# Patient Record
Sex: MALE | Race: WHITE | NOT HISPANIC OR LATINO | Employment: OTHER | URBAN - METROPOLITAN AREA
[De-identification: names, ages, dates, MRNs, and addresses within clinical notes are randomized per-mention and may not be internally consistent; named-entity substitution may affect disease eponyms.]

---

## 2017-01-07 ENCOUNTER — APPOINTMENT (OUTPATIENT)
Dept: RADIOLOGY | Facility: MEDICAL CENTER | Age: 67
DRG: 356 | End: 2017-01-07
Attending: INTERNAL MEDICINE
Payer: MEDICARE

## 2017-01-07 PROCEDURE — 73620 X-RAY EXAM OF FOOT: CPT | Mod: RT

## 2017-01-10 ENCOUNTER — PATIENT OUTREACH (OUTPATIENT)
Dept: HEALTH INFORMATION MANAGEMENT | Facility: OTHER | Age: 67
End: 2017-01-10

## 2017-11-24 ENCOUNTER — RESOLUTE PROFESSIONAL BILLING HOSPITAL PROF FEE (OUTPATIENT)
Dept: HOSPITALIST | Facility: MEDICAL CENTER | Age: 67
End: 2017-11-24
Payer: MEDICARE

## 2017-11-24 ENCOUNTER — APPOINTMENT (OUTPATIENT)
Dept: RADIOLOGY | Facility: MEDICAL CENTER | Age: 67
DRG: 896 | End: 2017-11-24
Attending: STUDENT IN AN ORGANIZED HEALTH CARE EDUCATION/TRAINING PROGRAM
Payer: MEDICARE

## 2017-11-24 ENCOUNTER — HOSPITAL ENCOUNTER (INPATIENT)
Facility: MEDICAL CENTER | Age: 67
LOS: 21 days | DRG: 896 | End: 2017-12-15
Attending: EMERGENCY MEDICINE | Admitting: INTERNAL MEDICINE
Payer: MEDICARE

## 2017-11-24 DIAGNOSIS — R41.89 NEUROCOGNITIVE DEFICITS: ICD-10-CM

## 2017-11-24 DIAGNOSIS — K92.1 HEMATOCHEZIA: ICD-10-CM

## 2017-11-24 DIAGNOSIS — R29.818 NEUROCOGNITIVE DEFICITS: ICD-10-CM

## 2017-11-24 DIAGNOSIS — F10.930 ALCOHOL WITHDRAWAL SYNDROME WITHOUT COMPLICATION (HCC): ICD-10-CM

## 2017-11-24 PROBLEM — I47.21 TORSADES DE POINTES (HCC): Status: ACTIVE | Noted: 2017-11-24

## 2017-11-24 PROBLEM — F10.939 ALCOHOL WITHDRAWAL (HCC): Status: ACTIVE | Noted: 2017-11-24

## 2017-11-24 PROBLEM — Z86.711 HISTORY OF PULMONARY EMBOLISM: Status: ACTIVE | Noted: 2017-11-24

## 2017-11-24 PROBLEM — K92.2 GI BLEED: Status: ACTIVE | Noted: 2017-11-24

## 2017-11-24 LAB
ALBUMIN SERPL BCP-MCNC: 3.1 G/DL (ref 3.2–4.9)
ALBUMIN SERPL BCP-MCNC: 3.3 G/DL (ref 3.2–4.9)
ALBUMIN/GLOB SERPL: 1.1 G/DL
ALBUMIN/GLOB SERPL: 1.2 G/DL
ALP SERPL-CCNC: 39 U/L (ref 30–99)
ALP SERPL-CCNC: 48 U/L (ref 30–99)
ALT SERPL-CCNC: 18 U/L (ref 2–50)
ALT SERPL-CCNC: 23 U/L (ref 2–50)
AMPHET UR QL SCN: NEGATIVE
ANION GAP SERPL CALC-SCNC: 10 MMOL/L (ref 0–11.9)
ANION GAP SERPL CALC-SCNC: 12 MMOL/L (ref 0–11.9)
AST SERPL-CCNC: 22 U/L (ref 12–45)
AST SERPL-CCNC: 31 U/L (ref 12–45)
BARBITURATES UR QL SCN: NEGATIVE
BASOPHILS # BLD AUTO: 0.1 % (ref 0–1.8)
BASOPHILS # BLD: 0.02 K/UL (ref 0–0.12)
BENZODIAZ UR QL SCN: NEGATIVE
BILIRUB SERPL-MCNC: 0.8 MG/DL (ref 0.1–1.5)
BILIRUB SERPL-MCNC: 1 MG/DL (ref 0.1–1.5)
BUN SERPL-MCNC: 13 MG/DL (ref 8–22)
BUN SERPL-MCNC: 16 MG/DL (ref 8–22)
BZE UR QL SCN: NEGATIVE
CALCIUM SERPL-MCNC: 8.2 MG/DL (ref 8.5–10.5)
CALCIUM SERPL-MCNC: 8.4 MG/DL (ref 8.5–10.5)
CANNABINOIDS UR QL SCN: NEGATIVE
CHLORIDE SERPL-SCNC: 108 MMOL/L (ref 96–112)
CHLORIDE SERPL-SCNC: 109 MMOL/L (ref 96–112)
CO2 SERPL-SCNC: 20 MMOL/L (ref 20–33)
CO2 SERPL-SCNC: 21 MMOL/L (ref 20–33)
CREAT SERPL-MCNC: 0.8 MG/DL (ref 0.5–1.4)
CREAT SERPL-MCNC: 0.88 MG/DL (ref 0.5–1.4)
EKG IMPRESSION: NORMAL
EKG IMPRESSION: NORMAL
EOSINOPHIL # BLD AUTO: 0 K/UL (ref 0–0.51)
EOSINOPHIL NFR BLD: 0 % (ref 0–6.9)
ERYTHROCYTE [DISTWIDTH] IN BLOOD BY AUTOMATED COUNT: 51.2 FL (ref 35.9–50)
ERYTHROCYTE [DISTWIDTH] IN BLOOD BY AUTOMATED COUNT: 51.5 FL (ref 35.9–50)
ERYTHROCYTE [DISTWIDTH] IN BLOOD BY AUTOMATED COUNT: 52.6 FL (ref 35.9–50)
GFR SERPL CREATININE-BSD FRML MDRD: >60 ML/MIN/1.73 M 2
GFR SERPL CREATININE-BSD FRML MDRD: >60 ML/MIN/1.73 M 2
GLOBULIN SER CALC-MCNC: 2.6 G/DL (ref 1.9–3.5)
GLOBULIN SER CALC-MCNC: 2.9 G/DL (ref 1.9–3.5)
GLUCOSE SERPL-MCNC: 107 MG/DL (ref 65–99)
GLUCOSE SERPL-MCNC: 150 MG/DL (ref 65–99)
HAV IGM SERPL QL IA: NEGATIVE
HBV CORE IGM SER QL: NEGATIVE
HBV SURFACE AG SER QL: NEGATIVE
HCT VFR BLD AUTO: 27 % (ref 42–52)
HCT VFR BLD AUTO: 27 % (ref 42–52)
HCT VFR BLD AUTO: 33 % (ref 42–52)
HCV AB SER QL: NEGATIVE
HGB BLD-MCNC: 11.1 G/DL (ref 14–18)
HGB BLD-MCNC: 9.1 G/DL (ref 14–18)
HGB BLD-MCNC: 9.2 G/DL (ref 14–18)
IMM GRANULOCYTES # BLD AUTO: 0.12 K/UL (ref 0–0.11)
IMM GRANULOCYTES NFR BLD AUTO: 0.8 % (ref 0–0.9)
INR PPP: 1.21 (ref 0.87–1.13)
LIPASE SERPL-CCNC: 91 U/L (ref 11–82)
LYMPHOCYTES # BLD AUTO: 0.4 K/UL (ref 1–4.8)
LYMPHOCYTES NFR BLD: 2.7 % (ref 22–41)
MAGNESIUM SERPL-MCNC: 1.5 MG/DL (ref 1.5–2.5)
MAGNESIUM SERPL-MCNC: 2.4 MG/DL (ref 1.5–2.5)
MCH RBC QN AUTO: 33.2 PG (ref 27–33)
MCH RBC QN AUTO: 33.2 PG (ref 27–33)
MCH RBC QN AUTO: 33.7 PG (ref 27–33)
MCHC RBC AUTO-ENTMCNC: 33.6 G/DL (ref 33.7–35.3)
MCHC RBC AUTO-ENTMCNC: 33.7 G/DL (ref 33.7–35.3)
MCHC RBC AUTO-ENTMCNC: 34.1 G/DL (ref 33.7–35.3)
MCV RBC AUTO: 100.3 FL (ref 81.4–97.8)
MCV RBC AUTO: 97.5 FL (ref 81.4–97.8)
MCV RBC AUTO: 98.5 FL (ref 81.4–97.8)
METHADONE UR QL SCN: NEGATIVE
MONOCYTES # BLD AUTO: 1.77 K/UL (ref 0–0.85)
MONOCYTES NFR BLD AUTO: 12.1 % (ref 0–13.4)
NEUTROPHILS # BLD AUTO: 12.26 K/UL (ref 1.82–7.42)
NEUTROPHILS NFR BLD: 84.3 % (ref 44–72)
NRBC # BLD AUTO: 0 K/UL
NRBC BLD AUTO-RTO: 0 /100 WBC
OPIATES UR QL SCN: NEGATIVE
OXYCODONE UR QL SCN: NEGATIVE
PCP UR QL SCN: NEGATIVE
PHOSPHATE SERPL-MCNC: 2.9 MG/DL (ref 2.5–4.5)
PLATELET # BLD AUTO: 126 K/UL (ref 164–446)
PLATELET # BLD AUTO: 94 K/UL (ref 164–446)
PLATELET # BLD AUTO: 97 K/UL (ref 164–446)
PMV BLD AUTO: 10.3 FL (ref 9–12.9)
PMV BLD AUTO: 10.5 FL (ref 9–12.9)
PMV BLD AUTO: 9.9 FL (ref 9–12.9)
POTASSIUM SERPL-SCNC: 3.5 MMOL/L (ref 3.6–5.5)
POTASSIUM SERPL-SCNC: 4.8 MMOL/L (ref 3.6–5.5)
PROPOXYPH UR QL SCN: NEGATIVE
PROT SERPL-MCNC: 5.7 G/DL (ref 6–8.2)
PROT SERPL-MCNC: 6.2 G/DL (ref 6–8.2)
PROTHROMBIN TIME: 15 SEC (ref 12–14.6)
RBC # BLD AUTO: 2.74 M/UL (ref 4.7–6.1)
RBC # BLD AUTO: 2.77 M/UL (ref 4.7–6.1)
RBC # BLD AUTO: 3.29 M/UL (ref 4.7–6.1)
SODIUM SERPL-SCNC: 140 MMOL/L (ref 135–145)
SODIUM SERPL-SCNC: 140 MMOL/L (ref 135–145)
T4 FREE SERPL-MCNC: 0.74 NG/DL (ref 0.53–1.43)
TSH SERPL DL<=0.005 MIU/L-ACNC: 0.64 UIU/ML (ref 0.3–3.7)
WBC # BLD AUTO: 14.6 K/UL (ref 4.8–10.8)
WBC # BLD AUTO: 7.7 K/UL (ref 4.8–10.8)
WBC # BLD AUTO: 9.7 K/UL (ref 4.8–10.8)

## 2017-11-24 PROCEDURE — 83690 ASSAY OF LIPASE: CPT

## 2017-11-24 PROCEDURE — 99285 EMERGENCY DEPT VISIT HI MDM: CPT

## 2017-11-24 PROCEDURE — 700105 HCHG RX REV CODE 258: Performed by: STUDENT IN AN ORGANIZED HEALTH CARE EDUCATION/TRAINING PROGRAM

## 2017-11-24 PROCEDURE — 302255 BARRIER CREAM MOISTURE BAZA PROTECT (ZINC) 5OZ: Performed by: INTERNAL MEDICINE

## 2017-11-24 PROCEDURE — 96376 TX/PRO/DX INJ SAME DRUG ADON: CPT

## 2017-11-24 PROCEDURE — 700111 HCHG RX REV CODE 636 W/ 250 OVERRIDE (IP): Performed by: EMERGENCY MEDICINE

## 2017-11-24 PROCEDURE — A9270 NON-COVERED ITEM OR SERVICE: HCPCS | Performed by: STUDENT IN AN ORGANIZED HEALTH CARE EDUCATION/TRAINING PROGRAM

## 2017-11-24 PROCEDURE — 84100 ASSAY OF PHOSPHORUS: CPT

## 2017-11-24 PROCEDURE — 700102 HCHG RX REV CODE 250 W/ 637 OVERRIDE(OP): Performed by: STUDENT IN AN ORGANIZED HEALTH CARE EDUCATION/TRAINING PROGRAM

## 2017-11-24 PROCEDURE — 94760 N-INVAS EAR/PLS OXIMETRY 1: CPT

## 2017-11-24 PROCEDURE — 93005 ELECTROCARDIOGRAM TRACING: CPT | Performed by: STUDENT IN AN ORGANIZED HEALTH CARE EDUCATION/TRAINING PROGRAM

## 2017-11-24 PROCEDURE — 700111 HCHG RX REV CODE 636 W/ 250 OVERRIDE (IP): Performed by: STUDENT IN AN ORGANIZED HEALTH CARE EDUCATION/TRAINING PROGRAM

## 2017-11-24 PROCEDURE — 80053 COMPREHEN METABOLIC PANEL: CPT

## 2017-11-24 PROCEDURE — 93010 ELECTROCARDIOGRAM REPORT: CPT | Mod: 76 | Performed by: INTERNAL MEDICINE

## 2017-11-24 PROCEDURE — HZ2ZZZZ DETOXIFICATION SERVICES FOR SUBSTANCE ABUSE TREATMENT: ICD-10-PCS | Performed by: INTERNAL MEDICINE

## 2017-11-24 PROCEDURE — 99223 1ST HOSP IP/OBS HIGH 75: CPT | Mod: AI,GC | Performed by: INTERNAL MEDICINE

## 2017-11-24 PROCEDURE — 96374 THER/PROPH/DIAG INJ IV PUSH: CPT

## 2017-11-24 PROCEDURE — 36415 COLL VENOUS BLD VENIPUNCTURE: CPT

## 2017-11-24 PROCEDURE — 96361 HYDRATE IV INFUSION ADD-ON: CPT

## 2017-11-24 PROCEDURE — 85027 COMPLETE CBC AUTOMATED: CPT | Mod: 91

## 2017-11-24 PROCEDURE — 83735 ASSAY OF MAGNESIUM: CPT

## 2017-11-24 PROCEDURE — 76700 US EXAM ABDOM COMPLETE: CPT

## 2017-11-24 PROCEDURE — 85025 COMPLETE CBC W/AUTO DIFF WBC: CPT

## 2017-11-24 PROCEDURE — 84443 ASSAY THYROID STIM HORMONE: CPT

## 2017-11-24 PROCEDURE — C9113 INJ PANTOPRAZOLE SODIUM, VIA: HCPCS | Performed by: STUDENT IN AN ORGANIZED HEALTH CARE EDUCATION/TRAINING PROGRAM

## 2017-11-24 PROCEDURE — 302172 SOFT BED BELT: Performed by: INTERNAL MEDICINE

## 2017-11-24 PROCEDURE — 770020 HCHG ROOM/CARE - TELE (206)

## 2017-11-24 PROCEDURE — 80074 ACUTE HEPATITIS PANEL: CPT

## 2017-11-24 PROCEDURE — 84439 ASSAY OF FREE THYROXINE: CPT

## 2017-11-24 PROCEDURE — 85610 PROTHROMBIN TIME: CPT

## 2017-11-24 PROCEDURE — 700101 HCHG RX REV CODE 250: Performed by: STUDENT IN AN ORGANIZED HEALTH CARE EDUCATION/TRAINING PROGRAM

## 2017-11-24 PROCEDURE — 74000 DX-ABDOMEN-1 VIEW: CPT

## 2017-11-24 PROCEDURE — 700105 HCHG RX REV CODE 258: Performed by: EMERGENCY MEDICINE

## 2017-11-24 PROCEDURE — 80307 DRUG TEST PRSMV CHEM ANLYZR: CPT

## 2017-11-24 RX ORDER — CHLORDIAZEPOXIDE HYDROCHLORIDE 25 MG/1
25 CAPSULE, GELATIN COATED ORAL 3 TIMES DAILY
Status: DISCONTINUED | OUTPATIENT
Start: 2017-11-24 | End: 2017-11-26

## 2017-11-24 RX ORDER — LORAZEPAM 2 MG/ML
1.5 INJECTION INTRAMUSCULAR
Status: DISCONTINUED | OUTPATIENT
Start: 2017-11-24 | End: 2017-12-02

## 2017-11-24 RX ORDER — LORAZEPAM 0.5 MG/1
0.5 TABLET ORAL EVERY 4 HOURS PRN
Status: DISCONTINUED | OUTPATIENT
Start: 2017-11-24 | End: 2017-12-02

## 2017-11-24 RX ORDER — LORAZEPAM 1 MG/1
1 TABLET ORAL EVERY 4 HOURS PRN
Status: DISCONTINUED | OUTPATIENT
Start: 2017-11-24 | End: 2017-12-02

## 2017-11-24 RX ORDER — LORAZEPAM 2 MG/ML
1 INJECTION INTRAMUSCULAR
Status: DISCONTINUED | OUTPATIENT
Start: 2017-11-24 | End: 2017-12-02

## 2017-11-24 RX ORDER — LORAZEPAM 1 MG/1
2 TABLET ORAL
Status: DISCONTINUED | OUTPATIENT
Start: 2017-11-24 | End: 2017-12-02

## 2017-11-24 RX ORDER — PANTOPRAZOLE SODIUM 40 MG/10ML
40 INJECTION, POWDER, LYOPHILIZED, FOR SOLUTION INTRAVENOUS 2 TIMES DAILY
Status: DISCONTINUED | OUTPATIENT
Start: 2017-11-24 | End: 2017-11-28

## 2017-11-24 RX ORDER — POTASSIUM CHLORIDE 20 MEQ/1
60 TABLET, EXTENDED RELEASE ORAL ONCE
Status: COMPLETED | OUTPATIENT
Start: 2017-11-24 | End: 2017-11-24

## 2017-11-24 RX ORDER — AMOXICILLIN 250 MG
2 CAPSULE ORAL 2 TIMES DAILY
Status: DISCONTINUED | OUTPATIENT
Start: 2017-11-24 | End: 2017-12-12

## 2017-11-24 RX ORDER — BISACODYL 10 MG
10 SUPPOSITORY, RECTAL RECTAL
Status: DISCONTINUED | OUTPATIENT
Start: 2017-11-24 | End: 2017-12-12

## 2017-11-24 RX ORDER — LORAZEPAM 2 MG/ML
2 INJECTION INTRAMUSCULAR ONCE
Status: COMPLETED | OUTPATIENT
Start: 2017-11-24 | End: 2017-11-24

## 2017-11-24 RX ORDER — POLYETHYLENE GLYCOL 3350 17 G/17G
1 POWDER, FOR SOLUTION ORAL
Status: DISCONTINUED | OUTPATIENT
Start: 2017-11-24 | End: 2017-12-12

## 2017-11-24 RX ORDER — LORAZEPAM 2 MG/ML
2 INJECTION INTRAMUSCULAR
Status: DISCONTINUED | OUTPATIENT
Start: 2017-11-24 | End: 2017-12-02

## 2017-11-24 RX ORDER — LORAZEPAM 1 MG/1
3 TABLET ORAL
Status: DISCONTINUED | OUTPATIENT
Start: 2017-11-24 | End: 2017-12-02

## 2017-11-24 RX ORDER — MAGNESIUM SULFATE HEPTAHYDRATE 40 MG/ML
2 INJECTION, SOLUTION INTRAVENOUS ONCE
Status: COMPLETED | OUTPATIENT
Start: 2017-11-24 | End: 2017-11-24

## 2017-11-24 RX ORDER — SODIUM CHLORIDE 9 MG/ML
INJECTION, SOLUTION INTRAVENOUS CONTINUOUS
Status: DISCONTINUED | OUTPATIENT
Start: 2017-11-24 | End: 2017-11-27

## 2017-11-24 RX ORDER — SODIUM CHLORIDE 9 MG/ML
1000 INJECTION, SOLUTION INTRAVENOUS ONCE
Status: COMPLETED | OUTPATIENT
Start: 2017-11-24 | End: 2017-11-24

## 2017-11-24 RX ORDER — LORAZEPAM 2 MG/ML
0.5 INJECTION INTRAMUSCULAR EVERY 4 HOURS PRN
Status: DISCONTINUED | OUTPATIENT
Start: 2017-11-24 | End: 2017-12-02

## 2017-11-24 RX ORDER — LORAZEPAM 1 MG/1
4 TABLET ORAL
Status: DISCONTINUED | OUTPATIENT
Start: 2017-11-24 | End: 2017-12-02

## 2017-11-24 RX ADMIN — CEFTRIAXONE 2 G: 2 INJECTION, POWDER, FOR SOLUTION INTRAMUSCULAR; INTRAVENOUS at 08:35

## 2017-11-24 RX ADMIN — LORAZEPAM 1 MG: 2 INJECTION INTRAMUSCULAR; INTRAVENOUS at 16:54

## 2017-11-24 RX ADMIN — LORAZEPAM 1 MG: 1 TABLET ORAL at 22:05

## 2017-11-24 RX ADMIN — THIAMINE HYDROCHLORIDE 100 MG: 100 INJECTION, SOLUTION INTRAMUSCULAR; INTRAVENOUS at 10:20

## 2017-11-24 RX ADMIN — POTASSIUM CHLORIDE 60 MEQ: 1500 TABLET, EXTENDED RELEASE ORAL at 16:57

## 2017-11-24 RX ADMIN — SODIUM CHLORIDE: 9 INJECTION, SOLUTION INTRAVENOUS at 22:06

## 2017-11-24 RX ADMIN — CHLORDIAZEPOXIDE HYDROCHLORIDE 25 MG: 25 CAPSULE ORAL at 22:05

## 2017-11-24 RX ADMIN — LORAZEPAM 1 MG: 1 TABLET ORAL at 19:04

## 2017-11-24 RX ADMIN — PANTOPRAZOLE SODIUM 40 MG: 40 INJECTION, POWDER, FOR SOLUTION INTRAVENOUS at 22:06

## 2017-11-24 RX ADMIN — LORAZEPAM 1.5 MG: 2 INJECTION INTRAMUSCULAR; INTRAVENOUS at 10:21

## 2017-11-24 RX ADMIN — SODIUM CHLORIDE: 9 INJECTION, SOLUTION INTRAVENOUS at 08:33

## 2017-11-24 RX ADMIN — MAGNESIUM SULFATE IN WATER 2 G: 40 INJECTION, SOLUTION INTRAVENOUS at 15:33

## 2017-11-24 RX ADMIN — CHLORDIAZEPOXIDE HYDROCHLORIDE 25 MG: 25 CAPSULE ORAL at 10:20

## 2017-11-24 RX ADMIN — SODIUM CHLORIDE: 9 INJECTION, SOLUTION INTRAVENOUS at 11:42

## 2017-11-24 RX ADMIN — LORAZEPAM 2 MG: 2 INJECTION INTRAMUSCULAR; INTRAVENOUS at 06:49

## 2017-11-24 RX ADMIN — SODIUM CHLORIDE 1000 ML: 9 INJECTION, SOLUTION INTRAVENOUS at 04:39

## 2017-11-24 RX ADMIN — PANTOPRAZOLE SODIUM 40 MG: 40 INJECTION, POWDER, FOR SOLUTION INTRAVENOUS at 10:20

## 2017-11-24 RX ADMIN — FOLIC ACID 1 MG: 5 INJECTION, SOLUTION INTRAMUSCULAR; INTRAVENOUS; SUBCUTANEOUS at 08:36

## 2017-11-24 RX ADMIN — CHLORDIAZEPOXIDE HYDROCHLORIDE 25 MG: 25 CAPSULE ORAL at 15:34

## 2017-11-24 RX ADMIN — LORAZEPAM 2 MG: 1 TABLET ORAL at 12:45

## 2017-11-24 RX ADMIN — LORAZEPAM 2 MG: 2 INJECTION INTRAMUSCULAR; INTRAVENOUS at 04:39

## 2017-11-24 RX ADMIN — LORAZEPAM 0.5 MG: 2 INJECTION INTRAMUSCULAR; INTRAVENOUS at 13:54

## 2017-11-24 RX ADMIN — SODIUM CHLORIDE 1000 ML: 9 INJECTION, SOLUTION INTRAVENOUS at 10:23

## 2017-11-24 ASSESSMENT — LIFESTYLE VARIABLES
HAVE YOU EVER FELT YOU SHOULD CUT DOWN ON YOUR DRINKING: NO
PAROXYSMAL SWEATS: NO SWEAT VISIBLE
ORIENTATION AND CLOUDING OF SENSORIUM: ORIENTED AND CAN DO SERIAL ADDITIONS
ON A TYPICAL DAY WHEN YOU DRINK ALCOHOL HOW MANY DRINKS DO YOU HAVE: 5
AUDITORY DISTURBANCES: NOT PRESENT
NAUSEA AND VOMITING: NO NAUSEA AND NO VOMITING
VISUAL DISTURBANCES: MODERATE SENSITIVITY
ORIENTATION AND CLOUDING OF SENSORIUM: CANNOT DO SERIAL ADDITIONS OR IS UNCERTAIN ABOUT DATE
ANXIETY: MILDLY ANXIOUS
AUDITORY DISTURBANCES: MILD HARSHNESS OR ABILITY TO FRIGHTEN
VISUAL DISTURBANCES: NOT PRESENT
NAUSEA AND VOMITING: NO NAUSEA AND NO VOMITING
VISUAL DISTURBANCES: NOT PRESENT
AUDITORY DISTURBANCES: NOT PRESENT
VISUAL DISTURBANCES: MODERATE SENSITIVITY
AGITATION: SOMEWHAT MORE THAN NORMAL ACTIVITY
TREMOR: MODERATE TREMOR WITH ARMS EXTENDED
PAROXYSMAL SWEATS: NO SWEAT VISIBLE
EVER_SMOKED: NEVER
TOTAL SCORE: 0
AGITATION: *
EVER HAD A DRINK FIRST THING IN THE MORNING TO STEADY YOUR NERVES TO GET RID OF A HANGOVER: NO
TREMOR: *
TREMOR: *
TOTAL SCORE: 8
EVER FELT BAD OR GUILTY ABOUT YOUR DRINKING: NO
TREMOR: *
EVER HAD A DRINK FIRST THING IN THE MORNING TO STEADY YOUR NERVES TO GET RID OF A HANGOVER: NO
TOTAL SCORE: 10
VISUAL DISTURBANCES: VERY MILD SENSITIVITY
TOTAL SCORE: 0
ON A TYPICAL DAY WHEN YOU DRINK ALCOHOL HOW MANY DRINKS DO YOU HAVE: 4
HEADACHE, FULLNESS IN HEAD: VERY MILD
TOTAL SCORE: 0
ANXIETY: *
ORIENTATION AND CLOUDING OF SENSORIUM: CANNOT DO SERIAL ADDITIONS OR IS UNCERTAIN ABOUT DATE
NAUSEA AND VOMITING: NO NAUSEA AND NO VOMITING
TOTAL SCORE: 11
DO YOU DRINK ALCOHOL: YES
TOTAL SCORE: 10
AUDITORY DISTURBANCES: NOT PRESENT
PAROXYSMAL SWEATS: BARELY PERCEPTIBLE SWEATING. PALMS MOIST
PAROXYSMAL SWEATS: NO SWEAT VISIBLE
HOW MANY TIMES IN THE PAST YEAR HAVE YOU HAD 5 OR MORE DRINKS IN A DAY: 150
PAROXYSMAL SWEATS: NO SWEAT VISIBLE
HEADACHE, FULLNESS IN HEAD: NOT PRESENT
ORIENTATION AND CLOUDING OF SENSORIUM: CANNOT DO SERIAL ADDITIONS OR IS UNCERTAIN ABOUT DATE
AUDITORY DISTURBANCES: VERY MILD HARSHNESS OR ABILITY TO FRIGHTEN
AGITATION: SOMEWHAT MORE THAN NORMAL ACTIVITY
ANXIETY: MILDLY ANXIOUS
AUDITORY DISTURBANCES: NOT PRESENT
CONSUMPTION TOTAL: POSITIVE
NAUSEA AND VOMITING: NO NAUSEA AND NO VOMITING
HEADACHE, FULLNESS IN HEAD: VERY MILD
ORIENTATION AND CLOUDING OF SENSORIUM: ORIENTED AND CAN DO SERIAL ADDITIONS
ORIENTATION AND CLOUDING OF SENSORIUM: CANNOT DO SERIAL ADDITIONS OR IS UNCERTAIN ABOUT DATE
TOTAL SCORE: 0
HOW MANY TIMES IN THE PAST YEAR HAVE YOU HAD 5 OR MORE DRINKS IN A DAY: 150
CONSUMPTION TOTAL: POSITIVE
TOTAL SCORE: 0
HEADACHE, FULLNESS IN HEAD: NOT PRESENT
HAVE PEOPLE ANNOYED YOU BY CRITICIZING YOUR DRINKING: NO
ANXIETY: MILDLY ANXIOUS
NAUSEA AND VOMITING: NO NAUSEA AND NO VOMITING
PAROXYSMAL SWEATS: BARELY PERCEPTIBLE SWEATING. PALMS MOIST
TREMOR: MODERATE TREMOR WITH ARMS EXTENDED
HEADACHE, FULLNESS IN HEAD: NOT PRESENT
TOTAL SCORE: 0
TOTAL SCORE: 15
AGITATION: *
HEADACHE, FULLNESS IN HEAD: VERY MILD
VISUAL DISTURBANCES: MODERATE SENSITIVITY
PAROXYSMAL SWEATS: NO SWEAT VISIBLE
ANXIETY: *
AUDITORY DISTURBANCES: NOT PRESENT
HAVE YOU EVER FELT YOU SHOULD CUT DOWN ON YOUR DRINKING: NO
TREMOR: MODERATE TREMOR WITH ARMS EXTENDED
TOTAL SCORE: 9
TREMOR: *
ANXIETY: MILDLY ANXIOUS
EVER FELT BAD OR GUILTY ABOUT YOUR DRINKING: NO
AUDITORY DISTURBANCES: NOT PRESENT
AGITATION: SOMEWHAT MORE THAN NORMAL ACTIVITY
AVERAGE NUMBER OF DAYS PER WEEK YOU HAVE A DRINK CONTAINING ALCOHOL: 6
ORIENTATION AND CLOUDING OF SENSORIUM: CANNOT DO SERIAL ADDITIONS OR IS UNCERTAIN ABOUT DATE
ORIENTATION AND CLOUDING OF SENSORIUM: CANNOT DO SERIAL ADDITIONS OR IS UNCERTAIN ABOUT DATE
HEADACHE, FULLNESS IN HEAD: MODERATE
HAVE PEOPLE ANNOYED YOU BY CRITICIZING YOUR DRINKING: NO
TREMOR: *
TOTAL SCORE: 10
HEADACHE, FULLNESS IN HEAD: VERY MILD
AGITATION: SOMEWHAT MORE THAN NORMAL ACTIVITY
AVERAGE NUMBER OF DAYS PER WEEK YOU HAVE A DRINK CONTAINING ALCOHOL: 6
ANXIETY: MILDLY ANXIOUS
VISUAL DISTURBANCES: NOT PRESENT
AGITATION: SOMEWHAT MORE THAN NORMAL ACTIVITY
PAROXYSMAL SWEATS: NO SWEAT VISIBLE
NAUSEA AND VOMITING: NO NAUSEA AND NO VOMITING
AGITATION: *
VISUAL DISTURBANCES: VERY MILD SENSITIVITY
NAUSEA AND VOMITING: NO NAUSEA AND NO VOMITING
TOTAL SCORE: 11
NAUSEA AND VOMITING: NO NAUSEA AND NO VOMITING
ANXIETY: MILDLY ANXIOUS
ALCOHOL_USE: YES

## 2017-11-24 ASSESSMENT — ENCOUNTER SYMPTOMS
FOCAL WEAKNESS: 0
BLURRED VISION: 0
VOMITING: 1
DIZZINESS: 0
WHEEZING: 0
COUGH: 0
PHOTOPHOBIA: 0
CHILLS: 0
NAUSEA: 0
SENSORY CHANGE: 0
SORE THROAT: 0
PALPITATIONS: 0
DOUBLE VISION: 0
HEARTBURN: 0
TINGLING: 0
DEPRESSION: 0
MYALGIAS: 0
HEADACHES: 0
SHORTNESS OF BREATH: 0
DIAPHORESIS: 0
LOSS OF CONSCIOUSNESS: 0
DIZZINESS: 1
FEVER: 0
HEMOPTYSIS: 0
DIARRHEA: 1
WEIGHT LOSS: 0
ABDOMINAL PAIN: 1
VOMITING: 0
WEAKNESS: 0
BLOOD IN STOOL: 1

## 2017-11-24 ASSESSMENT — PAIN SCALES - GENERAL
PAINLEVEL_OUTOF10: 0

## 2017-11-24 ASSESSMENT — PATIENT HEALTH QUESTIONNAIRE - PHQ9
SUM OF ALL RESPONSES TO PHQ9 QUESTIONS 1 AND 2: 0
SUM OF ALL RESPONSES TO PHQ QUESTIONS 1-9: 0
2. FEELING DOWN, DEPRESSED, IRRITABLE, OR HOPELESS: NOT AT ALL
1. LITTLE INTEREST OR PLEASURE IN DOING THINGS: NOT AT ALL

## 2017-11-24 NOTE — H&P
Internal Medicine Admitting History and Physical    Note Author: Kim Moreno M.D.       Name Chava Mercedes     1950   Age/Sex 67 y.o. male   MRN [id number]   Code Status Full Code     After 5PM or if no immediate response to page, please call for cross-coverage  Attending/Team: Dr. Up/Lydia See Patient List for primary contact information  Call [phone] to page    1st Call - Day Intern (R1):   Dr. Martínez 2nd Call - Day Sr. Resident (R2/R3):   Dr. Del Angel       Chief Complaint:  GI bleed, ETOH withdrawl    HPI:  Mr. Mercedes is a 67-year-old male with a past medical history of duodenal ulcer requiring hemostasis in 2016, alcohol withdrawal complicated by delirium tremens, PE status post IVC filter in 2017 who presents today as a transfer from [place] for GI bleed and alcohol withdrawal. Patient is a poor historian with inconsistent stories to different staff members. Patient states that the bleeding started yesterday evening, as he had 2-3 episodes of bloody bowel movements consisting of dark stool as well as bright red blood per rectum. He also notes multiple episodes of emesis however denies any blood in the vomit. He states that emesis was mainly bile. Patient also complains of abdominal pain that is located in his epigastric and periumbilical region, with radiation straight through to the middle of his back. He also notes dizziness when he is standing. He denies any fevers, chills, chest pain, palpitations, shortness of breath.  Patient states that his last drink was on 2017, where he drank 2 beers.    An [place] patient was not to be tachycardic to 125. His hemoglobin was noted to be 11.9. Patient was given IV fluids, octreotide, Protonix and transferred to [facility] for further evaluation. In the ED patient remained tachycardic but his Hb remained relatively stable at 11.1. Patient was admitted for further workup.       Review of Systems   Constitutional: Negative  for chills, fever and weight loss.   HENT: Negative for congestion, hearing loss and sore throat.    Eyes: Negative for blurred vision, double vision and photophobia.   Respiratory: Negative for cough, shortness of breath and wheezing.    Cardiovascular: Negative for chest pain, palpitations and leg swelling.   Gastrointestinal: Positive for abdominal pain, blood in stool, diarrhea, melena and vomiting. Negative for nausea.   Genitourinary: Negative for dysuria, frequency and urgency.   Musculoskeletal: Negative for joint pain and myalgias.   Skin: Negative for rash.   Neurological: Positive for dizziness. Negative for tingling, sensory change, focal weakness, weakness and headaches.   Psychiatric/Behavioral: Negative for depression.             Past Medical History:   No past medical history on file.    Past Surgical History:  Past Surgical History:   Procedure Laterality Date   • GASTROSCOPY-ENDO  12/18/2016    Procedure: GASTROSCOPY-ENDO;  Surgeon: Garry Cordero M.D.;  Location: SURGERY [facility];  Service:    • GASTROSCOPY N/A 12/11/2016    Procedure: GASTROSCOPY;  Surgeon: Olaf Ortiz M.D.;  Location: SURGERY [facility];  Service:    • GASTROSCOPY-ENDO  12/11/2016    Procedure: GASTROSCOPY-ENDO;  Surgeon: Olaf Ortiz M.D.;  Location: SURGERY [facility];  Service:        Current Outpatient Medications:  Home Medications    **Home medications have not yet been reviewed for this encounter**         Medication Allergy/Sensitivities:  No Known Allergies      Family History:  No family history on file.    Social History:  Social History     Social History   • Marital status: Single     Spouse name: N/A   • Number of children: N/A   • Years of education: N/A     Occupational History   • Not on file.     Social History Main Topics   • Smoking status: Not on file   • Smokeless tobacco: Not on file   • Alcohol use Not on file   • Drug use: Unknown   • Sexual activity: Not on file     Other Topics  Concern   • Not on file     Social History Narrative   • No narrative on file       Physical Exam     Vitals:    11/24/17 0426 11/24/17 0429   BP:  139/93   Pulse:  (!) 123   Resp:  18   Temp:  36.9 °C (98.5 °F)   Weight: 68 kg (150 lb)    Height: 1.829 m (6')      Body mass index is 20.34 kg/m².  /93   Pulse (!) 123   Temp 36.9 °C (98.5 °F)   Resp 18   Ht 1.829 m (6')   Wt 68 kg (150 lb)   BMI 20.34 kg/m²   O2 therapy: O2 Delivery: None (Room Air)    Physical Exam   Constitutional: He is oriented to person, place, and time and well-developed, well-nourished, and in no distress.   Patient has severe tremors with rest and intention   HENT:   Head: Normocephalic and atraumatic.   Mouth/Throat: No oropharyngeal exudate.   Eyes: Pupils are equal, round, and reactive to light. No scleral icterus.   Neck: Normal range of motion. Neck supple. No JVD present.   Cardiovascular: Regular rhythm and normal heart sounds.  Tachycardia present.    Pulmonary/Chest: Effort normal and breath sounds normal. No respiratory distress. He has no wheezes.   Abdominal: Soft. Bowel sounds are normal. He exhibits no distension. There is tenderness. There is no rebound and no guarding.   Moderate hepatomegaly noted   Genitourinary:   Genitourinary Comments: Rectal exam showed trace amount of blood in the rectal vault   Musculoskeletal: Normal range of motion. He exhibits no edema.   Neurological: He is alert and oriented to person, place, and time. No cranial nerve deficit.   Skin: Skin is dry. No rash noted. He is not diaphoretic. No erythema.   Psychiatric: Affect normal.       Data Review       Old Records Request:   Completed  Current Records review and summary: Completed    Lab Data Review:  Recent Results (from the past 24 hour(s))   CBC WITH DIFFERENTIAL    Collection Time: 11/24/17  4:33 AM   Result Value Ref Range    WBC 14.6 (H) 4.8 - 10.8 K/uL    RBC 3.29 (L) 4.70 - 6.10 M/uL    Hemoglobin 11.1 (L) 14.0 - 18.0 g/dL     Hematocrit 33.0 (L) 42.0 - 52.0 %    .3 (H) 81.4 - 97.8 fL    MCH 33.7 (H) 27.0 - 33.0 pg    MCHC 33.6 (L) 33.7 - 35.3 g/dL    RDW 52.6 (H) 35.9 - 50.0 fL    Platelet Count 126 (L) 164 - 446 K/uL    MPV 10.3 9.0 - 12.9 fL    Neutrophils-Polys 84.30 (H) 44.00 - 72.00 %    Lymphocytes 2.70 (L) 22.00 - 41.00 %    Monocytes 12.10 0.00 - 13.40 %    Eosinophils 0.00 0.00 - 6.90 %    Basophils 0.10 0.00 - 1.80 %    Immature Granulocytes 0.80 0.00 - 0.90 %    Nucleated RBC 0.00 /100 WBC    Neutrophils (Absolute) 12.26 (H) 1.82 - 7.42 K/uL    Lymphs (Absolute) 0.40 (L) 1.00 - 4.80 K/uL    Monos (Absolute) 1.77 (H) 0.00 - 0.85 K/uL    Eos (Absolute) 0.00 0.00 - 0.51 K/uL    Baso (Absolute) 0.02 0.00 - 0.12 K/uL    Immature Granulocytes (abs) 0.12 (H) 0.00 - 0.11 K/uL    NRBC (Absolute) 0.00 K/uL   CMP    Collection Time: 11/24/17  4:33 AM   Result Value Ref Range    Sodium 140 135 - 145 mmol/L    Potassium 4.8 3.6 - 5.5 mmol/L    Chloride 108 96 - 112 mmol/L    Co2 20 20 - 33 mmol/L    Anion Gap 12.0 (H) 0.0 - 11.9    Glucose 150 (H) 65 - 99 mg/dL    Bun 16 8 - 22 mg/dL    Creatinine 0.88 0.50 - 1.40 mg/dL    Calcium 8.4 (L) 8.5 - 10.5 mg/dL    AST(SGOT) 31 12 - 45 U/L    ALT(SGPT) 23 2 - 50 U/L    Alkaline Phosphatase 48 30 - 99 U/L    Total Bilirubin 1.0 0.1 - 1.5 mg/dL    Albumin 3.3 3.2 - 4.9 g/dL    Total Protein 6.2 6.0 - 8.2 g/dL    Globulin 2.9 1.9 - 3.5 g/dL    A-G Ratio 1.1 g/dL   PT/INR    Collection Time: 11/24/17  4:33 AM   Result Value Ref Range    PT 15.0 (H) 12.0 - 14.6 sec    INR 1.21 (H) 0.87 - 1.13   ESTIMATED GFR    Collection Time: 11/24/17  4:33 AM   Result Value Ref Range    GFR If African American >60 >60 mL/min/1.73 m 2    GFR If Non African American >60 >60 mL/min/1.73 m 2   Magnesium    Collection Time: 11/24/17  4:33 AM   Result Value Ref Range    Magnesium 1.5 1.5 - 2.5 mg/dL   PHOSPHORUS    Collection Time: 11/24/17  4:33 AM   Result Value Ref Range    Phosphorus 2.9 2.5 - 4.5 mg/dL        Imaging/Procedures Review:    Independant Imaging Review: Completed  US-ABDOMEN COMPLETE SURVEY    (Results Pending)   [id number] VIEW    (Results Pending)        EKG: Pending       Assessment/Plan     * GI bleed- (present on admission)   Assessment & Plan    - Patient transferred from [place] after having mixture of melena and bright red blood per rectum for 1 day  - Complains of epigastric abdominal pain, dizziness when standing, tachycardic at rest.  - Hb currently stable, decreased from 11.9 in [place] to 11.1 at [facility]  - Patient received octreotide and Protonix in [place]  - Continue IV fluids  - NPO  - Started on octreotide, Protonix, Rocephin for prophylaxis  - Abdominal ultrasound ordered  - GI on consult, appreciate rec's, likely requires scope with possible intervention  - Q8 H&H        Alcohol withdrawal (CMS-HCC)- (present on admission)   Assessment & Plan    - Patient has history of alcohol withdrawal with symptoms, history of DTs  - Patient states last drink was 2 days ago, 11/22/2017  - Patient currently has tremors  - CIWA protocol  - Fall/Seizure/Aspiration precautions  - Folate, Thiamine, B12        History of pulmonary embolism   Assessment & Plan    - History of PE status post IVC filter        Hepatic steatosis- (present on admission)   Assessment & Plan    - Patient has history of hepatic steatosis  - Abdominal ultrasound ordered          Anticipated Hospital stay:  >2 midnights      Quality Measures    Reviewed items::  Labs reviewed and Medications reviewed  Walker catheter::  No Walker  : no central line.  DVT prophylaxis pharmacological::  Contraindicated - High bleeding risk  DVT prophylaxis - mechanical:  SCDs  Ulcer Prophylaxis::  Yes  : Rocephin.
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
## 2017-11-24 NOTE — ED NOTES
Med rec complete per Pt at bedside   Pt denies any medication Rx or OTC  Allergies reviewed  No ABX in last 30 days

## 2017-11-24 NOTE — ED NOTES
"BIB med flight    Chief Complaint   Patient presents with   • Lower GI Bleed     Per EMS, pt had bowel movement around 0000 with corin red blood. per pt, \"I had 3 bloody stools.\" pt transfered [facility].      Pt in gown, blood drawn, ERP at bedside.   "

## 2017-11-24 NOTE — PROGRESS NOTES
Change in patient condition due to: Cardiac  Rapid Response called at: 1435  Physician NORM KAHN notified at 1445    See Code Blue timeline for rapid response events. Patient Remained on Unit

## 2017-11-24 NOTE — ASSESSMENT & PLAN NOTE
- History of PE status post IVC filter  - IVC filter placed due to contraindication for anticoagulation given duodenal ulcer bleeding

## 2017-11-24 NOTE — SENIOR ADMIT NOTE
HPI:  Patient is a 67-year-old man who was transferred here from [place] due to GI bleeding and alcohol withdrawal. His hemoglobin in [place] was 11.9. He was given octreotide and Protonix IV, and then transferred here. Patient is a poor historian, and is unclear on details, but endorses 3 episodes of pasty stool with a mixture of bright red blood and dark red. He told EDP that he filled up the toilet with blood 3 times. This is associated with orthostasis, headache, bilious vomiting, diffuse abdominal pain - more periumbilical - not related to food and it does radiates to his back. He denies fevers/chills, hematemesis.   Of note, the patient states that his last drinks were 2 beers on Wednesday. He has a history of heavy drinking in the past.  Patient has a history of duodenal ulcers with severe GI bleeding, PE s/p IVC filter placement and severe alcohol withdrawal in the past with DVTs - no seizures. He is      In ED: Severe tremors, tachycardic, MADELINE - trace amounts of bright red blood.     On exam: In moderate distress, cooperative, mild tangential thinking when answering questions.  Eyes: No scleral icterus  Cardiac: Tachycardic, regular rhythm, no murmurs rubs or gallops. No lower extremity edema.  Pulmonary: Clear to auscultation bilaterally, no wheezes or crackles.  Abdomen: Distended, mild guarding, no rebound. Diffuse mild tenderness. No stigmata of liver disease. Hepatomegaly. Negative Brar sign.  Neuro: severe tremors    Assessment and plan:    GI bleed  History of duodenal ulcers in the past  Hb 11.9 >> now 11.1  Positive orthostasis  MADELINE - trace blood  Diffuse abdominal tenderness    Plan:  NPO  IV fluids - has received 2 L as of now - start 150 ml/hr  PPI IV BID  GI consult in a.m. for possible EGD  Lipase  Abdominal x-ray  Abdominal ultrasound  SCDs     Alcohol withdrawal  History of delirium tremens  [facility] protocol  IV thiamine and folate  EKG    PE   s/p IVC filter

## 2017-11-24 NOTE — ASSESSMENT & PLAN NOTE
- Patient has history of alcohol withdrawal with symptoms, history of DTs  - Patient states last drink was 11/22/17, two days before admission  - Withdrawal symptoms improved as CIWA/Librium were discontinued  - Beyond typical 7 day withdrawal course, but continues to have disorientation/delirium (much improved)  - Fall/Seizure/Aspiration precautions  - Multivitamin, thiamine, folate

## 2017-11-24 NOTE — ASSESSMENT & PLAN NOTE
- Patient transferred from [place] after having mixture of melena and bright red blood per rectum for 1 day  - Abdominal ultrasound shows fatty liver, mild hepatomegaly, cholelithiasis  - RBC scan limited study due to motion, no signs of active bleeding  - Hb stable, no signs of active bleeding  - EGD shows small nodular mucosa, superficial ulcers, pathology pending  - Continue PPI, sucralfate  - Continue IV maintenance fluids  - Transfuse if Hgb <7  - colonoscopy as outpatient

## 2017-11-24 NOTE — PROGRESS NOTES
Pt settled into room. Strip bed alarm and built in bed alarm armed on pt. Call light within reach. VSS. Admit profile completed. Skin check done. MD at bedside talking with pt. Pt very tremulous. Pt educated to not get up and to call for assistance. Pt pulling at IV while at bedside. Educated to not touch IV. More tape applied to pt's IV. Pt oriented x4 though is off. Pt mumbles and makes nonsense statements occasionally. Pt knows, date, time, place, and person.

## 2017-11-24 NOTE — PROGRESS NOTES
Monitor room called monitor check on 720 in [facility]. Patient was in [place]. Monitor room called down to [facility] and was told that patient was in the bathroom and they would check. This RN went to [facility] and found nurse in bathroom with patient. This RN pulled code cart into bathroom and a rapid response was initiated. NORM KAHN was paged. On [place] patient appeared to be in [place] and this was verified by monitor room. Patient then converted out into sinus tach. Patient was returned to bed and transported to [facility] on [place] with Rapid Team and this RN.   NORM KAHN updated. 2 g Mg ordered. CIWA assessed. Patient updated on plan of care. Call light within reach and hourly rounding in progress.

## 2017-11-25 ENCOUNTER — APPOINTMENT (OUTPATIENT)
Dept: RADIOLOGY | Facility: MEDICAL CENTER | Age: 67
DRG: 896 | End: 2017-11-25
Attending: STUDENT IN AN ORGANIZED HEALTH CARE EDUCATION/TRAINING PROGRAM
Payer: MEDICARE

## 2017-11-25 LAB
ALBUMIN SERPL BCP-MCNC: 2.9 G/DL (ref 3.2–4.9)
ALBUMIN/GLOB SERPL: 1.3 G/DL
ALP SERPL-CCNC: 33 U/L (ref 30–99)
ALT SERPL-CCNC: 15 U/L (ref 2–50)
ANION GAP SERPL CALC-SCNC: 5 MMOL/L (ref 0–11.9)
AST SERPL-CCNC: 22 U/L (ref 12–45)
BILIRUB SERPL-MCNC: 1 MG/DL (ref 0.1–1.5)
BUN SERPL-MCNC: 10 MG/DL (ref 8–22)
CALCIUM SERPL-MCNC: 7.8 MG/DL (ref 8.5–10.5)
CHLORIDE SERPL-SCNC: 112 MMOL/L (ref 96–112)
CO2 SERPL-SCNC: 20 MMOL/L (ref 20–33)
CREAT SERPL-MCNC: 0.67 MG/DL (ref 0.5–1.4)
EKG IMPRESSION: NORMAL
ERYTHROCYTE [DISTWIDTH] IN BLOOD BY AUTOMATED COUNT: 51.8 FL (ref 35.9–50)
ERYTHROCYTE [DISTWIDTH] IN BLOOD BY AUTOMATED COUNT: 52.5 FL (ref 35.9–50)
FOLATE SERPL-MCNC: 18.4 NG/ML
GFR SERPL CREATININE-BSD FRML MDRD: >60 ML/MIN/1.73 M 2
GLOBULIN SER CALC-MCNC: 2.2 G/DL (ref 1.9–3.5)
GLUCOSE SERPL-MCNC: 91 MG/DL (ref 65–99)
HCT VFR BLD AUTO: 26.2 % (ref 42–52)
HCT VFR BLD AUTO: 27.1 % (ref 42–52)
HGB BLD-MCNC: 8.6 G/DL (ref 14–18)
HGB BLD-MCNC: 8.8 G/DL (ref 14–18)
MAGNESIUM SERPL-MCNC: 2 MG/DL (ref 1.5–2.5)
MCH RBC QN AUTO: 33.2 PG (ref 27–33)
MCH RBC QN AUTO: 33.5 PG (ref 27–33)
MCHC RBC AUTO-ENTMCNC: 32.5 G/DL (ref 33.7–35.3)
MCHC RBC AUTO-ENTMCNC: 32.8 G/DL (ref 33.7–35.3)
MCV RBC AUTO: 101.9 FL (ref 81.4–97.8)
MCV RBC AUTO: 102.3 FL (ref 81.4–97.8)
PHOSPHATE SERPL-MCNC: 1.5 MG/DL (ref 2.5–4.5)
PLATELET # BLD AUTO: 92 K/UL (ref 164–446)
PLATELET # BLD AUTO: 98 K/UL (ref 164–446)
PMV BLD AUTO: 10.6 FL (ref 9–12.9)
PMV BLD AUTO: 10.7 FL (ref 9–12.9)
POTASSIUM SERPL-SCNC: 3.7 MMOL/L (ref 3.6–5.5)
PROT SERPL-MCNC: 5.1 G/DL (ref 6–8.2)
RBC # BLD AUTO: 2.57 M/UL (ref 4.7–6.1)
RBC # BLD AUTO: 2.65 M/UL (ref 4.7–6.1)
SODIUM SERPL-SCNC: 137 MMOL/L (ref 135–145)
VIT B12 SERPL-MCNC: 169 PG/ML (ref 211–911)
WBC # BLD AUTO: 5.3 K/UL (ref 4.8–10.8)
WBC # BLD AUTO: 5.9 K/UL (ref 4.8–10.8)

## 2017-11-25 PROCEDURE — 90662 IIV NO PRSV INCREASED AG IM: CPT | Performed by: INTERNAL MEDICINE

## 2017-11-25 PROCEDURE — 84100 ASSAY OF PHOSPHORUS: CPT

## 2017-11-25 PROCEDURE — 90471 IMMUNIZATION ADMIN: CPT

## 2017-11-25 PROCEDURE — 83735 ASSAY OF MAGNESIUM: CPT

## 2017-11-25 PROCEDURE — C9113 INJ PANTOPRAZOLE SODIUM, VIA: HCPCS | Performed by: STUDENT IN AN ORGANIZED HEALTH CARE EDUCATION/TRAINING PROGRAM

## 2017-11-25 PROCEDURE — 3E0234Z INTRODUCTION OF SERUM, TOXOID AND VACCINE INTO MUSCLE, PERCUTANEOUS APPROACH: ICD-10-PCS | Performed by: INTERNAL MEDICINE

## 2017-11-25 PROCEDURE — 700101 HCHG RX REV CODE 250: Performed by: STUDENT IN AN ORGANIZED HEALTH CARE EDUCATION/TRAINING PROGRAM

## 2017-11-25 PROCEDURE — 700111 HCHG RX REV CODE 636 W/ 250 OVERRIDE (IP): Performed by: STUDENT IN AN ORGANIZED HEALTH CARE EDUCATION/TRAINING PROGRAM

## 2017-11-25 PROCEDURE — 700105 HCHG RX REV CODE 258: Performed by: STUDENT IN AN ORGANIZED HEALTH CARE EDUCATION/TRAINING PROGRAM

## 2017-11-25 PROCEDURE — 302146

## 2017-11-25 PROCEDURE — 82746 ASSAY OF FOLIC ACID SERUM: CPT

## 2017-11-25 PROCEDURE — 36415 COLL VENOUS BLD VENIPUNCTURE: CPT

## 2017-11-25 PROCEDURE — 82607 VITAMIN B-12: CPT

## 2017-11-25 PROCEDURE — 85027 COMPLETE CBC AUTOMATED: CPT

## 2017-11-25 PROCEDURE — 90670 PCV13 VACCINE IM: CPT | Performed by: INTERNAL MEDICINE

## 2017-11-25 PROCEDURE — 770020 HCHG ROOM/CARE - TELE (206)

## 2017-11-25 PROCEDURE — 99233 SBSQ HOSP IP/OBS HIGH 50: CPT | Mod: GC | Performed by: INTERNAL MEDICINE

## 2017-11-25 PROCEDURE — 78278 ACUTE GI BLOOD LOSS IMAGING: CPT

## 2017-11-25 PROCEDURE — 80053 COMPREHEN METABOLIC PANEL: CPT

## 2017-11-25 PROCEDURE — A9270 NON-COVERED ITEM OR SERVICE: HCPCS | Performed by: STUDENT IN AN ORGANIZED HEALTH CARE EDUCATION/TRAINING PROGRAM

## 2017-11-25 PROCEDURE — 700111 HCHG RX REV CODE 636 W/ 250 OVERRIDE (IP): Performed by: INTERNAL MEDICINE

## 2017-11-25 PROCEDURE — 700102 HCHG RX REV CODE 250 W/ 637 OVERRIDE(OP): Performed by: STUDENT IN AN ORGANIZED HEALTH CARE EDUCATION/TRAINING PROGRAM

## 2017-11-25 PROCEDURE — 93005 ELECTROCARDIOGRAM TRACING: CPT | Performed by: STUDENT IN AN ORGANIZED HEALTH CARE EDUCATION/TRAINING PROGRAM

## 2017-11-25 PROCEDURE — 93010 ELECTROCARDIOGRAM REPORT: CPT | Performed by: INTERNAL MEDICINE

## 2017-11-25 RX ORDER — CYANOCOBALAMIN 1000 UG/ML
1000 INJECTION, SOLUTION INTRAMUSCULAR; SUBCUTANEOUS ONCE
Status: COMPLETED | OUTPATIENT
Start: 2017-11-25 | End: 2017-11-25

## 2017-11-25 RX ORDER — CHOLECALCIFEROL (VITAMIN D3) 125 MCG
1000 CAPSULE ORAL DAILY
Status: DISCONTINUED | OUTPATIENT
Start: 2017-11-25 | End: 2017-11-25

## 2017-11-25 RX ORDER — MAGNESIUM SULFATE 1 G/100ML
1 INJECTION INTRAVENOUS ONCE
Status: COMPLETED | OUTPATIENT
Start: 2017-11-25 | End: 2017-11-25

## 2017-11-25 RX ORDER — POTASSIUM CHLORIDE 20 MEQ/1
40 TABLET, EXTENDED RELEASE ORAL ONCE
Status: COMPLETED | OUTPATIENT
Start: 2017-11-25 | End: 2017-11-25

## 2017-11-25 RX ADMIN — LORAZEPAM 1.5 MG: 2 INJECTION INTRAMUSCULAR; INTRAVENOUS at 21:13

## 2017-11-25 RX ADMIN — LORAZEPAM 1.5 MG: 2 INJECTION INTRAMUSCULAR; INTRAVENOUS at 22:30

## 2017-11-25 RX ADMIN — LORAZEPAM 1.5 MG: 2 INJECTION INTRAMUSCULAR; INTRAVENOUS at 13:46

## 2017-11-25 RX ADMIN — CYANOCOBALAMIN 1000 MCG: 1000 INJECTION, SOLUTION INTRAMUSCULAR; SUBCUTANEOUS at 08:22

## 2017-11-25 RX ADMIN — LORAZEPAM 1.5 MG: 2 INJECTION INTRAMUSCULAR; INTRAVENOUS at 10:20

## 2017-11-25 RX ADMIN — THIAMINE HYDROCHLORIDE 100 MG: 100 INJECTION, SOLUTION INTRAMUSCULAR; INTRAVENOUS at 11:57

## 2017-11-25 RX ADMIN — SODIUM CHLORIDE: 9 INJECTION, SOLUTION INTRAVENOUS at 03:45

## 2017-11-25 RX ADMIN — LORAZEPAM 1.5 MG: 2 INJECTION INTRAMUSCULAR; INTRAVENOUS at 11:16

## 2017-11-25 RX ADMIN — INFLUENZA A VIRUSA/MICHIGAN/45/2015 X-275 (H1N1) ANTIGEN (FORMALDEHYDE INACTIVATED), INFLUENZA A VIRUS A/HONG KONG/4801/2014 X-263B (H3N2) ANTIGEN (FORMALDEHYDE INACTIVATED), AND INFLUENZA B VIRUS B/BRISBANE/60/2008 ANTIGEN (FORMALDEHYDE INACTIVATED) 0.5 ML: 60; 60; 60 INJECTION, SUSPENSION INTRAMUSCULAR at 05:30

## 2017-11-25 RX ADMIN — SODIUM CHLORIDE: 9 INJECTION, SOLUTION INTRAVENOUS at 21:16

## 2017-11-25 RX ADMIN — LORAZEPAM 1 MG: 2 INJECTION INTRAMUSCULAR; INTRAVENOUS at 05:32

## 2017-11-25 RX ADMIN — PANTOPRAZOLE SODIUM 40 MG: 40 INJECTION, POWDER, FOR SOLUTION INTRAVENOUS at 08:23

## 2017-11-25 RX ADMIN — LORAZEPAM 1 MG: 2 INJECTION INTRAMUSCULAR; INTRAVENOUS at 12:15

## 2017-11-25 RX ADMIN — LORAZEPAM 1.5 MG: 2 INJECTION INTRAMUSCULAR; INTRAVENOUS at 19:59

## 2017-11-25 RX ADMIN — POTASSIUM PHOSPHATE, MONOBASIC AND POTASSIUM PHOSPHATE, DIBASIC 30 MMOL: 224; 236 INJECTION, SOLUTION INTRAVENOUS at 11:57

## 2017-11-25 RX ADMIN — LORAZEPAM 1.5 MG: 2 INJECTION INTRAMUSCULAR; INTRAVENOUS at 15:07

## 2017-11-25 RX ADMIN — LORAZEPAM 1.5 MG: 2 INJECTION INTRAMUSCULAR; INTRAVENOUS at 16:25

## 2017-11-25 RX ADMIN — LORAZEPAM 1.5 MG: 2 INJECTION INTRAMUSCULAR; INTRAVENOUS at 18:29

## 2017-11-25 RX ADMIN — LORAZEPAM 1 MG: 2 INJECTION INTRAMUSCULAR; INTRAVENOUS at 01:36

## 2017-11-25 RX ADMIN — THERA TABS 1 TABLET: TAB at 08:14

## 2017-11-25 RX ADMIN — LORAZEPAM 1.5 MG: 2 INJECTION INTRAMUSCULAR; INTRAVENOUS at 17:11

## 2017-11-25 RX ADMIN — POTASSIUM CHLORIDE 40 MEQ: 1500 TABLET, EXTENDED RELEASE ORAL at 07:45

## 2017-11-25 RX ADMIN — CHLORDIAZEPOXIDE HYDROCHLORIDE 25 MG: 25 CAPSULE ORAL at 08:14

## 2017-11-25 RX ADMIN — CHLORDIAZEPOXIDE HYDROCHLORIDE 25 MG: 25 CAPSULE ORAL at 19:59

## 2017-11-25 RX ADMIN — MAGNESIUM SULFATE IN DEXTROSE 1 G: 10 INJECTION, SOLUTION INTRAVENOUS at 07:37

## 2017-11-25 RX ADMIN — LORAZEPAM 1 MG: 2 INJECTION INTRAMUSCULAR; INTRAVENOUS at 03:45

## 2017-11-25 RX ADMIN — LORAZEPAM 1 MG: 2 INJECTION INTRAMUSCULAR; INTRAVENOUS at 23:26

## 2017-11-25 RX ADMIN — PNEUMOCOCCAL 13-VALENT CONJUGATE VACCINE 0.5 ML: 2.2; 2.2; 2.2; 2.2; 2.2; 4.4; 2.2; 2.2; 2.2; 2.2; 2.2; 2.2; 2.2 INJECTION, SUSPENSION INTRAMUSCULAR at 20:19

## 2017-11-25 RX ADMIN — PANTOPRAZOLE SODIUM 40 MG: 40 INJECTION, POWDER, FOR SOLUTION INTRAVENOUS at 20:01

## 2017-11-25 RX ADMIN — CHLORDIAZEPOXIDE HYDROCHLORIDE 25 MG: 25 CAPSULE ORAL at 15:58

## 2017-11-25 ASSESSMENT — ENCOUNTER SYMPTOMS
WEIGHT LOSS: 1
HEMOPTYSIS: 0
NAUSEA: 0
MUSCULOSKELETAL NEGATIVE: 1
PND: 0
SINUS PAIN: 0
SENSORY CHANGE: 0
ABDOMINAL PAIN: 1
VOMITING: 1
SPEECH CHANGE: 0
CONSTIPATION: 0
TREMORS: 1
SENSORY CHANGE: 1
DIARRHEA: 1
SHORTNESS OF BREATH: 0
PALPITATIONS: 0
SORE THROAT: 0
VOMITING: 0
CHILLS: 0
FEVER: 0
DIAPHORESIS: 0
BLOOD IN STOOL: 1
COUGH: 0
HALLUCINATIONS: 1
CARDIOVASCULAR NEGATIVE: 1

## 2017-11-25 ASSESSMENT — LIFESTYLE VARIABLES
NAUSEA AND VOMITING: NO NAUSEA AND NO VOMITING
HEADACHE, FULLNESS IN HEAD: MODERATELY SEVERE
VISUAL DISTURBANCES: MODERATE SENSITIVITY
PAROXYSMAL SWEATS: NO SWEAT VISIBLE
VISUAL DISTURBANCES: VERY MILD SENSITIVITY
VISUAL DISTURBANCES: NOT PRESENT
TOTAL SCORE: 24
ANXIETY: *
HEADACHE, FULLNESS IN HEAD: MILD
AUDITORY DISTURBANCES: MILD HARSHNESS OR ABILITY TO FRIGHTEN
VISUAL DISTURBANCES: NOT PRESENT
TREMOR: MODERATE TREMOR WITH ARMS EXTENDED
ORIENTATION AND CLOUDING OF SENSORIUM: DATE DISORIENTATION BY MORE THAN TWO CALENDAR DAYS
PAROXYSMAL SWEATS: *
TREMOR: *
VISUAL DISTURBANCES: MILD SENSITIVITY
VISUAL DISTURBANCES: MODERATE SENSITIVITY
HEADACHE, FULLNESS IN HEAD: MODERATELY SEVERE
TREMOR: MODERATE TREMOR WITH ARMS EXTENDED
NAUSEA AND VOMITING: NO NAUSEA AND NO VOMITING
AGITATION: MODERATELY FIDGETY AND RESTLESS
PAROXYSMAL SWEATS: *
VISUAL DISTURBANCES: MILD SENSITIVITY
HEADACHE, FULLNESS IN HEAD: NOT PRESENT
ORIENTATION AND CLOUDING OF SENSORIUM: DATE DISORIENTATION BY NO MORE THAN TWO CALENDAR DAYS
HEADACHE, FULLNESS IN HEAD: MODERATELY SEVERE
NAUSEA AND VOMITING: NO NAUSEA AND NO VOMITING
AGITATION: *
TOTAL SCORE: 20
ORIENTATION AND CLOUDING OF SENSORIUM: DATE DISORIENTATION BY NO MORE THAN TWO CALENDAR DAYS
NAUSEA AND VOMITING: NO NAUSEA AND NO VOMITING
ANXIETY: MODERATELY ANXIOUS OR GUARDED, SO ANXIETY IS INFERRED
HEADACHE, FULLNESS IN HEAD: MODERATE
TREMOR: *
AGITATION: *
HEADACHE, FULLNESS IN HEAD: NOT PRESENT
AUDITORY DISTURBANCES: NOT PRESENT
TOTAL SCORE: 11
NAUSEA AND VOMITING: NO NAUSEA AND NO VOMITING
ORIENTATION AND CLOUDING OF SENSORIUM: DATE DISORIENTATION BY MORE THAN TWO CALENDAR DAYS
TREMOR: MODERATE TREMOR WITH ARMS EXTENDED
AUDITORY DISTURBANCES: VERY MILD HARSHNESS OR ABILITY TO FRIGHTEN
ANXIETY: *
TOTAL SCORE: 14
ORIENTATION AND CLOUDING OF SENSORIUM: DATE DISORIENTATION BY MORE THAN TWO CALENDAR DAYS
AUDITORY DISTURBANCES: MILD HARSHNESS OR ABILITY TO FRIGHTEN
TOTAL SCORE: 20
AGITATION: MODERATELY FIDGETY AND RESTLESS
NAUSEA AND VOMITING: NO NAUSEA AND NO VOMITING
TREMOR: MODERATE TREMOR WITH ARMS EXTENDED
NAUSEA AND VOMITING: NO NAUSEA AND NO VOMITING
ANXIETY: *
PAROXYSMAL SWEATS: *
VISUAL DISTURBANCES: NOT PRESENT
TOTAL SCORE: 19
AGITATION: *
AUDITORY DISTURBANCES: NOT PRESENT
ORIENTATION AND CLOUDING OF SENSORIUM: DATE DISORIENTATION BY MORE THAN TWO CALENDAR DAYS
TREMOR: MODERATE TREMOR WITH ARMS EXTENDED
HEADACHE, FULLNESS IN HEAD: VERY MILD
ORIENTATION AND CLOUDING OF SENSORIUM: DATE DISORIENTATION BY MORE THAN TWO CALENDAR DAYS
TOTAL SCORE: 11
AUDITORY DISTURBANCES: MILD HARSHNESS OR ABILITY TO FRIGHTEN
AUDITORY DISTURBANCES: NOT PRESENT
TOTAL SCORE: 19
HEADACHE, FULLNESS IN HEAD: MODERATE
NAUSEA AND VOMITING: NO NAUSEA AND NO VOMITING
TOTAL SCORE: 25
VISUAL DISTURBANCES: MILD SENSITIVITY
TOTAL SCORE: 20
TOTAL SCORE: MILD ITCHING, PINS AND NEEDLES SENSATION, BURNING OR NUMBNESS
PAROXYSMAL SWEATS: NO SWEAT VISIBLE
HEADACHE, FULLNESS IN HEAD: MODERATE
TOTAL SCORE: 19
TOTAL SCORE: 25
AGITATION: *
AUDITORY DISTURBANCES: VERY MILD HARSHNESS OR ABILITY TO FRIGHTEN
HEADACHE, FULLNESS IN HEAD: MILD
ORIENTATION AND CLOUDING OF SENSORIUM: DATE DISORIENTATION BY NO MORE THAN TWO CALENDAR DAYS
NAUSEA AND VOMITING: NO NAUSEA AND NO VOMITING
NAUSEA AND VOMITING: NO NAUSEA AND NO VOMITING
TREMOR: *
NAUSEA AND VOMITING: NO NAUSEA AND NO VOMITING
ANXIETY: *
ANXIETY: MODERATELY ANXIOUS OR GUARDED, SO ANXIETY IS INFERRED
ANXIETY: *
NAUSEA AND VOMITING: NO NAUSEA AND NO VOMITING
AUDITORY DISTURBANCES: NOT PRESENT
PAROXYSMAL SWEATS: *
ANXIETY: MODERATELY ANXIOUS OR GUARDED, SO ANXIETY IS INFERRED
ORIENTATION AND CLOUDING OF SENSORIUM: DATE DISORIENTATION BY MORE THAN TWO CALENDAR DAYS
TREMOR: MODERATE TREMOR WITH ARMS EXTENDED
AGITATION: MODERATELY FIDGETY AND RESTLESS
AUDITORY DISTURBANCES: NOT PRESENT
HEADACHE, FULLNESS IN HEAD: VERY MILD
AGITATION: *
NAUSEA AND VOMITING: NO NAUSEA AND NO VOMITING
AUDITORY DISTURBANCES: VERY MILD HARSHNESS OR ABILITY TO FRIGHTEN
TREMOR: *
ORIENTATION AND CLOUDING OF SENSORIUM: CANNOT DO SERIAL ADDITIONS OR IS UNCERTAIN ABOUT DATE
AGITATION: *
ANXIETY: *
NAUSEA AND VOMITING: NO NAUSEA AND NO VOMITING
HEADACHE, FULLNESS IN HEAD: NOT PRESENT
AGITATION: *
VISUAL DISTURBANCES: NOT PRESENT
ORIENTATION AND CLOUDING OF SENSORIUM: DATE DISORIENTATION BY MORE THAN TWO CALENDAR DAYS
AUDITORY DISTURBANCES: NOT PRESENT
TOTAL SCORE: 18
ORIENTATION AND CLOUDING OF SENSORIUM: DATE DISORIENTATION BY MORE THAN TWO CALENDAR DAYS
HEADACHE, FULLNESS IN HEAD: NOT PRESENT
VISUAL DISTURBANCES: MODERATE SENSITIVITY
VISUAL DISTURBANCES: NOT PRESENT
ANXIETY: MODERATELY ANXIOUS OR GUARDED, SO ANXIETY IS INFERRED
PAROXYSMAL SWEATS: *
TOTAL SCORE: 12
ORIENTATION AND CLOUDING OF SENSORIUM: DATE DISORIENTATION BY MORE THAN TWO CALENDAR DAYS
HEADACHE, FULLNESS IN HEAD: MODERATE
TREMOR: *
AUDITORY DISTURBANCES: MILD HARSHNESS OR ABILITY TO FRIGHTEN
PAROXYSMAL SWEATS: *
TREMOR: MODERATE TREMOR WITH ARMS EXTENDED
PAROXYSMAL SWEATS: *
ANXIETY: *
AGITATION: SOMEWHAT MORE THAN NORMAL ACTIVITY
VISUAL DISTURBANCES: MODERATE SENSITIVITY
AGITATION: *
ANXIETY: *
PAROXYSMAL SWEATS: *
TREMOR: *
AUDITORY DISTURBANCES: MILD HARSHNESS OR ABILITY TO FRIGHTEN
PAROXYSMAL SWEATS: *
TREMOR: MODERATE TREMOR WITH ARMS EXTENDED
TREMOR: *
ANXIETY: *
AGITATION: *
AGITATION: MODERATELY FIDGETY AND RESTLESS
TOTAL SCORE: 14
ORIENTATION AND CLOUDING OF SENSORIUM: DATE DISORIENTATION BY MORE THAN TWO CALENDAR DAYS
PAROXYSMAL SWEATS: NO SWEAT VISIBLE
ORIENTATION AND CLOUDING OF SENSORIUM: DATE DISORIENTATION BY MORE THAN TWO CALENDAR DAYS
AGITATION: *
VISUAL DISTURBANCES: MODERATELY SEVERE HALLUCINATIONS
PAROXYSMAL SWEATS: NO SWEAT VISIBLE
ANXIETY: *
PAROXYSMAL SWEATS: *
AUDITORY DISTURBANCES: VERY MILD HARSHNESS OR ABILITY TO FRIGHTEN
NAUSEA AND VOMITING: NO NAUSEA AND NO VOMITING
PAROXYSMAL SWEATS: *
VISUAL DISTURBANCES: MILD SENSITIVITY
ANXIETY: MODERATELY ANXIOUS OR GUARDED, SO ANXIETY IS INFERRED

## 2017-11-25 ASSESSMENT — PAIN SCALES - GENERAL
PAINLEVEL_OUTOF10: 0

## 2017-11-25 NOTE — CARE PLAN
Problem: Safety  Goal: Will remain free from injury  Outcome: PROGRESSING AS EXPECTED  Educated patient on use of call light, no slip socks on, bed lowest position. All needs attended to. Patient verbalized understanding. Educated patient on use of call light, no slip socks on, bed lowest position. All needs attended to. Patient verbalized understanding.     Problem: Communication  Goal: The ability to communicate needs accurately and effectively will improve  Outcome: PROGRESSING SLOWER THAN EXPECTED  Patient educated on medications, procedures and use of call light. Patient will continue to verbalize and improve on education and communication in the plan of care.

## 2017-11-25 NOTE — PROGRESS NOTES
Pt observed, no change from original assessment, vss, no c/o pain at this time  Pt AAOx3, no other signs or symptoms of distress, fall precautions in place, call light within reach, all questions answered, will continue to monitor. He is orient x 3 but is still impulsive and attempts to climb out of bed without assistance. Bed alarm is on and will observe closely.

## 2017-11-25 NOTE — DISCHARGE SUMMARY
Internal Medicine Discharge Summary  Note Author: Gibson Etienne MD      Admit Date:  11/24/2017       Discharge Date:   12/15/2017    Service:   [facility] Internal Medicine [facility] Team  Attending Physician(s):   Dr. Madrid/Dr Up  Senior Resident(s):   Dr. Rader/Dr. Etienne  Shine Resident(s):   Dr. Martínez/Dr. Solomon      Primary Diagnosis:   Altered level of consciousness      Secondary Diagnoses:                Principal Problem:    GI bleed POA: Yes  Active Problems:    Alcohol withdrawal (CMS-HCC) POA: Yes    Macrocytic anemia POA: Yes    Hepatic steatosis POA: Yes    History of pulmonary embolism POA: Unknown    Torsades de pointes (CMS-HCC) POA: No  Resolved Problems:    * No resolved hospital problems. *      Hospital Summary (Brief Narrative):       Mr. Mercedes is a 67-year-old male with a past medical history of duodenal ulcer requiring hemostasis in 12/2016, alcohol withdrawal complicated by delirium tremens, pulmonary embolism status post IVC filter in 01/2017 who presents as a transfer from [place] for gastrointestinal bleed and alcohol withdrawal.  In [place] patient was noted to be tachycardic to 125. His hemoglobin was noted to be 11.9. Patient was given IV fluids, octreotide, Protonix and transferred to [facility] for further evaluation. In the ED patient remained tachycardic but his Hb remained relatively stable at 11.1. Patient was admitted for further workup. Octreotide and Protonix drips continued initially but later on as was felt that the bleeding is mostly lower GI bleeding he was switched to twice a day IV Protonix and the octreotide drip was discontinued. Antibiotics were also discontinued. Patient started on IV fluids. Hemoglobin monitored closely every 6 hours and remained stable. Patient was also started on CIWA protocol. He was given folic acid, thiamine and multivitamins and started on Librium. During the course of hospitalization, the patient developed features of  alcohol withdrawal, managed according to [facility] protocol with lorazepam IV and oral, and Librium by mouth. Alcohol withdrawal symptoms gradually resolved.     Gastroenterology was consulted for GI bleeding. EGD was done after stabilization from alcohol withdrawal and showed small nodular mucosa in cardia 7 mm in size biopsied, two small superficial ulcers in the pre pylorus region 3 mm in size no active bleeding or visible vessel, biopsies for HP performed. otherwise normal EGD.    The patient continued to have some disorientation, confusion even after resolution of alcohol withdrawal. More history  obtained from the family and his wife stated that he has history of seizure in the past but he had no attack since one year ago. Neurology was consulted with recommendations to monitor as they felt his mental status was multifactorial.  Ammonia and lactate levels were WNL.  EEG was unremarkable.  MRI brain showed 14 x 8mm simple cyst in the pineal region, but was otherwise normal.   However Patient had fever to 38.3 on 12/05 prompting concer for infectious etiology of AMS.  CXRs were negative except for right- and left-sided atelectasis with hypoinflation though pneumonitis could not be excluded.  Preliminary blood cultures were positive for gram positive cocci for which Vancomycin was started.  However, final cultures grew coagulase negative staph thought to be a contaminant and Vancomycin was discontinued.  From then, he showed no signs of possible infection with the exception of elevated CRP and ESR.    In the meantime, Coretrak was placed for failed SLP evaluation on 12/04 and he was intermittently placed in wrist and vest restraints for continued agitation amid waxing and waning disorientation and hallucination and delusions.  Psychiatry was consulted for behavior out of concern for underlying psychiatric disorder.  Their evaluation was negative for psychiatric pathology and advised that his condition was likely  alcohol-related with recommendation for cognitive evaluation.  Neurocognitive evaluation by SLP found Patient to have minimal to severe cognitive deficits in all domains and not at level of independent living with recommendation for SNF placement.  Behavioral symptoms were managed with PRN and nightly scheduled Seroquel.  Mental status continued to wax and wane with very slow improvement.  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  While he continued to confabulate, he was no longer having hallucinations or periods of agitation.  Restraints, Condom Cath, and Coretrak were discontinued without complication.  Diet was advanced from tube feeds to full liquid diet with nectar thick liquids and ultimately thin liquids.  With discontinuation of Coretrak, Patient was no longer candidate for SNF, but also vehemently stated that he just wanted to go home.  Based on our findings, we find him capable of living in the senior living facility where he currently resides, however were concerned about him making his own decision for discharge.  We discussed with his wife, who is his legal next of kin.  She agreed that she was comfortable with his progress and stated that he could be discharged home.  At time of discharge, he is tolerating his diet, voiding spontaneous, and AOx3 (could not tell day or date, but knew month and year).        Patient /Hospital Summary (Details -- Problem Oriented) :          * GI bleed   Assessment & Plan    - Patient transferred from [place] after having mixture of melena and bright red blood per rectum for 1 day  - Complains of epigastric abdominal pain, dizziness when standing, tachycardic at rest.  - Hb showing downward trend, decreased from 11.9 in [place] to 9   - Abdominal ultrasound shows fatty liver, mild hepatomegaly, cholelithiasis  - RBC scan ordered, however pt went into Torsades, cardioverted into  sinus rhythm  - Discussed case with GI who will evaluate patient, but do not believe patient is actively bleeding, consult placed  - Continue IV fluids  - NPO for now  - H&H q6h  - Transfuse if Hgb <7        Alcohol withdrawal (CMS-HCC)   Assessment & Plan    - Patient has history of alcohol withdrawal with symptoms, history of DTs  - Patient states last drink was 2 days ago, 11/22/2017  - Patient currently has tremors  - Scheduled librium 25 mg TID  - CIWA protocol  - Fall/Seizure/Aspiration precautions  - Folate, Thiamine, B12        Torsades de pointes (CMS-HCC)   Assessment & Plan    - Cardioverted into sinus rhythm  - EKG show sinus tachycardia  - No history of cardiac problems, heart attack, stroke  - Mg 1.5, K 3.5, repleting  - Continue to monitor on telemetry          History of pulmonary embolism   Assessment & Plan    - History of PE status post IVC filter        Hepatic steatosis   Assessment & Plan    - Patient has history of hepatic steatosis  - LFTs stable  - Confirmed on abdominal ultrasound            Consultants:     Gastroenterology  Neurology  Psychiatry    Also seen by: SLP    Procedures:        1 Gastroscopy (12/02)  2. EEG (12/04)      Imaging/ Testing:      DX-CHEST-LIMITED (1 VIEW)   Final Result      1.  There is increasing right lung hypoinflation with right lower lobe atelectasis.      MR-BRAIN-WITH & W/O   Final Result      1.  No acute abnormality.   2.  Mild cerebral atrophy.   3.  There is an approximately 14 x 8 mm sized simple cyst in the pineal region.      DX-CHEST-PORTABLE (1 VIEW)   Final Result      Mild left basilar opacity may represent atelectasis. Pneumonitis not excluded.         DX-ABDOMEN FOR TUBE PLACEMENT   Final Result      Enteric tube has been placed and the tip projects over the stomach.      CT-HEAD W/O   Final Result      No acute intracranial findings.         NM-GI BLEEDING SCAN   Final Result      1.  Negative limited GI bleed nuclear medicine scan.      2.   Patient was unable to tolerate imaging beyond 50 minutes despite sedation.      US-ABDOMEN COMPLETE SURVEY   Final Result      1.  Cholelithiasis without evidence for biliary obstruction      2.  Fatty infiltration of liver and mild hepatomegaly         [id number] VIEW   Final Result      No dilated bowel            Discharge Medications:         Medication Reconciliation: Completed       Medication List      START taking these medications      Instructions   Cyanocobalamin 2000 MCG Tabs  Start taking on:  12/16/2017   1 Tab by Per NG Tube route every day.  Dose:  2000 mcg     ferrous sulfate 325 (65 Fe) MG tablet   Take 1 Tab by mouth every day.  Dose:  325 mg     folic acid 1 MG Tabs  Start taking on:  12/16/2017  Commonly known as:  FOLVITE   Take 1 Tab by mouth every day.  Dose:  1 mg     multivitamin Tabs  Start taking on:  12/16/2017   Take 1 Tab by mouth every day.  Dose:  1 Tab     omeprazole 40 MG delayed-release capsule  Commonly known as:  PRILOSEC   Take 1 Cap by mouth every day.  Dose:  40 mg     quetiapine 50 MG tablet  Commonly known as:  SEROQUEL   Take 1 Tab by mouth every evening.  Dose:  50 mg     sucralfate 1 GM Tabs  Commonly known as:  CARAFATE   Take 1 Tab by mouth 4 Times a Day,Before Meals and at Bedtime.  Dose:  1 g     * thiamine 100 MG/ML Soln  Start taking on:  12/16/2017  Commonly known as:  B-1   Doctor's comments:  To be given IM at pharmacy on 12/16 and 12/17  2.5 mL by Intramuscular route every day for 2 days.  Dose:  250 mg     * thiamine 100 MG tablet  Start taking on:  12/18/2017  Commonly known as:  THIAMINE   Take 1 Tab by mouth every day.  Dose:  100 mg        * This list has 2 medication(s) that are the same as other medications prescribed for you. Read the directions carefully, and ask your doctor or other care provider to review them with you.                  Prognosis:   Fair    Disposition:   Home    Diet:   Regular dysphagia III diet with thin liquids    Activity:    As Tolerated    Instructions:      Primary Team Recommendations:  Patient is to continued on IM thiamine on 12/16 and 12/17.  IM injections may be given at his local pharmacy (he has been instructed to do this).  He is to then continue with PO Thiamine indefinitely.  He is to continue taking all medications as described above.    The patient was instructed to return to the ER in the event of worsening symptoms. I have counseled the patient on the importance of compliance and the patient has agreed to proceed with all medical recommendations and follow up plan indicated above.   The patient understands that all medications come with benefits and risks. Risks may include permanent injury or death and these risks can be minimized with close reassessment and monitoring.        Primary Care Provider:    Pcp Pt States None  Discharge summary faxed to primary care provider:  Deferred  Copy of discharge summary given to the patient: Completed      Follow up appointment details :      none    Pending Studies:        none    Time spent on discharge day patient visit, preparing discharge paperwork and arranging for patient follow up.    Summary of follow up issues:   As above    Discharge Time (Minutes) :    70  Hospital Course Type:  Inpatient Stay >2 midnights      Condition on Discharge    ______________________________________________________________________    Interval history/exam for day of discharge:     - Seen at bedside   - Patient appears well, AOx3 (unsure of day/date)   - Eager for discharge to home    Vitals:    11/25/17 0000 11/25/17 0400 11/25/17 0459 11/25/17 0739   BP: 101/60 104/56 101/58 104/63   Pulse: 93 100 (!) 108 (!) 103   Resp: 16 18 16 19   Temp: 37.3 °C (99.2 °F) 37.2 °C (99 °F) 37.3 °C (99.2 °F) 37.4 °C (99.3 °F)   SpO2: 96% 95% 97% 96%   Weight:       Height:         Weight/BMI: Body mass index is 23.62 kg/m².  Pulse Oximetry: 96 %, O2 (LPM): 0, O2 Delivery: None (Room Air)    General: Well  appearing  CVS: RRR, no MRG  PULM: CTABL, no wheezing, crackles, rhonchi  EXT: no edema, tenderness  NEURO/PSYCH: unable to complete backwards serial 7's beyond 100 and 93, AOx3, no focal neurological deficits, conversant without delusions or tangential speech    Most Recent Labs:    Lab Results   Component Value Date/Time    WBC 7.2 12/11/2017 03:05 AM    RBC 3.09 (L) 12/11/2017 03:05 AM    HEMOGLOBIN 9.2 (L) 12/11/2017 03:05 AM    HEMATOCRIT 29.2 (L) 12/11/2017 03:05 AM    MCV 94.5 12/11/2017 03:05 AM    MCH 29.8 12/11/2017 03:05 AM    MCHC 31.5 (L) 12/11/2017 03:05 AM    MPV 9.8 12/11/2017 03:05 AM    NEUTSPOLYS 54.40 12/09/2017 02:06 AM    LYMPHOCYTES 28.90 12/09/2017 02:06 AM    MONOCYTES 12.30 12/09/2017 02:06 AM    EOSINOPHILS 2.60 12/09/2017 02:06 AM    BASOPHILS 1.80 12/09/2017 02:06 AM    HYPOCHROMIA 1+ 12/09/2017 02:06 AM    ANISOCYTOSIS 1+ 12/09/2017 02:06 AM      Lab Results   Component Value Date/Time    SODIUM 139 12/09/2017 02:06 AM    POTASSIUM 4.0 12/09/2017 02:06 AM    CHLORIDE 109 12/09/2017 02:06 AM    CO2 24 12/09/2017 02:06 AM    GLUCOSE 101 (H) 12/09/2017 02:06 AM    BUN 8 12/09/2017 02:06 AM    CREATININE 0.75 12/09/2017 02:06 AM      Lab Results   Component Value Date/Time    ALTSGPT 15 12/06/2017 03:14 AM    ASTSGOT 16 12/06/2017 03:14 AM    ALKPHOSPHAT 51 12/06/2017 03:14 AM    TBILIRUBIN 0.3 12/06/2017 03:14 AM    LIPASE 97 (H) 11/28/2017 03:25 AM    ALBUMIN 2.9 (L) 12/06/2017 03:14 AM    GLOBULIN 3.1 12/06/2017 03:14 AM    PREALBUMIN 11.0 (L) 12/11/2017 03:05 AM    INR 1.21 (H) 11/24/2017 04:33 AM    MACROCYTOSIS 1+ 12/09/2017 02:06 AM     Lab Results   Component Value Date/Time    PROTHROMBTM 15.0 (H) 11/24/2017 04:33 AM    INR 1.21 (H) 11/24/2017 04:33 AM

## 2017-11-25 NOTE — ASSESSMENT & PLAN NOTE
- Rhythm strip reviewed by cardiology, likely motion artifact obscuring sinus tachycardia, not Torsades  - Patient did not require cardioversion  - No history of cardiac problems, heart attack, stroke  - Mg 1.5, K 3.5, repleting  - Continue to monitor on telemetry

## 2017-11-25 NOTE — PROGRESS NOTES
.         Internal Medicine Interval Note  Note Author: Marito Martínez M.D.     Name Chava Mercedes     1950   Age/Sex 67 y.o. male   MRN [id number]   Code Status FULL     After 5PM or if no immediate response to page, please call for cross-coverage  Attending/Team: Jeovanny/Lydia See Patient List for primary contact information  Call [phone] to page    1st Call - Day Intern (R1):   Tanya 2nd Call - Day Sr. Resident (R2/R3):   Mer         Reason for interval visit  (Principal Problem)   GI bleed    Interval Problem Daily Status Update  (24 hours)     Patient with fall overnight, no head injury or LOC. Lap belt placed for self protection and impulsivity. Required 9 mg ativan overnight. Patient A+Ox2 this AM to self and place. Appears less tremulous, but endorses visual hallucinations earlier today. Report bloody bowel movements, but per nursing BM was dark brown and liquid. Rhythm strip from yesterday reviewed with cardiologist, who does not believe it was Torsades but motion artifact obscuring sinus tachycardia. Patient with stable sinus tachycardia overnight, Hgb stable at 8.6. K and Phos low, will replete. Case discussed with GI, will start on diet, with tentative upper and lower endoscopy when clinically stable. Continue librium and CIWA protocol.    Review of Systems   Constitutional: Negative for chills, diaphoresis and fever.   Respiratory: Negative for cough, hemoptysis and shortness of breath.    Cardiovascular: Negative for chest pain and palpitations.   Gastrointestinal: Positive for abdominal pain, blood in stool and diarrhea. Negative for constipation, nausea and vomiting.   Genitourinary: Negative for dysuria.   Neurological: Positive for sensory change.       Consultants/Specialty  GI    Disposition  Inpatient    Quality Measures    Reviewed items::  Labs reviewed, Medications reviewed and EKG reviewed  Walker catheter::  No Walker  DVT prophylaxis pharmacological::  Contraindicated -  High bleeding risk  DVT prophylaxis - mechanical:  SCDs  Ulcer Prophylaxis::  Yes          Physical Exam       Vitals:    11/25/17 0400 11/25/17 0459 11/25/17 0739 11/25/17 1153   BP: 104/56 101/58 104/63 120/77   Pulse: 100 (!) 108 (!) 103 97   Resp: 18 16 19 18   Temp: 37.2 °C (99 °F) 37.3 °C (99.2 °F) 37.4 °C (99.3 °F) 36.9 °C (98.4 °F)   SpO2: 95% 97% 96% 94%   Weight:       Height:         Body mass index is 23.62 kg/m². Weight: 79 kg (174 lb 2.6 oz)  Oxygen Therapy:  Pulse Oximetry: 94 %, O2 (LPM): 0, O2 Delivery: None (Room Air)    Physical Exam   Constitutional: He is oriented to person, place, and time. No distress.   Eyes: Conjunctivae and EOM are normal. Pupils are equal, round, and reactive to light.   Cardiovascular: Regular rhythm and normal heart sounds.  Exam reveals no gallop and no friction rub.    No murmur heard.  Pulmonary/Chest: Effort normal and breath sounds normal. No respiratory distress. He has no wheezes. He has no rales.   Abdominal: Soft. Bowel sounds are normal. He exhibits no distension. There is tenderness. There is no guarding.   Musculoskeletal: He exhibits no edema or tenderness.   Neurological: He is alert and oriented to person, place, and time.   Decreased sensation to light touch plantar feet b/l   Skin: Skin is warm and dry. He is not diaphoretic.   Psychiatric: Affect and judgment normal.         Lab Data Review:         11/25/2017  11:44 AM    Recent Labs      11/24/17 0433 11/24/17   1512  11/24/17   1841  11/25/17   0258   SODIUM  140  140   --   137   POTASSIUM  4.8  3.5*   --   3.7   CHLORIDE  108  109   --   112   CO2  20  21   --   20   BUN  16  13   --   10   CREATININE  0.88  0.80   --   0.67   MAGNESIUM  1.5   --   2.4  2.0   PHOSPHORUS  2.9   --    --   1.5*   CALCIUM  8.4*  8.2*   --   7.8*       Recent Labs      11/24/17 0433 11/24/17   1512  11/25/17   0258   ALTSGPT  23  18  15   ASTSGOT  31  22  22   ALKPHOSPHAT  48  39  33   TBILIRUBIN  1.0  0.8  1.0    LIPASE  91*   --    --    GLUCOSE  150*  107*  91       Recent Labs      11/24/17 0433   11/24/17 1841 11/25/17 0258 11/25/17   1054   RBC  3.29*   < >  2.74*  2.57*  2.65*   HEMOGLOBIN  11.1*   < >  9.1*  8.6*  8.8*   HEMATOCRIT  33.0*   < >  27.0*  26.2*  27.1*   PLATELETCT  126*   < >  94*  92*  98*   PROTHROMBTM  15.0*   --    --    --    --    INR  1.21*   --    --    --    --     < > = values in this interval not displayed.       Recent Labs      11/24/17 0433 11/24/17   1512  11/24/17 1841 11/25/17 0258 11/25/17   1054   WBC  14.6*   < >   --   7.7  5.9  5.3   NEUTSPOLYS  84.30*   --    --    --    --    --    LYMPHOCYTES  2.70*   --    --    --    --    --    MONOCYTES  12.10   --    --    --    --    --    EOSINOPHILS  0.00   --    --    --    --    --    BASOPHILS  0.10   --    --    --    --    --    ASTSGOT  31   --   22   --   22   --    ALTSGPT  23   --   18   --   15   --    ALKPHOSPHAT  48   --   39   --   33   --    TBILIRUBIN  1.0   --   0.8   --   1.0   --     < > = values in this interval not displayed.           Assessment/Plan     * GI bleed- (present on admission)   Assessment & Plan    - Patient transferred from [place] after having mixture of melena and bright red blood per rectum for 1 day  - Complains of epigastric abdominal pain, dizziness when standing, tachycardic at rest.  - Hb stable 8.6, no signs of active bleeding  - Abdominal ultrasound shows fatty liver, mild hepatomegaly, cholelithiasis  - RBC scan ordered, however not performed as patient had irregularity noted by telemetry, rhythm reviewed by cardiology: motion artifact due to tremulousness  - GI following  - regular diet during alcohol withdrawal, with tentative plan for upper and lower endoscopy when clinically stable  - Continue IV fluids  - H&H q12h  - Transfuse if Hgb <7        Alcohol withdrawal (CMS-HCC)- (present on admission)   Assessment & Plan    - Patient has history of alcohol withdrawal with  symptoms, history of DTs  - Patient states last drink was 11/22/17, 2 before admission  - Scheduled librium 25 mg TID  - CIWA protocol  - Fall/Seizure/Aspiration precautions  - Folate, Thiamine, B12        Torsades de pointes (CMS-HCC)   Assessment & Plan    - Rhythm strip reviewed by cardiology, likely motion artifact obscuring sinus tachycardia, not Torsades  - Patient did not require cardioversion  - No history of cardiac problems, heart attack, stroke  - Mg 1.5, K 3.5, repleting  - Continue to monitor on telemetry          History of pulmonary embolism   Assessment & Plan    - History of PE status post IVC filter        Hepatic steatosis- (present on admission)   Assessment & Plan    - Patient has history of hepatic steatosis  - LFTs stable  - Confirmed on abdominal ultrasound

## 2017-11-25 NOTE — PROGRESS NOTES
"Pt in bed, agitated, hallucination, asked RN \"why are you in my apartment\".   IV fluids running.   RN placed condom cath.  "

## 2017-11-25 NOTE — PROGRESS NOTES
Per CN, pt was found on the floor after bed alarm went off. Pt was on his knees. Pt denies pain.    Lap belt placed.    Condom cath off; replaced with new one.

## 2017-11-25 NOTE — PROGRESS NOTES
.         Internal Medicine Interval Note  Note Author: Marito Martínez M.D.     Name Chava Mercedes     1950   Age/Sex 67 y.o. male   MRN [id number]   Code Status FULL     After 5PM or if no immediate response to page, please call for cross-coverage  Attending/Team: Jeovanny/Lydia See Patient List for primary contact information  Call [phone] to page    1st Call - Day Intern (R1):   Tanya 2nd Call - Day Sr. Resident (R2/R3):   Prasanth         Reason for interval visit  (Principal Problem)   GI bleed    Interval Problem Daily Status Update  (24 hours)     Hgb 11 this AM, pt with dark liquid BM x4 today. Pt with tachycardia 120's but otherwise stable VS. Sent for tagged RBC scan to assess for active bleed, however pt went into torsades and was cardioverted into normal sinus rhythm. Mg 1.5, currently repleting with IV Mg. GI consulted. Will continue to check Hgb/Hct q6h, pt on telemetry. Alcohol withdrawal symptoms under control with CIWA protocol, pt requiring ativan every few hours, mentating well.    Review of Systems   Constitutional: Negative for chills, diaphoresis and fever.   Respiratory: Negative for cough, hemoptysis and shortness of breath.    Cardiovascular: Negative for chest pain and palpitations.   Gastrointestinal: Positive for abdominal pain and blood in stool. Negative for heartburn, nausea and vomiting.   Genitourinary: Negative for dysuria.   Neurological: Negative for dizziness, loss of consciousness and headaches.       Consultants/Specialty  Gastroenterology    Disposition  Inpatient    Quality Measures    Reviewed items::  Labs reviewed, Medications reviewed and EKG reviewed  Walker catheter::  No Walker  DVT prophylaxis pharmacological::  Contraindicated - High bleeding risk  DVT prophylaxis - mechanical:  SCDs  Ulcer Prophylaxis::  Yes          Physical Exam       Vitals:    17 0800 17 1200 17 1453 17 1503   BP: 127/71 107/62 121/74 109/85   Pulse: (!) 124  (!) 102 (!) 107 (!) 108   Resp: 20 18 16 16   Temp: 37.3 °C (99.1 °F) 37 °C (98.6 °F)     SpO2: 96% 97% 95% 95%   Weight:       Height:         Body mass index is 23.86 kg/m². Weight: 79.8 kg (175 lb 14.8 oz)  Oxygen Therapy:  Pulse Oximetry: 95 %, O2 (LPM): 0, O2 Delivery: None (Room Air)    Physical Exam   Constitutional: He is oriented to person, place, and time. No distress.   Eyes: Conjunctivae and EOM are normal. Pupils are equal, round, and reactive to light.   Cardiovascular: Regular rhythm.  Exam reveals no gallop and no friction rub.    No murmur heard.  tachycardic   Pulmonary/Chest: Effort normal. No respiratory distress. He has no wheezes. He has no rales.   Abdominal: Soft. Bowel sounds are normal. He exhibits no distension. There is tenderness. There is no guarding.   Musculoskeletal: He exhibits no edema or tenderness.   Neurological: He is alert and oriented to person, place, and time.   Skin: Skin is warm and dry. He is not diaphoretic.         Lab Data Review:         11/24/2017  4:07 PM    Recent Labs      11/24/17   0433  11/24/17   1512   SODIUM  140  140   POTASSIUM  4.8  3.5*   CHLORIDE  108  109   CO2  20  21   BUN  16  13   CREATININE  0.88  0.80   MAGNESIUM  1.5   --    PHOSPHORUS  2.9   --    CALCIUM  8.4*  8.2*       Recent Labs      11/24/17   0433  11/24/17   1512   ALTSGPT  23  18   ASTSGOT  31  22   ALKPHOSPHAT  48  39   TBILIRUBIN  1.0  0.8   LIPASE  91*   --    GLUCOSE  150*  107*       Recent Labs      11/24/17   0433  11/24/17   1205   RBC  3.29*  2.77*   HEMOGLOBIN  11.1*  9.2*   HEMATOCRIT  33.0*  27.0*   PLATELETCT  126*  97*   PROTHROMBTM  15.0*   --    INR  1.21*   --        Recent Labs      11/24/17   0433  11/24/17   1205  11/24/17   1512   WBC  14.6*  9.7   --    NEUTSPOLYS  84.30*   --    --    LYMPHOCYTES  2.70*   --    --    MONOCYTES  12.10   --    --    EOSINOPHILS  0.00   --    --    BASOPHILS  0.10   --    --    ASTSGOT  31   --   22   ALTSGPT  23   --   18    ALKPHOSPHAT  48   --   39   TBILIRUBIN  1.0   --   0.8           Assessment/Plan     * GI bleed- (present on admission)   Assessment & Plan    - Patient transferred from [place] after having mixture of melena and bright red blood per rectum for 1 day  - Complains of epigastric abdominal pain, dizziness when standing, tachycardic at rest.  - Hb showing downward trend, decreased from 11.9 in [place] to 9   - Abdominal ultrasound shows fatty liver, mild hepatomegaly, cholelithiasis  - RBC scan ordered, however pt went into Torsades, cardioverted into sinus rhythm  - Discussed case with GI who will evaluate patient, but do not believe patient is actively bleeding, consult placed  - Continue IV fluids  - NPO for now  - H&H q6h  - Transfuse if Hgb <7        Alcohol withdrawal (CMS-[facility])- (present on admission)   Assessment & Plan    - Patient has history of alcohol withdrawal with symptoms, history of DTs  - Patient states last drink was 2 days ago, 11/22/2017  - Patient currently has tremors  - Scheduled librium 25 mg TID  - CIWA protocol  - Fall/Seizure/Aspiration precautions  - Folate, Thiamine, B12        Torsades de pointes (CMS-[facility])   Assessment & Plan    - Cardioverted into sinus rhythm  - EKG show sinus tachycardia  - No history of cardiac problems, heart attack, stroke  - Mg 1.5, K 3.5, repleting  - Continue to monitor on telemetry          History of pulmonary embolism   Assessment & Plan    - History of PE status post IVC filter        Hepatic steatosis- (present on admission)   Assessment & Plan    - Patient has history of hepatic steatosis  - LFTs stable  - Confirmed on abdominal ultrasound

## 2017-11-25 NOTE — NON-PROVIDER
Internal Medicine Medical Student Note  Note Author: Raul MSantos Gasloane, Student    Name Chava Mercedes     1950   Age/Sex 67 y.o. male   MRN [id number]   Code Status Full code     After 5PM or if no immediate response to page, please call for cross-coverage  Attending/Team: Dr. Up/ Lydia See Patient List for primary contact information  Call [phone] to page after hours   1st Call - Day Intern (R1):   Dr. Martínez 2nd Call - Day Sr. Resident (R2/R3):   Dr. Del Angel     Reason for interval visit  (Principal Problem)   GI bleed    Interval Problem Daily Status Update  (24 hours)   Per nursing, patient had a fall overnight without LOC or head injury. Patient now has lap restraint due to risk of falls and impulsivity. He received approximately 9mg of ativan last night. Patient is alert and oriented to person and place. His tremors are improved from yesterday, and more profound during with intention. He reports having 3 bowel movements this morning. Per nurse, these bowel movements were dark brown/ black and did not contain bright red blood. Patient also reports 1 episode of emesis this morning and denies any any blood in vomit.    Review of Systems   Constitutional: Negative for chills, diaphoresis and fever.   HENT: Negative for congestion, sinus pain and sore throat.    Respiratory: Negative for cough, hemoptysis and shortness of breath.    Cardiovascular: Negative for chest pain, palpitations, leg swelling and PND.   Gastrointestinal: Positive for abdominal pain, blood in stool, diarrhea and vomiting. Negative for constipation and nausea.   Neurological: Positive for tremors. Negative for sensory change and speech change.     Physical Exam       Vitals:    17 0400 17 0459 17 0739 17 1153   BP: 104/56 101/58 104/63 120/77   Pulse: 100 (!) 108 (!) 103 97   Resp: 18 16 19 18   Temp: 37.2 °C (99 °F) 37.3 °C (99.2 °F) 37.4 °C (99.3 °F) 36.9 °C (98.4 °F)   SpO2: 95% 97% 96% 94%    Weight:       Height:         Body mass index is 23.62 kg/m². Weight: 79 kg (174 lb 2.6 oz)  Oxygen Therapy:  Pulse Oximetry: 94 %, O2 (LPM): 0, O2 Delivery: None (Room Air)    Physical Exam   Constitutional: He is well-developed, well-nourished, and in no distress.   HENT:   Head: Normocephalic and atraumatic.   Eyes: Conjunctivae and EOM are normal.   Neck: Normal range of motion. Neck supple.   Cardiovascular: Normal rate, regular rhythm and normal heart sounds.  Exam reveals no gallop and no friction rub.    No murmur heard.  Pulmonary/Chest: Effort normal and breath sounds normal. No respiratory distress. He has no wheezes. He has no rales.   Abdominal: Soft. Bowel sounds are normal. He exhibits no distension. There is tenderness (epigastric area). There is no rebound and no guarding.   Neurological: He is alert. No cranial nerve deficit.   Skin: Skin is warm and dry.     Most Recent Labs:    Lab Results   Component Value Date/Time    WBC 5.3 11/25/2017 10:54 AM    RBC 2.65 (L) 11/25/2017 10:54 AM    HEMOGLOBIN 8.8 (L) 11/25/2017 10:54 AM    HEMATOCRIT 27.1 (L) 11/25/2017 10:54 AM    .3 (H) 11/25/2017 10:54 AM    MCH 33.2 (H) 11/25/2017 10:54 AM    MCHC 32.5 (L) 11/25/2017 10:54 AM    MPV 10.6 11/25/2017 10:54 AM    NEUTSPOLYS 84.30 (H) 11/24/2017 04:33 AM    LYMPHOCYTES 2.70 (L) 11/24/2017 04:33 AM    MONOCYTES 12.10 11/24/2017 04:33 AM    EOSINOPHILS 0.00 11/24/2017 04:33 AM    BASOPHILS 0.10 11/24/2017 04:33 AM    ANISOCYTOSIS 1+ 12/24/2016 12:33 AM      Lab Results   Component Value Date/Time    SODIUM 137 11/25/2017 02:58 AM    POTASSIUM 3.7 11/25/2017 02:58 AM    CHLORIDE 112 11/25/2017 02:58 AM    CO2 20 11/25/2017 02:58 AM    GLUCOSE 91 11/25/2017 02:58 AM    BUN 10 11/25/2017 02:58 AM    CREATININE 0.67 11/25/2017 02:58 AM      Lab Results   Component Value Date/Time    ALTSGPT 15 11/25/2017 02:58 AM    ASTSGOT 22 11/25/2017 02:58 AM    ALKPHOSPHAT 33 11/25/2017 02:58 AM    TBILIRUBIN 1.0  11/25/2017 02:58 AM    LIPASE 91 (H) 11/24/2017 04:33 AM    ALBUMIN 2.9 (L) 11/25/2017 02:58 AM    GLOBULIN 2.2 11/25/2017 02:58 AM    PREALBUMIN 8.0 (L) 12/26/2016 03:52 AM    INR 1.21 (H) 11/24/2017 04:33 AM    MACROCYTOSIS 1+ 12/24/2016 12:33 AM     Lab Results   Component Value Date/Time    PROTHROMBTM 15.0 (H) 11/24/2017 04:33 AM    INR 1.21 (H) 11/24/2017 04:33 AM        Assessment/Plan   #Lower GI bleed  -Hemoglobin decreasing, 8.6 today  -No signs of active bleeding  -GI following  -Tentative plan for EGD and colonoscopy once patient is clinically stable  -H&H q12h  -Transfuse if hemoglobin <7  -Monitor for signs of active bleeding (tachycardia, hypotension)    #Alcohol withdrawal  -Patient is improved clinically, less tachycardic  -Continue Librium 25 mg TID  -[facility] protocol  -Continue Folate, thiamine, B12    #Torsades de pointes  -Rhythm strip reviewed by cardiology, likely artifact due to tremors  -Mg - 1.5; K - 3.5  -Continue to monitor on telemetry  -Continue to monitor Mg and K

## 2017-11-25 NOTE — PROGRESS NOTES
Attending Note:  I have personally evaluated and examined this patient and agree with the findings as documented in the resident note except as documented in this attending note.  I am actively involved in the patient's care.    Intern note to follow.  The event yesterday was reviewed.  It was not Torsades but rather sinus tach with movement artifact.  He is more stable today from alcohol withdrawal.  Less tachy.  Hemoglobin is slowly decreasing but not dramatic.  Stable.  Continue treatment for withdrawal.

## 2017-11-25 NOTE — PROGRESS NOTES
Patient taken to nuclear med. For scan. He is still restrained and with CIWA of 19. RN was Informed.

## 2017-11-25 NOTE — PROGRESS NOTES
Still making attempts to climb out of bed without assistance. Having increase tremors and agitation. Due to this change in  Behavior, will need soft restraints to provide safety. Will inform primary team.
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
## 2017-11-26 PROBLEM — I47.21 TORSADES DE POINTES (HCC): Status: RESOLVED | Noted: 2017-11-24 | Resolved: 2017-11-26

## 2017-11-26 LAB
ALBUMIN SERPL BCP-MCNC: 3 G/DL (ref 3.2–4.9)
ALBUMIN/GLOB SERPL: 1.2 G/DL
ALP SERPL-CCNC: 35 U/L (ref 30–99)
ALT SERPL-CCNC: 17 U/L (ref 2–50)
ANION GAP SERPL CALC-SCNC: 8 MMOL/L (ref 0–11.9)
AST SERPL-CCNC: 26 U/L (ref 12–45)
BASOPHILS # BLD AUTO: 0.7 % (ref 0–1.8)
BASOPHILS # BLD: 0.03 K/UL (ref 0–0.12)
BILIRUB SERPL-MCNC: 0.6 MG/DL (ref 0.1–1.5)
BUN SERPL-MCNC: 7 MG/DL (ref 8–22)
CALCIUM SERPL-MCNC: 8.2 MG/DL (ref 8.5–10.5)
CHLORIDE SERPL-SCNC: 110 MMOL/L (ref 96–112)
CO2 SERPL-SCNC: 20 MMOL/L (ref 20–33)
CREAT SERPL-MCNC: 0.62 MG/DL (ref 0.5–1.4)
EKG IMPRESSION: NORMAL
EOSINOPHIL # BLD AUTO: 0.13 K/UL (ref 0–0.51)
EOSINOPHIL NFR BLD: 2.9 % (ref 0–6.9)
ERYTHROCYTE [DISTWIDTH] IN BLOOD BY AUTOMATED COUNT: 49.6 FL (ref 35.9–50)
GFR SERPL CREATININE-BSD FRML MDRD: >60 ML/MIN/1.73 M 2
GLOBULIN SER CALC-MCNC: 2.5 G/DL (ref 1.9–3.5)
GLUCOSE SERPL-MCNC: 86 MG/DL (ref 65–99)
HCT VFR BLD AUTO: 25.8 % (ref 42–52)
HGB BLD-MCNC: 8.7 G/DL (ref 14–18)
IMM GRANULOCYTES # BLD AUTO: 0.02 K/UL (ref 0–0.11)
IMM GRANULOCYTES NFR BLD AUTO: 0.5 % (ref 0–0.9)
LYMPHOCYTES # BLD AUTO: 0.73 K/UL (ref 1–4.8)
LYMPHOCYTES NFR BLD: 16.6 % (ref 22–41)
MAGNESIUM SERPL-MCNC: 2.1 MG/DL (ref 1.5–2.5)
MCH RBC QN AUTO: 34.1 PG (ref 27–33)
MCHC RBC AUTO-ENTMCNC: 33.7 G/DL (ref 33.7–35.3)
MCV RBC AUTO: 101.2 FL (ref 81.4–97.8)
MONOCYTES # BLD AUTO: 0.5 K/UL (ref 0–0.85)
MONOCYTES NFR BLD AUTO: 11.3 % (ref 0–13.4)
NEUTROPHILS # BLD AUTO: 3 K/UL (ref 1.82–7.42)
NEUTROPHILS NFR BLD: 68 % (ref 44–72)
NRBC # BLD AUTO: 0 K/UL
NRBC BLD AUTO-RTO: 0 /100 WBC
PHOSPHATE SERPL-MCNC: 2.8 MG/DL (ref 2.5–4.5)
PLATELET # BLD AUTO: 96 K/UL (ref 164–446)
PMV BLD AUTO: 10.7 FL (ref 9–12.9)
POTASSIUM SERPL-SCNC: 3.7 MMOL/L (ref 3.6–5.5)
PROT SERPL-MCNC: 5.5 G/DL (ref 6–8.2)
RBC # BLD AUTO: 2.55 M/UL (ref 4.7–6.1)
SODIUM SERPL-SCNC: 138 MMOL/L (ref 135–145)
WBC # BLD AUTO: 4.4 K/UL (ref 4.8–10.8)

## 2017-11-26 PROCEDURE — A9270 NON-COVERED ITEM OR SERVICE: HCPCS | Performed by: STUDENT IN AN ORGANIZED HEALTH CARE EDUCATION/TRAINING PROGRAM

## 2017-11-26 PROCEDURE — 36415 COLL VENOUS BLD VENIPUNCTURE: CPT

## 2017-11-26 PROCEDURE — 80053 COMPREHEN METABOLIC PANEL: CPT

## 2017-11-26 PROCEDURE — 93010 ELECTROCARDIOGRAM REPORT: CPT | Performed by: INTERNAL MEDICINE

## 2017-11-26 PROCEDURE — 700105 HCHG RX REV CODE 258: Performed by: STUDENT IN AN ORGANIZED HEALTH CARE EDUCATION/TRAINING PROGRAM

## 2017-11-26 PROCEDURE — 700102 HCHG RX REV CODE 250 W/ 637 OVERRIDE(OP): Performed by: STUDENT IN AN ORGANIZED HEALTH CARE EDUCATION/TRAINING PROGRAM

## 2017-11-26 PROCEDURE — C9113 INJ PANTOPRAZOLE SODIUM, VIA: HCPCS | Performed by: STUDENT IN AN ORGANIZED HEALTH CARE EDUCATION/TRAINING PROGRAM

## 2017-11-26 PROCEDURE — 99233 SBSQ HOSP IP/OBS HIGH 50: CPT | Mod: GC | Performed by: INTERNAL MEDICINE

## 2017-11-26 PROCEDURE — 700111 HCHG RX REV CODE 636 W/ 250 OVERRIDE (IP): Performed by: STUDENT IN AN ORGANIZED HEALTH CARE EDUCATION/TRAINING PROGRAM

## 2017-11-26 PROCEDURE — 84100 ASSAY OF PHOSPHORUS: CPT

## 2017-11-26 PROCEDURE — 83735 ASSAY OF MAGNESIUM: CPT

## 2017-11-26 PROCEDURE — 93005 ELECTROCARDIOGRAM TRACING: CPT | Performed by: STUDENT IN AN ORGANIZED HEALTH CARE EDUCATION/TRAINING PROGRAM

## 2017-11-26 PROCEDURE — 85025 COMPLETE CBC W/AUTO DIFF WBC: CPT

## 2017-11-26 PROCEDURE — 770001 HCHG ROOM/CARE - MED/SURG/GYN PRIV*

## 2017-11-26 RX ORDER — CHLORDIAZEPOXIDE HYDROCHLORIDE 25 MG/1
50 CAPSULE, GELATIN COATED ORAL 3 TIMES DAILY
Status: DISCONTINUED | OUTPATIENT
Start: 2017-11-26 | End: 2017-11-27

## 2017-11-26 RX ORDER — POTASSIUM CHLORIDE 20 MEQ/1
40 TABLET, EXTENDED RELEASE ORAL ONCE
Status: COMPLETED | OUTPATIENT
Start: 2017-11-26 | End: 2017-11-26

## 2017-11-26 RX ADMIN — LORAZEPAM 1 MG: 1 TABLET ORAL at 10:26

## 2017-11-26 RX ADMIN — LORAZEPAM 1.5 MG: 2 INJECTION INTRAMUSCULAR; INTRAVENOUS at 02:07

## 2017-11-26 RX ADMIN — SODIUM CHLORIDE: 9 INJECTION, SOLUTION INTRAVENOUS at 06:42

## 2017-11-26 RX ADMIN — CHLORDIAZEPOXIDE HYDROCHLORIDE 50 MG: 25 CAPSULE ORAL at 08:38

## 2017-11-26 RX ADMIN — THERA TABS 1 TABLET: TAB at 08:21

## 2017-11-26 RX ADMIN — LORAZEPAM 1.5 MG: 2 INJECTION INTRAMUSCULAR; INTRAVENOUS at 05:07

## 2017-11-26 RX ADMIN — LORAZEPAM 2 MG: 1 TABLET ORAL at 22:04

## 2017-11-26 RX ADMIN — PANTOPRAZOLE SODIUM 40 MG: 40 INJECTION, POWDER, FOR SOLUTION INTRAVENOUS at 20:20

## 2017-11-26 RX ADMIN — SODIUM CHLORIDE: 9 INJECTION, SOLUTION INTRAVENOUS at 18:23

## 2017-11-26 RX ADMIN — LORAZEPAM 1.5 MG: 2 INJECTION INTRAMUSCULAR; INTRAVENOUS at 01:12

## 2017-11-26 RX ADMIN — LORAZEPAM 2 MG: 1 TABLET ORAL at 08:21

## 2017-11-26 RX ADMIN — CHLORDIAZEPOXIDE HYDROCHLORIDE 50 MG: 25 CAPSULE ORAL at 20:21

## 2017-11-26 RX ADMIN — LORAZEPAM 1 MG: 1 TABLET ORAL at 14:26

## 2017-11-26 RX ADMIN — CHLORDIAZEPOXIDE HYDROCHLORIDE 50 MG: 25 CAPSULE ORAL at 14:26

## 2017-11-26 RX ADMIN — LORAZEPAM 1 MG: 1 TABLET ORAL at 18:23

## 2017-11-26 RX ADMIN — PANTOPRAZOLE SODIUM 40 MG: 40 INJECTION, POWDER, FOR SOLUTION INTRAVENOUS at 08:24

## 2017-11-26 RX ADMIN — THIAMINE HYDROCHLORIDE 100 MG: 100 INJECTION, SOLUTION INTRAMUSCULAR; INTRAVENOUS at 08:24

## 2017-11-26 RX ADMIN — LORAZEPAM 1 MG: 1 TABLET ORAL at 06:16

## 2017-11-26 RX ADMIN — LORAZEPAM 1 MG: 2 INJECTION INTRAMUSCULAR; INTRAVENOUS at 02:59

## 2017-11-26 RX ADMIN — POTASSIUM CHLORIDE 40 MEQ: 1500 TABLET, EXTENDED RELEASE ORAL at 14:29

## 2017-11-26 ASSESSMENT — ENCOUNTER SYMPTOMS
WEAKNESS: 0
ABDOMINAL PAIN: 1
PALPITATIONS: 0
FEVER: 0
MYALGIAS: 0
WHEEZING: 0
COUGH: 0
CHILLS: 0
SORE THROAT: 0
NAUSEA: 0
VOMITING: 0
SHORTNESS OF BREATH: 0
BLOOD IN STOOL: 0
CONSTIPATION: 0
DIARRHEA: 0
PND: 0
DIAPHORESIS: 0
HEMOPTYSIS: 0
SPUTUM PRODUCTION: 0

## 2017-11-26 ASSESSMENT — LIFESTYLE VARIABLES
TREMOR: *
ANXIETY: MILDLY ANXIOUS
PAROXYSMAL SWEATS: BARELY PERCEPTIBLE SWEATING. PALMS MOIST
AUDITORY DISTURBANCES: NOT PRESENT
ANXIETY: *
TOTAL SCORE: 10
NAUSEA AND VOMITING: NO NAUSEA AND NO VOMITING
NAUSEA AND VOMITING: NO NAUSEA AND NO VOMITING
HEADACHE, FULLNESS IN HEAD: VERY MILD
HEADACHE, FULLNESS IN HEAD: VERY MILD
AGITATION: *
TREMOR: *
PAROXYSMAL SWEATS: *
ORIENTATION AND CLOUDING OF SENSORIUM: DATE DISORIENTATION BY MORE THAN TWO CALENDAR DAYS
AUDITORY DISTURBANCES: NOT PRESENT
TOTAL SCORE: VERY MILD ITCHING, PINS AND NEEDLES SENSATION, BURNING OR NUMBNESS
ANXIETY: MILDLY ANXIOUS
TOTAL SCORE: 12
TOTAL SCORE: 10
VISUAL DISTURBANCES: VERY MILD SENSITIVITY
TREMOR: MODERATE TREMOR WITH ARMS EXTENDED
AUDITORY DISTURBANCES: MILD HARSHNESS OR ABILITY TO FRIGHTEN
AGITATION: *
PAROXYSMAL SWEATS: BARELY PERCEPTIBLE SWEATING. PALMS MOIST
HEADACHE, FULLNESS IN HEAD: NOT PRESENT
NAUSEA AND VOMITING: NO NAUSEA AND NO VOMITING
AUDITORY DISTURBANCES: NOT PRESENT
ANXIETY: NO ANXIETY (AT EASE)
TREMOR: *
AGITATION: *
TOTAL SCORE: 20
HEADACHE, FULLNESS IN HEAD: MILD
VISUAL DISTURBANCES: MILD SENSITIVITY
ORIENTATION AND CLOUDING OF SENSORIUM: DATE DISORIENTATION BY MORE THAN TWO CALENDAR DAYS
VISUAL DISTURBANCES: VERY MILD SENSITIVITY
HEADACHE, FULLNESS IN HEAD: NOT PRESENT
HEADACHE, FULLNESS IN HEAD: VERY MILD
VISUAL DISTURBANCES: MODERATE SENSITIVITY
TREMOR: *
PAROXYSMAL SWEATS: *
TREMOR: *
HEADACHE, FULLNESS IN HEAD: NOT PRESENT
TOTAL SCORE: 14
TREMOR: *
PAROXYSMAL SWEATS: *
PAROXYSMAL SWEATS: BARELY PERCEPTIBLE SWEATING. PALMS MOIST
VISUAL DISTURBANCES: MILD SENSITIVITY
AGITATION: *
AGITATION: *
NAUSEA AND VOMITING: NO NAUSEA AND NO VOMITING
HEADACHE, FULLNESS IN HEAD: VERY MILD
AGITATION: *
TREMOR: *
HEADACHE, FULLNESS IN HEAD: VERY MILD
AUDITORY DISTURBANCES: MILD HARSHNESS OR ABILITY TO FRIGHTEN
TOTAL SCORE: 19
ORIENTATION AND CLOUDING OF SENSORIUM: DISORIENTED FOR PLACE AND / OR PERSON
NAUSEA AND VOMITING: NO NAUSEA AND NO VOMITING
ORIENTATION AND CLOUDING OF SENSORIUM: DATE DISORIENTATION BY MORE THAN TWO CALENDAR DAYS
PAROXYSMAL SWEATS: BARELY PERCEPTIBLE SWEATING. PALMS MOIST
TOTAL SCORE: 16
NAUSEA AND VOMITING: NO NAUSEA AND NO VOMITING
AUDITORY DISTURBANCES: NOT PRESENT
TREMOR: *
ORIENTATION AND CLOUDING OF SENSORIUM: DISORIENTED FOR PLACE AND / OR PERSON
VISUAL DISTURBANCES: VERY MILD SENSITIVITY
ANXIETY: NO ANXIETY (AT EASE)
AGITATION: *
ANXIETY: MILDLY ANXIOUS
VISUAL DISTURBANCES: MILD SENSITIVITY
ORIENTATION AND CLOUDING OF SENSORIUM: DISORIENTED FOR PLACE AND / OR PERSON
AUDITORY DISTURBANCES: NOT PRESENT
NAUSEA AND VOMITING: NO NAUSEA AND NO VOMITING
AUDITORY DISTURBANCES: NOT PRESENT
AGITATION: *
PAROXYSMAL SWEATS: *
PAROXYSMAL SWEATS: BARELY PERCEPTIBLE SWEATING. PALMS MOIST
ORIENTATION AND CLOUDING OF SENSORIUM: DATE DISORIENTATION BY MORE THAN TWO CALENDAR DAYS
AUDITORY DISTURBANCES: VERY MILD HARSHNESS OR ABILITY TO FRIGHTEN
VISUAL DISTURBANCES: VERY MILD SENSITIVITY
TREMOR: *
ANXIETY: *
TOTAL SCORE: 12
NAUSEA AND VOMITING: NO NAUSEA AND NO VOMITING
AGITATION: *
ANXIETY: NO ANXIETY (AT EASE)
AGITATION: *
NAUSEA AND VOMITING: NO NAUSEA AND NO VOMITING
TOTAL SCORE: 10
ORIENTATION AND CLOUDING OF SENSORIUM: DATE DISORIENTATION BY NO MORE THAN TWO CALENDAR DAYS
ORIENTATION AND CLOUDING OF SENSORIUM: CANNOT DO SERIAL ADDITIONS OR IS UNCERTAIN ABOUT DATE
HEADACHE, FULLNESS IN HEAD: VERY MILD
TOTAL SCORE: 11
VISUAL DISTURBANCES: MILD SENSITIVITY
NAUSEA AND VOMITING: NO NAUSEA AND NO VOMITING
ORIENTATION AND CLOUDING OF SENSORIUM: DATE DISORIENTATION BY MORE THAN TWO CALENDAR DAYS
ANXIETY: NO ANXIETY (AT EASE)
AUDITORY DISTURBANCES: NOT PRESENT
VISUAL DISTURBANCES: VERY MILD SENSITIVITY
ANXIETY: MILDLY ANXIOUS
PAROXYSMAL SWEATS: *

## 2017-11-26 ASSESSMENT — PATIENT HEALTH QUESTIONNAIRE - PHQ9
SUM OF ALL RESPONSES TO PHQ9 QUESTIONS 1 AND 2: 0
SUM OF ALL RESPONSES TO PHQ QUESTIONS 1-9: 0
1. LITTLE INTEREST OR PLEASURE IN DOING THINGS: NOT AT ALL
2. FEELING DOWN, DEPRESSED, IRRITABLE, OR HOPELESS: NOT AT ALL

## 2017-11-26 ASSESSMENT — PAIN SCALES - GENERAL
PAINLEVEL_OUTOF10: 0

## 2017-11-26 NOTE — PROGRESS NOTES
3rd restraint applied to right foot due to patient still attempting to climb out of bed and then striking at staff. Will inform physician and supervisor.

## 2017-11-26 NOTE — NON-PROVIDER
Internal Medicine Medical Student Note  Note Author: Raul MSantos Gasloane, Student    Name Chava Mercedes     1950   Age/Sex 67 y.o. male   MRN [id number]   Code Status Full code     After 5PM or if no immediate response to page, please call for cross-coverage  Attending/Team: Dr Up/ Lydia See Patient List for primary contact information  Call [phone] to page after hours   1st Call - Day Intern (R1):   Dr. Martínez 2nd Call - Day Sr. Resident (R2/R3):   Dr. Del Angel     Reason for interval visit  (Principal Problem)   GI bleed    Interval Problem Daily Status Update  (24 hours)   Per nursing, patient had multiple episodes where he tried to get out of bed overnight. He his now in bilateral wrist and single leg restraints. He received approximately 23 mg of Ativan overnight per CIWA protocol. Speaking to him this morning, he is A&O x 2 but appears more confused and was unable to answer most questions appropriately. Per nurse, he had 3 bowel movements that were black/ tarry. No episodes of emesis. Patient's heart rate has remained in the 80-90s. No chest pain or palpatations.     Review of Systems   Constitutional: Negative for chills, diaphoresis and fever.   HENT: Negative for congestion and sore throat.    Respiratory: Negative for cough, hemoptysis, sputum production, shortness of breath and wheezing.    Cardiovascular: Negative for chest pain, palpitations, leg swelling and PND.   Gastrointestinal: Positive for abdominal pain (epigastric pain). Negative for blood in stool, constipation, diarrhea, melena, nausea and vomiting.   Neurological: Negative for weakness.     Physical Exam       Vitals:    17 2000 17 0000 17 0400 17 0800   BP: 113/45 105/70 115/44 103/67   Pulse: (!) 121 (!) 108 89 93   Resp: 19 16 18 15   Temp: 37.6 °C (99.6 °F) 37.1 °C (98.7 °F) 36.8 °C (98.2 °F) 36.9 °C (98.5 °F)   SpO2: 97% 96% 99% 97%   Weight: 79 kg (174 lb 2.6 oz)      Height:         Body  mass index is 23.62 kg/m². Weight: 79 kg (174 lb 2.6 oz)  Oxygen Therapy:  Pulse Oximetry: 97 %, O2 (LPM): 0, O2 Delivery: None (Room Air)    Physical Exam   Constitutional: He is oriented to person, place, and time and well-developed, well-nourished, and in no distress.   HENT:   Head: Normocephalic and atraumatic.   Eyes: Conjunctivae and EOM are normal.   Cardiovascular: Normal rate and regular rhythm.  Exam reveals no gallop and no friction rub.    No murmur heard.  Pulmonary/Chest: Breath sounds normal. No respiratory distress. He has no wheezes. He has no rales.   Abdominal: Soft. Bowel sounds are normal. He exhibits no distension. There is tenderness (epigastric region). There is no rebound and no guarding.   Musculoskeletal: Normal range of motion.   Neurological: He is alert and oriented to person, place, and time.   Skin: Skin is warm and dry. No rash noted. No erythema.     Most Recent Labs:    Lab Results   Component Value Date/Time    WBC 4.4 (L) 11/26/2017 03:12 AM    RBC 2.55 (L) 11/26/2017 03:12 AM    HEMOGLOBIN 8.7 (L) 11/26/2017 03:12 AM    HEMATOCRIT 25.8 (L) 11/26/2017 03:12 AM    .2 (H) 11/26/2017 03:12 AM    MCH 34.1 (H) 11/26/2017 03:12 AM    MCHC 33.7 11/26/2017 03:12 AM    MPV 10.7 11/26/2017 03:12 AM    NEUTSPOLYS 68.00 11/26/2017 03:12 AM    LYMPHOCYTES 16.60 (L) 11/26/2017 03:12 AM    MONOCYTES 11.30 11/26/2017 03:12 AM    EOSINOPHILS 2.90 11/26/2017 03:12 AM    BASOPHILS 0.70 11/26/2017 03:12 AM    ANISOCYTOSIS 1+ 12/24/2016 12:33 AM      Lab Results   Component Value Date/Time    SODIUM 138 11/26/2017 03:13 AM    POTASSIUM 3.7 11/26/2017 03:13 AM    CHLORIDE 110 11/26/2017 03:13 AM    CO2 20 11/26/2017 03:13 AM    GLUCOSE 86 11/26/2017 03:13 AM    BUN 7 (L) 11/26/2017 03:13 AM    CREATININE 0.62 11/26/2017 03:13 AM      Lab Results   Component Value Date/Time    ALTSGPT 17 11/26/2017 03:13 AM    ASTSGOT 26 11/26/2017 03:13 AM    ALKPHOSPHAT 35 11/26/2017 03:13 AM    TBILIRUBIN  0.6 11/26/2017 03:13 AM    LIPASE 91 (H) 11/24/2017 04:33 AM    ALBUMIN 3.0 (L) 11/26/2017 03:13 AM    GLOBULIN 2.5 11/26/2017 03:13 AM    PREALBUMIN 8.0 (L) 12/26/2016 03:52 AM    INR 1.21 (H) 11/24/2017 04:33 AM    MACROCYTOSIS 1+ 12/24/2016 12:33 AM     Lab Results   Component Value Date/Time    PROTHROMBTM 15.0 (H) 11/24/2017 04:33 AM    INR 1.21 (H) 11/24/2017 04:33 AM        Assessment/Plan   #Lower GI bleed  -Hemoglobin stable, 8.7 today  -No signs of active bleeding  -GI following  -Tentative plan for EGD and colonoscopy once patient is clinically stable  -H&H q12h  -Transfuse if hemoglobin <7  -Monitor for signs of active bleeding (tachycardia, hypotension)     #Alcohol withdrawal  -Patient is improved clinically, less tachycardic  -Increase Librium to 50 mg TID  -[facility] protocol  -Continue Folate, thiamine, B12     #Sinus Tachycardia  -Mg - 1.5; K - 3.5  -Heart rate has been stable in 80-90s  -Discontinue cardiac monitoring  -Continue to monitor Mg and K    #Pancreatitis  -Lipase 91 at admission  -Abdominal pain in epigastric region  -Continue to monitor

## 2017-11-26 NOTE — PROGRESS NOTES
Patient is restless and agitated in bed, CIWA ranging from 14-25 throughout night. Administering Ativan as needed. Respirations 16, satting above 96% on RA, will continue to monitor.

## 2017-11-26 NOTE — PROGRESS NOTES
Report received by NOC RN. Assumed care of pt. Assessment complete. [employer] med student at bedside. Pt A&O x self. Pt is confused, is in bilat wrist and right LE restraints-good CMS distal restraints, and has small black, tarry stools. Pt in no apparent signs of distress. Plan of care discussed. Call light in reach,  bed in lowest position, and pt has no further questions at this time.

## 2017-11-26 NOTE — PROGRESS NOTES
Returned back from Nuclear medicine  With limited scan due to agitation and attempting to climb out of bed. CIWA of 19-24. Will continue to provide ativan as required. Soft restraints remain due to anxiety and restlessness.

## 2017-11-26 NOTE — PROGRESS NOTES
Gastroenterology Progress Note     Author: Arvin Barahona   Date & Time Created: 11/25/2017 5:11 PM    Interval History:  Drop in HGB, no Torsades    Review of Systems:  Review of Systems   Constitutional: Positive for weight loss. Negative for chills and fever.   HENT: Negative.    Respiratory: Negative for hemoptysis and shortness of breath.    Cardiovascular: Negative.    Gastrointestinal: Positive for blood in stool.   Musculoskeletal: Negative.    Skin: Negative for rash.   Neurological:        Tremulousness   Psychiatric/Behavioral: Positive for hallucinations.       Physical Exam:  Physical Exam   Constitutional: He is oriented to person, place, and time. He appears well-developed and well-nourished. No distress.   HENT:   Head: Normocephalic and atraumatic.   Eyes: No scleral icterus.   Neck: Neck supple. No tracheal deviation present. No thyromegaly present.   Cardiovascular: Regular rhythm.    Pulmonary/Chest: Effort normal. No stridor. No respiratory distress.   Abdominal: He exhibits no distension. There is no tenderness. There is no rebound and no guarding.   Musculoskeletal: Normal range of motion.   Neurological: He is alert and oriented to person, place, and time.   Skin: Skin is warm and dry. He is not diaphoretic.       Labs:        Invalid input(s): [id number]      Recent Labs      11/24/17 0433 11/24/17   1512  11/24/17   1841  11/25/17   0258   SODIUM  140  140   --   137   POTASSIUM  4.8  3.5*   --   3.7   CHLORIDE  108  109   --   112   CO2  20  21   --   20   BUN  16  13   --   10   CREATININE  0.88  0.80   --   0.67   MAGNESIUM  1.5   --   2.4  2.0   PHOSPHORUS  2.9   --    --   1.5*   CALCIUM  8.4*  8.2*   --   7.8*     Recent Labs      11/24/17 0433 11/24/17   1512  11/25/17   0258   ALTSGPT  23  18  15   ASTSGOT  31  22  22   ALKPHOSPHAT  48  39  33   TBILIRUBIN  1.0  0.8  1.0   LIPASE  91*   --    --    GLUCOSE  150*  107*  91     Recent Labs      11/24/17 0433    17   18417   0258  17   1054   RBC  3.29*   < >  2.74*  2.57*  2.65*   HEMOGLOBIN  11.1*   < >  9.1*  8.6*  8.8*   HEMATOCRIT  33.0*   < >  27.0*  26.2*  27.1*   PLATELETCT  126*   < >  94*  92*  98*   PROTHROMBTM  15.0*   --    --    --    --    INR  1.21*   --    --    --    --     < > = values in this interval not displayed.     Recent Labs      17   0433   17   1512  17   1841  178  17   1054   WBC  14.6*   < >   --   7.7  5.9  5.3   NEUTSPOLYS  84.30*   --    --    --    --    --    LYMPHOCYTES  2.70*   --    --    --    --    --    MONOCYTES  12.10   --    --    --    --    --    EOSINOPHILS  0.00   --    --    --    --    --    BASOPHILS  0.10   --    --    --    --    --    ASTSGOT  31   --   22   --   22   --    ALTSGPT  23   --   18   --   15   --    ALKPHOSPHAT  48   --   39   --   33   --    TBILIRUBIN  1.0   --   0.8   --   1.0   --     < > = values in this interval not displayed.     Hemodynamics:  Temp (24hrs), Av.3 °C (99.2 °F), Min:36.9 °C (98.4 °F), Max:37.5 °C (99.5 °F)  Temperature: 37.5 °C (99.5 °F)  Pulse  Av.5  Min: 93  Max: 148   Blood Pressure : 133/56     Respiratory:    Respiration: 19, Pulse Oximetry: 96 %        RUL Breath Sounds: Diminished, RML Breath Sounds: Diminished, RLL Breath Sounds: Diminished, FLORA Breath Sounds: Diminished, LLL Breath Sounds: Diminished  Fluids:    Intake/Output Summary (Last 24 hours) at 17 1711  Last data filed at 17 1400   Gross per 24 hour   Intake             2440 ml   Output             3050 ml   Net             -610 ml     Weight: 79 kg (174 lb 2.6 oz)  GI/Nutrition:  Orders Placed This Encounter   Procedures   • DIET ORDER     Standing Status:   Standing     Number of Occurrences:   1     Order Specific Question:   Diet:     Answer:   Regular [1]     Medical Decision Making, by Problem:  Active Hospital Problems    Diagnosis   • *GI bleed [K92.2]   • Alcohol withdrawal  (CMS-HCC) [F10.239]   • History of pulmonary embolism [Z86.711]   • Hepatic steatosis [K76.0]   • Macrocytic anemia [D53.9]   • Torsades de pointes (CMS-HCC) [I47.2]       Plan:  1. Acute hemorrhagic anemia from GI tract  Current brown stool  Withdrawal (high dose Ativan required overnight)  and CIWA protocol, watch for DTs, timing of EGD and colon once his withdrawal status has improved and makes sure does not detriorate into delirium.    If active bleeding urgent endoscopy.    2. H/O DU 12/2016  Would cover with PPI.  Quality-Core Measures     3. Tachycardia  Appreciate input primary team and cardio RE: artefact and likely not Torsade de Pointes    4. Fatty liver  No evidence for ESLD or portal HTN by US  LFTs wnl    5. Anemia acute  Macrocytosis 2/2 ETOH    Please let us now when stable for EGD and colon or if actively bleeding.

## 2017-11-26 NOTE — PROGRESS NOTES
.         Internal Medicine Interval Note  Note Author: Marito Martínez M.D.     Name Chava Mercedes     1950   Age/Sex 67 y.o. male   MRN [id number]   Code Status FULL     After 5PM or if no immediate response to page, please call for cross-coverage  Attending/Team: Jeovanny/Lydia See Patient List for primary contact information  Call [phone] to page    1st Call - Day Intern (R1):   Tanya 2nd Call - Day Sr. Resident (R2/R3):   Mer         Reason for interval visit  (Principal Problem)   GI bleed    Interval Problem Daily Status Update  (24 hours)     Patient in 3 point restraints as he has been impulsive, agitated, getting out of bed with fall risk. Required 24 g ativan last 24 hours. CIWA scores range 10-20. Patient alert and oriented to self and place. Does not answer appropriately. Denies current hallucinations. Will increase librium dosage to 50 mg TID. No signs of active bleeding, Hgb stable. Continue CIWA protocol amidst delirium tremens.      Review of Systems   Constitutional: Negative for chills and fever.   Respiratory: Negative for cough, hemoptysis and shortness of breath.    Cardiovascular: Negative for chest pain and palpitations.   Musculoskeletal: Negative for joint pain and myalgias.       Consultants/Specialty  GI    Disposition  Inpatient    Quality Measures    Reviewed items::  Labs reviewed, Medications reviewed and EKG reviewed  Walker catheter::  No Walker  DVT prophylaxis pharmacological::  Contraindicated - High bleeding risk  DVT prophylaxis - mechanical:  SCDs  Ulcer Prophylaxis::  Yes          Physical Exam       Vitals:    17 2000 17 0000 17 0400 17 0800   BP: 113/45 105/70 115/44 103/67   Pulse: (!) 121 (!) 108 89 93   Resp: 19 16 18 15   Temp: 37.6 °C (99.6 °F) 37.1 °C (98.7 °F) 36.8 °C (98.2 °F) 36.9 °C (98.5 °F)   SpO2: 97% 96% 99% 97%   Weight: 79 kg (174 lb 2.6 oz)      Height:         Body mass index is 23.62 kg/m². Weight: 79 kg (174 lb 2.6  oz)  Oxygen Therapy:  Pulse Oximetry: 97 %, O2 (LPM): 0, O2 Delivery: None (Room Air)    Physical Exam   Constitutional: No distress.   Eyes: Conjunctivae and EOM are normal. Pupils are equal, round, and reactive to light.   Cardiovascular: Normal rate, regular rhythm and normal heart sounds.  Exam reveals no gallop and no friction rub.    No murmur heard.  Pulmonary/Chest: Effort normal and breath sounds normal. No respiratory distress. He has no wheezes. He has no rales.   Abdominal: Soft. Bowel sounds are normal. He exhibits no distension. There is no tenderness. There is no guarding.   Musculoskeletal: He exhibits no edema or tenderness.   Neurological:   A+Ox2   Skin: Skin is warm and dry. He is not diaphoretic.         Lab Data Review:         11/25/2017  11:44 AM    Recent Labs      11/24/17 0433 11/24/17   1512  11/24/17   1841  11/25/17 0258  11/26/17 0313   SODIUM  140  140   --   137  138   POTASSIUM  4.8  3.5*   --   3.7  3.7   CHLORIDE  108  109   --   112  110   CO2  20  21   --   20  20   BUN  16  13   --   10  7*   CREATININE  0.88  0.80   --   0.67  0.62   MAGNESIUM  1.5   --   2.4  2.0  2.1   PHOSPHORUS  2.9   --    --   1.5*  2.8   CALCIUM  8.4*  8.2*   --   7.8*  8.2*       Recent Labs      11/24/17 0433  11/24/17   1512  11/25/17   0258  11/26/17 0313   ALTSGPT  23  18  15  17   ASTSGOT  31  22  22  26   ALKPHOSPHAT  48  39  33  35   TBILIRUBIN  1.0  0.8  1.0  0.6   LIPASE  91*   --    --    --    GLUCOSE  150*  107*  91  86       Recent Labs      11/24/17   0433   11/25/17   0258  11/25/17   1054  11/26/17   0312   RBC  3.29*   < >  2.57*  2.65*  2.55*   HEMOGLOBIN  11.1*   < >  8.6*  8.8*  8.7*   HEMATOCRIT  33.0*   < >  26.2*  27.1*  25.8*   PLATELETCT  126*   < >  92*  98*  96*   PROTHROMBTM  15.0*   --    --    --    --    INR  1.21*   --    --    --    --     < > = values in this interval not displayed.       Recent Labs      11/24/17   0433   11/24/17   1512   11/25/17   0258   11/25/17   1054  11/26/17   0312  11/26/17   0313   WBC  14.6*   < >   --    < >  5.9  5.3  4.4*   --    NEUTSPOLYS  84.30*   --    --    --    --    --   68.00   --    LYMPHOCYTES  2.70*   --    --    --    --    --   16.60*   --    MONOCYTES  12.10   --    --    --    --    --   11.30   --    EOSINOPHILS  0.00   --    --    --    --    --   2.90   --    BASOPHILS  0.10   --    --    --    --    --   0.70   --    ASTSGOT  31   --   22   --   22   --    --   26   ALTSGPT  23   --   18   --   15   --    --   17   ALKPHOSPHAT  48   --   39   --   33   --    --   35   TBILIRUBIN  1.0   --   0.8   --   1.0   --    --   0.6    < > = values in this interval not displayed.           Assessment/Plan     * GI bleed- (present on admission)   Assessment & Plan    - Patient transferred from [place] after having mixture of melena and bright red blood per rectum for 1 day  - Abdominal ultrasound shows fatty liver, mild hepatomegaly, cholelithiasis  - RBC scan limited study due to motion, no signs of active bleeding  - Hb stable 8.6, no signs of active bleeding  - regular diet during alcohol withdrawal, with tentative plan for upper and lower endoscopy when clinically stable  - Continue IV fluids  - Transfuse if Hgb <7        Alcohol withdrawal (CMS-HCC)- (present on admission)   Assessment & Plan    - Patient has history of alcohol withdrawal with symptoms, history of DTs  - Patient states last drink was 11/22/17, 2 days before admission  - Increase dosage of librium to 50 mg TID  - WA protocol  - Fall/Seizure/Aspiration precautions  - Folate, Thiamine, B12        History of pulmonary embolism   Assessment & Plan    - History of PE status post IVC filter        Hepatic steatosis- (present on admission)   Assessment & Plan    - Patient has history of hepatic steatosis  - LFTs stable  - Confirmed on abdominal ultrasound

## 2017-11-26 NOTE — CARE PLAN
Problem: Safety  Goal: Will remain free from falls    Intervention: Implement fall precautions   11/26/17 1052   OTHER   Environmental Precautions Treaded Slipper Socks on Patient;Personal Belongings, Wastebasket, Call Bell etc. in Easy Reach;Transferred to Stronger Side;Communication Sign for Patients & Families;Bed in Low Position;Report Given to Other Health Care Providers Regarding Fall Risk;Mobility Assessed & Appropriate Sign Placed   IV Pole on Same Side of Bed as Bathroom Yes   Bedrails Alternative to Bedrails Used   Chair/Bed Strip Alarm Yes - Alarm On   Bed Alarm (Built in - for ICU ONLY) Yes - Alarm On         Problem: Skin Integrity  Goal: Risk for impaired skin integrity will decrease    Intervention: Implement precautions to protect skin integrity in collaboration with the interdisciplinary team  Condom cath, frequent turns, barrier cream

## 2017-11-26 NOTE — PROGRESS NOTES
Report received at bedside, assumed care. Pt has severe DT's in bed, attempting to get out. In bilateral wrist and right leg restraints. A&O x self, disoriented to time, place and situation. Declines pain. No other signs or symptoms of distress, fall precautions in place, call light within reach, tele box on, will continue to monitor.

## 2017-11-27 LAB
ALBUMIN SERPL BCP-MCNC: 3 G/DL (ref 3.2–4.9)
ALBUMIN/GLOB SERPL: 1.2 G/DL
ALP SERPL-CCNC: 38 U/L (ref 30–99)
ALT SERPL-CCNC: 19 U/L (ref 2–50)
ANION GAP SERPL CALC-SCNC: 6 MMOL/L (ref 0–11.9)
AST SERPL-CCNC: 26 U/L (ref 12–45)
BILIRUB SERPL-MCNC: 0.4 MG/DL (ref 0.1–1.5)
BUN SERPL-MCNC: 6 MG/DL (ref 8–22)
CALCIUM SERPL-MCNC: 8.1 MG/DL (ref 8.5–10.5)
CHLORIDE SERPL-SCNC: 112 MMOL/L (ref 96–112)
CO2 SERPL-SCNC: 21 MMOL/L (ref 20–33)
CREAT SERPL-MCNC: 0.72 MG/DL (ref 0.5–1.4)
ERYTHROCYTE [DISTWIDTH] IN BLOOD BY AUTOMATED COUNT: 49.1 FL (ref 35.9–50)
GFR SERPL CREATININE-BSD FRML MDRD: >60 ML/MIN/1.73 M 2
GLOBULIN SER CALC-MCNC: 2.6 G/DL (ref 1.9–3.5)
GLUCOSE SERPL-MCNC: 91 MG/DL (ref 65–99)
HCT VFR BLD AUTO: 23.7 % (ref 42–52)
HGB BLD-MCNC: 7.9 G/DL (ref 14–18)
MAGNESIUM SERPL-MCNC: 1.9 MG/DL (ref 1.5–2.5)
MCH RBC QN AUTO: 33.9 PG (ref 27–33)
MCHC RBC AUTO-ENTMCNC: 33.3 G/DL (ref 33.7–35.3)
MCV RBC AUTO: 101.7 FL (ref 81.4–97.8)
PHOSPHATE SERPL-MCNC: 3 MG/DL (ref 2.5–4.5)
PLATELET # BLD AUTO: 134 K/UL (ref 164–446)
PMV BLD AUTO: 10.5 FL (ref 9–12.9)
POTASSIUM SERPL-SCNC: 3.8 MMOL/L (ref 3.6–5.5)
PROT SERPL-MCNC: 5.6 G/DL (ref 6–8.2)
RBC # BLD AUTO: 2.33 M/UL (ref 4.7–6.1)
SODIUM SERPL-SCNC: 139 MMOL/L (ref 135–145)
WBC # BLD AUTO: 4.3 K/UL (ref 4.8–10.8)

## 2017-11-27 PROCEDURE — 84100 ASSAY OF PHOSPHORUS: CPT

## 2017-11-27 PROCEDURE — 85027 COMPLETE CBC AUTOMATED: CPT

## 2017-11-27 PROCEDURE — 700105 HCHG RX REV CODE 258: Performed by: STUDENT IN AN ORGANIZED HEALTH CARE EDUCATION/TRAINING PROGRAM

## 2017-11-27 PROCEDURE — A9270 NON-COVERED ITEM OR SERVICE: HCPCS | Performed by: STUDENT IN AN ORGANIZED HEALTH CARE EDUCATION/TRAINING PROGRAM

## 2017-11-27 PROCEDURE — 99232 SBSQ HOSP IP/OBS MODERATE 35: CPT | Mod: GC | Performed by: INTERNAL MEDICINE

## 2017-11-27 PROCEDURE — 770001 HCHG ROOM/CARE - MED/SURG/GYN PRIV*

## 2017-11-27 PROCEDURE — C9113 INJ PANTOPRAZOLE SODIUM, VIA: HCPCS | Performed by: STUDENT IN AN ORGANIZED HEALTH CARE EDUCATION/TRAINING PROGRAM

## 2017-11-27 PROCEDURE — 700111 HCHG RX REV CODE 636 W/ 250 OVERRIDE (IP): Performed by: STUDENT IN AN ORGANIZED HEALTH CARE EDUCATION/TRAINING PROGRAM

## 2017-11-27 PROCEDURE — A9270 NON-COVERED ITEM OR SERVICE: HCPCS | Performed by: INTERNAL MEDICINE

## 2017-11-27 PROCEDURE — 36415 COLL VENOUS BLD VENIPUNCTURE: CPT

## 2017-11-27 PROCEDURE — 700102 HCHG RX REV CODE 250 W/ 637 OVERRIDE(OP): Performed by: INTERNAL MEDICINE

## 2017-11-27 PROCEDURE — 83735 ASSAY OF MAGNESIUM: CPT

## 2017-11-27 PROCEDURE — 700102 HCHG RX REV CODE 250 W/ 637 OVERRIDE(OP): Performed by: STUDENT IN AN ORGANIZED HEALTH CARE EDUCATION/TRAINING PROGRAM

## 2017-11-27 PROCEDURE — 80053 COMPREHEN METABOLIC PANEL: CPT

## 2017-11-27 RX ORDER — CHLORDIAZEPOXIDE HYDROCHLORIDE 25 MG/1
25 CAPSULE, GELATIN COATED ORAL 3 TIMES DAILY
Status: DISCONTINUED | OUTPATIENT
Start: 2017-11-27 | End: 2017-11-29

## 2017-11-27 RX ADMIN — LORAZEPAM 2 MG: 1 TABLET ORAL at 15:27

## 2017-11-27 RX ADMIN — CHLORDIAZEPOXIDE HYDROCHLORIDE 50 MG: 25 CAPSULE ORAL at 10:12

## 2017-11-27 RX ADMIN — LORAZEPAM 1 MG: 1 TABLET ORAL at 02:01

## 2017-11-27 RX ADMIN — LORAZEPAM 1 MG: 1 TABLET ORAL at 06:14

## 2017-11-27 RX ADMIN — LORAZEPAM 2 MG: 1 TABLET ORAL at 18:42

## 2017-11-27 RX ADMIN — CHLORDIAZEPOXIDE HYDROCHLORIDE 25 MG: 25 CAPSULE ORAL at 15:27

## 2017-11-27 RX ADMIN — LORAZEPAM 2 MG: 1 TABLET ORAL at 10:13

## 2017-11-27 RX ADMIN — SODIUM CHLORIDE: 9 INJECTION, SOLUTION INTRAVENOUS at 04:00

## 2017-11-27 RX ADMIN — PANTOPRAZOLE SODIUM 40 MG: 40 INJECTION, POWDER, FOR SOLUTION INTRAVENOUS at 20:59

## 2017-11-27 RX ADMIN — THERA TABS 1 TABLET: TAB at 10:13

## 2017-11-27 RX ADMIN — PANTOPRAZOLE SODIUM 40 MG: 40 INJECTION, POWDER, FOR SOLUTION INTRAVENOUS at 10:13

## 2017-11-27 RX ADMIN — CHLORDIAZEPOXIDE HYDROCHLORIDE 25 MG: 25 CAPSULE ORAL at 20:59

## 2017-11-27 RX ADMIN — LORAZEPAM 2 MG: 1 TABLET ORAL at 20:59

## 2017-11-27 RX ADMIN — LORAZEPAM 2 MG: 1 TABLET ORAL at 00:07

## 2017-11-27 ASSESSMENT — LIFESTYLE VARIABLES
PAROXYSMAL SWEATS: BARELY PERCEPTIBLE SWEATING. PALMS MOIST
VISUAL DISTURBANCES: MODERATE SENSITIVITY
PAROXYSMAL SWEATS: BARELY PERCEPTIBLE SWEATING. PALMS MOIST
AGITATION: *
PAROXYSMAL SWEATS: NO SWEAT VISIBLE
TOTAL SCORE: 7
TREMOR: *
TOTAL SCORE: 13
AGITATION: SOMEWHAT MORE THAN NORMAL ACTIVITY
ANXIETY: MILDLY ANXIOUS
AUDITORY DISTURBANCES: MODERATE HARSHNESS OR ABILITY TO FRIGHTEN
HEADACHE, FULLNESS IN HEAD: MILD
ANXIETY: NO ANXIETY (AT EASE)
VISUAL DISTURBANCES: MODERATE SENSITIVITY
ORIENTATION AND CLOUDING OF SENSORIUM: DATE DISORIENTATION BY MORE THAN TWO CALENDAR DAYS
PAROXYSMAL SWEATS: BARELY PERCEPTIBLE SWEATING. PALMS MOIST
TOTAL SCORE: 14
PAROXYSMAL SWEATS: BARELY PERCEPTIBLE SWEATING. PALMS MOIST
VISUAL DISTURBANCES: MILD SENSITIVITY
AUDITORY DISTURBANCES: VERY MILD HARSHNESS OR ABILITY TO FRIGHTEN
TREMOR: *
TREMOR: TREMOR NOT VISIBLE BUT CAN BE FELT, FINGERTIP TO FINGERTIP
NAUSEA AND VOMITING: NO NAUSEA AND NO VOMITING
ANXIETY: MILDLY ANXIOUS
HEADACHE, FULLNESS IN HEAD: VERY MILD
AGITATION: NORMAL ACTIVITY
TOTAL SCORE: VERY MILD ITCHING, PINS AND NEEDLES SENSATION, BURNING OR NUMBNESS
TREMOR: *
AGITATION: *
TREMOR: *
AGITATION: SOMEWHAT MORE THAN NORMAL ACTIVITY
ORIENTATION AND CLOUDING OF SENSORIUM: DATE DISORIENTATION BY MORE THAN TWO CALENDAR DAYS
AUDITORY DISTURBANCES: NOT PRESENT
NAUSEA AND VOMITING: NO NAUSEA AND NO VOMITING
VISUAL DISTURBANCES: NOT PRESENT
ORIENTATION AND CLOUDING OF SENSORIUM: DATE DISORIENTATION BY MORE THAN TWO CALENDAR DAYS
HEADACHE, FULLNESS IN HEAD: VERY MILD
PAROXYSMAL SWEATS: BARELY PERCEPTIBLE SWEATING. PALMS MOIST
ANXIETY: NO ANXIETY (AT EASE)
TOTAL SCORE: 13
ORIENTATION AND CLOUDING OF SENSORIUM: DATE DISORIENTATION BY MORE THAN TWO CALENDAR DAYS
AGITATION: NORMAL ACTIVITY
NAUSEA AND VOMITING: NO NAUSEA AND NO VOMITING
VISUAL DISTURBANCES: NOT PRESENT
NAUSEA AND VOMITING: NO NAUSEA AND NO VOMITING
AUDITORY DISTURBANCES: MILD HARSHNESS OR ABILITY TO FRIGHTEN
ORIENTATION AND CLOUDING OF SENSORIUM: DATE DISORIENTATION BY MORE THAN TWO CALENDAR DAYS
HEADACHE, FULLNESS IN HEAD: VERY MILD
VISUAL DISTURBANCES: NOT PRESENT
HEADACHE, FULLNESS IN HEAD: MILD
ANXIETY: MILDLY ANXIOUS
TREMOR: NO TREMOR
VISUAL DISTURBANCES: VERY MILD SENSITIVITY
DO YOU DRINK ALCOHOL: YES
ORIENTATION AND CLOUDING OF SENSORIUM: DATE DISORIENTATION BY NO MORE THAN TWO CALENDAR DAYS
AUDITORY DISTURBANCES: VERY MILD HARSHNESS OR ABILITY TO FRIGHTEN
NAUSEA AND VOMITING: NO NAUSEA AND NO VOMITING
ANXIETY: *
AUDITORY DISTURBANCES: VERY MILD HARSHNESS OR ABILITY TO FRIGHTEN
PAROXYSMAL SWEATS: BARELY PERCEPTIBLE SWEATING. PALMS MOIST
NAUSEA AND VOMITING: NO NAUSEA AND NO VOMITING
HEADACHE, FULLNESS IN HEAD: VERY MILD
AUDITORY DISTURBANCES: VERY MILD HARSHNESS OR ABILITY TO FRIGHTEN
ANXIETY: NO ANXIETY (AT EASE)
VISUAL DISTURBANCES: MODERATE SENSITIVITY
ANXIETY: *
ORIENTATION AND CLOUDING OF SENSORIUM: DATE DISORIENTATION BY MORE THAN TWO CALENDAR DAYS
PAROXYSMAL SWEATS: BARELY PERCEPTIBLE SWEATING. PALMS MOIST
NAUSEA AND VOMITING: NO NAUSEA AND NO VOMITING
HEADACHE, FULLNESS IN HEAD: MODERATE
AUDITORY DISTURBANCES: NOT PRESENT
TOTAL SCORE: 14
HEADACHE, FULLNESS IN HEAD: VERY MILD
TOTAL SCORE: 13
ORIENTATION AND CLOUDING OF SENSORIUM: DATE DISORIENTATION BY MORE THAN TWO CALENDAR DAYS
AGITATION: SOMEWHAT MORE THAN NORMAL ACTIVITY
TREMOR: *
NAUSEA AND VOMITING: NO NAUSEA AND NO VOMITING
TOTAL SCORE: 10
AGITATION: SOMEWHAT MORE THAN NORMAL ACTIVITY
TREMOR: *
TOTAL SCORE: 10

## 2017-11-27 ASSESSMENT — ENCOUNTER SYMPTOMS
MYALGIAS: 0
BLOOD IN STOOL: 1
NAUSEA: 0
COUGH: 0
WEAKNESS: 0
DIAPHORESIS: 0
NERVOUS/ANXIOUS: 0
SHORTNESS OF BREATH: 0
HALLUCINATIONS: 0
HEMOPTYSIS: 0
ABDOMINAL PAIN: 1
FEVER: 0
PALPITATIONS: 0
CHILLS: 0
VOMITING: 0
CONSTIPATION: 0
SPUTUM PRODUCTION: 0
SINUS PAIN: 0
SORE THROAT: 0

## 2017-11-27 ASSESSMENT — PAIN SCALES - GENERAL
PAINLEVEL_OUTOF10: 5
PAINLEVEL_OUTOF10: 10
PAINLEVEL_OUTOF10: 4

## 2017-11-27 NOTE — PROGRESS NOTES
Gastroenterology Progress Note     Author: Gurmeet Randolph   Date & Time Created: 11/27/2017 11:49 AM    Interval History:  No c/o. Denies further bleeding. Remained agitated overnight.     Review of Systems:  Review of Systems   Constitutional: Negative for chills and fever.   Gastrointestinal: Positive for blood in stool.       Physical Exam:  Physical Exam   Constitutional:   Restless, Oriented to person, place only   Eyes: No scleral icterus.   Cardiovascular: Normal rate and regular rhythm.    Pulmonary/Chest: Effort normal and breath sounds normal.   Abdominal: Soft. Bowel sounds are normal.   Skin: Skin is warm and dry.   Nursing note and vitals reviewed.      Labs:        Invalid input(s): [id number]      Recent Labs      11/25/17 0258 11/26/17 0313 11/27/17 0347   SODIUM  137  138  139   POTASSIUM  3.7  3.7  3.8   CHLORIDE  112  110  112   CO2  20  20  21   BUN  10  7*  6*   CREATININE  0.67  0.62  0.72   MAGNESIUM  2.0  2.1  1.9   PHOSPHORUS  1.5*  2.8  3.0   CALCIUM  7.8*  8.2*  8.1*     Recent Labs      11/25/17   0258  11/26/17 0313 11/27/17 0347   ALTSGPT  15  17  19   ASTSGOT  22  26  26   ALKPHOSPHAT  33  35  38   TBILIRUBIN  1.0  0.6  0.4   GLUCOSE  91  86  91     Recent Labs      11/25/17   1054  11/26/17 0312 11/27/17 0347   RBC  2.65*  2.55*  2.33*   HEMOGLOBIN  8.8*  8.7*  7.9*   HEMATOCRIT  27.1*  25.8*  23.7*   PLATELETCT  98*  96*  134*     Recent Labs      11/25/17   0258  11/25/17   1054  11/26/17   0312  11/26/17 0313 11/27/17   0347   WBC  5.9  5.3  4.4*   --   4.3*   NEUTSPOLYS   --    --   68.00   --    --    LYMPHOCYTES   --    --   16.60*   --    --    MONOCYTES   --    --   11.30   --    --    EOSINOPHILS   --    --   2.90   --    --    BASOPHILS   --    --   0.70   --    --    ASTSGOT  22   --    --   26  26   ALTSGPT  15   --    --   17  19   ALKPHOSPHAT  33   --    --   35  38   TBILIRUBIN  1.0   --    --   0.6  0.4     Hemodynamics:  Temp (24hrs),  Av.1 °C (98.7 °F), Min:36.6 °C (97.9 °F), Max:37.6 °C (99.7 °F)  Temperature: 37 °C (98.6 °F)  Pulse  Av  Min: 81  Max: 148   Blood Pressure : 109/59     Respiratory:    Respiration: 16, Pulse Oximetry: 98 %        RUL Breath Sounds: Clear, RML Breath Sounds: Diminished, RLL Breath Sounds: Diminished, FLORA Breath Sounds: Clear, LLL Breath Sounds: Diminished  Fluids:    Intake/Output Summary (Last 24 hours) at 17 1149  Last data filed at 17 0547   Gross per 24 hour   Intake             1320 ml   Output             1500 ml   Net             -180 ml     Weight: 79 kg (174 lb 2.6 oz)  GI/Nutrition:  Orders Placed This Encounter   Procedures   • DIET ORDER     Standing Status:   Standing     Number of Occurrences:   1     Order Specific Question:   Diet:     Answer:   Regular [1]     Medical Decision Making, by Problem:  Active Hospital Problems    Diagnosis   • *GI bleed: Minor rectal bleeding.   • Alcohol withdrawal: Improving.   • History of pulmonary embolism    • Severe protein-calorie malnutrition    • Pancytopenia: Secondary to EtOH bone marrow suppression.    • Macrocytic anemia       Plan:  Continue CIWA protocol.  Plan EGD/colonoscopy withdrawal resolved.  Monitor Hct.       Reviewed items::  Radiology images reviewed, Labs reviewed and Medications reviewed

## 2017-11-27 NOTE — PROGRESS NOTES
Received patient from night shift nurse. Assessment complete. A&Ox1. C/o hof headache d/t ambient noise. Medicated per MAR.Call light within reach. All needs attended to at this time. Bed in low position, treaded slippers on feet.

## 2017-11-27 NOTE — NON-PROVIDER
Internal Medicine Medical Student Note  Note Author: Raul Blackburnsloane, Student    Name Chava Mercedes     1950   Age/Sex 67 y.o. male   MRN [id number]   Code Status Full code     After 5PM or if no immediate response to page, please call for cross-coverage  Attending/Team: Dr. Up/ john See Patient List for primary contact information  Call [phone] to page after hours   1st Call - Day Intern (R1):   Dr. Martínez 2nd Call - Day Sr. Resident (R2/R3):   Dr. Etienne     Reason for interval visit  (Principal Problem)   GI bleed    Interval Problem Daily Status Update  (24 hours)   Patient reports no acute overnight events. He was given approximately 12mg of Ativan last night per CIWA protocol. Speaking with him this morning, he is alert and oriented to person and place, although he is confused and often answers questions inappropriately. No episodes of emesis or bowel movements. Patients heart rate continues to be stable in the 80-90s with no chest pain or palpatations.     Review of Systems   Constitutional: Negative for chills, diaphoresis and fever.   HENT: Negative for congestion, sinus pain and sore throat.    Respiratory: Negative for cough, hemoptysis, sputum production and shortness of breath.    Cardiovascular: Negative for chest pain, palpitations and leg swelling.   Gastrointestinal: Positive for abdominal pain (epigastric region). Negative for constipation, nausea and vomiting.   Skin: Negative for itching and rash.   Neurological: Negative for weakness.       Physical Exam       Vitals:    17 2337 17 0340 17 0800 17 1208   BP: 102/66 100/71 109/59 120/62   Pulse: 81 90 83 86   Resp: 18 18 16 16   Temp: 36.7 °C (98 °F) 37.4 °C (99.3 °F) 37 °C (98.6 °F) 36.6 °C (97.8 °F)   SpO2: 97% 97% 98% 98%   Weight:       Height:         Body mass index is 23.62 kg/m². Weight: 79 kg (174 lb 2.6 oz)  Oxygen Therapy:  Pulse Oximetry: 98 %, O2 (LPM): 0, O2 Delivery: None (Room  Air)    Physical Exam   Constitutional: He is well-developed, well-nourished, and in no distress.   HENT:   Head: Normocephalic and atraumatic.   Eyes: Conjunctivae and EOM are normal.   Neck: Normal range of motion. Neck supple.   Cardiovascular: Normal rate, regular rhythm and normal heart sounds.  Exam reveals no gallop and no friction rub.    No murmur heard.  Pulmonary/Chest: Effort normal and breath sounds normal. No respiratory distress. He has no wheezes. He has no rales.   Abdominal: Soft. He exhibits no distension. There is tenderness (epigastric region). There is no rebound and no guarding.   Musculoskeletal: He exhibits no edema.   Skin: Skin is warm and dry. No rash noted. No erythema.     Most Recent Labs:    Lab Results   Component Value Date/Time    WBC 4.3 (L) 11/27/2017 03:47 AM    RBC 2.33 (L) 11/27/2017 03:47 AM    HEMOGLOBIN 7.9 (L) 11/27/2017 03:47 AM    HEMATOCRIT 23.7 (L) 11/27/2017 03:47 AM    .7 (H) 11/27/2017 03:47 AM    MCH 33.9 (H) 11/27/2017 03:47 AM    MCHC 33.3 (L) 11/27/2017 03:47 AM    MPV 10.5 11/27/2017 03:47 AM    NEUTSPOLYS 68.00 11/26/2017 03:12 AM    LYMPHOCYTES 16.60 (L) 11/26/2017 03:12 AM    MONOCYTES 11.30 11/26/2017 03:12 AM    EOSINOPHILS 2.90 11/26/2017 03:12 AM    BASOPHILS 0.70 11/26/2017 03:12 AM    ANISOCYTOSIS 1+ 12/24/2016 12:33 AM      Lab Results   Component Value Date/Time    SODIUM 139 11/27/2017 03:47 AM    POTASSIUM 3.8 11/27/2017 03:47 AM    CHLORIDE 112 11/27/2017 03:47 AM    CO2 21 11/27/2017 03:47 AM    GLUCOSE 91 11/27/2017 03:47 AM    BUN 6 (L) 11/27/2017 03:47 AM    CREATININE 0.72 11/27/2017 03:47 AM      Lab Results   Component Value Date/Time    ALTSGPT 19 11/27/2017 03:47 AM    ASTSGOT 26 11/27/2017 03:47 AM    ALKPHOSPHAT 38 11/27/2017 03:47 AM    TBILIRUBIN 0.4 11/27/2017 03:47 AM    LIPASE 91 (H) 11/24/2017 04:33 AM    ALBUMIN 3.0 (L) 11/27/2017 03:47 AM    GLOBULIN 2.6 11/27/2017 03:47 AM    PREALBUMIN 8.0 (L) 12/26/2016 03:52 AM    INR  1.21 (H) 11/24/2017 04:33 AM    MACROCYTOSIS 1+ 12/24/2016 12:33 AM     Lab Results   Component Value Date/Time    PROTHROMBTM 15.0 (H) 11/24/2017 04:33 AM    INR 1.21 (H) 11/24/2017 04:33 AM        Assessment/Plan     #Lower GI bleed  -Hemoglobin decreasing, 7.9 today  -No signs of active bleeding  -GI following  -Tentative plan for EGD and colonoscopy once patient is clinically stable  -H&H daily  -Transfuse if hemoglobin <7  -Monitor for signs of active bleeding (tachycardia, hypotension)     #Alcohol withdrawal  -Patient is improved clinically, less tachycardic  -Decrease Librium to 25 mg TID  -CIWA protocol  -Continue Folate, thiamine, B12     #Sinus Tachycardia  -Mg - 1.9; K - 3.8  -Heart rate has been stable in 80-90s  -Discontinue cardiac monitoring  -Continue to monitor Mg and K     #Pancreatitis  -Lipase 91 at admission  -Abdominal pain in epigastric region  -Continue to monitor

## 2017-11-27 NOTE — PROGRESS NOTES
.         Internal Medicine Interval Note  Note Author: Marito Martínez M.D.     Name Chava Mercedes     1950   Age/Sex 67 y.o. male   MRN [id number]   Code Status FULL     After 5PM or if no immediate response to page, please call for cross-coverage  Attending/Team: Jeovanny/Lydia See Patient List for primary contact information  Call [phone] to page    1st Call - Day Intern (R1):   Tanya 2nd Call - Day Sr. Resident (R2/R3):   Mer         Reason for interval visit  (Principal Problem)   Alcohol withdrawal    Interval Problem Daily Status Update  (24 hours)     Required 13 mg ativan last 24 hours, CIWA score range 10-14 during that time. A+Ox2, no acute distress. Will decrease librium dosage to 25 mg TID. Discontinue IVF. Continue CIWA protocol and librium. GI following.      Review of Systems   Constitutional: Negative for chills and fever.   Respiratory: Negative for cough, hemoptysis and shortness of breath.    Cardiovascular: Negative for chest pain and palpitations.   Musculoskeletal: Negative for joint pain and myalgias.   Psychiatric/Behavioral: Negative for hallucinations. The patient is not nervous/anxious.        Consultants/Specialty  GI    Disposition  Inpatient    Quality Measures    Reviewed items::  Labs reviewed and Medications reviewed  Walker catheter::  No Walker  DVT prophylaxis pharmacological::  Contraindicated - High bleeding risk  DVT prophylaxis - mechanical:  SCDs  Ulcer Prophylaxis::  Yes          Physical Exam       Vitals:    17 2337 17 0340 17 0800 17 1208   BP: 102/66 100/71 109/59 120/62   Pulse: 81 90 83 86   Resp: 18 18 16 16   Temp: 36.7 °C (98 °F) 37.4 °C (99.3 °F) 37 °C (98.6 °F) 36.6 °C (97.8 °F)   SpO2: 97% 97% 98% 98%   Weight:       Height:         Body mass index is 23.62 kg/m². Weight: 79 kg (174 lb 2.6 oz)  Oxygen Therapy:  Pulse Oximetry: 98 %, O2 (LPM): 0, O2 Delivery: None (Room Air)    Physical Exam   Constitutional: No distress.    Eyes: Conjunctivae and EOM are normal. Pupils are equal, round, and reactive to light.   Cardiovascular: Normal rate, regular rhythm and normal heart sounds.  Exam reveals no gallop and no friction rub.    No murmur heard.  Pulmonary/Chest: Effort normal and breath sounds normal. No respiratory distress. He has no wheezes. He has no rales.   Abdominal: Soft. Bowel sounds are normal. He exhibits no distension. There is no tenderness. There is no guarding.   Musculoskeletal: He exhibits no edema or tenderness.   Neurological:   A+Ox2   Skin: Skin is warm and dry. He is not diaphoretic.         Lab Data Review:         11/25/2017  11:44 AM    Recent Labs      11/25/17 0258 11/26/17 0313 11/27/17 0347   SODIUM  137  138  139   POTASSIUM  3.7  3.7  3.8   CHLORIDE  112  110  112   CO2  20  20  21   BUN  10  7*  6*   CREATININE  0.67  0.62  0.72   MAGNESIUM  2.0  2.1  1.9   PHOSPHORUS  1.5*  2.8  3.0   CALCIUM  7.8*  8.2*  8.1*       Recent Labs      11/25/17 0258 11/26/17 0313 11/27/17 0347   ALTSGPT  15  17  19   ASTSGOT  22  26  26   ALKPHOSPHAT  33  35  38   TBILIRUBIN  1.0  0.6  0.4   GLUCOSE  91  86  91       Recent Labs      11/25/17   1054  11/26/17 0312 11/27/17 0347   RBC  2.65*  2.55*  2.33*   HEMOGLOBIN  8.8*  8.7*  7.9*   HEMATOCRIT  27.1*  25.8*  23.7*   PLATELETCT  98*  96*  134*       Recent Labs      11/25/17 0258 11/25/17   1054  11/26/17 0312 11/26/17 0313 11/27/17 0347   WBC  5.9  5.3  4.4*   --   4.3*   NEUTSPOLYS   --    --   68.00   --    --    LYMPHOCYTES   --    --   16.60*   --    --    MONOCYTES   --    --   11.30   --    --    EOSINOPHILS   --    --   2.90   --    --    BASOPHILS   --    --   0.70   --    --    ASTSGOT  22   --    --   26  26   ALTSGPT  15   --    --   17  19   ALKPHOSPHAT  33   --    --   35  38   TBILIRUBIN  1.0   --    --   0.6  0.4           Assessment/Plan     * GI bleed- (present on admission)   Assessment & Plan    - Patient transferred  from [place] after having mixture of melena and bright red blood per rectum for 1 day  - Abdominal ultrasound shows fatty liver, mild hepatomegaly, cholelithiasis  - RBC scan limited study due to motion, no signs of active bleeding  - Hb 7.9, no signs of active bleeding  - regular diet during alcohol withdrawal, with tentative plan for upper and lower endoscopy when clinically stable  - Discontinue IV fluids  - Transfuse if Hgb <7        Alcohol withdrawal (CMS-HCC)- (present on admission)   Assessment & Plan    - Patient has history of alcohol withdrawal with symptoms, history of DTs  - Patient states last drink was 11/22/17, two days before admission  - Decrease dosage of librium to 25 mg TID  - CIWA protocol  - Fall/Seizure/Aspiration precautions  - Multivitamin        History of pulmonary embolism- (present on admission)   Assessment & Plan    - History of PE status post IVC filter        Hepatic steatosis   Assessment & Plan    - Patient has history of hepatic steatosis  - LFTs stable  - Confirmed on abdominal ultrasound

## 2017-11-27 NOTE — PROGRESS NOTES
Report received at bedside, assumed care. Pt is sleeping comfortably in bed, easy to arouse. AO to self and place. In bilateral wrist and right leg restraints.  Declines pain. No other signs or symptoms of distress, fall precautions in place, call light within reach, will continue to monitor.

## 2017-11-27 NOTE — PROGRESS NOTES
Gastroenterology Progress Note     Author: Arvin Barahona   Date & Time Created: 11/26/2017 4:23 PM    Interval History:  Still in withdrawal, unable to understand issues at hand    Review of Systems:  Review of Systems   Unable to perform ROS: Mental status change       Physical Exam:  Physical Exam   Constitutional: He appears well-nourished. He appears distressed.   HENT:   Head: Atraumatic.   Eyes: No scleral icterus.   Neck: No tracheal deviation present. No thyromegaly present.   Pulmonary/Chest: No stridor. No respiratory distress.   Abdominal: Soft. He exhibits no distension.   Neurological:   Not alert and oriented times 1.    In restraints.       Labs:        Invalid input(s): [id number]      Recent Labs      11/24/17 0433 11/24/17 1512 11/24/17   1841  11/25/17 0258  11/26/17 0313   SODIUM  140  140   --   137  138   POTASSIUM  4.8  3.5*   --   3.7  3.7   CHLORIDE  108  109   --   112  110   CO2  20  21   --   20  20   BUN  16  13   --   10  7*   CREATININE  0.88  0.80   --   0.67  0.62   MAGNESIUM  1.5   --   2.4  2.0  2.1   PHOSPHORUS  2.9   --    --   1.5*  2.8   CALCIUM  8.4*  8.2*   --   7.8*  8.2*     Recent Labs      11/24/17 0433 11/24/17   1512  11/25/17 0258  11/26/17 0313   ALTSGPT  23  18  15  17   ASTSGOT  31  22  22  26   ALKPHOSPHAT  48  39  33  35   TBILIRUBIN  1.0  0.8  1.0  0.6   LIPASE  91*   --    --    --    GLUCOSE  150*  107*  91  86     Recent Labs      11/24/17   0433   11/25/17   0258  11/25/17   1054  11/26/17   0312   RBC  3.29*   < >  2.57*  2.65*  2.55*   HEMOGLOBIN  11.1*   < >  8.6*  8.8*  8.7*   HEMATOCRIT  33.0*   < >  26.2*  27.1*  25.8*   PLATELETCT  126*   < >  92*  98*  96*   PROTHROMBTM  15.0*   --    --    --    --    INR  1.21*   --    --    --    --     < > = values in this interval not displayed.     Recent Labs      11/24/17   0433   11/24/17   1512   11/25/17   0258  11/25/17   1054  11/26/17   0312  11/26/17   0313   WBC  14.6*   < >    --    < >  5.9  5.3  4.4*   --    NEUTSPOLYS  84.30*   --    --    --    --    --   68.00   --    LYMPHOCYTES  2.70*   --    --    --    --    --   16.60*   --    MONOCYTES  12.10   --    --    --    --    --   11.30   --    EOSINOPHILS  0.00   --    --    --    --    --   2.90   --    BASOPHILS  0.10   --    --    --    --    --   0.70   --    ASTSGOT  31   --   22   --   22   --    --   26   ALTSGPT  23   --   18   --   15   --    --   17   ALKPHOSPHAT  48   --   39   --   33   --    --   35   TBILIRUBIN  1.0   --   0.8   --   1.0   --    --   0.6    < > = values in this interval not displayed.     Hemodynamics:  Temp (24hrs), Av.1 °C (98.8 °F), Min:36.8 °C (98.2 °F), Max:37.6 °C (99.6 °F)  Temperature: 36.9 °C (98.5 °F)  Pulse  Av.9  Min: 89  Max: 148   Blood Pressure : 103/67     Respiratory:    Respiration: 15, Pulse Oximetry: 97 %        RUL Breath Sounds: Clear, RML Breath Sounds: Diminished, RLL Breath Sounds: Diminished, FLORA Breath Sounds: Clear, LLL Breath Sounds: Diminished  Fluids:    Intake/Output Summary (Last 24 hours) at 17 1623  Last data filed at 17 1300   Gross per 24 hour   Intake              470 ml   Output             2950 ml   Net            -2480 ml     Weight: 79 kg (174 lb 2.6 oz)  GI/Nutrition:  Orders Placed This Encounter   Procedures   • DIET ORDER     Standing Status:   Standing     Number of Occurrences:   1     Order Specific Question:   Diet:     Answer:   Regular [1]     Medical Decision Making, by Problem:  Active Hospital Problems    Diagnosis   • *GI bleed [K92.2]   • Alcohol withdrawal (CMS-HCC) [F10.239]   • History of pulmonary embolism [Z86.711]   • Hepatic steatosis [K76.0]   • Macrocytic anemia [D53.9]       Plan:  Hematochezia  No active bleeding  Drop Hgb 1.2 to 8 -9 but stable times 48 hours w/o bleeding  CIWA protocol and restraints    Elective EGD colon when stable or emergently when active bleeding      Quality-Core Measures

## 2017-11-27 NOTE — PROGRESS NOTES
Patient is restless but sleeping in bed, no signs of distress, even unlabored breathing, will continue to monitor.

## 2017-11-27 NOTE — CARE PLAN
Problem: Safety  Goal: Will remain free from injury  Educated pt on safety precautions and pt has verbalized understanding. Pt has demonstrated proper use of the call light and calls appropriately. Pt is wearing non slip socks, in bed in the low locked position and the call light is within reach    Problem: Psychosocial Needs:  Goal: Level of anxiety will decrease  Assess patient's anxiety triggers; encourage pt to verbalize feelings regarding care, hospital stay, and illness. Maintain calm environment; keep lights dim, noise level low, door/curtain closed. Medicate PRN per MAR. Hourly round.

## 2017-11-28 LAB
ANION GAP SERPL CALC-SCNC: 13 MMOL/L (ref 0–11.9)
BUN SERPL-MCNC: 5 MG/DL (ref 8–22)
CALCIUM SERPL-MCNC: 8.9 MG/DL (ref 8.5–10.5)
CHLORIDE SERPL-SCNC: 109 MMOL/L (ref 96–112)
CO2 SERPL-SCNC: 18 MMOL/L (ref 20–33)
CREAT SERPL-MCNC: 0.78 MG/DL (ref 0.5–1.4)
ERYTHROCYTE [DISTWIDTH] IN BLOOD BY AUTOMATED COUNT: 49.1 FL (ref 35.9–50)
GFR SERPL CREATININE-BSD FRML MDRD: >60 ML/MIN/1.73 M 2
GLUCOSE SERPL-MCNC: 104 MG/DL (ref 65–99)
HCT VFR BLD AUTO: 25 % (ref 42–52)
HGB BLD-MCNC: 8.3 G/DL (ref 14–18)
LIPASE SERPL-CCNC: 97 U/L (ref 11–82)
MCH RBC QN AUTO: 33.6 PG (ref 27–33)
MCHC RBC AUTO-ENTMCNC: 33.2 G/DL (ref 33.7–35.3)
MCV RBC AUTO: 101.2 FL (ref 81.4–97.8)
PLATELET # BLD AUTO: 173 K/UL (ref 164–446)
PMV BLD AUTO: 10.3 FL (ref 9–12.9)
POTASSIUM SERPL-SCNC: 3.5 MMOL/L (ref 3.6–5.5)
RBC # BLD AUTO: 2.47 M/UL (ref 4.7–6.1)
SODIUM SERPL-SCNC: 140 MMOL/L (ref 135–145)
WBC # BLD AUTO: 6.1 K/UL (ref 4.8–10.8)

## 2017-11-28 PROCEDURE — A9270 NON-COVERED ITEM OR SERVICE: HCPCS

## 2017-11-28 PROCEDURE — A9270 NON-COVERED ITEM OR SERVICE: HCPCS | Performed by: STUDENT IN AN ORGANIZED HEALTH CARE EDUCATION/TRAINING PROGRAM

## 2017-11-28 PROCEDURE — 700102 HCHG RX REV CODE 250 W/ 637 OVERRIDE(OP): Performed by: STUDENT IN AN ORGANIZED HEALTH CARE EDUCATION/TRAINING PROGRAM

## 2017-11-28 PROCEDURE — A9270 NON-COVERED ITEM OR SERVICE: HCPCS | Performed by: INTERNAL MEDICINE

## 2017-11-28 PROCEDURE — 80048 BASIC METABOLIC PNL TOTAL CA: CPT

## 2017-11-28 PROCEDURE — 770001 HCHG ROOM/CARE - MED/SURG/GYN PRIV*

## 2017-11-28 PROCEDURE — 700102 HCHG RX REV CODE 250 W/ 637 OVERRIDE(OP)

## 2017-11-28 PROCEDURE — 700105 HCHG RX REV CODE 258: Performed by: STUDENT IN AN ORGANIZED HEALTH CARE EDUCATION/TRAINING PROGRAM

## 2017-11-28 PROCEDURE — 85027 COMPLETE CBC AUTOMATED: CPT

## 2017-11-28 PROCEDURE — 36415 COLL VENOUS BLD VENIPUNCTURE: CPT

## 2017-11-28 PROCEDURE — 700102 HCHG RX REV CODE 250 W/ 637 OVERRIDE(OP): Performed by: INTERNAL MEDICINE

## 2017-11-28 PROCEDURE — 83690 ASSAY OF LIPASE: CPT

## 2017-11-28 PROCEDURE — 99232 SBSQ HOSP IP/OBS MODERATE 35: CPT | Mod: GC | Performed by: INTERNAL MEDICINE

## 2017-11-28 RX ORDER — POTASSIUM CHLORIDE 20 MEQ/1
40 TABLET, EXTENDED RELEASE ORAL ONCE
Status: COMPLETED | OUTPATIENT
Start: 2017-11-28 | End: 2017-11-28

## 2017-11-28 RX ORDER — OMEPRAZOLE 20 MG/1
20 CAPSULE, DELAYED RELEASE ORAL 2 TIMES DAILY
Status: DISCONTINUED | OUTPATIENT
Start: 2017-11-28 | End: 2017-12-05

## 2017-11-28 RX ORDER — SODIUM CHLORIDE 9 MG/ML
INJECTION, SOLUTION INTRAVENOUS CONTINUOUS
Status: DISCONTINUED | OUTPATIENT
Start: 2017-11-28 | End: 2017-12-03

## 2017-11-28 RX ORDER — SODIUM CHLORIDE 9 MG/ML
500 INJECTION, SOLUTION INTRAVENOUS ONCE
Status: COMPLETED | OUTPATIENT
Start: 2017-11-28 | End: 2017-11-28

## 2017-11-28 RX ADMIN — THERA TABS 1 TABLET: TAB at 08:29

## 2017-11-28 RX ADMIN — CHLORDIAZEPOXIDE HYDROCHLORIDE 25 MG: 25 CAPSULE ORAL at 21:18

## 2017-11-28 RX ADMIN — LORAZEPAM 3 MG: 1 TABLET ORAL at 11:06

## 2017-11-28 RX ADMIN — LORAZEPAM 3 MG: 1 TABLET ORAL at 12:17

## 2017-11-28 RX ADMIN — LORAZEPAM 1 MG: 1 TABLET ORAL at 18:48

## 2017-11-28 RX ADMIN — LORAZEPAM 3 MG: 1 TABLET ORAL at 02:28

## 2017-11-28 RX ADMIN — LORAZEPAM 3 MG: 1 TABLET ORAL at 03:37

## 2017-11-28 RX ADMIN — CHLORDIAZEPOXIDE HYDROCHLORIDE 25 MG: 25 CAPSULE ORAL at 15:02

## 2017-11-28 RX ADMIN — LORAZEPAM 3 MG: 1 TABLET ORAL at 17:43

## 2017-11-28 RX ADMIN — LORAZEPAM 3 MG: 1 TABLET ORAL at 00:19

## 2017-11-28 RX ADMIN — LORAZEPAM 3 MG: 1 TABLET ORAL at 16:12

## 2017-11-28 RX ADMIN — CHLORDIAZEPOXIDE HYDROCHLORIDE 25 MG: 25 CAPSULE ORAL at 08:28

## 2017-11-28 RX ADMIN — SODIUM CHLORIDE: 9 INJECTION, SOLUTION INTRAVENOUS at 18:17

## 2017-11-28 RX ADMIN — LORAZEPAM 2 MG: 1 TABLET ORAL at 01:32

## 2017-11-28 RX ADMIN — LORAZEPAM 3 MG: 1 TABLET ORAL at 23:22

## 2017-11-28 RX ADMIN — SODIUM CHLORIDE 500 ML: 9 INJECTION, SOLUTION INTRAVENOUS at 13:14

## 2017-11-28 RX ADMIN — LORAZEPAM 1 MG: 1 TABLET ORAL at 06:54

## 2017-11-28 RX ADMIN — OMEPRAZOLE 20 MG: 20 CAPSULE, DELAYED RELEASE ORAL at 21:18

## 2017-11-28 RX ADMIN — POTASSIUM CHLORIDE 40 MEQ: 1500 TABLET, EXTENDED RELEASE ORAL at 06:54

## 2017-11-28 RX ADMIN — SODIUM CHLORIDE: 9 INJECTION, SOLUTION INTRAVENOUS at 13:23

## 2017-11-28 RX ADMIN — LORAZEPAM 3 MG: 1 TABLET ORAL at 15:02

## 2017-11-28 RX ADMIN — LORAZEPAM 2 MG: 1 TABLET ORAL at 05:13

## 2017-11-28 RX ADMIN — OMEPRAZOLE 20 MG: 20 CAPSULE, DELAYED RELEASE ORAL at 08:29

## 2017-11-28 RX ADMIN — LORAZEPAM 2 MG: 1 TABLET ORAL at 21:18

## 2017-11-28 ASSESSMENT — LIFESTYLE VARIABLES
HEADACHE, FULLNESS IN HEAD: MODERATELY SEVERE
VISUAL DISTURBANCES: MODERATE SENSITIVITY
AUDITORY DISTURBANCES: MILD HARSHNESS OR ABILITY TO FRIGHTEN
PAROXYSMAL SWEATS: NO SWEAT VISIBLE
AUDITORY DISTURBANCES: NOT PRESENT
TOTAL SCORE: 19
AGITATION: *
ANXIETY: MILDLY ANXIOUS
ANXIETY: *
ANXIETY: *
NAUSEA AND VOMITING: MILD NAUSEA WITH NO VOMITING
NAUSEA AND VOMITING: MILD NAUSEA WITH NO VOMITING
TREMOR: MODERATE TREMOR WITH ARMS EXTENDED
AGITATION: *
HEADACHE, FULLNESS IN HEAD: MODERATE
AGITATION: SOMEWHAT MORE THAN NORMAL ACTIVITY
TREMOR: *
AGITATION: NORMAL ACTIVITY
ANXIETY: MILDLY ANXIOUS
NAUSEA AND VOMITING: MILD NAUSEA WITH NO VOMITING
PAROXYSMAL SWEATS: NO SWEAT VISIBLE
NAUSEA AND VOMITING: NO NAUSEA AND NO VOMITING
ORIENTATION AND CLOUDING OF SENSORIUM: DATE DISORIENTATION BY MORE THAN TWO CALENDAR DAYS
VISUAL DISTURBANCES: MODERATELY SEVERE HALLUCINATIONS
AUDITORY DISTURBANCES: VERY MILD HARSHNESS OR ABILITY TO FRIGHTEN
NAUSEA AND VOMITING: MILD NAUSEA WITH NO VOMITING
HEADACHE, FULLNESS IN HEAD: MODERATE
TREMOR: *
TREMOR: *
VISUAL DISTURBANCES: MODERATELY SEVERE HALLUCINATIONS
AUDITORY DISTURBANCES: NOT PRESENT
NAUSEA AND VOMITING: MILD NAUSEA WITH NO VOMITING
PAROXYSMAL SWEATS: BARELY PERCEPTIBLE SWEATING. PALMS MOIST
VISUAL DISTURBANCES: MODERATE SENSITIVITY
PAROXYSMAL SWEATS: NO SWEAT VISIBLE
PAROXYSMAL SWEATS: BARELY PERCEPTIBLE SWEATING. PALMS MOIST
PAROXYSMAL SWEATS: NO SWEAT VISIBLE
VISUAL DISTURBANCES: VERY MILD SENSITIVITY
TOTAL SCORE: 10
NAUSEA AND VOMITING: MILD NAUSEA WITH NO VOMITING
TREMOR: *
TREMOR: *
VISUAL DISTURBANCES: MILD SENSITIVITY
ORIENTATION AND CLOUDING OF SENSORIUM: DATE DISORIENTATION BY MORE THAN TWO CALENDAR DAYS
AGITATION: NORMAL ACTIVITY
ORIENTATION AND CLOUDING OF SENSORIUM: DATE DISORIENTATION BY MORE THAN TWO CALENDAR DAYS
TOTAL SCORE: VERY MILD ITCHING, PINS AND NEEDLES SENSATION, BURNING OR NUMBNESS
ANXIETY: *
TOTAL SCORE: 9
AUDITORY DISTURBANCES: NOT PRESENT
AUDITORY DISTURBANCES: NOT PRESENT
NAUSEA AND VOMITING: NO NAUSEA AND NO VOMITING
TOTAL SCORE: 14
ANXIETY: *
PAROXYSMAL SWEATS: NO SWEAT VISIBLE
AGITATION: SOMEWHAT MORE THAN NORMAL ACTIVITY
AUDITORY DISTURBANCES: NOT PRESENT
TOTAL SCORE: 12
PAROXYSMAL SWEATS: NO SWEAT VISIBLE
AUDITORY DISTURBANCES: VERY MILD HARSHNESS OR ABILITY TO FRIGHTEN
ANXIETY: *
ORIENTATION AND CLOUDING OF SENSORIUM: DATE DISORIENTATION BY MORE THAN TWO CALENDAR DAYS
TREMOR: *
NAUSEA AND VOMITING: NO NAUSEA AND NO VOMITING
ORIENTATION AND CLOUDING OF SENSORIUM: DATE DISORIENTATION BY MORE THAN TWO CALENDAR DAYS
TOTAL SCORE: 16
AGITATION: *
TREMOR: *
AUDITORY DISTURBANCES: NOT PRESENT
ANXIETY: MILDLY ANXIOUS
TREMOR: *
AUDITORY DISTURBANCES: NOT PRESENT
TOTAL SCORE: 19
ORIENTATION AND CLOUDING OF SENSORIUM: DATE DISORIENTATION BY MORE THAN TWO CALENDAR DAYS
ORIENTATION AND CLOUDING OF SENSORIUM: DISORIENTED FOR PLACE AND / OR PERSON
TOTAL SCORE: 23
ANXIETY: MILDLY ANXIOUS
VISUAL DISTURBANCES: MODERATELY SEVERE HALLUCINATIONS
NAUSEA AND VOMITING: NO NAUSEA AND NO VOMITING
AGITATION: *
AGITATION: *
AUDITORY DISTURBANCES: MILD HARSHNESS OR ABILITY TO FRIGHTEN
ORIENTATION AND CLOUDING OF SENSORIUM: DATE DISORIENTATION BY MORE THAN TWO CALENDAR DAYS
VISUAL DISTURBANCES: MODERATELY SEVERE HALLUCINATIONS
HEADACHE, FULLNESS IN HEAD: MODERATE
AUDITORY DISTURBANCES: VERY MILD HARSHNESS OR ABILITY TO FRIGHTEN
VISUAL DISTURBANCES: MODERATELY SEVERE HALLUCINATIONS
ANXIETY: *
TREMOR: *
TOTAL SCORE: VERY MILD ITCHING, PINS AND NEEDLES SENSATION, BURNING OR NUMBNESS
TREMOR: TREMOR NOT VISIBLE BUT CAN BE FELT, FINGERTIP TO FINGERTIP
TOTAL SCORE: 14
NAUSEA AND VOMITING: NO NAUSEA AND NO VOMITING
ANXIETY: MILDLY ANXIOUS
AUDITORY DISTURBANCES: NOT PRESENT
NAUSEA AND VOMITING: MILD NAUSEA WITH NO VOMITING
HEADACHE, FULLNESS IN HEAD: MILD
AUDITORY DISTURBANCES: NOT PRESENT
TOTAL SCORE: 19
TREMOR: *
HEADACHE, FULLNESS IN HEAD: MODERATELY SEVERE
PAROXYSMAL SWEATS: NO SWEAT VISIBLE
PAROXYSMAL SWEATS: BARELY PERCEPTIBLE SWEATING. PALMS MOIST
TOTAL SCORE: MILD ITCHING, PINS AND NEEDLES SENSATION, BURNING OR NUMBNESS
TOTAL SCORE: 18
TREMOR: *
TOTAL SCORE: VERY MILD ITCHING, PINS AND NEEDLES SENSATION, BURNING OR NUMBNESS
VISUAL DISTURBANCES: VERY MILD SENSITIVITY
HEADACHE, FULLNESS IN HEAD: MODERATELY SEVERE
TREMOR: MODERATE TREMOR WITH ARMS EXTENDED
HEADACHE, FULLNESS IN HEAD: MODERATE
AGITATION: NORMAL ACTIVITY
HEADACHE, FULLNESS IN HEAD: MODERATE
AGITATION: NORMAL ACTIVITY
VISUAL DISTURBANCES: NOT PRESENT
HEADACHE, FULLNESS IN HEAD: MILD
AGITATION: SOMEWHAT MORE THAN NORMAL ACTIVITY
ANXIETY: MILDLY ANXIOUS
PAROXYSMAL SWEATS: BARELY PERCEPTIBLE SWEATING. PALMS MOIST
AUDITORY DISTURBANCES: NOT PRESENT
PAROXYSMAL SWEATS: BARELY PERCEPTIBLE SWEATING. PALMS MOIST
HEADACHE, FULLNESS IN HEAD: MODERATELY SEVERE
VISUAL DISTURBANCES: MODERATELY SEVERE HALLUCINATIONS
TOTAL SCORE: 18
ORIENTATION AND CLOUDING OF SENSORIUM: ORIENTED AND CAN DO SERIAL ADDITIONS
PAROXYSMAL SWEATS: BARELY PERCEPTIBLE SWEATING. PALMS MOIST
AGITATION: *
AGITATION: NORMAL ACTIVITY
NAUSEA AND VOMITING: MILD NAUSEA WITH NO VOMITING
VISUAL DISTURBANCES: VERY MILD SENSITIVITY
TOTAL SCORE: 13
ORIENTATION AND CLOUDING OF SENSORIUM: DATE DISORIENTATION BY MORE THAN TWO CALENDAR DAYS
NAUSEA AND VOMITING: NO NAUSEA AND NO VOMITING
HEADACHE, FULLNESS IN HEAD: MILD
HEADACHE, FULLNESS IN HEAD: VERY SEVERE
VISUAL DISTURBANCES: MODERATE SENSITIVITY
ANXIETY: MILDLY ANXIOUS
HEADACHE, FULLNESS IN HEAD: MILD
AGITATION: NORMAL ACTIVITY
ANXIETY: MODERATELY ANXIOUS OR GUARDED, SO ANXIETY IS INFERRED
ORIENTATION AND CLOUDING OF SENSORIUM: DATE DISORIENTATION BY MORE THAN TWO CALENDAR DAYS
ORIENTATION AND CLOUDING OF SENSORIUM: DATE DISORIENTATION BY MORE THAN TWO CALENDAR DAYS
VISUAL DISTURBANCES: MODERATE SENSITIVITY
TOTAL SCORE: 21
TOTAL SCORE: 19
PAROXYSMAL SWEATS: BARELY PERCEPTIBLE SWEATING. PALMS MOIST
NAUSEA AND VOMITING: NO NAUSEA AND NO VOMITING
TREMOR: *
HEADACHE, FULLNESS IN HEAD: MODERATE
PAROXYSMAL SWEATS: NO SWEAT VISIBLE
ANXIETY: MILDLY ANXIOUS
ORIENTATION AND CLOUDING OF SENSORIUM: DATE DISORIENTATION BY NO MORE THAN TWO CALENDAR DAYS
ORIENTATION AND CLOUDING OF SENSORIUM: DATE DISORIENTATION BY MORE THAN TWO CALENDAR DAYS
ORIENTATION AND CLOUDING OF SENSORIUM: DATE DISORIENTATION BY MORE THAN TWO CALENDAR DAYS

## 2017-11-28 ASSESSMENT — PAIN SCALES - GENERAL
PAINLEVEL_OUTOF10: 5
PAINLEVEL_OUTOF10: 5
PAINLEVEL_OUTOF10: 10
PAINLEVEL_OUTOF10: 5
PAINLEVEL_OUTOF10: 5
PAINLEVEL_OUTOF10: 4

## 2017-11-28 ASSESSMENT — ENCOUNTER SYMPTOMS
VOMITING: 0
MYALGIAS: 0
PALPITATIONS: 0
NAUSEA: 0
SHORTNESS OF BREATH: 0
HEMOPTYSIS: 0
FEVER: 0
COUGH: 0
ABDOMINAL PAIN: 0
HALLUCINATIONS: 1
CHILLS: 0

## 2017-11-28 NOTE — PROGRESS NOTES
Pt continues to remove SCDs. SCDs were placed back on. Reoriented, repositioned, and educated pt on importance of SCDs.

## 2017-11-28 NOTE — PROGRESS NOTES
.         Internal Medicine Interval Note  Note Author: Marito Martínez M.D.     Name Chava Mercedes     1950   Age/Sex 67 y.o. male   MRN [id number]   Code Status FULL     After 5PM or if no immediate response to page, please call for cross-coverage  Attending/Team: Jeovanny/Lydia See Patient List for primary contact information  Call [phone] to page    1st Call - Day Intern (R1):   Tanya 2nd Call - Day Sr. Resident (R2/R3):   Mer         Reason for interval visit  (Principal Problem)   Alcohol withdrawal    Interval Problem Daily Status Update  (24 hours)     Required 18 mg ativan last 24 hours, CIWA score near 13, with a few spikes to 23. Nurse endorses patient having visual and auditory hallucinations. Patient states he thinks he's in a store during morning interview. Continue librium 25 mg TID, CIWA protocol. Hgb stable. Will start on IV fluids given dry appearance and patient's inability to make needs known.      Review of Systems   Constitutional: Negative for chills and fever.   Respiratory: Negative for cough, hemoptysis and shortness of breath.    Cardiovascular: Negative for chest pain and palpitations.   Gastrointestinal: Negative for abdominal pain, nausea and vomiting.   Musculoskeletal: Negative for joint pain and myalgias.   Psychiatric/Behavioral: Positive for hallucinations.       Consultants/Specialty  GI    Disposition  Inpatient    Quality Measures    Reviewed items::  Labs reviewed and Medications reviewed  Walker catheter::  No Walker  DVT prophylaxis pharmacological::  Contraindicated - High bleeding risk  DVT prophylaxis - mechanical:  SCDs  Ulcer Prophylaxis::  Yes          Physical Exam       Vitals:    17 1600 17 2000 17 0400 17 0800   BP: 105/60 127/52 (!) 99/56 (!) 99/65   Pulse: 90 96 (!) 107 90   Resp: 18 19 18 18   Temp: 36.7 °C (98 °F) 37.7 °C (99.9 °F) 37.2 °C (98.9 °F) 37.4 °C (99.3 °F)   SpO2: 95% 97% 92% 94%   Weight:  80.6 kg (177 lb 11.1  oz)     Height:         Body mass index is 24.1 kg/m². Weight: 80.6 kg (177 lb 11.1 oz)  Oxygen Therapy:  Pulse Oximetry: 94 %, O2 (LPM): 0, O2 Delivery: None (Room Air)    Physical Exam   Constitutional: No distress.   Eyes: Conjunctivae and EOM are normal. Pupils are equal, round, and reactive to light.   Cardiovascular: Normal rate, regular rhythm and normal heart sounds.  Exam reveals no gallop and no friction rub.    No murmur heard.  Pulmonary/Chest: Effort normal and breath sounds normal. No respiratory distress. He has no wheezes. He has no rales.   Abdominal: Soft. Bowel sounds are normal. He exhibits no distension. There is no tenderness. There is no guarding.   Musculoskeletal: He exhibits no edema or tenderness.   Neurological:   A+Ox2   Skin: Skin is warm and dry. He is not diaphoretic.         Lab Data Review:         11/25/2017  11:44 AM    Recent Labs      11/26/17 0313 11/27/17 0347 11/28/17 0325   SODIUM  138  139  140   POTASSIUM  3.7  3.8  3.5*   CHLORIDE  110  112  109   CO2  20  21  18*   BUN  7*  6*  5*   CREATININE  0.62  0.72  0.78   MAGNESIUM  2.1  1.9   --    PHOSPHORUS  2.8  3.0   --    CALCIUM  8.2*  8.1*  8.9       Recent Labs      11/26/17 0313 11/27/17 0347 11/28/17 0325   ALTSGPT  17  19   --    ASTSGOT  26  26   --    ALKPHOSPHAT  35  38   --    TBILIRUBIN  0.6  0.4   --    GLUCOSE  86  91  104*       Recent Labs      11/26/17 0312 11/27/17 0347 11/28/17 0325   RBC  2.55*  2.33*  2.47*   HEMOGLOBIN  8.7*  7.9*  8.3*   HEMATOCRIT  25.8*  23.7*  25.0*   PLATELETCT  96*  134*  173       Recent Labs      11/26/17 0312 11/26/17 0313 11/27/17 0347 11/28/17 0325   WBC  4.4*   --   4.3*  6.1   NEUTSPOLYS  68.00   --    --    --    LYMPHOCYTES  16.60*   --    --    --    MONOCYTES  11.30   --    --    --    EOSINOPHILS  2.90   --    --    --    BASOPHILS  0.70   --    --    --    ASTSGOT   --   26  26   --    ALTSGPT   --   17  19   --    ALKPHOSPHAT   --    35  38   --    TBILIRUBIN   --   0.6  0.4   --            Assessment/Plan     * GI bleed- (present on admission)   Assessment & Plan    - Patient transferred from [place] after having mixture of melena and bright red blood per rectum for 1 day  - Abdominal ultrasound shows fatty liver, mild hepatomegaly, cholelithiasis  - RBC scan limited study due to motion, no signs of active bleeding  - Hb stable, no signs of active bleeding  - regular diet during alcohol withdrawal, with tentative plan for upper and lower endoscopy when clinically stable  - Start IV maintenance fluids  - Transfuse if Hgb <7        Alcohol withdrawal (CMS-HCC)- (present on admission)   Assessment & Plan    - Patient has history of alcohol withdrawal with symptoms, history of DTs  - Patient states last drink was 11/22/17, two days before admission  - Continue librium 25 mg TID  - CIWA protocol  - Fall/Seizure/Aspiration precautions  - Multivitamin        Hypokalemia- (present on admission)   Assessment & Plan    - Replete with oral supplements  - Continue to monitor        History of pulmonary embolism- (present on admission)   Assessment & Plan    - History of PE status post IVC filter        Hepatic steatosis   Assessment & Plan    - Patient has history of hepatic steatosis  - LFTs stable  - Confirmed on abdominal ultrasound

## 2017-11-28 NOTE — DIETARY
Nutrition Services:    High Risk diagnosis (GI Bleed, ETOH withdrawal, PCM)  on Nutrition Admit Screen. Pt is currently on a Regular diet and per chart pt PO % most meals. Ht: 182.9 cm, Wt: 80.6 kg, BMI 24.1. Consult RD as needed. RD will re-screen weekly.      RD available prn

## 2017-11-28 NOTE — CARE PLAN
Problem: Nutritional:  Goal: Achieve adequate nutritional intake  Patient will consume >50% of meals  Outcome: MET Date Met: 11/28/17

## 2017-11-28 NOTE — PROGRESS NOTES
Report received at bedside, assumed care of patient. Patient A&O x 1-2, no signs of distress noted. All LDAs assessed. Discussed POC with patient and all questions answered at this time. Denies pain at this time. Bed alarm on. Bed in lowest position, upper side rails up. Non-skid socks in place. Pt in 3 pt soft restraints. Will continue to monitor pt status.

## 2017-11-28 NOTE — PROGRESS NOTES
Bedside repost received from night shift RN. Patient A&Ox3. Patient reports headache but declines interventions at this time. Bed in lowest position, call light and personal belongings within reach. Hourly rounding in place.

## 2017-11-29 LAB
ANION GAP SERPL CALC-SCNC: 7 MMOL/L (ref 0–11.9)
BUN SERPL-MCNC: 3 MG/DL (ref 8–22)
CALCIUM SERPL-MCNC: 8.6 MG/DL (ref 8.5–10.5)
CHLORIDE SERPL-SCNC: 111 MMOL/L (ref 96–112)
CO2 SERPL-SCNC: 21 MMOL/L (ref 20–33)
CREAT SERPL-MCNC: 0.75 MG/DL (ref 0.5–1.4)
ERYTHROCYTE [DISTWIDTH] IN BLOOD BY AUTOMATED COUNT: 48.9 FL (ref 35.9–50)
GFR SERPL CREATININE-BSD FRML MDRD: >60 ML/MIN/1.73 M 2
GLUCOSE SERPL-MCNC: 99 MG/DL (ref 65–99)
HCT VFR BLD AUTO: 24.2 % (ref 42–52)
HGB BLD-MCNC: 8.2 G/DL (ref 14–18)
MCH RBC QN AUTO: 33.7 PG (ref 27–33)
MCHC RBC AUTO-ENTMCNC: 33.9 G/DL (ref 33.7–35.3)
MCV RBC AUTO: 99.6 FL (ref 81.4–97.8)
PLATELET # BLD AUTO: 189 K/UL (ref 164–446)
PMV BLD AUTO: 9.9 FL (ref 9–12.9)
POTASSIUM SERPL-SCNC: 3.6 MMOL/L (ref 3.6–5.5)
RBC # BLD AUTO: 2.43 M/UL (ref 4.7–6.1)
SODIUM SERPL-SCNC: 139 MMOL/L (ref 135–145)
WBC # BLD AUTO: 4.7 K/UL (ref 4.8–10.8)

## 2017-11-29 PROCEDURE — 770001 HCHG ROOM/CARE - MED/SURG/GYN PRIV*

## 2017-11-29 PROCEDURE — 80048 BASIC METABOLIC PNL TOTAL CA: CPT

## 2017-11-29 PROCEDURE — 36415 COLL VENOUS BLD VENIPUNCTURE: CPT

## 2017-11-29 PROCEDURE — 700102 HCHG RX REV CODE 250 W/ 637 OVERRIDE(OP): Performed by: STUDENT IN AN ORGANIZED HEALTH CARE EDUCATION/TRAINING PROGRAM

## 2017-11-29 PROCEDURE — A9270 NON-COVERED ITEM OR SERVICE: HCPCS | Performed by: STUDENT IN AN ORGANIZED HEALTH CARE EDUCATION/TRAINING PROGRAM

## 2017-11-29 PROCEDURE — 700111 HCHG RX REV CODE 636 W/ 250 OVERRIDE (IP): Performed by: STUDENT IN AN ORGANIZED HEALTH CARE EDUCATION/TRAINING PROGRAM

## 2017-11-29 PROCEDURE — A9270 NON-COVERED ITEM OR SERVICE: HCPCS

## 2017-11-29 PROCEDURE — 700102 HCHG RX REV CODE 250 W/ 637 OVERRIDE(OP)

## 2017-11-29 PROCEDURE — A9270 NON-COVERED ITEM OR SERVICE: HCPCS | Performed by: INTERNAL MEDICINE

## 2017-11-29 PROCEDURE — 85027 COMPLETE CBC AUTOMATED: CPT

## 2017-11-29 PROCEDURE — 99232 SBSQ HOSP IP/OBS MODERATE 35: CPT | Mod: GC | Performed by: INTERNAL MEDICINE

## 2017-11-29 PROCEDURE — 700102 HCHG RX REV CODE 250 W/ 637 OVERRIDE(OP): Performed by: INTERNAL MEDICINE

## 2017-11-29 PROCEDURE — 700105 HCHG RX REV CODE 258: Performed by: STUDENT IN AN ORGANIZED HEALTH CARE EDUCATION/TRAINING PROGRAM

## 2017-11-29 RX ORDER — CHLORDIAZEPOXIDE HYDROCHLORIDE 25 MG/1
50 CAPSULE, GELATIN COATED ORAL 3 TIMES DAILY
Status: DISCONTINUED | OUTPATIENT
Start: 2017-11-29 | End: 2017-11-30

## 2017-11-29 RX ADMIN — CHLORDIAZEPOXIDE HYDROCHLORIDE 50 MG: 25 CAPSULE ORAL at 21:07

## 2017-11-29 RX ADMIN — LORAZEPAM 2 MG: 1 TABLET ORAL at 23:30

## 2017-11-29 RX ADMIN — OMEPRAZOLE 20 MG: 20 CAPSULE, DELAYED RELEASE ORAL at 08:58

## 2017-11-29 RX ADMIN — LORAZEPAM 2 MG: 1 TABLET ORAL at 06:29

## 2017-11-29 RX ADMIN — LORAZEPAM 1.5 MG: 2 INJECTION INTRAMUSCULAR; INTRAVENOUS at 12:08

## 2017-11-29 RX ADMIN — CHLORDIAZEPOXIDE HYDROCHLORIDE 50 MG: 25 CAPSULE ORAL at 14:23

## 2017-11-29 RX ADMIN — CHLORDIAZEPOXIDE HYDROCHLORIDE 25 MG: 25 CAPSULE ORAL at 08:58

## 2017-11-29 RX ADMIN — SODIUM CHLORIDE: 9 INJECTION, SOLUTION INTRAVENOUS at 14:23

## 2017-11-29 RX ADMIN — LORAZEPAM 2 MG: 1 TABLET ORAL at 04:31

## 2017-11-29 RX ADMIN — OMEPRAZOLE 20 MG: 20 CAPSULE, DELAYED RELEASE ORAL at 21:07

## 2017-11-29 RX ADMIN — LORAZEPAM 1 MG: 1 TABLET ORAL at 08:57

## 2017-11-29 RX ADMIN — LORAZEPAM 3 MG: 1 TABLET ORAL at 02:07

## 2017-11-29 RX ADMIN — LORAZEPAM 3 MG: 1 TABLET ORAL at 14:23

## 2017-11-29 RX ADMIN — LORAZEPAM 3 MG: 1 TABLET ORAL at 03:34

## 2017-11-29 RX ADMIN — LORAZEPAM 3 MG: 1 TABLET ORAL at 00:51

## 2017-11-29 RX ADMIN — SODIUM CHLORIDE: 9 INJECTION, SOLUTION INTRAVENOUS at 03:34

## 2017-11-29 RX ADMIN — THERA TABS 1 TABLET: TAB at 08:57

## 2017-11-29 RX ADMIN — LORAZEPAM 2 MG: 1 TABLET ORAL at 21:07

## 2017-11-29 ASSESSMENT — LIFESTYLE VARIABLES
AUDITORY DISTURBANCES: NOT PRESENT
VISUAL DISTURBANCES: MILD SENSITIVITY
NAUSEA AND VOMITING: NO NAUSEA AND NO VOMITING
ORIENTATION AND CLOUDING OF SENSORIUM: DATE DISORIENTATION BY MORE THAN TWO CALENDAR DAYS
TREMOR: *
TOTAL SCORE: 5
ANXIETY: NO ANXIETY (AT EASE)
TREMOR: TREMOR NOT VISIBLE BUT CAN BE FELT, FINGERTIP TO FINGERTIP
AGITATION: SOMEWHAT MORE THAN NORMAL ACTIVITY
PAROXYSMAL SWEATS: BARELY PERCEPTIBLE SWEATING. PALMS MOIST
PAROXYSMAL SWEATS: NO SWEAT VISIBLE
HEADACHE, FULLNESS IN HEAD: NOT PRESENT
PAROXYSMAL SWEATS: *
ORIENTATION AND CLOUDING OF SENSORIUM: DATE DISORIENTATION BY MORE THAN TWO CALENDAR DAYS
AGITATION: MODERATELY FIDGETY AND RESTLESS
ORIENTATION AND CLOUDING OF SENSORIUM: DATE DISORIENTATION BY NO MORE THAN TWO CALENDAR DAYS
AGITATION: NORMAL ACTIVITY
TREMOR: *
ANXIETY: *
TOTAL SCORE: 14
TREMOR: *
ANXIETY: NO ANXIETY (AT EASE)
HEADACHE, FULLNESS IN HEAD: MODERATE
TREMOR: TREMOR NOT VISIBLE BUT CAN BE FELT, FINGERTIP TO FINGERTIP
ANXIETY: MILDLY ANXIOUS
TOTAL SCORE: MILD ITCHING, PINS AND NEEDLES SENSATION, BURNING OR NUMBNESS
AGITATION: NORMAL ACTIVITY
PAROXYSMAL SWEATS: BARELY PERCEPTIBLE SWEATING. PALMS MOIST
VISUAL DISTURBANCES: MODERATELY SEVERE HALLUCINATIONS
PAROXYSMAL SWEATS: *
TOTAL SCORE: 7
AGITATION: NORMAL ACTIVITY
ANXIETY: MILDLY ANXIOUS
AUDITORY DISTURBANCES: NOT PRESENT
NAUSEA AND VOMITING: NO NAUSEA AND NO VOMITING
ORIENTATION AND CLOUDING OF SENSORIUM: DATE DISORIENTATION BY MORE THAN TWO CALENDAR DAYS
PAROXYSMAL SWEATS: BARELY PERCEPTIBLE SWEATING. PALMS MOIST
AUDITORY DISTURBANCES: VERY MILD HARSHNESS OR ABILITY TO FRIGHTEN
NAUSEA AND VOMITING: NO NAUSEA AND NO VOMITING
ORIENTATION AND CLOUDING OF SENSORIUM: DATE DISORIENTATION BY MORE THAN TWO CALENDAR DAYS
ORIENTATION AND CLOUDING OF SENSORIUM: DATE DISORIENTATION BY MORE THAN TWO CALENDAR DAYS
VISUAL DISTURBANCES: NOT PRESENT
PAROXYSMAL SWEATS: BARELY PERCEPTIBLE SWEATING. PALMS MOIST
AUDITORY DISTURBANCES: MODERATE HARSHNESS OR ABILITY TO FRIGHTEN
TREMOR: MODERATE TREMOR WITH ARMS EXTENDED
AGITATION: *
ORIENTATION AND CLOUDING OF SENSORIUM: DATE DISORIENTATION BY MORE THAN TWO CALENDAR DAYS
TREMOR: TREMOR NOT VISIBLE BUT CAN BE FELT, FINGERTIP TO FINGERTIP
TOTAL SCORE: 20
VISUAL DISTURBANCES: MILD SENSITIVITY
TOTAL SCORE: 15
VISUAL DISTURBANCES: MODERATE SENSITIVITY
VISUAL DISTURBANCES: NOT PRESENT
HEADACHE, FULLNESS IN HEAD: SEVERE
AUDITORY DISTURBANCES: VERY MILD HARSHNESS OR ABILITY TO FRIGHTEN
PAROXYSMAL SWEATS: NO SWEAT VISIBLE
TREMOR: *
NAUSEA AND VOMITING: NO NAUSEA AND NO VOMITING
NAUSEA AND VOMITING: NO NAUSEA AND NO VOMITING
AGITATION: *
VISUAL DISTURBANCES: MODERATE SENSITIVITY
PAROXYSMAL SWEATS: BARELY PERCEPTIBLE SWEATING. PALMS MOIST
ORIENTATION AND CLOUDING OF SENSORIUM: DATE DISORIENTATION BY MORE THAN TWO CALENDAR DAYS
TREMOR: NO TREMOR
VISUAL DISTURBANCES: NOT PRESENT
AUDITORY DISTURBANCES: VERY MILD HARSHNESS OR ABILITY TO FRIGHTEN
HEADACHE, FULLNESS IN HEAD: MODERATE
AGITATION: SOMEWHAT MORE THAN NORMAL ACTIVITY
VISUAL DISTURBANCES: VERY MILD SENSITIVITY
ANXIETY: NO ANXIETY (AT EASE)
NAUSEA AND VOMITING: NO NAUSEA AND NO VOMITING
ANXIETY: MILDLY ANXIOUS
ORIENTATION AND CLOUDING OF SENSORIUM: DATE DISORIENTATION BY MORE THAN TWO CALENDAR DAYS
AUDITORY DISTURBANCES: VERY MILD HARSHNESS OR ABILITY TO FRIGHTEN
ORIENTATION AND CLOUDING OF SENSORIUM: DATE DISORIENTATION BY MORE THAN TWO CALENDAR DAYS
NAUSEA AND VOMITING: NO NAUSEA AND NO VOMITING
PAROXYSMAL SWEATS: *
NAUSEA AND VOMITING: NO NAUSEA AND NO VOMITING
TOTAL SCORE: 14
TOTAL SCORE: 7
HEADACHE, FULLNESS IN HEAD: SEVERE
VISUAL DISTURBANCES: VERY MILD SENSITIVITY
HEADACHE, FULLNESS IN HEAD: SEVERE
HEADACHE, FULLNESS IN HEAD: MODERATE
NAUSEA AND VOMITING: NO NAUSEA AND NO VOMITING
TOTAL SCORE: 20
TOTAL SCORE: 13
TOTAL SCORE: 10
ANXIETY: *
ANXIETY: NO ANXIETY (AT EASE)
AUDITORY DISTURBANCES: NOT PRESENT
TREMOR: NO TREMOR
AGITATION: NORMAL ACTIVITY
AGITATION: SOMEWHAT MORE THAN NORMAL ACTIVITY
ANXIETY: MILDLY ANXIOUS
VISUAL DISTURBANCES: MILD SENSITIVITY
AUDITORY DISTURBANCES: VERY MILD HARSHNESS OR ABILITY TO FRIGHTEN
ORIENTATION AND CLOUDING OF SENSORIUM: DATE DISORIENTATION BY MORE THAN TWO CALENDAR DAYS
HEADACHE, FULLNESS IN HEAD: MODERATE
AUDITORY DISTURBANCES: NOT PRESENT
VISUAL DISTURBANCES: MODERATELY SEVERE HALLUCINATIONS
ANXIETY: NO ANXIETY (AT EASE)
AGITATION: *
VISUAL DISTURBANCES: VERY MILD SENSITIVITY
NAUSEA AND VOMITING: NO NAUSEA AND NO VOMITING
ORIENTATION AND CLOUDING OF SENSORIUM: DATE DISORIENTATION BY NO MORE THAN TWO CALENDAR DAYS
TOTAL SCORE: 15
HEADACHE, FULLNESS IN HEAD: MODERATE
ORIENTATION AND CLOUDING OF SENSORIUM: DISORIENTED FOR PLACE AND / OR PERSON
TOTAL SCORE: 16
AGITATION: NORMAL ACTIVITY
AUDITORY DISTURBANCES: VERY MILD HARSHNESS OR ABILITY TO FRIGHTEN
ANXIETY: NO ANXIETY (AT EASE)
PAROXYSMAL SWEATS: BARELY PERCEPTIBLE SWEATING. PALMS MOIST
PAROXYSMAL SWEATS: BARELY PERCEPTIBLE SWEATING. PALMS MOIST
AGITATION: SOMEWHAT MORE THAN NORMAL ACTIVITY
TOTAL SCORE: VERY MILD ITCHING, PINS AND NEEDLES SENSATION, BURNING OR NUMBNESS
TOTAL SCORE: 15
ORIENTATION AND CLOUDING OF SENSORIUM: DISORIENTED FOR PLACE AND / OR PERSON
TOTAL SCORE: 14
TREMOR: *
NAUSEA AND VOMITING: NO NAUSEA AND NO VOMITING
HEADACHE, FULLNESS IN HEAD: NOT PRESENT
HEADACHE, FULLNESS IN HEAD: NOT PRESENT
PAROXYSMAL SWEATS: *
PAROXYSMAL SWEATS: BARELY PERCEPTIBLE SWEATING. PALMS MOIST
TREMOR: *
AUDITORY DISTURBANCES: NOT PRESENT
NAUSEA AND VOMITING: NO NAUSEA AND NO VOMITING
HEADACHE, FULLNESS IN HEAD: VERY MILD
HEADACHE, FULLNESS IN HEAD: MODERATE
TREMOR: *
AUDITORY DISTURBANCES: NOT PRESENT
VISUAL DISTURBANCES: MILD SENSITIVITY
AGITATION: NORMAL ACTIVITY
ANXIETY: *
AUDITORY DISTURBANCES: NOT PRESENT
HEADACHE, FULLNESS IN HEAD: MODERATE
ANXIETY: NO ANXIETY (AT EASE)
NAUSEA AND VOMITING: MILD NAUSEA WITH NO VOMITING
TREMOR: NO TREMOR
TOTAL SCORE: VERY MILD ITCHING, PINS AND NEEDLES SENSATION, BURNING OR NUMBNESS
NAUSEA AND VOMITING: NO NAUSEA AND NO VOMITING

## 2017-11-29 ASSESSMENT — ENCOUNTER SYMPTOMS
SHORTNESS OF BREATH: 0
ABDOMINAL PAIN: 0
WEAKNESS: 0
TREMORS: 1
SPEECH CHANGE: 0
DIARRHEA: 0
SENSORY CHANGE: 0
DIAPHORESIS: 0
HALLUCINATIONS: 1
HEMOPTYSIS: 0
PALPITATIONS: 0
SPUTUM PRODUCTION: 0
VOMITING: 0
SORE THROAT: 0
NAUSEA: 0
COUGH: 0
CHILLS: 0
MYALGIAS: 0
FEVER: 0

## 2017-11-29 ASSESSMENT — PAIN SCALES - GENERAL
PAINLEVEL_OUTOF10: 5
PAINLEVEL_OUTOF10: 0
PAINLEVEL_OUTOF10: 5

## 2017-11-29 NOTE — CARE PLAN
Problem: Skin Integrity  Goal: Risk for impaired skin integrity will decrease    Intervention: Implement precautions to protect skin integrity in collaboration with the interdisciplinary team   11/28/17 0800 11/28/17 2000 11/29/17 0000   OTHER   Skin Preventative Measures --  Pillows in Use for Support / Positioning --    Bed Types --  Pressure Redistribution Mattress (Atmosair) --    Friction Interventions --  Draw Sheet / Pad Used for Repositioning --    Moisturizers --  Moisturizer  --    PT / OT Involved in Care Order has been Placed --  --    Activity  --  Bed --    Patient Turns / Repositioning --  --  Supine   Assistance / Tolerance for Turning/Repositioning --  --  Assistance of Two or More;Tolerates Poorly   Patient is Receiving Nutrition --  Oral Intake Adequate --    Vitamin Therapy in Use Yes --  --

## 2017-11-29 NOTE — NON-PROVIDER
Internal Medicine Medical Student Note  Note Author: Raul Blackburnsloane, Student    Name Chava Mercedes     1950   Age/Sex 67 y.o. male   MRN [id number]   Code Status Full code     After 5PM or if no immediate response to page, please call for cross-coverage  Attending/Team: Dr. Madrid/ Lydia See Patient List for primary contact information  Call [phone] to page after hours   1st Call - Day Intern (R1):   Dr. Martínez 2nd Call - Day Sr. Resident (R2/R3):   Dr. Etienne     Reason for interval visit  (Principal Problem)   GI bleed    Interval Problem Daily Status Update  (24 hours)   Patient had no acute overnight     Review of Systems   Constitutional: Negative for chills, diaphoresis and fever.   HENT: Negative for congestion and sore throat.    Respiratory: Negative for cough, sputum production and shortness of breath.    Cardiovascular: Negative for chest pain, palpitations and leg swelling.   Gastrointestinal: Positive for melena. Negative for abdominal pain, diarrhea, nausea and vomiting.   Neurological: Positive for tremors. Negative for sensory change, speech change and weakness.   Psychiatric/Behavioral: Positive for hallucinations.     Physical Exam       Vitals:    17 1600 17 2000 17 0400 17 0800   BP: 105/70 127/67 (!) 97/55 104/56   Pulse: 94 100 89 83   Resp: 12 18 19 20   Temp: 37.5 °C (99.5 °F) 37.9 °C (100.3 °F) 37.1 °C (98.7 °F) 36.5 °C (97.7 °F)   SpO2: 97% 95% 97% 97%   Weight:  80.1 kg (176 lb 9.4 oz)     Height:         Body mass index is 23.95 kg/m². Weight: 80.1 kg (176 lb 9.4 oz)  Oxygen Therapy:  Pulse Oximetry: 97 %, O2 (LPM): 0, O2 Delivery: None (Room Air)    Physical Exam   Constitutional: He is well-developed, well-nourished, and in no distress.   HENT:   Head: Normocephalic and atraumatic.   Eyes: Conjunctivae and EOM are normal.   Neck: Normal range of motion. Neck supple.   Cardiovascular: Normal rate, regular rhythm and normal heart sounds.  Exam  reveals no gallop and no friction rub.    No murmur heard.  Pulmonary/Chest: Effort normal and breath sounds normal. No respiratory distress. He has no wheezes. He has no rales.   Abdominal: Soft. Bowel sounds are normal. There is tenderness (epigastric region).   Neurological:   A&Ox1   Skin: Skin is warm and dry.     Most Recent Labs:    Lab Results   Component Value Date/Time    WBC 4.7 (L) 11/29/2017 02:34 AM    RBC 2.43 (L) 11/29/2017 02:34 AM    HEMOGLOBIN 8.2 (L) 11/29/2017 02:34 AM    HEMATOCRIT 24.2 (L) 11/29/2017 02:34 AM    MCV 99.6 (H) 11/29/2017 02:34 AM    MCH 33.7 (H) 11/29/2017 02:34 AM    MCHC 33.9 11/29/2017 02:34 AM    MPV 9.9 11/29/2017 02:34 AM    NEUTSPOLYS 68.00 11/26/2017 03:12 AM    LYMPHOCYTES 16.60 (L) 11/26/2017 03:12 AM    MONOCYTES 11.30 11/26/2017 03:12 AM    EOSINOPHILS 2.90 11/26/2017 03:12 AM    BASOPHILS 0.70 11/26/2017 03:12 AM    ANISOCYTOSIS 1+ 12/24/2016 12:33 AM      Lab Results   Component Value Date/Time    SODIUM 139 11/29/2017 02:34 AM    POTASSIUM 3.6 11/29/2017 02:34 AM    CHLORIDE 111 11/29/2017 02:34 AM    CO2 21 11/29/2017 02:34 AM    GLUCOSE 99 11/29/2017 02:34 AM    BUN 3 (L) 11/29/2017 02:34 AM    CREATININE 0.75 11/29/2017 02:34 AM      Lab Results   Component Value Date/Time    ALTSGPT 19 11/27/2017 03:47 AM    ASTSGOT 26 11/27/2017 03:47 AM    ALKPHOSPHAT 38 11/27/2017 03:47 AM    TBILIRUBIN 0.4 11/27/2017 03:47 AM    LIPASE 97 (H) 11/28/2017 03:25 AM    ALBUMIN 3.0 (L) 11/27/2017 03:47 AM    GLOBULIN 2.6 11/27/2017 03:47 AM    PREALBUMIN 8.0 (L) 12/26/2016 03:52 AM    INR 1.21 (H) 11/24/2017 04:33 AM    MACROCYTOSIS 1+ 12/24/2016 12:33 AM     Lab Results   Component Value Date/Time    PROTHROMBTM 15.0 (H) 11/24/2017 04:33 AM    INR 1.21 (H) 11/24/2017 04:33 AM        Assessment/Plan     #Lower GI bleed  -Hemoglobin stable, 8.2 today  -No signs of active bleeding  -GI following  -Tentative plan for EGD and colonoscopy once patient is clinically stable  -H&H  daily  -Transfuse if hemoglobin <7  -Monitor for signs of active bleeding (tachycardia, hypotension)     #Alcohol withdrawal  -Patient is improved clinically, less tachycardic  -Increase Librium to 50 mg TID  -[facility] protocol  -Continue Folate, thiamine, B12     #Sinus Tachycardia  -K - 3.6  -Heart rate has been stable in 80-90s  -Continue to monitor Mg and K     #Pancreatitis  -Lipase 91 at admission; Lipase 97 now  -Abdominal pain in epigastric region  -Continue to monitor

## 2017-11-29 NOTE — PROGRESS NOTES
Received bedside report from ABRAHAM Castañeda. Pt observed, in 3 point restraints; PMSx4 and no signs of skin irritation, no change from original assessment, vss, c/o 5/10 headache pain at this time  Pt AAOx2 disoriented to time and situation, no other signs or symptoms of distress, fall precautions in place, call light within reach, all questions answered, will continue to monitor.

## 2017-11-29 NOTE — CARE PLAN
Problem: Safety  Goal: Will remain free from injury  Outcome: PROGRESSING AS EXPECTED  Patient educated on need for restraints, patient continues to hallucinate and ask for a knife, Patient rounded on hourly and restraints checked Q2H and PRN, Patient progressing as expected    Problem: Skin Integrity  Goal: Risk for impaired skin integrity will decrease  Outcome: PROGRESSING AS EXPECTED  Patient monitored for turning frequently, patient monitored for incontinence, skin integrity maintained or improved throughout the shift

## 2017-11-29 NOTE — CARE PLAN
Problem: Safety  Goal: Will remain free from falls    Intervention: Implement fall precautions   11/27/17 0800 11/28/17 2000   OTHER   Environmental Precautions --  Personal Belongings, Wastebasket, Call Bell etc. in Easy Reach;Report Given to Other Health Care Providers Regarding Fall Risk;Bed in Low Position;Mobility Assessed & Appropriate Sign Placed;Communication Sign for Patients & Families   IV Pole on Same Side of Bed as Bathroom Yes --    Bedrails --  Bedrails Closest to Bathroom Down   Chair/Bed Strip Alarm --  Yes - Alarm On   Bed Alarm (Built in - for ICU ONLY) --  Yes - Alarm On

## 2017-11-29 NOTE — PROGRESS NOTES
Report received at bedside, assumed care of patient. Patient A&O x 1, no signs of distress noted. All LDAs assessed. Discussed POC with patient and all questions answered at this time. Denies pain at this time. Bed alarm on. Bed in lowest position, upper side rails up. 3-point soft restraints in place. Will continue to monitor pt status.

## 2017-11-30 LAB
ANION GAP SERPL CALC-SCNC: 6 MMOL/L (ref 0–11.9)
BUN SERPL-MCNC: 5 MG/DL (ref 8–22)
CALCIUM SERPL-MCNC: 8.5 MG/DL (ref 8.5–10.5)
CHLORIDE SERPL-SCNC: 110 MMOL/L (ref 96–112)
CO2 SERPL-SCNC: 21 MMOL/L (ref 20–33)
CREAT SERPL-MCNC: 0.82 MG/DL (ref 0.5–1.4)
ERYTHROCYTE [DISTWIDTH] IN BLOOD BY AUTOMATED COUNT: 49 FL (ref 35.9–50)
GFR SERPL CREATININE-BSD FRML MDRD: >60 ML/MIN/1.73 M 2
GLUCOSE SERPL-MCNC: 106 MG/DL (ref 65–99)
HCT VFR BLD AUTO: 24.5 % (ref 42–52)
HGB BLD-MCNC: 8.1 G/DL (ref 14–18)
MCH RBC QN AUTO: 33.5 PG (ref 27–33)
MCHC RBC AUTO-ENTMCNC: 33.1 G/DL (ref 33.7–35.3)
MCV RBC AUTO: 101.2 FL (ref 81.4–97.8)
PLATELET # BLD AUTO: 247 K/UL (ref 164–446)
PMV BLD AUTO: 10.1 FL (ref 9–12.9)
POTASSIUM SERPL-SCNC: 3.5 MMOL/L (ref 3.6–5.5)
RBC # BLD AUTO: 2.42 M/UL (ref 4.7–6.1)
SODIUM SERPL-SCNC: 137 MMOL/L (ref 135–145)
WBC # BLD AUTO: 5.5 K/UL (ref 4.8–10.8)

## 2017-11-30 PROCEDURE — 85027 COMPLETE CBC AUTOMATED: CPT

## 2017-11-30 PROCEDURE — 36415 COLL VENOUS BLD VENIPUNCTURE: CPT

## 2017-11-30 PROCEDURE — 99232 SBSQ HOSP IP/OBS MODERATE 35: CPT | Mod: GC | Performed by: INTERNAL MEDICINE

## 2017-11-30 PROCEDURE — A9270 NON-COVERED ITEM OR SERVICE: HCPCS | Performed by: STUDENT IN AN ORGANIZED HEALTH CARE EDUCATION/TRAINING PROGRAM

## 2017-11-30 PROCEDURE — 80048 BASIC METABOLIC PNL TOTAL CA: CPT

## 2017-11-30 PROCEDURE — 700102 HCHG RX REV CODE 250 W/ 637 OVERRIDE(OP): Performed by: STUDENT IN AN ORGANIZED HEALTH CARE EDUCATION/TRAINING PROGRAM

## 2017-11-30 PROCEDURE — 700102 HCHG RX REV CODE 250 W/ 637 OVERRIDE(OP)

## 2017-11-30 PROCEDURE — A9270 NON-COVERED ITEM OR SERVICE: HCPCS

## 2017-11-30 PROCEDURE — 770006 HCHG ROOM/CARE - MED/SURG/GYN SEMI*

## 2017-11-30 PROCEDURE — 700105 HCHG RX REV CODE 258: Performed by: STUDENT IN AN ORGANIZED HEALTH CARE EDUCATION/TRAINING PROGRAM

## 2017-11-30 RX ORDER — CHLORDIAZEPOXIDE HYDROCHLORIDE 25 MG/1
25 CAPSULE, GELATIN COATED ORAL 3 TIMES DAILY
Status: DISCONTINUED | OUTPATIENT
Start: 2017-11-30 | End: 2017-12-02

## 2017-11-30 RX ORDER — FOLIC ACID 1 MG/1
1 TABLET ORAL DAILY
Status: DISCONTINUED | OUTPATIENT
Start: 2017-11-30 | End: 2017-12-12

## 2017-11-30 RX ORDER — THIAMINE MONONITRATE (VIT B1) 100 MG
100 TABLET ORAL DAILY
Status: DISCONTINUED | OUTPATIENT
Start: 2017-11-30 | End: 2017-12-11

## 2017-11-30 RX ORDER — POTASSIUM CHLORIDE 20 MEQ/1
40 TABLET, EXTENDED RELEASE ORAL ONCE
Status: COMPLETED | OUTPATIENT
Start: 2017-11-30 | End: 2017-11-30

## 2017-11-30 RX ADMIN — CHLORDIAZEPOXIDE HYDROCHLORIDE 25 MG: 25 CAPSULE ORAL at 20:56

## 2017-11-30 RX ADMIN — OMEPRAZOLE 20 MG: 20 CAPSULE, DELAYED RELEASE ORAL at 08:40

## 2017-11-30 RX ADMIN — STANDARDIZED SENNA CONCENTRATE AND DOCUSATE SODIUM 2 TABLET: 8.6; 5 TABLET, FILM COATED ORAL at 08:40

## 2017-11-30 RX ADMIN — SODIUM CHLORIDE: 9 INJECTION, SOLUTION INTRAVENOUS at 10:57

## 2017-11-30 RX ADMIN — Medication 100 MG: at 08:40

## 2017-11-30 RX ADMIN — SODIUM CHLORIDE: 9 INJECTION, SOLUTION INTRAVENOUS at 00:54

## 2017-11-30 RX ADMIN — LORAZEPAM 2 MG: 1 TABLET ORAL at 00:54

## 2017-11-30 RX ADMIN — FOLIC ACID 1 MG: 1 TABLET ORAL at 08:40

## 2017-11-30 RX ADMIN — LORAZEPAM 2 MG: 1 TABLET ORAL at 03:04

## 2017-11-30 RX ADMIN — CHLORDIAZEPOXIDE HYDROCHLORIDE 25 MG: 25 CAPSULE ORAL at 14:35

## 2017-11-30 RX ADMIN — LORAZEPAM 1 MG: 1 TABLET ORAL at 05:47

## 2017-11-30 RX ADMIN — THERA TABS 1 TABLET: TAB at 08:40

## 2017-11-30 RX ADMIN — OMEPRAZOLE 20 MG: 20 CAPSULE, DELAYED RELEASE ORAL at 20:56

## 2017-11-30 RX ADMIN — SODIUM CHLORIDE: 9 INJECTION, SOLUTION INTRAVENOUS at 21:04

## 2017-11-30 RX ADMIN — POTASSIUM CHLORIDE 40 MEQ: 1500 TABLET, EXTENDED RELEASE ORAL at 06:43

## 2017-11-30 RX ADMIN — LORAZEPAM 0.5 MG: 0.5 TABLET ORAL at 20:58

## 2017-11-30 RX ADMIN — CHLORDIAZEPOXIDE HYDROCHLORIDE 50 MG: 25 CAPSULE ORAL at 08:40

## 2017-11-30 ASSESSMENT — LIFESTYLE VARIABLES
ORIENTATION AND CLOUDING OF SENSORIUM: DISORIENTED FOR PLACE AND / OR PERSON
TREMOR: TREMOR NOT VISIBLE BUT CAN BE FELT, FINGERTIP TO FINGERTIP
TREMOR: *
TREMOR: NO TREMOR
PAROXYSMAL SWEATS: NO SWEAT VISIBLE
ANXIETY: NO ANXIETY (AT EASE)
TOTAL SCORE: 14
HEADACHE, FULLNESS IN HEAD: NOT PRESENT
PAROXYSMAL SWEATS: NO SWEAT VISIBLE
AUDITORY DISTURBANCES: NOT PRESENT
ON A TYPICAL DAY WHEN YOU DRINK ALCOHOL HOW MANY DRINKS DO YOU HAVE: 5
AGITATION: NORMAL ACTIVITY
AGITATION: NORMAL ACTIVITY
VISUAL DISTURBANCES: VERY MILD SENSITIVITY
TREMOR: *
HAVE YOU EVER FELT YOU SHOULD CUT DOWN ON YOUR DRINKING: NO
ANXIETY: NO ANXIETY (AT EASE)
TOTAL SCORE: 11
VISUAL DISTURBANCES: MILD SENSITIVITY
ANXIETY: *
TOTAL SCORE: 7
HEADACHE, FULLNESS IN HEAD: NOT PRESENT
EVER HAD A DRINK FIRST THING IN THE MORNING TO STEADY YOUR NERVES TO GET RID OF A HANGOVER: NO
TOTAL SCORE: 3
ANXIETY: MILDLY ANXIOUS
PAROXYSMAL SWEATS: NO SWEAT VISIBLE
TOTAL SCORE: VERY MILD ITCHING, PINS AND NEEDLES SENSATION, BURNING OR NUMBNESS
EVER FELT BAD OR GUILTY ABOUT YOUR DRINKING: NO
HEADACHE, FULLNESS IN HEAD: NOT PRESENT
AGITATION: NORMAL ACTIVITY
PAROXYSMAL SWEATS: BARELY PERCEPTIBLE SWEATING. PALMS MOIST
TOTAL SCORE: VERY MILD ITCHING, PINS AND NEEDLES SENSATION, BURNING OR NUMBNESS
NAUSEA AND VOMITING: NO NAUSEA AND NO VOMITING
AGITATION: NORMAL ACTIVITY
ORIENTATION AND CLOUDING OF SENSORIUM: DISORIENTED FOR PLACE AND / OR PERSON
HEADACHE, FULLNESS IN HEAD: NOT PRESENT
ANXIETY: *
TOTAL SCORE: 4
DO YOU DRINK ALCOHOL: YES
NAUSEA AND VOMITING: NO NAUSEA AND NO VOMITING
ORIENTATION AND CLOUDING OF SENSORIUM: DATE DISORIENTATION BY NO MORE THAN TWO CALENDAR DAYS
ORIENTATION AND CLOUDING OF SENSORIUM: DISORIENTED FOR PLACE AND / OR PERSON
ANXIETY: NO ANXIETY (AT EASE)
TOTAL SCORE: 0
AVERAGE NUMBER OF DAYS PER WEEK YOU HAVE A DRINK CONTAINING ALCOHOL: 6
HAVE PEOPLE ANNOYED YOU BY CRITICIZING YOUR DRINKING: NO
PAROXYSMAL SWEATS: NO SWEAT VISIBLE
ORIENTATION AND CLOUDING OF SENSORIUM: DATE DISORIENTATION BY MORE THAN TWO CALENDAR DAYS
NAUSEA AND VOMITING: NO NAUSEA AND NO VOMITING
TOTAL SCORE: 0
HOW MANY TIMES IN THE PAST YEAR HAVE YOU HAD 5 OR MORE DRINKS IN A DAY: 150
AUDITORY DISTURBANCES: NOT PRESENT
NAUSEA AND VOMITING: NO NAUSEA AND NO VOMITING
AGITATION: NORMAL ACTIVITY
AGITATION: NORMAL ACTIVITY
NAUSEA AND VOMITING: NO NAUSEA AND NO VOMITING
TOTAL SCORE: 10
TREMOR: NO TREMOR
AUDITORY DISTURBANCES: NOT PRESENT
AUDITORY DISTURBANCES: NOT PRESENT
VISUAL DISTURBANCES: MODERATELY SEVERE HALLUCINATIONS
TOTAL SCORE: VERY MILD ITCHING, PINS AND NEEDLES SENSATION, BURNING OR NUMBNESS
AUDITORY DISTURBANCES: NOT PRESENT
AUDITORY DISTURBANCES: NOT PRESENT
ORIENTATION AND CLOUDING OF SENSORIUM: DATE DISORIENTATION BY MORE THAN TWO CALENDAR DAYS
VISUAL DISTURBANCES: NOT PRESENT
VISUAL DISTURBANCES: NOT PRESENT
HEADACHE, FULLNESS IN HEAD: NOT PRESENT
ORIENTATION AND CLOUDING OF SENSORIUM: DATE DISORIENTATION BY NO MORE THAN TWO CALENDAR DAYS
HEADACHE, FULLNESS IN HEAD: MILD
VISUAL DISTURBANCES: NOT PRESENT
TOTAL SCORE: 4
PAROXYSMAL SWEATS: BARELY PERCEPTIBLE SWEATING. PALMS MOIST
PAROXYSMAL SWEATS: BARELY PERCEPTIBLE SWEATING. PALMS MOIST
HEADACHE, FULLNESS IN HEAD: NOT PRESENT
TREMOR: TREMOR NOT VISIBLE BUT CAN BE FELT, FINGERTIP TO FINGERTIP
NAUSEA AND VOMITING: NO NAUSEA AND NO VOMITING
NAUSEA AND VOMITING: NO NAUSEA AND NO VOMITING
TREMOR: *
AGITATION: SOMEWHAT MORE THAN NORMAL ACTIVITY
CONSUMPTION TOTAL: POSITIVE
VISUAL DISTURBANCES: MILD SENSITIVITY
TOTAL SCORE: 0
ANXIETY: NO ANXIETY (AT EASE)
AUDITORY DISTURBANCES: NOT PRESENT

## 2017-11-30 ASSESSMENT — ENCOUNTER SYMPTOMS
COUGH: 0
FEVER: 0
HEMOPTYSIS: 0
TREMORS: 1
ABDOMINAL PAIN: 1
PALPITATIONS: 0
ROS GI COMMENTS: EPIGASTRIC PAIN
ABDOMINAL PAIN: 0
BLOOD IN STOOL: 0
SPEECH CHANGE: 0
SORE THROAT: 0
HALLUCINATIONS: 1
SENSORY CHANGE: 0
DIAPHORESIS: 0
MYALGIAS: 0
SHORTNESS OF BREATH: 0
DIARRHEA: 0
NAUSEA: 0
VOMITING: 0
FOCAL WEAKNESS: 0
SPUTUM PRODUCTION: 0
WEAKNESS: 0
CONSTIPATION: 0
WHEEZING: 0
CHILLS: 0

## 2017-11-30 ASSESSMENT — PATIENT HEALTH QUESTIONNAIRE - PHQ9
SUM OF ALL RESPONSES TO PHQ QUESTIONS 1-9: 0
1. LITTLE INTEREST OR PLEASURE IN DOING THINGS: NOT AT ALL
2. FEELING DOWN, DEPRESSED, IRRITABLE, OR HOPELESS: NOT AT ALL
SUM OF ALL RESPONSES TO PHQ9 QUESTIONS 1 AND 2: 0

## 2017-11-30 ASSESSMENT — PAIN SCALES - GENERAL
PAINLEVEL_OUTOF10: 0

## 2017-11-30 NOTE — CARE PLAN
Problem: Safety  Goal: Will remain free from falls    Intervention: Implement fall precautions   11/29/17 2000   OTHER   Environmental Precautions Report Given to Other Health Care Providers Regarding Fall Risk;Communication Sign for Patients & Families;Bed in Low Position;Mobility Assessed & Appropriate Sign Placed   IV Pole on Same Side of Bed as Bathroom Yes   Bedrails Bedrails Closest to Bathroom Down   Chair/Bed Strip Alarm Yes - Alarm On   Bed Alarm (Built in - for ICU ONLY) Yes - Alarm On

## 2017-11-30 NOTE — CARE PLAN
Problem: Skin Integrity  Goal: Risk for impaired skin integrity will decrease    Intervention: Implement precautions to protect skin integrity in collaboration with the interdisciplinary team   11/29/17 0800 11/29/17 2000 11/30/17 0000   OTHER   Skin Preventative Measures --  --  Pillows in Use for Support / Positioning   Bed Types --  Pressure Redistribution Mattress (Atmosair) --    Friction Interventions --  Draw Sheet / Pad Used for Repositioning --    Moisturizers --  Moisturizer  --    PT / OT Involved in Care Physical Therapy Involved;Occupational Therapy Involved --  --    Activity  --  Bed;Not attempted (comments)  (3 pt restraints) --    Patient Turns / Repositioning --  --  Left Side   Assistance / Tolerance for Turning/Repositioning --  --  Assistance of Two or More   Patient is Receiving Nutrition --  Oral Intake Adequate --    Vitamin Therapy in Use --  Yes --

## 2017-11-30 NOTE — PROGRESS NOTES
Received report and assumed care of pt.  Pt appears oriented to self only. POC discussed with pt. Pt denies any pain or needs at this time. 3 point restraints in place, CIWA protocol in place. Bed in low and locked position, call light within reach. Hourly rounding in place.

## 2017-11-30 NOTE — PROGRESS NOTES
Gastroenterology Progress Note     Author: Gurmeet MEDINA Agustín   Date & Time Created: 2017 11:14 AM    Interval History:  No c/o. Denies further bleeding.   Continues to be agitated and hallucinating.    Review of Systems:  Review of Systems   Constitutional: Negative for chills and fever.   Gastrointestinal: Negative for blood in stool.       Physical Exam:  Physical Exam   Constitutional: He appears lethargic. He is easily aroused. He is sedated and restrained.   Restless, Oriented to person, place only   Eyes: No scleral icterus.   Cardiovascular: Normal rate and regular rhythm.    Pulmonary/Chest: Effort normal and breath sounds normal.   Abdominal: Soft. Bowel sounds are normal. He exhibits no distension. There is no tenderness. There is no rebound and no guarding.   Neurological: He is easily aroused. He appears lethargic.   Skin: Skin is warm and dry.   Nursing note and vitals reviewed.      Labs:        Invalid input(s): [id number]      Recent Labs      17   SODIUM  140  139  137   POTASSIUM  3.5*  3.6  3.5*   CHLORIDE  109  111  110   CO2  18*  21  21   BUN  5*  3*  5*   CREATININE  0.78  0.75  0.82   CALCIUM  8.9  8.6  8.5     Recent Labs      17   LIPASE  97*   --    --    GLUCOSE  104*  99  106*     Recent Labs      17   RBC  2.47*  2.43*  2.42*   HEMOGLOBIN  8.3*  8.2*  8.1*   HEMATOCRIT  25.0*  24.2*  24.5*   PLATELETCT  173  189  247     Recent Labs      17   WBC  6.1  4.7*  5.5     Hemodynamics:  Temp (24hrs), Av.6 °C (97.9 °F), Min:36.4 °C (97.6 °F), Max:36.8 °C (98.2 °F)  Temperature: 36.8 °C (98.2 °F)  Pulse  Av.9  Min: 81  Max: 148   Blood Pressure : (!) 86/55 (RN notified)     Respiratory:    Respiration: 16, Pulse Oximetry: 93 %        RUL Breath Sounds: Clear, RML Breath Sounds:  Diminished, RLL Breath Sounds: Diminished, FLORA Breath Sounds: Clear, LLL Breath Sounds: Diminished  Fluids:    Intake/Output Summary (Last 24 hours) at 11/27/17 1149  Last data filed at 11/27/17 0547   Gross per 24 hour   Intake             1320 ml   Output             1500 ml   Net             -180 ml     Weight: 80.7 kg (177 lb 14.6 oz)  GI/Nutrition:  Orders Placed This Encounter   Procedures   • DIET ORDER     Standing Status:   Standing     Number of Occurrences:   1     Order Specific Question:   Diet:     Answer:   Regular [1]     Medical Decision Making, by Problem:  Active Hospital Problems    Diagnosis   • *GI bleed: Minor rectal bleeding.   • Alcohol withdrawal: Improving.   • History of pulmonary embolism    • Severe protein-calorie malnutrition    • Pancytopenia: Secondary to EtOH bone marrow suppression.    • Macrocytic anemia       Plan:  Continue CIWA protocol.  Plan EGD/colonoscopy when EtOH withdrawal resolved.  Monitor Hct.       Reviewed items::  Radiology images reviewed, Labs reviewed and Medications reviewed

## 2017-11-30 NOTE — PROGRESS NOTES
.         Internal Medicine Interval Note  Note Author: Marito Martínez M.D.     Name Chava Mercedes     1950   Age/Sex 67 y.o. male   MRN [id number]   Code Status FULL     After 5PM or if no immediate response to page, please call for cross-coverage  Attending/Team: Julius/Lydia See Patient List for primary contact information  Call [phone] to page    1st Call - Day Intern (R1):   Tanya 2nd Call - Day Sr. Resident (R2/R3):   [place]         Reason for interval visit  (Principal Problem)   Alcohol withdrawal    Interval Problem Daily Status Update  (24 hours)     Patient requiring 35 mg ativan last 24 hours, CIWA score ranging 14-20. Patient with altered mentation, A+Ox2. Nursing still endorses visual hallucinations. Will increase librium dosage to 50 mg TID. Continue CIWA protocol.      Review of Systems   Constitutional: Negative for chills and fever.   Respiratory: Negative for cough, hemoptysis and shortness of breath.    Cardiovascular: Negative for chest pain and palpitations.   Gastrointestinal: Negative for abdominal pain, nausea and vomiting.   Musculoskeletal: Negative for joint pain and myalgias.   Psychiatric/Behavioral: Positive for hallucinations.       Consultants/Specialty  GI    Disposition  Inpatient    Quality Measures    Reviewed items::  Labs reviewed and Medications reviewed  Walker catheter::  No Walker  DVT prophylaxis pharmacological::  Contraindicated - High bleeding risk  DVT prophylaxis - mechanical:  SCDs  Ulcer Prophylaxis::  Yes          Physical Exam       Vitals:    17 2000 17 0400 17 0800 17 1600   BP: 127/67 (!) 97/55 104/56 100/55   Pulse: 100 89 83 83   Resp: 18 19 20 18   Temp: 37.9 °C (100.3 °F) 37.1 °C (98.7 °F) 36.5 °C (97.7 °F) 36.4 °C (97.6 °F)   SpO2: 95% 97% 97% 95%   Weight: 80.1 kg (176 lb 9.4 oz)      Height:         Body mass index is 23.95 kg/m². Weight: 80.1 kg (176 lb 9.4 oz)  Oxygen Therapy:  Pulse Oximetry: 95 %, O2 (LPM): 0, O2  Delivery: None (Room Air)    Physical Exam   Constitutional: No distress.   Eyes: Conjunctivae and EOM are normal. Pupils are equal, round, and reactive to light.   Cardiovascular: Normal rate, regular rhythm and normal heart sounds.  Exam reveals no gallop and no friction rub.    No murmur heard.  Pulmonary/Chest: Effort normal and breath sounds normal. No respiratory distress. He has no wheezes. He has no rales.   Abdominal: Soft. Bowel sounds are normal. He exhibits no distension. There is no tenderness. There is no guarding.   Musculoskeletal: He exhibits no edema or tenderness.   Neurological:   A+Ox2   Skin: Skin is warm and dry. He is not diaphoretic.         Lab Data Review:         11/25/2017  11:44 AM    Recent Labs      11/27/17 0347 11/28/17 0325 11/29/17 0234   SODIUM  139  140  139   POTASSIUM  3.8  3.5*  3.6   CHLORIDE  112  109  111   CO2  21  18*  21   BUN  6*  5*  3*   CREATININE  0.72  0.78  0.75   MAGNESIUM  1.9   --    --    PHOSPHORUS  3.0   --    --    CALCIUM  8.1*  8.9  8.6       Recent Labs      11/27/17 0347 11/28/17 0325 11/29/17 0234   ALTSGPT  19   --    --    ASTSGOT  26   --    --    ALKPHOSPHAT  38   --    --    TBILIRUBIN  0.4   --    --    LIPASE   --   97*   --    GLUCOSE  91  104*  99       Recent Labs      11/27/17 0347 11/28/17 0325 11/29/17 0234   RBC  2.33*  2.47*  2.43*   HEMOGLOBIN  7.9*  8.3*  8.2*   HEMATOCRIT  23.7*  25.0*  24.2*   PLATELETCT  134*  173  189       Recent Labs      11/27/17 0347 11/28/17 0325 11/29/17 0234   WBC  4.3*  6.1  4.7*   ASTSGOT  26   --    --    ALTSGPT  19   --    --    ALKPHOSPHAT  38   --    --    TBILIRUBIN  0.4   --    --            Assessment/Plan     * GI bleed- (present on admission)   Assessment & Plan    - Patient transferred from [place] after having mixture of melena and bright red blood per rectum for 1 day  - Abdominal ultrasound shows fatty liver, mild hepatomegaly, cholelithiasis  - RBC scan  limited study due to motion, no signs of active bleeding  - Hb stable, no signs of active bleeding  - regular diet during alcohol withdrawal, with tentative plan for upper and lower endoscopy when clinically stable  - Continue IV maintenance fluids  - Transfuse if Hgb <7        Alcohol withdrawal (CMS-HCC)- (present on admission)   Assessment & Plan    - Patient has history of alcohol withdrawal with symptoms, history of DTs  - Patient states last drink was 11/22/17, two days before admission  - Increase librium dosage to 50 mg TID  - CIWA protocol  - Fall/Seizure/Aspiration precautions  - Multivitamin        Hypokalemia- (present on admission)   Assessment & Plan    - Replete with oral supplements  - Continue to monitor        History of pulmonary embolism- (present on admission)   Assessment & Plan    - History of PE status post IVC filter        Hepatic steatosis   Assessment & Plan    - Patient has history of hepatic steatosis  - LFTs stable  - Confirmed on abdominal ultrasound

## 2017-11-30 NOTE — NON-PROVIDER
Internal Medicine Medical Student Note  Note Author: Raul MSantos [place], Student    Name Chava Mercedes     1950   Age/Sex 67 y.o. male   MRN [id number]   Code Status Full code     After 5PM or if no immediate response to page, please call for cross-coverage  Attending/Team: Dr. Madrid/ Lydia See Patient List for primary contact information  Call [phone] to page after hours   1st Call - Day Intern (R1):   Dr. Martínez 2nd Call - Day Sr. Resident (R2/R3):   Dr. Etienne     Reason for interval visit  (Principal Problem)   GI bleed    Interval Problem Daily Status Update  (24 hours)   Per nursing, patient had no acute overnight events. His appears to be doing better following the increase in Librium to 50 mg TID yesterday. Patient received approximately 12.5 mg of Ativan yesterday per CIWA protocol. Today, the patient is alert and oriented x 4, able to answer questions appropriately and follow commands. He denies any hallucinations. No chest pain, palpitations, or shortness of breath.     Review of Systems   Constitutional: Negative for chills, diaphoresis, fever and malaise/fatigue.   HENT: Negative for congestion and sore throat.    Respiratory: Negative for cough, sputum production, shortness of breath and wheezing.    Cardiovascular: Negative for chest pain, palpitations and leg swelling.   Gastrointestinal: Positive for abdominal pain. Negative for constipation, diarrhea, nausea and vomiting.   Skin: Negative for itching and rash.   Neurological: Positive for tremors. Negative for sensory change, speech change, focal weakness and weakness.     Physical Exam       Vitals:    17 2000 17 0400 17 0800 17 1200   BP:  (!) 92/59 (!) 86/55 (!) 98/61   Pulse: 89 86 86 89   Resp: 17 18 16 16   Temp: 36.6 °C (97.8 °F) 36.7 °C (98.1 °F) 36.8 °C (98.2 °F) 36.7 °C (98.1 °F)   SpO2: 93% 94% 93% 95%   Weight: 80.7 kg (177 lb 14.6 oz)      Height:         Body mass index is 24.13 kg/m². Weight:  80.7 kg (177 lb 14.6 oz)  Oxygen Therapy:  Pulse Oximetry: 95 %, O2 (LPM): 0, O2 Delivery: None (Room Air)    Physical Exam   Constitutional: He is oriented to person, place, and time and well-developed, well-nourished, and in no distress.   HENT:   Head: Normocephalic and atraumatic.   Eyes: Conjunctivae and EOM are normal.   Neck: Normal range of motion. Neck supple.   Cardiovascular: Normal rate, regular rhythm and normal heart sounds.  Exam reveals no gallop and no friction rub.    No murmur heard.  Pulmonary/Chest: Effort normal and breath sounds normal. No respiratory distress. He has no wheezes. He has no rales.   Abdominal: Soft. Bowel sounds are normal. He exhibits no distension. There is tenderness (epigastric region). There is no rebound and no guarding.   Musculoskeletal: He exhibits no edema, tenderness or deformity.   Neurological: He is alert and oriented to person, place, and time. No cranial nerve deficit. Coordination normal.   Skin: Skin is warm and dry.     Most Recent Labs:    Lab Results   Component Value Date/Time    WBC 5.5 11/30/2017 02:07 AM    RBC 2.42 (L) 11/30/2017 02:07 AM    HEMOGLOBIN 8.1 (L) 11/30/2017 02:07 AM    HEMATOCRIT 24.5 (L) 11/30/2017 02:07 AM    .2 (H) 11/30/2017 02:07 AM    MCH 33.5 (H) 11/30/2017 02:07 AM    MCHC 33.1 (L) 11/30/2017 02:07 AM    MPV 10.1 11/30/2017 02:07 AM    NEUTSPOLYS 68.00 11/26/2017 03:12 AM    LYMPHOCYTES 16.60 (L) 11/26/2017 03:12 AM    MONOCYTES 11.30 11/26/2017 03:12 AM    EOSINOPHILS 2.90 11/26/2017 03:12 AM    BASOPHILS 0.70 11/26/2017 03:12 AM    ANISOCYTOSIS 1+ 12/24/2016 12:33 AM      Lab Results   Component Value Date/Time    SODIUM 137 11/30/2017 02:07 AM    POTASSIUM 3.5 (L) 11/30/2017 02:07 AM    CHLORIDE 110 11/30/2017 02:07 AM    CO2 21 11/30/2017 02:07 AM    GLUCOSE 106 (H) 11/30/2017 02:07 AM    BUN 5 (L) 11/30/2017 02:07 AM    CREATININE 0.82 11/30/2017 02:07 AM      Lab Results   Component Value Date/Time    ALTSGPT 19  11/27/2017 03:47 AM    ASTSGOT 26 11/27/2017 03:47 AM    ALKPHOSPHAT 38 11/27/2017 03:47 AM    TBILIRUBIN 0.4 11/27/2017 03:47 AM    LIPASE 97 (H) 11/28/2017 03:25 AM    ALBUMIN 3.0 (L) 11/27/2017 03:47 AM    GLOBULIN 2.6 11/27/2017 03:47 AM    PREALBUMIN 8.0 (L) 12/26/2016 03:52 AM    INR 1.21 (H) 11/24/2017 04:33 AM    MACROCYTOSIS 1+ 12/24/2016 12:33 AM     Lab Results   Component Value Date/Time    PROTHROMBTM 15.0 (H) 11/24/2017 04:33 AM    INR 1.21 (H) 11/24/2017 04:33 AM        Assessment/Plan     #GI bleed  -Hemoglobin stable, 8.1 today  -No signs of active bleeding  -Plan for GI consult once Librium dose is reduced  -H&H daily   -Transfuse if hemoglobin <7  -Monitor for signs of active bleeding (tachycardia, hypotension)     #Alcohol withdrawal  -Patient is improved clinically, less tachycardic  -Decrease Librium to 25 mg TID  -[facility] protocol  -Continue Folate, thiamine, B12     #Sinus Tachycardia  -K - 3.5  -Heart rate has been stable in 80-90s  -Continue to monitor Mg and K     #Pancreatitis  -Lipase 91 at admission; Lipase 97 now  -Abdominal pain in epigastric region  -Continue to monitor

## 2017-11-30 NOTE — PROGRESS NOTES
Report given to Deshawn ROSARIO accepting pt into [facility], pt awaiting transport.

## 2017-11-30 NOTE — CARE PLAN
Problem: Safety  Goal: Will remain free from injury  Outcome: PROGRESSING AS EXPECTED  3 point restraints in place.

## 2017-11-30 NOTE — PROGRESS NOTES
Pt observed, no change from original assessment, vss, no c/o pain at this time  Pt AAOx3, disoriented to time. No other signs or symptoms of distress, fall precautions in place, call light within reach, all questions answered, will continue to monitor.

## 2017-11-30 NOTE — PROGRESS NOTES
Report received, pt care assumed, tele box on. VSS, pt assessment complete. Pt aaox1, self, no signs of distress noted at this time. POC discussed with pt and verbalizes no questions. Pt denies chest pain, pain or SOB. Pt denies any additional needs at this time. Bed in lowest position, bed alarm on, pt educated on fall risk and verbalized understanding, call light within reach, will continue to monitor.

## 2017-11-30 NOTE — PROGRESS NOTES
.         Internal Medicine Interval Note  Note Author: Marito Martínez M.D.     Name Chava Mercedes     1950   Age/Sex 67 y.o. male   MRN [id number]   Code Status FULL     After 5PM or if no immediate response to page, please call for cross-coverage  Attending/Team: Julius/Lydia See Patient List for primary contact information  Call [phone] to page    1st Call - Day Intern (R1):   Tanya 2nd Call - Day Sr. Resident (R2/R3):   [place]         Reason for interval visit  (Principal Problem)   Alcohol withdrawal    Interval Problem Daily Status Update  (24 hours)     Patient requiring 13 mg ativan over last 24 hours. CIWA score range from 5-15. Endorses seeing dogs and cats in the room during initial AM interview, but was A+Ox3 later during morning. Will decrease librium dosage to 25 mg TID. Continue CIWA protocol.       Review of Systems   Constitutional: Negative for chills and fever.   Respiratory: Negative for cough, hemoptysis and shortness of breath.    Cardiovascular: Negative for chest pain and palpitations.   Gastrointestinal: Negative for abdominal pain, nausea and vomiting.        Epigastric pain   Musculoskeletal: Negative for joint pain and myalgias.   Psychiatric/Behavioral: Positive for hallucinations.       Consultants/Specialty  GI    Disposition  Inpatient    Quality Measures    Reviewed items::  Labs reviewed and Medications reviewed  Walker catheter::  No Walker  DVT prophylaxis pharmacological::  Contraindicated - High bleeding risk  DVT prophylaxis - mechanical:  SCDs  Ulcer Prophylaxis::  Yes          Physical Exam       Vitals:    17 2000 17 0400 17 0800 17 1200   BP:  (!) 92/59 (!) 86/55 (!) 98/61   Pulse: 89 86 86 89   Resp:  16   Temp: 36.6 °C (97.8 °F) 36.7 °C (98.1 °F) 36.8 °C (98.2 °F) 36.7 °C (98.1 °F)   SpO2: 93% 94% 93% 95%   Weight: 80.7 kg (177 lb 14.6 oz)      Height:         Body mass index is 24.13 kg/m². Weight: 80.7 kg (177 lb 14.6  oz)  Oxygen Therapy:  Pulse Oximetry: 95 %, O2 (LPM): 0, O2 Delivery: None (Room Air)    Physical Exam   Constitutional: No distress.   Eyes: Conjunctivae and EOM are normal. Pupils are equal, round, and reactive to light.   Cardiovascular: Normal rate, regular rhythm and normal heart sounds.  Exam reveals no gallop and no friction rub.    No murmur heard.  Pulmonary/Chest: Effort normal and breath sounds normal. No respiratory distress. He has no wheezes. He has no rales.   Abdominal: Soft. Bowel sounds are normal. He exhibits no distension. There is no tenderness. There is no guarding.   Musculoskeletal: He exhibits no edema or tenderness.   Neurological:   A+Ox2   Skin: Skin is warm and dry. He is not diaphoretic.         Lab Data Review:         11/25/2017  11:44 AM    Recent Labs      11/28/17 0325 11/29/17 0234 11/30/17 0207   SODIUM  140  139  137   POTASSIUM  3.5*  3.6  3.5*   CHLORIDE  109  111  110   CO2  18*  21  21   BUN  5*  3*  5*   CREATININE  0.78  0.75  0.82   CALCIUM  8.9  8.6  8.5       Recent Labs      11/28/17 0325 11/29/17 0234 11/30/17   0207   LIPASE  97*   --    --    GLUCOSE  104*  99  106*       Recent Labs      11/28/17 0325 11/29/17 0234 11/30/17   0207   RBC  2.47*  2.43*  2.42*   HEMOGLOBIN  8.3*  8.2*  8.1*   HEMATOCRIT  25.0*  24.2*  24.5*   PLATELETCT  173  189  247       Recent Labs      11/28/17 0325 11/29/17 0234 11/30/17   0207   WBC  6.1  4.7*  5.5           Assessment/Plan     * GI bleed- (present on admission)   Assessment & Plan    - Patient transferred from [place] after having mixture of melena and bright red blood per rectum for 1 day  - Abdominal ultrasound shows fatty liver, mild hepatomegaly, cholelithiasis  - RBC scan limited study due to motion, no signs of active bleeding  - Hb stable, no signs of active bleeding  - regular diet during alcohol withdrawal, with tentative plan for upper and lower endoscopy when clinically stable  -  Continue IV maintenance fluids  - Transfuse if Hgb <7        Alcohol withdrawal (CMS-HCC)- (present on admission)   Assessment & Plan    - Patient has history of alcohol withdrawal with symptoms, history of DTs  - Patient states last drink was 11/22/17, two days before admission  - Decrease librium dosage to 25 mg TID  - CIWA protocol  - Fall/Seizure/Aspiration precautions  - Multivitamin, thiamine, folate        Hypokalemia- (present on admission)   Assessment & Plan    - Replete with oral supplements  - Continue to monitor        History of pulmonary embolism- (present on admission)   Assessment & Plan    - History of PE status post IVC filter  - IVC placed due to contraindication for anticoagulation given duodenal ulcer bleeding        Hepatic steatosis   Assessment & Plan    - Patient has history of hepatic steatosis  - LFTs stable  - Confirmed on abdominal ultrasound

## 2017-11-30 NOTE — PROGRESS NOTES
Report received at bedside, assumed care of patient. Patient A&O x 1-3, no signs of distress noted. All LDAs assessed. Discussed POC with patient and all questions answered at this time. Denies pain at this time. Bed alarm on. Bed in lowest position, upper side rails up. Pt in 3-pt restraints. Will continue to monitor pt status.

## 2017-12-01 LAB
ANION GAP SERPL CALC-SCNC: 8 MMOL/L (ref 0–11.9)
APPEARANCE UR: CLEAR
BILIRUB UR QL STRIP.AUTO: NEGATIVE
BUN SERPL-MCNC: 4 MG/DL (ref 8–22)
CALCIUM SERPL-MCNC: 8.6 MG/DL (ref 8.5–10.5)
CHLORIDE SERPL-SCNC: 112 MMOL/L (ref 96–112)
CO2 SERPL-SCNC: 21 MMOL/L (ref 20–33)
COLOR UR: YELLOW
CREAT SERPL-MCNC: 0.84 MG/DL (ref 0.5–1.4)
ERYTHROCYTE [DISTWIDTH] IN BLOOD BY AUTOMATED COUNT: 48.8 FL (ref 35.9–50)
GFR SERPL CREATININE-BSD FRML MDRD: >60 ML/MIN/1.73 M 2
GLUCOSE SERPL-MCNC: 115 MG/DL (ref 65–99)
GLUCOSE UR STRIP.AUTO-MCNC: NEGATIVE MG/DL
HCT VFR BLD AUTO: 25.2 % (ref 42–52)
HGB BLD-MCNC: 8.3 G/DL (ref 14–18)
KETONES UR STRIP.AUTO-MCNC: NEGATIVE MG/DL
LEUKOCYTE ESTERASE UR QL STRIP.AUTO: NEGATIVE
MCH RBC QN AUTO: 33.2 PG (ref 27–33)
MCHC RBC AUTO-ENTMCNC: 32.9 G/DL (ref 33.7–35.3)
MCV RBC AUTO: 100.8 FL (ref 81.4–97.8)
MICRO URNS: NORMAL
NITRITE UR QL STRIP.AUTO: NEGATIVE
PH UR STRIP.AUTO: 5.5 [PH]
PLATELET # BLD AUTO: 299 K/UL (ref 164–446)
PMV BLD AUTO: 9.7 FL (ref 9–12.9)
POTASSIUM SERPL-SCNC: 3.9 MMOL/L (ref 3.6–5.5)
PROT UR QL STRIP: NEGATIVE MG/DL
RBC # BLD AUTO: 2.5 M/UL (ref 4.7–6.1)
RBC UR QL AUTO: NEGATIVE
SODIUM SERPL-SCNC: 141 MMOL/L (ref 135–145)
SP GR UR STRIP.AUTO: 1.01
UROBILINOGEN UR STRIP.AUTO-MCNC: 0.2 MG/DL
WBC # BLD AUTO: 4.5 K/UL (ref 4.8–10.8)

## 2017-12-01 PROCEDURE — A9270 NON-COVERED ITEM OR SERVICE: HCPCS

## 2017-12-01 PROCEDURE — 700102 HCHG RX REV CODE 250 W/ 637 OVERRIDE(OP): Performed by: STUDENT IN AN ORGANIZED HEALTH CARE EDUCATION/TRAINING PROGRAM

## 2017-12-01 PROCEDURE — 700111 HCHG RX REV CODE 636 W/ 250 OVERRIDE (IP): Performed by: FAMILY MEDICINE

## 2017-12-01 PROCEDURE — 700105 HCHG RX REV CODE 258: Performed by: FAMILY MEDICINE

## 2017-12-01 PROCEDURE — 81003 URINALYSIS AUTO W/O SCOPE: CPT

## 2017-12-01 PROCEDURE — A9270 NON-COVERED ITEM OR SERVICE: HCPCS | Performed by: INTERNAL MEDICINE

## 2017-12-01 PROCEDURE — A9270 NON-COVERED ITEM OR SERVICE: HCPCS | Performed by: STUDENT IN AN ORGANIZED HEALTH CARE EDUCATION/TRAINING PROGRAM

## 2017-12-01 PROCEDURE — 770006 HCHG ROOM/CARE - MED/SURG/GYN SEMI*

## 2017-12-01 PROCEDURE — 700102 HCHG RX REV CODE 250 W/ 637 OVERRIDE(OP): Performed by: INTERNAL MEDICINE

## 2017-12-01 PROCEDURE — 99232 SBSQ HOSP IP/OBS MODERATE 35: CPT | Mod: GC | Performed by: INTERNAL MEDICINE

## 2017-12-01 PROCEDURE — 80048 BASIC METABOLIC PNL TOTAL CA: CPT

## 2017-12-01 PROCEDURE — 306559 VEST RESTRAINT (LARGE): Performed by: STUDENT IN AN ORGANIZED HEALTH CARE EDUCATION/TRAINING PROGRAM

## 2017-12-01 PROCEDURE — 85027 COMPLETE CBC AUTOMATED: CPT

## 2017-12-01 PROCEDURE — 36415 COLL VENOUS BLD VENIPUNCTURE: CPT

## 2017-12-01 PROCEDURE — 700102 HCHG RX REV CODE 250 W/ 637 OVERRIDE(OP)

## 2017-12-01 RX ORDER — SUCRALFATE ORAL 1 G/10ML
1 SUSPENSION ORAL EVERY 6 HOURS
Status: DISCONTINUED | OUTPATIENT
Start: 2017-12-01 | End: 2017-12-12

## 2017-12-01 RX ORDER — SODIUM CHLORIDE 9 MG/ML
INJECTION, SOLUTION INTRAVENOUS ONCE
Status: COMPLETED | OUTPATIENT
Start: 2017-12-01 | End: 2017-12-01

## 2017-12-01 RX ORDER — HALOPERIDOL 2 MG/1
2 TABLET ORAL ONCE
Status: COMPLETED | OUTPATIENT
Start: 2017-12-01 | End: 2017-12-01

## 2017-12-01 RX ORDER — HALOPERIDOL 5 MG/ML
5 INJECTION INTRAMUSCULAR ONCE
Status: COMPLETED | OUTPATIENT
Start: 2017-12-01 | End: 2017-12-01

## 2017-12-01 RX ADMIN — Medication 100 MG: at 11:06

## 2017-12-01 RX ADMIN — SUCRALFATE 1 G: 1 SUSPENSION ORAL at 23:00

## 2017-12-01 RX ADMIN — SODIUM CHLORIDE: 9 INJECTION, SOLUTION INTRAVENOUS at 05:17

## 2017-12-01 RX ADMIN — STANDARDIZED SENNA CONCENTRATE AND DOCUSATE SODIUM 2 TABLET: 8.6; 5 TABLET, FILM COATED ORAL at 11:07

## 2017-12-01 RX ADMIN — CHLORDIAZEPOXIDE HYDROCHLORIDE 25 MG: 25 CAPSULE ORAL at 11:07

## 2017-12-01 RX ADMIN — HALOPERIDOL 2 MG: 2 TABLET ORAL at 22:39

## 2017-12-01 RX ADMIN — HALOPERIDOL LACTATE 5 MG: 5 INJECTION, SOLUTION INTRAMUSCULAR at 05:55

## 2017-12-01 RX ADMIN — CHLORDIAZEPOXIDE HYDROCHLORIDE 25 MG: 25 CAPSULE ORAL at 14:31

## 2017-12-01 RX ADMIN — OMEPRAZOLE 20 MG: 20 CAPSULE, DELAYED RELEASE ORAL at 11:07

## 2017-12-01 RX ADMIN — SUCRALFATE 1 G: 1 SUSPENSION ORAL at 11:09

## 2017-12-01 RX ADMIN — THERA TABS 1 TABLET: TAB at 11:07

## 2017-12-01 RX ADMIN — SUCRALFATE 1 G: 1 SUSPENSION ORAL at 19:14

## 2017-12-01 RX ADMIN — FOLIC ACID 1 MG: 1 TABLET ORAL at 11:07

## 2017-12-01 RX ADMIN — LORAZEPAM 1 MG: 1 TABLET ORAL at 22:17

## 2017-12-01 RX ADMIN — CHLORDIAZEPOXIDE HYDROCHLORIDE 25 MG: 25 CAPSULE ORAL at 20:15

## 2017-12-01 RX ADMIN — OMEPRAZOLE 20 MG: 20 CAPSULE, DELAYED RELEASE ORAL at 20:15

## 2017-12-01 ASSESSMENT — LIFESTYLE VARIABLES
HEADACHE, FULLNESS IN HEAD: NOT PRESENT
VISUAL DISTURBANCES: NOT PRESENT
AUDITORY DISTURBANCES: NOT PRESENT
TOTAL SCORE: 9
PAROXYSMAL SWEATS: NO SWEAT VISIBLE
AGITATION: NORMAL ACTIVITY
HEADACHE, FULLNESS IN HEAD: NOT PRESENT
NAUSEA AND VOMITING: NO NAUSEA AND NO VOMITING
ORIENTATION AND CLOUDING OF SENSORIUM: DISORIENTED FOR PLACE AND / OR PERSON
ORIENTATION AND CLOUDING OF SENSORIUM: DISORIENTED FOR PLACE AND / OR PERSON
NAUSEA AND VOMITING: NO NAUSEA AND NO VOMITING
AUDITORY DISTURBANCES: NOT PRESENT
ANXIETY: MILDLY ANXIOUS
ORIENTATION AND CLOUDING OF SENSORIUM: DISORIENTED FOR PLACE AND / OR PERSON
VISUAL DISTURBANCES: MODERATELY SEVERE HALLUCINATIONS
TREMOR: MODERATE TREMOR WITH ARMS EXTENDED
ANXIETY: NO ANXIETY (AT EASE)
AGITATION: NORMAL ACTIVITY
PAROXYSMAL SWEATS: NO SWEAT VISIBLE
HEADACHE, FULLNESS IN HEAD: NOT PRESENT
TOTAL SCORE: 14
AUDITORY DISTURBANCES: NOT PRESENT
VISUAL DISTURBANCES: NOT PRESENT
VISUAL DISTURBANCES: MODERATE SENSITIVITY
ORIENTATION AND CLOUDING OF SENSORIUM: DISORIENTED FOR PLACE AND / OR PERSON
AGITATION: NORMAL ACTIVITY
TOTAL SCORE: 7
PAROXYSMAL SWEATS: NO SWEAT VISIBLE
PAROXYSMAL SWEATS: NO SWEAT VISIBLE
VISUAL DISTURBANCES: NOT PRESENT
VISUAL DISTURBANCES: MODERATELY SEVERE HALLUCINATIONS
AUDITORY DISTURBANCES: NOT PRESENT
ORIENTATION AND CLOUDING OF SENSORIUM: DATE DISORIENTATION BY MORE THAN TWO CALENDAR DAYS
NAUSEA AND VOMITING: NO NAUSEA AND NO VOMITING
VISUAL DISTURBANCES: MODERATELY SEVERE HALLUCINATIONS
PAROXYSMAL SWEATS: NO SWEAT VISIBLE
TREMOR: MODERATE TREMOR WITH ARMS EXTENDED
NAUSEA AND VOMITING: NO NAUSEA AND NO VOMITING
HEADACHE, FULLNESS IN HEAD: NOT PRESENT
TOTAL SCORE: 4
HEADACHE, FULLNESS IN HEAD: NOT PRESENT
ORIENTATION AND CLOUDING OF SENSORIUM: DATE DISORIENTATION BY MORE THAN TWO CALENDAR DAYS
ANXIETY: NO ANXIETY (AT EASE)
TOTAL SCORE: 12
AGITATION: NORMAL ACTIVITY
HEADACHE, FULLNESS IN HEAD: NOT PRESENT
AUDITORY DISTURBANCES: NOT PRESENT
ANXIETY: NO ANXIETY (AT EASE)
AGITATION: NORMAL ACTIVITY
NAUSEA AND VOMITING: NO NAUSEA AND NO VOMITING
TREMOR: NO TREMOR
ORIENTATION AND CLOUDING OF SENSORIUM: DATE DISORIENTATION BY MORE THAN TWO CALENDAR DAYS
TREMOR: TREMOR NOT VISIBLE BUT CAN BE FELT, FINGERTIP TO FINGERTIP
VISUAL DISTURBANCES: NOT PRESENT
AGITATION: *
ANXIETY: NO ANXIETY (AT EASE)
NAUSEA AND VOMITING: NO NAUSEA AND NO VOMITING
HEADACHE, FULLNESS IN HEAD: NOT PRESENT
TREMOR: NO TREMOR
AGITATION: NORMAL ACTIVITY
ORIENTATION AND CLOUDING OF SENSORIUM: DATE DISORIENTATION BY MORE THAN TWO CALENDAR DAYS
PAROXYSMAL SWEATS: NO SWEAT VISIBLE
PAROXYSMAL SWEATS: NO SWEAT VISIBLE
HEADACHE, FULLNESS IN HEAD: NOT PRESENT
ORIENTATION AND CLOUDING OF SENSORIUM: CANNOT DO SERIAL ADDITIONS OR IS UNCERTAIN ABOUT DATE
TREMOR: TREMOR NOT VISIBLE BUT CAN BE FELT, FINGERTIP TO FINGERTIP
AUDITORY DISTURBANCES: NOT PRESENT
AGITATION: NORMAL ACTIVITY
NAUSEA AND VOMITING: NO NAUSEA AND NO VOMITING
PAROXYSMAL SWEATS: NO SWEAT VISIBLE
ANXIETY: MILDLY ANXIOUS
PAROXYSMAL SWEATS: NO SWEAT VISIBLE
TOTAL SCORE: 4
AUDITORY DISTURBANCES: NOT PRESENT
ANXIETY: NO ANXIETY (AT EASE)
VISUAL DISTURBANCES: NOT PRESENT
HEADACHE, FULLNESS IN HEAD: NOT PRESENT
AGITATION: *
TREMOR: MODERATE TREMOR WITH ARMS EXTENDED
NAUSEA AND VOMITING: NO NAUSEA AND NO VOMITING
TREMOR: MODERATE TREMOR WITH ARMS EXTENDED
AUDITORY DISTURBANCES: NOT PRESENT
TOTAL SCORE: 12
TOTAL SCORE: 3
ANXIETY: MILDLY ANXIOUS
TOTAL SCORE: 8
TREMOR: NO TREMOR
AUDITORY DISTURBANCES: NOT PRESENT
ANXIETY: NO ANXIETY (AT EASE)
NAUSEA AND VOMITING: NO NAUSEA AND NO VOMITING

## 2017-12-01 ASSESSMENT — ENCOUNTER SYMPTOMS
HEADACHES: 0
SPEECH CHANGE: 0
DIARRHEA: 0
SENSORY CHANGE: 0
COUGH: 0
PALPITATIONS: 0
VOMITING: 0
CARDIOVASCULAR NEGATIVE: 1
ORTHOPNEA: 0
ABDOMINAL PAIN: 0
CHILLS: 0
NEUROLOGICAL NEGATIVE: 1
SHORTNESS OF BREATH: 0
ROS GI COMMENTS: EPIGASTRIC PAIN
MYALGIAS: 0
NAUSEA: 0
ABDOMINAL PAIN: 1
EYES NEGATIVE: 1
SORE THROAT: 0
GASTROINTESTINAL NEGATIVE: 1
HEMOPTYSIS: 0
FEVER: 0
WHEEZING: 0
WEIGHT LOSS: 0
CONSTIPATION: 0
RESPIRATORY NEGATIVE: 1
DIZZINESS: 0
MUSCULOSKELETAL NEGATIVE: 1
HALLUCINATIONS: 0
DIAPHORESIS: 0
CONSTITUTIONAL NEGATIVE: 1
SPUTUM PRODUCTION: 0

## 2017-12-01 ASSESSMENT — PAIN SCALES - GENERAL: PAINLEVEL_OUTOF10: 0

## 2017-12-01 NOTE — PROGRESS NOTES
"CIWA reassessed. Patient states he no longer sees anyone except me. Patient still recollects of \"man in mask in hallway\". CIWA scored at 4. Will report off shift events to day shift nurse.   "

## 2017-12-01 NOTE — PROGRESS NOTES
Gastroenterology Progress Note     Author: Tyree Jameejbibiyi   Date & Time Created: 2017 9:32 AM    Interval History:  Patient seen in consultation by my partner Dr. Randolph for  Concerns for GI bleeding  He has a history of upper Gi bleed and small ulcers   He presently has finished his regular breakfast of Korean toast and tolerated it well  He has denied any further Gi bleeding but does experience some epigastric discomfort  Review of Systems:  Review of Systems   Eyes: Negative.    Respiratory: Negative.    Cardiovascular: Negative.    Gastrointestinal: Positive for abdominal pain.   Genitourinary: Negative.    Neurological: Negative.        Physical Exam:  Physical Exam   Constitutional: He appears well-developed.   HENT:   Head: Normocephalic.   Eyes: Pupils are equal, round, and reactive to light.   Neck: Normal range of motion.   Cardiovascular: Normal rate and regular rhythm.    Pulmonary/Chest: Effort normal and breath sounds normal.   Abdominal: Soft. Bowel sounds are normal. There is tenderness.   Neurological: He is alert.   He is not oriented to place or time       Labs:        Invalid input(s): [id number]      Recent Labs      17   SODIUM  139  137  141   POTASSIUM  3.6  3.5*  3.9   CHLORIDE  111  110  112   CO2  21  21  21   BUN  3*  5*  4*   CREATININE  0.75  0.82  0.84   CALCIUM  8.6  8.5  8.6     Recent Labs      17   GLUCOSE  99  106*  115*     Recent Labs      17   RBC  2.43*  2.42*  2.50*   HEMOGLOBIN  8.2*  8.1*  8.3*   HEMATOCRIT  24.2*  24.5*  25.2*   PLATELETCT  189  247  299     Recent Labs      17   WBC  4.7*  5.5  4.5*     Hemodynamics:  Temp (24hrs), Av.9 °C (98.4 °F), Min:36.7 °C (98.1 °F), Max:37.2 °C (98.9 °F)  Temperature: 36.8 °C (98.2 °F)  Pulse  Av.1  Min: 60  Max: 148    Blood Pressure : (!) 91/63     Respiratory:    Respiration: 18, Pulse Oximetry: 97 %        RUL Breath Sounds: Clear, RML Breath Sounds: Diminished, RLL Breath Sounds: Diminished, FLORA Breath Sounds: Clear, LLL Breath Sounds: Diminished  Fluids:    Intake/Output Summary (Last 24 hours) at 12/01/17 0932  Last data filed at 12/01/17 0900   Gross per 24 hour   Intake              360 ml   Output             3200 ml   Net            -2840 ml        GI/Nutrition:  Orders Placed This Encounter   Procedures   • DIET ORDER     Standing Status:   Standing     Number of Occurrences:   1     Order Specific Question:   Diet:     Answer:   Regular [1]     Medical Decision Making, by Problem:  Active Hospital Problems    Diagnosis   • *GI bleed [K92.2]   • Alcohol withdrawal (CMS-[facility]) [F10.239]   • History of pulmonary embolism [Z86.711]   • Severe protein-calorie malnutrition (CMS-HCC) [E43]   • Pancytopenia (CMS-[facility]) [D61.818]   • Macrocytic anemia [D53.9]   • ETOH abuse [F10.10]   • Hypokalemia [E87.6]       Plan:  Possible MW tear or esophagitis  His H/H is stable would recommend continue ETOH withdrawal  protocol  Keep on PPI therapy and add carafate slurry or elixir 1 gm po TID  Once he is over his etoh withdrawalwe can consider egd tomorrow  Quality-Core Measures

## 2017-12-01 NOTE — PROGRESS NOTES
Pt continually attempting unsafe attempts at getting up.  Last CIWA score of 8, BP of 90/54, ativan held.

## 2017-12-01 NOTE — PROGRESS NOTES
"I assumed care of patient at 19:00. Patient resting. Patient hallucinating, pt stated there was a \"dead porter\" next to him, but room was empty. Patient denies nausea. CIWA scored per protocol and ativan given. Will continually reassess. No signs of distress. Patient denies pain. No BM's this shift. Will continue to monitor.   Krupa ROSARIO  "

## 2017-12-01 NOTE — PROGRESS NOTES
CIWA score 4 or less and no ativan administered since 0543 11/30/17. Pt verbalized understanding of situation and hospital admission and safety precautions in place. Pt educated on safety precautions and call light use for needs. 3 point soft restraints DC'd at 1800. MD notified.

## 2017-12-01 NOTE — CARE PLAN
Problem: Safety  Goal: Will remain free from injury    Intervention: Provide assistance with mobility  Assistance of two for positioning and turns. Bed alarm on and CNA notified of high fall risk. Patient denies needs; condom cath in place. Will round on patient frequently.       Problem: Psychosocial Needs:  Goal: Level of anxiety will decrease    Intervention: Identify and develop with patient strategies to cope with anxiety triggers  Patient anxious about being in room. I sat with patient and maintained conversation. Door opened and tv on for distraction and comfort.       Problem: Medication  Goal: Compliance with prescribed medication will improve    Intervention: Educate patient and significant other/support system medication rationale and regimen  Educated patient on medications. Patient confused and re-education necessary on every med pass.

## 2017-12-01 NOTE — PROGRESS NOTES
"BP 85/47. Pulse 61. Looking back at vitals BP dropped yesterday to 80s/50s. Patient has tried to get out of bed several times throughout the night. Bed alarm on. Patient also has had visual hallucinations. Recently, he stated \"a man in a mask is running down the ramos.\". He was agitated and anxious with nursing staff about situation and not being able to get out of bed at this time. CIWA done per protocol and is currently 14; ativan not given do to low Bp. Will call hospitalist and update him.     05:10- Dr. Frank returned call. MD ordered a 1L NS bolus stat and post bolus a 5mg IM injection of haldol. Orders were read back to provider and placed. I will continue to monitor.   "

## 2017-12-01 NOTE — PROGRESS NOTES
.         Internal Medicine Interval Note  Note Author: Marito Martínez M.D.     Name Chava Mercedes     1950   Age/Sex 67 y.o. male   MRN [id number]   Code Status FULL     After 5PM or if no immediate response to page, please call for cross-coverage  Attending/Team: Julius/Lydia See Patient List for primary contact information  Call [phone] to page    1st Call - Day Intern (R1):   Tanya 2nd Call - Day Sr. Resident (R2/R3):   [place]         Reason for interval visit  (Principal Problem)   Alcohol withdrawal    Interval Problem Daily Status Update  (24 hours)     Patient has not required ativan since 0500 yesterday morning. CIWA scores range 4 or less, however had one incident overnight when patient was agitated and claimed seeing a dead man in his window. Did not receive ativan due to low BP, but did receive haldol IM x1 dose in addition to fluid bolus. This morning is more alert and oriented, with appropriate response to questions. Will continue CIWA protocol. Seen by GI, will plan for EGD tomorrow. NPO at midnight. Will do colonoscopy as outpatient, as patient unlikely to finish go lytely prep at this time.      Review of Systems   Constitutional: Negative for chills and fever.   Respiratory: Negative for cough, hemoptysis and shortness of breath.    Cardiovascular: Negative for chest pain and palpitations.   Gastrointestinal: Negative for abdominal pain, nausea and vomiting.        Epigastric pain   Musculoskeletal: Negative for joint pain and myalgias.   Psychiatric/Behavioral: Negative for hallucinations.       Consultants/Specialty  GI    Disposition  Inpatient    Quality Measures    Reviewed items::  Labs reviewed and Medications reviewed  Walker catheter::  No Walker  DVT prophylaxis pharmacological::  Contraindicated - High bleeding risk  DVT prophylaxis - mechanical:  SCDs  Ulcer Prophylaxis::  Yes          Physical Exam       Vitals:    17 1915 17 0455 17 0556 17 0647    BP: 102/69 (!) 85/47 (!) 95/58 (!) 91/63   Pulse: 60 72 80 85   Resp: 16 18  18   Temp: 36.8 °C (98.2 °F) 37 °C (98.6 °F)  36.8 °C (98.2 °F)   SpO2: 95% 98%  97%   Weight:       Height:         Body mass index is 24.13 kg/m².    Oxygen Therapy:  Pulse Oximetry: 97 %, O2 (LPM): 0, O2 Delivery: None (Room Air)    Physical Exam   Constitutional: He is oriented to person, place, and time. No distress.   Eyes: Conjunctivae and EOM are normal. Pupils are equal, round, and reactive to light.   Cardiovascular: Normal rate, regular rhythm and normal heart sounds.  Exam reveals no gallop and no friction rub.    No murmur heard.  Pulmonary/Chest: Effort normal and breath sounds normal. No respiratory distress. He has no wheezes. He has no rales.   Abdominal: Soft. Bowel sounds are normal. He exhibits no distension. There is no tenderness. There is no guarding.   Musculoskeletal: He exhibits no edema or tenderness.   Neurological: He is alert and oriented to person, place, and time.   Skin: Skin is warm and dry. He is not diaphoretic.         Lab Data Review:         11/25/2017  11:44 AM    Recent Labs      11/29/17 0234 11/30/17 0207 12/01/17 0219   SODIUM  139  137  141   POTASSIUM  3.6  3.5*  3.9   CHLORIDE  111  110  112   CO2  21  21  21   BUN  3*  5*  4*   CREATININE  0.75  0.82  0.84   CALCIUM  8.6  8.5  8.6       Recent Labs      11/29/17 0234 11/30/17 0207 12/01/17 0219   GLUCOSE  99  106*  115*       Recent Labs      11/29/17 0234 11/30/17 0207 12/01/17 0219   RBC  2.43*  2.42*  2.50*   HEMOGLOBIN  8.2*  8.1*  8.3*   HEMATOCRIT  24.2*  24.5*  25.2*   PLATELETCT  189  247  299       Recent Labs      11/29/17 0234 11/30/17 0207 12/01/17 0219   WBC  4.7*  5.5  4.5*           Assessment/Plan     * GI bleed- (present on admission)   Assessment & Plan    - Patient transferred from [facility] after having mixture of melena and bright red blood per rectum for 1 day  - Abdominal ultrasound  shows fatty liver, mild hepatomegaly, cholelithiasis  - RBC scan limited study due to motion, no signs of active bleeding  - Hb stable, no signs of active bleeding  - regular diet during alcohol withdrawal  - EGD planned for tomorrow  - Continue IV maintenance fluids  - Transfuse if Hgb <7          Alcohol withdrawal (CMS-HCC)- (present on admission)   Assessment & Plan    - Patient has history of alcohol withdrawal with symptoms, history of DTs  - Patient states last drink was 11/22/17, two days before admission  - Continue librium 25 mg TID  - CIWA protocol  - Fall/Seizure/Aspiration precautions  - Multivitamin, thiamine, folate        Hypokalemia- (present on admission)   Assessment & Plan    - Replete with oral supplements  - Continue to monitor        History of pulmonary embolism- (present on admission)   Assessment & Plan    - History of PE status post IVC filter  - IVC placed due to contraindication for anticoagulation given duodenal ulcer bleeding        Hepatic steatosis   Assessment & Plan    - Patient has history of hepatic steatosis  - LFTs stable  - Confirmed on abdominal ultrasound

## 2017-12-01 NOTE — PROGRESS NOTES
Pt confused, disoriented to location and time. Pt states he knows he is here for etOH. Pt impulsive and attempting to get out of bed without assistance. CIWA score of 4 at this time, no ativan given. Pt reoriented to situation and updated on POC. Bed alarm is on, bed in low and locked position, SCDs in place, call light within reach. Hourly rounding in place.

## 2017-12-01 NOTE — PROGRESS NOTES
Gastroenterology Progress Note     Author: Tyree Ni   Date & Time Created: 2017 9:51 AM    Interval History:  At breakfast today and tolerated it but does have some mild abd pain   No nausea or vomiting    Review of Systems:  Review of Systems   Constitutional: Negative.    HENT: Negative.    Eyes: Negative.    Respiratory: Negative.    Cardiovascular: Negative.    Gastrointestinal: Negative.    Genitourinary: Negative.    Musculoskeletal: Negative.    Skin: Negative.    Neurological: Negative.        Physical Exam:  Physical Exam   Constitutional: He appears well-developed.   HENT:   Head: Normocephalic.   Eyes: Pupils are equal, round, and reactive to light.   Neck: Normal range of motion.   Cardiovascular: Normal rate and regular rhythm.    Pulmonary/Chest: Effort normal and breath sounds normal.   Abdominal: There is tenderness.       Labs:        Invalid input(s): [id number]      Recent Labs      17   SODIUM  139  137  141   POTASSIUM  3.6  3.5*  3.9   CHLORIDE  111  110  112   CO2  21  21  21   BUN  3*  5*  4*   CREATININE  0.75  0.82  0.84   CALCIUM  8.6  8.5  8.6     Recent Labs      17   GLUCOSE  99  106*  115*     Recent Labs      17   RBC  2.43*  2.42*  2.50*   HEMOGLOBIN  8.2*  8.1*  8.3*   HEMATOCRIT  24.2*  24.5*  25.2*   PLATELETCT  189  247  299     Recent Labs      17   WBC  4.7*  5.5  4.5*     Hemodynamics:  Temp (24hrs), Av.9 °C (98.4 °F), Min:36.7 °C (98.1 °F), Max:37.2 °C (98.9 °F)  Temperature: 36.8 °C (98.2 °F)  Pulse  Av.1  Min: 60  Max: 148   Blood Pressure : (!) 91/63     Respiratory:    Respiration: 18, Pulse Oximetry: 97 %        RUL Breath Sounds: Clear, RML Breath Sounds: Diminished, RLL Breath Sounds: Diminished, FLORA Breath Sounds: Clear, LLL Breath Sounds:  Diminished  Fluids:    Intake/Output Summary (Last 24 hours) at 12/01/17 0951  Last data filed at 12/01/17 0900   Gross per 24 hour   Intake              360 ml   Output             3200 ml   Net            -2840 ml        GI/Nutrition:  Orders Placed This Encounter   Procedures   • DIET ORDER     Standing Status:   Standing     Number of Occurrences:   1     Order Specific Question:   Diet:     Answer:   Regular [1]     Medical Decision Making, by Problem:  Active Hospital Problems    Diagnosis   • *GI bleed [K92.2]   • Alcohol withdrawal (CMS-[facility]) [F10.239]   • History of pulmonary embolism [Z86.711]   • Severe protein-calorie malnutrition (CMS-[facility]) [E43]   • Pancytopenia (CMS-[facility]) [D61.818]   • Macrocytic anemia [D53.9]   • ETOH abuse [F10.10]   • Hypokalemia [E87.6]       Plan:  Probably and MW tear would benefit from and EGD plan for tomorrow  He is still under ETOH withdrawal protocol please continue  Monitor H/H and keep > 7/21  Continue PPI therapy and add carafate      Quality-Core Measures

## 2017-12-01 NOTE — NON-PROVIDER
Internal Medicine Medical Student Note  Note Author: Raul MSantos Gasloane, Student    Name Chava Mercedes     1950   Age/Sex 67 y.o. male   MRN [id number]   Code Status Full code     After 5PM or if no immediate response to page, please call for cross-coverage  Attending/Team: Dr. Madrid/ Lydia See Patient List for primary contact information  Call [phone] to page after hours   1st Call - Day Intern (R1):   Dr. Martínez 2nd Call - Day Sr. Resident (R2/R3):   Dr. Etienne     Reason for interval visit  (Principal Problem)   GI bleed    Interval Problem Daily Status Update  (24 hours)   Per nursing, patient attempted to get out of bed early this morning and had visual hallucinations at that time. Nursing held Ativan due to low blood pressure and he was given 1L bolus NS and 5 mg IM Haldol. Patient's Librium was decreased to 25 mg TID yesterday and only required 0.5 mg of Ativan yesterday. This morning, the patient was A&Ox4 and able to answer questions appropriately. He was also quite agitated and uncooperative so any further history was limited. No chest pain, palpitations, or shortness of breath.    Review of Systems   Constitutional: Negative for chills, diaphoresis, fever, malaise/fatigue and weight loss.   HENT: Negative for congestion and sore throat.    Respiratory: Negative for cough, sputum production, shortness of breath and wheezing.    Cardiovascular: Negative for chest pain, palpitations, orthopnea and leg swelling.   Gastrointestinal: Positive for abdominal pain (epigastric). Negative for constipation, diarrhea, nausea and vomiting.   Neurological: Negative for dizziness, sensory change, speech change and headaches.     Physical Exam       Vitals:    17 1915 17 0455 17 0556 17 0647   BP: 102/69 (!) 85/47 (!) 95/58 (!) 91/63   Pulse: 60 72 80 85   Resp: 16 18  18   Temp: 36.8 °C (98.2 °F) 37 °C (98.6 °F)  36.8 °C (98.2 °F)   SpO2: 95% 98%  97%   Weight:       Height:          Body mass index is 24.13 kg/m².    Oxygen Therapy:  Pulse Oximetry: 97 %, O2 (LPM): 0, O2 Delivery: None (Room Air)    Physical Exam   Constitutional: He is oriented to person, place, and time and well-developed, well-nourished, and in no distress.   HENT:   Head: Normocephalic and atraumatic.   Eyes: Conjunctivae and EOM are normal.   Neck: Normal range of motion. Neck supple.   Cardiovascular: Normal rate, regular rhythm, normal heart sounds and intact distal pulses.  Exam reveals no gallop and no friction rub.    No murmur heard.  Pulmonary/Chest: Effort normal and breath sounds normal. No respiratory distress. He has no wheezes. He has no rales. He exhibits no tenderness.   Abdominal: Soft. Bowel sounds are normal. He exhibits no distension. There is tenderness (epigastric region). There is no rebound and no guarding.   Musculoskeletal: He exhibits no edema or deformity.   Neurological: He is alert and oriented to person, place, and time. He displays normal reflexes. No cranial nerve deficit. Coordination normal.   Skin: Skin is warm and dry. No rash noted. No erythema.     Most Recent Labs:    Lab Results   Component Value Date/Time    WBC 4.5 (L) 12/01/2017 02:19 AM    RBC 2.50 (L) 12/01/2017 02:19 AM    HEMOGLOBIN 8.3 (L) 12/01/2017 02:19 AM    HEMATOCRIT 25.2 (L) 12/01/2017 02:19 AM    .8 (H) 12/01/2017 02:19 AM    MCH 33.2 (H) 12/01/2017 02:19 AM    MCHC 32.9 (L) 12/01/2017 02:19 AM    MPV 9.7 12/01/2017 02:19 AM    NEUTSPOLYS 68.00 11/26/2017 03:12 AM    LYMPHOCYTES 16.60 (L) 11/26/2017 03:12 AM    MONOCYTES 11.30 11/26/2017 03:12 AM    EOSINOPHILS 2.90 11/26/2017 03:12 AM    BASOPHILS 0.70 11/26/2017 03:12 AM    ANISOCYTOSIS 1+ 12/24/2016 12:33 AM      Lab Results   Component Value Date/Time    SODIUM 141 12/01/2017 02:19 AM    POTASSIUM 3.9 12/01/2017 02:19 AM    CHLORIDE 112 12/01/2017 02:19 AM    CO2 21 12/01/2017 02:19 AM    GLUCOSE 115 (H) 12/01/2017 02:19 AM    BUN 4 (L) 12/01/2017 02:19  AM    CREATININE 0.84 12/01/2017 02:19 AM      Lab Results   Component Value Date/Time    ALTSGPT 19 11/27/2017 03:47 AM    ASTSGOT 26 11/27/2017 03:47 AM    ALKPHOSPHAT 38 11/27/2017 03:47 AM    TBILIRUBIN 0.4 11/27/2017 03:47 AM    LIPASE 97 (H) 11/28/2017 03:25 AM    ALBUMIN 3.0 (L) 11/27/2017 03:47 AM    GLOBULIN 2.6 11/27/2017 03:47 AM    PREALBUMIN 8.0 (L) 12/26/2016 03:52 AM    INR 1.21 (H) 11/24/2017 04:33 AM    MACROCYTOSIS 1+ 12/24/2016 12:33 AM     Lab Results   Component Value Date/Time    PROTHROMBTM 15.0 (H) 11/24/2017 04:33 AM    INR 1.21 (H) 11/24/2017 04:33 AM        Assessment/Plan     #GI bleed  -Hemoglobin stable, 8.3 today  -No signs of active bleeding  -Plan for GI consult now that he is more stable  -H&H daily   -Transfuse if hemoglobin <7  -Monitor for signs of active bleeding (tachycardia, hypotension)     #Alcohol withdrawal  -Patient is improved clinically, less tachycardic  -Continue Librium to 25 mg TID  -[facility] protocol  -Continue Folate, thiamine, B12     #Sinus Tachycardia  -K - 3.5  -Heart rate has been stable in 80-90s  -Continue to monitor Mg and K     #Pancreatitis  -Lipase 91 at admission; Lipase 97 now  -Abdominal pain in epigastric region  -Continue to monitor

## 2017-12-02 LAB
ANION GAP SERPL CALC-SCNC: 9 MMOL/L (ref 0–11.9)
BUN SERPL-MCNC: 4 MG/DL (ref 8–22)
CALCIUM SERPL-MCNC: 8.8 MG/DL (ref 8.5–10.5)
CHLORIDE SERPL-SCNC: 113 MMOL/L (ref 96–112)
CO2 SERPL-SCNC: 21 MMOL/L (ref 20–33)
CREAT SERPL-MCNC: 0.8 MG/DL (ref 0.5–1.4)
ERYTHROCYTE [DISTWIDTH] IN BLOOD BY AUTOMATED COUNT: 50.3 FL (ref 35.9–50)
FERRITIN SERPL-MCNC: 22.2 NG/ML (ref 22–322)
GFR SERPL CREATININE-BSD FRML MDRD: >60 ML/MIN/1.73 M 2
GLUCOSE SERPL-MCNC: 103 MG/DL (ref 65–99)
HCT VFR BLD AUTO: 26.3 % (ref 42–52)
HGB BLD-MCNC: 8.5 G/DL (ref 14–18)
HGB RETIC QN AUTO: 22.2 PG/CELL (ref 29–35)
IMM RETICS NFR: 20.1 % (ref 9.3–17.4)
IRON SATN MFR SERPL: ABNORMAL % (ref 15–55)
IRON SERPL-MCNC: <10 UG/DL (ref 50–180)
MCH RBC QN AUTO: 32.4 PG (ref 27–33)
MCHC RBC AUTO-ENTMCNC: 32.3 G/DL (ref 33.7–35.3)
MCV RBC AUTO: 100.4 FL (ref 81.4–97.8)
PLATELET # BLD AUTO: 400 K/UL (ref 164–446)
PMV BLD AUTO: 9.8 FL (ref 9–12.9)
POTASSIUM SERPL-SCNC: 3.6 MMOL/L (ref 3.6–5.5)
RBC # BLD AUTO: 2.62 M/UL (ref 4.7–6.1)
RETICS # AUTO: 0.07 M/UL (ref 0.04–0.06)
RETICS/RBC NFR: 2.7 % (ref 0.8–2.1)
SODIUM SERPL-SCNC: 143 MMOL/L (ref 135–145)
TIBC SERPL-MCNC: 335 UG/DL (ref 250–450)
WBC # BLD AUTO: 4.4 K/UL (ref 4.8–10.8)

## 2017-12-02 PROCEDURE — 83550 IRON BINDING TEST: CPT

## 2017-12-02 PROCEDURE — A9270 NON-COVERED ITEM OR SERVICE: HCPCS | Performed by: INTERNAL MEDICINE

## 2017-12-02 PROCEDURE — 85046 RETICYTE/HGB CONCENTRATE: CPT

## 2017-12-02 PROCEDURE — 85027 COMPLETE CBC AUTOMATED: CPT

## 2017-12-02 PROCEDURE — 0DB78ZX EXCISION OF STOMACH, PYLORUS, VIA NATURAL OR ARTIFICIAL OPENING ENDOSCOPIC, DIAGNOSTIC: ICD-10-PCS | Performed by: INTERNAL MEDICINE

## 2017-12-02 PROCEDURE — 500066 HCHG BITE BLOCK, ECT: Performed by: INTERNAL MEDICINE

## 2017-12-02 PROCEDURE — 700102 HCHG RX REV CODE 250 W/ 637 OVERRIDE(OP)

## 2017-12-02 PROCEDURE — 0DB48ZX EXCISION OF ESOPHAGOGASTRIC JUNCTION, VIA NATURAL OR ARTIFICIAL OPENING ENDOSCOPIC, DIAGNOSTIC: ICD-10-PCS | Performed by: INTERNAL MEDICINE

## 2017-12-02 PROCEDURE — 160002 HCHG RECOVERY MINUTES (STAT): Performed by: INTERNAL MEDICINE

## 2017-12-02 PROCEDURE — 99232 SBSQ HOSP IP/OBS MODERATE 35: CPT | Mod: GC | Performed by: INTERNAL MEDICINE

## 2017-12-02 PROCEDURE — A9270 NON-COVERED ITEM OR SERVICE: HCPCS | Performed by: STUDENT IN AN ORGANIZED HEALTH CARE EDUCATION/TRAINING PROGRAM

## 2017-12-02 PROCEDURE — 700102 HCHG RX REV CODE 250 W/ 637 OVERRIDE(OP): Performed by: STUDENT IN AN ORGANIZED HEALTH CARE EDUCATION/TRAINING PROGRAM

## 2017-12-02 PROCEDURE — 700111 HCHG RX REV CODE 636 W/ 250 OVERRIDE (IP)

## 2017-12-02 PROCEDURE — 160048 HCHG OR STATISTICAL LEVEL 1-5: Performed by: INTERNAL MEDICINE

## 2017-12-02 PROCEDURE — 160035 HCHG PACU - 1ST 60 MINS PHASE I: Performed by: INTERNAL MEDICINE

## 2017-12-02 PROCEDURE — 160203 HCHG ENDO MINUTES - 1ST 30 MINS LEVEL 4: Performed by: INTERNAL MEDICINE

## 2017-12-02 PROCEDURE — 700102 HCHG RX REV CODE 250 W/ 637 OVERRIDE(OP): Performed by: INTERNAL MEDICINE

## 2017-12-02 PROCEDURE — 80048 BASIC METABOLIC PNL TOTAL CA: CPT

## 2017-12-02 PROCEDURE — 83540 ASSAY OF IRON: CPT

## 2017-12-02 PROCEDURE — 700105 HCHG RX REV CODE 258: Performed by: STUDENT IN AN ORGANIZED HEALTH CARE EDUCATION/TRAINING PROGRAM

## 2017-12-02 PROCEDURE — 770006 HCHG ROOM/CARE - MED/SURG/GYN SEMI*

## 2017-12-02 PROCEDURE — 36415 COLL VENOUS BLD VENIPUNCTURE: CPT

## 2017-12-02 PROCEDURE — 700101 HCHG RX REV CODE 250

## 2017-12-02 PROCEDURE — 82728 ASSAY OF FERRITIN: CPT

## 2017-12-02 PROCEDURE — 88312 SPECIAL STAINS GROUP 1: CPT | Mod: 59

## 2017-12-02 PROCEDURE — A9270 NON-COVERED ITEM OR SERVICE: HCPCS

## 2017-12-02 PROCEDURE — 160009 HCHG ANES TIME/MIN: Performed by: INTERNAL MEDICINE

## 2017-12-02 PROCEDURE — 88305 TISSUE EXAM BY PATHOLOGIST: CPT | Mod: 59

## 2017-12-02 RX ORDER — LORAZEPAM 2 MG/ML
1 CONCENTRATE ORAL EVERY 6 HOURS PRN
Status: DISCONTINUED | OUTPATIENT
Start: 2017-12-02 | End: 2017-12-08 | Stop reason: ALTCHOICE

## 2017-12-02 RX ORDER — QUETIAPINE FUMARATE 25 MG/1
50 TABLET, FILM COATED ORAL
Status: DISPENSED | OUTPATIENT
Start: 2017-12-02 | End: 2017-12-06

## 2017-12-02 RX ORDER — QUETIAPINE FUMARATE 25 MG/1
25 TABLET, FILM COATED ORAL EVERY MORNING
Status: DISPENSED | OUTPATIENT
Start: 2017-12-03 | End: 2017-12-06

## 2017-12-02 RX ORDER — QUETIAPINE FUMARATE 25 MG/1
25 TABLET, FILM COATED ORAL EVERY MORNING
Status: DISCONTINUED | OUTPATIENT
Start: 2017-12-02 | End: 2017-12-02

## 2017-12-02 RX ORDER — CHOLECALCIFEROL (VITAMIN D3) 125 MCG
2000 CAPSULE ORAL DAILY
Status: DISCONTINUED | OUTPATIENT
Start: 2017-12-02 | End: 2017-12-12

## 2017-12-02 RX ORDER — QUETIAPINE FUMARATE 25 MG/1
25 TABLET, FILM COATED ORAL ONCE
Status: COMPLETED | OUTPATIENT
Start: 2017-12-02 | End: 2017-12-02

## 2017-12-02 RX ORDER — QUETIAPINE FUMARATE 25 MG/1
50 TABLET, FILM COATED ORAL EVERY EVENING
Status: DISCONTINUED | OUTPATIENT
Start: 2017-12-02 | End: 2017-12-02

## 2017-12-02 RX ORDER — POTASSIUM CHLORIDE 20MEQ/15ML
40 LIQUID (ML) ORAL ONCE
Status: DISCONTINUED | OUTPATIENT
Start: 2017-12-02 | End: 2017-12-02

## 2017-12-02 RX ORDER — POTASSIUM CHLORIDE 20 MEQ/1
40 TABLET, EXTENDED RELEASE ORAL ONCE
Status: COMPLETED | OUTPATIENT
Start: 2017-12-02 | End: 2017-12-02

## 2017-12-02 RX ADMIN — THERA TABS 1 TABLET: TAB at 07:39

## 2017-12-02 RX ADMIN — LORAZEPAM 2 MG: 1 TABLET ORAL at 07:38

## 2017-12-02 RX ADMIN — FOLIC ACID 1 MG: 1 TABLET ORAL at 07:39

## 2017-12-02 RX ADMIN — STANDARDIZED SENNA CONCENTRATE AND DOCUSATE SODIUM 2 TABLET: 8.6; 5 TABLET, FILM COATED ORAL at 07:39

## 2017-12-02 RX ADMIN — CYANOCOBALAMIN TAB 500 MCG 2000 MCG: 500 TAB at 07:39

## 2017-12-02 RX ADMIN — SUCRALFATE 1 G: 1 SUSPENSION ORAL at 17:11

## 2017-12-02 RX ADMIN — OMEPRAZOLE 20 MG: 20 CAPSULE, DELAYED RELEASE ORAL at 21:37

## 2017-12-02 RX ADMIN — OMEPRAZOLE 20 MG: 20 CAPSULE, DELAYED RELEASE ORAL at 07:39

## 2017-12-02 RX ADMIN — SUCRALFATE 1 G: 1 SUSPENSION ORAL at 05:14

## 2017-12-02 RX ADMIN — QUETIAPINE FUMARATE 25 MG: 25 TABLET ORAL at 13:55

## 2017-12-02 RX ADMIN — Medication 100 MG: at 07:39

## 2017-12-02 RX ADMIN — QUETIAPINE FUMARATE 50 MG: 25 TABLET ORAL at 21:37

## 2017-12-02 RX ADMIN — POTASSIUM CHLORIDE 40 MEQ: 1500 TABLET, EXTENDED RELEASE ORAL at 07:39

## 2017-12-02 RX ADMIN — SODIUM CHLORIDE: 9 INJECTION, SOLUTION INTRAVENOUS at 21:44

## 2017-12-02 RX ADMIN — CHLORDIAZEPOXIDE HYDROCHLORIDE 25 MG: 25 CAPSULE ORAL at 07:39

## 2017-12-02 ASSESSMENT — LIFESTYLE VARIABLES
AGITATION: SOMEWHAT MORE THAN NORMAL ACTIVITY
NAUSEA AND VOMITING: NO NAUSEA AND NO VOMITING
TOTAL SCORE: 2
HEADACHE, FULLNESS IN HEAD: NOT PRESENT
AGITATION: SOMEWHAT MORE THAN NORMAL ACTIVITY
ANXIETY: NO ANXIETY (AT EASE)
AUDITORY DISTURBANCES: NOT PRESENT
PAROXYSMAL SWEATS: NO SWEAT VISIBLE
VISUAL DISTURBANCES: NOT PRESENT
VISUAL DISTURBANCES: NOT PRESENT
AGITATION: SOMEWHAT MORE THAN NORMAL ACTIVITY
ANXIETY: NO ANXIETY (AT EASE)
TREMOR: NO TREMOR
TOTAL SCORE: 6
NAUSEA AND VOMITING: NO NAUSEA AND NO VOMITING
PAROXYSMAL SWEATS: NO SWEAT VISIBLE
NAUSEA AND VOMITING: NO NAUSEA AND NO VOMITING
PAROXYSMAL SWEATS: NO SWEAT VISIBLE
PAROXYSMAL SWEATS: NO SWEAT VISIBLE
AUDITORY DISTURBANCES: NOT PRESENT
NAUSEA AND VOMITING: NO NAUSEA AND NO VOMITING
AUDITORY DISTURBANCES: MODERATE HARSHNESS OR ABILITY TO FRIGHTEN
TOTAL SCORE: 13
TOTAL SCORE: 7
PAROXYSMAL SWEATS: NO SWEAT VISIBLE
AUDITORY DISTURBANCES: NOT PRESENT
HEADACHE, FULLNESS IN HEAD: VERY MILD
NAUSEA AND VOMITING: NO NAUSEA AND NO VOMITING
TREMOR: *
VISUAL DISTURBANCES: NOT PRESENT
HEADACHE, FULLNESS IN HEAD: NOT PRESENT
ORIENTATION AND CLOUDING OF SENSORIUM: CANNOT DO SERIAL ADDITIONS OR IS UNCERTAIN ABOUT DATE
ORIENTATION AND CLOUDING OF SENSORIUM: DATE DISORIENTATION BY NO MORE THAN TWO CALENDAR DAYS
VISUAL DISTURBANCES: NOT PRESENT
ORIENTATION AND CLOUDING OF SENSORIUM: CANNOT DO SERIAL ADDITIONS OR IS UNCERTAIN ABOUT DATE
AUDITORY DISTURBANCES: NOT PRESENT
TREMOR: NO TREMOR
HEADACHE, FULLNESS IN HEAD: NOT PRESENT
ORIENTATION AND CLOUDING OF SENSORIUM: DATE DISORIENTATION BY MORE THAN TWO CALENDAR DAYS
ANXIETY: MODERATELY ANXIOUS OR GUARDED, SO ANXIETY IS INFERRED
AGITATION: SOMEWHAT MORE THAN NORMAL ACTIVITY
TREMOR: *
TOTAL SCORE: 3
TREMOR: NO TREMOR
AGITATION: MODERATELY FIDGETY AND RESTLESS
ANXIETY: NO ANXIETY (AT EASE)
ORIENTATION AND CLOUDING OF SENSORIUM: DATE DISORIENTATION BY MORE THAN TWO CALENDAR DAYS
VISUAL DISTURBANCES: NOT PRESENT
ANXIETY: MILDLY ANXIOUS
HEADACHE, FULLNESS IN HEAD: NOT PRESENT

## 2017-12-02 ASSESSMENT — ENCOUNTER SYMPTOMS
SPUTUM PRODUCTION: 0
WEIGHT LOSS: 0
SPEECH CHANGE: 0
ORTHOPNEA: 0
SORE THROAT: 0
WHEEZING: 0
NEUROLOGICAL NEGATIVE: 1
ABDOMINAL PAIN: 1
SENSORY CHANGE: 0
HALLUCINATIONS: 0
RESPIRATORY NEGATIVE: 1
ABDOMINAL PAIN: 0
ROS GI COMMENTS: EPIGASTRIC PAIN
CARDIOVASCULAR NEGATIVE: 1
COUGH: 0
CONSTIPATION: 0
FEVER: 0
TINGLING: 0
MYALGIAS: 0
PND: 0
CHILLS: 0
HALLUCINATIONS: 1
EYES NEGATIVE: 1
HEMOPTYSIS: 0
PALPITATIONS: 0
DIZZINESS: 0
WEAKNESS: 0
VOMITING: 0
SHORTNESS OF BREATH: 0
DIARRHEA: 0
DIAPHORESIS: 0
HEADACHES: 0
NAUSEA: 0

## 2017-12-02 ASSESSMENT — PAIN SCALES - GENERAL
PAINLEVEL_OUTOF10: 0

## 2017-12-02 NOTE — PROGRESS NOTES
Patient is been up and down in bed even with restraint for most of the shift. Educating not to get up to prevent falling but patient keep insisting that he needs to get up and do things.

## 2017-12-02 NOTE — PROGRESS NOTES
Patient agitated, wanting to go downtown and asking staff to play monopoly. Attempting to pull out his IV. Attempted to reorient patient and encouraged him to try to sleep. Patient refusing. Continues to loosen his vest restraint and sit at the edge of the bed.

## 2017-12-02 NOTE — PROGRESS NOTES
MD notified of altered LOC. UA ordered. This RN advised to administer ativan per CIWA scale, upon entering room pt was asleep. Ativan held.

## 2017-12-02 NOTE — PROGRESS NOTES
Pt remains confused. Frequently attempting OOB despite restraints. Bed alarm in place. Frequent reorientation attempted. Pt becomes agitated when attempts made to reorient. Wife, Amalia, spoke with pt.

## 2017-12-02 NOTE — PROGRESS NOTES
Pt confused and unable to give consent, pt gave phone number of wife at [phone], however she did not respond to call thus far. Will endorse to NOC RN.

## 2017-12-02 NOTE — CARE PLAN
Problem: Safety  Goal: Will remain free from falls    Intervention: Implement fall precautions  Patient is unsteady. Reminded patient to call before he gets up but patient is impulsive and confused. Attempted to educate and reorient patient, bed alarm on, wrist restraints on, patient in room near nursing station, frequent tolieting and fluids offered.       Problem: Skin Integrity  Goal: Risk for impaired skin integrity will decrease    Intervention: Assess and monitor skin integrity, appearance and/or temperature  Patient is in wrist restraints. Frequent skin assessment done on wrists. No signs of irritation or damage at this time.

## 2017-12-02 NOTE — PROGRESS NOTES
[facility] purple rounding. Per Md's CIWA will be d/c'd by Md's. Questioning underlying psych issues. Will continue to monitor. Restraints remain in place.

## 2017-12-02 NOTE — PROGRESS NOTES
Patient pulled out his IV and took off his vest restraint. Patient stated he just wants to go dancing. Unsuccessful at reorienting patient. CNA at bedside. [facility] paged.     Reached Rhett KAHN. Stated he would call back in 10 minutes for orders after consulting the team.    Orders received for 2 mg Haldol PO once now.

## 2017-12-02 NOTE — PROGRESS NOTES
"Patient continues to attempt to get out of bed. Patient is able to untie himself from vest restraint. Patient agitated, stated he is seeing \"bunnies\". Attempted to reorient patient. Bed alarm on. Patient in bed near nursing station.   "
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
## 2017-12-02 NOTE — PROGRESS NOTES
Pt is AOx1, disoriented to time, place, and event. Patient kept attempting to get out of bed to order food. Reoriented patient and provided a snack. Plan of care discussed. Denies pain. Tolerated all oral medications.  Call light in use, bed alarm on, vest restraint on for safety, patient attempting to get out of bed multiple times despite staff education and reorientation, treaded socks on, bed locked in low position

## 2017-12-02 NOTE — PROGRESS NOTES
.         Internal Medicine Interval Note  Note Author: Gibson Etienne M.D.     Name Chava Mercedes     1950   Age/Sex 67 y.o. male   MRN [id number]   Code Status FULL     After 5PM or if no immediate response to page, please call for cross-coverage  Attending/Team: Julius/Lydia See Patient List for primary contact information  Call [phone] to page    1st Call - Day Intern (R1):   Tanya 2nd Call - Day Sr. Resident (R2/R3):   [place]         Reason for interval visit  (Principal Problem)   Alcohol withdrawal    Interval Problem Daily Status Update  (24 hours)     CIWA score 4 to 9 in the 12 hs  Developed episode of agitation, received 1.5 mg ativan and halodol   Plan for EGD today  Stopped CIWA protocol and started qutiapine       Review of Systems   Constitutional: Negative for chills and fever.   Respiratory: Negative for cough, hemoptysis and shortness of breath.    Cardiovascular: Negative for chest pain and palpitations.   Gastrointestinal: Negative for abdominal pain, nausea and vomiting.        Epigastric pain   Musculoskeletal: Negative for joint pain and myalgias.   Psychiatric/Behavioral: Negative for hallucinations.       Consultants/Specialty  GI    Disposition  Inpatient    Quality Measures    Reviewed items::  Labs reviewed and Medications reviewed  Walker catheter::  No Walker  DVT prophylaxis pharmacological::  Contraindicated - High bleeding risk  DVT prophylaxis - mechanical:  SCDs  Ulcer Prophylaxis::  Yes          Physical Exam       Vitals:    17 1240 17 1250 17 1300 17 1310   BP:       Pulse: 96 (!) 116 98 88   Resp: (!) 34 (!) 23 13 14   Temp:       SpO2: 95% 97% 96% 97%   Weight:       Height:         Body mass index is 24.43 kg/m². Weight: 81.7 kg (180 lb 1.9 oz)  Oxygen Therapy:  Pulse Oximetry: 97 %, O2 (LPM): 2, O2 Delivery: None (Room Air)    Physical Exam   Constitutional: He is oriented to person, place, and time. No distress.   Eyes: Conjunctivae and  EOM are normal. Pupils are equal, round, and reactive to light.   Cardiovascular: Normal rate, regular rhythm and normal heart sounds.  Exam reveals no gallop and no friction rub.    No murmur heard.  Pulmonary/Chest: Effort normal and breath sounds normal. No respiratory distress. He has no wheezes. He has no rales.   Abdominal: Soft. Bowel sounds are normal. He exhibits no distension. There is no tenderness. There is no guarding.   Musculoskeletal: He exhibits no edema or tenderness.   Neurological: He is alert and oriented to person, place, and time.   Skin: Skin is warm and dry. He is not diaphoretic.         Lab Data Review:         11/25/2017  11:44 AM    Recent Labs      11/30/17 0207 12/01/17 0219 12/02/17 0156   SODIUM  137  141  143   POTASSIUM  3.5*  3.9  3.6   CHLORIDE  110  112  113*   CO2  21  21  21   BUN  5*  4*  4*   CREATININE  0.82  0.84  0.80   CALCIUM  8.5  8.6  8.8       Recent Labs      11/30/17 0207 12/01/17 0219 12/02/17 0156   GLUCOSE  106*  115*  103*       Recent Labs      11/30/17 0207 12/01/17 0219 12/02/17 0156 12/02/17   0810   RBC  2.42*  2.50*  2.62*   --    HEMOGLOBIN  8.1*  8.3*  8.5*   --    HEMATOCRIT  24.5*  25.2*  26.3*   --    PLATELETCT  247  299  400   --    IRON   --    --    --   <10*   FERRITIN   --    --    --   22.2   TOTIRONBC   --    --    --   335       Recent Labs      11/30/17 0207 12/01/17 0219 12/02/17 0156   WBC  5.5  4.5*  4.4*           Assessment/Plan     * GI bleed- (present on admission)   Assessment & Plan    - Patient transferred from [place] after having mixture of melena and bright red blood per rectum for 1 day  - Abdominal ultrasound shows fatty liver, mild hepatomegaly, cholelithiasis  - RBC scan limited study due to motion, no signs of active bleeding  - Hb stable, no signs of active bleeding  - regular diet during alcohol withdrawal  - EGD tomorrow, colonoscopy as outpatient  - Continue IV maintenance fluids  -  Transfuse if Hgb <7    - EGD done today and showed    1/ small nodular mucosa in cardia 7 mm in size biopsied              2/ two small superficial ulcers in the pre pylorus region 3 mm in size no active bleeding or visible vessel              3/ biopsies for HP performed              4/ otherwise normal EGD  Will continue PPI po and sucralfate at this time          Alcohol withdrawal (CMS-HCC)- (present on admission)   Assessment & Plan    - Patient has history of alcohol withdrawal with symptoms, history of DTs  - Patient states last drink was 11/22/17, two days before admission  - there is improvement in his alcohol withdrawal symptoms, CIWA 4 to 9   - developed agitation yesterday. We think there might be element of psychiatric issue. Will confirm his baseline with the wife  - to stop CIWA protocol and librium  - start quitiapine for the episode of agitation  - Fall/Seizure/Aspiration precautions  - Multivitamin, thiamine, folate        Hypokalemia- (present on admission)   Assessment & Plan    - Replete with oral supplements  - Continue to monitor        History of pulmonary embolism- (present on admission)   Assessment & Plan    - History of PE status post IVC filter  - IVC placed due to contraindication for anticoagulation given duodenal ulcer bleeding        Hepatic steatosis   Assessment & Plan    - Patient has history of hepatic steatosis  - LFTs stable  - Confirmed on abdominal ultrasound

## 2017-12-02 NOTE — CARE PLAN
Problem: Safety  Goal: Will remain free from injury  Outcome: PROGRESSING AS EXPECTED  BA on, bed in low and locked position, call light within reach.    Problem: Discharge Barriers/Planning  Goal: Patient's continuum of care needs will be met  Outcome: PROGRESSING SLOWER THAN EXPECTED

## 2017-12-02 NOTE — PROGRESS NOTES
Gastroenterology Progress Note     Author: Tyree Jameejbibiyi   Date & Time Created: 12/2/2017 12:12 PM    Interval History:  Patient seen in consultation by my partner Dr. Randolph for  Concerns for GI bleeding  He has a history of upper Gi bleed and small ulcers     He has worsened from a neurological stand point but there has been no further signs of GI bleeding  He could not finish his prep so we will postpone his procedure.  Review of Systems:  Review of Systems   Eyes: Negative.    Respiratory: Negative.    Cardiovascular: Negative.    Gastrointestinal: Positive for abdominal pain.   Genitourinary: Negative.    Neurological: Negative.        Physical Exam:  Physical Exam   Constitutional: He appears well-developed.   HENT:   Head: Normocephalic.   Eyes: Pupils are equal, round, and reactive to light.   Neck: Normal range of motion.   Cardiovascular: Normal rate and regular rhythm.    Pulmonary/Chest: Effort normal and breath sounds normal.   Abdominal: Soft. Bowel sounds are normal. There is tenderness.   Neurological: He is alert.   He is not oriented to place or time       Labs:        Invalid input(s): [id number]      Recent Labs      11/30/17 0207 12/01/17 0219 12/02/17 0156   SODIUM  137  141  143   POTASSIUM  3.5*  3.9  3.6   CHLORIDE  110  112  113*   CO2  21  21  21   BUN  5*  4*  4*   CREATININE  0.82  0.84  0.80   CALCIUM  8.5  8.6  8.8     Recent Labs      11/30/17 0207 12/01/17 0219 12/02/17 0156   GLUCOSE  106*  115*  103*     Recent Labs      11/30/17 0207 12/01/17 0219 12/02/17 0156 12/02/17   0810   RBC  2.42*  2.50*  2.62*   --    HEMOGLOBIN  8.1*  8.3*  8.5*   --    HEMATOCRIT  24.5*  25.2*  26.3*   --    PLATELETCT  247  299  400   --    IRON   --    --    --   <10*   FERRITIN   --    --    --   22.2   TOTIRONBC   --    --    --   335     Recent Labs      11/30/17 0207 12/01/17 0219 12/02/17 0156   WBC  5.5  4.5*  4.4*     Hemodynamics:  Temp (24hrs),  Av °C (98.6 °F), Min:36.8 °C (98.2 °F), Max:37.1 °C (98.8 °F)  Temperature: 37 °C (98.6 °F)  Pulse  Av  Min: 60  Max: 148Heart Rate (Monitored): 81  Blood Pressure : 107/66     Respiratory:    Respiration: 16, Pulse Oximetry: 93 %        RUL Breath Sounds: Clear, RML Breath Sounds: Diminished, RLL Breath Sounds: Diminished, FLORA Breath Sounds: Clear, LLL Breath Sounds: Diminished  Fluids:    Intake/Output Summary (Last 24 hours) at 17 0932  Last data filed at 17 0900   Gross per 24 hour   Intake              360 ml   Output             3200 ml   Net            -2840 ml     Weight: 81.7 kg (180 lb 1.9 oz)  GI/Nutrition:  Orders Placed This Encounter   Procedures   • DIET NPO     Standing Status:   Standing     Number of Occurrences:   1     Order Specific Question:   Restrict to:     Answer:   Sips with Medications [3]     Medical Decision Making, by Problem:  Active Hospital Problems    Diagnosis   • *GI bleed [K92.2]   • Alcohol withdrawal (CMS-HCC) [F10.239]   • History of pulmonary embolism [Z86.711]   • Severe protein-calorie malnutrition (CMS-HCC) [E43]   • Pancytopenia (CMS-HCC) [D61.818]   • Macrocytic anemia [D53.9]   • ETOH abuse [F10.10]   • Hypokalemia [E87.6]       Plan:  Possible MW tear or esophagitis  His H/H is stable would recommend continue ETOH withdrawal  protocol  Keep on PPI therapy and add carafate slurry or elixir 1 gm po TID  We will plan for EGD the wife was kind enough to consent him for the EGD  His colonoscopy can be performed as an o/p  OQuality-Core Measures

## 2017-12-02 NOTE — PROGRESS NOTES
No consent found in chart for EGD. Pt remains confused and CIWA scale continues to be use. Message left for wife Amalia at number in chart. Awaiting call back for consent.

## 2017-12-02 NOTE — OR SURGEON
Immediate Post OP Note    PreOp Diagnosis: hematemsis    PostOp Diagnosis:    1/ small nodular mucosa in cardia 7 mm in size biopsied   2/ two small superficial ulcers in the pre pylorus region 3 mm in size no active bleeding or visible vessel   3/ biopsies for HP performed   4/ otherwise normal EGD    Procedure(s):  GASTROSCOPY - Wound Class: Clean Contaminated    Surgeon(s):  Tyree Dan M.D.    Anesthesiologist/Type of Anesthesia:  Anesthesiologist: Chava Wagner M.D./General    Surgical Staff:  Circulator: Siddhartha Bautista R.N.  Endoscopy Technician: Quang Yee    Specimens:  Biopsies of antrum and cardia region    Estimated Blood Loss: none    Findings:     Narrative:   Prior to the procedure the patient was informed of the risk and benefits of the procedure all question were answered.  The patient was sedated and once adequate sedation was achieved the bite block was placed. They were placed in the left lateral decubitus positron.   Using the standard Olympus gastroscope it was introduced through the oral pharynx and passed into the esophagus.  Intubation of the esophagus was achieved easily and the entire esophagus appeared normal.  The GEJ 38 cm,it appeared normal.  Intubation of the gastric cavity was performed and insufflation was achieved allowing for a panoramic view of the entire gastric mucosa.  No abnormalities were appreciated. Advancement into the antrum allowed for visualization of the antrum and pylorus there are two small superficial ulcers in the prepyloric region 3 mm in size. Biopsies of the antrum were performed for H.Pylori.  Intubation of the pylorus allowed for visualization of the duodenal bulb along with the second and third portion, all these structures appeared normal.  No mucosal abnormalities were appreciated.  Extubation of the pylorus allowed for retroflexion to be performed in the antrum to visualize the cardia, fundus and body, there was a small area of nodular mucosa  about 7 mm in size which was biopsied.  The gastroscope was straightened excess air was removed and patient tolerated the procedure well.    Complications:none    Recommendations:continue current medications and [facility] protocol   Advance diet   Await pathology findings and if HP positive treat   Follow up at [phone]  Any further issues please call   We will sign off            12/2/2017 12:34 PM Tyree Dan

## 2017-12-02 NOTE — CARE PLAN
Problem: Bowel/Gastric:  Goal: Will not experience complications related to bowel motility  Outcome: PROGRESSING SLOWER THAN EXPECTED
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
## 2017-12-02 NOTE — PROGRESS NOTES
"Patient still agitated and attempting to get out of bed. Refusing IV insertion. Patient forgetful about staff education about the need to stay in bed due to impaired mobility and the reason for his restraints. Patient continues to have delusions \" I've got a million dollar idea\", \" I've figured out how to neutralize the weight of an atom to shrink objects\", \" I need a dresser. Bring me tools and I can build one myself\", \"Let's go dancing, let me out of these things\", and \" I want some chinese, I'll go get some or they deliver don't they?\"     Patient showed little change in behavior after medications, distraction, education, and reorientation.   "

## 2017-12-03 ENCOUNTER — APPOINTMENT (OUTPATIENT)
Dept: RADIOLOGY | Facility: MEDICAL CENTER | Age: 67
DRG: 896 | End: 2017-12-03
Attending: STUDENT IN AN ORGANIZED HEALTH CARE EDUCATION/TRAINING PROGRAM
Payer: MEDICARE

## 2017-12-03 PROBLEM — R40.4 ALTERED LEVEL OF CONSCIOUSNESS: Status: ACTIVE | Noted: 2017-12-03

## 2017-12-03 LAB
ANION GAP SERPL CALC-SCNC: 5 MMOL/L (ref 0–11.9)
BUN SERPL-MCNC: 3 MG/DL (ref 8–22)
CALCIUM SERPL-MCNC: 8.8 MG/DL (ref 8.5–10.5)
CHLORIDE SERPL-SCNC: 114 MMOL/L (ref 96–112)
CO2 SERPL-SCNC: 24 MMOL/L (ref 20–33)
CREAT SERPL-MCNC: 0.79 MG/DL (ref 0.5–1.4)
ERYTHROCYTE [DISTWIDTH] IN BLOOD BY AUTOMATED COUNT: 50.9 FL (ref 35.9–50)
GFR SERPL CREATININE-BSD FRML MDRD: >60 ML/MIN/1.73 M 2
GLUCOSE SERPL-MCNC: 92 MG/DL (ref 65–99)
HCT VFR BLD AUTO: 25.8 % (ref 42–52)
HGB BLD-MCNC: 8.3 G/DL (ref 14–18)
MCH RBC QN AUTO: 32.2 PG (ref 27–33)
MCHC RBC AUTO-ENTMCNC: 32.2 G/DL (ref 33.7–35.3)
MCV RBC AUTO: 100 FL (ref 81.4–97.8)
PLATELET # BLD AUTO: 442 K/UL (ref 164–446)
PMV BLD AUTO: 9.2 FL (ref 9–12.9)
POTASSIUM SERPL-SCNC: 3.5 MMOL/L (ref 3.6–5.5)
RBC # BLD AUTO: 2.58 M/UL (ref 4.7–6.1)
SODIUM SERPL-SCNC: 143 MMOL/L (ref 135–145)
WBC # BLD AUTO: 4.6 K/UL (ref 4.8–10.8)

## 2017-12-03 PROCEDURE — 92610 EVALUATE SWALLOWING FUNCTION: CPT

## 2017-12-03 PROCEDURE — 700102 HCHG RX REV CODE 250 W/ 637 OVERRIDE(OP): Performed by: INTERNAL MEDICINE

## 2017-12-03 PROCEDURE — 700105 HCHG RX REV CODE 258: Performed by: STUDENT IN AN ORGANIZED HEALTH CARE EDUCATION/TRAINING PROGRAM

## 2017-12-03 PROCEDURE — 85027 COMPLETE CBC AUTOMATED: CPT

## 2017-12-03 PROCEDURE — 70450 CT HEAD/BRAIN W/O DYE: CPT

## 2017-12-03 PROCEDURE — A9270 NON-COVERED ITEM OR SERVICE: HCPCS | Performed by: INTERNAL MEDICINE

## 2017-12-03 PROCEDURE — 770006 HCHG ROOM/CARE - MED/SURG/GYN SEMI*

## 2017-12-03 PROCEDURE — 36415 COLL VENOUS BLD VENIPUNCTURE: CPT

## 2017-12-03 PROCEDURE — G8997 SWALLOW GOAL STATUS: HCPCS | Mod: CI

## 2017-12-03 PROCEDURE — 99233 SBSQ HOSP IP/OBS HIGH 50: CPT | Mod: GC | Performed by: INTERNAL MEDICINE

## 2017-12-03 PROCEDURE — 80048 BASIC METABOLIC PNL TOTAL CA: CPT

## 2017-12-03 PROCEDURE — 700111 HCHG RX REV CODE 636 W/ 250 OVERRIDE (IP): Performed by: INTERNAL MEDICINE

## 2017-12-03 PROCEDURE — G8996 SWALLOW CURRENT STATUS: HCPCS | Mod: CL

## 2017-12-03 RX ORDER — HEPARIN SODIUM 5000 [USP'U]/ML
5000 INJECTION, SOLUTION INTRAVENOUS; SUBCUTANEOUS EVERY 8 HOURS
Status: DISCONTINUED | OUTPATIENT
Start: 2017-12-03 | End: 2017-12-15 | Stop reason: HOSPADM

## 2017-12-03 RX ORDER — SODIUM CHLORIDE 9 MG/ML
INJECTION, SOLUTION INTRAVENOUS CONTINUOUS
Status: DISCONTINUED | OUTPATIENT
Start: 2017-12-03 | End: 2017-12-06 | Stop reason: ALTCHOICE

## 2017-12-03 RX ORDER — POTASSIUM CHLORIDE 20 MEQ/1
40 TABLET, EXTENDED RELEASE ORAL ONCE
Status: DISPENSED | OUTPATIENT
Start: 2017-12-03 | End: 2017-12-04

## 2017-12-03 RX ORDER — POTASSIUM CHLORIDE 20MEQ/15ML
40 LIQUID (ML) ORAL ONCE
Status: DISCONTINUED | OUTPATIENT
Start: 2017-12-03 | End: 2017-12-03

## 2017-12-03 RX ORDER — FERROUS SULFATE 325(65) MG
325 TABLET ORAL
Status: DISCONTINUED | OUTPATIENT
Start: 2017-12-03 | End: 2017-12-12

## 2017-12-03 RX ADMIN — SUCRALFATE 1 G: 1 SUSPENSION ORAL at 00:08

## 2017-12-03 RX ADMIN — HEPARIN SODIUM 5000 UNITS: 5000 INJECTION, SOLUTION INTRAVENOUS; SUBCUTANEOUS at 21:59

## 2017-12-03 RX ADMIN — HEPARIN SODIUM 5000 UNITS: 5000 INJECTION, SOLUTION INTRAVENOUS; SUBCUTANEOUS at 15:08

## 2017-12-03 RX ADMIN — SODIUM CHLORIDE: 9 INJECTION, SOLUTION INTRAVENOUS at 16:00

## 2017-12-03 RX ADMIN — SUCRALFATE 1 G: 1 SUSPENSION ORAL at 06:12

## 2017-12-03 ASSESSMENT — ENCOUNTER SYMPTOMS
ROS GI COMMENTS: EPIGASTRIC PAIN
ABDOMINAL PAIN: 0
CHILLS: 0
HEADACHES: 1
MYALGIAS: 0
VOMITING: 0
FEVER: 0
DIZZINESS: 0
SPEECH CHANGE: 0
FOCAL WEAKNESS: 0
NAUSEA: 0
SENSORY CHANGE: 0
PALPITATIONS: 0
HALLUCINATIONS: 0
HEMOPTYSIS: 0
SHORTNESS OF BREATH: 0
COUGH: 0

## 2017-12-03 ASSESSMENT — PAIN SCALES - GENERAL
PAINLEVEL_OUTOF10: 0
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
## 2017-12-03 NOTE — PROGRESS NOTES
Pt's wife called the unit phone, asking to speak with . This CNA answered and let the wife know that her  was off the unit for the moment and asked her to call back in an hour. Pt's wife then stated that she was in [place] for her job and speaking with staff keeps her updated on her husbands condition. Pt's wife asked this CNA if nursing staff can keep her updated on pts condition, as she can not be here. RN aware that pt's wife will be calling back for an update.

## 2017-12-03 NOTE — PROGRESS NOTES
Pt is AOx2, disoriented to time and situation. Patient is aware he is in [place] but was confused he was in the hospital. Reoriented patient. Plan of care discussed. Denies pain. Tolerated all oral medications. Call light in use, bed alarm on, in wrist restraints, treaded socks on, bed locked in low position

## 2017-12-03 NOTE — ASSESSMENT & PLAN NOTE
- Continued confusion and confabulations  - CT, EEG unremarkable  - MRI head 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, otherwise normal   - EtOH-related per Psych eval. 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

## 2017-12-03 NOTE — PROGRESS NOTES
.         Internal Medicine Interval Note  Note Author: Marito Martínez M.D.     Name Chava Mercedes     1950   Age/Sex 67 y.o. male   MRN [id number]   Code Status FULL     After 5PM or if no immediate response to page, please call for cross-coverage  Attending/Team: Rigo See Patient List for primary contact information  Call [phone] to page    1st Call - Day Intern (R1):   Tanya 2nd Call - Day Sr. Resident (R2/R3):   [place]         Reason for interval visit  (Principal Problem)   Alcohol withdrawal    Interval Problem Daily Status Update  (24 hours)     Patient remains confused, not oriented to place or situation. CIWA discontinued yesterday, started on seroquel. Spoke with wife on the phone, she states patient has history of seizures, not on any medications. CT head negative for acute pathology. Will consult neurology for possible EEG. EGD yesterday with small nodular mucosa, superficial ulcers, biopsy pathology pending. No signs of active bleeding, Hgb stable. Will start heparin for DVT ppx.        Review of Systems   Constitutional: Negative for chills and fever.   Respiratory: Negative for cough, hemoptysis and shortness of breath.    Cardiovascular: Negative for chest pain and palpitations.   Gastrointestinal: Negative for abdominal pain, nausea and vomiting.        Epigastric pain   Musculoskeletal: Negative for joint pain and myalgias.   Neurological: Positive for headaches. Negative for dizziness, sensory change, speech change and focal weakness.   Psychiatric/Behavioral: Negative for hallucinations.       Consultants/Specialty  GI    Disposition  Inpatient    Quality Measures    Reviewed items::  Labs reviewed and Medications reviewed  Walker catheter::  No Walker  DVT prophylaxis pharmacological::  Heparin  DVT prophylaxis - mechanical:  SCDs  Ulcer Prophylaxis::  Yes          Physical Exam       Vitals:    17 1535 17 1620 17 2000 17 0727   BP: 116/76 112/68 113/68  119/61   Pulse: 98 (!) 107 89 89   Resp: 16 16 14 16   Temp: 36.8 °C (98.2 °F) 37 °C (98.6 °F) 36.9 °C (98.5 °F) 36.6 °C (97.8 °F)   SpO2: 94% 94% 94% 94%   Weight:       Height:         Body mass index is 24.43 kg/m².    Oxygen Therapy:  Pulse Oximetry: 94 %, O2 (LPM): 0, O2 Delivery: None (Room Air)    Physical Exam   Constitutional: No distress.   Eyes: Conjunctivae and EOM are normal. Pupils are equal, round, and reactive to light.   Cardiovascular: Normal rate, regular rhythm and normal heart sounds.  Exam reveals no gallop and no friction rub.    No murmur heard.  Pulmonary/Chest: Effort normal and breath sounds normal. No respiratory distress. He has no wheezes. He has no rales.   Abdominal: Soft. Bowel sounds are normal. He exhibits no distension. There is no tenderness. There is no guarding.   Musculoskeletal: He exhibits no edema or tenderness.   Neurological: He has normal sensation, normal reflexes and intact cranial nerves.   Not oriented to place or situation, difficulty with memory   Skin: Skin is warm and dry. He is not diaphoretic.         Lab Data Review:         11/25/2017  11:44 AM    Recent Labs      12/01/17 0219 12/02/17 0156 12/03/17 0318   SODIUM  141  143  143   POTASSIUM  3.9  3.6  3.5*   CHLORIDE  112  113*  114*   CO2  21  21  24   BUN  4*  4*  3*   CREATININE  0.84  0.80  0.79   CALCIUM  8.6  8.8  8.8       Recent Labs      12/01/17 0219 12/02/17 0156 12/03/17   0318   GLUCOSE  115*  103*  92       Recent Labs      12/01/17 0219 12/02/17   0156  12/02/17   0810  12/03/17   0318   RBC  2.50*  2.62*   --   2.58*   HEMOGLOBIN  8.3*  8.5*   --   8.3*   HEMATOCRIT  25.2*  26.3*   --   25.8*   PLATELETCT  299  400   --   442   IRON   --    --   <10*   --    FERRITIN   --    --   22.2   --    TOTIRONBC   --    --   335   --        Recent Labs      12/01/17   0219  12/02/17   0156  12/03/17   0318   WBC  4.5*  4.4*  4.6*           Assessment/Plan     * GI bleed- (present on  admission)   Assessment & Plan    - Patient transferred from [place] after having mixture of melena and bright red blood per rectum for 1 day  - Abdominal ultrasound shows fatty liver, mild hepatomegaly, cholelithiasis  - RBC scan limited study due to motion, no signs of active bleeding  - Hb stable, no signs of active bleeding  - EGD shows small nodular mucosa, superficial ulcers, pathology pending  - Continue PPI, sucralfate  - Continue IV maintenance fluids  - Transfuse if Hgb <7  - colonoscopy as outpatient            Altered level of consciousness   Assessment & Plan    - Remains disoriented beyond typical 7 day alcohol withdrawal course  - Alcohol withdrawal measures discontinued  - CT head negative for acute process  - Hx of seizures noted per wife  - Started on seroquel for agitation  - Neurology consulted for possible EEG        Alcohol withdrawal (CMS-HCC)- (present on admission)   Assessment & Plan    - Patient has history of alcohol withdrawal with symptoms, history of DTs  - Patient states last drink was 11/22/17, two days before admission  - there is improvement in his alcohol withdrawal symptoms, CIWA 4 to 9   - CIWA protocol and librium discontinued  - Continues to have agitation and disorientation beyond typical 7 day withdrawal course  - start seroquel for agitation  - Fall/Seizure/Aspiration precautions  - Multivitamin, thiamine, folate        Hypokalemia- (present on admission)   Assessment & Plan    - Replete with oral supplements  - Continue to monitor        History of pulmonary embolism- (present on admission)   Assessment & Plan    - History of PE status post IVC filter  - IVC placed due to contraindication for anticoagulation given duodenal ulcer bleeding        Hepatic steatosis   Assessment & Plan    - Patient has history of hepatic steatosis  - LFTs stable  - Confirmed on abdominal ultrasound

## 2017-12-03 NOTE — PROGRESS NOTES
During morning assessment pt very fatigued and confused, just shaking his head for most assessment questions, following some commands, however pt appears very week. When given some water to sip, pt initially swallows, but momentarily later coughing. Screened pt for dysphagia which he failed. Dr Martínez updated. Swallow eval ordered and NPO at this time.

## 2017-12-03 NOTE — CARE PLAN
Problem: Safety  Goal: Will remain free from injury  Outcome: PROGRESSING AS EXPECTED  Room near nurses station, pt in bilateral wrist restraints as well as vest for unsafe mobilization and pulling lines. Minimum Q2 hr turning and reposiitoning.    Problem: Discharge Barriers/Planning  Goal: Patient's continuum of care needs will be met  Outcome: PROGRESSING AS EXPECTED  CT head complete. Swallow eval, pt remains NPO, speech to re-evaluate first thing in the morning.

## 2017-12-03 NOTE — THERAPY
"Speech Language Therapy Clinical Swallow Evaluation completed.    Functional Status: Per RN, patient was eating a regular/thin liquid diet two days ago. He was made NPO for procedure yesterday and NPO today d/t concerns for aspiration. Upon entry, patient was sleeping required moderate verbal cueing to fully rouse. He was confused, AAO to self and when asked why he was in the hospital he was able to state \"from alcohol.\" Oral motor examination was conducted and revealed diffuse weakness in oral motor musculature. Cough was strong and productive although wet. PO trials were given and consisted of single ice chips, 4 tsp of NTL, and 3, 1/2 tsp of puree. S/sx of penetration/aspiration were observed as evidenced by immediate coughing/choking in 1/4 trials of NTL and increased wet vocal quality in 50% of consistencies tested. S/sx consistent with moderate oropharyngeal dysphagia were also observed and were characterized by moderate oral holding/delayed initiation of pharyngeal swallow, 2-3 swallows to clear bolus, and audible gulping. Limited PO trials were given this session d/t serious concerns for adequate ANDRÉS for safe PO intake. Recommend patient remain NPO today with reassessment tomorrow AM, if appropriate.     Recommendations - Diet: NPO with reassessment tomorrow AM, if appropriate                            Strategies: To be determined                             Medication Administration: Non-oral source, in the interim     Plan of Care: Will benefit from Speech Therapy 3 times per week    Post-Acute Therapy: Discharge to a transitional care facility for continued skilled therapy services.    See \"Rehab Therapy-Acute\" Patient Summary Report for complete documentation. Thank you for the consult.         "

## 2017-12-04 ENCOUNTER — APPOINTMENT (OUTPATIENT)
Dept: RADIOLOGY | Facility: MEDICAL CENTER | Age: 67
DRG: 896 | End: 2017-12-04
Attending: INTERNAL MEDICINE
Payer: MEDICARE

## 2017-12-04 LAB
ALBUMIN SERPL BCP-MCNC: 3.5 G/DL (ref 3.2–4.9)
ALBUMIN/GLOB SERPL: 1.1 G/DL
ALP SERPL-CCNC: 49 U/L (ref 30–99)
ALT SERPL-CCNC: 24 U/L (ref 2–50)
ANION GAP SERPL CALC-SCNC: 14 MMOL/L (ref 0–11.9)
AST SERPL-CCNC: 23 U/L (ref 12–45)
BASOPHILS # BLD AUTO: 1.4 % (ref 0–1.8)
BASOPHILS # BLD: 0.08 K/UL (ref 0–0.12)
BILIRUB SERPL-MCNC: 0.4 MG/DL (ref 0.1–1.5)
BUN SERPL-MCNC: 6 MG/DL (ref 8–22)
CALCIUM SERPL-MCNC: 8.9 MG/DL (ref 8.5–10.5)
CHLORIDE SERPL-SCNC: 107 MMOL/L (ref 96–112)
CO2 SERPL-SCNC: 20 MMOL/L (ref 20–33)
CREAT SERPL-MCNC: 0.83 MG/DL (ref 0.5–1.4)
EOSINOPHIL # BLD AUTO: 0.14 K/UL (ref 0–0.51)
EOSINOPHIL NFR BLD: 2.4 % (ref 0–6.9)
ERYTHROCYTE [DISTWIDTH] IN BLOOD BY AUTOMATED COUNT: 51.4 FL (ref 35.9–50)
GFR SERPL CREATININE-BSD FRML MDRD: >60 ML/MIN/1.73 M 2
GLOBULIN SER CALC-MCNC: 3.2 G/DL (ref 1.9–3.5)
GLUCOSE SERPL-MCNC: 74 MG/DL (ref 65–99)
HCT VFR BLD AUTO: 28 % (ref 42–52)
HGB BLD-MCNC: 9.4 G/DL (ref 14–18)
IMM GRANULOCYTES # BLD AUTO: 0.03 K/UL (ref 0–0.11)
IMM GRANULOCYTES NFR BLD AUTO: 0.5 % (ref 0–0.9)
LYMPHOCYTES # BLD AUTO: 1.42 K/UL (ref 1–4.8)
LYMPHOCYTES NFR BLD: 24.4 % (ref 22–41)
MCH RBC QN AUTO: 33.9 PG (ref 27–33)
MCHC RBC AUTO-ENTMCNC: 33.6 G/DL (ref 33.7–35.3)
MCV RBC AUTO: 101.1 FL (ref 81.4–97.8)
MONOCYTES # BLD AUTO: 1.11 K/UL (ref 0–0.85)
MONOCYTES NFR BLD AUTO: 19 % (ref 0–13.4)
NEUTROPHILS # BLD AUTO: 3.05 K/UL (ref 1.82–7.42)
NEUTROPHILS NFR BLD: 52.3 % (ref 44–72)
NRBC # BLD AUTO: 0 K/UL
NRBC BLD AUTO-RTO: 0 /100 WBC
PLATELET # BLD AUTO: 560 K/UL (ref 164–446)
PMV BLD AUTO: 10.4 FL (ref 9–12.9)
POTASSIUM SERPL-SCNC: 3.7 MMOL/L (ref 3.6–5.5)
PROT SERPL-MCNC: 6.7 G/DL (ref 6–8.2)
RBC # BLD AUTO: 2.77 M/UL (ref 4.7–6.1)
SODIUM SERPL-SCNC: 141 MMOL/L (ref 135–145)
WBC # BLD AUTO: 5.8 K/UL (ref 4.8–10.8)

## 2017-12-04 PROCEDURE — 85025 COMPLETE CBC W/AUTO DIFF WBC: CPT

## 2017-12-04 PROCEDURE — A9270 NON-COVERED ITEM OR SERVICE: HCPCS | Performed by: INTERNAL MEDICINE

## 2017-12-04 PROCEDURE — 36415 COLL VENOUS BLD VENIPUNCTURE: CPT

## 2017-12-04 PROCEDURE — 700102 HCHG RX REV CODE 250 W/ 637 OVERRIDE(OP): Performed by: STUDENT IN AN ORGANIZED HEALTH CARE EDUCATION/TRAINING PROGRAM

## 2017-12-04 PROCEDURE — 80053 COMPREHEN METABOLIC PANEL: CPT

## 2017-12-04 PROCEDURE — 700102 HCHG RX REV CODE 250 W/ 637 OVERRIDE(OP): Performed by: INTERNAL MEDICINE

## 2017-12-04 PROCEDURE — 95951 EEG: CPT | Mod: 52

## 2017-12-04 PROCEDURE — 302136 NUTRITION PUMP: Performed by: STUDENT IN AN ORGANIZED HEALTH CARE EDUCATION/TRAINING PROGRAM

## 2017-12-04 PROCEDURE — 99232 SBSQ HOSP IP/OBS MODERATE 35: CPT | Mod: GC | Performed by: INTERNAL MEDICINE

## 2017-12-04 PROCEDURE — 700111 HCHG RX REV CODE 636 W/ 250 OVERRIDE (IP): Performed by: INTERNAL MEDICINE

## 2017-12-04 PROCEDURE — 306559 VEST RESTRAINT (LARGE): Performed by: INTERNAL MEDICINE

## 2017-12-04 PROCEDURE — 770006 HCHG ROOM/CARE - MED/SURG/GYN SEMI*

## 2017-12-04 PROCEDURE — A9270 NON-COVERED ITEM OR SERVICE: HCPCS | Performed by: STUDENT IN AN ORGANIZED HEALTH CARE EDUCATION/TRAINING PROGRAM

## 2017-12-04 PROCEDURE — A9270 NON-COVERED ITEM OR SERVICE: HCPCS

## 2017-12-04 PROCEDURE — 3E0G76Z INTRODUCTION OF NUTRITIONAL SUBSTANCE INTO UPPER GI, VIA NATURAL OR ARTIFICIAL OPENING: ICD-10-PCS | Performed by: INTERNAL MEDICINE

## 2017-12-04 PROCEDURE — 92526 ORAL FUNCTION THERAPY: CPT

## 2017-12-04 PROCEDURE — 700105 HCHG RX REV CODE 258: Performed by: STUDENT IN AN ORGANIZED HEALTH CARE EDUCATION/TRAINING PROGRAM

## 2017-12-04 PROCEDURE — 4A00X4Z MEASUREMENT OF CENTRAL NERVOUS ELECTRICAL ACTIVITY, EXTERNAL APPROACH: ICD-10-PCS | Performed by: PSYCHIATRY & NEUROLOGY

## 2017-12-04 PROCEDURE — 700102 HCHG RX REV CODE 250 W/ 637 OVERRIDE(OP)

## 2017-12-04 RX ADMIN — HEPARIN SODIUM 5000 UNITS: 5000 INJECTION, SOLUTION INTRAVENOUS; SUBCUTANEOUS at 15:02

## 2017-12-04 RX ADMIN — HEPARIN SODIUM 5000 UNITS: 5000 INJECTION, SOLUTION INTRAVENOUS; SUBCUTANEOUS at 22:47

## 2017-12-04 RX ADMIN — SODIUM CHLORIDE: 9 INJECTION, SOLUTION INTRAVENOUS at 02:00

## 2017-12-04 RX ADMIN — QUETIAPINE FUMARATE 50 MG: 25 TABLET ORAL at 22:47

## 2017-12-04 RX ADMIN — OMEPRAZOLE 20 MG: 20 CAPSULE, DELAYED RELEASE ORAL at 22:50

## 2017-12-04 RX ADMIN — HEPARIN SODIUM 5000 UNITS: 5000 INJECTION, SOLUTION INTRAVENOUS; SUBCUTANEOUS at 05:45

## 2017-12-04 RX ADMIN — STANDARDIZED SENNA CONCENTRATE AND DOCUSATE SODIUM 2 TABLET: 8.6; 5 TABLET, FILM COATED ORAL at 22:47

## 2017-12-04 RX ADMIN — SODIUM CHLORIDE: 9 INJECTION, SOLUTION INTRAVENOUS at 12:05

## 2017-12-04 ASSESSMENT — PAIN SCALES - GENERAL: PAINLEVEL_OUTOF10: 3

## 2017-12-04 ASSESSMENT — ENCOUNTER SYMPTOMS
CONSTIPATION: 0
HEADACHES: 0
WEAKNESS: 0
FOCAL WEAKNESS: 0
ROS GI COMMENTS: EPIGASTRIC PAIN
VOMITING: 0
SPUTUM PRODUCTION: 0
ABDOMINAL PAIN: 0
SPEECH CHANGE: 0
SENSORY CHANGE: 0
CHILLS: 0
DIZZINESS: 0
HEMOPTYSIS: 0
ABDOMINAL PAIN: 1
HALLUCINATIONS: 0
BACK PAIN: 0
SHORTNESS OF BREATH: 0
COUGH: 0
SORE THROAT: 0
MYALGIAS: 0
PALPITATIONS: 0
HEADACHES: 1
WHEEZING: 0
NECK PAIN: 0
FEVER: 0
NAUSEA: 0
ORTHOPNEA: 0
DIAPHORESIS: 0
DIARRHEA: 0
CLAUDICATION: 0

## 2017-12-04 NOTE — PROGRESS NOTES
Patient restless, attempting to get up unsafely.  Soft wrist restraints placed.  MD notified.  New orders for vest and soft wrist restraints received.

## 2017-12-04 NOTE — PROGRESS NOTES
Late entry 0821 - Patient is AAOx2, disoriented to event and time.  No complaints of pain.  NPO at this time, oral medications held, MD aware.  Pending speech evaluation.  On room air.  Bed alarm on, bed locked in lowest position, treaded socks in place, call light within reach, room near nursing station, all needs met at this time.

## 2017-12-04 NOTE — CARE PLAN
Problem: Safety  Goal: Will remain free from injury  Outcome: PROGRESSING AS EXPECTED  Safety precautions in place. Non skid socks on. Bed alarm in place and functioning. Patient on restraints, q 2 hour checks in place. Room near nursing station. Call light within reach. Bi hourly rounding in place.

## 2017-12-04 NOTE — PROGRESS NOTES
Report received from day shift nurse. Assumed care at 1900.     Patient is alert and oriented x 2, disoriented to time and event. Assessment completed. On room air, tolerating well. Denies any pain and discomfort at this time. Patient vest, bilateral wrist restraints as endorsed. CMS intact. Patient educated regarding plan of care. Bed alarm in place and functioning. Bed locked, in lowest position, treaded socks on. Call light and personal belongings within reach.

## 2017-12-04 NOTE — DISCHARGE PLANNING
Clinical Note per Chart Review: Transferred to [facility] from [facility] in [place].   Diagnosis: GI Bleed, ETOH Withdrawal, ALOC  H/H: 9.4, 28%  Bun: 6  CT Head: WNL  Restraints   Neuro Consult   EEG ordered.   Speech eval: Recommendation of SNF for continue skilled needs.     Discharge Plan: SW to continue to assess for discharge planning needs.       Addendum: Contacted ABRAHAM Adair, r/t restraint status. Pt currently unrestrained, discussed continued POC and PT/OT evaluation.  Paged [facility] Resident [phone] to discuss obtaining therapy orders as appropriate.  Awaiting return call.

## 2017-12-04 NOTE — CONSULTS
DATE OF SERVICE:  12/03/2017    REFERRING PHYSICIAN:  Blue Madrid MD    HISTORY OF PRESENT ILLNESS:  The patient is a 67-year-old man who was admitted   to our facility on November 24 as he was transferred from an outside facility   for possible GI bleed and alcohol withdrawal.  He was felt to have clear   symptoms of alcohol withdrawal upon presentation.  He was treated with Librium   and Ativan as needed.  His last drink was on November 22nd.  He was treated   with thiamine, folate, and B12.  I was asked to see the patient because he   remains agitated, confused, despite being over a week out from presentation.    He is currently getting Seroquel 25 mg in the morning and 50 mg at night and   he is off Librium.  He has Ativan as needed written for.  He has not gotten it   in the last 24 hours.    PAST MEDICAL HISTORY:  Notable for possible seizures in the past as well as   possible psychiatric disorder.  No collateral  historians are available at   this time.    ALLERGIES:  He has no known drug allergies.    FAMILY HISTORY:  Unreliable.    REVIEW OF SYSTEMS:  Unreliable.    PHYSICAL EXAMINATION:  VITAL SIGNS:  Temperature is 36.9 with a heart rate of 94, blood pressure   109/69.  GENERAL:  The patient is lying in bed, asleep, in soft restraints.  He is   easily arousable and cooperates with the exam, but falls back to sleep easily.  MENTAL STATUS:  He is alert when aroused, but falls asleep easily.  He is   oriented to person, place, and month.  He seems to have general lack of   insight into his hospital stay and medical conditions.  CRANIAL NERVES:  His pupils are equal, round, reactive to light.  His   extraocular movements are intact with no nystagmus.  Face is symmetric.  Motor exam:  5/5 throughout with no abnormal tone and drift.  Reflexes:  Trace throughout with downgoing toes bilaterally.  Coordination:  Normal finger-to-nose bilaterally.  GAIT:  Deferred.    LABORATORY DATA:  He has a white blood  cell count of 4.6, hemoglobin 8.3,   platelets 442,000.  Sodium 143, potassium 3.5, BUN of 3, creatinine 0.79.  He   has a CT of his head today that showed no acute findings.    IMPRESSION:  The patient is a 67-year-old male with prolonged encephalopathy   and delirium after GI bleed and alcohol withdrawal.  Likely this is just a   prolonged multifactorial delirium at this point.  He does have a reported   history of seizures, but does not appear to be actively seizing during my time   with him.  No definite clinical seizures have been reported.  Certainly   nonconvulsive seizures can be tricky to discern clinically, so it is probably   worthwhile to get an EEG to rule this out.    RECOMMENDATIONS:  1.  We will get an EEG.  2.  Continue vitamin supplementation.  Wernicke's encephalopathy   could be concern, but there are no signs of physical symptoms to suggest this   at this time.  3.  Continue to try to promote normal sleep-wake cycle.  4.  I will follow up after the EEG is completed.       ____________________________________     MD ULI Zimmer / MARGOT    DD:  12/03/2017 18:59:12  DT:  12/03/2017 19:43:43    D#:  [id number]  Job#:  [id number]
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
## 2017-12-04 NOTE — NON-PROVIDER
Internal Medicine Medical Student Note  Note Author: Rual MICHELESantos Hooks, Student    Name Chava Mercedes     1950   Age/Sex 67 y.o. male   MRN [id number]   Code Status Full code     After 5PM or if no immediate response to page, please call for cross-coverage  Attending/Team: Dr. Madrid/ Lydia  See Patient List for primary contact information  Call [phone] to page after hours   1st Call - Day Intern (R1):   Dr. Martínez 2nd Call - Day Sr. Resident (R2/R3):   Dr. Etienne     Reason for interval visit  (Principal Problem)   GI bleed    Interval Problem Daily Status Update  (24 hours)   Patient was seen by neurology yesterday and believes that the etiology of the delirium is multifactorial and recommends an EEG today. Patient's CT head was negative yesterday. He was also seen by speech pathology who said that he failed his swallowing evaluation. They stated that he will be reevaluated this morning. Speaking with the patient this morning, he continues to be confused and disoriented (to place and situation). Patient denies chest pain, palpitations, and shortness of breath.     Review of Systems   Constitutional: Negative for diaphoresis and fever.   HENT: Negative for congestion and sore throat.    Respiratory: Negative for cough, sputum production, shortness of breath and wheezing.    Cardiovascular: Negative for chest pain, palpitations, orthopnea, claudication and leg swelling.   Gastrointestinal: Positive for abdominal pain (epigastric region). Negative for constipation, diarrhea, nausea and vomiting.   Musculoskeletal: Negative for back pain, joint pain, myalgias and neck pain.   Neurological: Negative for dizziness, sensory change, speech change, weakness and headaches.     Physical Exam       Vitals:    17 1525 17 1918 17 0410 17 0730   BP: 109/69 103/69 111/66 121/69   Pulse: 94 90 83 94   Resp: 16 16 16 16   Temp: 36.9 °C (98.4 °F) 36.6 °C (97.8 °F) 36.7 °C (98 °F) 37.2 °C (99  °F)   SpO2: 97% 94% 95% 94%   Weight:       Height:         Body mass index is 24.43 kg/m².    Oxygen Therapy:  Pulse Oximetry: 94 %, O2 (LPM): 0, O2 Delivery: None (Room Air)    Physical Exam   Constitutional: He is well-developed, well-nourished, and in no distress.   HENT:   Head: Normocephalic and atraumatic.   Eyes: Conjunctivae and EOM are normal.   Neck: Normal range of motion. Neck supple.   Cardiovascular: Normal rate, regular rhythm, normal heart sounds and intact distal pulses.  Exam reveals no gallop and no friction rub.    No murmur heard.  Pulmonary/Chest: Effort normal and breath sounds normal. No respiratory distress. He has no wheezes. He has no rales. He exhibits no tenderness.   Abdominal: Soft. Bowel sounds are normal. He exhibits no distension. There is tenderness (epigastric region). There is no rebound and no guarding.   Musculoskeletal: He exhibits no edema.   Neurological: He displays normal reflexes. He exhibits normal muscle tone.   A&O x 2   Skin: Skin is warm and dry.     Most Recent Labs:    Lab Results   Component Value Date/Time    WBC 5.8 12/04/2017 04:53 AM    RBC 2.77 (L) 12/04/2017 04:53 AM    HEMOGLOBIN 9.4 (L) 12/04/2017 04:53 AM    HEMATOCRIT 28.0 (L) 12/04/2017 04:53 AM    .1 (H) 12/04/2017 04:53 AM    MCH 33.9 (H) 12/04/2017 04:53 AM    MCHC 33.6 (L) 12/04/2017 04:53 AM    MPV 10.4 12/04/2017 04:53 AM    NEUTSPOLYS 52.30 12/04/2017 04:53 AM    LYMPHOCYTES 24.40 12/04/2017 04:53 AM    MONOCYTES 19.00 (H) 12/04/2017 04:53 AM    EOSINOPHILS 2.40 12/04/2017 04:53 AM    BASOPHILS 1.40 12/04/2017 04:53 AM    ANISOCYTOSIS 1+ 12/24/2016 12:33 AM      Lab Results   Component Value Date/Time    SODIUM 141 12/04/2017 04:53 AM    POTASSIUM 3.7 12/04/2017 04:53 AM    CHLORIDE 107 12/04/2017 04:53 AM    CO2 20 12/04/2017 04:53 AM    GLUCOSE 74 12/04/2017 04:53 AM    BUN 6 (L) 12/04/2017 04:53 AM    CREATININE 0.83 12/04/2017 04:53 AM      Lab Results   Component Value Date/Time     ALTSGPT 24 12/04/2017 04:53 AM    ASTSGOT 23 12/04/2017 04:53 AM    ALKPHOSPHAT 49 12/04/2017 04:53 AM    TBILIRUBIN 0.4 12/04/2017 04:53 AM    LIPASE 97 (H) 11/28/2017 03:25 AM    ALBUMIN 3.5 12/04/2017 04:53 AM    GLOBULIN 3.2 12/04/2017 04:53 AM    PREALBUMIN 8.0 (L) 12/26/2016 03:52 AM    INR 1.21 (H) 11/24/2017 04:33 AM    MACROCYTOSIS 1+ 12/24/2016 12:33 AM     Lab Results   Component Value Date/Time    PROTHROMBTM 15.0 (H) 11/24/2017 04:33 AM    INR 1.21 (H) 11/24/2017 04:33 AM        Assessment/Plan     #GI bleed  -Hemoglobin stable, 8.3 today  -No signs of active bleeding  -Plan for GI consult now that he is more stable  -H&H daily   -Transfuse if hemoglobin <7  -Monitor for signs of active bleeding (tachycardia, hypotension)  -EGD - negative     #Altered mental status  -Per neurology, multifactorial delerium  -Continue Folate, thiamine, B12  -Speech therapy - failed swallowing test yesterday; reevaluation this morning  -Continue seroquel 25mg am and 50 mg pm  -EEG - pending     #Sinus Tachycardia  -K - 3.5  -Heart rate has been stable in 80-90s  -Continue to monitor Mg and K     #Pancreatitis  -Lipase 91 at admission; Lipase 97 now  -Abdominal pain in epigastric region  -Continue to monitor

## 2017-12-05 ENCOUNTER — APPOINTMENT (OUTPATIENT)
Dept: RADIOLOGY | Facility: MEDICAL CENTER | Age: 67
DRG: 896 | End: 2017-12-05
Attending: STUDENT IN AN ORGANIZED HEALTH CARE EDUCATION/TRAINING PROGRAM
Payer: MEDICARE

## 2017-12-05 LAB
AMMONIA PLAS-SCNC: 28 UMOL/L (ref 11–45)
ANION GAP SERPL CALC-SCNC: 12 MMOL/L (ref 0–11.9)
BUN SERPL-MCNC: 4 MG/DL (ref 8–22)
CALCIUM SERPL-MCNC: 8.8 MG/DL (ref 8.5–10.5)
CHLORIDE SERPL-SCNC: 109 MMOL/L (ref 96–112)
CO2 SERPL-SCNC: 19 MMOL/L (ref 20–33)
CREAT SERPL-MCNC: 0.63 MG/DL (ref 0.5–1.4)
GFR SERPL CREATININE-BSD FRML MDRD: >60 ML/MIN/1.73 M 2
GLUCOSE SERPL-MCNC: 93 MG/DL (ref 65–99)
LACTATE BLD-SCNC: 1 MMOL/L (ref 0.5–2)
POTASSIUM SERPL-SCNC: 3.6 MMOL/L (ref 3.6–5.5)
SODIUM SERPL-SCNC: 140 MMOL/L (ref 135–145)

## 2017-12-05 PROCEDURE — 700102 HCHG RX REV CODE 250 W/ 637 OVERRIDE(OP)

## 2017-12-05 PROCEDURE — A9270 NON-COVERED ITEM OR SERVICE: HCPCS | Performed by: STUDENT IN AN ORGANIZED HEALTH CARE EDUCATION/TRAINING PROGRAM

## 2017-12-05 PROCEDURE — 700111 HCHG RX REV CODE 636 W/ 250 OVERRIDE (IP): Performed by: INTERNAL MEDICINE

## 2017-12-05 PROCEDURE — A9270 NON-COVERED ITEM OR SERVICE: HCPCS

## 2017-12-05 PROCEDURE — 770006 HCHG ROOM/CARE - MED/SURG/GYN SEMI*

## 2017-12-05 PROCEDURE — 82140 ASSAY OF AMMONIA: CPT

## 2017-12-05 PROCEDURE — 83605 ASSAY OF LACTIC ACID: CPT

## 2017-12-05 PROCEDURE — 700102 HCHG RX REV CODE 250 W/ 637 OVERRIDE(OP): Performed by: INTERNAL MEDICINE

## 2017-12-05 PROCEDURE — 36415 COLL VENOUS BLD VENIPUNCTURE: CPT

## 2017-12-05 PROCEDURE — 80048 BASIC METABOLIC PNL TOTAL CA: CPT

## 2017-12-05 PROCEDURE — 87086 URINE CULTURE/COLONY COUNT: CPT

## 2017-12-05 PROCEDURE — A9270 NON-COVERED ITEM OR SERVICE: HCPCS | Performed by: INTERNAL MEDICINE

## 2017-12-05 PROCEDURE — 700102 HCHG RX REV CODE 250 W/ 637 OVERRIDE(OP): Performed by: STUDENT IN AN ORGANIZED HEALTH CARE EDUCATION/TRAINING PROGRAM

## 2017-12-05 PROCEDURE — 71010 DX-CHEST-PORTABLE (1 VIEW): CPT

## 2017-12-05 PROCEDURE — 87040 BLOOD CULTURE FOR BACTERIA: CPT | Mod: 91

## 2017-12-05 PROCEDURE — 87077 CULTURE AEROBIC IDENTIFY: CPT

## 2017-12-05 PROCEDURE — 99233 SBSQ HOSP IP/OBS HIGH 50: CPT | Mod: GC | Performed by: INTERNAL MEDICINE

## 2017-12-05 RX ORDER — LORAZEPAM 2 MG/ML
1 INJECTION INTRAMUSCULAR
Status: DISPENSED | OUTPATIENT
Start: 2017-12-05 | End: 2017-12-06

## 2017-12-05 RX ORDER — ACETAMINOPHEN 500 MG
500 TABLET ORAL EVERY 6 HOURS PRN
Status: DISCONTINUED | OUTPATIENT
Start: 2017-12-05 | End: 2017-12-12

## 2017-12-05 RX ADMIN — HEPARIN SODIUM 5000 UNITS: 5000 INJECTION, SOLUTION INTRAVENOUS; SUBCUTANEOUS at 14:06

## 2017-12-05 RX ADMIN — SUCRALFATE 1 G: 1 SUSPENSION ORAL at 05:50

## 2017-12-05 RX ADMIN — STANDARDIZED SENNA CONCENTRATE AND DOCUSATE SODIUM 2 TABLET: 8.6; 5 TABLET, FILM COATED ORAL at 09:18

## 2017-12-05 RX ADMIN — SUCRALFATE 1 G: 1 SUSPENSION ORAL at 01:04

## 2017-12-05 RX ADMIN — HEPARIN SODIUM 5000 UNITS: 5000 INJECTION, SOLUTION INTRAVENOUS; SUBCUTANEOUS at 21:17

## 2017-12-05 RX ADMIN — OMEPRAZOLE 40 MG: 20 CAPSULE, DELAYED RELEASE ORAL at 11:40

## 2017-12-05 RX ADMIN — FOLIC ACID 1 MG: 1 TABLET ORAL at 09:18

## 2017-12-05 RX ADMIN — Medication 100 MG: at 09:18

## 2017-12-05 RX ADMIN — CYANOCOBALAMIN TAB 500 MCG 2000 MCG: 500 TAB at 09:17

## 2017-12-05 RX ADMIN — HEPARIN SODIUM 5000 UNITS: 5000 INJECTION, SOLUTION INTRAVENOUS; SUBCUTANEOUS at 05:50

## 2017-12-05 RX ADMIN — POLYETHYLENE GLYCOL 3350 1 PACKET: 17 POWDER, FOR SOLUTION ORAL at 14:11

## 2017-12-05 RX ADMIN — QUETIAPINE FUMARATE 50 MG: 25 TABLET ORAL at 21:18

## 2017-12-05 RX ADMIN — THERA TABS 1 TABLET: TAB at 09:18

## 2017-12-05 RX ADMIN — OMEPRAZOLE 40 MG: 20 CAPSULE, DELAYED RELEASE ORAL at 21:17

## 2017-12-05 RX ADMIN — ACETAMINOPHEN 500 MG: 500 TABLET ORAL at 09:18

## 2017-12-05 RX ADMIN — SUCRALFATE 1 G: 1 SUSPENSION ORAL at 17:45

## 2017-12-05 RX ADMIN — Medication 325 MG: at 09:18

## 2017-12-05 RX ADMIN — QUETIAPINE FUMARATE 25 MG: 25 TABLET ORAL at 09:18

## 2017-12-05 RX ADMIN — SUCRALFATE 1 G: 1 SUSPENSION ORAL at 11:40

## 2017-12-05 ASSESSMENT — ENCOUNTER SYMPTOMS
HEADACHES: 1
HALLUCINATIONS: 0
COUGH: 0
FEVER: 0
WHEEZING: 0
ROS GI COMMENTS: EPIGASTRIC PAIN
BLURRED VISION: 0
DIZZINESS: 0
CONSTIPATION: 0
SPUTUM PRODUCTION: 0
DIAPHORESIS: 0
PALPITATIONS: 0
VOMITING: 0
MYALGIAS: 0
DIARRHEA: 0
ORTHOPNEA: 0
SENSORY CHANGE: 0
FOCAL WEAKNESS: 0
SORE THROAT: 0
ABDOMINAL PAIN: 0
SPEECH CHANGE: 0
CHILLS: 0
NAUSEA: 0
BLOOD IN STOOL: 0
SHORTNESS OF BREATH: 0
WEAKNESS: 0
EYE PAIN: 0
PND: 0
HALLUCINATIONS: 1
HEMOPTYSIS: 0
EYE REDNESS: 0
HEADACHES: 0
PHOTOPHOBIA: 0
EYE DISCHARGE: 0

## 2017-12-05 NOTE — PROCEDURES
DATE OF SERVICE:  12/04/2017    This is an inpatient EEG, was done 12/04/2017.    EEG 12/04/17 6:22 PM        ROUTINE ELECTROENCEPHALOGRAM REPORT        NAME: Daren Chava BARTLETT     REFERRING Dr:     INDICATION: Possible Seizures         TECHNIQUE: 30 channel routine electroencephalogram (EEG) was performed in accordance with the international 10-20 system. The study was reviewed in bipolar and referential montages. The recording examined the patient during wakeful and drowsy state(s).      DESCRIPTION OF THE RECORD:        Background rhythm during awake stage shows well-organized, well-developed, average voltage 10 to 11 hertz alpha activity in the posterior regions.  It blocks with eye opening and it is bilaterally synchronous and symmetrical.  No spike-and-wave discharges or any delta lateralizing abnormalities are seen. There are short nonspecific theta/delta diffusely. Stage I sleep was achieved.Excessive diffuse beta could be secondary to the use of sedation.        ACTIVATION PROCEDURES:          ICTAL AND/OR INTERICTAL FINDINGS:    No focal or generalized epileptiform activity noted. No regional slowing was seen during this routine study.  No clinical events or seizures were reported or recorded during the study.       EKG: sampling of the EKG recording demonstrated sinus rhythm.          INTERPRETATION:        ________________________________________________________________________     This scalp EEG remains not remarkable ( despite few nonspecific non localizing theta/delta bursts)     Of note, unremarkable EEG does not completely exclude the diagnosis  of seizures since seizure is an episodic phenomena.  Clinical correlation may help     If clinical suspicion of seizure remains high.  Prolonged outpatient EEG   monitoring may be of help.     EEG 12/04/17  ________________________________________________________________________                           ____________________________________     NEREIDA HERRON,  MD HARRELL / MARGOT    DD:  12/04/2017 18:30:19  DT:  12/04/2017 20:11:43    D#:  [id number]  Job#:  [id number]

## 2017-12-05 NOTE — PROGRESS NOTES
Cortrak Placement    This verified patient name and medical record number prior to tube placement.  Cortrak tube (43 inches, 10 Kazakh) placed at 60 cm in left nare.  Per Cortrak picture, tube appears to be in the stomach. Bridle also placed per active order    Nursing Instructions: Awaiting KUB to confirm placement before use for medications or feeding. Once placement confirmed, flush tube with 30 ml of water, and then remove and save stylet, in patient medication drawer.

## 2017-12-05 NOTE — THERAPY
"Speech Language Therapy dysphagia treatment completed.   Functional Status: Patient currently strict NPO/no nutrition pending reassessment. Patient awake and alert, but confused. Patient noted to have intermittently gurgly vocal quality prior to PO trials. Suction was set up by this SLP and patient was orally suctioned with improvement in vocal quality. Patient consumed PO trials of single ice chips, NTL via tsp, and purees. Patient presented with consistent significant coughing on PO trials of ALL consistencies despite use of strategies and precautions. Patient required frequent oral suctioning via [place] as well to clear vocal quality. Laryngeal elevation palpated as weak. Patient also mentioned \"I can't eat steak. I always choke.\" At this time, patient appears at risk for aspiration and does not appear safe for PO intake. Recommend patient continue NPO with placement of Cortrak for supplemental nutrition. RN aware and to speak with MD. SLP is following.     Recommendations: At this time, patient appears at risk for aspiration and does not appear safe for PO intake. Recommend patient continue NPO with placement of Cortrak for supplemental nutrition.  Plan of Care: Will benefit from Speech Therapy 3 times per week  Post-Acute Therapy: Discharge to a transitional care facility for continued skilled therapy services.    See \"Rehab Therapy-Acute\" Patient Summary Report for complete documentation.     "

## 2017-12-05 NOTE — EEG PROGRESS NOTE
EEG 12/04/17 6:22 PM      ROUTINE ELECTROENCEPHALOGRAM REPORT      NAME: Chava Mercedes    REFERRING Dr:    INDICATION: Possible Seizures       TECHNIQUE: 30 channel routine electroencephalogram (EEG) was performed in accordance with the international 10-20 system. The study was reviewed in bipolar and referential montages. The recording examined the patient during wakeful and drowsy state(s).     DESCRIPTION OF THE RECORD:      Background rhythm during awake stage shows well-organized, well-developed, average voltage 10 to 11 hertz alpha activity in the posterior regions.  It blocks with eye opening and it is bilaterally synchronous and symmetrical.  No spike-and-wave discharges or any delta lateralizing abnormalities are seen. There are short nonspecific theta/delta diffusely. Stage I sleep was achieved.Excessive diffuse beta could be secondary to the use of sedation.      ACTIVATION PROCEDURES:        ICTAL AND/OR INTERICTAL FINDINGS:    No focal or generalized epileptiform activity noted. No regional slowing was seen during this routine study.  No clinical events or seizures were reported or recorded during the study.      EKG: sampling of the EKG recording demonstrated sinus rhythm.        INTERPRETATION:      ________________________________________________________________________    This scalp EEG remains not remarkable ( despite few nonspecific non localizing theta/delta bursts)    Of note, unremarkable EEG does not completely exclude the diagnosis  of seizures since seizure is an episodic phenomena.  Clinical correlation may help     If clinical suspicion of seizure remains high.  Prolonged outpatient EEG   monitoring may be of help.    EEG 12/04/17  ________________________________________________________________________

## 2017-12-05 NOTE — PROGRESS NOTES
MD requesting pt on continuous pulse ox. Pt placed on , pt de stating while asleep, Oxy mask put on to increase oxygen intake while asleep.

## 2017-12-05 NOTE — NON-PROVIDER
Internal Medicine Medical Student Note  Note Author: Raul MSantos Gasloane, Student    Name Chava Mercedes     1950   Age/Sex 67 y.o. male   MRN [id number]   Code Status Full code     After 5PM or if no immediate response to page, please call for cross-coverage  Attending/Team: Dr. Madrid/ Lydia See Patient List for primary contact information  Call [phone] to page after hours   1st Call - Day Intern (R1):   Dr. Solomon 2nd Call - Day Sr. Resident (R2/R3):   Dr. Etienne     Reason for interval visit  (Principal Problem)   GI bleed    Interval Problem Daily Status Update  (24 hours)   Patient now has an NG tube, after he was seen by speech therapy yesterday who recommended that it be placed due to aspiration risk. He was also noted to be febrile this morning with a temperature of 38.3C. Speaking with the patient, he was oriented only to self and continues to be confused. Patient does not report chest pain, palpitations, or shortness of breath.    Review of Systems   Constitutional: Negative for diaphoresis and fever.   HENT: Negative for congestion and sore throat.    Eyes: Negative for blurred vision, photophobia, pain, discharge and redness.   Respiratory: Negative for cough, sputum production, shortness of breath and wheezing.    Cardiovascular: Negative for chest pain, palpitations, orthopnea, leg swelling and PND.   Gastrointestinal: Negative for abdominal pain, blood in stool, constipation, diarrhea, melena, nausea and vomiting.   Neurological: Negative for dizziness, sensory change, speech change, focal weakness, weakness and headaches.   Psychiatric/Behavioral: Positive for hallucinations.     Physical Exam       Vitals:    17 1919 17 0410 17 0730 17 1129   BP: 124/69 119/71 119/64    Pulse: 84 79 (!) 108    Resp: 16 16 16    Temp: 36.6 °C (97.9 °F) 36.4 °C (97.6 °F) (!) 38.3 °C (100.9 °F) 37.3 °C (99.2 °F)   SpO2: 93% 95% 94%    Weight:       Height:         Body mass  index is 24.42 kg/m².    Oxygen Therapy:  Pulse Oximetry: 94 %, O2 (LPM): 0, O2 Delivery: None (Room Air)    Physical Exam   Constitutional: He is well-developed, well-nourished, and in no distress.   HENT:   Head: Normocephalic and atraumatic.   Eyes: Conjunctivae and EOM are normal.   Neck: Normal range of motion. Neck supple.   Cardiovascular: Normal rate, regular rhythm, normal heart sounds and intact distal pulses.  Exam reveals no gallop and no friction rub.    No murmur heard.  Pulmonary/Chest: Effort normal and breath sounds normal.   Abdominal: Soft. Bowel sounds are normal. He exhibits no distension. There is no tenderness. There is no rebound and no guarding.   Musculoskeletal: He exhibits no edema.   Neurological:   A&Ox1   Skin: Skin is warm and dry.     Most Recent Labs:    Lab Results   Component Value Date/Time    WBC 5.8 12/04/2017 04:53 AM    RBC 2.77 (L) 12/04/2017 04:53 AM    HEMOGLOBIN 9.4 (L) 12/04/2017 04:53 AM    HEMATOCRIT 28.0 (L) 12/04/2017 04:53 AM    .1 (H) 12/04/2017 04:53 AM    MCH 33.9 (H) 12/04/2017 04:53 AM    MCHC 33.6 (L) 12/04/2017 04:53 AM    MPV 10.4 12/04/2017 04:53 AM    NEUTSPOLYS 52.30 12/04/2017 04:53 AM    LYMPHOCYTES 24.40 12/04/2017 04:53 AM    MONOCYTES 19.00 (H) 12/04/2017 04:53 AM    EOSINOPHILS 2.40 12/04/2017 04:53 AM    BASOPHILS 1.40 12/04/2017 04:53 AM    ANISOCYTOSIS 1+ 12/24/2016 12:33 AM      Lab Results   Component Value Date/Time    SODIUM 140 12/05/2017 03:33 AM    POTASSIUM 3.6 12/05/2017 03:33 AM    CHLORIDE 109 12/05/2017 03:33 AM    CO2 19 (L) 12/05/2017 03:33 AM    GLUCOSE 93 12/05/2017 03:33 AM    BUN 4 (L) 12/05/2017 03:33 AM    CREATININE 0.63 12/05/2017 03:33 AM      Lab Results   Component Value Date/Time    ALTSGPT 24 12/04/2017 04:53 AM    ASTSGOT 23 12/04/2017 04:53 AM    ALKPHOSPHAT 49 12/04/2017 04:53 AM    TBILIRUBIN 0.4 12/04/2017 04:53 AM    LIPASE 97 (H) 11/28/2017 03:25 AM    ALBUMIN 3.5 12/04/2017 04:53 AM    GLOBULIN 3.2  12/04/2017 04:53 AM    PREALBUMIN 8.0 (L) 12/26/2016 03:52 AM    INR 1.21 (H) 11/24/2017 04:33 AM    MACROCYTOSIS 1+ 12/24/2016 12:33 AM     Lab Results   Component Value Date/Time    PROTHROMBTM 15.0 (H) 11/24/2017 04:33 AM    INR 1.21 (H) 11/24/2017 04:33 AM      Assessment/Plan     #GI bleed  -Hemoglobin stable  -No signs of active bleeding  -Plan for GI consult now that he is more stable  -H&H daily   -Transfuse if hemoglobin <7  -Monitor for signs of active bleeding (tachycardia, hypotension)  -EGD - negative     #Altered mental status  -Per neurology, multifactorial delerium  -Continue Folate, thiamine, B12  -Speech therapy - failed swallowing test yesterday; NG tube now in place  -Continue seroquel 25mg am and 50 mg pm  -EEG - negative  -Neurology following  -MRI brain ordered     #Sinus Tachycardia  -Heart rate has been stable in 80-90s  -Continue to monitor Mg and K     #Pancreatitis  -Lipase 91 at admission; Lipase 97 now  -Abdominal pain in epigastric region  -Continue to monitor

## 2017-12-05 NOTE — CARE PLAN
Problem: Safety  Goal: Will remain free from injury  Verify wrist restrains, bed alarm on, bed locked and in low position, hourly rounding in place, patient room near nursing station.     Problem: Skin Integrity  Goal: Risk for impaired skin integrity will decrease    Intervention: Assess and monitor skin integrity, appearance and/or temperature  Assess skin, implement skin protectors as needed, change bed when incontinent.

## 2017-12-05 NOTE — DIETARY
"Nutrition Support Assessment - Male    Chava Mercedes is a 67 y.o. male with admitting DX of:  1. GI bleed  2. Alcohol withdrawal    Pertinent History: Peptic ulcer disease     Allergies:  Patient has no known allergies.  Height: 182.9 cm (6' 0.01\")  Weight: 81.7 kg (180 lb 1.9 oz) via bed scale  Weight to Use in Calculations: 80 kg (176 lb 5.9 oz)  Ideal Body Weight: 80.7 kg (178 lb)  Percent Ideal Body Weight: 101.2  Body mass index is 24.42 kg/m². - WNL    Pertinent Labs: BUN: 4  Last BM: 17  Pertinent Medications: Vit B12, ferrous sulfate, folvite, theragran, prilosec, seroquel, senokot, thiamine   Pertinent Fluids: NS infusion 100 mL/hr  Surgery / Procedures: Gastroscopy , EEG      Estimated Needs: MSJ x 1.2 = 1944 kcal  Total Calories / day: 1940 - 2140  (Calories / k - 27)  Total Grams Protein / day: 88 - 112  (Grams Protein / k.1 - 1.4)  Total Fluids ml / day: 2003.5 mL         Assessment / Evaluation:   -Day 11 of admit.  Pt has been without nutrition for 5 days  -Pt is confused and disoriented with delirium - pending EEG results  -Pt failed swallow evaluation on  - TF to start  -Cortrak placed and confirmed to be in the stomach    Plan / Recommendation:   -Initiate Fibersource HN @ 25 mL/hr and advance per protocol to goal rate of 70 mL/hr, providing 2016 kcal, 91 grams of protein, and 1361 mL of free water per day  -Fluids per MD  -PO diet when determined safe and appropriate    RD following       "

## 2017-12-05 NOTE — CARE PLAN
Problem: Safety  Goal: Will remain free from falls  Outcome: PROGRESSING AS EXPECTED  Bed locked in lowest position, treaded socks in place, call light within reach, bed alarm on, room near nursing station.  Pt instructed to call for assistance.    Problem: Psychosocial Needs:  Goal: Level of anxiety will decrease  Outcome: PROGRESSING AS EXPECTED  One on one discussion provided.

## 2017-12-05 NOTE — DISCHARGE PLANNING
Clinical Note per Chart Review: Pt continues to be confused, restraints in place, see flowsheet.   Febrile: 38.3  NGT feeding initiated. Coretrek Tube.     Discharge Plan: SW to continue to assess for discharge planning needs as appropriate.

## 2017-12-05 NOTE — PROGRESS NOTES
Assumed care of pt at 0730. A/Ox1 to self, discussed plan of care. Pt on room air, tolerating tube feed at 25, pt has fever gave tylenol will reassess. NG tube placed in left nare at 60, confirmed position. Pt in restraints at this time for pulling at lines. All needs met at this time. Bed in lowest position, treaded socks on, personal belongings and call light within reach, instructed to call for any assistance.

## 2017-12-05 NOTE — PROGRESS NOTES
.         Internal Medicine Interval Note  Note Author: Shazia Solomon M.D.     Name Chava Mercedes     1950   Age/Sex 67 y.o. male   MRN [id number]   Code Status FULL     After 5PM or if no immediate response to page, please call for cross-coverage  Attending/Team: Julius/Lydia See Patient List for primary contact information  Call [phone] to page    1st Call - Day Intern (R1):   Juanito 2nd Call - Day Sr. Resident (R2/R3):   [place]         Reason for interval visit  (Principal Problem)   Alcohol withdrawal    Interval Problem Daily Status Update  (24 hours)     - Remains altered; AOx 2 (self and month/year, but not day/place/event)  - EEG yesterday unremarkable  - Fever to 38.3 this AM; BCx x2, ammonia, lactate, and portable CXR ordered  - Delirium unlikely infectious per Neurology, recommending MRI Head with/without  - Continuous Pulse Ox placed; saturating 95% on 4L at sleep, but increased with waking  - Coretrak placed yesterday   - Nutrition: tolerating TF @ 25cc/hr with goal of 70cc/hr        Review of Systems   Constitutional: Negative for chills and fever.   Respiratory: Negative for cough, hemoptysis and shortness of breath.    Cardiovascular: Negative for chest pain and palpitations.   Gastrointestinal: Negative for abdominal pain, nausea and vomiting.        Epigastric pain   Musculoskeletal: Negative for joint pain and myalgias.   Neurological: Positive for headaches. Negative for dizziness, sensory change, speech change and focal weakness.   Psychiatric/Behavioral: Negative for hallucinations.       Consultants/Specialty  Gastroenterology    Disposition  Inpatient    Quality Measures    Reviewed items::  Labs reviewed and Medications reviewed  Walker Catheter: Walker in place for AMS.  DVT prophylaxis pharmacological::  Heparin  DVT prophylaxis - mechanical:  SCDs  Ulcer Prophylaxis::  Yes          Physical Exam       Vitals:    17 0410 17 0730 17 1129 17 1300    BP: 119/71 119/64     Pulse: 79 (!) 108     Resp: 16 16     Temp: 36.4 °C (97.6 °F) (!) 38.3 °C (100.9 °F) 37.3 °C (99.2 °F)    SpO2: 95% 94%  89%   Weight:       Height:         Body mass index is 24.42 kg/m².    Oxygen Therapy:  Pulse Oximetry: 89 %, O2 (LPM): 4, O2 Delivery: None (Room Air)    Physical Exam   Constitutional: He is well-developed, well-nourished, and in no distress. No distress.   Very drowsy but arousable   HENT:   Head: Normocephalic and atraumatic.   Eyes: Conjunctivae and EOM are normal. Right eye exhibits no discharge. Left eye exhibits no discharge. No scleral icterus.   Neck: Normal range of motion.   No obvious nuchal rigidity or meningeal signs, though cannot rule it out   Cardiovascular: Normal rate, regular rhythm, normal heart sounds and intact distal pulses.  Exam reveals no gallop and no friction rub.    No murmur heard.  Pulmonary/Chest: Effort normal. No respiratory distress. He has no wheezes.   Course upper airway sounds/gurgling of secretions   Abdominal: Soft. Bowel sounds are normal. He exhibits no distension. There is no tenderness. There is no guarding.   Musculoskeletal: Normal range of motion. He exhibits no edema or tenderness.   Neurological: He is alert. He has normal sensation and intact cranial nerves. He displays abnormal reflex. He exhibits normal muscle tone. GCS score is 15.   Not oriented to place or situation or day of week, poor attention, unable to elicit DTR's of lower extremities   Skin: Skin is warm and dry. He is not diaphoretic.         Lab Data Review:         11/25/2017  11:44 AM    Recent Labs      12/03/17 0318 12/04/17 0453  12/05/17   0333   SODIUM  143  141  140   POTASSIUM  3.5*  3.7  3.6   CHLORIDE  114*  107  109   CO2  24  20  19*   BUN  3*  6*  4*   CREATININE  0.79  0.83  0.63   CALCIUM  8.8  8.9  8.8       Recent Labs      12/03/17 0318 12/04/17 0453  12/05/17   0333   ALTSGPT   --   24   --    ASTSGOT   --   23   --     ALKPHOSPHAT   --   49   --    TBILIRUBIN   --   0.4   --    GLUCOSE  92  74  93       Recent Labs      12/03/17   0318  12/04/17   0453   RBC  2.58*  2.77*   HEMOGLOBIN  8.3*  9.4*   HEMATOCRIT  25.8*  28.0*   PLATELETCT  442  560*       Recent Labs      12/03/17 0318  12/04/17   0453   WBC  4.6*  5.8   NEUTSPOLYS   --   52.30   LYMPHOCYTES   --   24.40   MONOCYTES   --   19.00*   EOSINOPHILS   --   2.40   BASOPHILS   --   1.40   ASTSGOT   --   23   ALTSGPT   --   24   ALKPHOSPHAT   --   49   TBILIRUBIN   --   0.4           Assessment/Plan     * GI bleed- (present on admission)   Assessment & Plan    - Patient transferred from [facility] after having mixture of melena and bright red blood per rectum for 1 day  - Abdominal ultrasound shows fatty liver, mild hepatomegaly, cholelithiasis  - RBC scan limited study due to motion, no signs of active bleeding  - Hb stable, no signs of active bleeding  - EGD shows small nodular mucosa, superficial ulcers, pathology pending  - Continue PPI, sucralfate  - Continue IV maintenance fluids  - Transfuse if Hgb <7  - colonoscopy as outpatient            Altered level of consciousness   Assessment & Plan    - Remains disoriented beyond typical 7 day alcohol withdrawal course  - Alcohol withdrawal measures discontinued  - CT head negative for acute process  - Hx of withdrawal seizures noted per wife  - Started on seroquel for agitation with PRN Ativan  - EEG unremarkable  - Fever to 100.9 this AM: BCx, lactate, CXR ordered + ammonia   - MRI head w/wo ordered per Neurology  - Geriatrics c/s if Neuro evaluation inconclusive  - Continuous pulse ox; currently 4L oxymask, transfer to tele if desat        Alcohol withdrawal (CMS-HCC)- (present on admission)   Assessment & Plan    - Patient has history of alcohol withdrawal with symptoms, history of DTs  - Patient states last drink was 11/22/17, two days before admission  - there is improvement in his alcohol withdrawal symptoms, CIWA 4  to 9   - CIWA protocol and librium discontinued  - Continues to have agitation and disorientation beyond typical 7 day withdrawal course  - start seroquel for agitation  - Fall/Seizure/Aspiration precautions  - Multivitamin, thiamine, folate        Hypokalemia- (present on admission)   Assessment & Plan    - stable  - Replete with oral supplements  - Continue to monitor        History of pulmonary embolism- (present on admission)   Assessment & Plan    - History of PE status post IVC filter  - IVC filter placed due to contraindication for anticoagulation given duodenal ulcer bleeding        Hepatic steatosis   Assessment & Plan    - Patient has history of hepatic steatosis  - LFTs stable  - Confirmed on abdominal ultrasound

## 2017-12-05 NOTE — PROGRESS NOTES
Late entry 2230: Received report and assumed care at 1900. Restraints verified. Patient very restless in bed. No signs of pain. Incontinent to urine condom cath placed. Feeding started using protocol and patient tolerating thus far. Medications given crushed through tube. Call light is within reach, bed locked and in low position, hourly rounding in place, patient room near nursing station.

## 2017-12-05 NOTE — PROGRESS NOTES
Neurology Progress Note        Subjective:  Mr. Mercedes is a 67-year-old man with prolonged delirium. He is more alert and interactive today but remains quite confused. He is also delusional today stating that for women came and took his pants and raped him last night.    Objective:  Vitals:  Vitals:    12/03/17 1525 12/03/17 1918 12/04/17 0410 12/04/17 0730   BP: 109/69 103/69 111/66 121/69   Pulse: 94 90 83 94   Resp: 16 16 16 16   Temp: 36.9 °C (98.4 °F) 36.6 °C (97.8 °F) 36.7 °C (98 °F) 37.2 °C (99 °F)   SpO2: 97% 94% 95% 94%   Weight:       Height:         General:  Awake, alert and in no apparent distress, cooperative with exam  Mental Status:  Alert, Oriented to person and hospital but could not tell me which hospital. His speech is fluent, comprehension is intact. His insight is lacking.  Cranial Nerves:  PERRL, EOMI with no nystagmus, face is symmetric, facial sensation is intact.  No dysarthria.  Motor: 5/5 throughout  Coordination:  Normal finger to nose bilaterally  Gait:  Deferred    Recent Labs      12/02/17 0156 12/03/17 0318 12/04/17   0453   WBC  4.4*  4.6*  5.8   RBC  2.62*  2.58*  2.77*   HEMOGLOBIN  8.5*  8.3*  9.4*   HEMATOCRIT  26.3*  25.8*  28.0*   MCV  100.4*  100.0*  101.1*   MCH  32.4  32.2  33.9*   RDW  50.3*  50.9*  51.4*   PLATELETCT  400  442  560*   MPV  9.8  9.2  10.4   NEUTSPOLYS   --    --   52.30   LYMPHOCYTES   --    --   24.40   MONOCYTES   --    --   19.00*   EOSINOPHILS   --    --   2.40   BASOPHILS   --    --   1.40     Recent Labs      12/02/17 0156 12/03/17 0318 12/04/17   0453   SODIUM  143  143  141   POTASSIUM  3.6  3.5*  3.7   CHLORIDE  113*  114*  107   CO2  21  24  20   GLUCOSE  103*  92  74   BUN  4*  3*  6*         Impression: The patient is a 67-year-old male with prolonged encephalopathy and delirium after GI bleed and alcohol withdrawal. Likely this is just a prolonged multifactorial delirium at this point.  He does have a reported history of seizures,  the EEG has been completed but official report is still pending.      Recommendations:  1. Await final EEG results  2. Continue supportive care  3. Consider psychiatric consultation for optimization of medications.  4. His B12 deficient on admission but this is been supplemented I doubt this explains his whole picture.

## 2017-12-06 ENCOUNTER — APPOINTMENT (OUTPATIENT)
Dept: RADIOLOGY | Facility: MEDICAL CENTER | Age: 67
DRG: 896 | End: 2017-12-06
Attending: STUDENT IN AN ORGANIZED HEALTH CARE EDUCATION/TRAINING PROGRAM
Payer: MEDICARE

## 2017-12-06 PROBLEM — R78.81 POSITIVE BLOOD CULTURES: Status: ACTIVE | Noted: 2017-12-06

## 2017-12-06 PROBLEM — A41.9 SEPSIS (HCC): Status: ACTIVE | Noted: 2017-12-06

## 2017-12-06 PROBLEM — R65.10 SIRS (SYSTEMIC INFLAMMATORY RESPONSE SYNDROME) (HCC): Status: ACTIVE | Noted: 2017-12-06

## 2017-12-06 LAB
ALBUMIN SERPL BCP-MCNC: 2.9 G/DL (ref 3.2–4.9)
ALBUMIN/GLOB SERPL: 0.9 G/DL
ALP SERPL-CCNC: 51 U/L (ref 30–99)
ALT SERPL-CCNC: 15 U/L (ref 2–50)
ANION GAP SERPL CALC-SCNC: 7 MMOL/L (ref 0–11.9)
AST SERPL-CCNC: 16 U/L (ref 12–45)
BASOPHILS # BLD AUTO: 0.9 % (ref 0–1.8)
BASOPHILS # BLD: 0.09 K/UL (ref 0–0.12)
BILIRUB SERPL-MCNC: 0.3 MG/DL (ref 0.1–1.5)
BUN SERPL-MCNC: 5 MG/DL (ref 8–22)
CALCIUM SERPL-MCNC: 8.2 MG/DL (ref 8.5–10.5)
CHLORIDE SERPL-SCNC: 109 MMOL/L (ref 96–112)
CO2 SERPL-SCNC: 25 MMOL/L (ref 20–33)
CREAT SERPL-MCNC: 0.69 MG/DL (ref 0.5–1.4)
CRP SERPL HS-MCNC: 11.66 MG/DL (ref 0–0.75)
EKG IMPRESSION: NORMAL
EOSINOPHIL # BLD AUTO: 0.11 K/UL (ref 0–0.51)
EOSINOPHIL NFR BLD: 1.1 % (ref 0–6.9)
ERYTHROCYTE [DISTWIDTH] IN BLOOD BY AUTOMATED COUNT: 54 FL (ref 35.9–50)
ERYTHROCYTE [SEDIMENTATION RATE] IN BLOOD BY WESTERGREN METHOD: 86 MM/HOUR (ref 0–20)
GFR SERPL CREATININE-BSD FRML MDRD: >60 ML/MIN/1.73 M 2
GLOBULIN SER CALC-MCNC: 3.1 G/DL (ref 1.9–3.5)
GLUCOSE SERPL-MCNC: 110 MG/DL (ref 65–99)
HCT VFR BLD AUTO: 28.6 % (ref 42–52)
HGB BLD-MCNC: 9.3 G/DL (ref 14–18)
IMM GRANULOCYTES # BLD AUTO: 0.05 K/UL (ref 0–0.11)
IMM GRANULOCYTES NFR BLD AUTO: 0.5 % (ref 0–0.9)
LYMPHOCYTES # BLD AUTO: 1.48 K/UL (ref 1–4.8)
LYMPHOCYTES NFR BLD: 14.9 % (ref 22–41)
MCH RBC QN AUTO: 32.3 PG (ref 27–33)
MCHC RBC AUTO-ENTMCNC: 32.5 G/DL (ref 33.7–35.3)
MCV RBC AUTO: 99.3 FL (ref 81.4–97.8)
MONOCYTES # BLD AUTO: 1.95 K/UL (ref 0–0.85)
MONOCYTES NFR BLD AUTO: 19.7 % (ref 0–13.4)
NEUTROPHILS # BLD AUTO: 6.23 K/UL (ref 1.82–7.42)
NEUTROPHILS NFR BLD: 62.9 % (ref 44–72)
NRBC # BLD AUTO: 0 K/UL
NRBC BLD AUTO-RTO: 0 /100 WBC
PLATELET # BLD AUTO: 578 K/UL (ref 164–446)
PMV BLD AUTO: 10 FL (ref 9–12.9)
POTASSIUM SERPL-SCNC: 3.4 MMOL/L (ref 3.6–5.5)
PROT SERPL-MCNC: 6 G/DL (ref 6–8.2)
RBC # BLD AUTO: 2.88 M/UL (ref 4.7–6.1)
SODIUM SERPL-SCNC: 141 MMOL/L (ref 135–145)
WBC # BLD AUTO: 9.9 K/UL (ref 4.8–10.8)

## 2017-12-06 PROCEDURE — 700117 HCHG RX CONTRAST REV CODE 255: Performed by: STUDENT IN AN ORGANIZED HEALTH CARE EDUCATION/TRAINING PROGRAM

## 2017-12-06 PROCEDURE — 70553 MRI BRAIN STEM W/O & W/DYE: CPT

## 2017-12-06 PROCEDURE — A9270 NON-COVERED ITEM OR SERVICE: HCPCS | Performed by: INTERNAL MEDICINE

## 2017-12-06 PROCEDURE — 36415 COLL VENOUS BLD VENIPUNCTURE: CPT

## 2017-12-06 PROCEDURE — 700102 HCHG RX REV CODE 250 W/ 637 OVERRIDE(OP): Performed by: STUDENT IN AN ORGANIZED HEALTH CARE EDUCATION/TRAINING PROGRAM

## 2017-12-06 PROCEDURE — 99233 SBSQ HOSP IP/OBS HIGH 50: CPT | Mod: GC | Performed by: INTERNAL MEDICINE

## 2017-12-06 PROCEDURE — 85025 COMPLETE CBC W/AUTO DIFF WBC: CPT

## 2017-12-06 PROCEDURE — A9579 GAD-BASE MR CONTRAST NOS,1ML: HCPCS | Performed by: STUDENT IN AN ORGANIZED HEALTH CARE EDUCATION/TRAINING PROGRAM

## 2017-12-06 PROCEDURE — 80053 COMPREHEN METABOLIC PANEL: CPT

## 2017-12-06 PROCEDURE — 85652 RBC SED RATE AUTOMATED: CPT

## 2017-12-06 PROCEDURE — 86140 C-REACTIVE PROTEIN: CPT

## 2017-12-06 PROCEDURE — A9270 NON-COVERED ITEM OR SERVICE: HCPCS | Performed by: STUDENT IN AN ORGANIZED HEALTH CARE EDUCATION/TRAINING PROGRAM

## 2017-12-06 PROCEDURE — 700102 HCHG RX REV CODE 250 W/ 637 OVERRIDE(OP): Performed by: INTERNAL MEDICINE

## 2017-12-06 PROCEDURE — 93010 ELECTROCARDIOGRAM REPORT: CPT | Performed by: INTERNAL MEDICINE

## 2017-12-06 PROCEDURE — 93005 ELECTROCARDIOGRAM TRACING: CPT | Performed by: INTERNAL MEDICINE

## 2017-12-06 PROCEDURE — 700105 HCHG RX REV CODE 258: Performed by: INTERNAL MEDICINE

## 2017-12-06 PROCEDURE — 700111 HCHG RX REV CODE 636 W/ 250 OVERRIDE (IP): Performed by: INTERNAL MEDICINE

## 2017-12-06 PROCEDURE — 770006 HCHG ROOM/CARE - MED/SURG/GYN SEMI*

## 2017-12-06 PROCEDURE — 700101 HCHG RX REV CODE 250: Performed by: STUDENT IN AN ORGANIZED HEALTH CARE EDUCATION/TRAINING PROGRAM

## 2017-12-06 RX ORDER — SODIUM CHLORIDE AND POTASSIUM CHLORIDE 300; 900 MG/100ML; MG/100ML
INJECTION, SOLUTION INTRAVENOUS CONTINUOUS
Status: DISCONTINUED | OUTPATIENT
Start: 2017-12-06 | End: 2017-12-08

## 2017-12-06 RX ADMIN — GADODIAMIDE 20 ML: 287 INJECTION INTRAVENOUS at 10:44

## 2017-12-06 RX ADMIN — SUCRALFATE 1 G: 1 SUSPENSION ORAL at 11:58

## 2017-12-06 RX ADMIN — ACETAMINOPHEN 500 MG: 500 TABLET ORAL at 21:12

## 2017-12-06 RX ADMIN — QUETIAPINE FUMARATE 50 MG: 25 TABLET ORAL at 21:05

## 2017-12-06 RX ADMIN — STANDARDIZED SENNA CONCENTRATE AND DOCUSATE SODIUM 2 TABLET: 8.6; 5 TABLET, FILM COATED ORAL at 21:05

## 2017-12-06 RX ADMIN — SUCRALFATE 1 G: 1 SUSPENSION ORAL at 06:14

## 2017-12-06 RX ADMIN — THERA TABS 1 TABLET: TAB at 09:26

## 2017-12-06 RX ADMIN — OMEPRAZOLE 40 MG: 20 CAPSULE, DELAYED RELEASE ORAL at 21:05

## 2017-12-06 RX ADMIN — SUCRALFATE 1 G: 1 SUSPENSION ORAL at 01:00

## 2017-12-06 RX ADMIN — SUCRALFATE 1 G: 1 SUSPENSION ORAL at 18:05

## 2017-12-06 RX ADMIN — HEPARIN SODIUM 5000 UNITS: 5000 INJECTION, SOLUTION INTRAVENOUS; SUBCUTANEOUS at 13:20

## 2017-12-06 RX ADMIN — VANCOMYCIN HYDROCHLORIDE 2000 MG: 100 INJECTION, POWDER, LYOPHILIZED, FOR SOLUTION INTRAVENOUS at 13:20

## 2017-12-06 RX ADMIN — OMEPRAZOLE 40 MG: 20 CAPSULE, DELAYED RELEASE ORAL at 09:25

## 2017-12-06 RX ADMIN — Medication 325 MG: at 09:26

## 2017-12-06 RX ADMIN — FOLIC ACID 1 MG: 1 TABLET ORAL at 09:26

## 2017-12-06 RX ADMIN — POTASSIUM CHLORIDE AND SODIUM CHLORIDE: 900; 300 INJECTION, SOLUTION INTRAVENOUS at 09:58

## 2017-12-06 RX ADMIN — CYANOCOBALAMIN TAB 500 MCG 2000 MCG: 500 TAB at 09:25

## 2017-12-06 RX ADMIN — QUETIAPINE FUMARATE 25 MG: 25 TABLET ORAL at 09:27

## 2017-12-06 RX ADMIN — Medication 100 MG: at 09:27

## 2017-12-06 RX ADMIN — HEPARIN SODIUM 5000 UNITS: 5000 INJECTION, SOLUTION INTRAVENOUS; SUBCUTANEOUS at 06:14

## 2017-12-06 RX ADMIN — HEPARIN SODIUM 5000 UNITS: 5000 INJECTION, SOLUTION INTRAVENOUS; SUBCUTANEOUS at 21:05

## 2017-12-06 ASSESSMENT — ENCOUNTER SYMPTOMS
VOMITING: 0
MYALGIAS: 0
SHORTNESS OF BREATH: 0
COUGH: 0
DIAPHORESIS: 0
COUGH: 1
SENSORY CHANGE: 0
PND: 0
PALPITATIONS: 0
FEVER: 0
CHILLS: 0
SORE THROAT: 0
HALLUCINATIONS: 0
DIZZINESS: 0
FOCAL WEAKNESS: 0
CONSTIPATION: 0
WHEEZING: 0
ABDOMINAL PAIN: 0
SPEECH CHANGE: 0
BLOOD IN STOOL: 0
HEMOPTYSIS: 0
SPEECH CHANGE: 1
NAUSEA: 0
ORTHOPNEA: 0
STRIDOR: 0
HEADACHES: 0
ROS GI COMMENTS: EPIGASTRIC PAIN
WEAKNESS: 0
DIARRHEA: 0
SPUTUM PRODUCTION: 0

## 2017-12-06 ASSESSMENT — PATIENT HEALTH QUESTIONNAIRE - PHQ9
2. FEELING DOWN, DEPRESSED, IRRITABLE, OR HOPELESS: NOT AT ALL
SUM OF ALL RESPONSES TO PHQ QUESTIONS 1-9: 0
SUM OF ALL RESPONSES TO PHQ9 QUESTIONS 1 AND 2: 0
1. LITTLE INTEREST OR PLEASURE IN DOING THINGS: NOT AT ALL

## 2017-12-06 ASSESSMENT — PAIN SCALES - GENERAL
PAINLEVEL_OUTOF10: 0
PAINLEVEL_OUTOF10: 0
PAINLEVEL_OUTOF10: 2

## 2017-12-06 NOTE — PROGRESS NOTES
Assume pt care. Pt a/o x3 with intermittent confusion and lethargy, VSS, pt in vest and wrist restraints q2h checks. Restraints for injury to self and pulling at lines. Pt rounds Q1-2hr with assessment of 4p's. T/P PRN. Incontinent care provided with Q2h T/P and PRN. Condom cath in place. IV assessment and care given.  Pt education provided base on admission, care plan, current medications, and PRN. Pt needs much reorientation. Pt has left nares core track in place for tube feeds. Failed swallow eval d/t encephalopathy. Pt on 2L NC and CPO2 monitoring. Multiple loose Bms. Pt is incontinent.     Pending MRI when available.  Hgb 8.3 --> 9.4 --> 9.3

## 2017-12-06 NOTE — CARE PLAN
Problem: Skin Integrity  Goal: Risk for impaired skin integrity will decrease  Outcome: PROGRESSING AS EXPECTED      Problem: Knowledge Deficit  Goal: Knowledge of disease process/condition, treatment plan, diagnostic tests, and medications will improve  Outcome: PROGRESSING SLOWER THAN EXPECTED    Goal: Knowledge of the prescribed therapeutic regimen will improve  Outcome: PROGRESSING SLOWER THAN EXPECTED

## 2017-12-06 NOTE — NON-PROVIDER
Internal Medicine Medical Student Note  Note Author: Raul MICHELESantos Gasloane, Student    Name Chava Mercedes     1950   Age/Sex 67 y.o. male   MRN [id number]   Code Status Full code     After 5PM or if no immediate response to page, please call for cross-coverage  Attending/Team: Dr. Madrid/ Lydia See Patient List for primary contact information  Call [phone] to page after hours   1st Call - Day Intern (R1):   Dr. Solomon 2nd Call - Day Sr. Resident (R2/R3):   Dr. Etienne     Reason for interval visit  (Principal Problem)   GI bleed    Interval Problem Daily Status Update  (24 hours)   Patient had no acute overnight events. Patient is A&O x 3 today and appears less confused this morning. He is able to answer most questions appropriately. He states that he feels as though he is doing better. Patient had one bottle from his blood cultures come back positive for a gram positive cocci. He was started on vancomycin while we await ID and sensitivities. Patient denies chest pain, palpitations, and shortness of breath.     Review of Systems   Constitutional: Negative for chills, diaphoresis and fever.   HENT: Negative for sore throat.    Respiratory: Negative for cough, hemoptysis, sputum production, shortness of breath, wheezing and stridor.    Cardiovascular: Negative for chest pain, palpitations, orthopnea, leg swelling and PND.   Gastrointestinal: Negative for abdominal pain, blood in stool, constipation, diarrhea, melena, nausea and vomiting.   Skin: Negative for itching and rash.   Neurological: Positive for speech change (Notes slurring of speech). Negative for dizziness, sensory change, weakness and headaches.     Physical Exam       Vitals:    17 1930 17 0337 17 0700 17 1110   BP: 112/52 106/65 (!) 95/51 108/56   Pulse:  100 94 94   Resp: 16 18 18 16   Temp: 37.4 °C (99.4 °F) 37.1 °C (98.8 °F) 37.1 °C (98.7 °F) 37.3 °C (99.1 °F)   SpO2: 93% 99% 91% 93%   Weight:       Height:          Body mass index is 24.42 kg/m².    Oxygen Therapy:  Pulse Oximetry: 93 %, O2 (LPM): 2, O2 Delivery: Silicone Nasal Cannula    Physical Exam   Constitutional: He is oriented to person, place, and time and well-developed, well-nourished, and in no distress.   HENT:   Head: Normocephalic and atraumatic.   Eyes: Conjunctivae and EOM are normal.   Neck: Normal range of motion. Neck supple.   Cardiovascular: Normal rate and regular rhythm.  Exam reveals no gallop and no friction rub.    No murmur heard.  Pulmonary/Chest: Effort normal and breath sounds normal. No respiratory distress. He has no wheezes. He has no rales. He exhibits no tenderness.   Abdominal: Soft. Bowel sounds are normal. He exhibits no distension. There is tenderness (epigastric region). There is no rebound.   Musculoskeletal: He exhibits no edema, tenderness or deformity.   Neurological: He is alert and oriented to person, place, and time. No cranial nerve deficit. Coordination normal.   Skin: Skin is warm and dry. No rash noted. No erythema. No pallor.     Most Recent Labs:    Lab Results   Component Value Date/Time    WBC 9.9 12/06/2017 03:14 AM    RBC 2.88 (L) 12/06/2017 03:14 AM    HEMOGLOBIN 9.3 (L) 12/06/2017 03:14 AM    HEMATOCRIT 28.6 (L) 12/06/2017 03:14 AM    MCV 99.3 (H) 12/06/2017 03:14 AM    MCH 32.3 12/06/2017 03:14 AM    MCHC 32.5 (L) 12/06/2017 03:14 AM    MPV 10.0 12/06/2017 03:14 AM    NEUTSPOLYS 62.90 12/06/2017 03:14 AM    LYMPHOCYTES 14.90 (L) 12/06/2017 03:14 AM    MONOCYTES 19.70 (H) 12/06/2017 03:14 AM    EOSINOPHILS 1.10 12/06/2017 03:14 AM    BASOPHILS 0.90 12/06/2017 03:14 AM    ANISOCYTOSIS 1+ 12/24/2016 12:33 AM      Lab Results   Component Value Date/Time    SODIUM 141 12/06/2017 03:14 AM    POTASSIUM 3.4 (L) 12/06/2017 03:14 AM    CHLORIDE 109 12/06/2017 03:14 AM    CO2 25 12/06/2017 03:14 AM    GLUCOSE 110 (H) 12/06/2017 03:14 AM    BUN 5 (L) 12/06/2017 03:14 AM    CREATININE 0.69 12/06/2017 03:14 AM      Lab  Results   Component Value Date/Time    ALTSGPT 15 12/06/2017 03:14 AM    ASTSGOT 16 12/06/2017 03:14 AM    ALKPHOSPHAT 51 12/06/2017 03:14 AM    TBILIRUBIN 0.3 12/06/2017 03:14 AM    LIPASE 97 (H) 11/28/2017 03:25 AM    ALBUMIN 2.9 (L) 12/06/2017 03:14 AM    GLOBULIN 3.1 12/06/2017 03:14 AM    PREALBUMIN 8.0 (L) 12/26/2016 03:52 AM    INR 1.21 (H) 11/24/2017 04:33 AM    MACROCYTOSIS 1+ 12/24/2016 12:33 AM     Lab Results   Component Value Date/Time    PROTHROMBTM 15.0 (H) 11/24/2017 04:33 AM    INR 1.21 (H) 11/24/2017 04:33 AM        Assessment/Plan     #GI bleed  -Hemoglobin stable  -No signs of active bleeding  -H&H daily   -Transfuse if hemoglobin <7  -Monitor for signs of active bleeding (tachycardia, hypotension)  -EGD - negative     #Altered mental status  -Per neurology, multifactorial delerium  -Continue Folate, thiamine, B12  -Speech therapy - failed swallowing test yesterday; NG tube now in place  -Continue seroquel 50 mg pm  -EEG - negative  -Neurology following  -MRI brain - Mild cerebral atrophy, 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

## 2017-12-06 NOTE — PROGRESS NOTES
.         Internal Medicine Interval Note  Note Author: Shazia Solomon M.D.     Name Chava Mercedes     1950   Age/Sex 67 y.o. male   MRN [id number]   Code Status FULL     After 5PM or if no immediate response to page, please call for cross-coverage  Attending/Team: Julius/Lydia See Patient List for primary contact information  Call [phone] to page    1st Call - Day Intern (R1):   Juanito 2nd Call - Day Sr. 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   Respiratory: Positive for cough. Negative for shortness of breath.    Cardiovascular: Negative for chest pain and palpitations.   Gastrointestinal: Negative for abdominal pain, nausea and vomiting.        Epigastric pain   Musculoskeletal: Negative for joint pain and myalgias.   Neurological: Negative for dizziness, sensory change, speech change and focal weakness.   Psychiatric/Behavioral: Negative for hallucinations.       Consultants/Specialty  Gastroenterology    Disposition  Inpatient    Quality Measures    Reviewed items::  Labs reviewed, Medications reviewed, EKG reviewed and Radiology images reviewed  Walker Catheter: Walker in place for AMS.  DVT prophylaxis pharmacological::  Heparin  DVT prophylaxis - mechanical:  SCDs  Ulcer Prophylaxis::  Yes          Physical Exam        Vitals:    12/05/17 1930 12/06/17 0337 12/06/17 0700 12/06/17 1110   BP: 112/52 106/65 (!) 95/51 108/56   Pulse:  100 94 94   Resp: 16 18 18 16   Temp: 37.4 °C (99.4 °F) 37.1 °C (98.8 °F) 37.1 °C (98.7 °F) 37.3 °C (99.1 °F)   SpO2: 93% 99% 91% 93%   Weight:       Height:         Body mass index is 24.42 kg/m².    Oxygen Therapy:  Pulse Oximetry: 93 %, O2 (LPM): 2, O2 Delivery: Silicone Nasal Cannula    Physical Exam   Constitutional: He is well-developed, well-nourished, and in no distress. No distress.   AOx3   HENT:   Head: Normocephalic and atraumatic.   Eyes: Conjunctivae and EOM are normal. Right eye exhibits no discharge. Left eye exhibits no discharge. No scleral icterus.   Neck: Normal range of motion.   No obvious nuchal rigidity or meningeal signs   Cardiovascular: Normal rate, regular rhythm, normal heart sounds and intact distal pulses.  Exam reveals no gallop and no friction rub.    No murmur heard.  Pulmonary/Chest: Effort normal. No respiratory distress. He has no wheezes.   Course upper airway sounds/gurgling of secretions (improved from yesterday)   Abdominal: Soft. Bowel sounds are normal. He exhibits no distension. There is no tenderness. There is no guarding.   Musculoskeletal: Normal range of motion. He exhibits no edema or tenderness.   Neurological: He is alert. He has normal sensation and intact cranial nerves. He exhibits normal muscle tone. GCS score is 15.   Skin: Skin is warm and dry. He is not diaphoretic.   Nursing note and vitals reviewed.        Lab Data Review:         11/25/2017  11:44 AM    Recent Labs      12/04/17 0453 12/05/17 0333 12/06/17 0314   SODIUM  141  140  141   POTASSIUM  3.7  3.6  3.4*   CHLORIDE  107  109  109   CO2  20  19*  25   BUN  6*  4*  5*   CREATININE  0.83  0.63  0.69   CALCIUM  8.9  8.8  8.2*       Recent Labs      12/04/17 0453  12/05/17 0333 12/06/17 0314   ALTSGPT  24   --   15   ASTSGOT  23   --   16   ALKPHOSPHAT  49   --   51    TBILIRUBIN  0.4   --   0.3   GLUCOSE  74  93  110*       Recent Labs      12/04/17   0453  12/06/17   0314   RBC  2.77*  2.88*   HEMOGLOBIN  9.4*  9.3*   HEMATOCRIT  28.0*  28.6*   PLATELETCT  560*  578*       Recent Labs      12/04/17   0453  12/06/17   0314   WBC  5.8  9.9   NEUTSPOLYS  52.30  62.90   LYMPHOCYTES  24.40  14.90*   MONOCYTES  19.00*  19.70*   EOSINOPHILS  2.40  1.10   BASOPHILS  1.40  0.90   ASTSGOT  23  16   ALTSGPT  24  15   ALKPHOSPHAT  49  51   TBILIRUBIN  0.4  0.3           Assessment/Plan     * Altered level of consciousness   Assessment & Plan    - Period of persistent disorientiation beyond typical 7 day alcohol withdrawal course  - Alcohol withdrawal measures discontinued  - Hx of withdrawal seizures noted per wife  - Started on seroquel for agitation with PRN Ativan  - CT head negative for acute process  - EEG unremarkable  - MRI head 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, otherwise normal   - EEG unremarkable  - Mental status now improved   - D/C seroquel but continue PRN Ativan  - Geriatrics c/s if Neuro evaluation inconclusive  - Continuous pulse ox, supplemental O2 as needed, transfer to tele if desaturation        Alcohol withdrawal (CMS-HCC)- (present on admission)   Assessment & Plan    - Patient has history of alcohol withdrawal with symptoms, history of DTs  - Patient states last drink was 11/22/17, two days before admission  - Withdrawal symptoms improved as CIWA/Librium were discontinued  - Beyond typical 7 day withdrawal course, but continues to have disorientation/delirium (much improved)  - Fall/Seizure/Aspiration precautions  - Multivitamin, thiamine, folate        GI bleed- (present on admission)   Assessment & Plan    - Patient transferred from [place] after having mixture of melena and bright red blood per rectum for 1 day  - Abdominal ultrasound shows fatty liver, mild hepatomegaly, cholelithiasis  - RBC scan limited study due to motion, no signs of active  bleeding  - Hb stable, no signs of active bleeding  - EGD shows small nodular mucosa, superficial ulcers, pathology pending  - Continue PPI, sucralfate  - Continue IV maintenance fluids  - Transfuse if Hgb <7  - colonoscopy as outpatient            Sepsis (CMS-[facility])   Assessment & Plan    - SIRS 2/4  - Fever to 100.9 (12/05) + tachycardia:   - 1/2 BCx shows gram(+) cocci, culture pending   - UCx negative   - CXR shows L. Basilar atelectasis, pneumonitis not excluded   - lactate/ammonia WNL  - Coarse upper respiratory sounds due to secretions  - Consideration for septic encephalopathy given AMS  - Vancomycin per pharmacy protocol started (12/06)  - Guaifenesin syrup for secretions        Hypokalemia- (present on admission)   Assessment & Plan    - Replete as needed  - Continue to monitor        History of pulmonary embolism- (present on admission)   Assessment & Plan    - History of PE status post IVC filter  - IVC filter placed due to contraindication for anticoagulation given duodenal ulcer bleeding        Hepatic steatosis   Assessment & Plan    - Patient has history of hepatic steatosis  - LFTs stable  - Confirmed on abdominal ultrasound

## 2017-12-06 NOTE — PROGRESS NOTES
Neurology Progress Note        Subjective:  Chava remains altered. He is more lethargic today and is falling asleep easily during my exam. It does not appear that he had any when necessary medications prior to my arrival. He denies any specific complaints. He did develop a low-grade fever this morning.    Objective:  Vitals:  Vitals:    12/05/17 0730 12/05/17 1129 12/05/17 1300 12/05/17 1525   BP: 119/64   128/60   Pulse: (!) 108   (!) 109   Resp: 16   16   Temp: (!) 38.3 °C (100.9 °F) 37.3 °C (99.2 °F)  37.9 °C (100.3 °F)   SpO2: 94%  89% 94%   Weight:       Height:         General:Asleep, falls asleep easily after being awoken, no apparent distress  Mental Status: Somewhat difficult to arouse today, follow simple commands, mumbling words to me.  Cranial Nerves:  PERRL, EOMI with no nystagmus, face is symmetric  Motor: Moves all 4 extremities equally  Reflexes: 1+ and symmetric with toes downgoing bilaterally  Coordination:  Normal finger to nose bilaterally  Gait:  Deferred    Recent Labs      12/03/17 0318 12/04/17   0453   WBC  4.6*  5.8   RBC  2.58*  2.77*   HEMOGLOBIN  8.3*  9.4*   HEMATOCRIT  25.8*  28.0*   MCV  100.0*  101.1*   MCH  32.2  33.9*   RDW  50.9*  51.4*   PLATELETCT  442  560*   MPV  9.2  10.4   NEUTSPOLYS   --   52.30   LYMPHOCYTES   --   24.40   MONOCYTES   --   19.00*   EOSINOPHILS   --   2.40   BASOPHILS   --   1.40     Recent Labs      12/03/17 0318 12/04/17   0453  12/05/17   0333   SODIUM  143  141  140   POTASSIUM  3.5*  3.7  3.6   CHLORIDE  114*  107  109   CO2  24  20  19*   GLUCOSE  92  74  93   BUN  3*  6*  4*     EEG:This scalp EEG remains not remarkable     Impression: The patient is a 67-year-old male with prolonged encephalopathy and delirium after GI bleed and alcohol withdrawal. Likely this is just a prolonged multifactorial delirium at this point.  He does have a reported history of seizures, the EEG was completely normal, which is surprising if his clinical situation  represents an encephalopathy I would suspect some generalized slowing of his brain waves. This suggests the possibility that he might be a primary psychiatric disorder at this point in time. He also has atrial fibrillation and is off anticoagulation due to his GI bleed and there is a chance he suffered a shower of emboli causing punctate multifocal infarcts. Although could be quite unusual for cerebral infarcts to present with corin delusions as he was describing yesterday.        Recommendations:  1. Continue supportive care  2. We'll get MRI of brain to rule out infarcts  3. I feel like a lumbar puncture be low yield at this point given the time course of his illness and lack of previous fevers. His overall pattern is not seen consistent with a CNS infection, however no definite answers been turned up this can be considered to completely cover every base.

## 2017-12-06 NOTE — PROGRESS NOTES
"Pharmacy Kinetics 67 y.o. male on vancomycin day # 1 2017    Currently on Vancomycin 2000 mg iv as a loading dose    Indication for Treatment: r/o gram positive bacteremia    Pertinent history per medical record: Admitted on 2017 for GI bleed and alcohol withdrawal. He had one blood culture come back positive for gram positive cocci today and he was empirically started on vancomycin.    Other antibiotics: none    Allergies: Patient has no known allergies.     List concerns for renal function: Contrast on     Pertinent cultures to date:   17 blood, peripheral x 2: GPC in 1 of 2 bottles    Recent Labs      17   0453  17   0314   WBC  5.8  9.9   NEUTSPOLYS  52.30  62.90     Recent Labs      17   0453  17   0333  17   0314   BUN  6*  4*  5*   CREATININE  0.83  0.63  0.69   ALBUMIN  3.5   --   2.9*     No results for input(s): VANCOTROUGH, VANCOPEAK, VANCORANDOM in the last 72 hours.  Intake/Output Summary (Last 24 hours) at 17 1437  Last data filed at 17 0600   Gross per 24 hour   Intake             1700 ml   Output             1400 ml   Net              300 ml      Blood pressure 108/56, pulse 94, temperature 37.3 °C (99.1 °F), resp. rate 16, height 1.829 m (6' 0.01\"), weight 81.7 kg (180 lb 1.9 oz), SpO2 93 %. Temp (24hrs), Av.4 °C (99.3 °F), Min:37.1 °C (98.7 °F), Max:37.9 °C (100.3 °F)      A/P   1. Vancomycin dose change: I have scheduled a dose of 1400 mg iv Q12H  2. Next vancomycin level: 1 to 2 days (not ordered)  3. Goal trough: 16-20 mcg/mL  4. Comments: This patient does not appear to be at high risk for vancomycin accumulation. I have scheduled a dose and level.    Yaz Blair, PharmD.      "

## 2017-12-07 LAB
ANION GAP SERPL CALC-SCNC: 6 MMOL/L (ref 0–11.9)
BACTERIA BLD CULT: ABNORMAL
BACTERIA BLD CULT: ABNORMAL
BACTERIA UR CULT: NORMAL
BASOPHILS # BLD AUTO: 1.5 % (ref 0–1.8)
BASOPHILS # BLD: 0.08 K/UL (ref 0–0.12)
BUN SERPL-MCNC: 4 MG/DL (ref 8–22)
CALCIUM SERPL-MCNC: 8.4 MG/DL (ref 8.5–10.5)
CHLORIDE SERPL-SCNC: 113 MMOL/L (ref 96–112)
CO2 SERPL-SCNC: 25 MMOL/L (ref 20–33)
CREAT SERPL-MCNC: 0.63 MG/DL (ref 0.5–1.4)
EOSINOPHIL # BLD AUTO: 0.27 K/UL (ref 0–0.51)
EOSINOPHIL NFR BLD: 4.9 % (ref 0–6.9)
ERYTHROCYTE [DISTWIDTH] IN BLOOD BY AUTOMATED COUNT: 53.4 FL (ref 35.9–50)
GFR SERPL CREATININE-BSD FRML MDRD: >60 ML/MIN/1.73 M 2
GLUCOSE SERPL-MCNC: 118 MG/DL (ref 65–99)
HCT VFR BLD AUTO: 25.5 % (ref 42–52)
HGB BLD-MCNC: 8.3 G/DL (ref 14–18)
IMM GRANULOCYTES # BLD AUTO: 0.03 K/UL (ref 0–0.11)
IMM GRANULOCYTES NFR BLD AUTO: 0.5 % (ref 0–0.9)
LYMPHOCYTES # BLD AUTO: 1.55 K/UL (ref 1–4.8)
LYMPHOCYTES NFR BLD: 28.4 % (ref 22–41)
MCH RBC QN AUTO: 31.3 PG (ref 27–33)
MCHC RBC AUTO-ENTMCNC: 32.5 G/DL (ref 33.7–35.3)
MCV RBC AUTO: 96.2 FL (ref 81.4–97.8)
MONOCYTES # BLD AUTO: 1.03 K/UL (ref 0–0.85)
MONOCYTES NFR BLD AUTO: 18.9 % (ref 0–13.4)
NEUTROPHILS # BLD AUTO: 2.5 K/UL (ref 1.82–7.42)
NEUTROPHILS NFR BLD: 45.8 % (ref 44–72)
NRBC # BLD AUTO: 0 K/UL
NRBC BLD AUTO-RTO: 0 /100 WBC
PLATELET # BLD AUTO: 568 K/UL (ref 164–446)
PMV BLD AUTO: 9.9 FL (ref 9–12.9)
POTASSIUM SERPL-SCNC: 3.5 MMOL/L (ref 3.6–5.5)
RBC # BLD AUTO: 2.65 M/UL (ref 4.7–6.1)
SIGNIFICANT IND 70042: ABNORMAL
SIGNIFICANT IND 70042: NORMAL
SITE SITE: ABNORMAL
SITE SITE: NORMAL
SODIUM SERPL-SCNC: 144 MMOL/L (ref 135–145)
SOURCE SOURCE: ABNORMAL
SOURCE SOURCE: NORMAL
WBC # BLD AUTO: 5.5 K/UL (ref 4.8–10.8)

## 2017-12-07 PROCEDURE — A9270 NON-COVERED ITEM OR SERVICE: HCPCS | Performed by: STUDENT IN AN ORGANIZED HEALTH CARE EDUCATION/TRAINING PROGRAM

## 2017-12-07 PROCEDURE — 80048 BASIC METABOLIC PNL TOTAL CA: CPT

## 2017-12-07 PROCEDURE — A9270 NON-COVERED ITEM OR SERVICE: HCPCS | Performed by: INTERNAL MEDICINE

## 2017-12-07 PROCEDURE — 92526 ORAL FUNCTION THERAPY: CPT

## 2017-12-07 PROCEDURE — 700102 HCHG RX REV CODE 250 W/ 637 OVERRIDE(OP): Performed by: STUDENT IN AN ORGANIZED HEALTH CARE EDUCATION/TRAINING PROGRAM

## 2017-12-07 PROCEDURE — 700101 HCHG RX REV CODE 250: Performed by: STUDENT IN AN ORGANIZED HEALTH CARE EDUCATION/TRAINING PROGRAM

## 2017-12-07 PROCEDURE — 700102 HCHG RX REV CODE 250 W/ 637 OVERRIDE(OP): Performed by: INTERNAL MEDICINE

## 2017-12-07 PROCEDURE — 85025 COMPLETE CBC W/AUTO DIFF WBC: CPT

## 2017-12-07 PROCEDURE — 99232 SBSQ HOSP IP/OBS MODERATE 35: CPT | Mod: GC | Performed by: INTERNAL MEDICINE

## 2017-12-07 PROCEDURE — 770006 HCHG ROOM/CARE - MED/SURG/GYN SEMI*

## 2017-12-07 PROCEDURE — 700105 HCHG RX REV CODE 258: Performed by: INTERNAL MEDICINE

## 2017-12-07 PROCEDURE — 700111 HCHG RX REV CODE 636 W/ 250 OVERRIDE (IP): Performed by: INTERNAL MEDICINE

## 2017-12-07 PROCEDURE — 36415 COLL VENOUS BLD VENIPUNCTURE: CPT

## 2017-12-07 RX ADMIN — Medication 100 MG: at 10:17

## 2017-12-07 RX ADMIN — VANCOMYCIN HYDROCHLORIDE 1400 MG: 100 INJECTION, POWDER, LYOPHILIZED, FOR SOLUTION INTRAVENOUS at 00:21

## 2017-12-07 RX ADMIN — OMEPRAZOLE 40 MG: 20 CAPSULE, DELAYED RELEASE ORAL at 10:16

## 2017-12-07 RX ADMIN — STANDARDIZED SENNA CONCENTRATE AND DOCUSATE SODIUM 2 TABLET: 8.6; 5 TABLET, FILM COATED ORAL at 20:19

## 2017-12-07 RX ADMIN — Medication 325 MG: at 10:17

## 2017-12-07 RX ADMIN — SUCRALFATE 1 G: 1 SUSPENSION ORAL at 05:54

## 2017-12-07 RX ADMIN — CYANOCOBALAMIN TAB 500 MCG 2000 MCG: 500 TAB at 10:17

## 2017-12-07 RX ADMIN — SUCRALFATE 1 G: 1 SUSPENSION ORAL at 20:00

## 2017-12-07 RX ADMIN — HEPARIN SODIUM 5000 UNITS: 5000 INJECTION, SOLUTION INTRAVENOUS; SUBCUTANEOUS at 05:54

## 2017-12-07 RX ADMIN — POTASSIUM CHLORIDE AND SODIUM CHLORIDE: 900; 300 INJECTION, SOLUTION INTRAVENOUS at 05:44

## 2017-12-07 RX ADMIN — HEPARIN SODIUM 5000 UNITS: 5000 INJECTION, SOLUTION INTRAVENOUS; SUBCUTANEOUS at 14:50

## 2017-12-07 RX ADMIN — OMEPRAZOLE 40 MG: 20 CAPSULE, DELAYED RELEASE ORAL at 20:19

## 2017-12-07 RX ADMIN — SUCRALFATE 1 G: 1 SUSPENSION ORAL at 14:50

## 2017-12-07 RX ADMIN — FOLIC ACID 1 MG: 1 TABLET ORAL at 10:17

## 2017-12-07 RX ADMIN — SUCRALFATE 1 G: 1 SUSPENSION ORAL at 00:21

## 2017-12-07 RX ADMIN — HEPARIN SODIUM 5000 UNITS: 5000 INJECTION, SOLUTION INTRAVENOUS; SUBCUTANEOUS at 20:19

## 2017-12-07 RX ADMIN — THERA TABS 1 TABLET: TAB at 10:17

## 2017-12-07 ASSESSMENT — ENCOUNTER SYMPTOMS
CONSTIPATION: 0
WEAKNESS: 0
FOCAL WEAKNESS: 0
SPEECH CHANGE: 0
NAUSEA: 0
DIARRHEA: 0
SENSORY CHANGE: 0
SORE THROAT: 0
ORTHOPNEA: 0
FEVER: 0
HALLUCINATIONS: 1
VOMITING: 0
ABDOMINAL PAIN: 1
COUGH: 0
HEADACHES: 0
SPUTUM PRODUCTION: 0
SHORTNESS OF BREATH: 0
PALPITATIONS: 0
DIAPHORESIS: 0
HEMOPTYSIS: 0

## 2017-12-07 ASSESSMENT — PAIN SCALES - GENERAL: PAINLEVEL_OUTOF10: 0

## 2017-12-07 NOTE — PROGRESS NOTES
Chava seems improved but still quite confused/delusional.    MRI brain with no obvious explanation.    Impression:  No obvious neurological explanation for patient's prolonged confusion.  Persistent delirium after alcohol withdrawal vs. Primary delusional disorder.  -no further testing recommendations at this time, consider psych involvement.

## 2017-12-07 NOTE — NON-PROVIDER
Internal Medicine Medical Student Note  Note Author: Raul Blackburnsloane, Student    Name Chava Mercedes     1950   Age/Sex 67 y.o. male   MRN [id number]   Code Status Full code     After 5PM or if no immediate response to page, please call for cross-coverage  Attending/Team: Dr. Madrid/ john See Patient List for primary contact information  Call [phone] to page after hours   1st Call - Day Intern (R1):   Dr. Solomon 2nd Call - Day Sr. Resident (R2/R3):   Dr. Etienne     Reason for interval visit  (Principal Problem)   Altered level of consciousness    Interval Problem Daily Status Update  (24 hours)   Patient had no acute overnight events. He remains afebrile since the time of his initial fever 2 days ago. He was seen by neuro who recommends psychiatry consultation. Today, patient appears more confused. He is coherent but delusional today. He denies any chest pain or shortness of breath.     Review of Systems   Constitutional: Negative for diaphoresis and fever.   HENT: Negative for congestion and sore throat.    Respiratory: Negative for cough, hemoptysis, sputum production and shortness of breath.    Cardiovascular: Negative for chest pain, palpitations, orthopnea and leg swelling.   Gastrointestinal: Positive for abdominal pain (epigastric region). Negative for constipation, diarrhea, nausea and vomiting.   Skin: Negative for itching and rash.   Neurological: Negative for sensory change, speech change, focal weakness, weakness and headaches.   Psychiatric/Behavioral: Positive for hallucinations.     Physical Exam       Vitals:    17 1655 17 1940 17 0335 17 0831   BP: (!) 88/59 (!) 96/50 (!) 96/63 119/66   Pulse: 83 92 92 99   Resp:  18 18 16   Temp:  37.2 °C (98.9 °F) 36.9 °C (98.5 °F) 37.7 °C (99.9 °F)   SpO2:  93% 99% 93%   Weight:       Height:         Body mass index is 24.42 kg/m².    Oxygen Therapy:  Pulse Oximetry: 93 %, O2 (LPM): 0, O2 Delivery: None (Room  Air)    Physical Exam   Constitutional: He is well-developed, well-nourished, and in no distress.   HENT:   Head: Normocephalic and atraumatic.   Eyes: Conjunctivae and EOM are normal.   Neck: Normal range of motion. Neck supple.   Cardiovascular: Normal rate, regular rhythm, normal heart sounds and intact distal pulses.  Exam reveals no gallop and no friction rub.    No murmur heard.  Pulmonary/Chest: Effort normal and breath sounds normal. No respiratory distress. He has no wheezes. He has no rales.   Abdominal: Soft. Bowel sounds are normal. He exhibits no distension. There is no tenderness. There is no rebound.   Neurological: He is alert.   A&Ox1; delusional   Skin: Skin is dry.     Most Recent Labs:    Lab Results   Component Value Date/Time    WBC 5.5 12/07/2017 03:02 AM    RBC 2.65 (L) 12/07/2017 03:02 AM    HEMOGLOBIN 8.3 (L) 12/07/2017 03:02 AM    HEMATOCRIT 25.5 (L) 12/07/2017 03:02 AM    MCV 96.2 12/07/2017 03:02 AM    MCH 31.3 12/07/2017 03:02 AM    MCHC 32.5 (L) 12/07/2017 03:02 AM    MPV 9.9 12/07/2017 03:02 AM    NEUTSPOLYS 45.80 12/07/2017 03:02 AM    LYMPHOCYTES 28.40 12/07/2017 03:02 AM    MONOCYTES 18.90 (H) 12/07/2017 03:02 AM    EOSINOPHILS 4.90 12/07/2017 03:02 AM    BASOPHILS 1.50 12/07/2017 03:02 AM    ANISOCYTOSIS 1+ 12/24/2016 12:33 AM      Lab Results   Component Value Date/Time    SODIUM 144 12/07/2017 03:02 AM    POTASSIUM 3.5 (L) 12/07/2017 03:02 AM    CHLORIDE 113 (H) 12/07/2017 03:02 AM    CO2 25 12/07/2017 03:02 AM    GLUCOSE 118 (H) 12/07/2017 03:02 AM    BUN 4 (L) 12/07/2017 03:02 AM    CREATININE 0.63 12/07/2017 03:02 AM      Lab Results   Component Value Date/Time    ALTSGPT 15 12/06/2017 03:14 AM    ASTSGOT 16 12/06/2017 03:14 AM    ALKPHOSPHAT 51 12/06/2017 03:14 AM    TBILIRUBIN 0.3 12/06/2017 03:14 AM    LIPASE 97 (H) 11/28/2017 03:25 AM    ALBUMIN 2.9 (L) 12/06/2017 03:14 AM    GLOBULIN 3.1 12/06/2017 03:14 AM    PREALBUMIN 8.0 (L) 12/26/2016 03:52 AM    INR 1.21 (H)  11/24/2017 04:33 AM    MACROCYTOSIS 1+ 12/24/2016 12:33 AM     Lab Results   Component Value Date/Time    PROTHROMBTM 15.0 (H) 11/24/2017 04:33 AM    INR 1.21 (H) 11/24/2017 04:33 AM        Assessment/Plan     #GI bleed  -Hemoglobin stable  -No signs of active bleeding  -H&H daily   -Transfuse if hemoglobin <7  -Monitor for signs of active bleeding (tachycardia, hypotension)  -EGD - negative     #Altered mental status  -Per neurology, multifactorial delerium  -Continue Folate, thiamine, B12  -Speech therapy - failed swallowing test; NG in place  -Continue seroquel 50 mg pm  -EEG - negative  -Neurology following  -MRI brain - Mild cerebral atrophy, 14x8 mm simple cyst in pineal region  -Plan to consult psychiatry     #Sinus Tachycardia  -Heart rate has been stable in 80-90s  -Continue to monitor Mg and K     #Pancreatitis  -Lipase 91 at admission; Lipase 97 now  -Abdominal pain in epigastric region  -Continue to monitor

## 2017-12-07 NOTE — CARE PLAN
Problem: Psychosocial Needs:  Goal: Level of anxiety will decrease  Outcome: PROGRESSING AS EXPECTED    Intervention: Identify and develop with patient strategies to cope with anxiety triggers  Assisting patient to identify anxiety triggers   Intervention: Collaborate with Interdisciplinary Team including Psychologist/Behavioral Health Team  Collaborating with patients team to provide optimal care

## 2017-12-07 NOTE — PROGRESS NOTES
Assumed care of patient at change of shift. Received report from day Rn. Pt is A&OX3 with intermittent confusion. VSS. Pt in vest restraint. Pt incontinent, condom cath in place. Reorientation provided as needed. Pt denies pain or needs at this time.

## 2017-12-07 NOTE — CARE PLAN
Problem: Safety  Goal: Will remain free from injury  Outcome: PROGRESSING AS EXPECTED  Bilat wrist restraints were removed. Vest restraint still in place. Pt has bed alarm in place as well as slipper socks on. Room is near nursing station and monitored frequently.     Problem: Infection  Goal: Will remain free from infection  Outcome: PROGRESSING AS EXPECTED  Pt is on vancomycin with gram + cocci at this time. This RN Is monitoring for signs and symptoms of infection, no new signs noted at this time.

## 2017-12-07 NOTE — THERAPY
"Speech Language Therapy dysphagia treatment completed.   Functional Status: Patient currently NPO with Cortrak. Patient awake and alert, but still confused, confabulatory, and only oriented to self, month, and year with confusion regarding place and reason. Patient with improvement in overall status compared to last tx session, but still noted to be congested and with intermittent coughing. Patient consumed PO trials of single ice chips, NTL via tsp and cup sip, purees, and pudding. Patient consumed PO trials of ice chips and NTL with intermittent coughing x2 on ice chips and x3 on NTL. Patient had no s/sx of aspiration on purees or pudding. Laryngeal elevation palpated as weak. Patient completed 5 rounds of laryngeal elevation, BoT retraction, and pharyngeal constriction exercises with \"good\" accuracy. At this time, patient appears improved, but still not at the level for PO intake. Recommend patient continue NPO with Cortrak. RN aware. SLP is following for dysphagia tx and will complete cognitive evaluation as able and appropriate.     Recommendations: At this time, patient appears improved, but still not at the level for PO intake. Recommend patient continue NPO with Cortrak.  Plan of Care: Will benefit from Speech Therapy 3 times per week  Post-Acute Therapy: Discharge to a transitional care facility for continued skilled therapy services.    See \"Rehab Therapy-Acute\" Patient Summary Report for complete documentation.     "

## 2017-12-07 NOTE — CARE PLAN
Problem: Nutritional:  Goal: Nutrition support tolerated and meeting greater than 85% of estimated needs  TF running @ 70 mL/hr (goal rate) and tolerating

## 2017-12-07 NOTE — PROGRESS NOTES
"Pharmacy Kinetics 67 y.o. male on vancomycin day # 2 2017    Currently on Vancomycin 1400 mg iv q12hr    Indication for Treatment: r/o gram positive bacteremia     Pertinent history per medical record: Admitted on 2017 for GI bleed and alcohol withdrawal. He had one blood culture come back positive for gram positive cocci and was empirically started on vancomycin.     Other antibiotics: none     Allergies: Patient has no known allergies.      List concerns for renal function: Contrast on      Pertinent cultures to date:   17 blood, peripheral x 2: coag neg staph in 1 of 2 bottles    Recent Labs      17   0314  17   0302   WBC  9.9  5.5   NEUTSPOLYS  62.90  45.80     Recent Labs      17   0333  17   0314  17   0302   BUN  4*  5*  4*   CREATININE  0.63  0.69  0.63   ALBUMIN   --   2.9*   --      Intake/Output Summary (Last 24 hours) at 17 1237  Last data filed at 17 0600   Gross per 24 hour   Intake             1922 ml   Output             1100 ml   Net              822 ml      Blood pressure 119/66, pulse 99, temperature 37.7 °C (99.9 °F), resp. rate 16, height 1.829 m (6' 0.01\"), weight 81.7 kg (180 lb 1.9 oz), SpO2 93 %. Temp (24hrs), Av.3 °C (99.1 °F), Min:36.9 °C (98.5 °F), Max:37.7 °C (99.9 °F)      A/P   1. Vancomycin dose change: continue vancomycin 1400 mg IV q12h  2. Next vancomycin level: 17 @ 0030  3. Goal trough: 12-16 mcg/mL  4. Comments: Renal indices stable, mental status improving. Blood cultures (+) for coag negative staph, possible contaminant. Changed goal trough to 12-16 mcg/mL, continue to monitor culture & sensitivities for opportunities for de-escalation.      Marika Friend, PharmD, BCOP       "

## 2017-12-07 NOTE — CARE PLAN
Problem: Skin Integrity  Goal: Risk for impaired skin integrity will decrease  Outcome: PROGRESSING AS EXPECTED    Intervention: Assess risk factors for impaired skin integrity and/or pressure ulcers  Will continue to monitor patients risk factors for pressure ulcers  Intervention: Assess and monitor skin integrity, appearance and/or temperature  Will continue to monitor patient's skin

## 2017-12-08 ENCOUNTER — APPOINTMENT (OUTPATIENT)
Dept: RADIOLOGY | Facility: MEDICAL CENTER | Age: 67
DRG: 896 | End: 2017-12-08
Attending: STUDENT IN AN ORGANIZED HEALTH CARE EDUCATION/TRAINING PROGRAM
Payer: MEDICARE

## 2017-12-08 PROBLEM — K92.2 GI BLEED: Status: RESOLVED | Noted: 2017-11-24 | Resolved: 2017-12-08

## 2017-12-08 LAB
ANION GAP SERPL CALC-SCNC: 5 MMOL/L (ref 0–11.9)
BASOPHILS # BLD AUTO: 1 % (ref 0–1.8)
BASOPHILS # BLD: 0.08 K/UL (ref 0–0.12)
BUN SERPL-MCNC: 3 MG/DL (ref 8–22)
CALCIUM SERPL-MCNC: 8.6 MG/DL (ref 8.5–10.5)
CHLORIDE SERPL-SCNC: 114 MMOL/L (ref 96–112)
CO2 SERPL-SCNC: 23 MMOL/L (ref 20–33)
CREAT SERPL-MCNC: 0.58 MG/DL (ref 0.5–1.4)
EOSINOPHIL # BLD AUTO: 0.23 K/UL (ref 0–0.51)
EOSINOPHIL NFR BLD: 3 % (ref 0–6.9)
ERYTHROCYTE [DISTWIDTH] IN BLOOD BY AUTOMATED COUNT: 55.9 FL (ref 35.9–50)
GFR SERPL CREATININE-BSD FRML MDRD: >60 ML/MIN/1.73 M 2
GLUCOSE SERPL-MCNC: 109 MG/DL (ref 65–99)
HCT VFR BLD AUTO: 26.1 % (ref 42–52)
HGB BLD-MCNC: 8.4 G/DL (ref 14–18)
IMM GRANULOCYTES # BLD AUTO: 0.05 K/UL (ref 0–0.11)
IMM GRANULOCYTES NFR BLD AUTO: 0.6 % (ref 0–0.9)
LYMPHOCYTES # BLD AUTO: 1.38 K/UL (ref 1–4.8)
LYMPHOCYTES NFR BLD: 17.9 % (ref 22–41)
MCH RBC QN AUTO: 31.2 PG (ref 27–33)
MCHC RBC AUTO-ENTMCNC: 32.2 G/DL (ref 33.7–35.3)
MCV RBC AUTO: 97 FL (ref 81.4–97.8)
MONOCYTES # BLD AUTO: 1.18 K/UL (ref 0–0.85)
MONOCYTES NFR BLD AUTO: 15.3 % (ref 0–13.4)
NEUTROPHILS # BLD AUTO: 4.78 K/UL (ref 1.82–7.42)
NEUTROPHILS NFR BLD: 62.2 % (ref 44–72)
NRBC # BLD AUTO: 0 K/UL
NRBC BLD AUTO-RTO: 0 /100 WBC
PLATELET # BLD AUTO: 673 K/UL (ref 164–446)
PMV BLD AUTO: 10.1 FL (ref 9–12.9)
POTASSIUM SERPL-SCNC: 3.9 MMOL/L (ref 3.6–5.5)
RBC # BLD AUTO: 2.69 M/UL (ref 4.7–6.1)
SODIUM SERPL-SCNC: 142 MMOL/L (ref 135–145)
WBC # BLD AUTO: 7.7 K/UL (ref 4.8–10.8)

## 2017-12-08 PROCEDURE — A9270 NON-COVERED ITEM OR SERVICE: HCPCS | Performed by: STUDENT IN AN ORGANIZED HEALTH CARE EDUCATION/TRAINING PROGRAM

## 2017-12-08 PROCEDURE — A9270 NON-COVERED ITEM OR SERVICE: HCPCS | Performed by: INTERNAL MEDICINE

## 2017-12-08 PROCEDURE — 700102 HCHG RX REV CODE 250 W/ 637 OVERRIDE(OP): Performed by: STUDENT IN AN ORGANIZED HEALTH CARE EDUCATION/TRAINING PROGRAM

## 2017-12-08 PROCEDURE — 700102 HCHG RX REV CODE 250 W/ 637 OVERRIDE(OP): Performed by: INTERNAL MEDICINE

## 2017-12-08 PROCEDURE — 80048 BASIC METABOLIC PNL TOTAL CA: CPT

## 2017-12-08 PROCEDURE — 770006 HCHG ROOM/CARE - MED/SURG/GYN SEMI*

## 2017-12-08 PROCEDURE — G8997 SWALLOW GOAL STATUS: HCPCS | Mod: CI

## 2017-12-08 PROCEDURE — 700101 HCHG RX REV CODE 250: Performed by: STUDENT IN AN ORGANIZED HEALTH CARE EDUCATION/TRAINING PROGRAM

## 2017-12-08 PROCEDURE — 99232 SBSQ HOSP IP/OBS MODERATE 35: CPT | Mod: GC | Performed by: INTERNAL MEDICINE

## 2017-12-08 PROCEDURE — 71010 DX-CHEST-LIMITED (1 VIEW): CPT

## 2017-12-08 PROCEDURE — 85025 COMPLETE CBC W/AUTO DIFF WBC: CPT

## 2017-12-08 PROCEDURE — G8996 SWALLOW CURRENT STATUS: HCPCS | Mod: CL

## 2017-12-08 PROCEDURE — 700111 HCHG RX REV CODE 636 W/ 250 OVERRIDE (IP): Performed by: INTERNAL MEDICINE

## 2017-12-08 PROCEDURE — 36415 COLL VENOUS BLD VENIPUNCTURE: CPT

## 2017-12-08 PROCEDURE — 92523 SPEECH SOUND LANG COMPREHEN: CPT

## 2017-12-08 RX ORDER — LORAZEPAM 2 MG/ML
0.5 INJECTION INTRAMUSCULAR EVERY 4 HOURS PRN
Status: DISCONTINUED | OUTPATIENT
Start: 2017-12-08 | End: 2017-12-11

## 2017-12-08 RX ORDER — QUETIAPINE FUMARATE 25 MG/1
50 TABLET, FILM COATED ORAL
Status: DISCONTINUED | OUTPATIENT
Start: 2017-12-08 | End: 2017-12-08

## 2017-12-08 RX ORDER — QUETIAPINE FUMARATE 25 MG/1
25 TABLET, FILM COATED ORAL EVERY MORNING
Status: DISCONTINUED | OUTPATIENT
Start: 2017-12-08 | End: 2017-12-08

## 2017-12-08 RX ORDER — QUETIAPINE FUMARATE 25 MG/1
50 TABLET, FILM COATED ORAL EVERY 6 HOURS PRN
Status: DISCONTINUED | OUTPATIENT
Start: 2017-12-08 | End: 2017-12-10

## 2017-12-08 RX ADMIN — HEPARIN SODIUM 5000 UNITS: 5000 INJECTION, SOLUTION INTRAVENOUS; SUBCUTANEOUS at 06:27

## 2017-12-08 RX ADMIN — SUCRALFATE 1 G: 1 SUSPENSION ORAL at 12:57

## 2017-12-08 RX ADMIN — HEPARIN SODIUM 5000 UNITS: 5000 INJECTION, SOLUTION INTRAVENOUS; SUBCUTANEOUS at 12:57

## 2017-12-08 RX ADMIN — SUCRALFATE 1 G: 1 SUSPENSION ORAL at 06:27

## 2017-12-08 RX ADMIN — SUCRALFATE 1 G: 1 SUSPENSION ORAL at 18:12

## 2017-12-08 RX ADMIN — QUETIAPINE FUMARATE 25 MG: 25 TABLET ORAL at 12:57

## 2017-12-08 RX ADMIN — HEPARIN SODIUM 5000 UNITS: 5000 INJECTION, SOLUTION INTRAVENOUS; SUBCUTANEOUS at 21:51

## 2017-12-08 RX ADMIN — SUCRALFATE 1 G: 1 SUSPENSION ORAL at 23:12

## 2017-12-08 RX ADMIN — ACETAMINOPHEN 500 MG: 500 TABLET ORAL at 21:51

## 2017-12-08 RX ADMIN — CYANOCOBALAMIN TAB 500 MCG 2000 MCG: 500 TAB at 09:44

## 2017-12-08 RX ADMIN — THERA TABS 1 TABLET: TAB at 09:44

## 2017-12-08 RX ADMIN — OMEPRAZOLE 40 MG: 20 CAPSULE, DELAYED RELEASE ORAL at 21:51

## 2017-12-08 RX ADMIN — FOLIC ACID 1 MG: 1 TABLET ORAL at 09:44

## 2017-12-08 RX ADMIN — SUCRALFATE 1 G: 1 SUSPENSION ORAL at 00:31

## 2017-12-08 RX ADMIN — LORAZEPAM 1 MG: 2 SOLUTION, CONCENTRATE ORAL at 03:23

## 2017-12-08 RX ADMIN — Medication 100 MG: at 09:44

## 2017-12-08 RX ADMIN — STANDARDIZED SENNA CONCENTRATE AND DOCUSATE SODIUM 2 TABLET: 8.6; 5 TABLET, FILM COATED ORAL at 21:51

## 2017-12-08 RX ADMIN — OMEPRAZOLE 40 MG: 20 CAPSULE, DELAYED RELEASE ORAL at 09:43

## 2017-12-08 RX ADMIN — POTASSIUM CHLORIDE AND SODIUM CHLORIDE: 900; 300 INJECTION, SOLUTION INTRAVENOUS at 00:31

## 2017-12-08 RX ADMIN — Medication 325 MG: at 09:44

## 2017-12-08 ASSESSMENT — ENCOUNTER SYMPTOMS
NERVOUS/ANXIOUS: 1
SORE THROAT: 0
SENSORY CHANGE: 0
COUGH: 1
WHEEZING: 0
PALPITATIONS: 0
NAUSEA: 0
FEVER: 0
DIARRHEA: 0
HEMOPTYSIS: 0
WEAKNESS: 0
TREMORS: 0
BLOOD IN STOOL: 0
COUGH: 0
SPUTUM PRODUCTION: 0
HEADACHES: 0
SHORTNESS OF BREATH: 0
DOUBLE VISION: 0
VOMITING: 0
SPEECH CHANGE: 0
CHILLS: 0
HALLUCINATIONS: 1
DIAPHORESIS: 0
DIZZINESS: 0
TINGLING: 0
CONSTIPATION: 0
ABDOMINAL PAIN: 0
BLURRED VISION: 0

## 2017-12-08 ASSESSMENT — PAIN SCALES - GENERAL
PAINLEVEL_OUTOF10: 0
PAINLEVEL_OUTOF10: 5

## 2017-12-08 NOTE — PROGRESS NOTES
.         Internal Medicine Interval Note  Note Author: Shazia Solomon M.D.     Name Chava Mercedes     1950   Age/Sex 67 y.o. male   MRN [id number]   Code Status FULL     After 5PM or if no immediate response to page, please call for cross-coverage  Attending/Team: Julius/Lydia See Patient List for primary contact information  Call [phone] to page    1st Call - Day Intern (R1):   Juanito 2nd Call - Day Sr. Resident (R2/R3):   [place]         Reason for interval visit  (Principal Problem)   Alcohol withdrawal    Interval Problem Daily Status Update  (24 hours)   - Mental status inconsistent; continued delusions + A/V hallucinations  - Neuro evaluation not c/w organic pathology  - Seen by Psychiatry; awaiting recs  - Neurocognitive eval by SLP shows min-severe cognitive deficits in all domains  - Not at level of independent living per SLP with recs for direct supervision with IADLs and SNF upon d/c  - CRP, ESR elevated  - WBC increase to 7.7 but still WNL, Monocytes elevated, no obvious signs of infection  - Order for repeat CXR, concern for aspiration  - bilateral wrist restraints and vest for agitation and combativeness  - Coretrak in place, NPO per SLP, TF @ goal 70        Review of Systems   Constitutional: Negative for chills and fever.   Respiratory: Positive for cough. Negative for shortness of breath and wheezing.    Cardiovascular: Negative for chest pain, palpitations and leg swelling.   Gastrointestinal: Negative for abdominal pain, diarrhea, nausea and vomiting.   Psychiatric/Behavioral: Positive for hallucinations.   Patient uncooperative and acutely agitated    Consultants/Specialty  Gastroenterology  Neurology  Psychiatry    Also seen by: SLP, Nutrition    Disposition  Inpatient    Quality Measures    Reviewed items::  Labs reviewed and Medications reviewed  Walker Catheter: Walker in place for AMS.  DVT prophylaxis pharmacological::  Heparin  DVT prophylaxis - mechanical:   SCDs  Ulcer Prophylaxis::  Yes          Physical Exam       Vitals:    12/07/17 0831 12/07/17 1558 12/08/17 0358 12/08/17 0715   BP: 119/66 103/60 114/67 114/66   Pulse: 99 (!) 103 (!) 105 91   Resp: 16 16 18 16   Temp: 37.7 °C (99.9 °F) 37.4 °C (99.3 °F) 37.3 °C (99.2 °F) 37.1 °C (98.8 °F)   SpO2: 93% 97% 95% 96%   Weight:       Height:         Body mass index is 24.42 kg/m².    Oxygen Therapy:  Pulse Oximetry: 96 %, O2 (LPM): 0, O2 Delivery: None (Room Air)    Physical Exam   Constitutional: He is oriented to person, place, and time. No distress (claims nursing is maltreating him).   AOx3   HENT:   Head: Normocephalic and atraumatic.   Eyes: Conjunctivae and EOM are normal. Right eye exhibits no discharge. Left eye exhibits no discharge. No scleral icterus.   Neck: Normal range of motion.   No obvious nuchal rigidity or meningeal signs   Cardiovascular: Normal rate, regular rhythm, normal heart sounds and intact distal pulses.  Exam reveals no gallop and no friction rub.    No murmur heard.  Pulmonary/Chest: Effort normal. No respiratory distress.   Course upper airway sounds/gurgling of secretions (improved from yesterday)   Abdominal: Soft. Bowel sounds are normal.   Musculoskeletal: Normal range of motion. He exhibits no edema or tenderness.   Neurological: He is alert and oriented to person, place, and time. He has normal sensation and intact cranial nerves. He exhibits normal muscle tone. GCS score is 15.   Skin: Skin is warm and dry. He is not diaphoretic.   Psychiatric: He is agitated. He expresses impulsivity.   Continued delusions and auditory/visual hallucinations   Nursing note and vitals reviewed.        Lab Data Review:         11/25/2017  11:44 AM    Recent Labs      12/06/17   0314  12/07/17   0302  12/08/17   0159   SODIUM  141  144  142   POTASSIUM  3.4*  3.5*  3.9   CHLORIDE  109  113*  114*   CO2  25  25  23   BUN  5*  4*  3*   CREATININE  0.69  0.63  0.58   CALCIUM  8.2*  8.4*  8.6       Recent  Labs      12/06/17   0314  12/07/17   0302  12/08/17   0159   ALTSGPT  15   --    --    ASTSGOT  16   --    --    ALKPHOSPHAT  51   --    --    TBILIRUBIN  0.3   --    --    GLUCOSE  110*  118*  109*       Recent Labs      12/06/17   0314  12/07/17   0302  12/08/17   0159   RBC  2.88*  2.65*  2.69*   HEMOGLOBIN  9.3*  8.3*  8.4*   HEMATOCRIT  28.6*  25.5*  26.1*   PLATELETCT  578*  568*  673*       Recent Labs      12/06/17   0314  12/07/17   0302  12/08/17   0159   WBC  9.9  5.5  7.7   NEUTSPOLYS  62.90  45.80  62.20   LYMPHOCYTES  14.90*  28.40  17.90*   MONOCYTES  19.70*  18.90*  15.30*   EOSINOPHILS  1.10  4.90  3.00   BASOPHILS  0.90  1.50  1.00   ASTSGOT  16   --    --    ALTSGPT  15   --    --    ALKPHOSPHAT  51   --    --    TBILIRUBIN  0.3   --    --            Assessment/Plan     * Altered level of consciousness   Assessment & Plan    - Period of persistent disorientiation beyond typical 7 day alcohol withdrawal course  - Alcohol withdrawal measures discontinued  - Hx of withdrawal seizures noted per wife  - Started on seroquel for agitation with PRN Ativan  - CT head negative for acute process  - EEG unremarkable  - MRI head 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, otherwise normal   - EEG unremarkable  - Mental status intermittently improved, but now having delusions/hallucinations  - PRN Ativan for agitation  - Neurocognitive eval: not at level of independent living, recs for direct supervision with IADLs and SNF upon d/c  - Psych on board, awaiting recs  - No signs of infection, but WBC increased and high risk of aspiration  - repeat CXR  - Continuous pulse ox, supplemental O2 as needed, transfer to tele if desaturation        Alcohol withdrawal (CMS-HCC)- (present on admission)   Assessment & Plan    - Patient has history of alcohol withdrawal with symptoms, history of DTs  - Patient states last drink was 11/22/17, two days before admission  - Withdrawal symptoms improved as CIWA/Librium were  discontinued  - Beyond typical 7 day withdrawal course, but continues to have disorientation/delirium (much improved)  - Fall/Seizure/Aspiration precautions  - Multivitamin, thiamine, folate        Positive blood cultures   Assessment & Plan    - SIRS 2/4  - Fever to 100.9 (12/05) + tachycardia:   - 1/2 BCx shows coag(-) staph, likely contaminant   - UCx negative   - CXR shows L. Basilar atelectasis, pneumonitis not excluded   - lactate/ammonia WNL   - CRP/ESR elevated, WBC increased, high Monocytes   - high risk of aspiration   - no other signs of infection  - Guaifenesin syrup for secretions  - d/c Vancomycin (12/06-12/07)  - CTM for signs of infection        Hypokalemia- (present on admission)   Assessment & Plan    - Replete as needed  - Continue to monitor        History of pulmonary embolism- (present on admission)   Assessment & Plan    - History of PE status post IVC filter  - IVC filter placed due to contraindication for anticoagulation given duodenal ulcer bleeding        Hepatic steatosis   Assessment & Plan    - Patient has history of hepatic steatosis  - LFTs stable  - Confirmed on abdominal ultrasound

## 2017-12-08 NOTE — NON-PROVIDER
Internal Medicine Medical Student Note  Note Author: Raul MICHELESantos Hooks, Student    Name Chava Mercedes     1950   Age/Sex 67 y.o. male   MRN [id number]   Code Status Full code     After 5PM or if no immediate response to page, please call for cross-coverage  Attending/Team: Dr. Madrid/ Lydia See Patient List for primary contact information  Call [phone] to page after hours   1st Call - Day Intern (R1):   Dr. Solomon 2nd Call - Day Sr. Resident (R2/R3):   Dr. Etienne     Reason for interval visit  (Principal Problem)   Altered level of consciousness    Interval Problem Daily Status Update  (24 hours)   Patient had no acute overnight events. Per nurse, his hallucinations have worsened and he was become quite combative. He was seen by SLP and had a neurocognitive evaluation completed, which showed severe cognitive deficits in all domains. Patient remains afebrile with no obvious signs of infection. No chest pain or shortness of breath.     Review of Systems   Constitutional: Negative for chills, diaphoresis and fever.   HENT: Negative for congestion and sore throat.    Eyes: Negative for blurred vision and double vision.   Respiratory: Negative for cough, hemoptysis, sputum production and shortness of breath.    Cardiovascular: Negative for chest pain, palpitations and leg swelling.   Gastrointestinal: Negative for abdominal pain, blood in stool, constipation, diarrhea, melena, nausea and vomiting.   Neurological: Negative for dizziness, tingling, tremors, sensory change, speech change, weakness and headaches.   Psychiatric/Behavioral: Positive for hallucinations. The patient is nervous/anxious.      Physical Exam       Vitals:    17 0831 17 1558 17 0358 17 0715   BP: 119/66 103/60 114/67 114/66   Pulse: 99 (!) 103 (!) 105 91   Resp: 16 16 18 16   Temp: 37.7 °C (99.9 °F) 37.4 °C (99.3 °F) 37.3 °C (99.2 °F) 37.1 °C (98.8 °F)   SpO2: 93% 97% 95% 96%   Weight:       Height:          Body mass index is 24.42 kg/m².    Oxygen Therapy:  Pulse Oximetry: 96 %, O2 (LPM): 0, O2 Delivery: None (Room Air)    Physical Exam   Constitutional: He is well-developed, well-nourished, and in no distress.   HENT:   Head: Normocephalic and atraumatic.   Eyes: Conjunctivae and EOM are normal.   Neck: Normal range of motion. Neck supple.   Cardiovascular: Normal rate, regular rhythm, normal heart sounds and intact distal pulses.  Exam reveals no gallop and no friction rub.    No murmur heard.  Pulmonary/Chest: Effort normal and breath sounds normal. No respiratory distress. He has no wheezes. He has no rales. He exhibits no tenderness.   Abdominal: Soft. Bowel sounds are normal. He exhibits no distension. There is tenderness (epigastric region).   Neurological:   A&Ox1; worsening hallucinations   Skin: Skin is warm and dry.     Most Recent Labs:    Lab Results   Component Value Date/Time    WBC 7.7 12/08/2017 01:59 AM    RBC 2.69 (L) 12/08/2017 01:59 AM    HEMOGLOBIN 8.4 (L) 12/08/2017 01:59 AM    HEMATOCRIT 26.1 (L) 12/08/2017 01:59 AM    MCV 97.0 12/08/2017 01:59 AM    MCH 31.2 12/08/2017 01:59 AM    MCHC 32.2 (L) 12/08/2017 01:59 AM    MPV 10.1 12/08/2017 01:59 AM    NEUTSPOLYS 62.20 12/08/2017 01:59 AM    LYMPHOCYTES 17.90 (L) 12/08/2017 01:59 AM    MONOCYTES 15.30 (H) 12/08/2017 01:59 AM    EOSINOPHILS 3.00 12/08/2017 01:59 AM    BASOPHILS 1.00 12/08/2017 01:59 AM    ANISOCYTOSIS 1+ 12/24/2016 12:33 AM      Lab Results   Component Value Date/Time    SODIUM 142 12/08/2017 01:59 AM    POTASSIUM 3.9 12/08/2017 01:59 AM    CHLORIDE 114 (H) 12/08/2017 01:59 AM    CO2 23 12/08/2017 01:59 AM    GLUCOSE 109 (H) 12/08/2017 01:59 AM    BUN 3 (L) 12/08/2017 01:59 AM    CREATININE 0.58 12/08/2017 01:59 AM      Lab Results   Component Value Date/Time    ALTSGPT 15 12/06/2017 03:14 AM    ASTSGOT 16 12/06/2017 03:14 AM    ALKPHOSPHAT 51 12/06/2017 03:14 AM    TBILIRUBIN 0.3 12/06/2017 03:14 AM    LIPASE 97 (H) 11/28/2017  03:25 AM    ALBUMIN 2.9 (L) 12/06/2017 03:14 AM    GLOBULIN 3.1 12/06/2017 03:14 AM    PREALBUMIN 8.0 (L) 12/26/2016 03:52 AM    INR 1.21 (H) 11/24/2017 04:33 AM    MACROCYTOSIS 1+ 12/24/2016 12:33 AM     Lab Results   Component Value Date/Time    PROTHROMBTM 15.0 (H) 11/24/2017 04:33 AM    INR 1.21 (H) 11/24/2017 04:33 AM      Assessment/Plan     #GI bleed  -Hemoglobin stable  -No signs of active bleeding  -H&H daily   -Transfuse if hemoglobin <7  -Monitor for signs of active bleeding (tachycardia, hypotension)  -EGD - negative     #Altered mental status  -Per neurology, multifactorial delerium  -Continue Folate, thiamine, B12  -Speech therapy - failed swallowing test; NG in place  -Continue seroquel 50 mg pm  -EEG - negative  -Neurology following  -MRI brain - Mild cerebral atrophy, 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

## 2017-12-08 NOTE — THERAPY
"Speech Language Therapy Evaluation completed to address speech, communication and cognition    Functional Status: Patient AAO to self, \"hospital,\" and month/year and reported living alone and independently prior to this hospital admission. Portions of standardized measures were used to assess an array of cognitive-linguistic domains which revealed deficits in the min-severe range. Pt required multiple repetition for instruction throughout evaluation and speech at times tangential and off topic. Moderate-severe deficits exhibited with short term memory (at the short paragraph level), attention, simple arithmetic, and sequencing.  Moderate deficits noted with thought organization as demonstrated in clock drawing and rapid naming tasks. Minimum deficits noted in verbal problem solving and reasoning. Attempted reading and writing task however pt declining d/t visual deficits. Glasses not at bedside.     Recommendations: At this time, patient presents with min-severe cognitive deficits across domains and does not appear at the level for independent living. Recommend direct supervision with IADLs if patient were to d/c home and speech therapy at the acute level of care to address deficits stated above.     Plan of Care: Will benefit from Speech Therapy 3 times per week    Post-Acute Therapy: Discharge to a transitional care facility for continued skilled therapy services.    See \"Rehab Therapy-Acute\" Patient Summary Report for complete documentation. Thank you for the consult.   "

## 2017-12-08 NOTE — PROGRESS NOTES
Upon morning assessment, pt found to have quarter size redness under right wrist restraint. He was pulling a lot of tension on the restraint. CMS intact otherwise. Removed and loosened restraint and repositioned patient up to chair so he would stop pulling tension. Mepilex protective border dressing placed over redness to prevent skin breakdown. MD made aware. Will monitor closely.

## 2017-12-08 NOTE — PROGRESS NOTES
.         Internal Medicine Interval Note  Note Author: Shazia Solomon M.D.     Name Chava Mercedes     1950   Age/Sex 67 y.o. male   MRN [id number]   Code Status FULL     After 5PM or if no immediate response to page, please call for cross-coverage  Attending/Team: Julius/Lydia See Patient List for primary contact information  Call [phone] to page    1st Call - Day Intern (R1):   Juanito 2nd Call - Day Sr. Resident (R2/R3):   [place]         Reason for interval visit  (Principal Problem)   Alcohol withdrawal    Interval Problem Daily Status Update  (24 hours)   - Mental status improving; AOx 3 (self, place, month/year) but having delusions  - Neuro evaluation not c/w organic pathology  - Discussed Dr. Wade (Psychiatry), who will see the patient tomorrow  - BCx growing coag(-) staph, likely contaminant; d/c Vancomycin  - CRP, ESR elevated, but no other signs of infection  - SLP recommends continued Coretrak and NPO  - bilateral wrist restraints and vest for agitation and combativeness          Review of Systems   Unable to perform ROS: Other   Patient uncooperative and acutely agitated    Consultants/Specialty  Gastroenterology    Disposition  Inpatient    Quality Measures    Reviewed items::  Labs reviewed and Medications reviewed  Walker Catheter: Walker in place for AMS.  DVT prophylaxis pharmacological::  Heparin  DVT prophylaxis - mechanical:  SCDs  Ulcer Prophylaxis::  Yes          Physical Exam       Vitals:    17 1940 17 0335 17 0831 17 1558   BP: (!) 96/50 (!) 96/63 119/66 103/60   Pulse: 92 92 99 (!) 103   Resp: 18 18 16 16   Temp: 37.2 °C (98.9 °F) 36.9 °C (98.5 °F) 37.7 °C (99.9 °F) 37.4 °C (99.3 °F)   SpO2: 93% 99% 93% 97%   Weight:       Height:         Body mass index is 24.42 kg/m².    Oxygen Therapy:  Pulse Oximetry: 97 %, O2 (LPM): 0, O2 Delivery: None (Room Air)    Physical Exam   Constitutional: He is oriented to person, place, and time. He appears  distressed (claims nursing is maltreating him).   AOx3   HENT:   Head: Normocephalic and atraumatic.   Eyes: Conjunctivae and EOM are normal. Right eye exhibits no discharge. Left eye exhibits no discharge. No scleral icterus.   Neck: Normal range of motion.   No obvious nuchal rigidity or meningeal signs   Cardiovascular: Normal rate, regular rhythm, normal heart sounds and intact distal pulses.  Exam reveals no gallop and no friction rub.    No murmur heard.  Pulmonary/Chest: Effort normal. No respiratory distress.   Course upper airway sounds/gurgling of secretions (improved from yesterday)   Abdominal: Soft. Bowel sounds are normal.   Musculoskeletal: Normal range of motion. He exhibits no edema or tenderness.   Neurological: He is alert and oriented to person, place, and time. He has normal sensation and intact cranial nerves. He exhibits normal muscle tone. GCS score is 15.   Skin: Skin is warm and dry. He is not diaphoretic.   Psychiatric: He is agitated. He expresses impulsivity.   Having active delusions and visual hallucinations   Nursing note and vitals reviewed.        Lab Data Review:         11/25/2017  11:44 AM    Recent Labs      12/05/17 0333 12/06/17 0314  12/07/17   0302   SODIUM  140  141  144   POTASSIUM  3.6  3.4*  3.5*   CHLORIDE  109  109  113*   CO2  19*  25  25   BUN  4*  5*  4*   CREATININE  0.63  0.69  0.63   CALCIUM  8.8  8.2*  8.4*       Recent Labs      12/05/17   0333  12/06/17 0314  12/07/17   0302   ALTSGPT   --   15   --    ASTSGOT   --   16   --    ALKPHOSPHAT   --   51   --    TBILIRUBIN   --   0.3   --    GLUCOSE  93  110*  118*       Recent Labs      12/06/17   0314  12/07/17   0302   RBC  2.88*  2.65*   HEMOGLOBIN  9.3*  8.3*   HEMATOCRIT  28.6*  25.5*   PLATELETCT  578*  568*       Recent Labs      12/06/17 0314  12/07/17   0302   WBC  9.9  5.5   NEUTSPOLYS  62.90  45.80   LYMPHOCYTES  14.90*  28.40   MONOCYTES  19.70*  18.90*   EOSINOPHILS  1.10  4.90   BASOPHILS   0.90  1.50   ASTSGOT  16   --    ALTSGPT  15   --    ALKPHOSPHAT  51   --    TBILIRUBIN  0.3   --            Assessment/Plan     * Altered level of consciousness   Assessment & Plan    - Period of persistent disorientiation beyond typical 7 day alcohol withdrawal course  - Alcohol withdrawal measures discontinued  - Hx of withdrawal seizures noted per wife  - Started on seroquel for agitation with PRN Ativan  - CT head negative for acute process  - EEG unremarkable  - MRI head 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, otherwise normal   - EEG unremarkable  - Mental status intermittently improved, but now having delusions/hallucinations  - D/C seroquel but continue PRN Ativan  - Psych c/s ordered  - Continuous pulse ox, supplemental O2 as needed, transfer to tele if desaturation        Alcohol withdrawal (CMS-HCC)- (present on admission)   Assessment & Plan    - Patient has history of alcohol withdrawal with symptoms, history of DTs  - Patient states last drink was 11/22/17, two days before admission  - Withdrawal symptoms improved as CIWA/Librium were discontinued  - Beyond typical 7 day withdrawal course, but continues to have disorientation/delirium (much improved)  - Fall/Seizure/Aspiration precautions  - Multivitamin, thiamine, folate        GI bleed- (present on admission)   Assessment & Plan    - Patient transferred from [place] after having mixture of melena and bright red blood per rectum for 1 day  - Abdominal ultrasound shows fatty liver, mild hepatomegaly, cholelithiasis  - RBC scan limited study due to motion, no signs of active bleeding  - Hb stable, no signs of active bleeding  - EGD shows small nodular mucosa, superficial ulcers, pathology pending  - Continue PPI, sucralfate  - Continue IV maintenance fluids  - Transfuse if Hgb <7  - colonoscopy as outpatient            Positive blood cultures   Assessment & Plan    - SIRS 2/4  - Fever to 100.9 (12/05) + tachycardia:   - 1/2 BCx shows  coag(-) staph, likely contaminant   - UCx negative   - CXR shows L. Basilar atelectasis, pneumonitis not excluded   - lactate/ammonia WNL   - CRP/ESR elevated   - no other signs of infection  - Coarse upper respiratory sounds due to secretions  - Guaifenesin syrup for secretions  - d/c Vancomycin (12/06-12/07)          Hypokalemia- (present on admission)   Assessment & Plan    - Replete as needed  - Continue to monitor        History of pulmonary embolism- (present on admission)   Assessment & Plan    - History of PE status post IVC filter  - IVC filter placed due to contraindication for anticoagulation given duodenal ulcer bleeding        Hepatic steatosis   Assessment & Plan    - Patient has history of hepatic steatosis  - LFTs stable  - Confirmed on abdominal ultrasound

## 2017-12-08 NOTE — PROGRESS NOTES
"Assumed care of patient. Currently awake in bed. Appears to be visually hallucinating. Attempting to pull at cortek, when asked to stop pulling he stated \"i'll pull it if I want.\"    Soft wrist and vest restraints in place.     IVF and TF infusing.     Strip alarm in place. Close to nurses station and curtain open for visibility.   "

## 2017-12-08 NOTE — PROGRESS NOTES
Assumed care at change of shift. Received report from day Rn. Pt A&Ox1. Pt is in vest and bilateral UE soft restraints, due to attempting to get out of bed and pull cortrak and IV out. Pt is experiencing visual and auditory hallucinations. Pt is uncooperative with staff and attempting to get out of bed and pulling at lines. Security called. Nursing staff attempting to calm and reorient patient to situation and place. Will continue q 2 hour restraint checks. Call light within reach. Bed low and locked. Safety measures in place.

## 2017-12-08 NOTE — PROGRESS NOTES
Pt continues to slide down in bed and not maintaining 30 degrees which is a risk for aspiration for the patient due to him being on tube feeding. Pt was maintaining a 0 degree angle by sliding all the way down in the bed and having his arms and head at the same height which also gives patient access to pulling out his coretrack which is partially why he is restrained in the first place.  Call out to MD to ask for one ankle restraint and maintain bilateral wrists and vest due to pt agility abilities to get into unsafe situations.  MD paged to [facility] RN phone for orders, currently awaiting upgrade in orders.

## 2017-12-08 NOTE — CARE PLAN
Problem: Safety  Goal: Will remain free from injury  Outcome: PROGRESSING AS EXPECTED    Intervention: Educate patient and significant other/support system about adaptive mobility strategies and safe transfers  Educated patient on safety measures that are in place    Goal: Will remain free from falls  Outcome: PROGRESSING AS EXPECTED

## 2017-12-08 NOTE — PROGRESS NOTES
Pt combative this morning and security called due to pt swatting at staff. Pt calmed down and was more cooperative. Pt still attempts to ambulate frequently unsafely so vest restraint maintained with order.  Pt agreeable most of the rest of the day until 1445 when pt started hallucinating more and pulled out his iv, disconnected his tube feeding and was attempting to untie his vest restraint by leaning over the side of the bed pulling at the quick release.  MD notified of these changes and orders given to apply bilateral wrist restraints as well as the vest restraint. Pt pulls at wrist restraints occasionally but overall tolerates well, tube feeding restarted and new IV placed. Restraint charting and protocols followed.

## 2017-12-08 NOTE — DISCHARGE PLANNING
Medical Social Work  Patient continues to be in soft wrist  amd vest restraints.  Notes indicate psychiatry will be seeing the patient today.

## 2017-12-08 NOTE — PSYCHIATRY
"PSYCHIATRIC CONSULTATION:  Reason for admission: GI bleed  Alcohol withdrawal (CMS-[facility])  Reason for consult: AMS, alcohol withdrawal  Requesting Physician: Shazia Solomon M.D.  Supervising Physician: Jasmin Arceo MD     Legal status:  No hold    Chief Complaint: \"Alcohol\"    HPI:   Patient is a 68 yo  male with past history of alcohol abuse and bleeding duodenal ulcer that was transferred from [place] for GI bleed and alcohol withdrawal. In the ED, patient was reported to be inconsistent with his history. Psychiatry was consulted as patient continued to have AMS despite a normal EEG. Patient reports he is here \"because of alcohol.\" He does not mention his GI bleed, but does state that he drinks \"due to boredom.\" When asked about his mood prior to this admission he states \"bored.\" He is witnessed attempting to pull out his NG tube despite being in soft wrist restraints. He is at times rude to the nurse when she attempts to help redirect him in conversation. Patient reports he does not believe he has a problem with drinking, denying previous hospitalizations for alcoholism, but noting he has had 2 episodes of alcohol withdrawal seizures in the past due to \"quitting cold turkey.\" He reports he is not currently interested in stopping alcohol as \"I tried quitting by myself 3 times in the past and 2/3 times I had seizures.\" He reports he is not interested in going to rehab or doing AA. When asked for his rationale, he states \"I got a DUI before coming here after I drank a 30 pack of beer over the span of 3 days - I have no desire to drive anymore.\" He denies that he has had financial or social issues from alcohol in the past. He denies all psychiatric symptoms except for \"poor sleep.\" He states he used to have a \"big\" [employer] company up until 2 years ago when his mother needed help with her medical issues. It is noted that his sister, Joycelyn, came to visit him in the hospital, but when asked about " "that, he says yes, then moments later says she . The attending, Dr. Arceo, is in the room at this time and each time he is asked about information that contradicts something he said earlier, he looks to her and asks her for confirmation - she does not know his sister nor this patient from previous admissions. He states only his sister Milly is alive. He states he has been  for 45 years to his wife and that they have a good relationship, but that she's currently living in [place]. He reports his address as \"[address] and Joycelyn lives in [address].\" He reports he took care of Joycelyn until she was doing better, \"but then I heard she  about a week ago.\"     Patient deemed an unreliable historian per chart review. He denies previous hospitalizations, but was here 2016 and 2016 for alcohol withdrawal. Per discharge planning note from 17, \"Joycelyn states that she is caring for their 93 year old mother as Joycelyn, patient and their mother all reside in the same [facility] in [place]. She states they each have their own apartment.  Joycelyn states her mother is bed bound and suffers from some dementia.Per Joycelyn patient is  from his wife who is currently residing in [place] and who is also an \"alcoholic\".\"    Psychiatric Review of Systems:current symptoms as reported by pt.  Depression: reports poor sleep; denies SI  Betina:denies     Anxiety/Panic Attacks denies     PTSD symptom: denies     Psychosis:denies     Other:       Medical Review of Systems: as reported by pt. All systems reviewed. Only those found to be + are noted below. All others are negative.   Neurological:    TBIs:denies      SZs: reports 2 seizures from alcohol withdrawal   Strokes:denies      Other:  Other medical symptoms:   Thyroid:denies      Diabetes:denies      Cardiovascular disease:denies     Duodenal ulcer    Psychiatric Examination:  Vitals: Blood pressure 114/66, pulse 91, temperature 37.1 °C " "(98.8 °F), resp. rate 16, height 1.829 m (6' 0.01\"), weight 81.7 kg (180 lb 1.9 oz), SpO2 96 %.  Musculoskeletal: increased psychomotor activity, appears restless, fidgeting with restraints, initially trying to pull out NG tube  Appearance: WDWN  male, wearing hospital gown, laying in hospital bed, currently in bilateral soft restraints and a safety vest. Behavior is confused, cooperative,  appropriate eye contact.   Thoughts:  Linear, illogical with confabulation at times, no blocking of thought noted, no delusional content noted, not obviously responding to internal stimuli.  Speech: Non-spontaneous, non-pressured with normal rate and jose, no slurring or stuttering noted  Mood: \"Bored\"           Affect: Confused         SI/HI: denies, no evidence of active SI/HI noted   Attention/Alertness: Alert  Memory: Impaired  Orientation: A&Ox2 - person, [place], did not know date/year/month    Fund of Knowledge: impaired  Insight/Judgement into symptoms: poor   Neurological Testing: Patient antonio a clock - outline missing on half the clock with numbers missing from upper left quadrant due to them being 'squished' into the remaining space, unable to draw in hour and minute hands    Other system(s) examined:     Past Psychiatric Hx:   Denies    Family Psychiatric Hx:  Denies    Social Hx:  Patient lives in [facility]), reports he has been  for 45 years to \"Amalia\" who is currently in [place] and has no children. He at first says he has 2 sisters that are still alive, but that the one that lived across the street from him is currently dead since 1 week ago. 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 He reports he receives current income through social security and would like to open a \"small\" construction company.     Drug/Alcohol/Tobacco Hx:   Drugs: " denies   Alcohol: reports that prior to admission he was drinking a 30 pack of beer over 3 days   Tobacco: denies    Medical Hx: labs, MARS, medications, etc were reviewed. Only those findings of potential interest to psychiatry are noted below:  History reviewed. No pertinent past medical history.  Medical Conditions:     Allergies: Patient has no known allergies.  Medications (currently prescribed at [facility]):    Current Facility-Administered Medications:   •  lorazepam (ATIVAN) injection 0.5 mg, 0.5 mg, Intravenous, Q4HRS PRN, Shazia Solomon M.D.  •  guaiFENesin (ROBITUSSIN) 100 MG/5ML solution 200 mg, 10 mL, Oral, Q4HRS PRN, Shazia Solomon M.D.  •  acetaminophen (TYLENOL) tablet 500 mg, 500 mg, Oral, Q6HRS PRN, Gibson Etienne M.D., 500 mg at 12/06/17 2112  •  omeprazole 2 mg/mL in sodium bicarbonate (PRILOSEC) oral susp 40 mg, 40 mg, Oral, Q12HRS, Devin Up M.D., 40 mg at 12/08/17 0943  •  ferrous sulfate tablet 325 mg, 325 mg, Oral, QDAY with Breakfast, Gibson Etienne M.D., 325 mg at 12/08/17 0944  •  heparin injection 5,000 Units, 5,000 Units, Subcutaneous, Q8HRS, Gibson Etienne M.D., 5,000 Units at 12/08/17 0627  •  cyanocobalamin (VITAMIN B-12) tablet 2,000 mcg, 2,000 mcg, Oral, DAILY, Gibson Etienne M.D., 2,000 mcg at 12/08/17 0944  •  sucralfate (CARAFATE) 1 GM/10ML suspension 1 g, 1 g, Oral, Q6HRS, Tyree Dan M.D., 1 g at 12/08/17 0627  •  folic acid (FOLVITE) tablet 1 mg, 1 mg, Oral, DAILY, Marito Martínez M.D., 1 mg at 12/08/17 0944  •  thiamine (THIAMINE) tablet 100 mg, 100 mg, Oral, DAILY, Marito Martínez M.D., 100 mg at 12/08/17 0944  •  multivitamin (THERAGRAN) tablet 1 Tab, 1 Tab, Oral, DAILY, Marito Martínez M.D., 1 Tab at 12/08/17 0944  •  senna-docusate (PERICOLACE or SENOKOT S) 8.6-50 MG per tablet 2 Tab, 2 Tab, Oral, BID, Stopped at 12/08/17 0900 **AND** polyethylene glycol/lytes (MIRALAX) PACKET 1 Packet, 1 Packet, Oral, QDAY PRN, 1 Packet at 12/05/17 1411 **AND**  magnesium hydroxide (MILK OF MAGNESIA) suspension 30 mL, 30 mL, Oral, QDAY PRN **AND** bisacodyl (DULCOLAX) suppository 10 mg, 10 mg, Rectal, QDAY PRN, Jovita Gilliam M.D.  Labs:  Recent Results (from the past 24 hour(s))   CBC WITH DIFFERENTIAL    Collection Time: 12/08/17  1:59 AM   Result Value Ref Range    WBC 7.7 4.8 - 10.8 K/uL    RBC 2.69 (L) 4.70 - 6.10 M/uL    Hemoglobin 8.4 (L) 14.0 - 18.0 g/dL    Hematocrit 26.1 (L) 42.0 - 52.0 %    MCV 97.0 81.4 - 97.8 fL    MCH 31.2 27.0 - 33.0 pg    MCHC 32.2 (L) 33.7 - 35.3 g/dL    RDW 55.9 (H) 35.9 - 50.0 fL    Platelet Count 673 (H) 164 - 446 K/uL    MPV 10.1 9.0 - 12.9 fL    Neutrophils-Polys 62.20 44.00 - 72.00 %    Lymphocytes 17.90 (L) 22.00 - 41.00 %    Monocytes 15.30 (H) 0.00 - 13.40 %    Eosinophils 3.00 0.00 - 6.90 %    Basophils 1.00 0.00 - 1.80 %    Immature Granulocytes 0.60 0.00 - 0.90 %    Nucleated RBC 0.00 /100 WBC    Neutrophils (Absolute) 4.78 1.82 - 7.42 K/uL    Lymphs (Absolute) 1.38 1.00 - 4.80 K/uL    Monos (Absolute) 1.18 (H) 0.00 - 0.85 K/uL    Eos (Absolute) 0.23 0.00 - 0.51 K/uL    Baso (Absolute) 0.08 0.00 - 0.12 K/uL    Immature Granulocytes (abs) 0.05 0.00 - 0.11 K/uL    NRBC (Absolute) 0.00 K/uL   BASIC METABOLIC PANEL    Collection Time: 12/08/17  1:59 AM   Result Value Ref Range    Sodium 142 135 - 145 mmol/L    Potassium 3.9 3.6 - 5.5 mmol/L    Chloride 114 (H) 96 - 112 mmol/L    Co2 23 20 - 33 mmol/L    Glucose 109 (H) 65 - 99 mg/dL    Bun 3 (L) 8 - 22 mg/dL    Creatinine 0.58 0.50 - 1.40 mg/dL    Calcium 8.6 8.5 - 10.5 mg/dL    Anion Gap 5.0 0.0 - 11.9   ESTIMATED GFR    Collection Time: 12/08/17  1:59 AM   Result Value Ref Range    GFR If African American >60 >60 mL/min/1.73 m 2    GFR If Non African American >60 >60 mL/min/1.73 m 2      ECG: QTc 458 on 12/6/17 EKG    Cranial Imaging: reviewed MRI Brain w/w/o contrast  There is no acute infarct. There is no acute or chronic parenchymal hemorrhage. Mild cerebral volume loss is  "seen. There is no intra-axial space-occupying lesion.    The ventricles, cortical sulci and the basal cisterns appear prominent consistent with mild cerebral atrophy. The visualized flow voids of the cerebral vasculature are unremarkable.  There is no large lesion identified in the expected course of the   intracranial portions of the cranial nerves.    The skull bones appear normal. The paranasal sinuses are clear.    There is no abnormal parenchymal or meningeal contrast enhancement.    There is an approximately 14 x 8 mm sized cyst in the pineal region.    CT Head w/o contrast 12/3/17  FINDINGS:  No acute hemorrhage, mass-effect, or midline shift.   No intracranial mass or acute ischemia identified.   There is mild diffuse atrophy. Ventricles and cisterns are patent Gray/white matter differentiation is maintained.    The paranasal sinuses and   mastoid air cells are clear.    Other:      ASSESSMENT:   # Encephalopathy    R/O Wernicke-Korsakoff  # Alcohol use disorder, severe      PLAN:  1. Patient denies any symptoms of a psychiatric disorder at this time besides alcohol abuse.   2. Patient found to be confabulating with waxing and waning cognition  3. Speech eval for cognition done with findings of: \"min-severe cognitive deficits across domains and does not appear at the level for independent living. Recommend direct supervision with IADLs if patient were to d/c home and speech therapy at the acute level of care to address deficits stated above.\"   3. Treatment team can consider using Seroquel 50mg PO Q6H prn agitation and if patient is not sleeping can also use at night. If issue is more behavioral vs needing to sedate the patient, can trial Risperdal 0.5mg PO BID agitation or Risperdal 1mg PO QHS. May increase either medication as appropriate.    Legal status: no hold  Labs/tests ordered: no  Phone calls: yes, attempted to call his sister Joycelyn, but number in Epic is not correct  Records reviewed: " yes  Discussed patient with other provider:no  Signing off, please re-consult as needed.    Thank you for the consult.

## 2017-12-09 LAB
ANION GAP SERPL CALC-SCNC: 6 MMOL/L (ref 0–11.9)
ANISOCYTOSIS BLD QL SMEAR: ABNORMAL
BASOPHILS # BLD AUTO: 1.8 % (ref 0–1.8)
BASOPHILS # BLD: 0.12 K/UL (ref 0–0.12)
BUN SERPL-MCNC: 8 MG/DL (ref 8–22)
CALCIUM SERPL-MCNC: 8.6 MG/DL (ref 8.5–10.5)
CHLORIDE SERPL-SCNC: 109 MMOL/L (ref 96–112)
CO2 SERPL-SCNC: 24 MMOL/L (ref 20–33)
CREAT SERPL-MCNC: 0.75 MG/DL (ref 0.5–1.4)
EOSINOPHIL # BLD AUTO: 0.17 K/UL (ref 0–0.51)
EOSINOPHIL NFR BLD: 2.6 % (ref 0–6.9)
ERYTHROCYTE [DISTWIDTH] IN BLOOD BY AUTOMATED COUNT: 57.2 FL (ref 35.9–50)
GFR SERPL CREATININE-BSD FRML MDRD: >60 ML/MIN/1.73 M 2
GLUCOSE SERPL-MCNC: 101 MG/DL (ref 65–99)
HCT VFR BLD AUTO: 26.5 % (ref 42–52)
HGB BLD-MCNC: 8.4 G/DL (ref 14–18)
HYPOCHROMIA BLD QL SMEAR: ABNORMAL
LYMPHOCYTES # BLD AUTO: 1.94 K/UL (ref 1–4.8)
LYMPHOCYTES NFR BLD: 28.9 % (ref 22–41)
MACROCYTES BLD QL SMEAR: ABNORMAL
MANUAL DIFF BLD: NORMAL
MCH RBC QN AUTO: 30.3 PG (ref 27–33)
MCHC RBC AUTO-ENTMCNC: 31.7 G/DL (ref 33.7–35.3)
MCV RBC AUTO: 95.7 FL (ref 81.4–97.8)
MONOCYTES # BLD AUTO: 0.82 K/UL (ref 0–0.85)
MONOCYTES NFR BLD AUTO: 12.3 % (ref 0–13.4)
MORPHOLOGY BLD-IMP: NORMAL
NEUTROPHILS # BLD AUTO: 3.64 K/UL (ref 1.82–7.42)
NEUTROPHILS NFR BLD: 54.4 % (ref 44–72)
NRBC # BLD AUTO: 0 K/UL
NRBC BLD AUTO-RTO: 0 /100 WBC
PLATELET # BLD AUTO: 753 K/UL (ref 164–446)
PLATELET BLD QL SMEAR: NORMAL
PMV BLD AUTO: 9.7 FL (ref 9–12.9)
POTASSIUM SERPL-SCNC: 4 MMOL/L (ref 3.6–5.5)
RBC # BLD AUTO: 2.77 M/UL (ref 4.7–6.1)
RBC BLD AUTO: PRESENT
SODIUM SERPL-SCNC: 139 MMOL/L (ref 135–145)
WBC # BLD AUTO: 6.7 K/UL (ref 4.8–10.8)

## 2017-12-09 PROCEDURE — 36415 COLL VENOUS BLD VENIPUNCTURE: CPT

## 2017-12-09 PROCEDURE — A9270 NON-COVERED ITEM OR SERVICE: HCPCS | Performed by: STUDENT IN AN ORGANIZED HEALTH CARE EDUCATION/TRAINING PROGRAM

## 2017-12-09 PROCEDURE — 80048 BASIC METABOLIC PNL TOTAL CA: CPT

## 2017-12-09 PROCEDURE — 85007 BL SMEAR W/DIFF WBC COUNT: CPT

## 2017-12-09 PROCEDURE — 700111 HCHG RX REV CODE 636 W/ 250 OVERRIDE (IP): Performed by: INTERNAL MEDICINE

## 2017-12-09 PROCEDURE — 770006 HCHG ROOM/CARE - MED/SURG/GYN SEMI*

## 2017-12-09 PROCEDURE — 700102 HCHG RX REV CODE 250 W/ 637 OVERRIDE(OP): Performed by: INTERNAL MEDICINE

## 2017-12-09 PROCEDURE — 700102 HCHG RX REV CODE 250 W/ 637 OVERRIDE(OP): Performed by: STUDENT IN AN ORGANIZED HEALTH CARE EDUCATION/TRAINING PROGRAM

## 2017-12-09 PROCEDURE — A9270 NON-COVERED ITEM OR SERVICE: HCPCS | Performed by: INTERNAL MEDICINE

## 2017-12-09 PROCEDURE — 99232 SBSQ HOSP IP/OBS MODERATE 35: CPT | Mod: GC | Performed by: INTERNAL MEDICINE

## 2017-12-09 PROCEDURE — 92526 ORAL FUNCTION THERAPY: CPT

## 2017-12-09 PROCEDURE — 85027 COMPLETE CBC AUTOMATED: CPT

## 2017-12-09 RX ORDER — POLYVINYL ALCOHOL 14 MG/ML
1 SOLUTION/ DROPS OPHTHALMIC PRN
Status: DISCONTINUED | OUTPATIENT
Start: 2017-12-09 | End: 2017-12-15 | Stop reason: HOSPADM

## 2017-12-09 RX ORDER — RISPERIDONE 1 MG/1
1 TABLET ORAL EVERY EVENING
Status: DISCONTINUED | OUTPATIENT
Start: 2017-12-09 | End: 2017-12-10 | Stop reason: ALTCHOICE

## 2017-12-09 RX ADMIN — STANDARDIZED SENNA CONCENTRATE AND DOCUSATE SODIUM 2 TABLET: 8.6; 5 TABLET, FILM COATED ORAL at 22:11

## 2017-12-09 RX ADMIN — FOLIC ACID 1 MG: 1 TABLET ORAL at 09:14

## 2017-12-09 RX ADMIN — STANDARDIZED SENNA CONCENTRATE AND DOCUSATE SODIUM 2 TABLET: 8.6; 5 TABLET, FILM COATED ORAL at 09:13

## 2017-12-09 RX ADMIN — RISPERIDONE 1 MG: 1 TABLET, FILM COATED ORAL at 21:00

## 2017-12-09 RX ADMIN — SUCRALFATE 1 G: 1 SUSPENSION ORAL at 05:51

## 2017-12-09 RX ADMIN — HEPARIN SODIUM 5000 UNITS: 5000 INJECTION, SOLUTION INTRAVENOUS; SUBCUTANEOUS at 22:11

## 2017-12-09 RX ADMIN — Medication 100 MG: at 09:13

## 2017-12-09 RX ADMIN — SUCRALFATE 1 G: 1 SUSPENSION ORAL at 16:59

## 2017-12-09 RX ADMIN — HEPARIN SODIUM 5000 UNITS: 5000 INJECTION, SOLUTION INTRAVENOUS; SUBCUTANEOUS at 05:51

## 2017-12-09 RX ADMIN — THERA TABS 1 TABLET: TAB at 09:13

## 2017-12-09 RX ADMIN — Medication 325 MG: at 09:14

## 2017-12-09 RX ADMIN — OMEPRAZOLE 40 MG: 20 CAPSULE, DELAYED RELEASE ORAL at 09:14

## 2017-12-09 RX ADMIN — CYANOCOBALAMIN TAB 500 MCG 2000 MCG: 500 TAB at 09:12

## 2017-12-09 RX ADMIN — OMEPRAZOLE 40 MG: 20 CAPSULE, DELAYED RELEASE ORAL at 22:11

## 2017-12-09 RX ADMIN — SUCRALFATE 1 G: 1 SUSPENSION ORAL at 23:42

## 2017-12-09 RX ADMIN — SUCRALFATE 1 G: 1 SUSPENSION ORAL at 12:36

## 2017-12-09 RX ADMIN — HEPARIN SODIUM 5000 UNITS: 5000 INJECTION, SOLUTION INTRAVENOUS; SUBCUTANEOUS at 14:00

## 2017-12-09 ASSESSMENT — LIFESTYLE VARIABLES
PAROXYSMAL SWEATS: NO SWEAT VISIBLE
EVER FELT BAD OR GUILTY ABOUT YOUR DRINKING: NO
HAVE PEOPLE ANNOYED YOU BY CRITICIZING YOUR DRINKING: NO
ORIENTATION AND CLOUDING OF SENSORIUM: ORIENTED AND CAN DO SERIAL ADDITIONS
HEADACHE, FULLNESS IN HEAD: NOT PRESENT
CONSUMPTION TOTAL: POSITIVE
ON A TYPICAL DAY WHEN YOU DRINK ALCOHOL HOW MANY DRINKS DO YOU HAVE: 5
EVER HAD A DRINK FIRST THING IN THE MORNING TO STEADY YOUR NERVES TO GET RID OF A HANGOVER: NO
TOTAL SCORE: 0
TREMOR: NO TREMOR
TOTAL SCORE: 0
HAVE YOU EVER FELT YOU SHOULD CUT DOWN ON YOUR DRINKING: NO
TOTAL SCORE: 0
VISUAL DISTURBANCES: NOT PRESENT
AVERAGE NUMBER OF DAYS PER WEEK YOU HAVE A DRINK CONTAINING ALCOHOL: 6
TOTAL SCORE: 2
AUDITORY DISTURBANCES: NOT PRESENT
ANXIETY: MILDLY ANXIOUS
AGITATION: SOMEWHAT MORE THAN NORMAL ACTIVITY
NAUSEA AND VOMITING: NO NAUSEA AND NO VOMITING
DO YOU DRINK ALCOHOL: YES
HOW MANY TIMES IN THE PAST YEAR HAVE YOU HAD 5 OR MORE DRINKS IN A DAY: 15

## 2017-12-09 ASSESSMENT — ENCOUNTER SYMPTOMS
PALPITATIONS: 0
FEVER: 0
HALLUCINATIONS: 1
SHORTNESS OF BREATH: 0
COUGH: 1
CHILLS: 0
DIARRHEA: 0
ABDOMINAL PAIN: 0
VOMITING: 0
NAUSEA: 0
WHEEZING: 0

## 2017-12-09 ASSESSMENT — COPD QUESTIONNAIRES
HAVE YOU SMOKED AT LEAST 100 CIGARETTES IN YOUR ENTIRE LIFE: NO/DON'T KNOW
DO YOU EVER COUGH UP ANY MUCUS OR PHLEGM?: NO/ONLY WITH OCCASIONAL COLDS OR INFECTIONS
DURING THE PAST 4 WEEKS HOW MUCH DID YOU FEEL SHORT OF BREATH: NONE/LITTLE OF THE TIME
COPD SCREENING SCORE: 2

## 2017-12-09 ASSESSMENT — PAIN SCALES - GENERAL: PAINLEVEL_OUTOF10: 0

## 2017-12-09 NOTE — CARE PLAN
Problem: Psychosocial Needs:  Goal: Level of anxiety will decrease  Outcome: PROGRESSING SLOWER THAN EXPECTED  Pt stating wanting feeding tube out, discussed with MD. Speech eval put in.     Problem: Communication  Goal: The ability to communicate needs accurately and effectively will improve  Outcome: PROGRESSING AS EXPECTED  Pt stating eyes dry, requested eye drops for pt.

## 2017-12-09 NOTE — THERAPY
"Speech Language Therapy dysphagia treatment completed.   Functional Status:  Called by nurs to see pt related to MD order. Pt with confabulatory speech intermittently. 12/8 chest xray indicating increasing right lung hypoinflation and RLL atelectasis. Pt follows directives to move up in bed. He requires bilat wrist and vest restraint. Pt given 1/3 tsp amounts of thickened water and sherbet. No delay in swallow with pt following directives for a double swallow. Pt with frequent, non-productive, congested sounding cough prior to, during, and post treatment. No oral suction required. Pt with hist of dysphagia during prior admits per EMR. SLP will continue to follow for prefeeding tx. NPO for nutrition cont to be recommended.   Recommendations: Prefeeding with SLP  Plan of Care: Will benefit from Speech Therapy 3 times per week  Post-Acute Therapy: Discharge to a transitional care facility for continued skilled therapy services.Thanks, Sumeet    See \"Rehab Therapy-Acute\" Patient Summary Report for complete documentation.     "

## 2017-12-09 NOTE — PROGRESS NOTES
Assumed care of pt at 0730. A/Ox3, discussed plan of care. Pt on room air, tolerating tube feed at 70, pt stating wife is coming from [place] to visit, pt stating no pain at this time. All needs met at this time. Bed in lowest position, treaded socks on, personal belongings and call light within reach, instructed to call for any assistance.

## 2017-12-09 NOTE — PROGRESS NOTES
.         Internal Medicine Interval Note  Note Author: Shazia Solomon M.D.     Name Chava Mercedes     1950   Age/Sex 67 y.o. male   MRN [id number]   Code Status FULL     After 5PM or if no immediate response to page, please call for cross-coverage  Attending/Team: Julius/Lydia See Patient List for primary contact information  Call [phone] to page    1st Call - Day Intern (R1):   Juanito 2nd Call - Day Sr. Resident (R2/R3):   [place]         Reason for interval visit  (Principal Problem)   Alcohol withdrawal    Interval Problem Daily Status Update  (24 hours)   - Mental status inconsistent; continued delusions + A/V hallucinations  - Neuro evaluation not c/w organic pathology  - PRN seroquel for agitation and QHS risperdal for behavio per Psych  - Neurocognitive eval by SLP shows min-severe cognitive deficits in all domains  - Not at level of independent living per SLP with recs for direct supervision with IADLs and SNF upon d/c  - Repeat CXR shows RLL atelectasis and hypoinflation  - bilateral wrist restraints and vest for agitation and combativeness  - Coretrak in place, NPO per SLP, TF @ goal 70  - Patient is medically stable for discharge, pending SLP eval and SNF placement        Review of Systems   Constitutional: Negative for chills and fever.   Respiratory: Positive for cough. Negative for shortness of breath and wheezing.    Cardiovascular: Negative for chest pain, palpitations and leg swelling.   Gastrointestinal: Negative for abdominal pain, diarrhea, nausea and vomiting.   Psychiatric/Behavioral: Positive for hallucinations.   Patient uncooperative and acutely agitated    Consultants/Specialty  Gastroenterology  Neurology  Psychiatry    Also seen by: SLP, Nutrition    Disposition  Inpatient    Quality Measures    Reviewed items::  Labs reviewed and Medications reviewed  Walker Catheter: Walker in place for AMS.  DVT prophylaxis pharmacological::  Heparin  DVT prophylaxis - mechanical:   SCDs  Ulcer Prophylaxis::  Yes          Physical Exam       Vitals:    12/08/17 1622 12/08/17 1922 12/09/17 0400 12/09/17 0800   BP: 102/59 109/65 115/67 (!) 99/63   Pulse: 90 64 67 (!) 58   Resp: 16 18 16 16   Temp: 37.2 °C (99 °F) 37.2 °C (98.9 °F) 36.9 °C (98.4 °F) 37.4 °C (99.3 °F)   SpO2: 93% 95% 96% 91%   Weight:       Height:         Body mass index is 24.42 kg/m².    Oxygen Therapy:  Pulse Oximetry: 91 %, O2 (LPM): 0, O2 Delivery: None (Room Air)    Physical Exam   Constitutional: He is oriented to person, place, and time. No distress.   AOx3   HENT:   Head: Normocephalic and atraumatic.   Eyes: Conjunctivae and EOM are normal. Right eye exhibits no discharge. Left eye exhibits no discharge. No scleral icterus.   Neck: Normal range of motion.   No obvious nuchal rigidity or meningeal signs   Cardiovascular: Normal rate, regular rhythm, normal heart sounds and intact distal pulses.  Exam reveals no gallop and no friction rub.    No murmur heard.  Pulmonary/Chest: Effort normal. No respiratory distress.   Abdominal: Soft. Bowel sounds are normal.   Musculoskeletal: Normal range of motion. He exhibits no edema or tenderness.   Neurological: He is alert and oriented to person, place, and time. He has normal sensation and intact cranial nerves. He exhibits normal muscle tone. GCS score is 15.   Skin: Skin is warm and dry. He is not diaphoretic.   Psychiatric: He is not agitated. He does not express impulsivity.   Continued delusions   Nursing note and vitals reviewed.        Lab Data Review:         11/25/2017  11:44 AM    Recent Labs      12/07/17   0302  12/08/17   0159  12/09/17   0206   SODIUM  144  142  139   POTASSIUM  3.5*  3.9  4.0   CHLORIDE  113*  114*  109   CO2  25  23  24   BUN  4*  3*  8   CREATININE  0.63  0.58  0.75   CALCIUM  8.4*  8.6  8.6       Recent Labs      12/07/17   0302  12/08/17   0159  12/09/17   0206   GLUCOSE  118*  109*  101*       Recent Labs      12/07/17   0302  12/08/17   0159   12/09/17   0206   RBC  2.65*  2.69*  2.77*   HEMOGLOBIN  8.3*  8.4*  8.4*   HEMATOCRIT  25.5*  26.1*  26.5*   PLATELETCT  568*  673*  753*       Recent Labs      12/07/17   0302  12/08/17   0159  12/09/17   0206   WBC  5.5  7.7  6.7   NEUTSPOLYS  45.80  62.20  54.40   LYMPHOCYTES  28.40  17.90*  28.90   MONOCYTES  18.90*  15.30*  12.30   EOSINOPHILS  4.90  3.00  2.60   BASOPHILS  1.50  1.00  1.80           Assessment/Plan     * Altered level of consciousness   Assessment & Plan    - Period of persistent disorientiation beyond typical 7 day alcohol withdrawal course  - Alcohol withdrawal measures discontinued  - Hx of withdrawal seizures noted per wife  - Started on seroquel for agitation with PRN Ativan  - CT head negative for acute process  - EEG unremarkable  - MRI head 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, otherwise normal   - Mental status intermittently improved, but now having delusions/hallucinations  - PRN Seroquel for agitation, Risperdal QHS  - Neurocognitive eval: not at level of independent living, recs for direct supervision with IADLs and SNF upon d/c  - Psych signing off  - No obvious signs of infection  - repeat CXR negative  - Continuous pulse ox, supplemental O2 as needed, transfer to tele if desaturation        Alcohol withdrawal (CMS-HCC)- (present on admission)   Assessment & Plan    - Patient has history of alcohol withdrawal with symptoms, history of DTs  - Patient states last drink was 11/22/17, two days before admission  - Withdrawal symptoms improved as CIWA/Librium were discontinued  - Beyond typical 7 day withdrawal course, but continues to have disorientation/delirium (much improved)  - Fall/Seizure/Aspiration precautions  - Multivitamin, thiamine, folate        Positive blood cultures   Assessment & Plan    - SIRS 2/4  - Fever to 100.9 (12/05) + tachycardia:   - 1/2 BCx shows coag(-) staph, likely contaminant   - UCx negative   - CXR shows L. Basilar atelectasis, pneumonitis  not excluded; repeat unchanged   - lactate/ammonia WNL   - CRP/ESR elevated, WBC increased, high Monocytes   - high risk of aspiration   - no other signs of infection  - Guaifenesin syrup for secretions  - d/c Vancomycin (12/06-12/07)  - CTM for signs of infection        Hypokalemia- (present on admission)   Assessment & Plan    - Replete as needed  - Continue to monitor        History of pulmonary embolism- (present on admission)   Assessment & Plan    - History of PE status post IVC filter  - IVC filter placed due to contraindication for anticoagulation given duodenal ulcer bleeding        Hepatic steatosis   Assessment & Plan    - Patient has history of hepatic steatosis  - LFTs stable  - Confirmed on abdominal ultrasound

## 2017-12-09 NOTE — CARE PLAN
Problem: Venous Thromboembolism (VTW)/Deep Vein Thrombosis (DVT) Prevention:  Goal: Patient will participate in Venous Thrombosis (VTE)/Deep Vein Thrombosis (DVT)Prevention Measures  Outcome: PROGRESSING AS EXPECTED  Pt agreeable to heparin shot for VTE prophylaxis. Got up to chair x1 this shift.     Problem: Fluid Volume:  Goal: Will maintain balanced intake and output  Outcome: PROGRESSING AS EXPECTED  IVF stopped today. Pt getting TF at 70mL/hr, tolerated well. Good urine output     Problem: Bowel/Gastric:  Goal: Normal bowel function is maintained or improved  Outcome: PROGRESSING AS EXPECTED  LBM 12/7, soft and incontinent. Denies n/v/d

## 2017-12-09 NOTE — CARE PLAN
Problem: Safety  Goal: Will remain free from injury  Outcome: PROGRESSING SLOWER THAN EXPECTED    Intervention: Educate patient and significant other/support system about adaptive mobility strategies and safe transfers  Educated pt about safe transferring, call light use and reason for wrist restraints     Goal: Will remain free from falls  Outcome: PROGRESSING SLOWER THAN EXPECTED

## 2017-12-09 NOTE — PROGRESS NOTES
Assumed care at change of shift. Received report from day Rn. Pt is in bilateral wrist restraints and vest restraint due to attempting to remove his Iv, feeding tube, and get out of bed. Pt is A&O to person. Pt is pleasantly confused. Call light in reach, pt reoriented to call light and instructed in use. Bed in low position and locked. Will continue q 2 hour restraint checks and hourly rounding.

## 2017-12-10 LAB
APPEARANCE UR: CLEAR
BACTERIA BLD CULT: NORMAL
BILIRUB UR QL STRIP.AUTO: NEGATIVE
COLOR UR: YELLOW
GLUCOSE UR STRIP.AUTO-MCNC: NEGATIVE MG/DL
KETONES UR STRIP.AUTO-MCNC: NEGATIVE MG/DL
LEUKOCYTE ESTERASE UR QL STRIP.AUTO: NEGATIVE
MICRO URNS: NORMAL
NITRITE UR QL STRIP.AUTO: NEGATIVE
PH UR STRIP.AUTO: 8 [PH]
PROT UR QL STRIP: NEGATIVE MG/DL
RBC UR QL AUTO: NEGATIVE
SIGNIFICANT IND 70042: NORMAL
SITE SITE: NORMAL
SOURCE SOURCE: NORMAL
SP GR UR STRIP.AUTO: 1.01
UROBILINOGEN UR STRIP.AUTO-MCNC: 0.2 MG/DL

## 2017-12-10 PROCEDURE — 700102 HCHG RX REV CODE 250 W/ 637 OVERRIDE(OP): Performed by: STUDENT IN AN ORGANIZED HEALTH CARE EDUCATION/TRAINING PROGRAM

## 2017-12-10 PROCEDURE — 51798 US URINE CAPACITY MEASURE: CPT

## 2017-12-10 PROCEDURE — A9270 NON-COVERED ITEM OR SERVICE: HCPCS | Performed by: INTERNAL MEDICINE

## 2017-12-10 PROCEDURE — A9270 NON-COVERED ITEM OR SERVICE: HCPCS | Performed by: STUDENT IN AN ORGANIZED HEALTH CARE EDUCATION/TRAINING PROGRAM

## 2017-12-10 PROCEDURE — 87186 SC STD MICRODIL/AGAR DIL: CPT

## 2017-12-10 PROCEDURE — 87077 CULTURE AEROBIC IDENTIFY: CPT

## 2017-12-10 PROCEDURE — 700102 HCHG RX REV CODE 250 W/ 637 OVERRIDE(OP): Performed by: INTERNAL MEDICINE

## 2017-12-10 PROCEDURE — 700111 HCHG RX REV CODE 636 W/ 250 OVERRIDE (IP): Performed by: STUDENT IN AN ORGANIZED HEALTH CARE EDUCATION/TRAINING PROGRAM

## 2017-12-10 PROCEDURE — 770006 HCHG ROOM/CARE - MED/SURG/GYN SEMI*

## 2017-12-10 PROCEDURE — 81003 URINALYSIS AUTO W/O SCOPE: CPT

## 2017-12-10 PROCEDURE — 700111 HCHG RX REV CODE 636 W/ 250 OVERRIDE (IP): Performed by: INTERNAL MEDICINE

## 2017-12-10 PROCEDURE — 99232 SBSQ HOSP IP/OBS MODERATE 35: CPT | Mod: GC | Performed by: INTERNAL MEDICINE

## 2017-12-10 PROCEDURE — 87086 URINE CULTURE/COLONY COUNT: CPT

## 2017-12-10 RX ORDER — QUETIAPINE FUMARATE 25 MG/1
25 TABLET, FILM COATED ORAL EVERY 6 HOURS PRN
Status: DISCONTINUED | OUTPATIENT
Start: 2017-12-10 | End: 2017-12-12

## 2017-12-10 RX ORDER — RISPERIDONE 0.5 MG/1
0.5 TABLET ORAL EVERY MORNING
Status: DISCONTINUED | OUTPATIENT
Start: 2017-12-10 | End: 2017-12-10 | Stop reason: ALTCHOICE

## 2017-12-10 RX ORDER — QUETIAPINE FUMARATE 25 MG/1
50 TABLET, FILM COATED ORAL EVERY EVENING
Status: DISCONTINUED | OUTPATIENT
Start: 2017-12-10 | End: 2017-12-12

## 2017-12-10 RX ADMIN — THERA TABS 1 TABLET: TAB at 09:57

## 2017-12-10 RX ADMIN — FOLIC ACID 1 MG: 1 TABLET ORAL at 09:57

## 2017-12-10 RX ADMIN — LORAZEPAM 0.5 MG: 2 INJECTION INTRAMUSCULAR; INTRAVENOUS at 07:06

## 2017-12-10 RX ADMIN — OMEPRAZOLE 40 MG: 20 CAPSULE, DELAYED RELEASE ORAL at 21:29

## 2017-12-10 RX ADMIN — HEPARIN SODIUM 5000 UNITS: 5000 INJECTION, SOLUTION INTRAVENOUS; SUBCUTANEOUS at 05:15

## 2017-12-10 RX ADMIN — Medication 100 MG: at 09:57

## 2017-12-10 RX ADMIN — QUETIAPINE FUMARATE 50 MG: 25 TABLET ORAL at 10:02

## 2017-12-10 RX ADMIN — Medication 325 MG: at 05:15

## 2017-12-10 RX ADMIN — QUETIAPINE FUMARATE 25 MG: 25 TABLET ORAL at 17:53

## 2017-12-10 RX ADMIN — HEPARIN SODIUM 5000 UNITS: 5000 INJECTION, SOLUTION INTRAVENOUS; SUBCUTANEOUS at 15:16

## 2017-12-10 RX ADMIN — STANDARDIZED SENNA CONCENTRATE AND DOCUSATE SODIUM 2 TABLET: 8.6; 5 TABLET, FILM COATED ORAL at 21:29

## 2017-12-10 RX ADMIN — STANDARDIZED SENNA CONCENTRATE AND DOCUSATE SODIUM 2 TABLET: 8.6; 5 TABLET, FILM COATED ORAL at 09:57

## 2017-12-10 RX ADMIN — SUCRALFATE 1 G: 1 SUSPENSION ORAL at 17:53

## 2017-12-10 RX ADMIN — QUETIAPINE FUMARATE 50 MG: 25 TABLET ORAL at 21:29

## 2017-12-10 RX ADMIN — OMEPRAZOLE 40 MG: 20 CAPSULE, DELAYED RELEASE ORAL at 09:57

## 2017-12-10 RX ADMIN — SUCRALFATE 1 G: 1 SUSPENSION ORAL at 05:15

## 2017-12-10 RX ADMIN — RISPERIDONE 0.5 MG: 0.5 TABLET, FILM COATED ORAL at 12:41

## 2017-12-10 RX ADMIN — HEPARIN SODIUM 5000 UNITS: 5000 INJECTION, SOLUTION INTRAVENOUS; SUBCUTANEOUS at 21:30

## 2017-12-10 RX ADMIN — SUCRALFATE 1 G: 1 SUSPENSION ORAL at 15:16

## 2017-12-10 RX ADMIN — CYANOCOBALAMIN TAB 500 MCG 2000 MCG: 500 TAB at 09:57

## 2017-12-10 ASSESSMENT — LIFESTYLE VARIABLES
TREMOR: NO TREMOR
PAROXYSMAL SWEATS: NO SWEAT VISIBLE
ANXIETY: MILDLY ANXIOUS
PAROXYSMAL SWEATS: NO SWEAT VISIBLE
AUDITORY DISTURBANCES: NOT PRESENT
VISUAL DISTURBANCES: NOT PRESENT
AGITATION: SOMEWHAT MORE THAN NORMAL ACTIVITY
TREMOR: NO TREMOR
ORIENTATION AND CLOUDING OF SENSORIUM: ORIENTED AND CAN DO SERIAL ADDITIONS
HEADACHE, FULLNESS IN HEAD: NOT PRESENT
ANXIETY: MILDLY ANXIOUS
VISUAL DISTURBANCES: NOT PRESENT
AGITATION: SOMEWHAT MORE THAN NORMAL ACTIVITY
AUDITORY DISTURBANCES: NOT PRESENT
HEADACHE, FULLNESS IN HEAD: NOT PRESENT
ORIENTATION AND CLOUDING OF SENSORIUM: ORIENTED AND CAN DO SERIAL ADDITIONS
NAUSEA AND VOMITING: NO NAUSEA AND NO VOMITING
TOTAL SCORE: 2
NAUSEA AND VOMITING: NO NAUSEA AND NO VOMITING
TOTAL SCORE: 2

## 2017-12-10 ASSESSMENT — PATIENT HEALTH QUESTIONNAIRE - PHQ9
SUM OF ALL RESPONSES TO PHQ QUESTIONS 1-9: 0
2. FEELING DOWN, DEPRESSED, IRRITABLE, OR HOPELESS: NOT AT ALL
SUM OF ALL RESPONSES TO PHQ9 QUESTIONS 1 AND 2: 0
1. LITTLE INTEREST OR PLEASURE IN DOING THINGS: NOT AT ALL

## 2017-12-10 ASSESSMENT — ENCOUNTER SYMPTOMS
SHORTNESS OF BREATH: 0
FEVER: 0
ABDOMINAL PAIN: 0
NAUSEA: 0
COUGH: 1
DIARRHEA: 0
VOMITING: 0
PALPITATIONS: 0
WHEEZING: 0
CHILLS: 0

## 2017-12-10 ASSESSMENT — PAIN SCALES - GENERAL
PAINLEVEL_OUTOF10: 0
PAINLEVEL_OUTOF10: 0

## 2017-12-10 NOTE — PROGRESS NOTES
Remains only oriented to self, alert this a.m. PRN IV Ativan administered this a.m. for agitation and anxiety. CorTrak in place, placement verified via CXR 12/04/17, taped at  60Pt does not know own limitations. Continues to attempt to get OOB without assistance and pull out CorTrak. Continues to require bilateral wrist restraints and vest, assessed, applied correctly, no s/s of skin breakdown at this time. Education done on POC, including need to remove restraints prior to discharge to a SNF, no learning eveident

## 2017-12-10 NOTE — PROGRESS NOTES
[facility] [place]:      Date & Time note created:   12/10/2017   2:12 PM     Visit Time:   Attending/resident bedside rounds 9-11:30 AM     The patient was evaluated with the resident staff(Dr Etienne).  I reviewed the resident's note and agree with the resident's findings and plan as documented, except or in addition, as documented below. Please refer to the resident's note for complete information.The chart was reviewed and summarized.  Available labs, imaging, O2 sats, EKGs were reviewed. Available nursing, consultant, and resident notes were reviewed. I am actively involved in the patient's care.                                                                BRIEF DISCUSSION:                                                         Remains agitated at times.  Neuro said not an organic brain issue and no interventions anticipated.  Pt continues to demand to be discharged.  Still getting TF.  Medically cleared for discharge but will need SNF if continues to fail swallow and will be difficult to place due to behaviors.  Add AM risperdal to PM dose.  Will need capacity eval to see if pt can be discharged as his wishes.  ----------------------------------------------------------------------------------------------------------------------  Vitals:    12/09/17 1600 12/09/17 1945 12/10/17 0400 12/10/17 0800   BP: 108/64 (!) 98/63 100/69 103/55   Pulse: 92 89 85 93   Resp: 16 18 18 17   Temp: 37.6 °C (99.7 °F) 37.9 °C (100.2 °F) 36.9 °C (98.4 °F) 37.4 °C (99.4 °F)   SpO2: 92% 90% 93% 91%   Weight:   76.3 kg (168 lb 3.4 oz)    Height:         Weight/BMI: Body mass index is 22.81 kg/m².  Pulse Oximetry: 91 %, O2 (LPM): 0, O2 Delivery: None (Room Air)    Intake/Output Summary (Last 24 hours) at 12/10/17 1412  Last data filed at 12/10/17 0700   Gross per 24 hour   Intake              860 ml   Output             1450 ml   Net             -590 ml       Blue Madrid MD   [employer]

## 2017-12-10 NOTE — PROGRESS NOTES
.         Internal Medicine Interval Note  Note Author: Gibson Etienne M.D.     Name Chava Mercedes     1950   Age/Sex 67 y.o. male   MRN [id number]   Code Status FULL     After 5PM or if no immediate response to page, please call for cross-coverage  Attending/Team: Julius/Lydia See Patient List for primary contact information  Call [phone] to page    1st Call - Day Intern (R1):   Juanito 2nd Call - Day Sr. Resident (R2/R3):   [place]         Reason for interval visit  (Principal Problem)   Alcohol withdrawal    Interval Problem Daily Status Update  (24 hours)     Still has some agitation and tried to pull the coretrack tube.  Seems oriented to 4, and understand his medical condition but reluctant to cooperate with plan  We added risperidone at morning in addition to night time  We will consult psychiatry for decision capacity  Still needs coretrak and should be NPO by speech.      Review of Systems   Constitutional: Negative for chills and fever.   Respiratory: Positive for cough. Negative for shortness of breath and wheezing.    Cardiovascular: Negative for chest pain, palpitations and leg swelling.   Gastrointestinal: Negative for abdominal pain, diarrhea, nausea and vomiting.   Patient uncooperative and acutely agitated    Consultants/Specialty  Gastroenterology  Neurology  Psychiatry    Also seen by: SLP, Nutrition    Disposition  Inpatient    Quality Measures    Reviewed items::  Labs reviewed and Medications reviewed  Walker Catheter: Walker in place for AMS.  DVT prophylaxis pharmacological::  Heparin  DVT prophylaxis - mechanical:  SCDs  Ulcer Prophylaxis::  Yes          Physical Exam       Vitals:    17 1600 17 1945 12/10/17 0400 12/10/17 0800   BP: 108/64 (!) 98/63 100/69 103/55   Pulse: 92 89 85 93   Resp: 16 18 18 17   Temp: 37.6 °C (99.7 °F) 37.9 °C (100.2 °F) 36.9 °C (98.4 °F) 37.4 °C (99.4 °F)   SpO2: 92% 90% 93% 91%   Weight:   76.3 kg (168 lb 3.4 oz)    Height:          Body mass index is 22.81 kg/m². Weight: 76.3 kg (168 lb 3.4 oz)  Oxygen Therapy:  Pulse Oximetry: 91 %, O2 (LPM): 0, O2 Delivery: None (Room Air)    Physical Exam   Constitutional: He is oriented to person, place, and time. No distress.   AOx3   HENT:   Head: Normocephalic and atraumatic.   Eyes: Conjunctivae and EOM are normal. Right eye exhibits no discharge. Left eye exhibits no discharge. No scleral icterus.   Neck: Normal range of motion.   No obvious nuchal rigidity or meningeal signs   Cardiovascular: Normal rate, regular rhythm, normal heart sounds and intact distal pulses.  Exam reveals no gallop and no friction rub.    No murmur heard.  Pulmonary/Chest: Effort normal. No respiratory distress.   Abdominal: Soft. Bowel sounds are normal.   Musculoskeletal: Normal range of motion. He exhibits no edema or tenderness.   Neurological: He is alert and oriented to person, place, and time. He has normal sensation and intact cranial nerves. He exhibits normal muscle tone. GCS score is 15.   Skin: Skin is warm and dry. He is not diaphoretic.   Psychiatric: He is not agitated. He does not express impulsivity.   Nursing note and vitals reviewed.        Lab Data Review:         11/25/2017  11:44 AMDictation #1  MRN:[id number]  CSN:[id number]      Recent Labs      12/08/17 0159 12/09/17 0206   SODIUM  142  139   POTASSIUM  3.9  4.0   CHLORIDE  114*  109   CO2  23  24   BUN  3*  8   CREATININE  0.58  0.75   CALCIUM  8.6  8.6       Recent Labs      12/08/17 0159 12/09/17   0206   GLUCOSE  109*  101*       Recent Labs      12/08/17 0159 12/09/17   0206   RBC  2.69*  2.77*   HEMOGLOBIN  8.4*  8.4*   HEMATOCRIT  26.1*  26.5*   PLATELETCT  673*  753*       Recent Labs      12/08/17 0159 12/09/17   0206   WBC  7.7  6.7   NEUTSPOLYS  62.20  54.40   LYMPHOCYTES  17.90*  28.90   MONOCYTES  15.30*  12.30   EOSINOPHILS  3.00  2.60   BASOPHILS  1.00  1.80           Assessment/Plan     * Altered level of consciousness      Assessment & Plan    - mental status improving but still has some agitation. 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 Korsackof encephalopathy   - PRN Seroquel for agitation  - we added risperidone at morning dose in addition to night dose  - psychiatry consulted for decision capacity          Alcohol withdrawal (CMS-HCC)- (present on admission)   Assessment & Plan    - Patient has history of alcohol withdrawal with symptoms, history of DTs  - Patient states last drink was 11/22/17, two days before admission  - Withdrawal symptoms improved as CIWA/Librium were discontinued  - Beyond typical 7 day withdrawal course, but continues to have disorientation/delirium (much improved)  - Fall/Seizure/Aspiration precautions  - Multivitamin, thiamine, folate        Positive blood cultures   Assessment & Plan    Most likely contamination . No features of infection. Discontinue ABx        Hypokalemia- (present on admission)   Assessment & Plan    - Replete as needed  - Continue to monitor        History of pulmonary embolism- (present on admission)   Assessment & Plan    - History of PE status post IVC filter  - IVC filter placed due to contraindication for anticoagulation given duodenal ulcer bleeding        Hepatic steatosis   Assessment & Plan    - Patient has history of hepatic steatosis  - LFTs stable  - Confirmed on abdominal ultrasound

## 2017-12-10 NOTE — PROGRESS NOTES
Pt stating wanting to leave. Informed Charge RN. Re oriented pt to situation. Pt continuing to state he needs to leave.

## 2017-12-10 NOTE — PROGRESS NOTES
Contacted Dr. Dutton with [facility] requesting orders to continue restraints, due to patient pulling at tubes, and not following instructions. New orders updated.

## 2017-12-10 NOTE — CARE PLAN
Problem: Knowledge Deficit  Goal: Knowledge of disease process/condition, treatment plan, diagnostic tests, and medications will improve  Education done on POC including need to remove restraint in order to discuss discharge option, no learning evident. Discussed with MD, medication changed and consult placed to physchiatry.   Intervention: Assess knowledge level of disease process/condition, treatment plan, diagnostic tests, and medications  Pt continues to require restraints for safety, pt does not understand limitations and continues to attempt to removed lines despite education. Will cotinue to assess readiness to remove restraints and re-orient pt often

## 2017-12-10 NOTE — PSYCHIATRY
Patient was too somnolent to do a capacity evaluation. Will attempt to do so tomorrow. If patient refuses to talk, he will be found incapacitated and was told that on today's attempt.     Appears there was confusion regarding Risperdal and Seroquel. Would recommend only using ONE, but not both. Will cancel Risperdal at this time and change Seroquel dosing due to patient being too somnolent.     Will change to Seroquel 25mg PO Q6H prn agitation and Seroquel 50mg PO QHS

## 2017-12-11 PROBLEM — F10.939 ALCOHOL WITHDRAWAL (HCC): Status: RESOLVED | Noted: 2017-11-24 | Resolved: 2017-12-11

## 2017-12-11 PROBLEM — R78.81 POSITIVE BLOOD CULTURES: Status: RESOLVED | Noted: 2017-12-06 | Resolved: 2017-12-11

## 2017-12-11 PROBLEM — Z86.711 HISTORY OF PULMONARY EMBOLISM: Status: RESOLVED | Noted: 2017-11-24 | Resolved: 2017-12-11

## 2017-12-11 LAB
CRP SERPL HS-MCNC: 1.25 MG/DL (ref 0–0.75)
ERYTHROCYTE [DISTWIDTH] IN BLOOD BY AUTOMATED COUNT: 57.1 FL (ref 35.9–50)
HCT VFR BLD AUTO: 29.2 % (ref 42–52)
HGB BLD-MCNC: 9.2 G/DL (ref 14–18)
MCH RBC QN AUTO: 29.8 PG (ref 27–33)
MCHC RBC AUTO-ENTMCNC: 31.5 G/DL (ref 33.7–35.3)
MCV RBC AUTO: 94.5 FL (ref 81.4–97.8)
PLATELET # BLD AUTO: 740 K/UL (ref 164–446)
PMV BLD AUTO: 9.8 FL (ref 9–12.9)
PREALB SERPL-MCNC: 11 MG/DL (ref 18–38)
RBC # BLD AUTO: 3.09 M/UL (ref 4.7–6.1)
WBC # BLD AUTO: 7.2 K/UL (ref 4.8–10.8)

## 2017-12-11 PROCEDURE — A9270 NON-COVERED ITEM OR SERVICE: HCPCS | Performed by: STUDENT IN AN ORGANIZED HEALTH CARE EDUCATION/TRAINING PROGRAM

## 2017-12-11 PROCEDURE — 700105 HCHG RX REV CODE 258: Performed by: STUDENT IN AN ORGANIZED HEALTH CARE EDUCATION/TRAINING PROGRAM

## 2017-12-11 PROCEDURE — A9270 NON-COVERED ITEM OR SERVICE: HCPCS | Performed by: INTERNAL MEDICINE

## 2017-12-11 PROCEDURE — 700111 HCHG RX REV CODE 636 W/ 250 OVERRIDE (IP): Performed by: INTERNAL MEDICINE

## 2017-12-11 PROCEDURE — 770006 HCHG ROOM/CARE - MED/SURG/GYN SEMI*

## 2017-12-11 PROCEDURE — 700102 HCHG RX REV CODE 250 W/ 637 OVERRIDE(OP): Performed by: INTERNAL MEDICINE

## 2017-12-11 PROCEDURE — 86140 C-REACTIVE PROTEIN: CPT

## 2017-12-11 PROCEDURE — 700111 HCHG RX REV CODE 636 W/ 250 OVERRIDE (IP): Performed by: STUDENT IN AN ORGANIZED HEALTH CARE EDUCATION/TRAINING PROGRAM

## 2017-12-11 PROCEDURE — 85027 COMPLETE CBC AUTOMATED: CPT

## 2017-12-11 PROCEDURE — 84425 ASSAY OF VITAMIN B-1: CPT

## 2017-12-11 PROCEDURE — 700102 HCHG RX REV CODE 250 W/ 637 OVERRIDE(OP): Performed by: STUDENT IN AN ORGANIZED HEALTH CARE EDUCATION/TRAINING PROGRAM

## 2017-12-11 PROCEDURE — 84134 ASSAY OF PREALBUMIN: CPT

## 2017-12-11 PROCEDURE — 36415 COLL VENOUS BLD VENIPUNCTURE: CPT

## 2017-12-11 PROCEDURE — 99232 SBSQ HOSP IP/OBS MODERATE 35: CPT | Mod: GC | Performed by: INTERNAL MEDICINE

## 2017-12-11 PROCEDURE — 92526 ORAL FUNCTION THERAPY: CPT

## 2017-12-11 RX ORDER — THIAMINE MONONITRATE (VIT B1) 100 MG
100 TABLET ORAL DAILY
Status: DISCONTINUED | OUTPATIENT
Start: 2017-12-18 | End: 2017-12-12

## 2017-12-11 RX ADMIN — THIAMINE HYDROCHLORIDE 500 MG: 100 INJECTION, SOLUTION INTRAMUSCULAR; INTRAVENOUS at 20:55

## 2017-12-11 RX ADMIN — THERA TABS 1 TABLET: TAB at 09:19

## 2017-12-11 RX ADMIN — THIAMINE HYDROCHLORIDE 500 MG: 100 INJECTION, SOLUTION INTRAMUSCULAR; INTRAVENOUS at 16:09

## 2017-12-11 RX ADMIN — QUETIAPINE FUMARATE 25 MG: 25 TABLET ORAL at 15:26

## 2017-12-11 RX ADMIN — QUETIAPINE FUMARATE 50 MG: 25 TABLET ORAL at 20:54

## 2017-12-11 RX ADMIN — HEPARIN SODIUM 5000 UNITS: 5000 INJECTION, SOLUTION INTRAVENOUS; SUBCUTANEOUS at 05:24

## 2017-12-11 RX ADMIN — FOLIC ACID 1 MG: 1 TABLET ORAL at 09:19

## 2017-12-11 RX ADMIN — THIAMINE HYDROCHLORIDE 500 MG: 100 INJECTION, SOLUTION INTRAMUSCULAR; INTRAVENOUS at 13:19

## 2017-12-11 RX ADMIN — Medication 100 MG: at 09:19

## 2017-12-11 RX ADMIN — CYANOCOBALAMIN TAB 500 MCG 2000 MCG: 500 TAB at 09:19

## 2017-12-11 RX ADMIN — SUCRALFATE 1 G: 1 SUSPENSION ORAL at 13:17

## 2017-12-11 RX ADMIN — STANDARDIZED SENNA CONCENTRATE AND DOCUSATE SODIUM 2 TABLET: 8.6; 5 TABLET, FILM COATED ORAL at 09:19

## 2017-12-11 RX ADMIN — OMEPRAZOLE 40 MG: 20 CAPSULE, DELAYED RELEASE ORAL at 09:19

## 2017-12-11 RX ADMIN — QUETIAPINE FUMARATE 25 MG: 25 TABLET ORAL at 09:19

## 2017-12-11 RX ADMIN — SUCRALFATE 1 G: 1 SUSPENSION ORAL at 00:10

## 2017-12-11 RX ADMIN — SUCRALFATE 1 G: 1 SUSPENSION ORAL at 18:10

## 2017-12-11 RX ADMIN — OMEPRAZOLE 40 MG: 20 CAPSULE, DELAYED RELEASE ORAL at 21:00

## 2017-12-11 RX ADMIN — SUCRALFATE 1 G: 1 SUSPENSION ORAL at 23:47

## 2017-12-11 RX ADMIN — Medication 325 MG: at 09:20

## 2017-12-11 RX ADMIN — HEPARIN SODIUM 5000 UNITS: 5000 INJECTION, SOLUTION INTRAVENOUS; SUBCUTANEOUS at 20:54

## 2017-12-11 RX ADMIN — HEPARIN SODIUM 5000 UNITS: 5000 INJECTION, SOLUTION INTRAVENOUS; SUBCUTANEOUS at 13:17

## 2017-12-11 RX ADMIN — SUCRALFATE 1 G: 1 SUSPENSION ORAL at 05:25

## 2017-12-11 ASSESSMENT — ENCOUNTER SYMPTOMS
DIZZINESS: 0
HALLUCINATIONS: 1
TREMORS: 0
TINGLING: 0
ORTHOPNEA: 0
NAUSEA: 0
BLOOD IN STOOL: 0
HEADACHES: 0
PHOTOPHOBIA: 0
DIAPHORESIS: 0
BLURRED VISION: 0
CLAUDICATION: 0
FOCAL WEAKNESS: 0
SENSORY CHANGE: 0
PALPITATIONS: 0
COUGH: 1
WHEEZING: 0
VOMITING: 0
HALLUCINATIONS: 0
SPEECH CHANGE: 0
DIARRHEA: 0
CONSTIPATION: 0
FEVER: 0
SPUTUM PRODUCTION: 0
CHILLS: 0
DOUBLE VISION: 0
HEMOPTYSIS: 0
WEAKNESS: 0
ABDOMINAL PAIN: 0
SORE THROAT: 0
SHORTNESS OF BREATH: 0
COUGH: 0

## 2017-12-11 ASSESSMENT — PAIN SCALES - GENERAL
PAINLEVEL_OUTOF10: 0
PAINLEVEL_OUTOF10: 0

## 2017-12-11 NOTE — DISCHARGE PLANNING
Clinical update:   DX: alcohol withdrawal, confusion, GI bleed. ETOH related Wernicke Korsakoff Encepalopathy.  Psych consulting, AO 3-4 but confabulates and waxes and wanes  Started IV Thiamine  Remains in restraints  SLP following to dc coretrak 12/12/17  Med cleared for SNF.  SW to continue to assess for discharge planning needs as appropriate.

## 2017-12-11 NOTE — CARE PLAN
Problem: Nutritional:  Goal: Nutrition support tolerated and meeting greater than 85% of estimated needs  Outcome: MET Date Met: 12/11/17

## 2017-12-11 NOTE — PROGRESS NOTES
.         Internal Medicine Interval Note  Note Author: Shazia Solomon M.D.     Name Chava Mercedes     1950   Age/Sex 67 y.o. male   MRN [id number]   Code Status FULL     After 5PM or if no immediate response to page, please call for cross-coverage  Attending/Team: Julius/Lydia See Patient List for primary contact information  Call [phone] to page    1st Call - Day Intern (R1):   Juanito 2nd Call - Day Sr. Resident (R2/R3):   [place]         Reason for interval visit  (Principal Problem)   Alcohol withdrawal    Interval Problem Daily Status Update  (24 hours)   - AOx3-4, with confabulations  - Start high dose IV Thiamine   - bilateral wrist restraints and vest for agitation and combativeness  - Full liquid nectar thick with 1:1 supervision, SLP to d/c coretrak tomorrow  - Patient is medically stable for discharge, pending SNF placement        Review of Systems   Constitutional: Negative for chills and fever.   Respiratory: Positive for cough. Negative for shortness of breath and wheezing.    Cardiovascular: Negative for chest pain, palpitations and leg swelling.   Gastrointestinal: Negative for abdominal pain, diarrhea, nausea and vomiting.   Psychiatric/Behavioral: Negative for hallucinations.       Consultants/Specialty  Gastroenterology  Neurology  Psychiatry    Also seen by: SLP, Nutrition    Disposition  Inpatient    Quality Measures    Reviewed items::  Labs reviewed and Medications reviewed  Walker Catheter: Walker in place for AMS.  DVT prophylaxis pharmacological::  Heparin  DVT prophylaxis - mechanical:  SCDs  Ulcer Prophylaxis::  Yes          Physical Exam       Vitals:    12/10/17 2000 12/10/17 2145 17 0327 17 0800   BP: (!) 97/64 101/67 (!) 90/57 109/62   Pulse:   100 92   Resp:   16 16   Temp:   37.6 °C (99.6 °F) 36.8 °C (98.2 °F)   SpO2:   96% 93%   Weight:       Height:         Body mass index is 22.81 kg/m².    Oxygen Therapy:  Pulse Oximetry: 93 %, O2 (LPM): 0, O2  Delivery: None (Room Air)    Physical Exam   Constitutional: He is oriented to person, place, and time. No distress.   AOx3   HENT:   Head: Normocephalic and atraumatic.   Eyes: Conjunctivae and EOM are normal. Right eye exhibits no discharge. Left eye exhibits no discharge. No scleral icterus.   Neck: Normal range of motion.   No obvious nuchal rigidity or meningeal signs   Cardiovascular: Normal rate, regular rhythm, normal heart sounds and intact distal pulses.  Exam reveals no gallop and no friction rub.    No murmur heard.  Pulmonary/Chest: Effort normal. No respiratory distress.   Abdominal: Soft. Bowel sounds are normal.   Musculoskeletal: Normal range of motion. He exhibits no edema or tenderness.   Neurological: He is alert and oriented to person, place, and time. He has normal sensation and intact cranial nerves. He exhibits normal muscle tone. GCS score is 15.   Skin: Skin is warm and dry. He is not diaphoretic.   Psychiatric: He is not agitated. He does not express impulsivity.   Continued delusions   Nursing note and vitals reviewed.        Lab Data Review:         11/25/2017  11:44 AM    Recent Labs      12/09/17 0206   SODIUM  139   POTASSIUM  4.0   CHLORIDE  109   CO2  24   BUN  8   CREATININE  0.75   CALCIUM  8.6       Recent Labs      12/09/17 0206 12/11/17   0305   PREALBUMIN   --   11.0*   GLUCOSE  101*   --        Recent Labs      12/09/17 0206 12/11/17   0305   RBC  2.77*  3.09*   HEMOGLOBIN  8.4*  9.2*   HEMATOCRIT  26.5*  29.2*   PLATELETCT  753*  740*       Recent Labs      12/09/17 0206 12/11/17 0305   WBC  6.7  7.2   NEUTSPOLYS  54.40   --    LYMPHOCYTES  28.90   --    MONOCYTES  12.30   --    EOSINOPHILS  2.60   --    BASOPHILS  1.80   --            Assessment/Plan     * Altered level of consciousness   Assessment & Plan    - Continued confusion  - CT head negative for acute process  - EEG unremarkable  - MRI head 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

## 2017-12-11 NOTE — CARE PLAN
Problem: Knowledge Deficit  Goal: Knowledge of disease process/condition, treatment plan, diagnostic tests, and medications will improve  Education done on POC including need to remove restraint in order to discuss discharge option, no learning evident. Discussed with MD, medication changed and consult placed to physchiatry.     Intervention: Assess knowledge level of disease process/condition, treatment plan, diagnostic tests, and medications  Pt continues to require restraints for safety, pt does not understand limitations and continues to attempt to removed lines despite education. Will cotinue to assess readiness to remove restraints and re-orient pt often          Problem: Psychosocial Needs:  Goal: Level of anxiety will decrease  Outcome: PROGRESSING AS EXPECTED  Pt calm currently, resting in bed, no s/s of anxiety. PRN PO Seroquel administered for anxiety, with good result as pt appears calm and comfortable

## 2017-12-11 NOTE — PROGRESS NOTES
1900:  Pt A&Ox2 (self and place only), pt is in non-violent restraint: vest and L and R wrists in use. Bed in low position, call light within reach, treaded socks on

## 2017-12-11 NOTE — NON-PROVIDER
Internal Medicine Medical Student Note  Note Author: Raul MSantos Gasloane, Student    Name Chava Mercedes     1950   Age/Sex 67 y.o. male   MRN [id number]   Code Status Full code     After 5PM or if no immediate response to page, please call for cross-coverage  Attending/Team: Dr. Up/ Lydia See Patient List for primary contact information  Call [phone] to page after hours   1st Call - Day Intern (R1):   Dr. Solomon 2nd Call - Day Sr. Resident (R2/R3):   Dr. Etienne     Reason for interval visit  (Principal Problem)   Altered level of consciousness    Interval Problem Daily Status Update  (24 hours)   Patient had no acute overnight events. Patient was seen by speech therapy this morning and recommends advancing diet to nectar thick full liquids. Speaking with the patient this morning, he appear alert and oriented to person and place. He was cooperative but continued to be confabulatory. He states that he wants the feeding tube removed and wants to go home. We are awaiting psychiatries evaluation for capacity to make this decision. Patient denies chest pain, palpitations or shortness of breath.     Review of Systems   Constitutional: Negative for diaphoresis and fever.   HENT: Negative for congestion and sore throat.    Eyes: Negative for blurred vision, double vision and photophobia.   Respiratory: Negative for cough, hemoptysis, sputum production and shortness of breath.    Cardiovascular: Negative for chest pain, palpitations, orthopnea, claudication and leg swelling.   Gastrointestinal: Negative for abdominal pain, blood in stool, constipation, diarrhea, melena, nausea and vomiting.   Neurological: Negative for dizziness, tingling, tremors, sensory change, speech change, focal weakness, weakness and headaches.   Psychiatric/Behavioral: Positive for hallucinations.       Physical Exam       Vitals:    12/10/17 2000 12/10/17 2145 17 0327 17 0800   BP: (!) 97/64 101/67 (!) 90/57  109/62   Pulse:   100 92   Resp:   16 16   Temp:   37.6 °C (99.6 °F) 36.8 °C (98.2 °F)   SpO2:   96% 93%   Weight:       Height:         Body mass index is 22.81 kg/m².    Oxygen Therapy:  Pulse Oximetry: 93 %, O2 (LPM): 0, O2 Delivery: None (Room Air)    Physical Exam   Constitutional: He is well-developed, well-nourished, and in no distress.   HENT:   Head: Normocephalic and atraumatic.   Eyes: Conjunctivae and EOM are normal.   Neck: Normal range of motion. Neck supple.   Cardiovascular: Normal rate, regular rhythm, normal heart sounds and intact distal pulses.  Exam reveals no gallop and no friction rub.    No murmur heard.  Pulmonary/Chest: Effort normal and breath sounds normal. No respiratory distress. He has no wheezes. He has no rales.   Abdominal: Soft. Bowel sounds are normal. He exhibits no distension. There is no tenderness. There is no rebound and no guarding.   Neurological: No cranial nerve deficit. Coordination normal.   A&O x 2, confabulation   Skin: Skin is warm and dry.     Most Recent Labs:    Lab Results   Component Value Date/Time    WBC 7.2 12/11/2017 03:05 AM    RBC 3.09 (L) 12/11/2017 03:05 AM    HEMOGLOBIN 9.2 (L) 12/11/2017 03:05 AM    HEMATOCRIT 29.2 (L) 12/11/2017 03:05 AM    MCV 94.5 12/11/2017 03:05 AM    MCH 29.8 12/11/2017 03:05 AM    MCHC 31.5 (L) 12/11/2017 03:05 AM    MPV 9.8 12/11/2017 03:05 AM    NEUTSPOLYS 54.40 12/09/2017 02:06 AM    LYMPHOCYTES 28.90 12/09/2017 02:06 AM    MONOCYTES 12.30 12/09/2017 02:06 AM    EOSINOPHILS 2.60 12/09/2017 02:06 AM    BASOPHILS 1.80 12/09/2017 02:06 AM    HYPOCHROMIA 1+ 12/09/2017 02:06 AM    ANISOCYTOSIS 1+ 12/09/2017 02:06 AM      Lab Results   Component Value Date/Time    SODIUM 139 12/09/2017 02:06 AM    POTASSIUM 4.0 12/09/2017 02:06 AM    CHLORIDE 109 12/09/2017 02:06 AM    CO2 24 12/09/2017 02:06 AM    GLUCOSE 101 (H) 12/09/2017 02:06 AM    BUN 8 12/09/2017 02:06 AM    CREATININE 0.75 12/09/2017 02:06 AM      Lab Results   Component  Value Date/Time    ALTSGPT 15 12/06/2017 03:14 AM    ASTSGOT 16 12/06/2017 03:14 AM    ALKPHOSPHAT 51 12/06/2017 03:14 AM    TBILIRUBIN 0.3 12/06/2017 03:14 AM    LIPASE 97 (H) 11/28/2017 03:25 AM    ALBUMIN 2.9 (L) 12/06/2017 03:14 AM    GLOBULIN 3.1 12/06/2017 03:14 AM    PREALBUMIN 11.0 (L) 12/11/2017 03:05 AM    INR 1.21 (H) 11/24/2017 04:33 AM    MACROCYTOSIS 1+ 12/09/2017 02:06 AM     Lab Results   Component Value Date/Time    PROTHROMBTM 15.0 (H) 11/24/2017 04:33 AM    INR 1.21 (H) 11/24/2017 04:33 AM      Assessment/Plan     #Altered mental status  -Per neurology, multifactorial delerium  -Continue Folate, B12  -Speech therapy - swallowing improving; rec advancing to nectar thick full liquid diet  -Continue seroquel 50 mg pm  -EEG - negative  -Neurology following  -MRI brain - Mild cerebral atrophy, 14x8 mm simple cyst in pineal region  -Psychiatry following - plan to evaluate today for capacity  -Increase thiamine to 500 mg TID     #GI bleed  -Hemoglobin stable  -No signs of active bleeding  -H&H daily   -Transfuse if hemoglobin <7  -Monitor for signs of active bleeding (tachycardia, hypotension)  -EGD - negative    #Sinus Tachycardia  -Heart rate has been stable in 80-90s  -Continue to monitor Mg and K    #History of pulmonary embolism  -IVC filter - placed 12/2016  -Heparin - 5,000 units for DVT prophylaxis

## 2017-12-11 NOTE — THERAPY
"Speech Language Therapy dysphagia treatment completed.     Functional Status: Patient was seen on this date for a dysphagia reassessment. Per RN, pt with improvements in ANDRÉS, he was AAO to self, place, reason, a month/year. Speech continues to be confabulatory however pt is pleasant and cooperative. Bilateral wrist restraints in place however were removed for session. Patient was given 12 oz NTL and 8 oz puree which he consumed with no overt s/sx of aspiration. Trials of soft solids resulted in immediate coughing x1 and increase in trace wet vocal quality noted 50% of the time during trials of thin liquids. At this time, patient appears at the level to begin a NTFL with 1:1 supervision/feeding.     Recommendations: 1) Nectar Thick Full Liquids, 2) Please DO NOT remove cortrak for at least 3 meals to ensure diet tolerance     Plan of Care: Will benefit from Speech Therapy 3 times per week    Post-Acute Therapy: Discharge to a transitional care facility for continued skilled therapy services.    See \"Rehab Therapy-Acute\" Patient Summary Report for complete documentation.     "

## 2017-12-11 NOTE — PSYCHIATRY
"PSYCHIATRIC FOLLOW UP:    Reason for Admission:alcohol withdrawal, confusion, GI bleed.   Legal hold status:  NA    Pt is much better. Speech at bedside and pt is eating without choking. Pt says that he was admitted because \"Im an alcoholic\". He was able to remember who we were (ie psychiatry), and was O x 4. He told me that his mother has been dead for about 1.5 years now. Has 2 other siblings that have , one a few weeks ago, and still has other living siblings. Shows a sense of humor. Denies feeling depressed, SI/HI, etc. No psychosis. Sleeping.    Has tried to quit drinking before and has always had \"really bad withdrawals\". Plans to not drink again. Says wife will be visiting this pm.    Reviewed notes: the last time he was agitated was -. He was only Ox1 then as well.    Psychiatric Examination: observed phenomenon:  Vitals:Blood pressure 109/62, pulse 92, temperature 36.8 °C (98.2 °F), resp. rate 16, height 1.829 m (6' 0.01\"), weight 76.3 kg (168 lb 3.4 oz), SpO2 93 %.  Musculoskeletal(abnormal movements, gait, etc): none noted though when he went to draw a clock he reminded me of a prior injury in his R hand that makes his writing worse. Gait not observed.  Appearance: clean, sitting up. Good eye contact, pleasant.  Thoughts: linear, no psychosis  Speech:wnl  Mood: \"good\"  Affect: euthymic  SI/HI:   denies  Attention/Alertness:good     Memory: improved because he is able to focus now.  Orientation: x 4     Fund of Knowledge:  Not tested    Insight/Judgement into symptoms: improved.  Neurological Testing:( ie clock, cube drawing, MMSE, MOCA,etc.) clock: error in placement of hands.     Lab results/tests:   Results for ANGELA LOCKE (MRN [id number]) as of 2017 11:57   Ref. Range 2017 03:05   Hemoglobin Latest Ref Range: 14.0 - 18.0 g/dL 9.2 (L)   Hematocrit Latest Ref Range: 42.0 - 52.0 % 29.2 (L)   Results for ANGLEA LOCKE (MRN [id number]) as of 2017 11:57   Ref. Range 2017 " 03:05   Platelet Count Latest Ref Range: 164 - 446 K/uL 740 (H)   Results for CORNELIA, ANGELA BARTLETT (MRN [id number]) as of 12/11/2017 11:57   Ref. 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 :improved. However not necessarily resolved as he might continue to wax and wane.    Alcohol Use Disorder: severe          Plan: continue the seroquel at . If pt tries to leave A and he appears once again confused, consider a medical hold for incapacity and reconsult. He can be held by security if needed with an order from a doctor to do so.  legal hold:NA  Signing off: please reconsult as needed.

## 2017-12-11 NOTE — CARE PLAN
Problem: Venous Thromboembolism (VTW)/Deep Vein Thrombosis (DVT) Prevention:  Goal: Patient will participate in Venous Thrombosis (VTE)/Deep Vein Thrombosis (DVT)Prevention Measures  Unfractionated heparin used for DVT prophylaxis, pt tolerates well. No s/s of complication    Problem: Psychosocial Needs:  Goal: Level of anxiety will decrease  Outcome: PROGRESSING AS EXPECTED  seroquel given per MAR, distraction and relaxing methods implemented. Pt calm and resting

## 2017-12-11 NOTE — PROGRESS NOTES
Remains only oriented to self, alert this a.m. More alert and aware of surroundings today.CorTrak in place, placement verified via CXR 12/04/17, taped at  60. Pt does not know own limitations. Continues to attempt to get OOB without assistance and pull out CorTrak. Continues to require bilateral wrist restraints and vest, assessed, applied correctly, no s/s of skin breakdown at this time. Education done on POC, including need to remove restraints prior to discharge to a SNF and speech to come and reassess pt today, no learning eveident

## 2017-12-12 LAB
BACTERIA UR CULT: ABNORMAL
BACTERIA UR CULT: ABNORMAL
SIGNIFICANT IND 70042: ABNORMAL
SITE SITE: ABNORMAL
SOURCE SOURCE: ABNORMAL

## 2017-12-12 PROCEDURE — 700102 HCHG RX REV CODE 250 W/ 637 OVERRIDE(OP): Performed by: STUDENT IN AN ORGANIZED HEALTH CARE EDUCATION/TRAINING PROGRAM

## 2017-12-12 PROCEDURE — A9270 NON-COVERED ITEM OR SERVICE: HCPCS | Performed by: STUDENT IN AN ORGANIZED HEALTH CARE EDUCATION/TRAINING PROGRAM

## 2017-12-12 PROCEDURE — A9270 NON-COVERED ITEM OR SERVICE: HCPCS | Performed by: INTERNAL MEDICINE

## 2017-12-12 PROCEDURE — 99232 SBSQ HOSP IP/OBS MODERATE 35: CPT | Mod: GC | Performed by: INTERNAL MEDICINE

## 2017-12-12 PROCEDURE — 700102 HCHG RX REV CODE 250 W/ 637 OVERRIDE(OP): Performed by: INTERNAL MEDICINE

## 2017-12-12 PROCEDURE — 700105 HCHG RX REV CODE 258: Performed by: STUDENT IN AN ORGANIZED HEALTH CARE EDUCATION/TRAINING PROGRAM

## 2017-12-12 PROCEDURE — 92526 ORAL FUNCTION THERAPY: CPT

## 2017-12-12 PROCEDURE — 700111 HCHG RX REV CODE 636 W/ 250 OVERRIDE (IP): Performed by: STUDENT IN AN ORGANIZED HEALTH CARE EDUCATION/TRAINING PROGRAM

## 2017-12-12 PROCEDURE — 770006 HCHG ROOM/CARE - MED/SURG/GYN SEMI*

## 2017-12-12 PROCEDURE — 700111 HCHG RX REV CODE 636 W/ 250 OVERRIDE (IP): Performed by: INTERNAL MEDICINE

## 2017-12-12 RX ORDER — QUETIAPINE FUMARATE 25 MG/1
25 TABLET, FILM COATED ORAL EVERY 6 HOURS PRN
Status: DISCONTINUED | OUTPATIENT
Start: 2017-12-12 | End: 2017-12-15 | Stop reason: HOSPADM

## 2017-12-12 RX ORDER — BISACODYL 10 MG
10 SUPPOSITORY, RECTAL RECTAL
Status: DISCONTINUED | OUTPATIENT
Start: 2017-12-12 | End: 2017-12-15 | Stop reason: HOSPADM

## 2017-12-12 RX ORDER — CHOLECALCIFEROL (VITAMIN D3) 125 MCG
2000 CAPSULE ORAL DAILY
Status: DISCONTINUED | OUTPATIENT
Start: 2017-12-13 | End: 2017-12-15 | Stop reason: HOSPADM

## 2017-12-12 RX ORDER — QUETIAPINE FUMARATE 25 MG/1
50 TABLET, FILM COATED ORAL EVERY EVENING
Status: DISCONTINUED | OUTPATIENT
Start: 2017-12-12 | End: 2017-12-15 | Stop reason: HOSPADM

## 2017-12-12 RX ORDER — SUCRALFATE ORAL 1 G/10ML
1 SUSPENSION ORAL EVERY 6 HOURS
Status: DISCONTINUED | OUTPATIENT
Start: 2017-12-12 | End: 2017-12-15 | Stop reason: HOSPADM

## 2017-12-12 RX ORDER — FOLIC ACID 1 MG/1
1 TABLET ORAL DAILY
Status: DISCONTINUED | OUTPATIENT
Start: 2017-12-13 | End: 2017-12-15 | Stop reason: HOSPADM

## 2017-12-12 RX ORDER — AMOXICILLIN 250 MG
2 CAPSULE ORAL 2 TIMES DAILY
Status: DISCONTINUED | OUTPATIENT
Start: 2017-12-12 | End: 2017-12-15 | Stop reason: HOSPADM

## 2017-12-12 RX ORDER — LORAZEPAM 0.5 MG/1
0.5 TABLET ORAL ONCE
Status: COMPLETED | OUTPATIENT
Start: 2017-12-12 | End: 2017-12-12

## 2017-12-12 RX ORDER — THIAMINE MONONITRATE (VIT B1) 100 MG
100 TABLET ORAL DAILY
Status: DISCONTINUED | OUTPATIENT
Start: 2017-12-18 | End: 2017-12-15 | Stop reason: HOSPADM

## 2017-12-12 RX ORDER — ACETAMINOPHEN 500 MG
500 TABLET ORAL EVERY 6 HOURS PRN
Status: DISCONTINUED | OUTPATIENT
Start: 2017-12-12 | End: 2017-12-15 | Stop reason: HOSPADM

## 2017-12-12 RX ORDER — POLYETHYLENE GLYCOL 3350 17 G/17G
1 POWDER, FOR SOLUTION ORAL
Status: DISCONTINUED | OUTPATIENT
Start: 2017-12-12 | End: 2017-12-15 | Stop reason: HOSPADM

## 2017-12-12 RX ADMIN — HEPARIN SODIUM 5000 UNITS: 5000 INJECTION, SOLUTION INTRAVENOUS; SUBCUTANEOUS at 05:41

## 2017-12-12 RX ADMIN — SUCRALFATE 1 G: 1 SUSPENSION ORAL at 12:50

## 2017-12-12 RX ADMIN — ACETAMINOPHEN 500 MG: 500 TABLET ORAL at 20:45

## 2017-12-12 RX ADMIN — OMEPRAZOLE 40 MG: 20 CAPSULE, DELAYED RELEASE ORAL at 08:47

## 2017-12-12 RX ADMIN — HEPARIN SODIUM 5000 UNITS: 5000 INJECTION, SOLUTION INTRAVENOUS; SUBCUTANEOUS at 14:44

## 2017-12-12 RX ADMIN — THERA TABS 1 TABLET: TAB at 08:47

## 2017-12-12 RX ADMIN — Medication 325 MG: at 10:34

## 2017-12-12 RX ADMIN — OMEPRAZOLE 40 MG: 20 CAPSULE, DELAYED RELEASE ORAL at 20:45

## 2017-12-12 RX ADMIN — THIAMINE HYDROCHLORIDE 500 MG: 100 INJECTION, SOLUTION INTRAMUSCULAR; INTRAVENOUS at 20:51

## 2017-12-12 RX ADMIN — QUETIAPINE FUMARATE 25 MG: 25 TABLET ORAL at 15:12

## 2017-12-12 RX ADMIN — HEPARIN SODIUM 5000 UNITS: 5000 INJECTION, SOLUTION INTRAVENOUS; SUBCUTANEOUS at 20:45

## 2017-12-12 RX ADMIN — POLYVINYL ALCOHOL 1 DROP: 14 SOLUTION/ DROPS OPHTHALMIC at 09:45

## 2017-12-12 RX ADMIN — CYANOCOBALAMIN TAB 500 MCG 2000 MCG: 500 TAB at 08:47

## 2017-12-12 RX ADMIN — STANDARDIZED SENNA CONCENTRATE AND DOCUSATE SODIUM 2 TABLET: 8.6; 5 TABLET, FILM COATED ORAL at 20:45

## 2017-12-12 RX ADMIN — QUETIAPINE FUMARATE 25 MG: 25 TABLET ORAL at 00:41

## 2017-12-12 RX ADMIN — SUCRALFATE 1 G: 1 SUSPENSION ORAL at 17:57

## 2017-12-12 RX ADMIN — SUCRALFATE 1 G: 1 SUSPENSION ORAL at 05:41

## 2017-12-12 RX ADMIN — STANDARDIZED SENNA CONCENTRATE AND DOCUSATE SODIUM 2 TABLET: 8.6; 5 TABLET, FILM COATED ORAL at 08:47

## 2017-12-12 RX ADMIN — LORAZEPAM 0.5 MG: 0.5 TABLET ORAL at 23:06

## 2017-12-12 RX ADMIN — QUETIAPINE FUMARATE 50 MG: 25 TABLET ORAL at 20:45

## 2017-12-12 RX ADMIN — SUCRALFATE 1 G: 1 SUSPENSION ORAL at 23:06

## 2017-12-12 RX ADMIN — THIAMINE HYDROCHLORIDE 500 MG: 100 INJECTION, SOLUTION INTRAMUSCULAR; INTRAVENOUS at 14:45

## 2017-12-12 RX ADMIN — THIAMINE HYDROCHLORIDE 500 MG: 100 INJECTION, SOLUTION INTRAMUSCULAR; INTRAVENOUS at 08:47

## 2017-12-12 RX ADMIN — FOLIC ACID 1 MG: 1 TABLET ORAL at 08:47

## 2017-12-12 ASSESSMENT — ENCOUNTER SYMPTOMS
DIARRHEA: 0
TINGLING: 0
DIAPHORESIS: 0
FOCAL WEAKNESS: 0
VOMITING: 0
BACK PAIN: 0
ORTHOPNEA: 0
SPUTUM PRODUCTION: 0
COUGH: 0
CONSTIPATION: 0
BRUISES/BLEEDS EASILY: 0
HALLUCINATIONS: 0
SENSORY CHANGE: 0
HEMOPTYSIS: 0
DIZZINESS: 0
SORE THROAT: 0
PALPITATIONS: 0
SHORTNESS OF BREATH: 0
HEADACHES: 0
MYALGIAS: 0
CLAUDICATION: 0
FEVER: 0
SPEECH CHANGE: 0
NECK PAIN: 0
WHEEZING: 0
ABDOMINAL PAIN: 0
COUGH: 1
DOUBLE VISION: 0
TREMORS: 0
BLURRED VISION: 0
CHILLS: 0
WEAKNESS: 0
NAUSEA: 0
HALLUCINATIONS: 1

## 2017-12-12 ASSESSMENT — LIFESTYLE VARIABLES: SUBSTANCE_ABUSE: 1

## 2017-12-12 ASSESSMENT — PAIN SCALES - GENERAL: PAINLEVEL_OUTOF10: 0

## 2017-12-12 NOTE — THERAPY
"Speech Language Therapy dysphagia treatment completed.   Functional Status: Pt awake, alert and confused but cooperative. Tolerating current NTFL diet.   Recommendations: Continue current diet.    Plan of Care: Will benefit from Speech Therapy 3 times per week  Post-Acute Therapy: Discharge to a transitional care facility for continued skilled therapy services.    See \"Rehab Therapy-Acute\" Patient Summary Report for complete documentation.     "

## 2017-12-12 NOTE — PROGRESS NOTES
Received call from lab, pts urine culture from 12/10 is growing VRE. Alerted charge nurse and CNA. Pt to be placed on contact isolation precautions.

## 2017-12-12 NOTE — CARE PLAN
Problem: Safety  Goal: Will remain free from injury  bilat wrist restraints and vest restraints in place    Problem: Knowledge Deficit  Goal: Knowledge of disease process/condition, treatment plan, diagnostic tests, and medications will improve  Confused, bed alarm in place as well

## 2017-12-12 NOTE — PROGRESS NOTES
Met Chava today and attempted to invite him to PEP.  Pt continues to have some confusion and impulsivity; attempting to get up from nurse's station w/o assist, asking questions that did not make sense.  Will ctm

## 2017-12-12 NOTE — NON-PROVIDER
Internal Medicine Medical Student Note  Note Author: Raul MICHELESantos Hooks, Student    Name Chava Mercedes     1950   Age/Sex 67 y.o. male   MRN [id number]   Code Status Full code     After 5PM or if no immediate response to page, please call for cross-coverage  Attending/Team: Dr. Up/ Lydia See Patient List for primary contact information  Call [phone] to page after hours   1st Call - Day Intern (R1):   Dr. Solomon 2nd Call - Day Sr. Resident (R2/R3):   Dr. Etienne     Reason for interval visit  (Principal Problem)   Altered level of consciousness    Interval Problem Daily Status Update  (24 hours)   Patient reports no acute overnight events. He was seen by psychiatry yesterday, who again reiterated their suspicion for Wernicke- Korsakoff syndrome. They also recommended reconsulting if the patient becomes confused and requests to leave AMA. Today, the patient is alert and oriented to person and place. He is able to follow commands and answer questions appropriately, although he continues to confabulate. Patient denies chest pain, palpitations, or shortness of breath.     Review of Systems   Constitutional: Negative for diaphoresis, fever and malaise/fatigue.   HENT: Negative for congestion and sore throat.    Eyes: Negative for blurred vision and double vision.   Respiratory: Negative for cough, hemoptysis, sputum production, shortness of breath and wheezing.    Cardiovascular: Negative for chest pain, palpitations, orthopnea, claudication and leg swelling.   Gastrointestinal: Negative for abdominal pain, constipation, diarrhea, nausea and vomiting.   Musculoskeletal: Negative for back pain, joint pain, myalgias and neck pain.   Neurological: Negative for dizziness, tingling, tremors, sensory change, speech change, focal weakness, weakness and headaches.   Endo/Heme/Allergies: Does not bruise/bleed easily.   Psychiatric/Behavioral: Positive for hallucinations and substance abuse (alcohol).      Physical Exam       Vitals:    12/11/17 1600 12/11/17 1920 12/12/17 0400 12/12/17 0720   BP: 103/49 104/65 111/69 101/62   Pulse: 92 97 98 (!) 104   Resp: 17 18 18 18   Temp: 36.7 °C (98.1 °F) 36.7 °C (98 °F) 37.4 °C (99.4 °F) 37 °C (98.6 °F)   SpO2: 91% 98% 96% 93%   Weight:       Height:         Body mass index is 22.81 kg/m².    Oxygen Therapy:  Pulse Oximetry: 93 %, O2 (LPM): 0, O2 Delivery: None (Room Air)    Physical Exam   Constitutional: He is well-developed, well-nourished, and in no distress.   HENT:   Head: Normocephalic and atraumatic.   Eyes: Conjunctivae and EOM are normal.   Neck: Normal range of motion. Neck supple.   Cardiovascular: Normal rate, regular rhythm and intact distal pulses.  Exam reveals no gallop and no friction rub.    No murmur heard.  Pulmonary/Chest: Effort normal and breath sounds normal. No respiratory distress. He has no wheezes. He has no rales. He exhibits no tenderness.   Abdominal: Soft. Bowel sounds are normal. He exhibits no distension. There is no tenderness. There is no rebound and no guarding.   Musculoskeletal: He exhibits no edema.   Neurological: No cranial nerve deficit. Coordination normal.   A&O x 2   Skin: Skin is warm and dry.     Most Recent Labs:    Lab Results   Component Value Date/Time    WBC 7.2 12/11/2017 03:05 AM    RBC 3.09 (L) 12/11/2017 03:05 AM    HEMOGLOBIN 9.2 (L) 12/11/2017 03:05 AM    HEMATOCRIT 29.2 (L) 12/11/2017 03:05 AM    MCV 94.5 12/11/2017 03:05 AM    MCH 29.8 12/11/2017 03:05 AM    MCHC 31.5 (L) 12/11/2017 03:05 AM    MPV 9.8 12/11/2017 03:05 AM    NEUTSPOLYS 54.40 12/09/2017 02:06 AM    LYMPHOCYTES 28.90 12/09/2017 02:06 AM    MONOCYTES 12.30 12/09/2017 02:06 AM    EOSINOPHILS 2.60 12/09/2017 02:06 AM    BASOPHILS 1.80 12/09/2017 02:06 AM    HYPOCHROMIA 1+ 12/09/2017 02:06 AM    ANISOCYTOSIS 1+ 12/09/2017 02:06 AM      Lab Results   Component Value Date/Time    SODIUM 139 12/09/2017 02:06 AM    POTASSIUM 4.0 12/09/2017 02:06 AM     CHLORIDE 109 12/09/2017 02:06 AM    CO2 24 12/09/2017 02:06 AM    GLUCOSE 101 (H) 12/09/2017 02:06 AM    BUN 8 12/09/2017 02:06 AM    CREATININE 0.75 12/09/2017 02:06 AM      Lab Results   Component Value Date/Time    ALTSGPT 15 12/06/2017 03:14 AM    ASTSGOT 16 12/06/2017 03:14 AM    ALKPHOSPHAT 51 12/06/2017 03:14 AM    TBILIRUBIN 0.3 12/06/2017 03:14 AM    LIPASE 97 (H) 11/28/2017 03:25 AM    ALBUMIN 2.9 (L) 12/06/2017 03:14 AM    GLOBULIN 3.1 12/06/2017 03:14 AM    PREALBUMIN 11.0 (L) 12/11/2017 03:05 AM    INR 1.21 (H) 11/24/2017 04:33 AM    MACROCYTOSIS 1+ 12/09/2017 02:06 AM     Lab Results   Component Value Date/Time    PROTHROMBTM 15.0 (H) 11/24/2017 04:33 AM    INR 1.21 (H) 11/24/2017 04:33 AM      Assessment/Plan     #Altered mental status  -Per neurology, multifactorial delerium  -Continue Folate, B12  -Speech therapy - swallowing improving; rec advancing to nectar thick full liquid diet  -Continue seroquel 50 mg qhs  -EEG - negative  -Neurology following  -MRI brain - Mild cerebral atrophy, 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

## 2017-12-12 NOTE — PROGRESS NOTES
Alert x3, bed alarm on and restraints   for impulsivity an unsteady gait. Cont tf's as ordered along side meals as ordered. Cont plan of care, call light within reach and visual checks through the  Night    Agitated, restless, attempting to get out of bed, successfully removing restraints- medicate as ordered but could benefit from something stronger. No sleep overnight

## 2017-12-13 PROCEDURE — 700111 HCHG RX REV CODE 636 W/ 250 OVERRIDE (IP): Performed by: INTERNAL MEDICINE

## 2017-12-13 PROCEDURE — A9270 NON-COVERED ITEM OR SERVICE: HCPCS | Performed by: INTERNAL MEDICINE

## 2017-12-13 PROCEDURE — 700111 HCHG RX REV CODE 636 W/ 250 OVERRIDE (IP): Performed by: STUDENT IN AN ORGANIZED HEALTH CARE EDUCATION/TRAINING PROGRAM

## 2017-12-13 PROCEDURE — 700102 HCHG RX REV CODE 250 W/ 637 OVERRIDE(OP): Performed by: INTERNAL MEDICINE

## 2017-12-13 PROCEDURE — 770021 HCHG ROOM/CARE - ISO PRIVATE

## 2017-12-13 PROCEDURE — 92526 ORAL FUNCTION THERAPY: CPT

## 2017-12-13 PROCEDURE — 700105 HCHG RX REV CODE 258: Performed by: STUDENT IN AN ORGANIZED HEALTH CARE EDUCATION/TRAINING PROGRAM

## 2017-12-13 PROCEDURE — 99232 SBSQ HOSP IP/OBS MODERATE 35: CPT | Mod: GC | Performed by: INTERNAL MEDICINE

## 2017-12-13 RX ADMIN — OMEPRAZOLE 40 MG: 20 CAPSULE, DELAYED RELEASE ORAL at 08:22

## 2017-12-13 RX ADMIN — SUCRALFATE 1 G: 1 SUSPENSION ORAL at 23:35

## 2017-12-13 RX ADMIN — STANDARDIZED SENNA CONCENTRATE AND DOCUSATE SODIUM 2 TABLET: 8.6; 5 TABLET, FILM COATED ORAL at 08:22

## 2017-12-13 RX ADMIN — Medication 325 MG: at 08:24

## 2017-12-13 RX ADMIN — SUCRALFATE 1 G: 1 SUSPENSION ORAL at 13:45

## 2017-12-13 RX ADMIN — STANDARDIZED SENNA CONCENTRATE AND DOCUSATE SODIUM 2 TABLET: 8.6; 5 TABLET, FILM COATED ORAL at 20:02

## 2017-12-13 RX ADMIN — THIAMINE HYDROCHLORIDE 250 MG: 100 INJECTION, SOLUTION INTRAMUSCULAR; INTRAVENOUS at 10:35

## 2017-12-13 RX ADMIN — FOLIC ACID 1 MG: 1 TABLET ORAL at 08:22

## 2017-12-13 RX ADMIN — SUCRALFATE 1 G: 1 SUSPENSION ORAL at 05:15

## 2017-12-13 RX ADMIN — HEPARIN SODIUM 5000 UNITS: 5000 INJECTION, SOLUTION INTRAVENOUS; SUBCUTANEOUS at 05:15

## 2017-12-13 RX ADMIN — HEPARIN SODIUM 5000 UNITS: 5000 INJECTION, SOLUTION INTRAVENOUS; SUBCUTANEOUS at 13:45

## 2017-12-13 RX ADMIN — HEPARIN SODIUM 5000 UNITS: 5000 INJECTION, SOLUTION INTRAVENOUS; SUBCUTANEOUS at 20:02

## 2017-12-13 RX ADMIN — THERA TABS 1 TABLET: TAB at 08:22

## 2017-12-13 RX ADMIN — QUETIAPINE FUMARATE 50 MG: 25 TABLET ORAL at 20:02

## 2017-12-13 RX ADMIN — SUCRALFATE 1 G: 1 SUSPENSION ORAL at 17:52

## 2017-12-13 RX ADMIN — QUETIAPINE FUMARATE 25 MG: 25 TABLET ORAL at 05:15

## 2017-12-13 RX ADMIN — CYANOCOBALAMIN TAB 500 MCG 2000 MCG: 500 TAB at 08:22

## 2017-12-13 ASSESSMENT — ENCOUNTER SYMPTOMS
WHEEZING: 0
ABDOMINAL PAIN: 0
FEVER: 0
VOMITING: 0
DIARRHEA: 0
NAUSEA: 0
CHILLS: 0
PALPITATIONS: 0
SHORTNESS OF BREATH: 0

## 2017-12-13 ASSESSMENT — PAIN SCALES - GENERAL
PAINLEVEL_OUTOF10: 0
PAINLEVEL_OUTOF10: 0

## 2017-12-13 NOTE — PROGRESS NOTES
Wife at bedside earlier in the day. She informed this RN that she will be returning to [place] next Tuesday and won't be back permanently until February. She advises that pt's sister is not well enough to help take care of patient. Pt's wife is going to [place] today to take care of paying patient rent and taking care of a couple things for him at home. She will return on Friday.     Pt did well with speech therapy and placed on regular dysphagia II diet, thin liquids. Cortak discontinued and removed - pt tolerated well.     R PIV leaking, removed - tip intact. New PIV started in left FA using aseptic technique.     Restraints removed because pt able to verbalize need to use call light and has not been crawling out of bed or pulling at lines or tubes.     Condom cath removed bc pt able to demonstrate using urinal and calling appropriately for emptying.     He is alert and oriented x4. Calm and cooperative. Short term memory loss, requiring occasionally cueing and reminders. Unsteady shuffle when ambulating. Requires one person assist for safety.     Strip bed alarm in place and on. Call light within reach. Pt and wife aware of plan of care.

## 2017-12-13 NOTE — THERAPY
"Speech Language Therapy dysphagia treatment completed.   Functional Status:  Pt awake, alert and cooperative. Confused but following commands. No overt s/sx of aspiration noted with trials of soft solids and thin liquids. Min cues required for swallow strategies to be followed.   Recommendations: Advance diet to dysphagia 2/thins. Hold with any difficulty.    Plan of Care: Will benefit from Speech Therapy 3 times per week  Post-Acute Therapy: Discharge to a transitional care facility for continued skilled therapy services.    See \"Rehab Therapy-Acute\" Patient Summary Report for complete documentation.     "

## 2017-12-13 NOTE — PROGRESS NOTES
Alert x2, bed alarm and built in placed for safety and confusion. Continue tf as ordered along side a liquid diet. Remains confused. Cont plan of care, call light within reach and visual checks through the night    Attempting to get out of bed and not being directable- unsafe ambulating. Wrist restraints, vest and bed rails where discontinued today. Orders received for vest restraints    Aggressive and not wanting to get back into bed after he is able to self remove his restraints. 5 RN's to get him into bed- PO ativan given as ordered x1. Disoriented to his environment     Removes his vest restraints and sets off bed alarm attempting to get out of bed    seroquel given as ordered with no effect  No sleep overnight again    Shaved and oob into the shower this early morning

## 2017-12-13 NOTE — PROGRESS NOTES
.         Internal Medicine Interval Note  Note Author: Gibson Etienne M.D.     Name Chava Mercedes     1950   Age/Sex 67 y.o. male   MRN [id number]   Code Status FULL     After 5PM or if no immediate response to page, please call for cross-coverage  Attending/Team: Dr. Jeovanny KAHN/Lydia See Patient List for primary contact information  Call [phone] to page    1st Call - Day Intern (R1):   Juanito 2nd Call - Day Sr. Resident (R2/R3):   [place]         Reason for interval visit  (Principal Problem)   Alcohol withdrawal    Interval Problem Daily Status Update  (24 hours)   - AOx3-4, with confabulations  - on high dose IV Thiamine   - bilateral wrist restraints and vest for agitation and combativeness  - Full liquid nectar thick with 1:1 supervision  - Patient is medically stable for discharge, pending SNF placement        Review of Systems   Constitutional: Negative for chills and fever.   Respiratory: Positive for cough. Negative for shortness of breath and wheezing.    Cardiovascular: Negative for chest pain, palpitations and leg swelling.   Gastrointestinal: Negative for abdominal pain, diarrhea, nausea and vomiting.   Psychiatric/Behavioral: Negative for hallucinations.       Consultants/Specialty  Gastroenterology  Neurology  Psychiatry    Also seen by: SLP, Nutrition    Disposition  Inpatient    Quality Measures    Reviewed items::  Labs reviewed and Medications reviewed  Walker Catheter: Walker in place for AMS.  DVT prophylaxis pharmacological::  Heparin  DVT prophylaxis - mechanical:  SCDs  Ulcer Prophylaxis::  Yes          Physical Exam       Vitals:    17 1920 17 0400 17 0720 17 1435   BP: 104/65 111/69 101/62 (!) 97/55   Pulse: 97 98 (!) 104 94   Resp: 18 18 18 16   Temp: 36.7 °C (98 °F) 37.4 °C (99.4 °F) 37 °C (98.6 °F) 36.6 °C (97.8 °F)   SpO2: 98% 96% 93% 96%   Weight:       Height:         Body mass index is 22.81 kg/m².    Oxygen Therapy:  Pulse Oximetry: 96 %, O2  (LPM): 0, O2 Delivery: None (Room Air)    Physical Exam   Constitutional: He is oriented to person, place, and time. No distress.   AOx3   HENT:   Head: Normocephalic and atraumatic.   Eyes: Conjunctivae and EOM are normal. Right eye exhibits no discharge. Left eye exhibits no discharge. No scleral icterus.   Neck: Normal range of motion.   No obvious nuchal rigidity or meningeal signs   Cardiovascular: Normal rate, regular rhythm, normal heart sounds and intact distal pulses.  Exam reveals no gallop and no friction rub.    No murmur heard.  Pulmonary/Chest: Effort normal. No respiratory distress.   Abdominal: Soft. Bowel sounds are normal.   Musculoskeletal: Normal range of motion. He exhibits no edema or tenderness.   Neurological: He is alert and oriented to person, place, and time. He has normal sensation and intact cranial nerves. He exhibits normal muscle tone. GCS score is 15.   Skin: Skin is warm and dry. He is not diaphoretic.   Psychiatric: He is not agitated. He does not express impulsivity.   Continued delusions   Nursing note and vitals reviewed.        Lab Data Review:         11/25/2017  11:44 AM    No results for input(s): SODIUM, POTASSIUM, CHLORIDE, CO2, BUN, CREATININE, MAGNESIUM, PHOSPHORUS, CALCIUM in the last 72 hours.    Recent Labs      12/11/17   0305   PREALBUMIN  11.0*       Recent Labs      12/11/17   0305   RBC  3.09*   HEMOGLOBIN  9.2*   HEMATOCRIT  29.2*   PLATELETCT  740*       Recent Labs      12/11/17   0305   WBC  7.2           Assessment/Plan     * Altered level of consciousness   Assessment & Plan    - Continued confusion and confabulating in speech  - CT head negative for acute process  - EEG unremarkable  - MRI head 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, otherwise normal   - EtOH-related per Psych eval. 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

## 2017-12-13 NOTE — PROGRESS NOTES
.         Internal Medicine Interval Note  Note Author: Shazia Solomon M.D.     Name Chava Mercedes     1950   Age/Sex 67 y.o. male   MRN [id number]   Code Status FULL     After 5PM or if no immediate response to page, please call for cross-coverage  Attending/Team: Dr. Jeovanny KAHN/Lydia See Patient List for primary contact information  Call [phone] to page    1st Call - Day Intern (R1):   Juanito 2nd Call - Day Sr. Resident (R2/R3):   [place]         Reason for interval visit  (Principal Problem)   Alcohol withdrawal    Interval Problem Daily Status Update  (24 hours)   - Agitated overnight, requiring Ativan and security  - AOx3-4, with confabulations  - high dose IV Thiamine   - bilateral wrist restraints and vest   - Dysphagia 2/thins; d/c coretrak  - Patient is medically stable for discharge, pending placement  - To discuss further placement options with wife  - Reduce PPI to once daily        Review of Systems   Constitutional: Negative for chills and fever.   Respiratory: Negative for shortness of breath and wheezing.    Cardiovascular: Negative for chest pain, palpitations and leg swelling.   Gastrointestinal: Negative for abdominal pain, diarrhea, nausea and vomiting.       Consultants/Specialty  Gastroenterology  Neurology  Psychiatry    Also seen by: SLP, Nutrition    Disposition  Inpatient    Quality Measures    Reviewed items::  Labs reviewed and Medications reviewed  Walker Catheter: Walker in place for AMS.  DVT prophylaxis pharmacological::  Heparin  DVT prophylaxis - mechanical:  SCDs  Ulcer Prophylaxis::  Yes          Physical Exam       Vitals:    17 1435 17 1920 17 0437 17 0645   BP: (!) 97/55 110/64 108/58 107/66   Pulse: 94 91 95 86   Resp: 16 18 18 18   Temp: 36.6 °C (97.8 °F) 36.5 °C (97.7 °F) 36.1 °C (97 °F) 36.9 °C (98.5 °F)   SpO2: 96% 97% 95% 96%   Weight:       Height:         Body mass index is 22.81 kg/m².    Oxygen Therapy:  Pulse Oximetry: 96  %, O2 (LPM): 0, O2 Delivery: None (Room Air)    Physical Exam   Constitutional: He is oriented to person, place, and time. No distress.   AOx3   HENT:   Head: Normocephalic and atraumatic.   Eyes: Conjunctivae and EOM are normal. Right eye exhibits no discharge. Left eye exhibits no discharge. No scleral icterus.   Neck: Normal range of motion.   Cardiovascular: Normal rate, regular rhythm, normal heart sounds and intact distal pulses.  Exam reveals no gallop and no friction rub.    No murmur heard.  Pulmonary/Chest: Effort normal. No respiratory distress.   Abdominal: Soft. Bowel sounds are normal.   Musculoskeletal: Normal range of motion. He exhibits no edema or tenderness.   Neurological: He is alert and oriented to person, place, and time. He has normal sensation and intact cranial nerves. He exhibits normal muscle tone. GCS score is 15.   Skin: Skin is warm and dry. He is not diaphoretic.   Psychiatric: He is not agitated. He does not express impulsivity.   Nursing note and vitals reviewed.        Lab Data Review:         11/25/2017  11:44 AM    No results for input(s): SODIUM, POTASSIUM, CHLORIDE, CO2, BUN, CREATININE, MAGNESIUM, PHOSPHORUS, CALCIUM in the last 72 hours.    Recent Labs      12/11/17   0305   PREALBUMIN  11.0*       Recent Labs      12/11/17   0305   RBC  3.09*   HEMOGLOBIN  9.2*   HEMATOCRIT  29.2*   PLATELETCT  740*       Recent Labs      12/11/17   0305   WBC  7.2           Assessment/Plan     * Altered level of consciousness   Assessment & Plan    - Continued confusion and confabulations  - CT, EEG unremarkable  - MRI head 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, otherwise normal   - EtOH-related per Psych eval. 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

## 2017-12-13 NOTE — PROGRESS NOTES
Assessment completed during AM rounds at 0845. Pt is disoriented and confused, alert but only oriented to self. Pt is often anxious and sometimes agitated. He is able to be redirected with diversion, encouragement, family interaction. Wife in to visit with pt from out of town, very helpful and supportive. Pt listens very well for her. He was up and out of bed in chair at nurses station several times, up to bathroom and continent of bowel and bladder. Pt was able to do some PO intake with encouragement and support after speech eval. He is anxious to have coretrack and IV removed and is often confused about why he has them, needs reminders. Pt napped off and on and recalled that he had a very difficult time sleeping last night and was tired. Wife was very emotional and shocked at his condition stating he is very independent normally. She states she left because of his drinking but would have been returning in February to live in his apartment in [place] with him. Pt needs hand held SBA assist for ambulation. Bed/chair alarm in use for safety at all times. Tube feeding running fibersource at goal of 70. Maintain on isolation contact precautions for VRE in urine. IV running NS at 100 and intermittent thiamine infusions. Tolerating well. Bed low with call light in reach. Medicated once with seroquel for agitation just prior to wife showing up.

## 2017-12-13 NOTE — CARE PLAN
Problem: Safety  Goal: Will remain free from injury  Outcome: PROGRESSING AS EXPECTED  Pt occasionally impulsive. Reeducated on use of call light and verbalized understanding. Restraint removed.     Problem: Mobility  Goal: Risk for activity intolerance will decrease  Outcome: PROGRESSING AS EXPECTED  Pt able to walk from nurses station table back to bed in room with assist of one person for safety.     Problem: Medication  Goal: Compliance with prescribed medication will improve  Outcome: PROGRESSING AS EXPECTED  Pt agreeable to all medications. Took whole in applesauce without issue.

## 2017-12-13 NOTE — CARE PLAN
Problem: Safety  Goal: Will remain free from injury  Bed alarm on for safety and confusion    Problem: Infection  Goal: Will remain free from infection  vre in the urine; in isolation

## 2017-12-13 NOTE — PROGRESS NOTES
Received report from previous shift RN. Assume care. Upon entering room restraints were off and pt was cooperating, laying in semi-fowlers in bed. Activated bed alarm for safety and reminded to call for assistance to get out of bed. Pt attempted to get up a few times but was easy to redirect and divert.

## 2017-12-13 NOTE — CARE PLAN
Problem: Psychosocial Needs:  Goal: Level of anxiety will decrease    Intervention: Collaborate with Interdisciplinary Team including Psychologist/Behavioral Health Team  Kept pt at nurses station for part of day for close observation and to prevent restraints. Pt was anxious and confused but able to tolerate day with no restraints.       Problem: Mobility  Goal: Risk for activity intolerance will decrease    Intervention: Encourage patient to increase activity level in collaboration with Interdisciplinary Team  Pt ambulated to and from bathroom and in hallway with SBA. Encouraged mobility. Pt compliant

## 2017-12-13 NOTE — CARE PLAN
"Problem: Safety  Goal: Will remain free from falls  Outcome: PROGRESSING AS EXPECTED  Pt free from injury or fall. Strip bed alarm utilized for pt safety.     Problem: Psychosocial Needs:  Goal: Level of anxiety will decrease  Outcome: PROGRESSING AS EXPECTED  Pt calm and cooperative today, stated \" I feel so happy today\"     Problem: Bowel/Gastric:  Goal: Will not experience complications related to bowel motility  Outcome: PROGRESSING AS EXPECTED  LBM today 12/13, continent.       "

## 2017-12-13 NOTE — DIETARY
Nutrition note:  TF d/c'd, Cortrak out. Pt is on a po diet (dysphagia 2 with thins ok) and states he is hungry, has good appetite.  Will have dietary staff offer snacks between meals.  RD will monitor per dept policy.

## 2017-12-14 PROCEDURE — 700111 HCHG RX REV CODE 636 W/ 250 OVERRIDE (IP): Performed by: STUDENT IN AN ORGANIZED HEALTH CARE EDUCATION/TRAINING PROGRAM

## 2017-12-14 PROCEDURE — A9270 NON-COVERED ITEM OR SERVICE: HCPCS | Performed by: INTERNAL MEDICINE

## 2017-12-14 PROCEDURE — 99232 SBSQ HOSP IP/OBS MODERATE 35: CPT | Mod: GC | Performed by: INTERNAL MEDICINE

## 2017-12-14 PROCEDURE — 700111 HCHG RX REV CODE 636 W/ 250 OVERRIDE (IP): Performed by: INTERNAL MEDICINE

## 2017-12-14 PROCEDURE — 770021 HCHG ROOM/CARE - ISO PRIVATE

## 2017-12-14 PROCEDURE — 700102 HCHG RX REV CODE 250 W/ 637 OVERRIDE(OP): Performed by: INTERNAL MEDICINE

## 2017-12-14 PROCEDURE — 700105 HCHG RX REV CODE 258: Performed by: STUDENT IN AN ORGANIZED HEALTH CARE EDUCATION/TRAINING PROGRAM

## 2017-12-14 PROCEDURE — A9270 NON-COVERED ITEM OR SERVICE: HCPCS | Performed by: STUDENT IN AN ORGANIZED HEALTH CARE EDUCATION/TRAINING PROGRAM

## 2017-12-14 PROCEDURE — 700102 HCHG RX REV CODE 250 W/ 637 OVERRIDE(OP): Performed by: STUDENT IN AN ORGANIZED HEALTH CARE EDUCATION/TRAINING PROGRAM

## 2017-12-14 RX ADMIN — Medication 325 MG: at 11:15

## 2017-12-14 RX ADMIN — CYANOCOBALAMIN TAB 500 MCG 2000 MCG: 500 TAB at 09:04

## 2017-12-14 RX ADMIN — SUCRALFATE 1 G: 1 SUSPENSION ORAL at 19:16

## 2017-12-14 RX ADMIN — THERA TABS 1 TABLET: TAB at 09:05

## 2017-12-14 RX ADMIN — HEPARIN SODIUM 5000 UNITS: 5000 INJECTION, SOLUTION INTRAVENOUS; SUBCUTANEOUS at 20:54

## 2017-12-14 RX ADMIN — HEPARIN SODIUM 5000 UNITS: 5000 INJECTION, SOLUTION INTRAVENOUS; SUBCUTANEOUS at 04:59

## 2017-12-14 RX ADMIN — SUCRALFATE 1 G: 1 SUSPENSION ORAL at 12:42

## 2017-12-14 RX ADMIN — SUCRALFATE 1 G: 1 SUSPENSION ORAL at 04:59

## 2017-12-14 RX ADMIN — STANDARDIZED SENNA CONCENTRATE AND DOCUSATE SODIUM 2 TABLET: 8.6; 5 TABLET, FILM COATED ORAL at 09:05

## 2017-12-14 RX ADMIN — HEPARIN SODIUM 5000 UNITS: 5000 INJECTION, SOLUTION INTRAVENOUS; SUBCUTANEOUS at 14:47

## 2017-12-14 RX ADMIN — QUETIAPINE FUMARATE 50 MG: 25 TABLET ORAL at 20:54

## 2017-12-14 RX ADMIN — OMEPRAZOLE 40 MG: 20 CAPSULE, DELAYED RELEASE ORAL at 09:02

## 2017-12-14 RX ADMIN — FOLIC ACID 1 MG: 1 TABLET ORAL at 09:05

## 2017-12-14 RX ADMIN — SUCRALFATE 1 G: 1 SUSPENSION ORAL at 23:15

## 2017-12-14 RX ADMIN — THIAMINE HYDROCHLORIDE 250 MG: 100 INJECTION, SOLUTION INTRAMUSCULAR; INTRAVENOUS at 08:55

## 2017-12-14 ASSESSMENT — ENCOUNTER SYMPTOMS
PALPITATIONS: 0
ABDOMINAL PAIN: 0
VOMITING: 0
DIARRHEA: 0
NAUSEA: 0
CHILLS: 0
FEVER: 0
SHORTNESS OF BREATH: 0
WHEEZING: 0

## 2017-12-14 ASSESSMENT — PAIN SCALES - GENERAL: PAINLEVEL_OUTOF10: 0

## 2017-12-14 NOTE — DISCHARGE PLANNING
Medical Social Work    SW met w/ pt at bedside and explained SNF Choice. Pt stated that he was not interested in going to a SNF as he needs to get home to [place] by tomorrow 12/15. Pt explained that his wife is leaving to [place] for 3 months and he would like to see her before she has to leave. Pt paged GOODR Lydia to provide update that pt is refusing SNF and requesting to dc home. Pt also requiring transportation to [place] upon dc.     SW to remain available for dc planning needs.

## 2017-12-14 NOTE — PROGRESS NOTES
Pt Alert and disoriented to time and situation with no pain.  Assessment completed and all medications given with no issues to note. Skin intact. Bed alarm on. Pt now resting in bed with call light within reach and is able to make all needs known to staff.

## 2017-12-14 NOTE — PROGRESS NOTES
.         Internal Medicine Interval Note  Note Author: Shazia Solomon M.D.     Name Chava Mercedes     1950   Age/Sex 67 y.o. male   MRN [id number]   Code Status FULL     After 5PM or if no immediate response to page, please call for cross-coverage  Attending/Team: Julius/Lydia See Patient List for primary contact information  Call [phone] to page    1st Call - Day Intern (R1):   Juanito 2nd Call - Day Sr. Resident (R2/R3):   [place]         Reason for interval visit  (Principal Problem)   Alcohol withdrawal    Interval Problem Daily Status Update  (24 hours)   - Cooperative overnight  - AOx3-4, with confabulations  - high dose IV Thiamine   - Discontinued bilateral wrist restraints and vest  - Tolerating Dysphagia 2/thins diet  - Patient is medically stable for discharge, pending placement  - Patient refusing SNF and wants to go home  - Given recent improvements, will reconsult psych for capacity        Review of Systems   Constitutional: Negative for chills and fever.   Respiratory: Negative for shortness of breath and wheezing.    Cardiovascular: Negative for chest pain, palpitations and leg swelling.   Gastrointestinal: Negative for abdominal pain, diarrhea, nausea and vomiting.       Consultants/Specialty  Gastroenterology  Neurology  Psychiatry    Also seen by: SLP, Nutrition    Disposition  Inpatient    Quality Measures    Reviewed items::  Labs reviewed and Medications reviewed  Walker Catheter: Walker in place for AMS.  DVT prophylaxis pharmacological::  Heparin  DVT prophylaxis - mechanical:  SCDs  Ulcer Prophylaxis::  Yes          Physical Exam       Vitals:    17 1551 17 1930 17 0411 17 0720   BP: 111/60 101/52 117/70 100/56   Pulse: 82 97 84 85   Resp: 18 18 18 18   Temp: 36.9 °C (98.5 °F) 36.5 °C (97.7 °F) 36.7 °C (98 °F) 36.3 °C (97.3 °F)   SpO2: 95% 97% 96% 94%   Weight:       Height:         Body mass index is 22.81 kg/m².    Oxygen Therapy:  Pulse  Oximetry: 94 %, O2 (LPM): 3, O2 Delivery: None (Room Air)    Physical Exam   Constitutional: He is oriented to person, place, and time. No distress.   AOx3   HENT:   Head: Normocephalic and atraumatic.   Eyes: Conjunctivae and EOM are normal. Right eye exhibits no discharge. Left eye exhibits no discharge. No scleral icterus.   Neck: Normal range of motion.   Cardiovascular: Normal rate, regular rhythm, normal heart sounds and intact distal pulses.  Exam reveals no gallop and no friction rub.    No murmur heard.  Pulmonary/Chest: Effort normal. No respiratory distress.   Abdominal: Soft. Bowel sounds are normal.   Musculoskeletal: Normal range of motion. He exhibits no edema or tenderness.   Neurological: He is alert and oriented to person, place, and time. He has normal sensation and intact cranial nerves. He exhibits normal muscle tone. GCS score is 15.   Skin: Skin is warm and dry. He is not diaphoretic.   Psychiatric: He is not agitated. He does not express impulsivity.   Nursing note and vitals reviewed.        Lab Data Review:         11/25/2017  11:44 AM    No results for input(s): SODIUM, POTASSIUM, CHLORIDE, CO2, BUN, CREATININE, MAGNESIUM, PHOSPHORUS, CALCIUM in the last 72 hours.    No results for input(s): ALTSGPT, ASTSGOT, ALKPHOSPHAT, TBILIRUBIN, DBILIRUBIN, GAMMAGT, AMYLASE, LIPASE, ALB, PREALBUMIN, GLUCOSE in the last 72 hours.    No results for input(s): RBC, HEMOGLOBIN, HEMATOCRIT, PLATELETCT, PROTHROMBTM, APTT, INR, IRON, FERRITIN, TOTIRONBC in the last 72 hours.              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

## 2017-12-14 NOTE — CARE PLAN
Problem: Safety  Goal: Will remain free from injury  Outcome: PROGRESSING SLOWER THAN EXPECTED  Walks with support from staff.     Problem: Skin Integrity  Goal: Risk for impaired skin integrity will decrease  Outcome: PROGRESSING AS EXPECTED  Skin intact with no issues to note

## 2017-12-14 NOTE — DISCHARGE PLANNING
Medical Social Work    Ongoing: SW spoke w/ [facility] re: dc plan and pt refusing to go to a SNF. [facility] to reconsult Psychiatry to assess pt for capacity to refuse SNF.

## 2017-12-15 ENCOUNTER — PATIENT OUTREACH (OUTPATIENT)
Dept: HEALTH INFORMATION MANAGEMENT | Facility: OTHER | Age: 67
End: 2017-12-15

## 2017-12-15 VITALS
TEMPERATURE: 98.3 F | OXYGEN SATURATION: 99 % | DIASTOLIC BLOOD PRESSURE: 56 MMHG | SYSTOLIC BLOOD PRESSURE: 100 MMHG | HEART RATE: 100 BPM | BODY MASS INDEX: 22.78 KG/M2 | RESPIRATION RATE: 18 BRPM | HEIGHT: 72 IN | WEIGHT: 168.21 LBS

## 2017-12-15 PROBLEM — R40.4 ALTERED LEVEL OF CONSCIOUSNESS: Status: RESOLVED | Noted: 2017-12-03 | Resolved: 2017-12-15

## 2017-12-15 LAB — VIT B1 BLD-MCNC: 252 NMOL/L (ref 70–180)

## 2017-12-15 PROCEDURE — A9270 NON-COVERED ITEM OR SERVICE: HCPCS | Performed by: STUDENT IN AN ORGANIZED HEALTH CARE EDUCATION/TRAINING PROGRAM

## 2017-12-15 PROCEDURE — 700102 HCHG RX REV CODE 250 W/ 637 OVERRIDE(OP): Performed by: STUDENT IN AN ORGANIZED HEALTH CARE EDUCATION/TRAINING PROGRAM

## 2017-12-15 PROCEDURE — 700111 HCHG RX REV CODE 636 W/ 250 OVERRIDE (IP): Performed by: STUDENT IN AN ORGANIZED HEALTH CARE EDUCATION/TRAINING PROGRAM

## 2017-12-15 PROCEDURE — 700111 HCHG RX REV CODE 636 W/ 250 OVERRIDE (IP): Performed by: INTERNAL MEDICINE

## 2017-12-15 PROCEDURE — A9270 NON-COVERED ITEM OR SERVICE: HCPCS | Performed by: INTERNAL MEDICINE

## 2017-12-15 PROCEDURE — 700102 HCHG RX REV CODE 250 W/ 637 OVERRIDE(OP): Performed by: INTERNAL MEDICINE

## 2017-12-15 PROCEDURE — 700105 HCHG RX REV CODE 258: Performed by: STUDENT IN AN ORGANIZED HEALTH CARE EDUCATION/TRAINING PROGRAM

## 2017-12-15 RX ORDER — OMEPRAZOLE 40 MG/1
40 CAPSULE, DELAYED RELEASE ORAL DAILY
Qty: 45 CAP | Refills: 0 | Status: SHIPPED | OUTPATIENT
Start: 2017-12-15 | End: 2023-12-27

## 2017-12-15 RX ORDER — FERROUS SULFATE 325(65) MG
325 TABLET ORAL DAILY
Qty: 30 TAB | Refills: 0 | Status: SHIPPED | OUTPATIENT
Start: 2017-12-15 | End: 2023-12-27

## 2017-12-15 RX ORDER — OMEPRAZOLE 40 MG/1
40 CAPSULE, DELAYED RELEASE ORAL DAILY
Qty: 30 CAP | Refills: 0 | Status: SHIPPED | OUTPATIENT
Start: 2017-12-15 | End: 2017-12-15

## 2017-12-15 RX ORDER — QUETIAPINE FUMARATE 50 MG/1
50 TABLET, FILM COATED ORAL EVERY EVENING
Qty: 30 TAB | Refills: 0 | Status: SHIPPED | OUTPATIENT
Start: 2017-12-15 | End: 2023-12-27

## 2017-12-15 RX ORDER — THIAMINE HYDROCHLORIDE 100 MG/ML
250 INJECTION, SOLUTION INTRAMUSCULAR; INTRAVENOUS DAILY
Status: DISCONTINUED | OUTPATIENT
Start: 2017-12-16 | End: 2017-12-15 | Stop reason: HOSPADM

## 2017-12-15 RX ORDER — SUCRALFATE 1 G/1
1 TABLET ORAL
Qty: 120 TAB | Refills: 0 | Status: SHIPPED | OUTPATIENT
Start: 2017-12-15 | End: 2023-12-27

## 2017-12-15 RX ORDER — FOLIC ACID 1 MG/1
1 TABLET ORAL DAILY
Qty: 30 TAB | Refills: 0 | Status: SHIPPED | OUTPATIENT
Start: 2017-12-16 | End: 2023-12-27

## 2017-12-15 RX ORDER — LANOLIN ALCOHOL/MO/W.PET/CERES
100 CREAM (GRAM) TOPICAL DAILY
Qty: 30 TAB | Refills: 5 | Status: SHIPPED | OUTPATIENT
Start: 2017-12-18 | End: 2023-12-27

## 2017-12-15 RX ORDER — THIAMINE HYDROCHLORIDE 100 MG/ML
250 INJECTION, SOLUTION INTRAMUSCULAR; INTRAVENOUS DAILY
Qty: 5 ML | Refills: 0 | Status: SHIPPED | OUTPATIENT
Start: 2017-12-16 | End: 2017-12-18

## 2017-12-15 RX ADMIN — STANDARDIZED SENNA CONCENTRATE AND DOCUSATE SODIUM 2 TABLET: 8.6; 5 TABLET, FILM COATED ORAL at 09:54

## 2017-12-15 RX ADMIN — THERA TABS 1 TABLET: TAB at 09:54

## 2017-12-15 RX ADMIN — CYANOCOBALAMIN TAB 500 MCG 2000 MCG: 500 TAB at 09:54

## 2017-12-15 RX ADMIN — OMEPRAZOLE 40 MG: 20 CAPSULE, DELAYED RELEASE ORAL at 09:54

## 2017-12-15 RX ADMIN — HEPARIN SODIUM 5000 UNITS: 5000 INJECTION, SOLUTION INTRAVENOUS; SUBCUTANEOUS at 05:19

## 2017-12-15 RX ADMIN — THIAMINE HYDROCHLORIDE 250 MG: 100 INJECTION, SOLUTION INTRAMUSCULAR; INTRAVENOUS at 09:54

## 2017-12-15 RX ADMIN — FOLIC ACID 1 MG: 1 TABLET ORAL at 09:54

## 2017-12-15 RX ADMIN — Medication 325 MG: at 09:57

## 2017-12-15 RX ADMIN — SUCRALFATE 1 G: 1 SUSPENSION ORAL at 05:19

## 2017-12-15 ASSESSMENT — LIFESTYLE VARIABLES: EVER_SMOKED: NEVER

## 2017-12-15 ASSESSMENT — PAIN SCALES - GENERAL: PAINLEVEL_OUTOF10: 0

## 2017-12-15 NOTE — DISCHARGE PLANNING
Late entry, Transport has been arranged with [facility] for Van Transport to pt's home in [place]. Transport is scheduled for 9:30 am tomorrow (12/15/2017). JAIRO Allen notified.

## 2017-12-15 NOTE — CARE PLAN
Problem: Bowel/Gastric:  Goal: Will not experience complications related to bowel motility    Intervention: Assess baseline bowel pattern  Pt having daily bowel movement. Pt denies pain or discomfort.

## 2017-12-15 NOTE — DISCHARGE PLANNING
Spoke to Nasima at [facility] to cancel Transport to Home in [place] as requested by JAIRO Allen. [facility] want him to stay longer.

## 2017-12-15 NOTE — DISCHARGE PLANNING
Medical Social Work    SW received notification from bedside RN that after a conversation w/ pt's wife and [facility] that this pt can dc home today. SW notified Supervisor, Deana Hill, that pt is needing UBER to get home to [place] today. Deana approved Uber ride for today and will set up UBER. SW awaiting a time for UBER transfer.

## 2017-12-15 NOTE — CARE PLAN
Problem: Mobility  Goal: Risk for activity intolerance will decrease    Intervention: Encourage patient to increase activity level in collaboration with Interdisciplinary Team  Pt ambulating the hallway with supervision.

## 2017-12-15 NOTE — PROGRESS NOTES
Spke with wife and discussed plan of care. Anticipated dc date is 12/15. Wife agrees with POC. Notified ALEKSANDR Allen. ETA for transport  is 0930. Pt updated with POC.

## 2017-12-15 NOTE — DISCHARGE PLANNING
Medical Social Work    KEANU sent 3rd page for [facility] and requested to speak to someone ASAP re: confirmation of this pt's dc plan. Pt has no dc orders in and pt's transport scheduled for 0930 today. KEANU was put on hold while the [facility] and [facility] were called. KEANU was connected to the Senior who stated that they would like Psychiatry to consult with this pt today and are cancelling the dc for today.     KEANU updated bedside RN and will remain available for dc planning needs.

## 2017-12-15 NOTE — CARE PLAN
Problem: Venous Thromboembolism (VTW)/Deep Vein Thrombosis (DVT) Prevention:  Goal: Patient will participate in Venous Thrombosis (VTE)/Deep Vein Thrombosis (DVT)Prevention Measures    Intervention: Assess and monitor for anticoagulation complications  No s/s of DVT, receiving Heparin and ambulates in room and to the bathroom.        Problem: Knowledge Deficit  Goal: Knowledge of disease process/condition, treatment plan, diagnostic tests, and medications will improve    Intervention: Assess knowledge level of disease process/condition, treatment plan, diagnostic tests, and medications  Reviewing plan of care, activities, and medication with patient.  Encouraging patient to ask questions and participate in plan of care.  Providing answers to all questions.  Continuing with current plan of care.  Hourly rounding in practice.

## 2017-12-15 NOTE — DISCHARGE INSTRUCTIONS
Discharge Instructions    Discharged to home by taxi with self. Discharged via walking, hospital escort: Yes.  Special equipment needed: Not Applicable    Be sure to schedule a follow-up appointment with your primary care doctor or any specialists as instructed.     Discharge Plan:   Diet Plan: Discussed  Activity Level: Discussed  Confirmed Follow up Appointment: Patient to Call and Schedule Appointment  Confirmed Symptoms Management: Discussed  Medication Reconciliation Updated: Yes  Pneumococcal Vaccine Given - only chart on this line when given: Given (See MAR)  Influenza Vaccine Indication: Indicated: 65 years and older  Influenza Vaccine Given - only chart on this line when given: Influenza Vaccine Given (See MAR)    I understand that a diet low in cholesterol, fat, and sodium is recommended for good health. Unless I have been given specific instructions below for another diet, I accept this instruction as my diet prescription.   Other diet: Regular    Special Instructions: None    · Is patient discharged on Warfarin / Coumadin?   No     · Is patient Post Blood Transfusion?  No    Depression / Suicide Risk    As you are discharged from this [facility] facility, it is important to learn how to keep safe from harming yourself.    Recognize the warning signs:  · Abrupt changes in personality, positive or negative- including increase in energy   · Giving away possessions  · Change in eating patterns- significant weight changes-  positive or negative  · Change in sleeping patterns- unable to sleep or sleeping all the time   · Unwillingness or inability to communicate  · Depression  · Unusual sadness, discouragement and loneliness  · Talk of wanting to die  · Neglect of personal appearance   · Rebelliousness- reckless behavior  · Withdrawal from people/activities they love  · Confusion- inability to concentrate     If you or a loved one observes any of these behaviors or has concerns about self-harm, here's what  you can do:  · Talk about it- your feelings and reasons for harming yourself  · Remove any means that you might use to hurt yourself (examples: pills, rope, extension cords, firearm)  · Get professional help from the community (Mental Health, Substance Abuse, psychological counseling)  · Do not be alone:Call your Safe Contact- someone whom you trust who will be there for you.  · Call your local CRISIS HOTLINE 267-7241 or 493-637-2202  · Call your local Children's Mobile Crisis Response Team [place] (932) 760-9744 or wwwGrows Up  · Call the toll free National Suicide Prevention Hotlines   · National Suicide Prevention Lifeline 230-183-UDYC (2175)  · National Hope Line Network 800-SUICIDE (680-5570)    Alcohol Problems  Most adults who drink alcohol drink in moderation (not a lot) are at low risk for developing problems related to their drinking. However, all drinkers, including low-risk drinkers, should know about the health risks connected with drinking alcohol.  RECOMMENDATIONS FOR LOW-RISK DRINKING   Drink in moderation. Moderate drinking is defined as follows:   · Men - no more than 2 drinks per day.  · Nonpregnant women - no more than 1 drink per day.  · Over age 65 - no more than 1 drink per day.  A standard drink is 12 grams of pure alcohol, which is equal to a 12 ounce bottle of beer or wine cooler, a 5 ounce glass of wine, or 1.5 ounces of distilled spirits (such as whiskey, ayleen, vodka, or rum).   ABSTAIN FROM (DO NOT DRINK) ALCOHOL:  · When pregnant or considering pregnancy.  · When taking a medication that interacts with alcohol.  · If you are alcohol dependent.  · A medical condition that prohibits drinking alcohol (such as ulcer, liver disease, or heart disease).  DISCUSS WITH YOUR CAREGIVER:  · If you are at risk for coronary heart disease, discuss the potential benefits and risks of alcohol use: Light to moderate drinking is associated with lower rates of coronary heart disease in  certain populations (for example, men over age 45 and postmenopausal women). Infrequent or nondrinkers are advised not to begin light to moderate drinking to reduce the risk of coronary heart disease so as to avoid creating an alcohol-related problem. Similar protective effects can likely be gained through proper diet and exercise.  · Women and the elderly have smaller amounts of body water than men. As a result women and the elderly achieve a higher blood alcohol concentration after drinking the same amount of alcohol.  · Exposing a fetus to alcohol can cause a broad range of birth defects referred to as Fetal Alcohol Syndrome (FAS) or Alcohol-Related Birth Defects (ARBD). Although FAS/ARBD is connected with excessive alcohol consumption during pregnancy, studies also have reported neurobehavioral problems in infants born to mothers reporting drinking an average of 1 drink per day during pregnancy.  · Heavier drinking (the consumption of more than 4 drinks per occasion by men and more than 3 drinks per occasion by women) impairs learning (cognitive) and psychomotor functions and increases the risk of alcohol-related problems, including accidents and injuries.  CAGE QUESTIONS:   · Have you ever felt that you should Cut down on your drinking?  · Have people Annoyed you by criticizing your drinking?  · Have you ever felt bad or Guilty about your drinking?  · Have you ever had a drink first thing in the morning to steady your nerves or get rid of a hangover (Eye opener)?  If you answered positively to any of these questions: You may be at risk for alcohol-related problems if alcohol consumption is:   · Men: Greater than 14 drinks per week or more than 4 drinks per occasion.  · Women: Greater than 7 drinks per week or more than 3 drinks per occasion.  Do you or your family have a medical history of alcohol-related problems, such as:  · Blackouts.  · Sexual dysfunction.  · Depression.  · Trauma.  · Liver  dysfunction.  · Sleep disorders.  · Hypertension.  · Chronic abdominal pain.  · Has your drinking ever caused you problems, such as problems with your family, problems with your work (or school) performance, or accidents/injuries?  · Do you have a compulsion to drink or a preoccupation with drinking?  · Do you have poor control or are you unable to stop drinking once you have started?  · Do you have to drink to avoid withdrawal symptoms?  · Do you have problems with withdrawal such as tremors, nausea, sweats, or mood disturbances?  · Does it take more alcohol than in the past to get you high?  · Do you feel a strong urge to drink?  · Do you change your plans so that you can have a drink?  · Do you ever drink in the morning to relieve the shakes or a hangover?  If you have answered a number of the previous questions positively, it may be time for you to talk to your caregivers, family, and friends and see if they think you have a problem. Alcoholism is a chemical dependency that keeps getting worse and will eventually destroy your health and relationships. Many alcoholics end up dead, impoverished, or in alf. This is often the end result of all chemical dependency.  · Do not be discouraged if you are not ready to take action immediately.  · Decisions to change behavior often involve up and down desires to change and feeling like you cannot decide.  · Try to think more seriously about your drinking behavior.  · Think of the reasons to quit.  WHERE TO GO FOR ADDITIONAL INFORMATION   · The National [place] on Alcohol Abuse and Alcoholism (NIAAA)  www.niaaa.nih.gov  · National [place] on Alcoholism and Drug Dependence (NCADD)  www.ncadd.org  · American Society of Addiction Medicine (ASAM)  www.asam.org   Document Released: 12/18/2006 Document Revised: 03/11/2013 Document Reviewed: 08/05/2009  ExitCare® Patient Information ©2014 [employer]

## 2017-12-15 NOTE — DISCHARGE PLANNING
Medical Social Work    SW paged GOODR Lydia to discuss dc plan. Pt is scheduled to leave w/ [facility] van @ 7580 today.

## 2017-12-15 NOTE — PROGRESS NOTES
Rx and discharge instructions reviewed with patient. Verbalized understanding.     ZARA hTrasher to take pt down to CNA and Jamele entrance at 1320 for Uber ride to [place].     Pt ambulatory.

## 2017-12-16 ENCOUNTER — PATIENT OUTREACH (OUTPATIENT)
Dept: HEALTH INFORMATION MANAGEMENT | Facility: OTHER | Age: 67
End: 2017-12-16

## 2017-12-16 NOTE — LETTER
Chava Mercedes  [address]  [place] [address]    December 16, 2017      Dear Chava Mercedes,    [facility] wants to ensure your discharge home is safe and you or your loved ones have had all of your questions answered regarding your care after you leave the hospital.    Our discharge team was unsuccessful in our attempts to contact you telephonically and we wanted to be sure that you had a list of resources and contact information should you have any questions regarding your hospital stay, follow-up instructions, or active medical symptoms.    Questions or Concerns Regarding… Contact   Medical Questions Related to Your Discharge  (7 days a week, 8am-5pm) Contact a Nurse Care Coordinator   [phone]   Medical Questions Not Related to Your Discharge  (24 hours a day / 7 days a week)  Contact the Nurse Health Line   [phone]    Medications or Discharge Instructions Refer to your discharge packet   or contact your -[phone]   Follow-up Appointment(s) Schedule your appointment via [employer]   or contact Scheduling [phone]   Billing Review your statement via [employer]  or contact Billing [phone]   Medical Records Review your records via [employer]   or contact Medical Records [phone]     You can also easily access your medical information, test results and upcoming appointments via the [employer] free online health management tool. You can learn more and sign up at [employer]. For assistance setting up your [employer] account, please call [phone].    Once again, we want to ensure your discharge home is safe and that you have a clear understanding of any next steps in your care. If you have any questions or concerns, please do not hesitate to contact us, we are here for you. Thank you for choosing [facility] for your healthcare needs.    Sincerely,      Your [facility] Healthcare Team

## 2017-12-18 NOTE — DOCUMENTATION QUERY
DOCUMENTATION QUERY    PROVIDERS: Please select “Cosign w/ note”to reply to query.    To better represent the severity of illness of your patient, please review the following information and exercise your independent professional judgment in responding to this query.     Sepsis is noted in the Progress Notes on 12/06    Based upon the conflicting documentation and then dropped documentation please clarify if sepsis was present on admission or developed after admission or has been ruled out?     Please select all that apply:   • Sepsis present on admission (specify causative organism if known and any associated organ dysfunction if known)  • Sepsis developed after admission  • Sepsis has been ruled out  • SIRS that is unrelated to any infection  • SIRS of non-infectious origin with acute organ dysfunction  • Other explanation of clinical findings  • Findings of no clinical significance  • Unable to determine        The medical record reflects the following:   Clinical Findings  WBC was 14.5 on admission with a LA taken on the 2nd at 1.0. Sepsis was documented by you on the 5th but not documented again after that. Patient has been DC at this time.      - SIRS 2/4  - Fever to 100.9 (12/05) + tachycardia:              - 1/2 BCx shows gram(+) cocci, culture pending              - UCx negative              - CXR shows L. Basilar atelectasis, pneumonitis not excluded              - lactate/ammonia WNL  - Coarse upper respiratory sounds due to secretions  - Consideration for septic encephalopathy given AMS  - Vancomycin per pharmacy protocol started (12/06)  - Guaifenesin syrup for secretions      Treatment  IV Antibiotics,  Guaifenesin syrup for secretions   Risk Factors   Patient admitted with GI bleed and ETOH withdrawal.    Location within medical record  Progress Notes      Thank you for your time.   Sincerely,   Helena Parks RN  Clinical Documentation Improvement Specialist    [phone]`  [email or web address]

## 2017-12-30 NOTE — DOCUMENTATION QUERY
DOCUMENTATION QUERY    PROVIDERS: Please select “Cosign w/ note”to reply to query.    To better represent the severity of illness of your patient, please review the following information and exercise your independent professional judgment in responding to this query.     The patient was diagnosed with Torsades de pointes and then subsequently this condition was ruled out, however this diagnosis again documented in the Discharge Summary. Based upon the clinical findings, risk factors, and treatment, please specify whether this condition was ruled in or ruled out:    · Torsades de pointes was ruled in   · Torsades de pointes was ruled out  · Other explanation of findings (please document)  · Unable to determine    The medical record reflects the following:   Clinical Findings PROGRESS NOTES    11/25/17: The event yesterday was reviewed.  It was not Torsades but rather sinus tach with movement artifact.  He is more stable today from alcohol withdrawal.  Less tachy.  Hemoglobin is slowly decreasing but not dramatic.  Stable.  Continue treatment for withdrawal.    11/25/17:  Tachycardia. Appreciate input primary team and cardio RE: artefact and likely not Torsade de Pointes      DISCHARGE SUMMARY    Primary Diagnosis:   Altered level of consciousness     Secondary Diagnoses:                Principal Problem:    GI bleed POA: Yes  Active Problems:    Alcohol withdrawal (CMS-HCC) POA: Yes    Macrocytic anemia POA: Yes    Hepatic steatosis POA: Yes    History of pulmonary embolism POA: Unknown    Torsades de pointes (CMS-HCC) POA: No     Treatment Electrolyte replacement, telemetry monitoring, chemical cardioversion   Risk Factors Alcohol withdrawal, electrolyte imbalance   Location within medical record Discharge Summary     Thank you,   Deann Landry MD, MEd, CPC, CCS, CDIP

## 2017-12-30 NOTE — ADDENDUM NOTE
Encounter addended by: Deann Landry on: 12/29/2017  5:02 PM<BR>    Actions taken: Pend clinical note

## 2018-01-22 ENCOUNTER — TELEPHONE (OUTPATIENT)
Dept: VASCULAR LAB | Facility: MEDICAL CENTER | Age: 68
End: 2018-01-22

## 2018-01-22 NOTE — TELEPHONE ENCOUNTER
Attempted to reach pt multiples times regarding IVC filter protocol.  He lives in [place] and transportation is difficult for him.  At this point it has been over a year since the filter was placed.  Will lobo filter as indefinite.      Mary CARRINGTON

## 2019-07-11 NOTE — CARE PLAN
"  History     Chief Complaint   Patient presents with    Diarrhea     pt with nausea/diarrhea.  reports near syncopal episode to EMS.   gave pt ASA.      Nausea     52 yo F C/O diarrhea and nausea associated with presyncopal episode this morning that lasted 5 minutes and resolved with sitting. The nausea woke her from sleep around 5am with one episode of non-bloody diarrhea. Afterwards she had chills, generalized paresthesias and lightheadedness. She did not lose consciousness, fall or have any vision changes. Her  gave her 5 baby aspirins and called an ambulance as she rested with improvement of symptoms. She had a similar episode "many years ago" with chest tightness which led to a cardiac workup with no abnormalities. She has not taken vyvanse or adderall in "a few weeks" but usually takes both. She has had two abdominal surgeries:  and abdominoplasty. She still has her gallbladder and appendix. Her LMP was last week, and she is still having regular periods. Denies recent alcohol consumption. She has had no fevers, abdominal/chest pain, shortness of breath, emesis, change in diet, sick contacts or recent travel. EMS reports orthostatic hypotension with a drop in systolic from 140 to 90.        Past Medical History:   Diagnosis Date    ADD (attention deficit disorder) 2017    Anxiety with depression 2017    Herpes simplex without mention of complication     rare outbreaks    Primary insomnia 2017       Past Surgical History:   Procedure Laterality Date    BELT ABDOMINOPLASTY      breast implants       SECTION      x 2       Family History   Problem Relation Age of Onset    Alzheimer's disease Mother     Diabetes Paternal Uncle     Breast cancer Neg Hx     Colon cancer Neg Hx     Ovarian cancer Neg Hx     Hypertension Neg Hx        Social History     Tobacco Use    Smoking status: Never Smoker    Smokeless tobacco: Never Used   Substance Use Topics " Problem: Safety  Goal: Will remain free from falls    Intervention: Implement fall precautions  Patient continues to attempt to get out of bed multiple times an hour while awake. Bed alarm on, wrist restraints on, patient in room near nursing station, education and reorientation provided but patient demonstrated no understanding.       Problem: Medication  Goal: Compliance with prescribed medication will improve    Intervention: Educate patient and significant other/support system medication rationale and regimen  Educated patient about purpose of evening and midnight medications. Patient demonstrated no understanding.             Alcohol use: No    Drug use: No       Review of Systems   Constitutional: Positive for chills. Negative for appetite change, fatigue and fever.   Eyes: Negative for visual disturbance.   Respiratory: Negative for cough, chest tightness and shortness of breath.    Cardiovascular: Negative for chest pain, palpitations and leg swelling.   Gastrointestinal: Positive for diarrhea and nausea. Negative for abdominal pain, blood in stool, rectal pain and vomiting.   Genitourinary: Negative for decreased urine volume, difficulty urinating, dysuria and frequency.   Musculoskeletal: Negative for back pain and gait problem.   Skin: Negative for wound.   Neurological: Positive for light-headedness. Negative for dizziness, seizures, speech difficulty, weakness and headaches.   Psychiatric/Behavioral: Negative for agitation and confusion.       Physical Exam   /82   Pulse 61   Temp 97.9 °F (36.6 °C) (Oral)   Resp 18   SpO2 98%   Breastfeeding? No     Physical Exam    Constitutional: She appears well-developed and well-nourished. She is not diaphoretic. No distress.   HENT:   Head: Normocephalic and atraumatic.   Mouth/Throat: Oropharynx is clear and moist.   Eyes: Conjunctivae and EOM are normal. Pupils are equal, round, and reactive to light.   Neck: Normal range of motion. Neck supple.   Cardiovascular: Normal rate, regular rhythm, normal heart sounds and intact distal pulses.   No murmur heard.  Pulmonary/Chest: Breath sounds normal. No respiratory distress. She has no wheezes. She has no rhonchi. She has no rales.   Abdominal: Soft. Bowel sounds are normal. She exhibits no distension. There is no hepatosplenomegaly. There is no tenderness. There is no rebound, no guarding, no tenderness at McBurney's point and negative Diaz's sign.   Musculoskeletal: Normal range of motion. She exhibits no edema.   Neurological: She is alert and oriented to person, place, and time. She has normal strength. GCS score is 15.  GCS eye subscore is 4. GCS verbal subscore is 5. GCS motor subscore is 6.   Skin: Skin is warm and dry. No erythema. No pallor.   Psychiatric: She has a normal mood and affect.         ED Course       6:30am Patient resting comfortably with vitals WNL. HPI and physical exam performed.  6:40am EKG done showing normal sinus rhythm.  6:45am Pt seen by Dr. Jett with discussion of plan.  7:00am Patient given 1L IV saline  7:30am Patient re-examined with resolution of symptoms. Plan to discharge.    On episode of non-bloody diarrhea with pre-syncopal episode with improvement of symptoms with rest and fluids. DDx include but not excluded to postural hypotension, dehydration, electrolyte abnormality, cardiac arrhythmia (VTach), seizure, and ectopic pregnancy. I do not believe symptoms are due to electrolyte abnormality or cardiac in origin. No seizure-like activity. More likely postural hypotension 2/2 mild dehydration.    Discharged home after discussion of when to return to ED if symptoms worsen, severe weakness or any other concerns. Follow-up with PCP within two weeks to monitor fluid status and improvement of gastrointestinal symptoms.

## 2023-12-26 ENCOUNTER — HOSPITAL ENCOUNTER (OUTPATIENT)
Facility: MEDICAL CENTER | Age: 73
End: 2023-12-28
Attending: EMERGENCY MEDICINE | Admitting: SURGERY
Payer: MEDICARE

## 2023-12-26 DIAGNOSIS — K40.91 UNILATERAL RECURRENT INGUINAL HERNIA WITHOUT OBSTRUCTION OR GANGRENE: ICD-10-CM

## 2023-12-26 DIAGNOSIS — K42.9 UMBILICAL HERNIA WITHOUT OBSTRUCTION AND WITHOUT GANGRENE: ICD-10-CM

## 2023-12-26 DIAGNOSIS — R10.84 GENERALIZED ABDOMINAL PAIN: ICD-10-CM

## 2023-12-26 DIAGNOSIS — K42.0 UMBILICAL HERNIA, INCARCERATED: ICD-10-CM

## 2023-12-26 PROCEDURE — 94760 N-INVAS EAR/PLS OXIMETRY 1: CPT

## 2023-12-26 PROCEDURE — 36415 COLL VENOUS BLD VENIPUNCTURE: CPT

## 2023-12-26 PROCEDURE — 99291 CRITICAL CARE FIRST HOUR: CPT

## 2023-12-26 RX ORDER — SODIUM CHLORIDE 9 MG/ML
1000 INJECTION, SOLUTION INTRAVENOUS ONCE
Status: COMPLETED | OUTPATIENT
Start: 2023-12-27 | End: 2023-12-27

## 2023-12-26 ASSESSMENT — FIBROSIS 4 INDEX: FIB4 SCORE: 7.21

## 2023-12-27 ENCOUNTER — ANESTHESIA EVENT (OUTPATIENT)
Dept: SURGERY | Facility: MEDICAL CENTER | Age: 73
End: 2023-12-27
Payer: MEDICARE

## 2023-12-27 ENCOUNTER — ANESTHESIA (OUTPATIENT)
Dept: SURGERY | Facility: MEDICAL CENTER | Age: 73
End: 2023-12-27
Payer: MEDICARE

## 2023-12-27 ENCOUNTER — APPOINTMENT (OUTPATIENT)
Dept: RADIOLOGY | Facility: MEDICAL CENTER | Age: 73
End: 2023-12-27
Attending: EMERGENCY MEDICINE
Payer: MEDICARE

## 2023-12-27 PROBLEM — K42.0 UMBILICAL HERNIA, INCARCERATED: Status: ACTIVE | Noted: 2023-12-27

## 2023-12-27 PROBLEM — K40.91 UNILATERAL RECURRENT INGUINAL HERNIA WITHOUT OBSTRUCTION OR GANGRENE: Status: ACTIVE | Noted: 2023-12-27

## 2023-12-27 LAB
ALBUMIN SERPL BCP-MCNC: 3.5 G/DL (ref 3.2–4.9)
ALBUMIN/GLOB SERPL: 0.8 G/DL
ALP SERPL-CCNC: 94 U/L (ref 30–99)
ALT SERPL-CCNC: 16 U/L (ref 2–50)
ANION GAP SERPL CALC-SCNC: 14 MMOL/L (ref 7–16)
APPEARANCE UR: CLEAR
AST SERPL-CCNC: 41 U/L (ref 12–45)
BASOPHILS # BLD AUTO: 1 % (ref 0–1.8)
BASOPHILS # BLD: 0.04 K/UL (ref 0–0.12)
BILIRUB SERPL-MCNC: 2.7 MG/DL (ref 0.1–1.5)
BILIRUB UR QL STRIP.AUTO: NEGATIVE
BUN SERPL-MCNC: 6 MG/DL (ref 8–22)
CALCIUM ALBUM COR SERPL-MCNC: 8.8 MG/DL (ref 8.5–10.5)
CALCIUM SERPL-MCNC: 8.4 MG/DL (ref 8.5–10.5)
CFT BLD TEG: 5.4 MIN (ref 4.6–9.1)
CFT P HPASE BLD TEG: 5.1 MIN (ref 4.3–8.3)
CHLORIDE SERPL-SCNC: 102 MMOL/L (ref 96–112)
CLOT ANGLE BLD TEG: 65.9 DEGREES (ref 63–78)
CO2 SERPL-SCNC: 19 MMOL/L (ref 20–33)
COLOR UR: YELLOW
CREAT SERPL-MCNC: 0.58 MG/DL (ref 0.5–1.4)
CT.EXTRINSIC BLD ROTEM: 2.4 MIN (ref 0.8–2.1)
EKG IMPRESSION: NORMAL
EOSINOPHIL # BLD AUTO: 0.04 K/UL (ref 0–0.51)
EOSINOPHIL NFR BLD: 1 % (ref 0–6.9)
ERYTHROCYTE [DISTWIDTH] IN BLOOD BY AUTOMATED COUNT: 47.8 FL (ref 35.9–50)
GFR SERPLBLD CREATININE-BSD FMLA CKD-EPI: 103 ML/MIN/1.73 M 2
GLOBULIN SER CALC-MCNC: 4.5 G/DL (ref 1.9–3.5)
GLUCOSE SERPL-MCNC: 150 MG/DL (ref 65–99)
GLUCOSE UR STRIP.AUTO-MCNC: NEGATIVE MG/DL
HCT VFR BLD AUTO: 36 % (ref 42–52)
HGB BLD-MCNC: 12.7 G/DL (ref 14–18)
IMM GRANULOCYTES # BLD AUTO: 0.02 K/UL (ref 0–0.11)
IMM GRANULOCYTES NFR BLD AUTO: 0.5 % (ref 0–0.9)
INR PPP: 1.58 (ref 0.87–1.13)
KETONES UR STRIP.AUTO-MCNC: NEGATIVE MG/DL
LEUKOCYTE ESTERASE UR QL STRIP.AUTO: NEGATIVE
LIPASE SERPL-CCNC: 44 U/L (ref 11–82)
LYMPHOCYTES # BLD AUTO: 0.26 K/UL (ref 1–4.8)
LYMPHOCYTES NFR BLD: 6.4 % (ref 22–41)
MCF BLD TEG: 40.3 MM (ref 52–69)
MCF.PLATELET INHIB BLD ROTEM: 10.7 MM (ref 15–32)
MCH RBC QN AUTO: 35.8 PG (ref 27–33)
MCHC RBC AUTO-ENTMCNC: 35.3 G/DL (ref 32.3–36.5)
MCV RBC AUTO: 101.4 FL (ref 81.4–97.8)
MICRO URNS: NORMAL
MONOCYTES # BLD AUTO: 0.82 K/UL (ref 0–0.85)
MONOCYTES NFR BLD AUTO: 20.2 % (ref 0–13.4)
NEUTROPHILS # BLD AUTO: 2.87 K/UL (ref 1.82–7.42)
NEUTROPHILS NFR BLD: 70.9 % (ref 44–72)
NITRITE UR QL STRIP.AUTO: NEGATIVE
NRBC # BLD AUTO: 0 K/UL
NRBC BLD-RTO: 0 /100 WBC (ref 0–0.2)
PA AA BLD-ACNC: ABNORMAL % (ref 0–11)
PA ADP BLD-ACNC: ABNORMAL % (ref 0–17)
PH UR STRIP.AUTO: 8 [PH] (ref 5–8)
PLATELET # BLD AUTO: 110 K/UL (ref 164–446)
PMV BLD AUTO: 9.8 FL (ref 9–12.9)
POTASSIUM SERPL-SCNC: 4.2 MMOL/L (ref 3.6–5.5)
PROT SERPL-MCNC: 8 G/DL (ref 6–8.2)
PROT UR QL STRIP: NEGATIVE MG/DL
PROTHROMBIN TIME: 19.1 SEC (ref 12–14.6)
RBC # BLD AUTO: 3.55 M/UL (ref 4.7–6.1)
RBC UR QL AUTO: NEGATIVE
SODIUM SERPL-SCNC: 135 MMOL/L (ref 135–145)
SP GR UR STRIP.AUTO: 1.03
TEG ALGORITHM TGALG: ABNORMAL
UROBILINOGEN UR STRIP.AUTO-MCNC: 0.2 MG/DL
WBC # BLD AUTO: 4.1 K/UL (ref 4.8–10.8)

## 2023-12-27 PROCEDURE — 700102 HCHG RX REV CODE 250 W/ 637 OVERRIDE(OP): Performed by: ANESTHESIOLOGY

## 2023-12-27 PROCEDURE — 49615 RPR AA HRN RCR 3-10 RDC: CPT | Mod: AS | Performed by: NURSE PRACTITIONER

## 2023-12-27 PROCEDURE — 85576 BLOOD PLATELET AGGREGATION: CPT | Mod: 91

## 2023-12-27 PROCEDURE — 80053 COMPREHEN METABOLIC PANEL: CPT

## 2023-12-27 PROCEDURE — 49521 REREPAIR ING HERNIA BLOCKED: CPT | Performed by: SURGERY

## 2023-12-27 PROCEDURE — 83690 ASSAY OF LIPASE: CPT

## 2023-12-27 PROCEDURE — C1729 CATH, DRAINAGE: HCPCS | Performed by: SURGERY

## 2023-12-27 PROCEDURE — A9270 NON-COVERED ITEM OR SERVICE: HCPCS | Performed by: NURSE PRACTITIONER

## 2023-12-27 PROCEDURE — 700101 HCHG RX REV CODE 250: Performed by: EMERGENCY MEDICINE

## 2023-12-27 PROCEDURE — 96375 TX/PRO/DX INJ NEW DRUG ADDON: CPT

## 2023-12-27 PROCEDURE — 700117 HCHG RX CONTRAST REV CODE 255: Performed by: EMERGENCY MEDICINE

## 2023-12-27 PROCEDURE — 74177 CT ABD & PELVIS W/CONTRAST: CPT

## 2023-12-27 PROCEDURE — C1781 MESH (IMPLANTABLE): HCPCS | Performed by: SURGERY

## 2023-12-27 PROCEDURE — 700111 HCHG RX REV CODE 636 W/ 250 OVERRIDE (IP): Mod: JZ | Performed by: EMERGENCY MEDICINE

## 2023-12-27 PROCEDURE — 36415 COLL VENOUS BLD VENIPUNCTURE: CPT

## 2023-12-27 PROCEDURE — 49521 REREPAIR ING HERNIA BLOCKED: CPT | Mod: AS | Performed by: NURSE PRACTITIONER

## 2023-12-27 PROCEDURE — 81003 URINALYSIS AUTO W/O SCOPE: CPT

## 2023-12-27 PROCEDURE — 770030 HCHG ROOM/CARE - EXTENDED RECOVERY EACH 15 MIN

## 2023-12-27 PROCEDURE — G0378 HOSPITAL OBSERVATION PER HR: HCPCS

## 2023-12-27 PROCEDURE — 700101 HCHG RX REV CODE 250: Performed by: ANESTHESIOLOGY

## 2023-12-27 PROCEDURE — 700111 HCHG RX REV CODE 636 W/ 250 OVERRIDE (IP): Performed by: ANESTHESIOLOGY

## 2023-12-27 PROCEDURE — 700105 HCHG RX REV CODE 258: Performed by: EMERGENCY MEDICINE

## 2023-12-27 PROCEDURE — 96366 THER/PROPH/DIAG IV INF ADDON: CPT

## 2023-12-27 PROCEDURE — 85025 COMPLETE CBC W/AUTO DIFF WBC: CPT

## 2023-12-27 PROCEDURE — 700111 HCHG RX REV CODE 636 W/ 250 OVERRIDE (IP): Mod: JZ | Performed by: NURSE PRACTITIONER

## 2023-12-27 PROCEDURE — 85384 FIBRINOGEN ACTIVITY: CPT | Mod: 91

## 2023-12-27 PROCEDURE — 99222 1ST HOSP IP/OBS MODERATE 55: CPT | Mod: 57 | Performed by: SURGERY

## 2023-12-27 PROCEDURE — 96376 TX/PRO/DX INJ SAME DRUG ADON: CPT

## 2023-12-27 PROCEDURE — A9270 NON-COVERED ITEM OR SERVICE: HCPCS | Performed by: ANESTHESIOLOGY

## 2023-12-27 PROCEDURE — 700102 HCHG RX REV CODE 250 W/ 637 OVERRIDE(OP): Performed by: NURSE PRACTITIONER

## 2023-12-27 PROCEDURE — 85610 PROTHROMBIN TIME: CPT

## 2023-12-27 PROCEDURE — 96365 THER/PROPH/DIAG IV INF INIT: CPT

## 2023-12-27 PROCEDURE — 160009 HCHG ANES TIME/MIN: Performed by: SURGERY

## 2023-12-27 PROCEDURE — 700105 HCHG RX REV CODE 258: Performed by: ANESTHESIOLOGY

## 2023-12-27 PROCEDURE — 160048 HCHG OR STATISTICAL LEVEL 1-5: Performed by: SURGERY

## 2023-12-27 PROCEDURE — 160039 HCHG SURGERY MINUTES - EA ADDL 1 MIN LEVEL 3: Performed by: SURGERY

## 2023-12-27 PROCEDURE — 49615 RPR AA HRN RCR 3-10 RDC: CPT | Mod: 59 | Performed by: SURGERY

## 2023-12-27 PROCEDURE — 93005 ELECTROCARDIOGRAM TRACING: CPT | Performed by: SURGERY

## 2023-12-27 PROCEDURE — 700101 HCHG RX REV CODE 250: Performed by: SURGERY

## 2023-12-27 PROCEDURE — 160002 HCHG RECOVERY MINUTES (STAT): Performed by: SURGERY

## 2023-12-27 PROCEDURE — 85347 COAGULATION TIME ACTIVATED: CPT

## 2023-12-27 PROCEDURE — 700111 HCHG RX REV CODE 636 W/ 250 OVERRIDE (IP): Mod: JZ | Performed by: ANESTHESIOLOGY

## 2023-12-27 PROCEDURE — 160028 HCHG SURGERY MINUTES - 1ST 30 MINS LEVEL 3: Performed by: SURGERY

## 2023-12-27 PROCEDURE — 160035 HCHG PACU - 1ST 60 MINS PHASE I: Performed by: SURGERY

## 2023-12-27 DEVICE — MESH FLAT SHEET 1 X 4 - (3/CA): Type: IMPLANTABLE DEVICE | Site: GROIN | Status: FUNCTIONAL

## 2023-12-27 RX ORDER — ONDANSETRON 2 MG/ML
4 INJECTION INTRAMUSCULAR; INTRAVENOUS
Status: DISCONTINUED | OUTPATIENT
Start: 2023-12-27 | End: 2023-12-27 | Stop reason: HOSPADM

## 2023-12-27 RX ORDER — ACETAMINOPHEN 325 MG/1
650 TABLET ORAL EVERY 6 HOURS
Status: DISCONTINUED | OUTPATIENT
Start: 2023-12-27 | End: 2023-12-28 | Stop reason: HOSPADM

## 2023-12-27 RX ORDER — HYDRALAZINE HYDROCHLORIDE 20 MG/ML
5 INJECTION INTRAMUSCULAR; INTRAVENOUS
Status: DISCONTINUED | OUTPATIENT
Start: 2023-12-27 | End: 2023-12-27 | Stop reason: HOSPADM

## 2023-12-27 RX ORDER — HALOPERIDOL 5 MG/ML
1 INJECTION INTRAMUSCULAR
Status: DISCONTINUED | OUTPATIENT
Start: 2023-12-27 | End: 2023-12-27 | Stop reason: HOSPADM

## 2023-12-27 RX ORDER — MIDAZOLAM HYDROCHLORIDE 1 MG/ML
INJECTION INTRAMUSCULAR; INTRAVENOUS PRN
Status: DISCONTINUED | OUTPATIENT
Start: 2023-12-27 | End: 2023-12-27 | Stop reason: SURG

## 2023-12-27 RX ORDER — HALOPERIDOL 5 MG/ML
1 INJECTION INTRAMUSCULAR EVERY 6 HOURS PRN
Status: DISCONTINUED | OUTPATIENT
Start: 2023-12-27 | End: 2023-12-28 | Stop reason: HOSPADM

## 2023-12-27 RX ORDER — CELECOXIB 200 MG/1
200 CAPSULE ORAL 2 TIMES DAILY
Status: DISCONTINUED | OUTPATIENT
Start: 2023-12-27 | End: 2023-12-28 | Stop reason: HOSPADM

## 2023-12-27 RX ORDER — LIDOCAINE HYDROCHLORIDE 40 MG/ML
SOLUTION TOPICAL PRN
Status: DISCONTINUED | OUTPATIENT
Start: 2023-12-27 | End: 2023-12-27 | Stop reason: SURG

## 2023-12-27 RX ORDER — SCOLOPAMINE TRANSDERMAL SYSTEM 1 MG/1
1 PATCH, EXTENDED RELEASE TRANSDERMAL
Status: DISCONTINUED | OUTPATIENT
Start: 2023-12-27 | End: 2023-12-27

## 2023-12-27 RX ORDER — ONDANSETRON 2 MG/ML
4 INJECTION INTRAMUSCULAR; INTRAVENOUS EVERY 4 HOURS PRN
Status: DISCONTINUED | OUTPATIENT
Start: 2023-12-27 | End: 2023-12-28 | Stop reason: HOSPADM

## 2023-12-27 RX ORDER — CELECOXIB 200 MG/1
200 CAPSULE ORAL ONCE
Status: COMPLETED | OUTPATIENT
Start: 2023-12-27 | End: 2023-12-27

## 2023-12-27 RX ORDER — OXYCODONE HCL 5 MG/5 ML
5 SOLUTION, ORAL ORAL
Status: COMPLETED | OUTPATIENT
Start: 2023-12-27 | End: 2023-12-27

## 2023-12-27 RX ORDER — LIDOCAINE HYDROCHLORIDE 20 MG/ML
INJECTION, SOLUTION EPIDURAL; INFILTRATION; INTRACAUDAL; PERINEURAL PRN
Status: DISCONTINUED | OUTPATIENT
Start: 2023-12-27 | End: 2023-12-27 | Stop reason: SURG

## 2023-12-27 RX ORDER — ONDANSETRON 2 MG/ML
INJECTION INTRAMUSCULAR; INTRAVENOUS PRN
Status: DISCONTINUED | OUTPATIENT
Start: 2023-12-27 | End: 2023-12-27 | Stop reason: SURG

## 2023-12-27 RX ORDER — DEXAMETHASONE SODIUM PHOSPHATE 4 MG/ML
4 INJECTION, SOLUTION INTRA-ARTICULAR; INTRALESIONAL; INTRAMUSCULAR; INTRAVENOUS; SOFT TISSUE
Status: DISCONTINUED | OUTPATIENT
Start: 2023-12-27 | End: 2023-12-28 | Stop reason: HOSPADM

## 2023-12-27 RX ORDER — OXYCODONE HYDROCHLORIDE 5 MG/1
5 TABLET ORAL EVERY 4 HOURS PRN
Qty: 30 TABLET | Refills: 0 | Status: SHIPPED | OUTPATIENT
Start: 2023-12-27 | End: 2024-01-03

## 2023-12-27 RX ORDER — ENOXAPARIN SODIUM 100 MG/ML
40 INJECTION SUBCUTANEOUS DAILY
Status: DISCONTINUED | OUTPATIENT
Start: 2023-12-28 | End: 2023-12-28 | Stop reason: HOSPADM

## 2023-12-27 RX ORDER — MIDAZOLAM HYDROCHLORIDE 1 MG/ML
1 INJECTION INTRAMUSCULAR; INTRAVENOUS
Status: DISCONTINUED | OUTPATIENT
Start: 2023-12-27 | End: 2023-12-27 | Stop reason: HOSPADM

## 2023-12-27 RX ORDER — HYDROMORPHONE HYDROCHLORIDE 1 MG/ML
0.5 INJECTION, SOLUTION INTRAMUSCULAR; INTRAVENOUS; SUBCUTANEOUS
Status: DISCONTINUED | OUTPATIENT
Start: 2023-12-27 | End: 2023-12-28 | Stop reason: HOSPADM

## 2023-12-27 RX ORDER — DIPHENHYDRAMINE HYDROCHLORIDE 50 MG/ML
25 INJECTION INTRAMUSCULAR; INTRAVENOUS EVERY 6 HOURS PRN
Status: DISCONTINUED | OUTPATIENT
Start: 2023-12-27 | End: 2023-12-28 | Stop reason: HOSPADM

## 2023-12-27 RX ORDER — SODIUM CHLORIDE, SODIUM LACTATE, POTASSIUM CHLORIDE, CALCIUM CHLORIDE 600; 310; 30; 20 MG/100ML; MG/100ML; MG/100ML; MG/100ML
INJECTION, SOLUTION INTRAVENOUS CONTINUOUS
Status: DISCONTINUED | OUTPATIENT
Start: 2023-12-27 | End: 2023-12-27 | Stop reason: HOSPADM

## 2023-12-27 RX ORDER — ONDANSETRON 2 MG/ML
4 INJECTION INTRAMUSCULAR; INTRAVENOUS ONCE
Status: COMPLETED | OUTPATIENT
Start: 2023-12-27 | End: 2023-12-27

## 2023-12-27 RX ORDER — ONDANSETRON 2 MG/ML
4 INJECTION INTRAMUSCULAR; INTRAVENOUS
Status: DISCONTINUED | OUTPATIENT
Start: 2023-12-27 | End: 2023-12-27

## 2023-12-27 RX ORDER — MEPERIDINE HYDROCHLORIDE 25 MG/ML
12.5 INJECTION INTRAMUSCULAR; INTRAVENOUS; SUBCUTANEOUS
Status: DISCONTINUED | OUTPATIENT
Start: 2023-12-27 | End: 2023-12-27 | Stop reason: HOSPADM

## 2023-12-27 RX ORDER — CELECOXIB 200 MG/1
200 CAPSULE ORAL 2 TIMES DAILY PRN
Status: DISCONTINUED | OUTPATIENT
Start: 2024-01-01 | End: 2023-12-28 | Stop reason: HOSPADM

## 2023-12-27 RX ORDER — LABETALOL HYDROCHLORIDE 5 MG/ML
5 INJECTION, SOLUTION INTRAVENOUS
Status: DISCONTINUED | OUTPATIENT
Start: 2023-12-27 | End: 2023-12-27 | Stop reason: HOSPADM

## 2023-12-27 RX ORDER — BUPIVACAINE HYDROCHLORIDE AND EPINEPHRINE 5; 5 MG/ML; UG/ML
INJECTION, SOLUTION EPIDURAL; INTRACAUDAL; PERINEURAL
Status: DISCONTINUED | OUTPATIENT
Start: 2023-12-27 | End: 2023-12-27 | Stop reason: HOSPADM

## 2023-12-27 RX ORDER — OXYCODONE HYDROCHLORIDE 5 MG/1
5 TABLET ORAL EVERY 4 HOURS PRN
Status: DISCONTINUED | OUTPATIENT
Start: 2023-12-27 | End: 2023-12-27

## 2023-12-27 RX ORDER — DEXAMETHASONE SODIUM PHOSPHATE 4 MG/ML
INJECTION, SOLUTION INTRA-ARTICULAR; INTRALESIONAL; INTRAMUSCULAR; INTRAVENOUS; SOFT TISSUE PRN
Status: DISCONTINUED | OUTPATIENT
Start: 2023-12-27 | End: 2023-12-27 | Stop reason: SURG

## 2023-12-27 RX ORDER — ACETAMINOPHEN 500 MG
500 TABLET ORAL ONCE
Status: DISCONTINUED | OUTPATIENT
Start: 2023-12-27 | End: 2023-12-27 | Stop reason: HOSPADM

## 2023-12-27 RX ORDER — LORAZEPAM 1 MG/1
0.5 TABLET ORAL EVERY 4 HOURS PRN
Status: DISCONTINUED | OUTPATIENT
Start: 2023-12-27 | End: 2023-12-28 | Stop reason: HOSPADM

## 2023-12-27 RX ORDER — OXYCODONE HYDROCHLORIDE 10 MG/1
10 TABLET ORAL
Status: DISCONTINUED | OUTPATIENT
Start: 2023-12-27 | End: 2023-12-28 | Stop reason: HOSPADM

## 2023-12-27 RX ORDER — MORPHINE SULFATE 4 MG/ML
4 INJECTION INTRAVENOUS
Status: COMPLETED | OUTPATIENT
Start: 2023-12-27 | End: 2023-12-27

## 2023-12-27 RX ORDER — OXYCODONE HYDROCHLORIDE 5 MG/1
5 TABLET ORAL
Status: DISCONTINUED | OUTPATIENT
Start: 2023-12-27 | End: 2023-12-28 | Stop reason: HOSPADM

## 2023-12-27 RX ORDER — ACETAMINOPHEN 325 MG/1
650 TABLET ORAL EVERY 6 HOURS PRN
Status: DISCONTINUED | OUTPATIENT
Start: 2024-01-01 | End: 2023-12-28 | Stop reason: HOSPADM

## 2023-12-27 RX ORDER — SODIUM CHLORIDE, SODIUM LACTATE, POTASSIUM CHLORIDE, CALCIUM CHLORIDE 600; 310; 30; 20 MG/100ML; MG/100ML; MG/100ML; MG/100ML
INJECTION, SOLUTION INTRAVENOUS
Status: DISCONTINUED | OUTPATIENT
Start: 2023-12-27 | End: 2023-12-27 | Stop reason: SURG

## 2023-12-27 RX ORDER — HYDROMORPHONE HYDROCHLORIDE 2 MG/ML
INJECTION, SOLUTION INTRAMUSCULAR; INTRAVENOUS; SUBCUTANEOUS PRN
Status: DISCONTINUED | OUTPATIENT
Start: 2023-12-27 | End: 2023-12-27 | Stop reason: SURG

## 2023-12-27 RX ORDER — CEFAZOLIN SODIUM 1 G/3ML
INJECTION, POWDER, FOR SOLUTION INTRAMUSCULAR; INTRAVENOUS PRN
Status: DISCONTINUED | OUTPATIENT
Start: 2023-12-27 | End: 2023-12-27 | Stop reason: SURG

## 2023-12-27 RX ORDER — DIAZEPAM 5 MG/ML
2.5 INJECTION, SOLUTION INTRAMUSCULAR; INTRAVENOUS ONCE
Status: COMPLETED | OUTPATIENT
Start: 2023-12-27 | End: 2023-12-27

## 2023-12-27 RX ADMIN — LIDOCAINE HYDROCHLORIDE 4 ML: 40 SOLUTION TOPICAL at 08:56

## 2023-12-27 RX ADMIN — DIAZEPAM 2.5 MG: 10 INJECTION, SOLUTION INTRAMUSCULAR; INTRAVENOUS at 02:21

## 2023-12-27 RX ADMIN — CELECOXIB 200 MG: 200 CAPSULE ORAL at 19:38

## 2023-12-27 RX ADMIN — HYDROMORPHONE HYDROCHLORIDE 1 MG: 2 INJECTION INTRAMUSCULAR; INTRAVENOUS; SUBCUTANEOUS at 09:29

## 2023-12-27 RX ADMIN — LORAZEPAM 0.5 MG: 1 TABLET ORAL at 16:03

## 2023-12-27 RX ADMIN — LIDOCAINE HYDROCHLORIDE 80 MG: 20 INJECTION, SOLUTION EPIDURAL; INFILTRATION; INTRACAUDAL at 08:55

## 2023-12-27 RX ADMIN — SODIUM CHLORIDE 1000 ML: 9 INJECTION, SOLUTION INTRAVENOUS at 00:30

## 2023-12-27 RX ADMIN — SODIUM CHLORIDE, POTASSIUM CHLORIDE, SODIUM LACTATE AND CALCIUM CHLORIDE: 600; 310; 30; 20 INJECTION, SOLUTION INTRAVENOUS at 08:50

## 2023-12-27 RX ADMIN — SUGAMMADEX 200 MG: 100 INJECTION, SOLUTION INTRAVENOUS at 09:52

## 2023-12-27 RX ADMIN — MORPHINE SULFATE 4 MG: 4 INJECTION, SOLUTION INTRAMUSCULAR; INTRAVENOUS at 14:37

## 2023-12-27 RX ADMIN — OXYCODONE HYDROCHLORIDE 10 MG: 10 TABLET ORAL at 19:38

## 2023-12-27 RX ADMIN — IOHEXOL 100 ML: 350 INJECTION, SOLUTION INTRAVENOUS at 01:16

## 2023-12-27 RX ADMIN — MIDAZOLAM HYDROCHLORIDE 1 MG: 1 INJECTION, SOLUTION INTRAMUSCULAR; INTRAVENOUS at 08:50

## 2023-12-27 RX ADMIN — OXYCODONE HYDROCHLORIDE 5 MG: 5 SOLUTION ORAL at 10:14

## 2023-12-27 RX ADMIN — ACETAMINOPHEN 650 MG: 325 TABLET, FILM COATED ORAL at 19:11

## 2023-12-27 RX ADMIN — FENTANYL CITRATE 25 MCG: 50 INJECTION, SOLUTION INTRAMUSCULAR; INTRAVENOUS at 10:15

## 2023-12-27 RX ADMIN — DEXAMETHASONE SODIUM PHOSPHATE 4 MG: 4 INJECTION INTRA-ARTICULAR; INTRALESIONAL; INTRAMUSCULAR; INTRAVENOUS; SOFT TISSUE at 09:01

## 2023-12-27 RX ADMIN — ONDANSETRON 4 MG: 2 INJECTION INTRAMUSCULAR; INTRAVENOUS at 16:07

## 2023-12-27 RX ADMIN — PROPOFOL 100 MG: 10 INJECTION, EMULSION INTRAVENOUS at 08:55

## 2023-12-27 RX ADMIN — FENTANYL CITRATE 25 MCG: 50 INJECTION, SOLUTION INTRAMUSCULAR; INTRAVENOUS at 10:26

## 2023-12-27 RX ADMIN — ONDANSETRON 4 MG: 2 INJECTION INTRAMUSCULAR; INTRAVENOUS at 09:52

## 2023-12-27 RX ADMIN — Medication 80 MG: at 08:55

## 2023-12-27 RX ADMIN — MIDAZOLAM HYDROCHLORIDE 1 MG: 1 INJECTION, SOLUTION INTRAMUSCULAR; INTRAVENOUS at 09:58

## 2023-12-27 RX ADMIN — LORAZEPAM 0.5 MG: 1 TABLET ORAL at 20:08

## 2023-12-27 RX ADMIN — CELECOXIB 200 MG: 200 CAPSULE ORAL at 08:04

## 2023-12-27 RX ADMIN — ONDANSETRON 4 MG: 2 INJECTION INTRAMUSCULAR; INTRAVENOUS at 03:59

## 2023-12-27 RX ADMIN — FENTANYL CITRATE 25 MCG: 50 INJECTION, SOLUTION INTRAMUSCULAR; INTRAVENOUS at 10:53

## 2023-12-27 RX ADMIN — ROCURONIUM BROMIDE 20 MG: 10 INJECTION, SOLUTION INTRAVENOUS at 09:02

## 2023-12-27 RX ADMIN — FENTANYL CITRATE 50 MCG: 50 INJECTION, SOLUTION INTRAMUSCULAR; INTRAVENOUS at 03:59

## 2023-12-27 RX ADMIN — OXYCODONE HYDROCHLORIDE 5 MG: 5 TABLET ORAL at 16:03

## 2023-12-27 RX ADMIN — CEFAZOLIN 2 G: 1 INJECTION, POWDER, FOR SOLUTION INTRAMUSCULAR; INTRAVENOUS at 08:50

## 2023-12-27 RX ADMIN — ROCURONIUM BROMIDE 10 MG: 10 INJECTION, SOLUTION INTRAVENOUS at 08:55

## 2023-12-27 RX ADMIN — FENTANYL CITRATE 100 MCG: 50 INJECTION, SOLUTION INTRAMUSCULAR; INTRAVENOUS at 08:55

## 2023-12-27 RX ADMIN — MAGNESIUM SULFATE HEPTAHYDRATE 250 ML: 500 INJECTION, SOLUTION INTRAMUSCULAR; INTRAVENOUS at 02:22

## 2023-12-27 RX ADMIN — SODIUM CHLORIDE, POTASSIUM CHLORIDE, SODIUM LACTATE AND CALCIUM CHLORIDE: 600; 310; 30; 20 INJECTION, SOLUTION INTRAVENOUS at 09:51

## 2023-12-27 ASSESSMENT — PAIN DESCRIPTION - PAIN TYPE
TYPE: ACUTE PAIN
TYPE: SURGICAL PAIN

## 2023-12-27 ASSESSMENT — PATIENT HEALTH QUESTIONNAIRE - PHQ9
1. LITTLE INTEREST OR PLEASURE IN DOING THINGS: NOT AT ALL
1. LITTLE INTEREST OR PLEASURE IN DOING THINGS: NOT AT ALL
SUM OF ALL RESPONSES TO PHQ9 QUESTIONS 1 AND 2: 0
SUM OF ALL RESPONSES TO PHQ9 QUESTIONS 1 AND 2: 0
2. FEELING DOWN, DEPRESSED, IRRITABLE, OR HOPELESS: NOT AT ALL
2. FEELING DOWN, DEPRESSED, IRRITABLE, OR HOPELESS: NOT AT ALL

## 2023-12-27 ASSESSMENT — LIFESTYLE VARIABLES
HAVE PEOPLE ANNOYED YOU BY CRITICIZING YOUR DRINKING: YES
ON A TYPICAL DAY WHEN YOU DRINK ALCOHOL HOW MANY DRINKS DO YOU HAVE: 5
TOTAL SCORE: 4
AVERAGE NUMBER OF DAYS PER WEEK YOU HAVE A DRINK CONTAINING ALCOHOL: 7
EVER HAD A DRINK FIRST THING IN THE MORNING TO STEADY YOUR NERVES TO GET RID OF A HANGOVER: YES
TOTAL SCORE: 4
HAVE YOU EVER FELT YOU SHOULD CUT DOWN ON YOUR DRINKING: YES
HOW MANY TIMES IN THE PAST YEAR HAVE YOU HAD 5 OR MORE DRINKS IN A DAY: 365
TOTAL SCORE: 4
ALCOHOL_USE: YES
CONSUMPTION TOTAL: POSITIVE
DOES PATIENT WANT TO STOP DRINKING: NO
EVER FELT BAD OR GUILTY ABOUT YOUR DRINKING: YES

## 2023-12-27 ASSESSMENT — PAIN SCALES - GENERAL: PAIN_LEVEL: 5

## 2023-12-27 ASSESSMENT — FIBROSIS 4 INDEX
FIB4 SCORE: 6.71
FIB4 SCORE: 6.71

## 2023-12-27 NOTE — ANESTHESIA PREPROCEDURE EVALUATION
Case: [id number] Anesthesia Start Date/Time: 12/27/23 0849    Procedures:       REPAIR, HERNIA, INGUINAL - RIGHT (Right: Groin)      REPAIR, HERNIA, UMBILICAL (Abdomen)    Anesthesia type: General    [facility]    Surgeons: Fermin Pacheco M.D.          72 yo w/incarcerated umbilical hernia and right inguinal hernia    Relevant Problems   ANESTHESIA (within normal limits)      GI   (positive) Duodenal ulcer         (positive) Hepatic steatosis     (+) cirrhosis of the liver w/ascites and increased coagulation studies  2-3 beers/day per patient    Physical Exam    Airway   Mallampati: II  TM distance: >3 FB  Neck ROM: full       Cardiovascular   Rhythm: regular  Rate: normal  (-) murmur     Dental - normal exam        Facial Hair   Pulmonary   Breath sounds clear to auscultation     Abdominal    Neurological - normal exam                   Anesthesia Plan    ASA 3 (liver failure)- EMERGENT   ASA physical status 3 criteria: alcohol and/or substance dependence or abuse and other (comment)ASA physical status emergent criteria: compromised vital organ, limb or tissue    Plan - general       Airway plan will be ETT          Induction: intravenous and rapid sequence    Postoperative Plan: Postoperative administration of opioids is intended.    Pertinent diagnostic labs and testing reviewed    Informed Consent:    Anesthetic plan and risks discussed with patient.    Use of blood products discussed with: patient whom consented to blood products.

## 2023-12-27 NOTE — PROGRESS NOTES
Report received. Pt up to floor with transport Surgical site assessed; umbilical incision with some bleeding,CMS intact. Monitor on pt. VSS, pt c/o 10/10 pain, tremors present with ETOH hx, MD paged for current pt status. Awaiting orders. Pt wife updated by pt.

## 2023-12-27 NOTE — OR NURSING
1002: Patient arrived via gurney with anesthesia and RN. VSS. Dermabond CDI x2. Orders reviewed and initiated.   1100: Patient tremulous and reports he is without transport home. Pt reports he and his wife live in [place] with no car. Mahesh CARRINGTON notified. APRN reports patient safe for discharge. Charge RN notified and working with Case management to arrange transportation back to [place].   1133: Family updated.   1138: ABRAHAM Lucas with case management at bedside.   1330: Updated by  that [facility] transport is unable to schedule patient pick-up till later time. Escalated to PACU leadership for guidance.  Decision to place Extended stay order. Mahesh CARRINGTON notified by Charge RN.   1343: Report called to ABRAHAM Franz. All questions answered. VSS. Dermabond CDI x2. No patient distress. Alert and oriented x4. Pt transported to room in stable condition on room air with 2 bags personal belongings. Family updated.

## 2023-12-27 NOTE — ED NOTES
Bedside report received from off going RN/tech: Katya ROSARIO, assumed care of patient.  POC discussed with patient. Call light within reach, all needs addressed at this time.       Fall risk interventions in place: Move the patient closer to the nurse's station, Patient's personal possessions are with in their safe reach, Place socks on patient, Place fall risk sign on patient's door, Give patient urinal if applicable, and Keep floor surfaces clean and dry (all applicable per Ferdinand Fall risk assessment)   Continuous monitoring: Cardiac Leads, Pulse Ox, or Blood Pressure  IVF/IV medications: Not Applicable   Oxygen: How many liters 1L  Bedside sitter: Not Applicable   Isolation: Not Applicable

## 2023-12-27 NOTE — DISCHARGE PLANNING
Received a call from [employer] [facility] that transport time is [id number]. Called to PACU phase I and spoke to pt RN Cristina and she is aware of time.  She has spoke to provider and placed pt in ext recovery already and will notify us of RM #.  Per [facility], pt transport will be need to be cxx by 2 pm to get refund.

## 2023-12-27 NOTE — ED TRIAGE NOTES
Chief Complaint   Patient presents with    Abdominal Pain     BIBA, transferred from [facility]  for severe abdominal pain/hernia started 2 days ago. Pt has umbilical and right inguinal hernia. Per EMS report, pt's umbilical hernia has been repaired in [place] in April 2023.  He is unable to tolerate p.o intake.      Pt was given Zofran 8 mg and Dilaudid 3 mg from other facility.    BP (!) 148/67   Pulse 90   Temp 36.4 °C (97.5 °F) (Temporal)   Resp 20   Ht 1.829 m (6')   Wt 79.4 kg (175 lb 0.7 oz)   SpO2 94%   BMI 23.74 kg/m²

## 2023-12-27 NOTE — DISCHARGE PLANNING
Call received from Cristina PACU RN. RN states that pt was transport to the [facility] ED via ambulance from [facility]. Hernia surgery was completed and MD wants pt discharged today. Per RN pt's wife does not drive and there is no one else to get pt back to [place]. Provider does not want to admit pt and is request transportation be arranged today. This RN CM expressed to PACU RN that escalation is needed to approve transportation. [facility] transportation approved by leader. RN CM spoke with pt at the bedside, pt alert and able to verify address, he states he does not have house keys. Cristina RN on the phone with wife and she states she will be home when patient arrives. Paperwork faxed to [facility]. Updates given to PACU RN that [facility] will not be available until [id number]. PACU RN states pt will need to be transferred to CDU for extended recovery. [facility] and CDU CM informed of pt's transfer.

## 2023-12-27 NOTE — PROGRESS NOTES
This 73-year-old male presents with symptomatic right inguinal hernias and umbilical hernias both of which apparently are recurrent.  He had previous attempted laparoscopic repair.  He has had several days of pain and nausea and vomiting.  His umbilical hernia appears to contain bowel but is reducible.  The patient right inguinal hernia is quite tender and I cannot reduce it.  CT scan did not really demonstrate evidence of obstruction but I fear that his recurrent right inguinal hernia is incarcerated.  He has stigmata of alcoholism including an elevated MCV CT evidence of cirrhosis and hypersplenism.  Hematologically he has evidence of hypersplenism.  There is a small amount of ascites.  He has an undiagnosed right pleural effusion.  His pro time is elevated as is his MELD score.  He is at risk for surgical complications.  He may be in alcohol withdrawal with his last drink on [place].  He is tremulous this morning.  He is lucid.  He is not hallucinating he is not tachycardic.  In light of the fact that his right inguinal hernia may be incarcerated he is a candidate to proceed with repairs of his hernias.  He was given detailed postoperative care instructions in the event that he can be treated on an ambulatory basis following surgery.  He was advised to stop drinking.  He consents to the risk possible complications of operation and understands he is at elevated risk for postoperative complications including a measurable though small risk for mortality

## 2023-12-27 NOTE — DISCHARGE PLANNING
Updated by Maria M ROSARIO that patient shaky and unsteady and unable to D/C. [facility]

## 2023-12-27 NOTE — ANESTHESIA TIME REPORT
Anesthesia Start and Stop Event Times       Date Time Event    12/27/2023 0835 Ready for Procedure     0849 Anesthesia Start     1006 Anesthesia Stop          Responsible Staff  12/27/23      Name Role Begin End    Bridget Villanueva M.D. Anesth 0849 1006          Overtime Reason:  no overtime (within assigned shift)    Comments:

## 2023-12-27 NOTE — ED PROVIDER NOTES
"ED Provider Note    CHIEF COMPLAINT  Chief Complaint   Patient presents with    Abdominal Pain     BIBA, transferred from [facility]  for severe abdominal pain/hernia started 2 days ago. Pt has umbilical and right inguinal hernia. Per EMS report, pt's umbilical hernia has been repaired in [place] in April 2023.  He is unable to tolerate p.o intake.        EXTERNAL RECORDS REVIEWED  External ED Note chest pain hospital with normal labs.  Did not CT, reducible umbilical and inguinal hernias but with discomfort, vomiting.  Concern for strangulation, obstruction.  Transferred through our talk, general surgery aware.    HPI/ROS  LIMITATION TO HISTORY   Select: : None  OUTSIDE HISTORIAN(S):  None    Chava Mercedes is a 73 y.o. male who presents to the emergency department by ambulance from outside hospital for abdominal pain with umbilical and right inguinal hernia, reducible but recur immediately, as well as nausea and vomiting for 2 days.    Patient describes 2 days of generalized abdominal pain, increased discomfort at both umbilical and right inguinal hernia, more difficulty reducing hernias.  Both repaired earlier this year at outside facility.  Recurrent some months after.  Also with nausea and vomiting for 2 days.  Not tolerating food, but was able to drink alcohol, not other oral fluids.  Normal bowel movement this morning.  Normal urination.  Denies fever.  Denies sick contacts.    Patient is tremulous during exam.  Does admit to almost daily EtOH, none since yesterday.  Denies history of withdrawal.  He believes that he is tremulous because he is dehydrated, hungry and dizzy.    PAST MEDICAL HISTORY   Takes a \"small white pill for swelling.\"    SURGICAL HISTORY   has a past surgical history that includes gastroscopy (N/A, 12/11/2016); gastroscopy-endo (12/11/2016); gastroscopy-endo (12/18/2016); and gastroscopy (N/A, 12/2/2017).    FAMILY HISTORY  History reviewed. No pertinent family " "history.    SOCIAL HISTORY  Social History     Tobacco Use    Smoking status: Not on file    Smokeless tobacco: Not on file   Substance and Sexual Activity    Alcohol use: Not on file    Drug use: Not on file    Sexual activity: Not on file       CURRENT MEDICATIONS  Home Medications       Reviewed by Katya Kendall R.N. (Registered Nurse) on 12/26/23 at 2346  Med List Status: Partial     Medication Last Dose Status   cyanocobalamin 2000 MCG Tab  Active   ferrous sulfate 325 (65 Fe) MG tablet  Active   folic acid (FOLVITE) 1 MG Tab  Active   multivitamin (THERAGRAN) Tab  Active   omeprazole (PRILOSEC) 40 MG delayed-release capsule  Active   quetiapine (SEROQUEL) 50 MG tablet  Active   sucralfate (CARAFATE) 1 GM Tab  Active   thiamine (THIAMINE) 100 MG tablet  Active                Takes a \"small white pill for swelling\"    ALLERGIES  No Known Allergies    PHYSICAL EXAM  VITAL SIGNS: BP (!) 140/80   Pulse (!) 105   Temp 36.4 °C (97.5 °F) (Temporal)   Resp 20   Ht 1.829 m (6')   Wt 79.4 kg (175 lb 0.7 oz)   SpO2 92%   BMI 23.74 kg/m²    Pulse ox interpretation: I interpret this pulse ox as normal.  Constitutional: Alert in no apparent distress.  Tremulous.  HENT: Normocephalic, atraumatic. Bilateral external ears normal, Nose normal.  Dry lips and mucous membranes.    Eyes: Pupils are equal and reactive, Conjunctiva normal.   Neck: Normal range of motion, Supple  Lymphatic: No lymphadenopathy noted.   Cardiovascular: Regular rate and rhythm, no murmurs. Distal pulses intact.   Thorax & Lungs: Normal breath sounds.  No wheezing/rales/ronchi. No increased work of breathing  Abdomen: Soft,.  Slightly distended.  Generally tender to palpation without guarding or peritonitis.  Easily reducible large umbilical hernia with mild hyperemia, color changes on the overlying skin.  Tender but easily reducible right inguinal hernia without color changes or distress with reduction.  : Circumcised, 2 descended " testicles.  Skin: Warm, Dry, No erythema, No rash.   Musculoskeletal: Good range of motion in all major joints.   Neurologic: Alert and orient x 4.  Speech clear and cohesive.  Tremulous 4 extremities.  Otherwise moves 4 spontaneously  Psychiatric: Affect normal, Judgment normal, Mood normal.       DIAGNOSTIC STUDIES / PROCEDURES    LABS  Results for orders placed or performed during the hospital encounter of 12/26/23   CBC WITH DIFFERENTIAL   Result Value Ref Range    WBC 4.1 (L) 4.8 - 10.8 K/uL    RBC 3.55 (L) 4.70 - 6.10 M/uL    Hemoglobin 12.7 (L) 14.0 - 18.0 g/dL    Hematocrit 36.0 (L) 42.0 - 52.0 %    .4 (H) 81.4 - 97.8 fL    MCH 35.8 (H) 27.0 - 33.0 pg    MCHC 35.3 32.3 - 36.5 g/dL    RDW 47.8 35.9 - 50.0 fL    Platelet Count 110 (L) 164 - 446 K/uL    MPV 9.8 9.0 - 12.9 fL    Neutrophils-Polys 70.90 44.00 - 72.00 %    Lymphocytes 6.40 (L) 22.00 - 41.00 %    Monocytes 20.20 (H) 0.00 - 13.40 %    Eosinophils 1.00 0.00 - 6.90 %    Basophils 1.00 0.00 - 1.80 %    Immature Granulocytes 0.50 0.00 - 0.90 %    Nucleated RBC 0.00 0.00 - 0.20 /100 WBC    Neutrophils (Absolute) 2.87 1.82 - 7.42 K/uL    Lymphs (Absolute) 0.26 (L) 1.00 - 4.80 K/uL    Monos (Absolute) 0.82 0.00 - 0.85 K/uL    Eos (Absolute) 0.04 0.00 - 0.51 K/uL    Baso (Absolute) 0.04 0.00 - 0.12 K/uL    Immature Granulocytes (abs) 0.02 0.00 - 0.11 K/uL    NRBC (Absolute) 0.00 K/uL   COMP METABOLIC PANEL   Result Value Ref Range    Sodium 135 135 - 145 mmol/L    Potassium 4.2 3.6 - 5.5 mmol/L    Chloride 102 96 - 112 mmol/L    Co2 19 (L) 20 - 33 mmol/L    Anion Gap 14.0 7.0 - 16.0    Glucose 150 (H) 65 - 99 mg/dL    Bun 6 (L) 8 - 22 mg/dL    Creatinine 0.58 0.50 - 1.40 mg/dL    Calcium 8.4 (L) 8.5 - 10.5 mg/dL    Correct Calcium 8.8 8.5 - 10.5 mg/dL    AST(SGOT) 41 12 - 45 U/L    ALT(SGPT) 16 2 - 50 U/L    Alkaline Phosphatase 94 30 - 99 U/L    Total Bilirubin 2.7 (H) 0.1 - 1.5 mg/dL    Albumin 3.5 3.2 - 4.9 g/dL    Total Protein 8.0 6.0 - 8.2 g/dL     Globulin 4.5 (H) 1.9 - 3.5 g/dL    A-G Ratio 0.8 g/dL   LIPASE   Result Value Ref Range    Lipase 44 11 - 82 U/L   URINALYSIS (UA)    Specimen: Urine   Result Value Ref Range    Color Yellow     Character Clear     Specific Gravity 1.032 <1.035    Ph 8.0 5.0 - 8.0    Glucose Negative Negative mg/dL    Ketones Negative Negative mg/dL    Protein Negative Negative mg/dL    Bilirubin Negative Negative    Urobilinogen, Urine 0.2 Negative    Nitrite Negative Negative    Leukocyte Esterase Negative Negative    Occult Blood Negative Negative    Micro Urine Req see below    ESTIMATED GFR   Result Value Ref Range    GFR (CKD-EPI) 103 >60 mL/min/1.73 m 2         RADIOLOGY  I have independently interpreted the diagnostic imaging associated with this visit and am waiting the final reading from the radiologist.     Radiologist interpretation:   CT-ABDOMEN-PELVIS WITH   Final Result      1.  Umbilical hernia containing significantly dilated knuckle of small bowel versus less likely postoperative fluid collection, without associated obstruction.   2.  Moderate RIGHT inguinal hernia.   3.  No evidence of bowel perforation.   4.  Findings suggest cirrhosis with moderate ascites and splenomegaly.   5.  Cholelithiasis.   6.  Moderate LEFT pleural effusion with associated atelectasis.            COURSE & MEDICAL DECISION MAKING    ED Observation Status? Yes; I am placing the patient in to an observation status due to a diagnostic uncertainty as well as therapeutic intensity. Patient placed in observation status at 11:46 PM, 12/26/2023.     Observation plan is as follows: Labs, imaging before final disposition can be made    Upon Reevaluation, the patient's condition has: not improved; and will be escalated to hospitalization.    Patient discharged from ED Observation status at 3:37 AM  (Time) 12/27/23 (Date).     INITIAL ASSESSMENT, COURSE AND PLAN  Care Narrative:   Seen evaluated bedside.  Tremulous without other acute distress.   Hemodynamically stable.  Repeat labs, add CT of the abdomen and pelvis.  The umbilical and inguinal hernias are reducible but recurrent, umbilical hernia with some hyperemic skin color changes overlying, tender to palpation without guarding or peritonitis.  Add fluid bolus for clinical dehydration.    Labs really quite unrevealing, mild leukopenia, WBC 4.1 without left shift or bandemia.  Mild macrocytic anemia hemoglobin 12.7.  CO2 is 19, consistent with clinical dehydration without other electrolyte derangement.  Renal function preserved.  Normal LFTs and lipase.    0210 -CT as above without gross obstruction, incarceration but dilated portion of the bowel within the umbilical hernia.  Clinically concerning for incarceration.  Patient with some ongoing discomfort.  Will discuss with surgery.  Patient still feeling anxious, tremulous, add Valium, detox bag.    ADDITIONAL PROBLEM LIST  Suspect more chronic alcohol use and patient indicates -tremor improved with Valium, detox bag infused.  No acute withdrawal.    DISPOSITION AND DISCUSSIONS  I have discussed management of the patient with the following physicians and ROBERT's:    3:34 AM Dr. Meza is aware of the patient and agreeable to admission and consultation.  Will plan operative repair in the morning.  Admit holding orders have been placed as requested.    Discussion of management with other [facility] [place] [facility] or appropriate source(s): None       FINAL DIAGNOSIS  1. Generalized abdominal pain    2. Umbilical hernia without obstruction and without gangrene    3. Unilateral recurrent inguinal hernia without obstruction or gangrene           Electronically signed by: Carolann Robles D.O., 12/26/2023 11:55 PM

## 2023-12-27 NOTE — ED NOTES
Labs drawn and collected and sent down. Patient is being transported up to Pre-op with belongings and chart. Patient on 1L NC

## 2023-12-27 NOTE — DISCHARGE PLANNING
DC Transport Scheduled    Received request at: 12/27/2023 at 1216    Transport Company Scheduled:  [facility]  Spoke with Deann at [facility] to schedule transport.    Scheduled Date: 12/27/2023  Scheduled Time: 1600    Destination: Home at [address] [facility]     Notified care team of scheduled transport via [employer].     If there are any changes needed to the DC transportation scheduled, please contact [facility] at ext. [phone] between the hours of [phone] Mon-Fri. If outside those hours, contact the ED Case Manager at ext. [phone].

## 2023-12-27 NOTE — ED NOTES
Pharmacy Medication Reconciliation      ~Medication reconciliation updated and complete per patient at bedside.   ~Allergies have been verified and updated   ~No oral ABX within the last 30 days  ~Patient home pharmacy :  [facility], [facility]      ~Anticoagulants (rivaroxaban, apixaban, edoxaban, dabigatran, warfarin, enoxaparin) taken in the last 14 days? NO  ~Anticoagulant: N/A Last dose: N/A

## 2023-12-27 NOTE — ANESTHESIA POSTPROCEDURE EVALUATION
Patient: Chava Mercedes    Procedure Summary       Date: 12/27/23 Room / Location: [facility] OR 08 / SURGERY [facility]    Anesthesia Start: 0849 Anesthesia Stop: 1006    Procedures:       REPAIR, HERNIA, INGUINAL - RIGHT (Right: Groin)      REPAIR, HERNIA, UMBILICAL (Abdomen) Diagnosis: (recurrent umbilical hernia and recurrent incarcerated right inguinal hernia)    Surgeons: Fermin Pacheco M.D. Responsible Provider: Bridget Villanueva M.D.    Anesthesia Type: general ASA Status: 3 - Emergent            Final Anesthesia Type: general  Last vitals  BP   Blood Pressure : (!) 151/75    Temp   36.9 °C (98.4 °F)    Pulse   95   Resp   13    SpO2   92 %      Anesthesia Post Evaluation    Patient location during evaluation: PACU  Patient participation: complete - patient participated  Level of consciousness: awake  Pain score: 5    Airway patency: patent  Anesthetic complications: no  Cardiovascular status: hemodynamically stable  Respiratory status: acceptable  Hydration status: acceptable    PONV: none          No notable events documented.     Nurse Pain Score: 5 (NPRS)

## 2023-12-27 NOTE — DISCHARGE INSTRUCTIONS
Post Operative Discharge Instructions:    1. DIET: Upon discharge from the hospital, you may resume your normal preoperative diet, unless specifically directed otherwise. Depending on how you are feeling and whether you have nausea or not, you may wish to stay with a bland diet for the first few days. However, you can advance this as quickly as you feel ready.    2. ACTIVITIES: After discharge from the hospital, you may resume full routine activities; however, there should be no heavy lifting (greater than 20 pounds or a bag of groceries) and no strenuous activities for at least 2 weeks. The duration may be longer, depending on your surgical procedure. Routine activities of daily living are acceptable. Activity level should be addressed at your post-op follow up appointment.    3. DRIVING: You may drive whenever you are off pain medications and are able to perform the activities needed to drive, i.e., turning, bending, twisting, etc.    4. BATHING: You may get the wound wet at any time after leaving the hospital. You may shower, but do not submerge in a bath for at least two weeks.  If you have wound dressings, they may come off after 48 hours.  If you have skin glue to the wound, this will fall off on its own, do not pick at it.  If you have Steri-Strips to the wound, these will fall off on their own, do not pick at them. You may trim the edges if needed.    5. BOWEL FUNCTION:   After surgery, it is not uncommon for patients to develop either frequent or loose stools after meals. This usually corrects itself after a few days, to a few weeks. If this occurs, do not worry; this will resolve on its own.  Constipation is much more common than loose stools. The cause is the combination of pain medication and decreased activity level and possibly the nature of the surgical procedure performed. If you feel this is occurring, you may use an over-the-counter treatment such as MiraLAX (or Milk of Magnesia, Ex-Lax,  Senokot, etc.) until the problem has resolved. Drink plenty of water and try to wean off narcotic pain medications as soon as is comfortable for you.    6. PAIN MEDICATION:   You will be given a prescription for pain medication at discharge. Please take these as directed. It is important to remember not to take medications on an empty stomach as this may cause nausea.  You may also take over the counter acetaminophen and/or NSAIDS (ibuprofen, Aleve, Advil, Motrin) per the package instructions.  You may also use ice to the wound to decrease pain and swelling. You may alternate 20 minutes on and 20 minutes off with the ice for the first 24-48 hours. Make sure you place a washcloth or towel between the ice pack and your skin.  Please note that narcotic pain medication cannot be refilled unless you are seen by a doctor. Make sure you call the office if you are running low on medication or if the dose you have been prescribed is not working well for you.    7.CALL THE [facility] [place] [facility] AT [phone] IF YOU HAVE:  (1) Fevers to more than 101F, (2) Unusual chest or leg pain, (3) Drainage or fluid from incision that may be foul smelling, increased tenderness or soreness at the wound or the wound edges are no longer together, redness or swelling at the incision site. Do not hesitate to call with any other questions.

## 2023-12-27 NOTE — ASSESSMENT & PLAN NOTE
Presented to ED at OSH with increasing umbilical pain  Hernia reducible but CT shows a knuckle of SB in hernia with ? Dilation  Plan repair

## 2023-12-27 NOTE — OP REPORT
Preoperative diagnosis; recurrent umbilical hernia and recurrent incarcerated right inguinal hernia  Postoperative diagnosis; same  Operation; reduction and repair of recurrent right inguinal hernia Phyllis type mesh reconstruction for indirect hernia and repair of anterior abdominal wall hernia 4 cm  Surgeon;ciaran  Assistant; Dior Aragon  An assistant was required for the safe completion of the surgical case.  The assistant provided retraction, exposure, performed wound closure and assisted with instrumentation promoting the efficiency of the surgery performed.  I felt an assistant was necessary in the interest of safety and efficiency.  My request for assistance is based on my training and experience and should be patently obvious to the most casual observer as necessary.  Wound classification clean  Specimens none  Estimated blood loss 100 cc  Complications none visualized  Counts correct x 2  Operative note  This 73-year-old male presents with symptomatic right inguinal hernia.  He has been having nausea and vomiting for several days.  This hernia contains small bowel and was not reducible preoperatively.  He also has a recurrent umbilical hernia both the umbilical hernia and right inguinal hernia were repaired with laparoscopic attempt earlier this year which failed.  The patient does have some significant comorbidities including stigmata of the neck cirrhosis.  The patient does have ascites.  He had a slightly elevated pro time.  Under the circumstances he was felt to be candidate for surgical intervention.  The risk possible complications of which were carefully explained to him in detail obviously elevated in the setting.  Patient received prophylactic antibiotics had sequential stockings applied as antiemetic prophylaxis sign consents for surgery and anesthesia did receive detailed postoperative care instructions in the event he can be treated on an ambulatory basis.  He was taken to the operating  room and placed under anesthesia once anesthetized his abdomen and groin were shaved and prepped with ChloraPrep solution and sterile drapes were applied and a timeout was affected a solution of half percent Marcaine with epinephrine was liberally infiltrated in all layers of both wounds an elliptical incision was made around the redundant umbilical skin.  The dissection was carried down sharply on the sac of the hernia.  This was mobilized circumferentially from the subcutaneous tissues down to the fascia.  The fascia was elevated and circumferentially mobilized.  I endeavored to keep the peritoneum intact which appeared to be successful.  The hernia sac was then closed in layers using running 2-0 PDS suture to prevent ascitic leak.  The remaining and redundant skin and sac were resected.  The hernia sac was reduced to the abdomen and the fascia was closed over in layers using running #1 strata fix suture.  The suture was also used to close the subcutaneous tissues.  The skin was ultimately closed using a running 4-0 Monocryl then sealed with Dermabond.  Attention was turned to the right groin here an incision was made and carried down through skin subcutaneous tissue superficial fascia the level of the external bleak hemostasis was controlled with electrocautery the external bleak was opened along its fibers through the external ring.  Patient had an incarcerated hernia which was then reduced.  Patient had the hernia sac which was quite long and chronic carefully  from the cord structures down to its exit from the internal ring.  Here it was twisted and stick tied using 2-0 PDS suture.  The remaining sac was amputated.  The patient had fairly good floor of the canal.  He had a previous laparoscopic repair.  I felt it was safest in the circumstance to do a Phyllis type repair.  A piece of Bard soft mesh was selected and sutured in place to the shelving edge of Poupart's ligament inferiorly using  interrupted 2-0 PDS suture to the transfascial's fascia superiorly using interrupted 2-0 PDS suture.  The tail of the mesh was tucked underneath the external bleak.  The mesh was split and wrapped around the cord and sewn to the shelving edge of Poupart's ligament.  Additional Marcaine was infiltrated in the wound.  The external oblique was closed using running 2-0 Vicryl suture.  The subcutaneous tissues were then closed in multiple layers using running 3-0 Vicryl suture.  The skin was closed using running 4-0 Monocryl.  The wound was sealed with Dermabond.  Patient was awakened extubated taken recovery room in stable satisfactory condition estimated blood loss was 100 cc sponge instrument counts were reported as correct.  I am hoping the patient can be treated on an ambulatory basis.  He was instructed on multimodal postoperative analgesia he should have scheduled follow-up in approximately 1 week in the [facility] clinic.  Should the trajectory of his recovery deviate from that is explained was anticipated and expected in the interim the patient should call promptly.  There were no apparent intraoperative complications

## 2023-12-27 NOTE — CONSULTS
DATE OF CONSULTATION:  12/27/2023     REFERRING PHYSICIAN:   Carolann Robles D.O.     CONSULTING PHYSICIAN:  Анна Meza M.D.     REASON FOR CONSULTATION:  I have been asked by  to see the patient in surgical consultation for evaluation of umbilical and right inguinal hernia.    HISTORY OF PRESENT ILLNESS: The patient is a 73 year-old White man who presents to the Emergency Department at an OSH with 2 days of umbilical and right inguinal hernia pain.  Apparently had umbilical hernia repaired in 4/23. Both hernias reducible by report but significantly painful. Transferred to [facility] for treatment. CT shows dilated bowel in umbilical hernia and right inguinal hernia.    PAST MEDICAL HISTORY:  has no past medical history on file.    PAST SURGICAL HISTORY:  has a past surgical history that includes gastroscopy (N/A, 12/11/2016); gastroscopy-endo (12/11/2016); gastroscopy-endo (12/18/2016); and gastroscopy (N/A, 12/2/2017).    ALLERGIES: No Known Allergies    CURRENT MEDICATIONS:    Home Medications       Reviewed by Katya Kendall R.N. (Registered Nurse) on 12/26/23 at 2346  Med List Status: Partial     Medication Last Dose Status   cyanocobalamin 2000 MCG Tab  Active   ferrous sulfate 325 (65 Fe) MG tablet  Active   folic acid (FOLVITE) 1 MG Tab  Active   multivitamin (THERAGRAN) Tab  Active   omeprazole (PRILOSEC) 40 MG delayed-release capsule  Active   quetiapine (SEROQUEL) 50 MG tablet  Active   sucralfate (CARAFATE) 1 GM Tab  Active   thiamine (THIAMINE) 100 MG tablet  Active                    FAMILY HISTORY: family history is not on file.    SOCIAL HISTORY:      REVIEW OF SYSTEMS: Comprehensive review of systems is negative with the exception of the aforementioned HPI, PMH, and PSH bullets in accordance with CMS guidelines.    PHYSICAL EXAMINATION:    Physical Exam  Cardiovascular:      Rate and Rhythm: Normal rate.   Pulmonary:      Effort: Pulmonary effort is normal.   Abdominal:      Palpations:  Abdomen is soft.      Comments: 4-5 cm umbilical hernia with minimal erythema and moderated tenderness. Is reducible.   Also has right inguinal hernia that is tender but reducible.   Musculoskeletal:         General: Normal range of motion.      Cervical back: Neck supple.   Skin:     General: Skin is warm.   Neurological:      General: No focal deficit present.      Mental Status: He is alert.         LABORATORY VALUES:   Recent Labs     12/27/23  0010   WBC 4.1*   RBC 3.55*   HEMOGLOBIN 12.7*   HEMATOCRIT 36.0*   .4*   MCH 35.8*   MCHC 35.3   RDW 47.8   PLATELETCT 110*   MPV 9.8     Recent Labs     12/27/23  0010   SODIUM 135   POTASSIUM 4.2   CHLORIDE 102   CO2 19*   GLUCOSE 150*   BUN 6*   CREATININE 0.58   CALCIUM 8.4*     Recent Labs     12/27/23 0010   ASTSGOT 41   ALTSGPT 16   TBILIRUBIN 2.7*   ALKPHOSPHAT 94   GLOBULIN 4.5*            IMAGING:   CT-ABDOMEN-PELVIS WITH   Final Result      1.  Umbilical hernia containing significantly dilated knuckle of small bowel versus less likely postoperative fluid collection, without associated obstruction.   2.  Moderate RIGHT inguinal hernia.   3.  No evidence of bowel perforation.   4.  Findings suggest cirrhosis with moderate ascites and splenomegaly.   5.  Cholelithiasis.   6.  Moderate LEFT pleural effusion with associated atelectasis.          ASSESSMENT AND PLAN:     * Umbilical hernia, incarcerated- (present on admission)  Assessment & Plan  Presented to ED at OSH with increasing umbilical pain  Hernia reducible but CT shows a knuckle of SB in hernia with ? Dilation  Plan repair    Unilateral recurrent inguinal hernia without obstruction or gangrene- (present on admission)  Assessment & Plan  Presented to Ed at OSH with increasing Right inguinal pain  Reducible but tender  Plan [facility]        DISPOSITION: Admit [facility] [place] [facility].     ____________________________________     Анна Meza M.D.    DD: 12/27/2023  4:34 AM    AAST Grading  System for EGS Conditions  ACS NSQIP Surgical Risk Calculator

## 2023-12-28 VITALS
HEART RATE: 81 BPM | DIASTOLIC BLOOD PRESSURE: 82 MMHG | SYSTOLIC BLOOD PRESSURE: 148 MMHG | RESPIRATION RATE: 15 BRPM | OXYGEN SATURATION: 95 % | TEMPERATURE: 98.7 F | HEIGHT: 72 IN | BODY MASS INDEX: 26.01 KG/M2 | WEIGHT: 192.02 LBS

## 2023-12-28 PROCEDURE — 700111 HCHG RX REV CODE 636 W/ 250 OVERRIDE (IP): Mod: JZ | Performed by: NURSE PRACTITIONER

## 2023-12-28 PROCEDURE — A9270 NON-COVERED ITEM OR SERVICE: HCPCS | Performed by: NURSE PRACTITIONER

## 2023-12-28 PROCEDURE — 51798 US URINE CAPACITY MEASURE: CPT

## 2023-12-28 PROCEDURE — 700102 HCHG RX REV CODE 250 W/ 637 OVERRIDE(OP)

## 2023-12-28 PROCEDURE — 770030 HCHG ROOM/CARE - EXTENDED RECOVERY EACH 15 MIN

## 2023-12-28 PROCEDURE — 96375 TX/PRO/DX INJ NEW DRUG ADDON: CPT | Mod: XU

## 2023-12-28 PROCEDURE — A9270 NON-COVERED ITEM OR SERVICE: HCPCS

## 2023-12-28 PROCEDURE — 700102 HCHG RX REV CODE 250 W/ 637 OVERRIDE(OP): Performed by: NURSE PRACTITIONER

## 2023-12-28 PROCEDURE — 99024 POSTOP FOLLOW-UP VISIT: CPT | Performed by: NURSE PRACTITIONER

## 2023-12-28 RX ORDER — CALCIUM CARBONATE 500 MG/1
500 TABLET, CHEWABLE ORAL 3 TIMES DAILY PRN
Status: DISCONTINUED | OUTPATIENT
Start: 2023-12-28 | End: 2023-12-28 | Stop reason: HOSPADM

## 2023-12-28 RX ADMIN — OXYCODONE HYDROCHLORIDE 10 MG: 10 TABLET ORAL at 00:34

## 2023-12-28 RX ADMIN — LORAZEPAM 0.5 MG: 1 TABLET ORAL at 04:33

## 2023-12-28 RX ADMIN — OXYCODONE HYDROCHLORIDE 10 MG: 10 TABLET ORAL at 04:32

## 2023-12-28 RX ADMIN — LORAZEPAM 0.5 MG: 1 TABLET ORAL at 00:31

## 2023-12-28 RX ADMIN — ACETAMINOPHEN 650 MG: 325 TABLET, FILM COATED ORAL at 00:31

## 2023-12-28 RX ADMIN — ACETAMINOPHEN 650 MG: 325 TABLET, FILM COATED ORAL at 05:25

## 2023-12-28 RX ADMIN — ANTACID TABLETS 500 MG: 500 TABLET, CHEWABLE ORAL at 00:54

## 2023-12-28 RX ADMIN — CELECOXIB 200 MG: 200 CAPSULE ORAL at 05:25

## 2023-12-28 RX ADMIN — HALOPERIDOL LACTATE 1 MG: 5 INJECTION, SOLUTION INTRAMUSCULAR at 05:40

## 2023-12-28 ASSESSMENT — PAIN DESCRIPTION - PAIN TYPE
TYPE: SURGICAL PAIN

## 2023-12-28 NOTE — PROGRESS NOTES
2 RN Skin Assessment Completed by Maria M RN and Maya RN.   Generalized dry, flaky, bruised skin  Head: WDL  Ears: WDL  Nose: WDL  Mouth: WDL  Neck: WDL  Breasts/Chest: WDL  Shoulder Blades: WDL  Spine: WDL  (R) Arm/Elbow/hand: WDL  (L) Arm/Elbow/hand: WDL  Abdomen:post op incision  Groin: post op incision   Sacrum/Coccyx/Buttocks: blanching  (R) Leg: WDL  (L) Leg: WDL  (R) Heel/Foot/Toe: blanching  (L) Heel/Foot/Toe: blanching              Devices in place: BP Cuff and Pulse Ox     Interventions in place: Gray ear foams and Pillows     Possible skin injury found: No     Pictures uploaded into Epic: N/A  Wound Consult Placed: N/A

## 2023-12-28 NOTE — CARE PLAN
The patient is Watcher - Medium risk of patient condition declining or worsening    Shift Goals  Clinical Goals: Post op care  Patient Goals: comfort  Family Goals: NA    Progress made toward(s) clinical / shift goals:    Problem: Pain - Standard  Goal: Alleviation of pain or a reduction in pain to the patient’s comfort goal  Description: Target End Date:  Prior to discharge or change in level of care    Document on Vitals flowsheet    1.  Document pain using the appropriate pain scale per order or unit policy  2.  Educate and implement non-pharmacologic comfort measures (i.e. relaxation, distraction, massage, cold/heat therapy, etc.)  3.  Pain management medications as ordered  4.  Reassess pain after pain med administration per policy  5.  If opiods administered assess patient's response to pain medication is appropriate per POSS sedation scale  6.  Follow pain management plan developed in collaboration with patient and interdisciplinary team (including palliative care or pain specialists if applicable)  Outcome: Not Met     Problem: Extended Recovery Optimal Care  Goal: Patient will achieve/maintain normal respiratory rate/effort  Description: Target End Date:  Prior to discharge or change in level of care    Document on Assessment flowsheet    1.   Assess and monitor rate, rhythm, depth and effort of respiration  2.   Ensure continuous pulse oximetry in use  3.   Assess O2 saturation, administer/titrate oxygen as ordered  4.   Educate patient on importance of turning, coughing and deep breathing  5.   Educate patient on splinting techniques during deep breathing and coughing  6.   Encourage use of incentive spirometer (at least 5 to 10 times per hour)  7.   Position patient for maximum ventilatory efficiency  8.   Oral hygiene  9.  Alternate physical activity with rest periods  12/27/2023 1751 by Maria M Monreal, R.N.  Outcome: Progressing  12/27/2023 1751 by Maria M Monreal RPATRICK.  Outcome: Progressing        Patient is not progressing towards the following goals:      Problem: Pain - Standard  Goal: Alleviation of pain or a reduction in pain to the patient’s comfort goal  Description: Target End Date:  Prior to discharge or change in level of care    Document on Vitals flowsheet    1.  Document pain using the appropriate pain scale per order or unit policy  2.  Educate and implement non-pharmacologic comfort measures (i.e. relaxation, distraction, massage, cold/heat therapy, etc.)  3.  Pain management medications as ordered  4.  Reassess pain after pain med administration per policy  5.  If opiods administered assess patient's response to pain medication is appropriate per POSS sedation scale  6.  Follow pain management plan developed in collaboration with patient and interdisciplinary team (including palliative care or pain specialists if applicable)  Outcome: Not Met

## 2023-12-28 NOTE — DISCHARGE PLANNING
Updated by [place] with ride line that transport set for 1000. Message sent to Yecenia ROSARIO to update.

## 2023-12-28 NOTE — PROGRESS NOTES
Tolerating diet  Adequate pain control   A bit tremulous     A&O x 4  Respiratory rate even and unlabored  Abd soft  Incisions clear and intact - bruising, no drainage   Minimal expected tenderness  Mobilizing    Home with RX and instructions  No lifting more than 20# x 4 weeks  May shower  Follow up in ACS clinic in 1-2 weeks

## 2023-12-28 NOTE — DISCHARGE PLANNING
DC Transport Scheduled    Received request at: 12/28/2023 at 0833    Transport Company Scheduled:  [employer]  Spoke with Nubia at [facility] to schedule transport.    Scheduled Date: 12/28/2023  Scheduled Time: 1000    Destination: Home at [address] [facility]     Notified care team of scheduled transport via [employer].     If there are any changes needed to the DC transportation scheduled, please contact [facility] at ext. [phone] between the hours of 7515-2768 Mon-Fri. If outside those hours, contact the ED Case Manager at ext. [phone].

## 2023-12-28 NOTE — PROGRESS NOTES
Received report from day shift RN, assumed care. Pt A&Ox4, NAD, VSS. C/o 8/10 pain in groin surgical incision. Surgical sites assessed; umbilical incision with some bleeding, dermabound and abdominal pad in place, CMS intact. Groin incision intact with some swelling, CMS intact. Tremors present with ETOH hx. POC discussed with patient, all questions addressed. Call light within reach.

## 2023-12-28 NOTE — PROGRESS NOTES
Patient off the unit with [facility] transport via gurney. IV removed, discharge instructions gone over with patient, patient verbalized understanding. Patient belongings with patient.

## 2023-12-28 NOTE — CARE PLAN
The patient is Stable - Low risk of patient condition declining or worsening    Shift Goals  Clinical Goals: pain control, d/c  Patient Goals: go home  Family Goals: n/a    Progress made toward(s) clinical / shift goals:    Problem: Extended Recovery Optimal Care  Goal: Alleviation or reduction of pain post surgery  Outcome: Progressing  Goal: Early mobilization post surgery  Outcome: Progressing     Problem: Pain - Standard  Goal: Alleviation of pain or a reduction in pain to the patient’s comfort goal  Outcome: Progressing       Patient is not progressing towards the following goals:

## 2023-12-28 NOTE — DISCHARGE PLANNING
Updated in AM rounds that patient is medically cleared for D/C. Will need transport home. Message sent to [place] with Ride [facility] and PCS filled out and faxed to [place]. Anticipate no other needs @ present time.

## 2023-12-28 NOTE — CARE PLAN
The patient is Watcher - Medium risk of patient condition declining or worsening    Shift Goals  Clinical Goals: Pain control, manage anxiety, monitor surgical sites  Patient Goals: Pain relief, comfort  Family Goals: Not at bedside    Progress made toward(s) clinical / shift goals:      Problem: Extended Recovery Optimal Care  Goal: Alleviation or reduction of pain post surgery  Outcome: Progressing  Goal: Early mobilization post surgery  Outcome: Progressing       Patient is not progressing towards the following goals:       none

## 2024-01-16 NOTE — CARE PLAN
Problem: Safety  Goal: Will remain free from falls  Outcome: PROGRESSING AS EXPECTED  Pt has restraints in place, pt currently sleeping. Received in report that pt pulls at lines. Lines not with in reach at this time.     Problem: Skin Integrity  Goal: Risk for impaired skin integrity will decrease  Outcome: PROGRESSING AS EXPECTED  Pt in restraints, pt being repositioned Q2 hours to prevent skin breakdown.        15

## 2024-03-04 ENCOUNTER — HOSPITAL ENCOUNTER (OUTPATIENT)
Dept: RADIOLOGY | Facility: MEDICAL CENTER | Age: 74
End: 2024-03-04

## 2024-03-04 ENCOUNTER — HOSPITAL ENCOUNTER (INPATIENT)
Facility: MEDICAL CENTER | Age: 74
LOS: 2 days | DRG: 377 | End: 2024-03-06
Attending: STUDENT IN AN ORGANIZED HEALTH CARE EDUCATION/TRAINING PROGRAM | Admitting: INTERNAL MEDICINE
Payer: MEDICARE

## 2024-03-04 DIAGNOSIS — K52.9 COLITIS: ICD-10-CM

## 2024-03-04 DIAGNOSIS — K29.71 GASTROINTESTINAL HEMORRHAGE ASSOCIATED WITH GASTRITIS, UNSPECIFIED GASTRITIS TYPE: ICD-10-CM

## 2024-03-04 LAB
ABO GROUP BLD: NORMAL
BLD GP AB SCN SERPL QL: NORMAL
GLUCOSE BLD STRIP.AUTO-MCNC: 112 MG/DL (ref 65–99)
HGB BLD-MCNC: 10.6 G/DL (ref 14–18)
INR PPP: 1.64 (ref 0.87–1.13)
PROTHROMBIN TIME: 19.6 SEC (ref 12–14.6)
RH BLD: NORMAL

## 2024-03-04 PROCEDURE — 770020 HCHG ROOM/CARE - TELE (206)

## 2024-03-04 PROCEDURE — 99223 1ST HOSP IP/OBS HIGH 75: CPT | Mod: AI | Performed by: INTERNAL MEDICINE

## 2024-03-04 PROCEDURE — 86900 BLOOD TYPING SEROLOGIC ABO: CPT

## 2024-03-04 PROCEDURE — 86901 BLOOD TYPING SEROLOGIC RH(D): CPT

## 2024-03-04 PROCEDURE — 36415 COLL VENOUS BLD VENIPUNCTURE: CPT

## 2024-03-04 PROCEDURE — 85018 HEMOGLOBIN: CPT

## 2024-03-04 PROCEDURE — 85610 PROTHROMBIN TIME: CPT

## 2024-03-04 PROCEDURE — 82962 GLUCOSE BLOOD TEST: CPT

## 2024-03-04 PROCEDURE — 700102 HCHG RX REV CODE 250 W/ 637 OVERRIDE(OP): Performed by: INTERNAL MEDICINE

## 2024-03-04 PROCEDURE — 700105 HCHG RX REV CODE 258: Performed by: INTERNAL MEDICINE

## 2024-03-04 PROCEDURE — 86850 RBC ANTIBODY SCREEN: CPT

## 2024-03-04 PROCEDURE — A9270 NON-COVERED ITEM OR SERVICE: HCPCS | Performed by: INTERNAL MEDICINE

## 2024-03-04 RX ORDER — GAUZE BANDAGE 2" X 2"
100 BANDAGE TOPICAL DAILY
Status: DISCONTINUED | OUTPATIENT
Start: 2024-03-05 | End: 2024-03-06 | Stop reason: HOSPADM

## 2024-03-04 RX ORDER — OXYCODONE HYDROCHLORIDE 5 MG/1
5 TABLET ORAL
Status: DISCONTINUED | OUTPATIENT
Start: 2024-03-04 | End: 2024-03-06 | Stop reason: HOSPADM

## 2024-03-04 RX ORDER — LABETALOL HYDROCHLORIDE 5 MG/ML
10 INJECTION, SOLUTION INTRAVENOUS
Status: DISCONTINUED | OUTPATIENT
Start: 2024-03-04 | End: 2024-03-06 | Stop reason: HOSPADM

## 2024-03-04 RX ORDER — LORAZEPAM 2 MG/1
2 TABLET ORAL
Status: DISCONTINUED | OUTPATIENT
Start: 2024-03-04 | End: 2024-03-06 | Stop reason: HOSPADM

## 2024-03-04 RX ORDER — LABETALOL 100 MG/1
200 TABLET, FILM COATED ORAL EVERY 6 HOURS PRN
Status: DISCONTINUED | OUTPATIENT
Start: 2024-03-04 | End: 2024-03-06 | Stop reason: HOSPADM

## 2024-03-04 RX ORDER — LORAZEPAM 1 MG/1
1 TABLET ORAL EVERY 4 HOURS PRN
Status: DISCONTINUED | OUTPATIENT
Start: 2024-03-04 | End: 2024-03-06 | Stop reason: HOSPADM

## 2024-03-04 RX ORDER — LORAZEPAM 2 MG/1
4 TABLET ORAL
Status: DISCONTINUED | OUTPATIENT
Start: 2024-03-04 | End: 2024-03-06 | Stop reason: HOSPADM

## 2024-03-04 RX ORDER — SODIUM CHLORIDE 9 MG/ML
INJECTION, SOLUTION INTRAVENOUS CONTINUOUS
Status: DISCONTINUED | OUTPATIENT
Start: 2024-03-04 | End: 2024-03-05

## 2024-03-04 RX ORDER — ONDANSETRON 2 MG/ML
4 INJECTION INTRAMUSCULAR; INTRAVENOUS EVERY 4 HOURS PRN
Status: DISCONTINUED | OUTPATIENT
Start: 2024-03-04 | End: 2024-03-06 | Stop reason: HOSPADM

## 2024-03-04 RX ORDER — LORAZEPAM 0.5 MG/1
0.5 TABLET ORAL EVERY 4 HOURS PRN
Status: DISCONTINUED | OUTPATIENT
Start: 2024-03-04 | End: 2024-03-06 | Stop reason: HOSPADM

## 2024-03-04 RX ORDER — ONDANSETRON 4 MG/1
4 TABLET, ORALLY DISINTEGRATING ORAL EVERY 4 HOURS PRN
Status: DISCONTINUED | OUTPATIENT
Start: 2024-03-04 | End: 2024-03-06 | Stop reason: HOSPADM

## 2024-03-04 RX ORDER — PANTOPRAZOLE SODIUM 40 MG/10ML
40 INJECTION, POWDER, LYOPHILIZED, FOR SOLUTION INTRAVENOUS 2 TIMES DAILY
Status: DISCONTINUED | OUTPATIENT
Start: 2024-03-05 | End: 2024-03-05

## 2024-03-04 RX ORDER — AZITHROMYCIN 250 MG/1
500 TABLET, FILM COATED ORAL DAILY
Status: DISCONTINUED | OUTPATIENT
Start: 2024-03-05 | End: 2024-03-06

## 2024-03-04 RX ORDER — DIAZEPAM 5 MG/ML
10 INJECTION, SOLUTION INTRAMUSCULAR; INTRAVENOUS
Status: DISCONTINUED | OUTPATIENT
Start: 2024-03-04 | End: 2024-03-06 | Stop reason: HOSPADM

## 2024-03-04 RX ORDER — FOLIC ACID 1 MG/1
1 TABLET ORAL DAILY
Status: DISCONTINUED | OUTPATIENT
Start: 2024-03-05 | End: 2024-03-06 | Stop reason: HOSPADM

## 2024-03-04 RX ORDER — GUAIFENESIN/DEXTROMETHORPHAN 100-10MG/5
10 SYRUP ORAL EVERY 6 HOURS PRN
Status: DISCONTINUED | OUTPATIENT
Start: 2024-03-04 | End: 2024-03-06 | Stop reason: HOSPADM

## 2024-03-04 RX ORDER — OXYCODONE HYDROCHLORIDE 10 MG/1
10 TABLET ORAL
Status: DISCONTINUED | OUTPATIENT
Start: 2024-03-04 | End: 2024-03-06 | Stop reason: HOSPADM

## 2024-03-04 RX ORDER — MORPHINE SULFATE 4 MG/ML
4 INJECTION INTRAVENOUS
Status: DISCONTINUED | OUTPATIENT
Start: 2024-03-04 | End: 2024-03-06 | Stop reason: HOSPADM

## 2024-03-04 RX ADMIN — LORAZEPAM 1 MG: 1 TABLET ORAL at 20:37

## 2024-03-04 RX ADMIN — OXYCODONE HYDROCHLORIDE 10 MG: 10 TABLET ORAL at 21:12

## 2024-03-04 RX ADMIN — SODIUM CHLORIDE: 9 INJECTION, SOLUTION INTRAVENOUS at 20:38

## 2024-03-04 ASSESSMENT — LIFESTYLE VARIABLES
HEADACHE, FULLNESS IN HEAD: NOT PRESENT
TREMOR: MODERATE TREMOR WITH ARMS EXTENDED
TOTAL SCORE: 2
TOTAL SCORE: 2
ORIENTATION AND CLOUDING OF SENSORIUM: ORIENTED AND CAN DO SERIAL ADDITIONS
EVER HAD A DRINK FIRST THING IN THE MORNING TO STEADY YOUR NERVES TO GET RID OF A HANGOVER: NO
NAUSEA AND VOMITING: MILD NAUSEA WITH NO VOMITING
ON A TYPICAL DAY WHEN YOU DRINK ALCOHOL HOW MANY DRINKS DO YOU HAVE: 6
ALCOHOL_USE: YES
PAROXYSMAL SWEATS: NO SWEAT VISIBLE
HAVE PEOPLE ANNOYED YOU BY CRITICIZING YOUR DRINKING: YES
TOTAL SCORE: MILD ITCHING, PINS AND NEEDLES SENSATION, BURNING OR NUMBNESS
EVER FELT BAD OR GUILTY ABOUT YOUR DRINKING: NO
AGITATION: SOMEWHAT MORE THAN NORMAL ACTIVITY
CONSUMPTION TOTAL: POSITIVE
DOES PATIENT WANT TO STOP DRINKING: NO
HAVE YOU EVER FELT YOU SHOULD CUT DOWN ON YOUR DRINKING: YES
ANXIETY: MILDLY ANXIOUS
TOTAL SCORE: 2
VISUAL DISTURBANCES: NOT PRESENT
HOW MANY TIMES IN THE PAST YEAR HAVE YOU HAD 5 OR MORE DRINKS IN A DAY: 6
AVERAGE NUMBER OF DAYS PER WEEK YOU HAVE A DRINK CONTAINING ALCOHOL: 7
AUDITORY DISTURBANCES: NOT PRESENT
TOTAL SCORE: 9

## 2024-03-04 ASSESSMENT — COGNITIVE AND FUNCTIONAL STATUS - GENERAL
DAILY ACTIVITIY SCORE: 20
HELP NEEDED FOR BATHING: A LITTLE
WALKING IN HOSPITAL ROOM: A LITTLE
DRESSING REGULAR UPPER BODY CLOTHING: A LITTLE
MOBILITY SCORE: 18
SUGGESTED CMS G CODE MODIFIER MOBILITY: CK
TOILETING: A LITTLE
DRESSING REGULAR LOWER BODY CLOTHING: A LITTLE
SUGGESTED CMS G CODE MODIFIER DAILY ACTIVITY: CJ
MOVING TO AND FROM BED TO CHAIR: A LITTLE
STANDING UP FROM CHAIR USING ARMS: A LITTLE
CLIMB 3 TO 5 STEPS WITH RAILING: A LOT
MOVING FROM LYING ON BACK TO SITTING ON SIDE OF FLAT BED: A LITTLE

## 2024-03-04 ASSESSMENT — PATIENT HEALTH QUESTIONNAIRE - PHQ9
SUM OF ALL RESPONSES TO PHQ9 QUESTIONS 1 AND 2: 0
2. FEELING DOWN, DEPRESSED, IRRITABLE, OR HOPELESS: NOT AT ALL
1. LITTLE INTEREST OR PLEASURE IN DOING THINGS: NOT AT ALL

## 2024-03-04 ASSESSMENT — FIBROSIS 4 INDEX: FIB4 SCORE: 6.71

## 2024-03-04 ASSESSMENT — PAIN DESCRIPTION - PAIN TYPE: TYPE: ACUTE PAIN

## 2024-03-05 ENCOUNTER — ANESTHESIA (OUTPATIENT)
Dept: SURGERY | Facility: MEDICAL CENTER | Age: 74
DRG: 377 | End: 2024-03-05
Payer: MEDICARE

## 2024-03-05 ENCOUNTER — ANESTHESIA EVENT (OUTPATIENT)
Dept: SURGERY | Facility: MEDICAL CENTER | Age: 74
DRG: 377 | End: 2024-03-05
Payer: MEDICARE

## 2024-03-05 PROBLEM — D68.9 COAGULOPATHY (HCC): Status: ACTIVE | Noted: 2024-03-05

## 2024-03-05 PROBLEM — D69.6 THROMBOCYTOPENIA (HCC): Status: ACTIVE | Noted: 2024-03-05

## 2024-03-05 PROBLEM — K70.9 ALCOHOLIC LIVER DISEASE (HCC): Status: ACTIVE | Noted: 2024-03-05

## 2024-03-05 PROBLEM — J18.9 PNEUMONIA: Status: ACTIVE | Noted: 2024-03-05

## 2024-03-05 LAB
ALBUMIN SERPL BCP-MCNC: 3.3 G/DL (ref 3.2–4.9)
ALBUMIN/GLOB SERPL: 1 G/DL
ALP SERPL-CCNC: 68 U/L (ref 30–99)
ALT SERPL-CCNC: 16 U/L (ref 2–50)
ANION GAP SERPL CALC-SCNC: 12 MMOL/L (ref 7–16)
AST SERPL-CCNC: 41 U/L (ref 12–45)
BASOPHILS # BLD AUTO: 1 % (ref 0–1.8)
BASOPHILS # BLD: 0.04 K/UL (ref 0–0.12)
BILIRUB SERPL-MCNC: 2.1 MG/DL (ref 0.1–1.5)
BUN SERPL-MCNC: 18 MG/DL (ref 8–22)
CALCIUM ALBUM COR SERPL-MCNC: 8.6 MG/DL (ref 8.5–10.5)
CALCIUM SERPL-MCNC: 8 MG/DL (ref 8.5–10.5)
CHLORIDE SERPL-SCNC: 102 MMOL/L (ref 96–112)
CO2 SERPL-SCNC: 18 MMOL/L (ref 20–33)
CREAT SERPL-MCNC: 0.51 MG/DL (ref 0.5–1.4)
EOSINOPHIL # BLD AUTO: 0.06 K/UL (ref 0–0.51)
EOSINOPHIL NFR BLD: 1.6 % (ref 0–6.9)
ERYTHROCYTE [DISTWIDTH] IN BLOOD BY AUTOMATED COUNT: 55.5 FL (ref 35.9–50)
GFR SERPLBLD CREATININE-BSD FMLA CKD-EPI: 107 ML/MIN/1.73 M 2
GLOBULIN SER CALC-MCNC: 3.2 G/DL (ref 1.9–3.5)
GLUCOSE SERPL-MCNC: 105 MG/DL (ref 65–99)
HCT VFR BLD AUTO: 30.1 % (ref 42–52)
HGB BLD-MCNC: 10.1 G/DL (ref 14–18)
HGB BLD-MCNC: 10.4 G/DL (ref 14–18)
HGB BLD-MCNC: 9.2 G/DL (ref 14–18)
IMM GRANULOCYTES # BLD AUTO: 0.01 K/UL (ref 0–0.11)
IMM GRANULOCYTES NFR BLD AUTO: 0.3 % (ref 0–0.9)
LYMPHOCYTES # BLD AUTO: 0.57 K/UL (ref 1–4.8)
LYMPHOCYTES NFR BLD: 14.9 % (ref 22–41)
MAGNESIUM SERPL-MCNC: 1.7 MG/DL (ref 1.5–2.5)
MCH RBC QN AUTO: 35.1 PG (ref 27–33)
MCHC RBC AUTO-ENTMCNC: 33.6 G/DL (ref 32.3–36.5)
MCV RBC AUTO: 104.5 FL (ref 81.4–97.8)
MONOCYTES # BLD AUTO: 0.68 K/UL (ref 0–0.85)
MONOCYTES NFR BLD AUTO: 17.8 % (ref 0–13.4)
NEUTROPHILS # BLD AUTO: 2.46 K/UL (ref 1.82–7.42)
NEUTROPHILS NFR BLD: 64.4 % (ref 44–72)
NRBC # BLD AUTO: 0 K/UL
NRBC BLD-RTO: 0 /100 WBC (ref 0–0.2)
PHOSPHATE SERPL-MCNC: 3 MG/DL (ref 2.5–4.5)
PLATELET # BLD AUTO: 32 K/UL (ref 164–446)
PLATELET BLD QL SMEAR: NORMAL
PLATELETS.RETICULATED NFR BLD AUTO: 10.3 % (ref 0.6–13.1)
PMV BLD AUTO: 11.8 FL (ref 9–12.9)
POTASSIUM SERPL-SCNC: 4.2 MMOL/L (ref 3.6–5.5)
PROCALCITONIN SERPL-MCNC: 0.2 NG/ML
PROT SERPL-MCNC: 6.5 G/DL (ref 6–8.2)
RBC # BLD AUTO: 2.88 M/UL (ref 4.7–6.1)
SODIUM SERPL-SCNC: 132 MMOL/L (ref 135–145)
WBC # BLD AUTO: 3.8 K/UL (ref 4.8–10.8)

## 2024-03-05 PROCEDURE — 700102 HCHG RX REV CODE 250 W/ 637 OVERRIDE(OP): Performed by: INTERNAL MEDICINE

## 2024-03-05 PROCEDURE — 160009 HCHG ANES TIME/MIN: Performed by: INTERNAL MEDICINE

## 2024-03-05 PROCEDURE — 700105 HCHG RX REV CODE 258: Performed by: ANESTHESIOLOGY

## 2024-03-05 PROCEDURE — 85018 HEMOGLOBIN: CPT

## 2024-03-05 PROCEDURE — 84100 ASSAY OF PHOSPHORUS: CPT

## 2024-03-05 PROCEDURE — 43235 EGD DIAGNOSTIC BRUSH WASH: CPT | Performed by: INTERNAL MEDICINE

## 2024-03-05 PROCEDURE — A9270 NON-COVERED ITEM OR SERVICE: HCPCS | Performed by: INTERNAL MEDICINE

## 2024-03-05 PROCEDURE — 0DJ08ZZ INSPECTION OF UPPER INTESTINAL TRACT, VIA NATURAL OR ARTIFICIAL OPENING ENDOSCOPIC: ICD-10-PCS | Performed by: INTERNAL MEDICINE

## 2024-03-05 PROCEDURE — 85055 RETICULATED PLATELET ASSAY: CPT

## 2024-03-05 PROCEDURE — 700101 HCHG RX REV CODE 250: Performed by: ANESTHESIOLOGY

## 2024-03-05 PROCEDURE — 770020 HCHG ROOM/CARE - TELE (206)

## 2024-03-05 PROCEDURE — C9113 INJ PANTOPRAZOLE SODIUM, VIA: HCPCS | Performed by: INTERNAL MEDICINE

## 2024-03-05 PROCEDURE — 85025 COMPLETE CBC W/AUTO DIFF WBC: CPT

## 2024-03-05 PROCEDURE — 160035 HCHG PACU - 1ST 60 MINS PHASE I: Performed by: INTERNAL MEDICINE

## 2024-03-05 PROCEDURE — 700105 HCHG RX REV CODE 258: Performed by: INTERNAL MEDICINE

## 2024-03-05 PROCEDURE — 160048 HCHG OR STATISTICAL LEVEL 1-5: Performed by: INTERNAL MEDICINE

## 2024-03-05 PROCEDURE — HZ2ZZZZ DETOXIFICATION SERVICES FOR SUBSTANCE ABUSE TREATMENT: ICD-10-PCS | Performed by: INTERNAL MEDICINE

## 2024-03-05 PROCEDURE — 160002 HCHG RECOVERY MINUTES (STAT): Performed by: INTERNAL MEDICINE

## 2024-03-05 PROCEDURE — 83735 ASSAY OF MAGNESIUM: CPT

## 2024-03-05 PROCEDURE — 36415 COLL VENOUS BLD VENIPUNCTURE: CPT

## 2024-03-05 PROCEDURE — 160202 HCHG ENDO MINUTES - 1ST 30 MINS LEVEL 3: Performed by: INTERNAL MEDICINE

## 2024-03-05 PROCEDURE — 84145 PROCALCITONIN (PCT): CPT

## 2024-03-05 PROCEDURE — 99233 SBSQ HOSP IP/OBS HIGH 50: CPT | Performed by: INTERNAL MEDICINE

## 2024-03-05 PROCEDURE — 700111 HCHG RX REV CODE 636 W/ 250 OVERRIDE (IP): Performed by: INTERNAL MEDICINE

## 2024-03-05 PROCEDURE — 99222 1ST HOSP IP/OBS MODERATE 55: CPT | Mod: 25 | Performed by: INTERNAL MEDICINE

## 2024-03-05 PROCEDURE — 700111 HCHG RX REV CODE 636 W/ 250 OVERRIDE (IP): Mod: JZ | Performed by: ANESTHESIOLOGY

## 2024-03-05 PROCEDURE — 80053 COMPREHEN METABOLIC PANEL: CPT

## 2024-03-05 RX ORDER — SODIUM CHLORIDE, SODIUM LACTATE, POTASSIUM CHLORIDE, CALCIUM CHLORIDE 600; 310; 30; 20 MG/100ML; MG/100ML; MG/100ML; MG/100ML
INJECTION, SOLUTION INTRAVENOUS CONTINUOUS
Status: DISCONTINUED | OUTPATIENT
Start: 2024-03-05 | End: 2024-03-06 | Stop reason: HOSPADM

## 2024-03-05 RX ORDER — HYDRALAZINE HYDROCHLORIDE 20 MG/ML
5 INJECTION INTRAMUSCULAR; INTRAVENOUS
Status: DISCONTINUED | OUTPATIENT
Start: 2024-03-05 | End: 2024-03-05 | Stop reason: HOSPADM

## 2024-03-05 RX ORDER — HALOPERIDOL 5 MG/ML
1 INJECTION INTRAMUSCULAR
Status: DISCONTINUED | OUTPATIENT
Start: 2024-03-05 | End: 2024-03-05 | Stop reason: HOSPADM

## 2024-03-05 RX ORDER — OMEPRAZOLE 20 MG/1
20 CAPSULE, DELAYED RELEASE ORAL DAILY
Status: DISCONTINUED | OUTPATIENT
Start: 2024-03-06 | End: 2024-03-06 | Stop reason: HOSPADM

## 2024-03-05 RX ORDER — IPRATROPIUM BROMIDE AND ALBUTEROL SULFATE 2.5; .5 MG/3ML; MG/3ML
3 SOLUTION RESPIRATORY (INHALATION)
Status: DISCONTINUED | OUTPATIENT
Start: 2024-03-05 | End: 2024-03-05 | Stop reason: HOSPADM

## 2024-03-05 RX ORDER — METOPROLOL TARTRATE 1 MG/ML
1 INJECTION, SOLUTION INTRAVENOUS
Status: DISCONTINUED | OUTPATIENT
Start: 2024-03-05 | End: 2024-03-05 | Stop reason: HOSPADM

## 2024-03-05 RX ORDER — OXYCODONE HCL 5 MG/5 ML
5 SOLUTION, ORAL ORAL
Status: DISCONTINUED | OUTPATIENT
Start: 2024-03-05 | End: 2024-03-05 | Stop reason: HOSPADM

## 2024-03-05 RX ORDER — ONDANSETRON 2 MG/ML
INJECTION INTRAMUSCULAR; INTRAVENOUS PRN
Status: DISCONTINUED | OUTPATIENT
Start: 2024-03-05 | End: 2024-03-05 | Stop reason: SURG

## 2024-03-05 RX ORDER — OXYCODONE HCL 5 MG/5 ML
10 SOLUTION, ORAL ORAL
Status: DISCONTINUED | OUTPATIENT
Start: 2024-03-05 | End: 2024-03-05 | Stop reason: HOSPADM

## 2024-03-05 RX ORDER — OCTREOTIDE ACETATE 100 UG/ML
50 INJECTION, SOLUTION INTRAVENOUS; SUBCUTANEOUS ONCE
Status: COMPLETED | OUTPATIENT
Start: 2024-03-05 | End: 2024-03-05

## 2024-03-05 RX ORDER — LIDOCAINE HYDROCHLORIDE 20 MG/ML
INJECTION, SOLUTION EPIDURAL; INFILTRATION; INTRACAUDAL; PERINEURAL PRN
Status: DISCONTINUED | OUTPATIENT
Start: 2024-03-05 | End: 2024-03-05 | Stop reason: SURG

## 2024-03-05 RX ORDER — DIPHENHYDRAMINE HYDROCHLORIDE 50 MG/ML
12.5 INJECTION INTRAMUSCULAR; INTRAVENOUS
Status: DISCONTINUED | OUTPATIENT
Start: 2024-03-05 | End: 2024-03-05 | Stop reason: HOSPADM

## 2024-03-05 RX ORDER — MEPERIDINE HYDROCHLORIDE 25 MG/ML
12.5 INJECTION INTRAMUSCULAR; INTRAVENOUS; SUBCUTANEOUS
Status: DISCONTINUED | OUTPATIENT
Start: 2024-03-05 | End: 2024-03-05 | Stop reason: HOSPADM

## 2024-03-05 RX ORDER — LABETALOL HYDROCHLORIDE 5 MG/ML
5 INJECTION, SOLUTION INTRAVENOUS
Status: DISCONTINUED | OUTPATIENT
Start: 2024-03-05 | End: 2024-03-05 | Stop reason: HOSPADM

## 2024-03-05 RX ORDER — SODIUM CHLORIDE, SODIUM LACTATE, POTASSIUM CHLORIDE, CALCIUM CHLORIDE 600; 310; 30; 20 MG/100ML; MG/100ML; MG/100ML; MG/100ML
INJECTION, SOLUTION INTRAVENOUS
Status: DISCONTINUED | OUTPATIENT
Start: 2024-03-05 | End: 2024-03-05 | Stop reason: SURG

## 2024-03-05 RX ORDER — EPHEDRINE SULFATE 50 MG/ML
5 INJECTION, SOLUTION INTRAVENOUS
Status: DISCONTINUED | OUTPATIENT
Start: 2024-03-05 | End: 2024-03-05 | Stop reason: HOSPADM

## 2024-03-05 RX ORDER — ONDANSETRON 2 MG/ML
4 INJECTION INTRAMUSCULAR; INTRAVENOUS
Status: DISCONTINUED | OUTPATIENT
Start: 2024-03-05 | End: 2024-03-05 | Stop reason: HOSPADM

## 2024-03-05 RX ADMIN — THERA TABS 1 TABLET: TAB at 05:31

## 2024-03-05 RX ADMIN — FENTANYL CITRATE 50 MCG: 50 INJECTION, SOLUTION INTRAMUSCULAR; INTRAVENOUS at 11:42

## 2024-03-05 RX ADMIN — Medication 100 MG: at 05:31

## 2024-03-05 RX ADMIN — OCTREOTIDE ACETATE 50 MCG: 100 INJECTION, SOLUTION INTRAVENOUS; SUBCUTANEOUS at 01:47

## 2024-03-05 RX ADMIN — PANTOPRAZOLE SODIUM 40 MG: 40 INJECTION, POWDER, FOR SOLUTION INTRAVENOUS at 05:31

## 2024-03-05 RX ADMIN — OCTREOTIDE ACETATE 50 MCG/HR: 200 INJECTION, SOLUTION INTRAVENOUS; SUBCUTANEOUS at 01:14

## 2024-03-05 RX ADMIN — ONDANSETRON 4 MG: 2 INJECTION INTRAMUSCULAR; INTRAVENOUS at 11:49

## 2024-03-05 RX ADMIN — AZITHROMYCIN DIHYDRATE 500 MG: 250 TABLET, FILM COATED ORAL at 05:31

## 2024-03-05 RX ADMIN — SODIUM CHLORIDE, POTASSIUM CHLORIDE, SODIUM LACTATE AND CALCIUM CHLORIDE: 600; 310; 30; 20 INJECTION, SOLUTION INTRAVENOUS at 13:04

## 2024-03-05 RX ADMIN — LORAZEPAM 1 MG: 1 TABLET ORAL at 04:30

## 2024-03-05 RX ADMIN — LORAZEPAM 0.5 MG: 0.5 TABLET ORAL at 15:27

## 2024-03-05 RX ADMIN — PROPOFOL 100 MG: 10 INJECTION, EMULSION INTRAVENOUS at 11:44

## 2024-03-05 RX ADMIN — LORAZEPAM 1 MG: 1 TABLET ORAL at 01:15

## 2024-03-05 RX ADMIN — LIDOCAINE HYDROCHLORIDE 50 MG: 20 INJECTION, SOLUTION EPIDURAL; INFILTRATION; INTRACAUDAL at 11:44

## 2024-03-05 RX ADMIN — SODIUM CHLORIDE: 9 INJECTION, SOLUTION INTRAVENOUS at 04:30

## 2024-03-05 RX ADMIN — FOLIC ACID 1 MG: 1 TABLET ORAL at 05:31

## 2024-03-05 RX ADMIN — CEFTRIAXONE SODIUM 2000 MG: 10 INJECTION, POWDER, FOR SOLUTION INTRAVENOUS at 05:31

## 2024-03-05 RX ADMIN — SODIUM CHLORIDE, POTASSIUM CHLORIDE, SODIUM LACTATE AND CALCIUM CHLORIDE: 600; 310; 30; 20 INJECTION, SOLUTION INTRAVENOUS at 11:40

## 2024-03-05 ASSESSMENT — COGNITIVE AND FUNCTIONAL STATUS - GENERAL
TOILETING: A LITTLE
PERSONAL GROOMING: A LITTLE
STANDING UP FROM CHAIR USING ARMS: A LITTLE
DRESSING REGULAR LOWER BODY CLOTHING: A LITTLE
MOVING TO AND FROM BED TO CHAIR: A LITTLE
SUGGESTED CMS G CODE MODIFIER DAILY ACTIVITY: CK
SUGGESTED CMS G CODE MODIFIER MOBILITY: CK
DRESSING REGULAR UPPER BODY CLOTHING: A LITTLE
MOVING FROM LYING ON BACK TO SITTING ON SIDE OF FLAT BED: A LITTLE
MOBILITY SCORE: 18
WALKING IN HOSPITAL ROOM: A LITTLE
DAILY ACTIVITIY SCORE: 19
CLIMB 3 TO 5 STEPS WITH RAILING: A LOT
HELP NEEDED FOR BATHING: A LITTLE

## 2024-03-05 ASSESSMENT — LIFESTYLE VARIABLES
VISUAL DISTURBANCES: NOT PRESENT
AUDITORY DISTURBANCES: NOT PRESENT
ANXIETY: MILDLY ANXIOUS
NAUSEA AND VOMITING: NO NAUSEA AND NO VOMITING
TOTAL SCORE: 8
ORIENTATION AND CLOUDING OF SENSORIUM: ORIENTED AND CAN DO SERIAL ADDITIONS
AGITATION: SOMEWHAT MORE THAN NORMAL ACTIVITY
AUDITORY DISTURBANCES: NOT PRESENT
TOTAL SCORE: 4
NAUSEA AND VOMITING: MILD NAUSEA WITH NO VOMITING
AGITATION: NORMAL ACTIVITY
AGITATION: NORMAL ACTIVITY
HEADACHE, FULLNESS IN HEAD: NOT PRESENT
HEADACHE, FULLNESS IN HEAD: NOT PRESENT
TOTAL SCORE: VERY MILD ITCHING, PINS AND NEEDLES SENSATION, BURNING OR NUMBNESS
TOTAL SCORE: 4
TOTAL SCORE: 10
VISUAL DISTURBANCES: NOT PRESENT
PAROXYSMAL SWEATS: NO SWEAT VISIBLE
TREMOR: *
VISUAL DISTURBANCES: NOT PRESENT
TOTAL SCORE: MILD ITCHING, PINS AND NEEDLES SENSATION, BURNING OR NUMBNESS
NAUSEA AND VOMITING: NO NAUSEA AND NO VOMITING
ORIENTATION AND CLOUDING OF SENSORIUM: ORIENTED AND CAN DO SERIAL ADDITIONS
TREMOR: *
HEADACHE, FULLNESS IN HEAD: NOT PRESENT
AGITATION: NORMAL ACTIVITY
AUDITORY DISTURBANCES: NOT PRESENT
VISUAL DISTURBANCES: NOT PRESENT
PAROXYSMAL SWEATS: NO SWEAT VISIBLE
ANXIETY: MILDLY ANXIOUS
HEADACHE, FULLNESS IN HEAD: NOT PRESENT
ANXIETY: *
ORIENTATION AND CLOUDING OF SENSORIUM: ORIENTED AND CAN DO SERIAL ADDITIONS
ANXIETY: *
ORIENTATION AND CLOUDING OF SENSORIUM: ORIENTED AND CAN DO SERIAL ADDITIONS
ORIENTATION AND CLOUDING OF SENSORIUM: ORIENTED AND CAN DO SERIAL ADDITIONS
NAUSEA AND VOMITING: MILD NAUSEA WITH NO VOMITING
PAROXYSMAL SWEATS: NO SWEAT VISIBLE
SUBSTANCE_ABUSE: 0
TREMOR: *
TREMOR: *
TREMOR: TREMOR NOT VISIBLE BUT CAN BE FELT, FINGERTIP TO FINGERTIP
HEADACHE, FULLNESS IN HEAD: NOT PRESENT
PAROXYSMAL SWEATS: NO SWEAT VISIBLE
AUDITORY DISTURBANCES: NOT PRESENT
AUDITORY DISTURBANCES: NOT PRESENT
ORIENTATION AND CLOUDING OF SENSORIUM: ORIENTED AND CAN DO SERIAL ADDITIONS
AUDITORY DISTURBANCES: NOT PRESENT
AGITATION: SOMEWHAT MORE THAN NORMAL ACTIVITY
TOTAL SCORE: 4
ANXIETY: *
HEADACHE, FULLNESS IN HEAD: NOT PRESENT
TREMOR: MODERATE TREMOR WITH ARMS EXTENDED
NAUSEA AND VOMITING: NO NAUSEA AND NO VOMITING
PAROXYSMAL SWEATS: NO SWEAT VISIBLE
TOTAL SCORE: MILD ITCHING, PINS AND NEEDLES SENSATION, BURNING OR NUMBNESS
TOTAL SCORE: 6
PAROXYSMAL SWEATS: NO SWEAT VISIBLE
VISUAL DISTURBANCES: NOT PRESENT
ANXIETY: MILDLY ANXIOUS
AGITATION: SOMEWHAT MORE THAN NORMAL ACTIVITY
NAUSEA AND VOMITING: MILD NAUSEA WITH NO VOMITING
VISUAL DISTURBANCES: VERY MILD SENSITIVITY

## 2024-03-05 ASSESSMENT — ENCOUNTER SYMPTOMS
SPEECH CHANGE: 0
HEARTBURN: 0
COUGH: 0
HEADACHES: 0
ORTHOPNEA: 0
DOUBLE VISION: 0
BLURRED VISION: 0
CHILLS: 0
CONSTIPATION: 0
PHOTOPHOBIA: 0
HEMOPTYSIS: 0
BLOOD IN STOOL: 0
HALLUCINATIONS: 0
FOCAL WEAKNESS: 0
PALPITATIONS: 0
NECK PAIN: 0
ABDOMINAL PAIN: 0
FEVER: 0
NERVOUS/ANXIOUS: 0
VOMITING: 1
BRUISES/BLEEDS EASILY: 0
TREMORS: 0
FLANK PAIN: 0
WEIGHT LOSS: 0
POLYDIPSIA: 0
SPUTUM PRODUCTION: 0
DIARRHEA: 0
BACK PAIN: 0
NAUSEA: 1

## 2024-03-05 ASSESSMENT — PATIENT HEALTH QUESTIONNAIRE - PHQ9
SUM OF ALL RESPONSES TO PHQ9 QUESTIONS 1 AND 2: 0
1. LITTLE INTEREST OR PLEASURE IN DOING THINGS: NOT AT ALL
2. FEELING DOWN, DEPRESSED, IRRITABLE, OR HOPELESS: NOT AT ALL

## 2024-03-05 ASSESSMENT — PAIN DESCRIPTION - PAIN TYPE
TYPE: ACUTE PAIN
TYPE: ACUTE PAIN;SURGICAL PAIN
TYPE: ACUTE PAIN
TYPE: SURGICAL PAIN
TYPE: ACUTE PAIN

## 2024-03-05 ASSESSMENT — PAIN SCALES - GENERAL: PAIN_LEVEL: 2

## 2024-03-05 NOTE — ASSESSMENT & PLAN NOTE
CIWA protocol  Thiamine supplementation  Monitor and replace electrolytes  Fall, aspiration, seizure precautions

## 2024-03-05 NOTE — CONSULTS
Date of Consultation:  3/5/2024    Patient: : Chava Mercedes  MRN: [id number]    Referring Physician:  Dr Gamble     GI:Skyler Du M.D.     Reason for Consultation: Hematemesis    History of Present Illness:   79-year-old male with a history of alcohol use disorder presents with coffee-ground emesis.  Patient endorsing melena.  Began acutely yesterday.  No events overnight while hospitalized.  Patient is anemic.  He is thrombocytopenic.  He has an elevated INR.  Cross-sectional imaging demonstrated no acute GI findings other than the possibility of colitis.  Patient last having upper endoscopy done in 2017.  He has a history of peptic ulcer disease.  Patient is nauseous.  He is feeling better since admission.      No past medical history on file.    Past Surgical History:   Procedure Laterality Date    INGUINAL HERNIA REPAIR Right 12/27/2023    Procedure: REPAIR, HERNIA, INGUINAL - RIGHT;  Surgeon: Fermin Pacheco M.D.;  Location: [facility];  Service: General    UMBILICAL HERNIA REPAIR N/A 12/27/2023    Procedure: REPAIR, HERNIA, UMBILICAL;  Surgeon: Fermin Pacheco M.D.;  Location: SURGERY [facility];  Service: General    GASTROSCOPY N/A 12/2/2017    Procedure: GASTROSCOPY;  Surgeon: Tyree Dan M.D.;  Location: [facility];  Service: Gastroenterology    GASTROSCOPY-ENDO  12/18/2016    Procedure: GASTROSCOPY-ENDO;  Surgeon: Garry Cordero M.D.;  Location: [facility];  Service:     GASTROSCOPY N/A 12/11/2016    Procedure: GASTROSCOPY;  Surgeon: Olaf Ortiz M.D.;  Location: [facility];  Service:     GASTROSCOPY-ENDO  12/11/2016    Procedure: GASTROSCOPY-ENDO;  Surgeon: Olaf Ortiz M.D.;  Location: [facility];  Service:        No family history on file.    Social History     Socioeconomic History    Marital status: Single   Tobacco Use    Smoking status: Never    Smokeless tobacco: Never       Current Meds (name, sig, last dose):     Current  Facility-Administered Medications:     octreotide (SandoSTATIN) 1,250 mcg in  mL Infusion    NS    Notify provider if pain remains uncontrolled **AND** Use the Numeric Rating Scale (NRS), Barraza-Baker Faces (WBF), or FLACC on regular floors and Critical-Care Pain Observation Tool (CPOT) on ICUs/Trauma to assess pain **AND** Pulse Ox **AND** Pharmacy Consult Request **AND** If patient difficult to arouse and/or has respiratory depression (respiratory rate of 10 or less), stop any opiates that are currently infusing and call a Rapid Response.    oxyCODONE immediate-release **OR** oxyCODONE immediate-release **OR** morphine injection    ondansetron    ondansetron    guaiFENesin dextromethorphan    labetalol **OR** labetalol    LORazepam    LORazepam    LORazepam    LORazepam    LORazepam **OR** diazePAM    thiamine **AND** multivitamin **AND** folic acid    pantoprazole    cefTRIAXone (ROCEPHIN) IV    azithromycin      ROS  10 systems reviewed and are negative unless otherwise noted in history of present illness.    Physical Exam:  Vitals:    03/04/24 1953 03/04/24 2359 03/05/24 0306 03/05/24 0730   BP: 126/72 112/69 132/76 126/69   Pulse: 92 95 85 78   Resp: 17 18 16 16   Temp: 36.7 °C (98.1 °F) 36.6 °C (97.9 °F) 36.4 °C (97.5 °F) 36.6 °C (97.9 °F)   TempSrc: Temporal Temporal Temporal Temporal   SpO2: 96% 97% 95% 97%   Weight:           Physical Exam  Vitals and nursing note reviewed.   Constitutional:       General: He is not in acute distress.     Appearance: He is ill-appearing.   Eyes:      General: No scleral icterus.  Cardiovascular:      Rate and Rhythm: Normal rate and regular rhythm.   Abdominal:      Palpations: Abdomen is soft.      Tenderness: There is no abdominal tenderness.   Skin:     General: Skin is warm.   Neurological:      General: No focal deficit present.      Mental Status: He is alert.   Psychiatric:         Behavior: Behavior normal.         Judgment: Judgment normal.            Labs:  Recent Labs     03/05/24  0443   SODIUM 132*   POTASSIUM 4.2   CHLORIDE 102   CO2 18*   BUN 18   CREATININE 0.51   MAGNESIUM 1.7   PHOSPHORUS 3.0   CALCIUM 8.0*     Recent Labs     03/05/24  0443   ALTSGPT 16   ASTSGOT 41   ALKPHOSPHAT 68   TBILIRUBIN 2.1*   GLUCOSE 105*     Recent Labs     03/05/24 0443   WBC 3.8*   NEUTSPOLYS 64.40   LYMPHOCYTES 14.90*   MONOCYTES 17.80*   EOSINOPHILS 1.60   BASOPHILS 1.00   ASTSGOT 41   ALTSGPT 16   ALKPHOSPHAT 68   TBILIRUBIN 2.1*     Recent Labs     03/04/24 2116 03/05/24 0443   RBC  --  2.88*   HEMOGLOBIN 10.6* 10.1*   HEMATOCRIT  --  30.1*   PLATELETCT  --  32*   PROTHROMBTM 19.6*  --    INR 1.64*  --      Recent Results (from the past 24 hour(s))   POCT glucose device results    Collection Time: 03/04/24  7:49 PM   Result Value Ref Range    POC Glucose, Blood 112 (H) 65 - 99 mg/dL   HGB (Hemoglobin) for 48 hours    Collection Time: 03/04/24  9:16 PM   Result Value Ref Range    Hemoglobin 10.6 (L) 14.0 - 18.0 g/dL   COD (Adult)    Collection Time: 03/04/24  9:16 PM   Result Value Ref Range    ABO Grouping Only A     Rh Grouping Only NEG     Antibody Screen-Cod NEG    Prothrombin time (INR)    Collection Time: 03/04/24  9:16 PM   Result Value Ref Range    PT 19.6 (H) 12.0 - 14.6 sec    INR 1.64 (H) 0.87 - 1.13   Comp Metabolic Panel (CMP)    Collection Time: 03/05/24  4:43 AM   Result Value Ref Range    Sodium 132 (L) 135 - 145 mmol/L    Potassium 4.2 3.6 - 5.5 mmol/L    Chloride 102 96 - 112 mmol/L    Co2 18 (L) 20 - 33 mmol/L    Anion Gap 12.0 7.0 - 16.0    Glucose 105 (H) 65 - 99 mg/dL    Bun 18 8 - 22 mg/dL    Creatinine 0.51 0.50 - 1.40 mg/dL    Calcium 8.0 (L) 8.5 - 10.5 mg/dL    Correct Calcium 8.6 8.5 - 10.5 mg/dL    AST(SGOT) 41 12 - 45 U/L    ALT(SGPT) 16 2 - 50 U/L    Alkaline Phosphatase 68 30 - 99 U/L    Total Bilirubin 2.1 (H) 0.1 - 1.5 mg/dL    Albumin 3.3 3.2 - 4.9 g/dL    Total Protein 6.5 6.0 - 8.2 g/dL    Globulin 3.2 1.9 - 3.5 g/dL     A-G Ratio 1.0 g/dL   Magnesium    Collection Time: 03/05/24  4:43 AM   Result Value Ref Range    Magnesium 1.7 1.5 - 2.5 mg/dL   Phosphorus    Collection Time: 03/05/24  4:43 AM   Result Value Ref Range    Phosphorus 3.0 2.5 - 4.5 mg/dL   CBC WITH DIFFERENTIAL    Collection Time: 03/05/24  4:43 AM   Result Value Ref Range    WBC 3.8 (L) 4.8 - 10.8 K/uL    RBC 2.88 (L) 4.70 - 6.10 M/uL    Hemoglobin 10.1 (L) 14.0 - 18.0 g/dL    Hematocrit 30.1 (L) 42.0 - 52.0 %    .5 (H) 81.4 - 97.8 fL    MCH 35.1 (H) 27.0 - 33.0 pg    MCHC 33.6 32.3 - 36.5 g/dL    RDW 55.5 (H) 35.9 - 50.0 fL    Platelet Count 32 (L) 164 - 446 K/uL    MPV 11.8 9.0 - 12.9 fL    Neutrophils-Polys 64.40 44.00 - 72.00 %    Lymphocytes 14.90 (L) 22.00 - 41.00 %    Monocytes 17.80 (H) 0.00 - 13.40 %    Eosinophils 1.60 0.00 - 6.90 %    Basophils 1.00 0.00 - 1.80 %    Immature Granulocytes 0.30 0.00 - 0.90 %    Nucleated RBC 0.00 0.00 - 0.20 /100 WBC    Neutrophils (Absolute) 2.46 1.82 - 7.42 K/uL    Lymphs (Absolute) 0.57 (L) 1.00 - 4.80 K/uL    Monos (Absolute) 0.68 0.00 - 0.85 K/uL    Eos (Absolute) 0.06 0.00 - 0.51 K/uL    Baso (Absolute) 0.04 0.00 - 0.12 K/uL    Immature Granulocytes (abs) 0.01 0.00 - 0.11 K/uL    NRBC (Absolute) 0.00 K/uL   ESTIMATED GFR    Collection Time: 03/05/24  4:43 AM   Result Value Ref Range    GFR (CKD-EPI) 107 >60 mL/min/1.73 m 2   PLATELET ESTIMATE    Collection Time: 03/05/24  4:43 AM   Result Value Ref Range    Plt Estimation Decreased    IMMATURE PLT FRACTION    Collection Time: 03/05/24  4:43 AM   Result Value Ref Range    Imm. Plt Fraction 10.3 0.6 - 13.1 %         Imaging:  CT-ABDOMEN-PELVIS WITH  Narrative: 12/27/2023 12:54 AM    HISTORY/REASON FOR EXAM:  Pain; IV contrast only; pain, umbilical and right inguinal hernias.    TECHNIQUE/EXAM DESCRIPTION:   CT scan of the abdomen and pelvis with contrast.    Contrast-enhanced helical scanning was obtained from the diaphragmatic domes through the pubic symphysis  following the bolus administration of nonionic contrast without complication.    100 mL of Omnipaque 350 nonionic contrast was administered without complication.    Low dose optimization technique was utilized for this CT exam including automated exposure control and adjustment of the mA and/or kV according to patient size.    COMPARISON: 12/16/2016    FINDINGS:  Lower Chest: Moderate LEFT pleural fluid collection with associated atelectasis.    Liver: Nodular margins suggesting cirrhosis.    Spleen: Enlarged measuring 13.4 cm.    Pancreas: Unremarkable.    Gallbladder: Multiple calcified stones..    Biliary: Nondilated.    Adrenal glands: Normal.    Kidneys: Unremarkable without hydronephrosis.    Bowel: No evidence of bowel obstruction.  Umbilical hernia containing apparent dilated knuckle of small bowel.  Appendix is not visualized.  Moderate size RIGHT inguinal hernia.    Lymph nodes: No adenopathy.    Vasculature: IVC filter present.    Peritoneum: Negative pneumoperitoneum.  Moderate free fluid.    Musculoskeletal: Lumbar spine degenerative changes.  Prior multilevel laminectomy.    Pelvis: Bladder is unremarkable.  Impression: 1.  Umbilical hernia containing significantly dilated knuckle of small bowel versus less likely postoperative fluid collection, without associated obstruction.  2.  Moderate RIGHT inguinal hernia.  3.  No evidence of bowel perforation.  4.  Findings suggest cirrhosis with moderate ascites and splenomegaly.  5.  Cholelithiasis.  6.  Moderate LEFT pleural effusion with associated atelectasis.        MDM Data Review:  -Records reviewed and summarized in current documentation  -I personally reviewed and interpreted the laboratory results  -I personally reviewed the radiology images  -I have personally reviewed medications    Hospital Problem List:  Active Hospital Problems    Diagnosis     Alcoholic liver disease (HCC) [K70.9]     Thrombocytopenia (HCC) [D69.6]     Coagulopathy (HCC)  [D68.9]     Pneumonia [J18.9]     Severe protein-calorie malnutrition (HCC) [E43]     Pancytopenia (HCC) [D61.818]     GIB (gastrointestinal bleeding) [K92.2]     Alcohol withdrawal syndrome, alcohol dependence [F10.931]        Impressions:  73-year-old male with chronic liver disease secondary to alcohol use disorder presents with coffee-ground emesis.  He is anemic.  He has an elevated INR. Cross-sectional imaging supports cirrhosis. He is not in acute withdrawal at this time.  He is NPO.  I have recommended upper endoscopy.    The risk, benefits, and alternatives were discussed in detail. Risks include bowel perforation, procedure related bleeding event, infection, inability to safely complete the exam, sedation related complications. The patient, understanding the discussion, consents to proceed forward.        Recommendations:  Continue n.p.o. status  EGD today  Continue with current medication management to include

## 2024-03-05 NOTE — PROGRESS NOTES
4 Eyes Skin Assessment Completed by ABRAHAM Newsome and ABRAHAM Garcia.    Head WDL  Ears WDL  Nose WDL  Mouth WDL  Neck WDL  Breast/Chest WDL  Shoulder Blades WDL  Spine WDL  (R) Arm/Elbow/Hand Redness, Blanching, and Bruising  (L) Arm/Elbow/Hand Redness, Blanching, and Bruising  Abdomen Scar  Groin Scar  Scrotum/Coccyx/Buttocks Redness and Blanching  (R) Leg Bruising, edema  (L) Leg Bruising and Edema  (R) Heel/Foot/Toe Redness and Blanching  (L) Heel/Foot/Toe Redness and Blanching, scab/skin tear between 3rd and 4th toe           Devices In Places Tele Box, Blood Pressure Cuff, and Pulse Ox      Interventions In Place Pillows    Possible Skin Injury Yes    Pictures Uploaded Into Epic Yes  Wound Consult Placed Yes  RN Wound Prevention Protocol Ordered No

## 2024-03-05 NOTE — DIETARY
"Nutrition services: Day 1 of admit.  Chava Mercedes is a 73 y.o. male with admitting DX of GIB (gastrointestinal bleeding) [K92.2]    Consult received for MST 2: unplanned wt loss 14-23 lb x 3 months, no poor PO intake PTA; consult for unexplained wt loss. RD visited pt at bedside. Pt reports ongoing wt loss since he had a hernia \"a couple years ago.\" States thinks wt is currently 178 lb and that he was 185 lb a year ago. Pt reports portions a little smaller over the past year. Pt agreed to receive oral nutrition supplements while on limited full liquids diet.    Assessment:  Height: 182.9 cm (6')  Weight: 73.8 kg (162 lb 11.2 oz)  Body mass index is 22.07 kg/m²., BMI classification: Normal  Diet/Intake: Full liquids    Evaluation:   PMHx includes EtOH dependence, PE. Dx list includes GIB, EtOH withdrawal syndrome, EtOH liver disease, coagulopathy, pancytopenia, PNA, severe protein-calorie malnutrition, thrombocytopenia. Per H&P: \"ongoing alcohol abuse at outside facility.\"  +EGD today: \"irregular Z-line at the gastroesophageal junction...mild gastritis.\"  Labs: Na 132, glu 105, Ca 8.0, corrected Ca 8.6  MAR: LR @ 100 mL/hr, ativan, octreotide, protonix, thiamine, MVI, folvite  Skin: pitting edema 1+ to BLEs, no documented wounds  Last BM:  Pt consented to Nutrition-focused physical exam (NFPE): somewhat sunken buccals, clavicles visible, deltoid region with some squaring and prominent acromion process  Wt hx per chart review does show severe wt loss of at least 7% in 2 months; limited wt hx, unsure long-term wt changes over the past year.  12/26/23 175.1 lb - stated wt  12/27/23 192 lb - via bed scale  03/04/24 162.7 lb    Malnutrition Risk: Chronic moderate malnutrition in the context of chronic disease r/t ongoing EtOH dependence vs EtOH liver disease AEB pt presents w/ moderate fat wasting and moderate muscle wasting.    Recommendations/Plan:  Provide TID Ensure Plus w/ meals to bolster kcal/protein intake for " pt w/ dx of malnutrition.  Encourage intake of meals and supplements >50-75%.  Document intake of all PO as % taken in ADL's to provide interdisciplinary communication across all shifts.   Monitor weight.  Nutrition rep will continue to see patient for ongoing meal and snack preferences.     RD following.

## 2024-03-05 NOTE — CARE PLAN
The patient is Watcher - Medium risk of patient condition declining or worsening    Shift Goals  Clinical Goals: monitor H/H, GI consult  Patient Goals: surgery, updates, pain mgmt  Family Goals: truong    Progress made toward(s) clinical / shift goals:    Problem: Optimal Care for Alcohol Withdrawal  Goal: Optimal Care for the alcohol withdrawal patient  Outcome: Progressing     Problem: Seizure Precautions  Goal: Implementation of seizure precautions  Outcome: Progressing     Problem: Lifestyle Changes  Goal: Patient's ability to identify lifestyle changes and available resources to help reduce recurrence of condition will improve  Outcome: Progressing     Problem: Fall Risk  Goal: Patient will remain free from falls  Outcome: Progressing     Problem: Skin Integrity  Goal: Skin integrity is maintained or improved  Outcome: Progressing       Patient is not progressing towards the following goals:

## 2024-03-05 NOTE — OR NURSING
1151: Pt arrived from OR to PACU 8. Connected to monitor. Report received from anesthesia & RN. VSS. Oxygen at 8L via mask. Breaths calm, even, and unlabored.  No signs of pain.     1210 pt awake, dr ivory at bedside, stated ok to resume diet.     1224 report given to Mercedez ROSARIO.     1234patient transported to room via rney with RN, transport monitor, pt on room air. No personal belongings with patient.

## 2024-03-05 NOTE — ASSESSMENT & PLAN NOTE
Platelets count in the 80s.  Probably secondary to alcoholic liver disease   monitor, consider replacement of platelets if platelet count goes down.

## 2024-03-05 NOTE — PROGRESS NOTES
[facility] DIRECT ADMISSION REPORT  Transferring facility: [facility]  Transferring physician: Rivka Randhawa    Chief complaint: Coffee-ground emesis, melena  Pertinent history & patient course: 73-year-old male with history of alcohol abuse presented to ER with 1 day of coffee-ground emesis and several episodes of melena.  Initial hemoglobin 11.8, repeated due to 10.2.  CTAP concerning for colitis, left lower lobe pneumonia with trace effusion, also concern for ascites and possible malignant neoplasm of ascending colon.  Hospital at full capacity and no GI service available, therefore transfer request for higher level care.    Patient was given Ativan 2 g, Librium 100 mg, Zofran 4 mg, Zosyn 4.5 g, Protonix 80 mg IV bolus    Pertinent imaging & lab results: As above  Consultants called prior to transfer and pertinent input from consultants:   Code Status:  per transferring provider, I personally verified with the transferring provider patient's code status and the transferring provider has confirmed this with the patient.  Reason for Transfer: Higher level care, possible GI evaluation  Further work up or recommendations requested prior to transfer:     Patient accepted for transfer: Yes  Accepting [facility] Facility: [facility] - Nursing to notify the Triage Coordinator in the RTOC via Voalte or Phone ext. [phone] when patient arrives to the unit. The Triage Coordinator will assign the admitting provider.    Consultants to be called upon arrival: GI in AM  Admission status: Inpatient.   Floor requested: tele  If ICU transfer, name of intensivist case discussed with and pertinent input from critical care:     The admitting provider is the point of contact for questions or concerns regarding patient's care.

## 2024-03-05 NOTE — ANESTHESIA TIME REPORT
Anesthesia Start and Stop Event Times       Date Time Event    3/5/2024 1138 Ready for Procedure     1140 Anesthesia Start     1154 Anesthesia Stop          Responsible Staff  03/05/24      Name Role Begin End    Rangel Sparks M.D. Anesth 1140 1154          Overtime Reason:  no overtime (within assigned shift)    Comments:

## 2024-03-05 NOTE — ANESTHESIA PREPROCEDURE EVALUATION
Case: [id number] Date/Time: 03/05/24 1050    Procedure: GASTROSCOPY (Esophagus)    Anesthesia type: MAC    Pre-op diagnosis: Coffee-ground emesis, melena    Location: [facility] ROOM 26 / SURGERY SAME DAY [facility]    Surgeons: Skyler Du M.D.            Relevant Problems   PULMONARY   (positive) Pneumonia      CARDIAC   (positive) Pulmonary emboli (HCC)   (positive) Pulmonary embolism (HCC)      GI   (positive) Duodenal ulcer         (positive) Alcoholic liver disease (HCC)   (positive) Hepatic steatosis       Physical Exam    Airway   Mallampati: II  TM distance: >3 FB  Neck ROM: full       Cardiovascular - normal exam  Rhythm: regular  Rate: normal  (-) murmur     Dental - normal exam           Pulmonary - normal exam  Breath sounds clear to auscultation     Abdominal    Neurological - normal exam                   Anesthesia Plan    ASA 3   ASA physical status 3 criteria: alcohol and/or substance dependence or abuse    Plan - general       Airway plan will be mask          Induction: intravenous      Pertinent diagnostic labs and testing reviewed    Informed Consent:    Anesthetic plan and risks discussed with patient.    Use of blood products discussed with: patient whom consented to blood products.

## 2024-03-05 NOTE — CARE PLAN
The patient is Stable - Low risk of patient condition declining or worsening    Shift Goals  Clinical Goals: GI consult, pain management, monitor H+H  Patient Goals: pain maangement, sugery, rest  Family Goals: JON    Progress made toward(s) clinical / shift goals: patient received an EGD today- see results for details. Assessed post-procedure pain and educated patient on non-pharmacological pain management techniques- patient verbalized understanding. Monitoring H+H per orders.      Problem: Knowledge Deficit - Standard  Goal: Patient and family/care givers will demonstrate understanding of plan of care, disease process/condition, diagnostic tests and medications  Outcome: Progressing  Note: updating patient on POC and meds (i.e. Lorazepam, Oxycodone)- patient verbalizes understanding.    Problem: Pain - Standard  Goal: Alleviation of pain or a reduction in pain to the patient’s comfort goal  Outcome: Progressing  Note: educated patient on use of non-pharmacological methods to manage pain- patient verbalizes understanding.     Problem: Optimal Care for Alcohol Withdrawal  Goal: Optimal Care for the alcohol withdrawal patient  Outcome: Progressing  Note: monitoring patient on alcohol withdrawal symptoms via use of CIWA- adjusting POC as needed.     Problem: Seizure Precautions  Goal: Implementation of seizure precautions  Outcome: Progressing  Note: padded rails on side, provided low stimulus environment, and loosen clothing around airway to ensure patency throughout shift.     Problem: Psychosocial  Goal: Patient's level of anxiety will decrease  Outcome: Progressing  Note: assessed patient's anxiety level. Encouraged patient to voice feelings. Gave Ativan per MAR.        Patient is not progressing towards the following goals:

## 2024-03-05 NOTE — ASSESSMENT & PLAN NOTE
Chest x-ray showed some left lower lobe opacification  Patient was started on ceftriaxone, azithromycin, will continue

## 2024-03-05 NOTE — PROGRESS NOTES
Hospital Medicine Daily Progress Note    Date of Service  3/5/2024    Chief Complaint  Chava Mercedes is a 73 y.o. male admitted 3/4/2024 with melena    Hospital Course  This is a   79-year-old male with history of alcohol dependence, history of PE not on anticoagulation, presented and ongoing alcohol abuse at outside facility by EMS with complaints of coffee-ground emesis, melanotic loose BMs   CT of the abdomen and pelvis with contrast: Thick-walled appearance to the ascending colon which could be due to the fact that it is based in ascites.  Colitis is in the differential diagnosis.  If clinically indicated consider follow-up with nonemergent colonoscopy or barium enema to exclude the possibility of malignant neoplasm of the ascending colon.  Mild abdominal and pelvic ascite   Patient admitted for further work up and treatment     Interval Problem Update  Patient seen and examined, afebrile, still having melena. I have consulted GI and case was discussed. Plan for EGD today appreciate rec.  Close monitoring on tele     I have discussed this patient's plan of care and discharge plan at IDT rounds today with Case Management, Nursing, Nursing leadership, and other members of the IDT team.    Consultants/Specialty  GI    Code Status  Full Code    Disposition  The patient is not medically cleared for discharge to home or a post-acute facility.      I have placed the appropriate orders for post-discharge needs.    Review of Systems  Review of Systems   Constitutional:  Negative for chills, fever and weight loss.   HENT:  Negative for ear pain, hearing loss and tinnitus.    Eyes:  Negative for blurred vision, double vision and photophobia.   Respiratory:  Negative for cough, hemoptysis and sputum production.    Cardiovascular:  Negative for chest pain, palpitations and orthopnea.   Gastrointestinal:  Positive for melena, nausea and vomiting. Negative for abdominal pain, blood in stool, constipation, diarrhea and  heartburn.   Genitourinary:  Negative for dysuria, flank pain, frequency and hematuria.   Musculoskeletal:  Negative for back pain, joint pain and neck pain.   Skin:  Negative for itching and rash.   Neurological:  Negative for tremors, speech change, focal weakness and headaches.   Endo/Heme/Allergies:  Negative for environmental allergies and polydipsia. Does not bruise/bleed easily.   Psychiatric/Behavioral:  Negative for hallucinations and substance abuse. The patient is not nervous/anxious.         Physical Exam  Temp:  [36.4 °C (97.5 °F)-36.7 °C (98.1 °F)] 36.4 °C (97.5 °F)  Pulse:  [] 73  Resp:  [16-21] 20  BP: ()/(48-76) 126/68  SpO2:  [95 %-100 %] 95 %    Physical Exam  Vitals and nursing note reviewed.   Constitutional:       General: He is not in acute distress.     Appearance: Normal appearance.   HENT:      Head: Normocephalic and atraumatic.      Nose: Nose normal.      Mouth/Throat:      Mouth: Mucous membranes are moist.   Eyes:      Extraocular Movements: Extraocular movements intact.      Pupils: Pupils are equal, round, and reactive to light.   Cardiovascular:      Rate and Rhythm: Regular rhythm. Tachycardia present.   Pulmonary:      Effort: Pulmonary effort is normal.      Breath sounds: Normal breath sounds.   Abdominal:      General: Abdomen is flat. There is no distension.      Tenderness: There is no abdominal tenderness.   Musculoskeletal:         General: No swelling or deformity. Normal range of motion.      Cervical back: Normal range of motion and neck supple.   Skin:     General: Skin is warm and dry.   Neurological:      General: No focal deficit present.      Mental Status: He is alert and oriented to person, place, and time.      Motor: Tremor present.   Psychiatric:         Mood and Affect: Mood normal.         Behavior: Behavior normal.         Fluids    Intake/Output Summary (Last 24 hours) at 3/5/2024 1330  Last data filed at 3/5/2024 1154  Gross per 24 hour    Intake 2090.34 ml   Output 1 ml   Net 2089.34 ml       Laboratory  Recent Labs     03/04/24 2116 03/05/24  0443   WBC  --  3.8*   RBC  --  2.88*   HEMOGLOBIN 10.6* 10.1*   HEMATOCRIT  --  30.1*   MCV  --  104.5*   MCH  --  35.1*   MCHC  --  33.6   RDW  --  55.5*   PLATELETCT  --  32*   MPV  --  11.8     Recent Labs     03/05/24 0443   SODIUM 132*   POTASSIUM 4.2   CHLORIDE 102   CO2 18*   GLUCOSE 105*   BUN 18   CREATININE 0.51   CALCIUM 8.0*     Recent Labs     03/04/24 2116   INR 1.64*               Imaging  OUTSIDE IMAGES-DX CHEST   Final Result      OUTSIDE IMAGES-CT ABDOMEN /PELVIS   Final Result           Assessment/Plan  * GIB (gastrointestinal bleeding)- (present on admission)  Assessment & Plan  73-year-old male with history of alcohol abuse presented with coffee-ground emesis and melena.  CT of the abdomen and pelvis with contrast: Possible colitis, ascites, liver cirrhosis,   I have consulted GI and discussed case today   Plan for EGD   Cont on PPI   Appreciate rec.       Pneumonia  Assessment & Plan  Chest x-ray showed some left lower lobe opacification  Patient was started on ceftriaxone, azithromycin, will continue    Coagulopathy (HCC)  Assessment & Plan  INR 1.5.  Secondary to chronic liver disease    Thrombocytopenia (HCC)  Assessment & Plan  Platelets count in the 80s.  Probably secondary to alcoholic liver disease   monitor, consider replacement of platelets if platelet count goes down.    Alcoholic liver disease (HCC)  Assessment & Plan  Alcohol cessation counseling    Pancytopenia (HCC)- (present on admission)  Assessment & Plan  Most likely sequence of chronic liver disease and bone marrow alcohol toxicity.    Severe protein-calorie malnutrition (HCC)- (present on admission)  Assessment & Plan  Noted    Alcohol withdrawal syndrome, alcohol dependence- (present on admission)  Assessment & Plan  CIWA protocol  Thiamine supplementation  Monitor and replace electrolytes  Fall, aspiration,  seizure precautions         VTE prophylaxis: scd     Greater than 51 minutes spent prepping to see patient (e.g. review of tests) obtaining and/or reviewing separately obtained history. Performing a medically appropriate examination and/ evaluation.  Counseling and educating the patient/family/caregiver.  Ordering medications, tests, or procedures.  Referring and communicating with other health care professionals.  Documenting clinical information in EPIC.  Independently interpreting results and communicating results to patient/family/caregiver.  Care coordination.      I have performed a physical exam and reviewed and updated ROS and Plan today (3/5/2024). In review of yesterday's note (3/4/2024), there are no changes except as documented above.

## 2024-03-05 NOTE — OP REPORT
PreOp Diagnosis: Coffee-ground emesis, hematemesis      PostOp Diagnosis:   1. irregular Z-line at the gastroesophageal junction.  2.  Mild gastritis        Procedure(s):  GASTROSCOPY - Wound Class: Clean Contaminated    Surgeon(s):  Skyler Du M.D.    Anesthesiologist/Type of Anesthesia:  Anesthesiologist: Rangel Sparks M.D./[facility]    Surgical Staff:  Endoscopy Technician: Sofia Morley; Nubia Rosa  Endoscopy Nurse: Shayy Beltran R.N.; Patrice Bustos R.N.    Specimens removed if any:  * No specimens in log *      CONSENT: The risks, benefits and alternatives of the procedure were discussed in detail. The risks include and are not limited to bleeding, infection, perforation, missed lesions, and sedations risks (cardiopulmonary compromise and allergic reaction to medications).    DESCRIPTION:   The patient presented to the operating room.  A time out was performed prior to beginning the procedure.   The patient was placed in the left lateral recumbent position.   Patient was sedated by anesthesia: Propofol.    OPERATIVE FINDINGS:    Esophagus: Normal.  Mildly irregular Z-line.  No varices.  Stomach: Patchy gastropathy.  No gastric varices.  Mild portal hypertensive change.  No biopsies obtained given thrombocytopenia.  Duodenum: Normal to the second portion.      Blood loss: None    The patient tolerated the procedure well.      There were no immediate complications.    IMPRESSION:  1.  Irregular Z-line at the gastroesophageal junction.  2.  Mild gastritis  3.  Mild portal hypertensive gastropathy    RECOMMENDATIONS:  May convert PPI therapy to oral once daily.  May discontinue octreotide  2 g sodium restriction diet  [facility]  Observe clinical course

## 2024-03-05 NOTE — H&P
Hospital Medicine History & Physical Note    Date of Service  3/4/2024    Primary Care Physician  Pcp Pt States None      Code Status  Full Code    Chief Complaint  No chief complaint on file.      History of Presenting Illness    79-year-old male with history of alcohol dependence, history of PE not on anticoagulation, presented and ongoing alcohol abuse at outside facility by EMS with complaints of coffee-ground emesis, melanotic loose BMs started at 7 PM yesterday.  He denies prior history of GI bleed, not on platelets.  He was then was evaluated at outside hospital and transferred here for GI consult.     Vitals at outside hospital significant for tachycardia 101.    Rectal exam: Melena, positive for FOBT.  Blood work significant for leukopenia 3.1 , hemoglobin 11.8 which is similar to prior values, elevated total bilirubin noted and mild LFT elevation.          EKG: Sinus rhythm, heart rate 93 no significant T/ST deviation.  Venous blood gases unremarkable.  Chemistry: Blood sugar 133, creatinine 0.6, BUN 15, sodium 132, potassium 4.1, chloride 102, bicarb 19, magnesium 1.6, anion gap 11, calcium 9.7, phosphorus 3.7, total bilirubin 2.0, AST 65, LT 29, troponin is not elevated, , lactic acid 3.1.  INR 1.5  CBC: Hemoglobin initially 11.8 went down to 10.2 after 4 hours of stay in the emergency department.  Platelets 82, WBC 2.7    CT of the abdomen and pelvis with contrast: Thick-walled appearance to the ascending colon which could be due to the fact that it is based in ascites.  Colitis is in the differential diagnosis.  If clinically indicated consider follow-up with nonemergent colonoscopy or barium enema to exclude the possibility of malignant neoplasm of the ascending colon.  Mild abdominal and pelvic ascites.  There is cholelithiasis.  There is mild prominence of the wall of the urinary bladder and mild cystitis is not excluded.  Prostate gland is mildly enlarged and is impressing upon the base of  the bladder.  There is a small left pleural effusion with atelectasis and possible pneumonia in the left lung base.  Chest x-ray: No acute disease.      Treatment at outside hospital included Zofran, 1 L of saline, Protonix 80 mg, Zosyn    Patient was seen on the floor after direct admit.  He is not in acute distress, currently denies any nausea or stomach pain.    Repeat hemoglobin 10.6.    I discussed the plan of care with patient.    Review of Systems  Review of Systems   Constitutional:  Negative for chills, fever and weight loss.   HENT:  Negative for ear pain, hearing loss and tinnitus.    Eyes:  Negative for blurred vision, double vision and photophobia.   Respiratory:  Negative for cough, hemoptysis and sputum production.    Cardiovascular:  Negative for chest pain, palpitations and orthopnea.   Gastrointestinal:  Positive for melena, nausea and vomiting. Negative for abdominal pain, blood in stool, constipation, diarrhea and heartburn.   Genitourinary:  Negative for dysuria, flank pain, frequency and hematuria.   Musculoskeletal:  Negative for back pain, joint pain and neck pain.   Skin:  Negative for itching and rash.   Neurological:  Negative for tremors, speech change, focal weakness and headaches.   Endo/Heme/Allergies:  Negative for environmental allergies and polydipsia. Does not bruise/bleed easily.   Psychiatric/Behavioral:  Negative for hallucinations and substance abuse. The patient is not nervous/anxious.        Past Medical History   has no past medical history on file.    Surgical History   has a past surgical history that includes gastroscopy (N/A, 12/11/2016); gastroscopy-endo (12/11/2016); gastroscopy-endo (12/18/2016); gastroscopy (N/A, 12/2/2017); inguinal hernia repair (Right, 12/27/2023); and umbilical hernia repair (N/A, 12/27/2023).     Family History  family history is not on file.   Family history reviewed with patient. There is no family history that is pertinent to the chief  "complaint.     Social History   reports that he has never smoked. He has never used smokeless tobacco.    Allergies  No Known Allergies    Medications  None       Physical Exam  Temp:  [36.7 °C (98.1 °F)] 36.7 °C (98.1 °F)  Pulse:  [] 92  Resp:  [17-18] 17  BP: (126-143)/(72-75) 126/72  SpO2:  [96 %-98 %] 96 %  Blood Pressure : 126/72   Temperature: 36.7 °C (98.1 °F)   Pulse: 92   Respiration: 17   Pulse Oximetry: 96 %       Physical Exam  Vitals and nursing note reviewed.   Constitutional:       General: He is not in acute distress.     Appearance: Normal appearance.   HENT:      Head: Normocephalic and atraumatic.      Nose: Nose normal.      Mouth/Throat:      Mouth: Mucous membranes are moist.   Eyes:      Extraocular Movements: Extraocular movements intact.      Pupils: Pupils are equal, round, and reactive to light.   Cardiovascular:      Rate and Rhythm: Regular rhythm. Tachycardia present.   Pulmonary:      Effort: Pulmonary effort is normal.      Breath sounds: Normal breath sounds.   Abdominal:      General: Abdomen is flat. There is no distension.      Tenderness: There is no abdominal tenderness.   Musculoskeletal:         General: No swelling or deformity. Normal range of motion.      Cervical back: Normal range of motion and neck supple.   Skin:     General: Skin is warm and dry.   Neurological:      General: No focal deficit present.      Mental Status: He is alert and oriented to person, place, and time.      Motor: Tremor present.   Psychiatric:         Mood and Affect: Mood normal.         Behavior: Behavior normal.         Laboratory:          No results for input(s): \"ALTSGPT\", \"ASTSGOT\", \"ALKPHOSPHAT\", \"TBILIRUBIN\", \"DBILIRUBIN\", \"GAMMAGT\", \"AMYLASE\", \"LIPASE\", \"ALB\", \"PREALBUMIN\", \"GLUCOSE\" in the last 72 hours.      No results for input(s): \"NTPROBNP\" in the last 72 hours.      No results for input(s): \"TROPONINT\" in the last 72 hours.    Imaging:  OUTSIDE IMAGES-DX CHEST   Final Result "      OUTSIDE IMAGES-CT ABDOMEN /PELVIS   Final Result          Assessment/Plan:  Justification for Admission Status  I anticipate this patient will require at least two midnights for appropriate medical management, necessitating inpatient admission because GI bleed    Patient will need a Telemetry bed on CARDIOLOGY service .  The need is secondary to GI bleed.    * GIB (gastrointestinal bleeding)- (present on admission)  Assessment & Plan  73-year-old male with history of alcohol abuse presented with coffee-ground emesis and melena.  CT of the abdomen and pelvis with contrast: Possible colitis, ascites, liver cirrhosis, cholelithiasis  Plan: Continue IV Protonix, prophylaxis of SBP with ceftriaxone  Octreotide given suggestion for liver cirrhosis on CT, elevated bilirubin and LFTs  Monitor H&H, transfuse as needed  GI consult in AM.    Alcohol withdrawal syndrome, alcohol dependence- (present on admission)  Assessment & Plan  [facility] protocol  Thiamine supplementation  Monitor and replace electrolytes  Fall, aspiration, seizure precautions    Pneumonia  Assessment & Plan  Chest x-ray showed some left lower lobe opacification  Patient was started on ceftriaxone, azithromycin, will continue    Coagulopathy (HCC)  Assessment & Plan  INR 1.5.  Secondary to chronic liver disease    Thrombocytopenia (HCC)  Assessment & Plan  Platelets count in the 80s.  Probably secondary to alcoholic liver disease   monitor, consider replacement of platelets if platelet count goes down.    Alcoholic liver disease (HCC)  Assessment & Plan  Alcohol cessation counseling    Pancytopenia (HCC)- (present on admission)  Assessment & Plan  Most likely sequence of chronic liver disease and bone marrow alcohol toxicity.    Severe protein-calorie malnutrition (HCC)- (present on admission)  Assessment & Plan  Noted        VTE prophylaxis: SCDs/TEDs

## 2024-03-05 NOTE — PROGRESS NOTES
Patient is A&Ox4 upon entering room, HOB in low-Leyva's position. Performed assessment- see Assessment for details. Performed CIWA- see CIWA assessment for details. Updated patient on POC. Patient remains NPO until GI consult. All needs were addressed at this time. Call light within reach.

## 2024-03-05 NOTE — ANESTHESIA POSTPROCEDURE EVALUATION
Patient: Chava Mercedes    Procedure Summary       Date: 03/05/24 Room / Location: [facility] ROOM 26 / SURGERY SAME DAY [facility]    Anesthesia Start: 1140 Anesthesia Stop: 1154    Procedure: GASTROSCOPY (Esophagus) Diagnosis: (Gastritis)    Surgeons: Skyler Du M.D. Responsible Provider: Rangel Sparks M.D.    Anesthesia Type: general ASA Status: 3            Final Anesthesia Type: general  Last vitals  BP   Blood Pressure : 93/48    Temp   36.4 °C (97.6 °F)    Pulse   70   Resp   16    SpO2   100 %      Anesthesia Post Evaluation    Patient location during evaluation: PACU  Patient participation: complete - patient participated  Level of consciousness: awake and alert  Pain score: 2    Airway patency: patent  Anesthetic complications: no  Cardiovascular status: hemodynamically stable  Respiratory status: acceptable  Hydration status: euvolemic    PONV: none          There were no known notable events for this encounter.     Nurse Pain Score: 8 (NPRS)

## 2024-03-05 NOTE — ASSESSMENT & PLAN NOTE
73-year-old male with history of alcohol abuse presented with coffee-ground emesis and melena.  CT of the abdomen and pelvis with contrast: Possible colitis, ascites, liver cirrhosis,   I have consulted GI and discussed case today   Plan for EGD   Cont on PPI   Appreciate rec.

## 2024-03-05 NOTE — PROGRESS NOTES
Patient is currently off unit for surgery. All needs were addressed at this time. Belongings left in patient's room.

## 2024-03-05 NOTE — PROGRESS NOTES
Patient arrived on unit. Hooked patient up to tele box. Nurses Mercedez and Radha performed 4 skin eye check. Patient is A&Ox4, no complaints of pain. Two large black BM upon arrival. Patient oriented to room and unit. All fall precautions in place. Bed is locked and in lowest position. Patient educated on use of call light and fall risk. Call light and personal belongings within reach.

## 2024-03-06 ENCOUNTER — PHARMACY VISIT (OUTPATIENT)
Dept: PHARMACY | Facility: MEDICAL CENTER | Age: 74
End: 2024-03-06
Payer: COMMERCIAL

## 2024-03-06 VITALS
HEIGHT: 72 IN | WEIGHT: 175.71 LBS | TEMPERATURE: 97.9 F | SYSTOLIC BLOOD PRESSURE: 99 MMHG | BODY MASS INDEX: 23.8 KG/M2 | OXYGEN SATURATION: 96 % | RESPIRATION RATE: 16 BRPM | HEART RATE: 88 BPM | DIASTOLIC BLOOD PRESSURE: 63 MMHG

## 2024-03-06 LAB
ANION GAP SERPL CALC-SCNC: 9 MMOL/L (ref 7–16)
BUN SERPL-MCNC: 11 MG/DL (ref 8–22)
CALCIUM SERPL-MCNC: 8.1 MG/DL (ref 8.5–10.5)
CHLORIDE SERPL-SCNC: 101 MMOL/L (ref 96–112)
CO2 SERPL-SCNC: 20 MMOL/L (ref 20–33)
CREAT SERPL-MCNC: 0.54 MG/DL (ref 0.5–1.4)
ERYTHROCYTE [DISTWIDTH] IN BLOOD BY AUTOMATED COUNT: 51.8 FL (ref 35.9–50)
GFR SERPLBLD CREATININE-BSD FMLA CKD-EPI: 105 ML/MIN/1.73 M 2
GLUCOSE SERPL-MCNC: 98 MG/DL (ref 65–99)
HCT VFR BLD AUTO: 29.3 % (ref 42–52)
HGB BLD-MCNC: 10.2 G/DL (ref 14–18)
HGB BLD-MCNC: 9.7 G/DL (ref 14–18)
MCH RBC QN AUTO: 35.3 PG (ref 27–33)
MCHC RBC AUTO-ENTMCNC: 34.8 G/DL (ref 32.3–36.5)
MCV RBC AUTO: 101.4 FL (ref 81.4–97.8)
PLATELET # BLD AUTO: 90 K/UL (ref 164–446)
PLATELETS.RETICULATED NFR BLD AUTO: 5.2 % (ref 0.6–13.1)
PMV BLD AUTO: 10.1 FL (ref 9–12.9)
POTASSIUM SERPL-SCNC: 3.9 MMOL/L (ref 3.6–5.5)
RBC # BLD AUTO: 2.89 M/UL (ref 4.7–6.1)
SODIUM SERPL-SCNC: 130 MMOL/L (ref 135–145)
WBC # BLD AUTO: 3.6 K/UL (ref 4.8–10.8)

## 2024-03-06 PROCEDURE — 36415 COLL VENOUS BLD VENIPUNCTURE: CPT

## 2024-03-06 PROCEDURE — 700102 HCHG RX REV CODE 250 W/ 637 OVERRIDE(OP): Performed by: INTERNAL MEDICINE

## 2024-03-06 PROCEDURE — 85027 COMPLETE CBC AUTOMATED: CPT

## 2024-03-06 PROCEDURE — 80048 BASIC METABOLIC PNL TOTAL CA: CPT

## 2024-03-06 PROCEDURE — 700111 HCHG RX REV CODE 636 W/ 250 OVERRIDE (IP): Performed by: INTERNAL MEDICINE

## 2024-03-06 PROCEDURE — 99232 SBSQ HOSP IP/OBS MODERATE 35: CPT | Performed by: NURSE PRACTITIONER

## 2024-03-06 PROCEDURE — 700101 HCHG RX REV CODE 250: Performed by: INTERNAL MEDICINE

## 2024-03-06 PROCEDURE — RXMED WILLOW AMBULATORY MEDICATION CHARGE: Performed by: INTERNAL MEDICINE

## 2024-03-06 PROCEDURE — 85018 HEMOGLOBIN: CPT

## 2024-03-06 PROCEDURE — A9270 NON-COVERED ITEM OR SERVICE: HCPCS | Performed by: INTERNAL MEDICINE

## 2024-03-06 PROCEDURE — 85055 RETICULATED PLATELET ASSAY: CPT

## 2024-03-06 PROCEDURE — 99239 HOSP IP/OBS DSCHRG MGMT >30: CPT | Performed by: INTERNAL MEDICINE

## 2024-03-06 RX ORDER — METRONIDAZOLE 500 MG/1
500 TABLET ORAL EVERY 12 HOURS
Qty: 8 TABLET | Refills: 0 | Status: SHIPPED | OUTPATIENT
Start: 2024-03-06 | End: 2024-03-06

## 2024-03-06 RX ORDER — OMEPRAZOLE 20 MG/1
20 CAPSULE, DELAYED RELEASE ORAL DAILY
Qty: 30 CAPSULE | Refills: 0 | Status: SHIPPED | OUTPATIENT
Start: 2024-03-07

## 2024-03-06 RX ADMIN — FOLIC ACID 1 MG: 1 TABLET ORAL at 06:05

## 2024-03-06 RX ADMIN — OXYCODONE HYDROCHLORIDE 10 MG: 10 TABLET ORAL at 06:04

## 2024-03-06 RX ADMIN — Medication 100 MG: at 06:05

## 2024-03-06 RX ADMIN — LORAZEPAM 0.5 MG: 0.5 TABLET ORAL at 03:49

## 2024-03-06 RX ADMIN — OMEPRAZOLE 20 MG: 20 CAPSULE, DELAYED RELEASE ORAL at 06:04

## 2024-03-06 RX ADMIN — CEFTRIAXONE SODIUM 2000 MG: 10 INJECTION, POWDER, FOR SOLUTION INTRAVENOUS at 06:05

## 2024-03-06 RX ADMIN — AZITHROMYCIN DIHYDRATE 500 MG: 250 TABLET, FILM COATED ORAL at 06:04

## 2024-03-06 RX ADMIN — THERA TABS 1 TABLET: TAB at 06:05

## 2024-03-06 ASSESSMENT — ENCOUNTER SYMPTOMS
FLANK PAIN: 0
ABDOMINAL PAIN: 1
CONSTIPATION: 0
FEVER: 0
NAUSEA: 0
CHILLS: 0
BLOOD IN STOOL: 0
HEADACHES: 0
NERVOUS/ANXIOUS: 0
SORE THROAT: 0
VOMITING: 0
FALLS: 0
DIARRHEA: 0
FOCAL WEAKNESS: 0
MYALGIAS: 0
COUGH: 0
WEAKNESS: 0
SHORTNESS OF BREATH: 0
INSOMNIA: 0

## 2024-03-06 ASSESSMENT — LIFESTYLE VARIABLES
VISUAL DISTURBANCES: NOT PRESENT
VISUAL DISTURBANCES: NOT PRESENT
AUDITORY DISTURBANCES: NOT PRESENT
TOTAL SCORE: 4
ORIENTATION AND CLOUDING OF SENSORIUM: ORIENTED AND CAN DO SERIAL ADDITIONS
NAUSEA AND VOMITING: NO NAUSEA AND NO VOMITING
HEADACHE, FULLNESS IN HEAD: NOT PRESENT
AGITATION: SOMEWHAT MORE THAN NORMAL ACTIVITY
VISUAL DISTURBANCES: NOT PRESENT
ORIENTATION AND CLOUDING OF SENSORIUM: ORIENTED AND CAN DO SERIAL ADDITIONS
PAROXYSMAL SWEATS: NO SWEAT VISIBLE
AGITATION: NORMAL ACTIVITY
AUDITORY DISTURBANCES: NOT PRESENT
ANXIETY: MILDLY ANXIOUS
NAUSEA AND VOMITING: NO NAUSEA AND NO VOMITING
HEADACHE, FULLNESS IN HEAD: NOT PRESENT
TOTAL SCORE: 1
PAROXYSMAL SWEATS: NO SWEAT VISIBLE
TREMOR: *
TOTAL SCORE: 6
AUDITORY DISTURBANCES: NOT PRESENT
ANXIETY: *
NAUSEA AND VOMITING: NO NAUSEA AND NO VOMITING
AGITATION: NORMAL ACTIVITY
ANXIETY: NO ANXIETY (AT EASE)
TREMOR: TREMOR NOT VISIBLE BUT CAN BE FELT, FINGERTIP TO FINGERTIP
ORIENTATION AND CLOUDING OF SENSORIUM: ORIENTED AND CAN DO SERIAL ADDITIONS
HEADACHE, FULLNESS IN HEAD: VERY MILD
PAROXYSMAL SWEATS: NO SWEAT VISIBLE
TREMOR: *

## 2024-03-06 ASSESSMENT — PAIN DESCRIPTION - PAIN TYPE: TYPE: ACUTE PAIN

## 2024-03-06 ASSESSMENT — FIBROSIS 4 INDEX: FIB4 SCORE: 23.06

## 2024-03-06 NOTE — DISCHARGE PLANNING
On hold for 25+ minutes for [facility] transport. Directed by management to use [employer]. Awaiting confirmation and time.

## 2024-03-06 NOTE — DISCHARGE PLANNING
Vincent will be here in 5-10 min. 2020 [employer] License [id number],  is [place]. Pt and bedside RN aware, pt will go to [facility] main entrance.

## 2024-03-06 NOTE — DOCUMENTATION QUERY
[facility]                                                                       Query Response Note      PATIENT:               ANGELA LOCKE  ACCT #:                  [id number]  MRN:                     [id number]  :                      1950  ADMIT DATE:       3/4/2024 6:24 PM  DISCH DATE:          RESPONDING  PROVIDER #:        [id number]           QUERY TEXT:    Severe protein calorie malnutrition and Moderate protein calorie Malnutrition are documented in the Medical Record. Based on the findings above and your clinical expertise Please specify the severity.      The patient's Clinical Indicators include:  Findings: 3/5 PN: severe protein calorie malnutrition    3/5 Dietary: Chronic moderate malnutrition in the context of chronic disease r/t ongoing EtOH dependence vs EtOH liver disease AEB pt presents w/ moderate fat wasting and moderate muscle wasting.      Bmi 22.07  unplanned wt loss 14-23 lb x 3 months, no poor PO intake PTA; consult for unexplained wt loss omewhat sunken buccals, clavicles visible, deltoid region with some squaring and prominent acromion process Wt hx per chart review does show severe wt loss of at least 7% in 2 months; limited wt hx, unsure long-term wt changes over the past year.    Treatment: Dietary consult, oral nutrition supplements    Risk Factors: alcohol dependence chronic liver disease history of severe protein calorie malnutrition    Danica Garduno RN BSN  Clinical Documentation   [email or web address]  Connect via Voalte Messenger  Options provided:   -- Moderate protein calorie malnutrition   -- Severe protein calorie malnutrition   -- Other explanation, (please specify other explanation)      Query created by: Danica Lerma on 3/6/2024 7:42 AM    RESPONSE TEXT:    Moderate protein calorie malnutrition          Electronically signed by:  JOSH OLIVEIRA MD 3/6/2024 7:47 AM

## 2024-03-06 NOTE — DISCHARGE PLANNING
Received message from Radha. If pt needed [facility], could arrange transport soon. It will take some time to arrange for taxi. Discussed with Management, they directed to wait for taxi.

## 2024-03-06 NOTE — DISCHARGE PLANNING
Case Management Discharge Planning    Admission Date: 3/4/2024  GMLOS: 4.6  ALOS: 2    6-Clicks ADL Score: 19  6-Clicks Mobility Score: 18      Anticipated Discharge Dispo: Discharge Disposition: Discharged to home/self care (01)    DME Needed: No    Action(s) Taken: Updated Provider/Nurse on Discharge Plan and DC Assessment Complete (See below)    Escalations Completed: None    Medically Clear: Yes    Next Steps: Pt has discharge orders, lives in [place]. Pt states he has no ride home. Has [facility] [employer], secondary insurance. Called  for transport.    Barriers to Discharge: Transportation    Is the patient up for discharge tomorrow: No

## 2024-03-06 NOTE — CARE PLAN
The patient is Stable - Low risk of patient condition declining or worsening    Shift Goals  Clinical Goals: Monitor H&H, IV abx, fluids  Patient Goals: rest  Family Goals: JON    Progress made toward(s) clinical / shift goals:      Problem: Knowledge Deficit - Standard  Goal: Patient and family/care givers will demonstrate understanding of plan of care, disease process/condition, diagnostic tests and medications  Outcome: Progressing     Problem: Optimal Care for Alcohol Withdrawal  Goal: Optimal Care for the alcohol withdrawal patient  Outcome: Progressing     Problem: Hemodynamics  Goal: Patient's hemodynamics, fluid balance and neurologic status will be stable or improve  Outcome: Progressing       Patient is not progressing towards the following goals:

## 2024-03-06 NOTE — DISCHARGE SUMMARY
Discharge Summary    CHIEF COMPLAINT ON ADMISSION  No chief complaint on file.      Reason for Admission  GI Bleed     Admission Date  3/4/2024    CODE STATUS  Full Code    HPI & HOSPITAL COURSE  This is a 73 y.o. male ith history of alcohol dependence, history of PE not on anticoagulation, presented and ongoing alcohol abuse at outside facility by EMS with complaints of coffee-ground emesis, melanotic loose BMs   CT of the abdomen and pelvis with contrast: Thick-walled appearance to the ascending colon which could be due to the fact that it is based in ascites.  Colitis is in the differential diagnosis. Started on abx, also GI was consulted and patient had EGD done showing Irregular Z-line at the gastroesophageal junction and mild gastritis.  Patient now doing better hemoglobin stable.   Will discharge patient home today     The patient met 2-midnight criteria for an inpatient stay at the time of discharge.    Discharge Date  3/6/24    FOLLOW UP ITEMS POST DISCHARGE  PCP     DISCHARGE DIAGNOSES  Principal Problem:    GIB (gastrointestinal bleeding) (POA: Yes)  Active Problems:    Alcohol withdrawal syndrome, alcohol dependence (POA: Yes)    Severe protein-calorie malnutrition (HCC) (POA: Yes)    Pancytopenia (HCC) (POA: Yes)    Alcoholic liver disease (HCC) (POA: Unknown)    Thrombocytopenia (HCC) (POA: Unknown)    Coagulopathy (HCC) (POA: Unknown)    Pneumonia (POA: Unknown)  Resolved Problems:    * No resolved hospital problems. *      FOLLOW UP  No future appointments.  [place] [address] [address] [facility] [address]  [phone]  Call  As needed      MEDICATIONS ON DISCHARGE     Medication List        START taking these medications        Instructions   omeprazole 20 MG delayed-release capsule  Start taking on: March 7, 2024  Commonly known as: PriLOSEC   Take 1 Capsule by mouth every day.  Dose: 20 mg              Allergies  No Known Allergies    DIET  Orders Placed This  Encounter   Procedures    Diet Order Diet: 2 Gram Sodium     Standing Status:   Standing     Number of Occurrences:   1     Order Specific Question:   Diet:     Answer:   2 Gram Sodium [7]       ACTIVITY  As tolerated.  Weight bearing as tolerated    CONSULTATIONS  GI     PROCEDURES  EGD     LABORATORY  Lab Results   Component Value Date    SODIUM 130 (L) 03/06/2024    POTASSIUM 3.9 03/06/2024    CHLORIDE 101 03/06/2024    CO2 20 03/06/2024    GLUCOSE 98 03/06/2024    BUN 11 03/06/2024    CREATININE 0.54 03/06/2024        Lab Results   Component Value Date    WBC 3.6 (L) 03/06/2024    HEMOGLOBIN 9.7 (L) 03/06/2024    HEMATOCRIT 29.3 (L) 03/06/2024    PLATELETCT 90 (L) 03/06/2024        Total time of the discharge process exceeds 35 minutes.

## 2024-03-06 NOTE — DISCHARGE PLANNING
DC Transport Scheduled    Transport Company Scheduled:  [employer]  Spoke with Gala at [facility] to schedule transport.  [facility] Trip #: [id number]     Scheduled Date: 3/6/2024  Scheduled Time: 1545    Destination: Home   Destination address: [address] [place] [id number]    Notified care team of scheduled transport via Voalte.     If there are any changes needed to the DC transportation scheduled, please contact [facility] at ext. [phone] between the hours of [phone] Mon-Fri. If outside those hours, contact the ED Case Manager at ext. [phone].

## 2024-03-06 NOTE — PROGRESS NOTES
Monitor Summary:     Rhythm: SR  Rate: 68-80  Ectopy: (R) PAC  Measurements: .19/.09/.41                 12 Hour Chart Check

## 2024-03-06 NOTE — PROGRESS NOTES
Report received from skip RN, pt care assumed, tele box on. VSS, pt assessment complete. Pt AAOx4, no signs of distress noted at this time. POC discussed with pt and verbalizes no questions. Pt denies any additional needs at this time. Bed in lowest position, bed alarm on, pt educated on fall risk and verbalized understanding, call light within reach, hourly rounding initiated.

## 2024-03-06 NOTE — DISCHARGE PLANNING
In the case of an emergency, pt's legal NOK is Spouse Amalia Mercedes [phone]    RNCM met with pt at bedside and obtained the information used in this assessment. Pt verified accuracy of facesheet. Pt lives in a single story apt with wife in [place]. .  Pt uses [facility]. Prior to current hospitalization, pt was completely independent in ADLS/IADLS. Pt has a limited support system. Pt admits any hx of substance use, states he drinks a 6 pk/day and denies any dx of mh. Owns FWW. Has support from wife at home.      Care Transition Team Assessment    Information Source:pt  Orientation Level: Oriented X4  Information Given By: Patient  Informant's Name: Chava  Who is responsible for making decisions for patient? : Patient         Elopement Risk  Legal Hold: No  Ambulatory or Self Mobile in Wheelchair: Yes  Disoriented: No  Psychiatric Symptoms: None  History of Wandering: No  Elopement this Admit: No  Vocalizing Wanting to Leave: No  Displays Behaviors, Body Language Wanting to Leave: No-Not at Risk for Elopement  Elopement Risk: Not at Risk for Elopement    Interdisciplinary Discharge Planning  Does Admitting Nurse Feel This Could be a Complex Discharge?: No  Primary Care Physician: none  Lives with - Patient's Self Care Capacity: Spouse  Patient or legal guardian wants to designate a caregiver: No  Support Systems: Friends / Neighbors, Spouse / Significant Other  Housing / Facility: 1 Story Apartment / Condo  Do You Take your Prescribed Medications Regularly: Yes  Mobility Issues: Yes  Prior Services: None  Durable Medical Equipment: Walker    Discharge Preparedness  What is your plan after discharge?: Home with help  What are your discharge supports?: Spouse  Prior Functional Level: Ambulatory, Independent with Activities of Daily Living, Independent with Medication Management, Uses Walker  Difficulity with ADLs: None  Difficulity with IADLs: None    Functional Assesment  Prior Functional Level:  Ambulatory, Independent with Activities of Daily Living, Independent with Medication Management, Uses Walker    Finances  Prescription Coverage: Yes    Vision / Hearing Impairment  Vision Impairment : Yes  Right Eye Vision: Impaired, Wears Glasses  Left Eye Vision: Impaired, Wears Glasses  Hearing Impairment : No              Domestic Abuse  Have you ever been the victim of abuse or violence?: No  Physical Abuse or Sexual Abuse: No  Verbal Abuse or Emotional Abuse: No  Possible Abuse/Neglect Reported to:: Not Applicable    Psychological Assessment  History of Substance Abuse: Alcohol         Anticipated Discharge Information  Discharge Disposition: Discharged to home/self care (01)

## 2024-03-06 NOTE — PROGRESS NOTES
Gastroenterology Progress Note               Author:  AL Martines Date & Time Created: 3/6/2024 10:57 AM       Patient ID:  Name:             Chava Mercedes  YOB: 1950  Age:                 73 y.o.  male  MRN:               [id number]        Medical Decision Making, by Problem:  Active Hospital Problems    Diagnosis     Alcoholic liver disease (HCC) [K70.9]     Thrombocytopenia (HCC) [D69.6]     Coagulopathy (HCC) [D68.9]     Pneumonia [J18.9]     Severe protein-calorie malnutrition (HCC) [E43]     Pancytopenia (HCC) [D61.818]     GIB (gastrointestinal bleeding) [K92.2]     Alcohol withdrawal syndrome, alcohol dependence [F10.931]            Presenting Chief Complaint:  Hematemesis     History of Present Illness:   79-year-old male with a history of alcohol use disorder presents with coffee-ground emesis.  Patient endorsing melena.  Began acutely yesterday.  No events overnight while hospitalized.  Patient is anemic.  He is thrombocytopenic.  He has an elevated INR.  Cross-sectional imaging demonstrated no acute GI findings other than the possibility of colitis.  Patient last having upper endoscopy done in 2017.  He has a history of peptic ulcer disease.  Patient is nauseous.  He is feeling better since admission.      Interval History:  3/6/2024: Patient seen at bedside.  Reports mild abdominal discomfort tolerating oral intake without difficulty.  Also denies any nausea, vomiting, hematemesis.  Hemoglobin stable at 10.2.  BUN normal.        Hospital Medications:  Current Facility-Administered Medications   Medication Dose Frequency Provider Last Rate Last Admin    Lactated Ringers   Continuous Rangel Sparks M.D. 100 mL/hr at 03/05/24 1304 New Bag at 03/05/24 1304    omeprazole (PriLOSEC) capsule 20 mg  20 mg DAILY Estefania Gamble M.D.   20 mg at 03/06/24 0604    Pharmacy Consult Request ...Pain Management Review 1 Each  1 Each PHARMACY TO DOSE Regis Tripp M.D.        oxyCODONE  immediate-release (Roxicodone) tablet 5 mg  5 mg Q3HRS PRN Regis Tripp M.D.        Or    oxyCODONE immediate release (Roxicodone) tablet 10 mg  10 mg Q3HRS PRN Regis Tripp M.D.   10 mg at 03/06/24 0604    Or    morphine 4 MG/ML injection 4 mg  4 mg Q3HRS PRN Regis Tripp M.D.        ondansetron (Zofran) syringe/vial injection 4 mg  4 mg Q4HRS PRN Regis Tripp M.D.        ondansetron (Zofran ODT) dispertab 4 mg  4 mg Q4HRS PRN Regis Tripp M.D.        guaiFENesin dextromethorphan (Robitussin DM) 100-10 MG/5ML syrup 10 mL  10 mL Q6HRS PRN Regis Tripp M.D.        labetalol (Normodyne/Trandate) injection 10 mg  10 mg Q HOUR PRN Regis Tripp M.D.        Or    labetalol (Normodyne) tablet 200 mg  200 mg Q6HRS PRN Regis Tripp M.D.        LORazepam (Ativan) tablet 0.5 mg  0.5 mg Q4HRS PRN Regis Tripp M.D.   0.5 mg at 03/06/24 0349    LORazepam (Ativan) tablet 1 mg  1 mg Q4HRS PRN Regis Tripp M.D.   1 mg at 03/05/24 0430    LORazepam (Ativan) tablet 2 mg  2 mg Q2HRS PRN Regis Tripp M.D.        LORazepam (Ativan) tablet 3 mg  3 mg Q HOUR PRN Regis Tripp M.D.        LORazepam (Ativan) tablet 4 mg  4 mg Q15 MIN PRN Regis Tripp M.D.        Or    diazePAM (Valium) injection 10 mg  10 mg Q15 MIN PRN Regis Tripp M.D.        thiamine (Vitamin B-1) tablet 100 mg  100 mg DAILY Regis Tripp M.D.   100 mg at 03/06/24 0605    And    multivitamin tablet 1 Tablet  1 Tablet DAILY Regis Tripp M.D.   1 Tablet at 03/06/24 0605    And    folic acid (Folvite) tablet 1 mg  1 mg DAILY Regis Tripp M.D.   1 mg at 03/06/24 0605    cefTRIAXone (Rocephin) syringe 2,000 mg  2,000 mg Q24HRS Regis Tripp M.D.   2,000 mg at 03/06/24 0605    azithromycin (Zithromax) tablet 500 mg  500 mg DAILY Regis Tripp M.D.   500 mg at 03/06/24 0604   Last reviewed on 3/5/2024 11:26 AM by Emelia Malik, R.N.       Review of Systems:  Review of Systems    Constitutional:  Negative for chills, fever and malaise/fatigue.   HENT:  Negative for congestion and sore throat.    Respiratory:  Negative for cough and shortness of breath.    Cardiovascular:  Negative for chest pain and leg swelling.   Gastrointestinal:  Positive for abdominal pain (mild). Negative for blood in stool, constipation, diarrhea, melena, nausea and vomiting.   Genitourinary:  Negative for dysuria and flank pain.   Musculoskeletal:  Negative for falls and myalgias.   Neurological:  Negative for focal weakness, weakness and headaches.   Psychiatric/Behavioral:  The patient is not nervous/anxious and does not have insomnia.    All other systems reviewed and are negative.        Vital signs:  Weight/BMI: Body mass index is 23.83 kg/m².  /63   Pulse 81   Temp 36.6 °C (97.9 °F) (Temporal)   Resp 16   Ht 1.829 m (6')   Wt 79.7 kg (175 lb 11.3 oz)   SpO2 96%   Vitals:    03/05/24 1915 03/05/24 2315 03/06/24 0336 03/06/24 0717   BP: 114/64 115/65 115/65 100/63   Pulse: 81 86 74 81   Resp: 18 18 16 16   Temp: 37 °C (98.6 °F) 36.9 °C (98.4 °F) 36.6 °C (97.9 °F) 36.6 °C (97.9 °F)   TempSrc: Temporal Temporal Temporal Temporal   SpO2: 96% 97% 96% 96%   Weight:   79.7 kg (175 lb 11.3 oz)    Height:         Oxygen Therapy:  Pulse Oximetry: 96 %, O2 (LPM): 0, O2 Delivery Device: None - Room Air    Intake/Output Summary (Last 24 hours) at 3/6/2024 1057  Last data filed at 3/6/2024 0937  Gross per 24 hour   Intake 1446 ml   Output 701 ml   Net 745 ml         Physical Exam:  Physical Exam  Vitals and nursing note reviewed.   Constitutional:       General: He is not in acute distress.     Appearance: He is normal weight. He is not toxic-appearing.   HENT:      Head: Normocephalic.      Nose: Nose normal. No congestion.      Mouth/Throat:      Mouth: Mucous membranes are moist.      Pharynx: Oropharynx is clear. No oropharyngeal exudate.   Eyes:      General: No scleral icterus.     Extraocular Movements:  Extraocular movements intact.      Conjunctiva/sclera: Conjunctivae normal.   Cardiovascular:      Rate and Rhythm: Normal rate and regular rhythm.      Pulses: Normal pulses.      Heart sounds: Normal heart sounds. No murmur heard.  Pulmonary:      Effort: Pulmonary effort is normal. No respiratory distress.      Breath sounds: Normal breath sounds. No wheezing.   Abdominal:      General: Abdomen is flat. Bowel sounds are normal. There is no distension.      Palpations: Abdomen is soft.      Tenderness: There is abdominal tenderness (mild w palpation). There is no guarding.   Musculoskeletal:      Right lower leg: No edema.      Left lower leg: No edema.   Skin:     General: Skin is warm and dry.      Capillary Refill: Capillary refill takes less than 2 seconds.      Coloration: Skin is not jaundiced.   Neurological:      General: No focal deficit present.      Mental Status: He is alert and oriented to person, place, and time. Mental status is at baseline.   Psychiatric:         Mood and Affect: Mood normal.         Behavior: Behavior normal.             Labs:  Recent Labs     03/05/24 0443 03/06/24  0357   SODIUM 132* 130*   POTASSIUM 4.2 3.9   CHLORIDE 102 101   CO2 18* 20   BUN 18 11   CREATININE 0.51 0.54   MAGNESIUM 1.7  --    PHOSPHORUS 3.0  --    CALCIUM 8.0* 8.1*     Recent Labs     03/05/24 0443 03/06/24  0357   ALTSGPT 16  --    ASTSGOT 41  --    ALKPHOSPHAT 68  --    TBILIRUBIN 2.1*  --    GLUCOSE 105* 98     Recent Labs     03/05/24 0443 03/06/24  0604   WBC 3.8* 3.6*   NEUTSPOLYS 64.40  --    LYMPHOCYTES 14.90*  --    MONOCYTES 17.80*  --    EOSINOPHILS 1.60  --    BASOPHILS 1.00  --    ASTSGOT 41  --    ALTSGPT 16  --    ALKPHOSPHAT 68  --    TBILIRUBIN 2.1*  --      Recent Labs     03/04/24 2116 03/05/24 0443 03/05/24  1322 03/05/24 2022 03/06/24  0604   RBC  --  2.88*  --   --  2.89*   HEMOGLOBIN 10.6* 10.1* 10.4* 9.2* 10.2*   HEMATOCRIT  --  30.1*  --   --  29.3*   PLATELETCT  --  32*  --    --  90*   PROTHROMBTM 19.6*  --   --   --   --    INR 1.64*  --   --   --   --      Recent Results (from the past 24 hour(s))   HGB (Hemoglobin) for 48 hours    Collection Time: 03/05/24  1:22 PM   Result Value Ref Range    Hemoglobin 10.4 (L) 14.0 - 18.0 g/dL   HGB (Hemoglobin) for 48 hours    Collection Time: 03/05/24  8:22 PM   Result Value Ref Range    Hemoglobin 9.2 (L) 14.0 - 18.0 g/dL   Basic Metabolic Panel    Collection Time: 03/06/24  3:57 AM   Result Value Ref Range    Sodium 130 (L) 135 - 145 mmol/L    Potassium 3.9 3.6 - 5.5 mmol/L    Chloride 101 96 - 112 mmol/L    Co2 20 20 - 33 mmol/L    Glucose 98 65 - 99 mg/dL    Bun 11 8 - 22 mg/dL    Creatinine 0.54 0.50 - 1.40 mg/dL    Calcium 8.1 (L) 8.5 - 10.5 mg/dL    Anion Gap 9.0 7.0 - 16.0   ESTIMATED GFR    Collection Time: 03/06/24  3:57 AM   Result Value Ref Range    GFR (CKD-EPI) 105 >60 mL/min/1.73 m 2   CBC WITHOUT DIFFERENTIAL    Collection Time: 03/06/24  6:04 AM   Result Value Ref Range    WBC 3.6 (L) 4.8 - 10.8 K/uL    RBC 2.89 (L) 4.70 - 6.10 M/uL    Hemoglobin 10.2 (L) 14.0 - 18.0 g/dL    Hematocrit 29.3 (L) 42.0 - 52.0 %    .4 (H) 81.4 - 97.8 fL    MCH 35.3 (H) 27.0 - 33.0 pg    MCHC 34.8 32.3 - 36.5 g/dL    RDW 51.8 (H) 35.9 - 50.0 fL    Platelet Count 90 (L) 164 - 446 K/uL    MPV 10.1 9.0 - 12.9 fL   IMMATURE PLT FRACTION    Collection Time: 03/06/24  6:04 AM   Result Value Ref Range    Imm. Plt Fraction 5.2 0.6 - 13.1 %       Radiology Review:  OUTSIDE IMAGES-DX CHEST   Final Result      OUTSIDE IMAGES-CT ABDOMEN /PELVIS   Final Result            MDM (Data Review):   -Records reviewed and summarized in current documentation  -I personally reviewed and interpreted the laboratory results  -I personally reviewed the radiology images    Assessment/Recommendations:  Gastritis  Portal hypertensive gastropathy  Alcohol liver disease  Pancytopenia, likely secondary to alcohol excess  Coagulopathy  Protein calorie malnutrition      MDM:  Is  73-year-old male with a past medical history as listed above who presented to [facility] 3/4/2024 with GI bleeding.  He underwent EGD on 3/5/2024 during which time he was noted to have gastritis, portal hypertensive gastropathy.  Biopsies were not able to be obtained due to thrombocytopenia with platelets 32.  Postprocedurally, patient AAOx4.  Reports feeling well without nausea, vomiting.  Reports mild abdominal discomfort.  Findings discussed with patient and discussed plan of care with PPI on discharge as well as dietary changes and refraining from alcohol.  Patient verbalizes understanding of this.  Hemoglobin stable.    Plan:  PPI on discharged  Alcohol cessation  2 g sodium restricted diet      No further interventions from acute GI service. GI to sign off. Please reconsult for any further questions or concerns.    Discussed with patient, Dr. Gamble, Dr. Aguilar          Core Quality Measures   Reviewed items::  Labs, Medications and Radiology reports reviewed

## 2024-03-07 NOTE — PROGRESS NOTES
Patient discharged to home. Jenniferft picked up patient, name and patients address confirmed with .

## 2024-08-12 ENCOUNTER — APPOINTMENT (OUTPATIENT)
Dept: RADIOLOGY | Facility: MEDICAL CENTER | Age: 74
End: 2024-08-12
Payer: MEDICARE

## 2024-08-12 ENCOUNTER — HOSPITAL ENCOUNTER (INPATIENT)
Facility: MEDICAL CENTER | Age: 74
LOS: 7 days | DRG: 377 | End: 2024-08-19
Attending: HOSPITALIST | Admitting: STUDENT IN AN ORGANIZED HEALTH CARE EDUCATION/TRAINING PROGRAM
Payer: MEDICARE

## 2024-08-12 DIAGNOSIS — K92.0 HEMATEMESIS WITH NAUSEA: ICD-10-CM

## 2024-08-12 DIAGNOSIS — F10.931 ALCOHOL WITHDRAWAL DELIRIUM (HCC): ICD-10-CM

## 2024-08-12 DIAGNOSIS — K70.31 ALCOHOLIC CIRRHOSIS OF LIVER WITH ASCITES (HCC): ICD-10-CM

## 2024-08-12 DIAGNOSIS — K29.71 GASTROINTESTINAL HEMORRHAGE ASSOCIATED WITH GASTRITIS, UNSPECIFIED GASTRITIS TYPE: ICD-10-CM

## 2024-08-12 PROBLEM — K70.30 ALCOHOLIC CIRRHOSIS (HCC): Status: ACTIVE | Noted: 2024-08-12

## 2024-08-12 PROBLEM — Z71.89 ACP (ADVANCE CARE PLANNING): Status: ACTIVE | Noted: 2024-08-12

## 2024-08-12 LAB
CORTIS SERPL-MCNC: 36 UG/DL (ref 0–23)
CRP SERPL HS-MCNC: <0.3 MG/DL (ref 0–0.75)
FERRITIN SERPL-MCNC: 71.2 NG/ML (ref 22–322)
FOLATE SERPL-MCNC: 8.6 NG/ML
HGB RETIC QN AUTO: 35.8 PG/CELL (ref 29–35)
IMM RETICS NFR: 13.1 % (ref 2.6–16.1)
INR PPP: 2.09 (ref 0.87–1.13)
IRON SATN MFR SERPL: 45 % (ref 15–55)
IRON SERPL-MCNC: 104 UG/DL (ref 50–180)
LACTATE SERPL-SCNC: 13.5 MMOL/L (ref 0.5–2)
LDH SERPL L TO P-CCNC: 169 U/L (ref 107–266)
LIPASE SERPL-CCNC: 31 U/L (ref 11–82)
MAGNESIUM SERPL-MCNC: 1.6 MG/DL (ref 1.5–2.5)
PHOSPHATE SERPL-MCNC: 3.7 MG/DL (ref 2.5–4.5)
PROTHROMBIN TIME: 23.7 SEC (ref 12–14.6)
RETICS # AUTO: 0.04 M/UL (ref 0.04–0.12)
RETICS/RBC NFR: 1.5 % (ref 0.8–2.6)
TIBC SERPL-MCNC: 231 UG/DL (ref 250–450)
UIBC SERPL-MCNC: 127 UG/DL (ref 110–370)
VIT B12 SERPL-MCNC: 824 PG/ML (ref 211–911)

## 2024-08-12 PROCEDURE — 700101 HCHG RX REV CODE 250: Performed by: STUDENT IN AN ORGANIZED HEALTH CARE EDUCATION/TRAINING PROGRAM

## 2024-08-12 PROCEDURE — 83550 IRON BINDING TEST: CPT

## 2024-08-12 PROCEDURE — 99223 1ST HOSP IP/OBS HIGH 75: CPT | Mod: 25,AI | Performed by: STUDENT IN AN ORGANIZED HEALTH CARE EDUCATION/TRAINING PROGRAM

## 2024-08-12 PROCEDURE — 83605 ASSAY OF LACTIC ACID: CPT

## 2024-08-12 PROCEDURE — 82272 OCCULT BLD FECES 1-3 TESTS: CPT

## 2024-08-12 PROCEDURE — 84466 ASSAY OF TRANSFERRIN: CPT

## 2024-08-12 PROCEDURE — 700111 HCHG RX REV CODE 636 W/ 250 OVERRIDE (IP): Mod: JZ | Performed by: STUDENT IN AN ORGANIZED HEALTH CARE EDUCATION/TRAINING PROGRAM

## 2024-08-12 PROCEDURE — 83615 LACTATE (LD) (LDH) ENZYME: CPT

## 2024-08-12 PROCEDURE — 85046 RETICYTE/HGB CONCENTRATE: CPT

## 2024-08-12 PROCEDURE — 82728 ASSAY OF FERRITIN: CPT

## 2024-08-12 PROCEDURE — 770020 HCHG ROOM/CARE - TELE (206)

## 2024-08-12 PROCEDURE — 85610 PROTHROMBIN TIME: CPT

## 2024-08-12 PROCEDURE — 85384 FIBRINOGEN ACTIVITY: CPT

## 2024-08-12 PROCEDURE — 82746 ASSAY OF FOLIC ACID SERUM: CPT

## 2024-08-12 PROCEDURE — 83735 ASSAY OF MAGNESIUM: CPT

## 2024-08-12 PROCEDURE — 85347 COAGULATION TIME ACTIVATED: CPT

## 2024-08-12 PROCEDURE — HZ2ZZZZ DETOXIFICATION SERVICES FOR SUBSTANCE ABUSE TREATMENT: ICD-10-PCS | Performed by: STUDENT IN AN ORGANIZED HEALTH CARE EDUCATION/TRAINING PROGRAM

## 2024-08-12 PROCEDURE — 99497 ADVNCD CARE PLAN 30 MIN: CPT | Performed by: STUDENT IN AN ORGANIZED HEALTH CARE EDUCATION/TRAINING PROGRAM

## 2024-08-12 PROCEDURE — 36415 COLL VENOUS BLD VENIPUNCTURE: CPT

## 2024-08-12 PROCEDURE — 700105 HCHG RX REV CODE 258: Performed by: STUDENT IN AN ORGANIZED HEALTH CARE EDUCATION/TRAINING PROGRAM

## 2024-08-12 PROCEDURE — 83690 ASSAY OF LIPASE: CPT | Mod: 91

## 2024-08-12 PROCEDURE — 83010 ASSAY OF HAPTOGLOBIN QUANT: CPT

## 2024-08-12 PROCEDURE — 700102 HCHG RX REV CODE 250 W/ 637 OVERRIDE(OP): Performed by: STUDENT IN AN ORGANIZED HEALTH CARE EDUCATION/TRAINING PROGRAM

## 2024-08-12 PROCEDURE — 30233N1 TRANSFUSION OF NONAUTOLOGOUS RED BLOOD CELLS INTO PERIPHERAL VEIN, PERCUTANEOUS APPROACH: ICD-10-PCS | Performed by: HOSPITALIST

## 2024-08-12 PROCEDURE — 84100 ASSAY OF PHOSPHORUS: CPT

## 2024-08-12 PROCEDURE — A9270 NON-COVERED ITEM OR SERVICE: HCPCS | Performed by: STUDENT IN AN ORGANIZED HEALTH CARE EDUCATION/TRAINING PROGRAM

## 2024-08-12 PROCEDURE — 85576 BLOOD PLATELET AGGREGATION: CPT

## 2024-08-12 PROCEDURE — 82533 TOTAL CORTISOL: CPT

## 2024-08-12 PROCEDURE — 86140 C-REACTIVE PROTEIN: CPT

## 2024-08-12 PROCEDURE — 82607 VITAMIN B-12: CPT

## 2024-08-12 PROCEDURE — 83540 ASSAY OF IRON: CPT

## 2024-08-12 RX ORDER — PANTOPRAZOLE SODIUM 40 MG/10ML
80 INJECTION, POWDER, LYOPHILIZED, FOR SOLUTION INTRAVENOUS ONCE
Status: COMPLETED | OUTPATIENT
Start: 2024-08-12 | End: 2024-08-12

## 2024-08-12 RX ORDER — SODIUM CHLORIDE, SODIUM LACTATE, POTASSIUM CHLORIDE, CALCIUM CHLORIDE 600; 310; 30; 20 MG/100ML; MG/100ML; MG/100ML; MG/100ML
INJECTION, SOLUTION INTRAVENOUS CONTINUOUS
Status: DISCONTINUED | OUTPATIENT
Start: 2024-08-12 | End: 2024-08-14

## 2024-08-12 RX ORDER — LORAZEPAM 0.5 MG/1
0.5 TABLET ORAL EVERY 4 HOURS PRN
Status: DISCONTINUED | OUTPATIENT
Start: 2024-08-12 | End: 2024-08-16

## 2024-08-12 RX ORDER — FOLIC ACID 1 MG/1
1 TABLET ORAL DAILY
Status: DISCONTINUED | OUTPATIENT
Start: 2024-08-13 | End: 2024-08-13

## 2024-08-12 RX ORDER — LORAZEPAM 2 MG/1
4 TABLET ORAL
Status: DISCONTINUED | OUTPATIENT
Start: 2024-08-12 | End: 2024-08-16

## 2024-08-12 RX ORDER — SODIUM CHLORIDE, SODIUM LACTATE, POTASSIUM CHLORIDE, AND CALCIUM CHLORIDE .6; .31; .03; .02 G/100ML; G/100ML; G/100ML; G/100ML
1000 INJECTION, SOLUTION INTRAVENOUS ONCE
Status: COMPLETED | OUTPATIENT
Start: 2024-08-13 | End: 2024-08-13

## 2024-08-12 RX ORDER — LORAZEPAM 2 MG/ML
0.5 INJECTION INTRAMUSCULAR EVERY 4 HOURS PRN
Status: DISCONTINUED | OUTPATIENT
Start: 2024-08-12 | End: 2024-08-16

## 2024-08-12 RX ORDER — ONDANSETRON 2 MG/ML
4 INJECTION INTRAMUSCULAR; INTRAVENOUS EVERY 4 HOURS PRN
Status: DISCONTINUED | OUTPATIENT
Start: 2024-08-12 | End: 2024-08-19 | Stop reason: HOSPADM

## 2024-08-12 RX ORDER — SODIUM CHLORIDE, SODIUM LACTATE, POTASSIUM CHLORIDE, AND CALCIUM CHLORIDE .6; .31; .03; .02 G/100ML; G/100ML; G/100ML; G/100ML
500 INJECTION, SOLUTION INTRAVENOUS
Status: COMPLETED | OUTPATIENT
Start: 2024-08-12 | End: 2024-08-13

## 2024-08-12 RX ORDER — LORAZEPAM 2 MG/ML
1.5 INJECTION INTRAMUSCULAR
Status: DISCONTINUED | OUTPATIENT
Start: 2024-08-12 | End: 2024-08-16

## 2024-08-12 RX ORDER — DIAZEPAM 5 MG
10 TABLET ORAL EVERY 6 HOURS
Status: COMPLETED | OUTPATIENT
Start: 2024-08-12 | End: 2024-08-13

## 2024-08-12 RX ORDER — LORAZEPAM 2 MG/ML
2 INJECTION INTRAMUSCULAR
Status: DISCONTINUED | OUTPATIENT
Start: 2024-08-12 | End: 2024-08-16

## 2024-08-12 RX ORDER — LORAZEPAM 1 MG/1
1 TABLET ORAL EVERY 4 HOURS PRN
Status: DISCONTINUED | OUTPATIENT
Start: 2024-08-12 | End: 2024-08-16

## 2024-08-12 RX ORDER — GAUZE BANDAGE 2" X 2"
100 BANDAGE TOPICAL DAILY
Status: DISCONTINUED | OUTPATIENT
Start: 2024-08-15 | End: 2024-08-12

## 2024-08-12 RX ORDER — LORAZEPAM 2 MG/1
2 TABLET ORAL
Status: DISCONTINUED | OUTPATIENT
Start: 2024-08-12 | End: 2024-08-16

## 2024-08-12 RX ORDER — LABETALOL HYDROCHLORIDE 5 MG/ML
10 INJECTION, SOLUTION INTRAVENOUS EVERY 4 HOURS PRN
Status: DISCONTINUED | OUTPATIENT
Start: 2024-08-12 | End: 2024-08-19 | Stop reason: HOSPADM

## 2024-08-12 RX ORDER — LORAZEPAM 2 MG/ML
1 INJECTION INTRAMUSCULAR
Status: DISCONTINUED | OUTPATIENT
Start: 2024-08-12 | End: 2024-08-16

## 2024-08-12 RX ORDER — ONDANSETRON 4 MG/1
4 TABLET, ORALLY DISINTEGRATING ORAL EVERY 4 HOURS PRN
Status: DISCONTINUED | OUTPATIENT
Start: 2024-08-12 | End: 2024-08-19 | Stop reason: HOSPADM

## 2024-08-12 RX ORDER — DIAZEPAM 5 MG
5 TABLET ORAL EVERY 6 HOURS
Status: COMPLETED | OUTPATIENT
Start: 2024-08-14 | End: 2024-08-14

## 2024-08-12 RX ADMIN — FOLIC ACID: 5 INJECTION, SOLUTION INTRAMUSCULAR; INTRAVENOUS; SUBCUTANEOUS at 22:43

## 2024-08-12 RX ADMIN — DIAZEPAM 10 MG: 5 TABLET ORAL at 22:29

## 2024-08-12 RX ADMIN — PANTOPRAZOLE SODIUM 80 MG: 40 INJECTION, POWDER, FOR SOLUTION INTRAVENOUS at 21:13

## 2024-08-12 RX ADMIN — THIAMINE HYDROCHLORIDE 500 MG: 100 INJECTION, SOLUTION INTRAMUSCULAR; INTRAVENOUS at 22:21

## 2024-08-12 RX ADMIN — PANTOPRAZOLE SODIUM 8 MG/HR: 40 INJECTION, POWDER, FOR SOLUTION INTRAVENOUS at 21:12

## 2024-08-12 RX ADMIN — ONDANSETRON 4 MG: 2 INJECTION INTRAMUSCULAR; INTRAVENOUS at 22:12

## 2024-08-12 RX ADMIN — LORAZEPAM 0.5 MG: 2 INJECTION INTRAMUSCULAR; INTRAVENOUS at 22:28

## 2024-08-12 RX ADMIN — SODIUM CHLORIDE, POTASSIUM CHLORIDE, SODIUM LACTATE AND CALCIUM CHLORIDE: 600; 310; 30; 20 INJECTION, SOLUTION INTRAVENOUS at 23:41

## 2024-08-12 SDOH — ECONOMIC STABILITY: TRANSPORTATION INSECURITY
IN THE PAST 12 MONTHS, HAS THE LACK OF TRANSPORTATION KEPT YOU FROM MEDICAL APPOINTMENTS OR FROM GETTING MEDICATIONS?: YES

## 2024-08-12 SDOH — ECONOMIC STABILITY: TRANSPORTATION INSECURITY
IN THE PAST 12 MONTHS, HAS LACK OF RELIABLE TRANSPORTATION KEPT YOU FROM MEDICAL APPOINTMENTS, MEETINGS, WORK OR FROM GETTING THINGS NEEDED FOR DAILY LIVING?: YES

## 2024-08-12 ASSESSMENT — LIFESTYLE VARIABLES
AVERAGE NUMBER OF DAYS PER WEEK YOU HAVE A DRINK CONTAINING ALCOHOL: 5
TOTAL SCORE: 9
ORIENTATION AND CLOUDING OF SENSORIUM: ORIENTED AND CAN DO SERIAL ADDITIONS
HAVE YOU EVER FELT YOU SHOULD CUT DOWN ON YOUR DRINKING: YES
NAUSEA AND VOMITING: INTERMITTENT NAUSEA WITH DRY HEAVES
TOTAL SCORE: 2
ON A TYPICAL DAY WHEN YOU DRINK ALCOHOL HOW MANY DRINKS DO YOU HAVE: 6
EVER HAD A DRINK FIRST THING IN THE MORNING TO STEADY YOUR NERVES TO GET RID OF A HANGOVER: NO
TREMOR: MODERATE TREMOR WITH ARMS EXTENDED
DOES PATIENT WANT TO STOP DRINKING: YES
VISUAL DISTURBANCES: NOT PRESENT
HAVE PEOPLE ANNOYED YOU BY CRITICIZING YOUR DRINKING: NO
DOES PATIENT WANT TO TALK TO SOMEONE ABOUT QUITTING: YES
SUBSTANCE_ABUSE: 0
HEADACHE, FULLNESS IN HEAD: NOT PRESENT
AUDITORY DISTURBANCES: NOT PRESENT
TOTAL SCORE: 2
ALCOHOL_USE: YES
HOW MANY TIMES IN THE PAST YEAR HAVE YOU HAD 5 OR MORE DRINKS IN A DAY: 5
PAROXYSMAL SWEATS: NO SWEAT VISIBLE
TOTAL SCORE: 2
AGITATION: NORMAL ACTIVITY
CONSUMPTION TOTAL: POSITIVE
ANXIETY: MILDLY ANXIOUS
EVER FELT BAD OR GUILTY ABOUT YOUR DRINKING: YES

## 2024-08-12 ASSESSMENT — FIBROSIS 4 INDEX: FIB4 SCORE: 8.31

## 2024-08-12 ASSESSMENT — COGNITIVE AND FUNCTIONAL STATUS - GENERAL
SUGGESTED CMS G CODE MODIFIER DAILY ACTIVITY: CH
MOBILITY SCORE: 24
DAILY ACTIVITIY SCORE: 24
SUGGESTED CMS G CODE MODIFIER MOBILITY: CH

## 2024-08-12 ASSESSMENT — ENCOUNTER SYMPTOMS
WEAKNESS: 1
DOUBLE VISION: 0
MYALGIAS: 0
HEARTBURN: 1
BLOOD IN STOOL: 1
CHILLS: 0
DIZZINESS: 0
HEADACHES: 0
SHORTNESS OF BREATH: 0
FOCAL WEAKNESS: 0
PALPITATIONS: 0
BRUISES/BLEEDS EASILY: 1
BLURRED VISION: 0
NAUSEA: 1
COUGH: 0
DEPRESSION: 0
ROS GI COMMENTS: COFFEE-GROUND EMESIS
FEVER: 0

## 2024-08-12 ASSESSMENT — SOCIAL DETERMINANTS OF HEALTH (SDOH)

## 2024-08-12 ASSESSMENT — PATIENT HEALTH QUESTIONNAIRE - PHQ9
2. FEELING DOWN, DEPRESSED, IRRITABLE, OR HOPELESS: NOT AT ALL
1. LITTLE INTEREST OR PLEASURE IN DOING THINGS: NOT AT ALL
SUM OF ALL RESPONSES TO PHQ9 QUESTIONS 1 AND 2: 0

## 2024-08-12 ASSESSMENT — PAIN DESCRIPTION - PAIN TYPE
TYPE: ACUTE PAIN
TYPE: ACUTE PAIN

## 2024-08-13 ENCOUNTER — APPOINTMENT (OUTPATIENT)
Dept: RADIOLOGY | Facility: MEDICAL CENTER | Age: 74
DRG: 377 | End: 2024-08-13
Attending: STUDENT IN AN ORGANIZED HEALTH CARE EDUCATION/TRAINING PROGRAM
Payer: MEDICARE

## 2024-08-13 ENCOUNTER — APPOINTMENT (OUTPATIENT)
Dept: RADIOLOGY | Facility: MEDICAL CENTER | Age: 74
DRG: 377 | End: 2024-08-13
Attending: INTERNAL MEDICINE
Payer: MEDICARE

## 2024-08-13 LAB
ABO GROUP BLD: NORMAL
ALBUMIN SERPL BCP-MCNC: 2.7 G/DL (ref 3.2–4.9)
ALBUMIN/GLOB SERPL: 0.8 G/DL
ALP SERPL-CCNC: 104 U/L (ref 30–99)
ALT SERPL-CCNC: 19 U/L (ref 2–50)
ANION GAP SERPL CALC-SCNC: 26 MMOL/L (ref 7–16)
AST SERPL-CCNC: 54 U/L (ref 12–45)
BARCODED ABORH UBTYP: 600
BARCODED PRD CODE UBPRD: NORMAL
BARCODED UNIT NUM UBUNT: NORMAL
BASOPHILS # BLD AUTO: 0.3 % (ref 0–1.8)
BASOPHILS # BLD: 0.03 K/UL (ref 0–0.12)
BILIRUB SERPL-MCNC: 1.9 MG/DL (ref 0.1–1.5)
BLD GP AB SCN SERPL QL: NORMAL
BUN SERPL-MCNC: 11 MG/DL (ref 8–22)
CALCIUM ALBUM COR SERPL-MCNC: 8.6 MG/DL (ref 8.5–10.5)
CALCIUM SERPL-MCNC: 7.6 MG/DL (ref 8.5–10.5)
CFT BLD TEG: 6 MIN (ref 4.6–9.1)
CFT P HPASE BLD TEG: 6.2 MIN (ref 4.3–8.3)
CHLORIDE SERPL-SCNC: 102 MMOL/L (ref 96–112)
CLOT ANGLE BLD TEG: 66.9 DEGREES (ref 63–78)
CLOT LYSIS 30M P MA LENFR BLD TEG: 0 % (ref 0–2.6)
CO2 SERPL-SCNC: 11 MMOL/L (ref 20–33)
COMPONENT R 8504R: NORMAL
CREAT SERPL-MCNC: 0.67 MG/DL (ref 0.5–1.4)
CT.EXTRINSIC BLD ROTEM: 2.1 MIN (ref 0.8–2.1)
EKG IMPRESSION: NORMAL
EOSINOPHIL # BLD AUTO: 0 K/UL (ref 0–0.51)
EOSINOPHIL NFR BLD: 0 % (ref 0–6.9)
ERYTHROCYTE [DISTWIDTH] IN BLOOD BY AUTOMATED COUNT: 55.8 FL (ref 35.9–50)
GFR SERPLBLD CREATININE-BSD FMLA CKD-EPI: 98 ML/MIN/1.73 M 2
GLOBULIN SER CALC-MCNC: 3.3 G/DL (ref 1.9–3.5)
GLUCOSE SERPL-MCNC: 129 MG/DL (ref 65–99)
HCT VFR BLD AUTO: 23.9 % (ref 42–52)
HCT VFR BLD AUTO: 24 % (ref 42–52)
HCT VFR BLD AUTO: 24.7 % (ref 42–52)
HCT VFR BLD AUTO: 26 % (ref 42–52)
HCT VFR BLD AUTO: 27.7 % (ref 42–52)
HCT VFR BLD AUTO: 28.3 % (ref 42–52)
HEMOCCULT STL QL: POSITIVE
HGB BLD-MCNC: 7.7 G/DL (ref 14–18)
HGB BLD-MCNC: 7.9 G/DL (ref 14–18)
HGB BLD-MCNC: 8.1 G/DL (ref 14–18)
HGB BLD-MCNC: 8.3 G/DL (ref 14–18)
HGB BLD-MCNC: 8.5 G/DL (ref 14–18)
HGB BLD-MCNC: 9 G/DL (ref 14–18)
IMM GRANULOCYTES # BLD AUTO: 0.07 K/UL (ref 0–0.11)
IMM GRANULOCYTES NFR BLD AUTO: 0.8 % (ref 0–0.9)
LACTATE SERPL-SCNC: 10.4 MMOL/L (ref 0.5–2)
LACTATE SERPL-SCNC: 13.3 MMOL/L (ref 0.5–2)
LACTATE SERPL-SCNC: 6.2 MMOL/L (ref 0.5–2)
LIPASE SERPL-CCNC: 33 U/L (ref 11–82)
LYMPHOCYTES # BLD AUTO: 0.34 K/UL (ref 1–4.8)
LYMPHOCYTES NFR BLD: 3.8 % (ref 22–41)
MAGNESIUM SERPL-MCNC: 1.6 MG/DL (ref 1.5–2.5)
MCF BLD TEG: 46 MM (ref 52–69)
MCF.PLATELET INHIB BLD ROTEM: 14 MM (ref 15–32)
MCH RBC QN AUTO: 32.7 PG (ref 27–33)
MCHC RBC AUTO-ENTMCNC: 30.7 G/DL (ref 32.3–36.5)
MCV RBC AUTO: 106.5 FL (ref 81.4–97.8)
MONOCYTES # BLD AUTO: 1.65 K/UL (ref 0–0.85)
MONOCYTES NFR BLD AUTO: 18.3 % (ref 0–13.4)
NEUTROPHILS # BLD AUTO: 6.95 K/UL (ref 1.82–7.42)
NEUTROPHILS NFR BLD: 76.8 % (ref 44–72)
NRBC # BLD AUTO: 0 K/UL
NRBC BLD-RTO: 0 /100 WBC (ref 0–0.2)
PHOSPHATE SERPL-MCNC: 3.3 MG/DL (ref 2.5–4.5)
PLATELET # BLD AUTO: 83 K/UL (ref 164–446)
PLATELETS.RETICULATED NFR BLD AUTO: 6.1 % (ref 0.6–13.1)
PMV BLD AUTO: 11.2 FL (ref 9–12.9)
POTASSIUM SERPL-SCNC: 3.9 MMOL/L (ref 3.6–5.5)
PRODUCT TYPE UPROD: NORMAL
PROT SERPL-MCNC: 6 G/DL (ref 6–8.2)
RBC # BLD AUTO: 2.6 M/UL (ref 4.7–6.1)
RH BLD: NORMAL
SODIUM SERPL-SCNC: 139 MMOL/L (ref 135–145)
TEG ALGORITHM TGALG: ABNORMAL
TRANSFERRIN SERPL-MCNC: 194 MG/DL (ref 200–370)
UNIT STATUS USTAT: NORMAL
WBC # BLD AUTO: 9 K/UL (ref 4.8–10.8)

## 2024-08-13 PROCEDURE — 84100 ASSAY OF PHOSPHORUS: CPT

## 2024-08-13 PROCEDURE — 700111 HCHG RX REV CODE 636 W/ 250 OVERRIDE (IP): Performed by: STUDENT IN AN ORGANIZED HEALTH CARE EDUCATION/TRAINING PROGRAM

## 2024-08-13 PROCEDURE — 93010 ELECTROCARDIOGRAM REPORT: CPT | Performed by: STUDENT IN AN ORGANIZED HEALTH CARE EDUCATION/TRAINING PROGRAM

## 2024-08-13 PROCEDURE — A9270 NON-COVERED ITEM OR SERVICE: HCPCS | Performed by: STUDENT IN AN ORGANIZED HEALTH CARE EDUCATION/TRAINING PROGRAM

## 2024-08-13 PROCEDURE — 93005 ELECTROCARDIOGRAM TRACING: CPT

## 2024-08-13 PROCEDURE — 85014 HEMATOCRIT: CPT

## 2024-08-13 PROCEDURE — 85018 HEMOGLOBIN: CPT

## 2024-08-13 PROCEDURE — 700105 HCHG RX REV CODE 258: Performed by: STUDENT IN AN ORGANIZED HEALTH CARE EDUCATION/TRAINING PROGRAM

## 2024-08-13 PROCEDURE — 770020 HCHG ROOM/CARE - TELE (206)

## 2024-08-13 PROCEDURE — 700101 HCHG RX REV CODE 250: Performed by: HOSPITALIST

## 2024-08-13 PROCEDURE — 99233 SBSQ HOSP IP/OBS HIGH 50: CPT | Performed by: HOSPITALIST

## 2024-08-13 PROCEDURE — 85025 COMPLETE CBC W/AUTO DIFF WBC: CPT

## 2024-08-13 PROCEDURE — 80053 COMPREHEN METABOLIC PANEL: CPT

## 2024-08-13 PROCEDURE — 700111 HCHG RX REV CODE 636 W/ 250 OVERRIDE (IP): Mod: JA | Performed by: HOSPITALIST

## 2024-08-13 PROCEDURE — 83605 ASSAY OF LACTIC ACID: CPT | Mod: 91

## 2024-08-13 PROCEDURE — 700105 HCHG RX REV CODE 258: Performed by: HOSPITALIST

## 2024-08-13 PROCEDURE — 76705 ECHO EXAM OF ABDOMEN: CPT

## 2024-08-13 PROCEDURE — 700102 HCHG RX REV CODE 250 W/ 637 OVERRIDE(OP): Performed by: STUDENT IN AN ORGANIZED HEALTH CARE EDUCATION/TRAINING PROGRAM

## 2024-08-13 PROCEDURE — 51798 US URINE CAPACITY MEASURE: CPT

## 2024-08-13 PROCEDURE — 99222 1ST HOSP IP/OBS MODERATE 55: CPT | Performed by: INTERNAL MEDICINE

## 2024-08-13 PROCEDURE — 85055 RETICULATED PLATELET ASSAY: CPT

## 2024-08-13 PROCEDURE — 700105 HCHG RX REV CODE 258

## 2024-08-13 PROCEDURE — 83735 ASSAY OF MAGNESIUM: CPT

## 2024-08-13 PROCEDURE — 86901 BLOOD TYPING SEROLOGIC RH(D): CPT

## 2024-08-13 PROCEDURE — 36415 COLL VENOUS BLD VENIPUNCTURE: CPT

## 2024-08-13 PROCEDURE — 86900 BLOOD TYPING SEROLOGIC ABO: CPT

## 2024-08-13 PROCEDURE — 86850 RBC ANTIBODY SCREEN: CPT

## 2024-08-13 RX ORDER — FOLIC ACID 1 MG/1
1 TABLET ORAL 2 TIMES DAILY
Status: DISCONTINUED | OUTPATIENT
Start: 2024-08-13 | End: 2024-08-19 | Stop reason: HOSPADM

## 2024-08-13 RX ORDER — SODIUM CHLORIDE, SODIUM LACTATE, POTASSIUM CHLORIDE, AND CALCIUM CHLORIDE .6; .31; .03; .02 G/100ML; G/100ML; G/100ML; G/100ML
500 INJECTION, SOLUTION INTRAVENOUS ONCE
Status: COMPLETED | OUTPATIENT
Start: 2024-08-13 | End: 2024-08-13

## 2024-08-13 RX ORDER — PHYTONADIONE 5 MG/1
10 TABLET ORAL DAILY
Status: COMPLETED | OUTPATIENT
Start: 2024-08-13 | End: 2024-08-15

## 2024-08-13 RX ORDER — MAGNESIUM SULFATE HEPTAHYDRATE 40 MG/ML
2 INJECTION, SOLUTION INTRAVENOUS ONCE
Status: COMPLETED | OUTPATIENT
Start: 2024-08-13 | End: 2024-08-13

## 2024-08-13 RX ORDER — UREA 10 %
1000 LOTION (ML) TOPICAL DAILY
Status: DISCONTINUED | OUTPATIENT
Start: 2024-08-13 | End: 2024-08-19 | Stop reason: HOSPADM

## 2024-08-13 RX ORDER — ACETAMINOPHEN 325 MG/1
650 TABLET ORAL EVERY 6 HOURS PRN
Status: DISCONTINUED | OUTPATIENT
Start: 2024-08-13 | End: 2024-08-19 | Stop reason: HOSPADM

## 2024-08-13 RX ORDER — SODIUM CHLORIDE, SODIUM LACTATE, POTASSIUM CHLORIDE, AND CALCIUM CHLORIDE .6; .31; .03; .02 G/100ML; G/100ML; G/100ML; G/100ML
1000 INJECTION, SOLUTION INTRAVENOUS ONCE
Status: COMPLETED | OUTPATIENT
Start: 2024-08-13 | End: 2024-08-13

## 2024-08-13 RX ADMIN — CYANOCOBALAMIN TAB 500 MCG 1000 MCG: 500 TAB at 05:23

## 2024-08-13 RX ADMIN — THIAMINE HYDROCHLORIDE 500 MG: 100 INJECTION, SOLUTION INTRAMUSCULAR; INTRAVENOUS at 16:16

## 2024-08-13 RX ADMIN — LORAZEPAM 1.5 MG: 2 INJECTION INTRAMUSCULAR; INTRAVENOUS at 09:56

## 2024-08-13 RX ADMIN — SODIUM CHLORIDE, POTASSIUM CHLORIDE, SODIUM LACTATE AND CALCIUM CHLORIDE: 600; 310; 30; 20 INJECTION, SOLUTION INTRAVENOUS at 07:23

## 2024-08-13 RX ADMIN — LORAZEPAM 2 MG: 2 TABLET ORAL at 18:18

## 2024-08-13 RX ADMIN — THIAMINE HYDROCHLORIDE 500 MG: 100 INJECTION, SOLUTION INTRAMUSCULAR; INTRAVENOUS at 21:52

## 2024-08-13 RX ADMIN — DIAZEPAM 10 MG: 5 TABLET ORAL at 11:49

## 2024-08-13 RX ADMIN — SODIUM CHLORIDE, POTASSIUM CHLORIDE, SODIUM LACTATE AND CALCIUM CHLORIDE 500 ML: 600; 310; 30; 20 INJECTION, SOLUTION INTRAVENOUS at 18:01

## 2024-08-13 RX ADMIN — PHYTONADIONE 10 MG: 5 TABLET ORAL at 01:13

## 2024-08-13 RX ADMIN — OCTREOTIDE ACETATE 50 MCG/HR: 200 INJECTION, SOLUTION INTRAVENOUS; SUBCUTANEOUS at 11:52

## 2024-08-13 RX ADMIN — PANTOPRAZOLE SODIUM 8 MG/HR: 40 INJECTION, POWDER, FOR SOLUTION INTRAVENOUS at 05:24

## 2024-08-13 RX ADMIN — LORAZEPAM 1 MG: 2 INJECTION INTRAMUSCULAR; INTRAVENOUS at 16:25

## 2024-08-13 RX ADMIN — MAGNESIUM SULFATE HEPTAHYDRATE 2 G: 2 INJECTION, SOLUTION INTRAVENOUS at 01:20

## 2024-08-13 RX ADMIN — THERA TABS 1 TABLET: TAB at 05:24

## 2024-08-13 RX ADMIN — LORAZEPAM 1 MG: 2 INJECTION INTRAMUSCULAR; INTRAVENOUS at 20:23

## 2024-08-13 RX ADMIN — LORAZEPAM 1.5 MG: 2 INJECTION INTRAMUSCULAR; INTRAVENOUS at 09:22

## 2024-08-13 RX ADMIN — CEFTRIAXONE SODIUM 2000 MG: 10 INJECTION, POWDER, FOR SOLUTION INTRAVENOUS at 09:48

## 2024-08-13 RX ADMIN — LORAZEPAM 1 MG: 2 INJECTION INTRAMUSCULAR; INTRAVENOUS at 12:34

## 2024-08-13 RX ADMIN — DIAZEPAM 10 MG: 5 TABLET ORAL at 18:17

## 2024-08-13 RX ADMIN — LORAZEPAM 1 MG: 2 INJECTION INTRAMUSCULAR; INTRAVENOUS at 06:14

## 2024-08-13 RX ADMIN — LORAZEPAM 0.5 MG: 2 INJECTION INTRAMUSCULAR; INTRAVENOUS at 03:21

## 2024-08-13 RX ADMIN — FOLIC ACID 1 MG: 1 TABLET ORAL at 18:17

## 2024-08-13 RX ADMIN — LORAZEPAM 1 MG: 2 INJECTION INTRAMUSCULAR; INTRAVENOUS at 08:13

## 2024-08-13 RX ADMIN — DIAZEPAM 10 MG: 5 TABLET ORAL at 05:24

## 2024-08-13 RX ADMIN — THIAMINE HYDROCHLORIDE 500 MG: 100 INJECTION, SOLUTION INTRAMUSCULAR; INTRAVENOUS at 08:47

## 2024-08-13 RX ADMIN — SODIUM CHLORIDE, POTASSIUM CHLORIDE, SODIUM LACTATE AND CALCIUM CHLORIDE 1000 ML: 600; 310; 30; 20 INJECTION, SOLUTION INTRAVENOUS at 00:15

## 2024-08-13 RX ADMIN — LORAZEPAM 2 MG: 2 TABLET ORAL at 23:00

## 2024-08-13 RX ADMIN — FOLIC ACID 1 MG: 1 TABLET ORAL at 05:24

## 2024-08-13 RX ADMIN — SODIUM CHLORIDE, POTASSIUM CHLORIDE, SODIUM LACTATE AND CALCIUM CHLORIDE 1000 ML: 600; 310; 30; 20 INJECTION, SOLUTION INTRAVENOUS at 06:20

## 2024-08-13 RX ADMIN — LORAZEPAM 1.5 MG: 2 INJECTION INTRAMUSCULAR; INTRAVENOUS at 14:20

## 2024-08-13 RX ADMIN — LORAZEPAM 1 MG: 1 TABLET ORAL at 11:49

## 2024-08-13 RX ADMIN — SODIUM CHLORIDE, POTASSIUM CHLORIDE, SODIUM LACTATE AND CALCIUM CHLORIDE 1000 ML: 600; 310; 30; 20 INJECTION, SOLUTION INTRAVENOUS at 01:06

## 2024-08-13 RX ADMIN — PANTOPRAZOLE SODIUM 8 MG/HR: 40 INJECTION, POWDER, FOR SOLUTION INTRAVENOUS at 15:38

## 2024-08-13 RX ADMIN — SODIUM CHLORIDE, POTASSIUM CHLORIDE, SODIUM LACTATE AND CALCIUM CHLORIDE 500 ML: 600; 310; 30; 20 INJECTION, SOLUTION INTRAVENOUS at 18:15

## 2024-08-13 RX ADMIN — LORAZEPAM 1 MG: 2 INJECTION INTRAMUSCULAR; INTRAVENOUS at 01:12

## 2024-08-13 ASSESSMENT — LIFESTYLE VARIABLES
AGITATION: SOMEWHAT MORE THAN NORMAL ACTIVITY
NAUSEA AND VOMITING: *
VISUAL DISTURBANCES: NOT PRESENT
ORIENTATION AND CLOUDING OF SENSORIUM: ORIENTED AND CAN DO SERIAL ADDITIONS
ORIENTATION AND CLOUDING OF SENSORIUM: ORIENTED AND CAN DO SERIAL ADDITIONS
VISUAL DISTURBANCES: NOT PRESENT
ORIENTATION AND CLOUDING OF SENSORIUM: DATE DISORIENTATION BY MORE THAN TWO CALENDAR DAYS
TOTAL SCORE: 15
AUDITORY DISTURBANCES: NOT PRESENT
ORIENTATION AND CLOUDING OF SENSORIUM: ORIENTED AND CAN DO SERIAL ADDITIONS
AGITATION: NORMAL ACTIVITY
TREMOR: *
NAUSEA AND VOMITING: MILD NAUSEA WITH NO VOMITING
ORIENTATION AND CLOUDING OF SENSORIUM: DATE DISORIENTATION BY MORE THAN TWO CALENDAR DAYS
VISUAL DISTURBANCES: NOT PRESENT
HEADACHE, FULLNESS IN HEAD: NOT PRESENT
HEADACHE, FULLNESS IN HEAD: MILD
ANXIETY: MILDLY ANXIOUS
TREMOR: *
PAROXYSMAL SWEATS: *
TOTAL SCORE: 13
PAROXYSMAL SWEATS: *
AUDITORY DISTURBANCES: NOT PRESENT
TOTAL SCORE: 11
TOTAL SCORE: 14
AGITATION: NORMAL ACTIVITY
TREMOR: MODERATE TREMOR WITH ARMS EXTENDED
NAUSEA AND VOMITING: MILD NAUSEA WITH NO VOMITING
ORIENTATION AND CLOUDING OF SENSORIUM: DATE DISORIENTATION BY MORE THAN TWO CALENDAR DAYS
VISUAL DISTURBANCES: NOT PRESENT
HEADACHE, FULLNESS IN HEAD: NOT PRESENT
ANXIETY: *
NAUSEA AND VOMITING: *
TOTAL SCORE: 6
HEADACHE, FULLNESS IN HEAD: NOT PRESENT
PAROXYSMAL SWEATS: BARELY PERCEPTIBLE SWEATING. PALMS MOIST
TREMOR: MODERATE TREMOR WITH ARMS EXTENDED
HEADACHE, FULLNESS IN HEAD: VERY MILD
AUDITORY DISTURBANCES: NOT PRESENT
TOTAL SCORE: 15
NAUSEA AND VOMITING: MILD NAUSEA WITH NO VOMITING
ANXIETY: MILDLY ANXIOUS
PAROXYSMAL SWEATS: BARELY PERCEPTIBLE SWEATING. PALMS MOIST
ANXIETY: *
HEADACHE, FULLNESS IN HEAD: NOT PRESENT
NAUSEA AND VOMITING: *
ANXIETY: NO ANXIETY (AT EASE)
NAUSEA AND VOMITING: *
PAROXYSMAL SWEATS: BARELY PERCEPTIBLE SWEATING. PALMS MOIST
HEADACHE, FULLNESS IN HEAD: VERY MILD
PAROXYSMAL SWEATS: BARELY PERCEPTIBLE SWEATING. PALMS MOIST
NAUSEA AND VOMITING: MILD NAUSEA WITH NO VOMITING
VISUAL DISTURBANCES: NOT PRESENT
TREMOR: *
NAUSEA AND VOMITING: MILD NAUSEA WITH NO VOMITING
ANXIETY: *
TOTAL SCORE: 12
ANXIETY: MILDLY ANXIOUS
NAUSEA AND VOMITING: *
TOTAL SCORE: 11
PAROXYSMAL SWEATS: *
VISUAL DISTURBANCES: NOT PRESENT
VISUAL DISTURBANCES: NOT PRESENT
AUDITORY DISTURBANCES: NOT PRESENT
AUDITORY DISTURBANCES: NOT PRESENT
VISUAL DISTURBANCES: NOT PRESENT
TREMOR: SEVERE TREMOR, EVEN WITH ARMS NOT EXTENDED
HEADACHE, FULLNESS IN HEAD: NOT PRESENT
TOTAL SCORE: 10
HEADACHE, FULLNESS IN HEAD: NOT PRESENT
TREMOR: *
AGITATION: SOMEWHAT MORE THAN NORMAL ACTIVITY
HEADACHE, FULLNESS IN HEAD: VERY MILD
ORIENTATION AND CLOUDING OF SENSORIUM: DATE DISORIENTATION BY MORE THAN TWO CALENDAR DAYS
AUDITORY DISTURBANCES: NOT PRESENT
NAUSEA AND VOMITING: MILD NAUSEA WITH NO VOMITING
ORIENTATION AND CLOUDING OF SENSORIUM: DATE DISORIENTATION BY NO MORE THAN TWO CALENDAR DAYS
NAUSEA AND VOMITING: INTERMITTENT NAUSEA WITH DRY HEAVES
ORIENTATION AND CLOUDING OF SENSORIUM: DATE DISORIENTATION BY MORE THAN TWO CALENDAR DAYS
AGITATION: NORMAL ACTIVITY
ORIENTATION AND CLOUDING OF SENSORIUM: DATE DISORIENTATION BY MORE THAN TWO CALENDAR DAYS
AGITATION: NORMAL ACTIVITY
AGITATION: SOMEWHAT MORE THAN NORMAL ACTIVITY
AGITATION: NORMAL ACTIVITY
TREMOR: *
ANXIETY: MILDLY ANXIOUS
TREMOR: MODERATE TREMOR WITH ARMS EXTENDED
AUDITORY DISTURBANCES: NOT PRESENT
ANXIETY: MILDLY ANXIOUS
VISUAL DISTURBANCES: NOT PRESENT
TREMOR: *
HEADACHE, FULLNESS IN HEAD: NOT PRESENT
AUDITORY DISTURBANCES: NOT PRESENT
VISUAL DISTURBANCES: NOT PRESENT
TREMOR: *
AUDITORY DISTURBANCES: NOT PRESENT
AGITATION: NORMAL ACTIVITY
VISUAL DISTURBANCES: NOT PRESENT
TOTAL SCORE: 11
ORIENTATION AND CLOUDING OF SENSORIUM: DATE DISORIENTATION BY NO MORE THAN TWO CALENDAR DAYS
PAROXYSMAL SWEATS: BARELY PERCEPTIBLE SWEATING. PALMS MOIST
PAROXYSMAL SWEATS: BARELY PERCEPTIBLE SWEATING. PALMS MOIST
ORIENTATION AND CLOUDING OF SENSORIUM: DATE DISORIENTATION BY NO MORE THAN TWO CALENDAR DAYS
TOTAL SCORE: 15
PAROXYSMAL SWEATS: BARELY PERCEPTIBLE SWEATING. PALMS MOIST
ANXIETY: MILDLY ANXIOUS
AGITATION: NORMAL ACTIVITY
HEADACHE, FULLNESS IN HEAD: NOT PRESENT
PAROXYSMAL SWEATS: *
TOTAL SCORE: 14
AUDITORY DISTURBANCES: NOT PRESENT
TREMOR: *
ANXIETY: MILDLY ANXIOUS
TREMOR: *
VISUAL DISTURBANCES: NOT PRESENT
VISUAL DISTURBANCES: NOT PRESENT
AUDITORY DISTURBANCES: NOT PRESENT
PAROXYSMAL SWEATS: BARELY PERCEPTIBLE SWEATING. PALMS MOIST
ORIENTATION AND CLOUDING OF SENSORIUM: DATE DISORIENTATION BY MORE THAN TWO CALENDAR DAYS
AGITATION: NORMAL ACTIVITY
HEADACHE, FULLNESS IN HEAD: VERY MILD
AGITATION: NORMAL ACTIVITY
AUDITORY DISTURBANCES: NOT PRESENT
AUDITORY DISTURBANCES: NOT PRESENT
NAUSEA AND VOMITING: MILD NAUSEA WITH NO VOMITING
TOTAL SCORE: 11
ANXIETY: MILDLY ANXIOUS
ANXIETY: MILDLY ANXIOUS
PAROXYSMAL SWEATS: NO SWEAT VISIBLE
AGITATION: NORMAL ACTIVITY

## 2024-08-13 ASSESSMENT — FIBROSIS 4 INDEX: FIB4 SCORE: 8.31

## 2024-08-13 ASSESSMENT — PAIN DESCRIPTION - PAIN TYPE
TYPE: ACUTE PAIN

## 2024-08-13 NOTE — H&P
Hospital Medicine History & Physical Note    Date of Service  8/12/2024    Primary Care Physician  Pcp Pt States None    Consultants  None    Code Status  DNAR/DNI    Chief Complaint  No chief complaint on file.  Coffee-ground emesis, black tarry stool    History of Presenting Illness  Chava Mercedes is a 74 y.o. male with history of alcohol abuse, alcoholic cirrhosis who presented 8/12/2024 as direct transfer for evaluation of GI bleed.  Patient reported having black tarry stool for the past 3 days, noted to have coffee-ground emesis earlier today.  Therefore he presented to [facility] where he was reported to have more witnessed coffee-ground emesis while in the emergency room.    Workup from [facility] included:  CBC: WBC 3.5, hemoglobin 11.2, platelet 83  PT 17.9  INR 1.6  APTT 42.7  CMP: Glucose 104, BUN 9, creatinine 0.7, sodium 138, potassium 4.3, chloride 101, bicarb 19, calcium 7.8, total protein 7.8, total bilirubin 2.0, AST 80, ALT 28, alkaline phosphatase 113    Patient was seen by me at [facility] arrival, admitted to medicine service for further evaluation and treatment.  No acute distress at time of evaluation, no further bleeding.    I discussed the plan of care with patient, bedside RN, and pharmacy.    Review of Systems  Review of Systems   Constitutional:  Negative for chills and fever.   HENT:  Negative for hearing loss and tinnitus.    Eyes:  Negative for blurred vision and double vision.   Respiratory:  Negative for cough and shortness of breath.    Cardiovascular:  Negative for chest pain and palpitations.   Gastrointestinal:  Positive for blood in stool, heartburn and nausea.        Coffee-ground emesis   Genitourinary:  Negative for dysuria and urgency.   Musculoskeletal:  Negative for joint pain and myalgias.   Skin:  Negative for itching and rash.   Neurological:  Positive for weakness. Negative for dizziness, focal weakness and headaches.   Endo/Heme/Allergies:  Negative for  environmental allergies. Bruises/bleeds easily.   Psychiatric/Behavioral:  Negative for depression and substance abuse.    All other systems reviewed and are negative.      Past Medical History   has no past medical history on file.    Surgical History   has a past surgical history that includes gastroscopy (N/A, 12/11/2016); gastroscopy-endo (12/11/2016); gastroscopy-endo (12/18/2016); gastroscopy (N/A, 12/2/2017); inguinal hernia repair (Right, 12/27/2023); umbilical hernia repair (N/A, 12/27/2023); and pr upper gi endoscopy,diagnosis (N/A, 3/5/2024).     Family History  family history is not on file.   Family history reviewed with patient. There is no family history that is pertinent to the chief complaint.     Social History   reports that he has never smoked. He has never used smokeless tobacco.    Allergies  No Known Allergies    Medications  Cannot display prior to admission medications because the patient has not been admitted in this contact.       Physical Exam  Temp:  [36.7 °C (98.1 °F)] 36.7 °C (98.1 °F)  Pulse:  [110] 110  Resp:  [18] 18  BP: (135)/(73) 135/73  SpO2:  [95 %] 95 %                          Physical Exam  Vitals and nursing note reviewed.   Constitutional:       General: He is not in acute distress.  HENT:      Head: Normocephalic and atraumatic.      Nose: Nose normal.      Mouth/Throat:      Mouth: Mucous membranes are dry.      Pharynx: Oropharynx is clear.   Eyes:      General: No scleral icterus.     Extraocular Movements: Extraocular movements intact.   Cardiovascular:      Rate and Rhythm: Normal rate and regular rhythm.      Pulses: Normal pulses.      Heart sounds:      No friction rub.   Pulmonary:      Effort: No respiratory distress.      Breath sounds: No wheezing or rales.   Chest:      Chest wall: No tenderness.   Abdominal:      General: There is distension.      Tenderness: There is no abdominal tenderness. There is no guarding or rebound.   Musculoskeletal:          "General: Normal range of motion.      Cervical back: Neck supple. No tenderness.      Right lower leg: No edema.      Left lower leg: No edema.   Skin:     General: Skin is warm and dry.      Capillary Refill: Capillary refill takes less than 2 seconds.   Neurological:      General: No focal deficit present.      Mental Status: He is alert and oriented to person, place, and time.   Psychiatric:         Mood and Affect: Mood normal.         Laboratory:          Recent Labs     08/12/24  2247   LIPASE 31     Recent Labs     08/12/24  2247   INR 2.09*     No results for input(s): \"NTPROBNP\" in the last 72 hours.      No results for input(s): \"TROPONINT\" in the last 72 hours.    Imaging:  OUTSIDE IMAGES-DX ABDOMEN   Final Result      US-RUQ    (Results Pending)       no X-Ray or EKG requiring interpretation    Assessment/Plan:  Justification for Admission Status  I anticipate this patient will require at least 2 midnights hospitalization, therefore appropriate for inpatient status.      * Acute GI bleeding- (present on admission)  Assessment & Plan  Reported 3 days of black tarry stool, followed by coffee-ground emesis 8/12  Alcohol cessation advised  Trend H&H, transfuse as needed  IV Protonix  Consult GI in a.m.    Acute blood loss anemia- (present on admission)  Assessment & Plan  Likely due to above  Anemia workup-replace iron/B12/folate as needed  Trend H&H  Transfuse for hemoglobin less than 7 or hemodynamic instability    Alcoholic cirrhosis (HCC)  Assessment & Plan  Per patient  Check RUQ US  Alcohol abstinence advised  Lasix/spironolactone/Xifaxan/lactulose as tolerated when hemodynamically stable-likely at discharge    Duodenal ulcer- (present on admission)  Assessment & Plan  PPI    Alcohol dependence with withdrawal delirium (HCC)- (present on admission)  Assessment & Plan  Reported drinking more than 6 packs of beer daily  Detox bag  Multivitamins  High-dose IV thiamine  Scheduled Valium  CIWA " protocol    Lactic acidosis- (present on admission)  Assessment & Plan  Likely due to ETOH  IVF  High dose IV thiamine  trend    ACP (advance care planning)  Assessment & Plan  Goal of care discussed with patient in T8.  He stated he does not wish to end up or machine/life support in a vegetative state.  He stated he does not wish to have aggressive/heroic life-sustaining measures-no CPR/defibrillation/intubation or mechanical ventilation.  He is however agreeable for treatment with IV Protonix, possible blood transfusion, gastroenterologist evaluation for possible endoscopic intervention as clinically warranted.  Diagnosis, prognosis, question and concern addressed.  DNR/DNI status confirmed.  ACP: 17 minutes        VTE prophylaxis: SCDs/TEDs

## 2024-08-13 NOTE — PROGRESS NOTES
Unable to complete med rec at this time. Pt is sleeping. Floor RN will attempt to review with pt later.

## 2024-08-13 NOTE — DIETARY
Nutrition services: Day 1 of admit.  Chava Mercedes is a 74 y.o. male admitted with hematemesis  History includes: portal hypertensive gastropathy, alcoholic  cirrhosis, duodenal ulcer, alcohol dependence  Patient with weight loss and decreased appetite noted on admit screen.  Per nursing, patient is actively withdrawing and is likely at his peak today. She recommended holding off on interview today.    Assessment:  Height: 182.9 cm (6')  Weight: 79.7 kg (175 lb 11.3 oz) Normal  Body mass index is 23.83 kg/m².   Diet/Intake: Full liquids    Evaluation:   Labs and medications reviewed and include IV LR and thiamine  He is at risk for refeeding syndrome  16# weight loss in approximately eight months per chart review.  This is 8% and notable but not significant  No bony prominences noted on observation. Patient was asleep, but this RD was able to see extremities, face and neck    Wt Readings from Last 15 Encounters:   08/13/24 79.7 kg (175 lb 11.3 oz)   03/06/24 79.7 kg (175 lb 11.3 oz)   12/27/23 87.1 kg (192 lb 0.3 oz)       Malnutrition Risk: Criteria not currently met    Recommendations/Inteventions/Plan:  Follow electrolytes and replace as appropriate.  Advance diet as possible with increasing alertness   Encourage intake of meals  Document intake of all PO as % taken in ADL's to provide interdisciplinary communication across all shifts.   Monitor weight.  Nutrition rep will continue to see patient for ongoing meal and snack preferences.     RD following.

## 2024-08-13 NOTE — PROGRESS NOTES
4 Eyes Skin Assessment Completed by ABRAHAM Brown and ABRAHAM Huber.    Head WDL  Ears WDL  Nose WDL  Mouth WDL  Neck WDL  Breast/Chest WDL  Shoulder Blades WDL  Spine WDL  (R) Arm/Elbow/Hand Redness and Bruising  (L) Arm/Elbow/Hand Redness and Bruising  Abdomen Scar  Groin WDL  Scrotum/Coccyx/Buttocks WDL  (R) Leg Swelling  (L) Leg Swelling  (R) Heel/Foot/Toe Redness and Blanching  (L) Heel/Foot/Toe Redness and Blanching          Devices In Places Tele Box, Blood Pressure Cuff, and Pulse Ox      Interventions In Place Pillows    Possible Skin Injury No    Pictures Uploaded Into Epic No, needs to be completed  Wound Consult Placed N/A  RN Wound Prevention Protocol Ordered No

## 2024-08-13 NOTE — ASSESSMENT & PLAN NOTE
Continue IV thiamine and folate    Counseled patient on cessation    Encourage mobilization PT eval

## 2024-08-13 NOTE — PROGRESS NOTES
Hospital Medicine Daily Progress Note    Date of Service  8/13/2024    Chief Complaint  Chava Mercedes is a 74 y.o. male admitted 8/12/2024 with GI bleed    Hospital Course  Chava Mercedes is a 74 y.o. male with history of alcohol abuse, alcoholic cirrhosis who presented 8/12/2024 as direct transfer for evaluation of GI bleed.  Patient reported having black tarry stool for the past 3 days, noted to have coffee-ground emesis earlier today.  Therefore he presented to [facility] where he was reported to have more witnessed coffee-ground emesis while in the emergency room.     Interval Problem Update    Patient is afebrile on room air  I reviewed hemoglobin 8.3-8 0.1-7.9  Lactic acid 10.4-6.2  I reviewed chemistry panel magnesium 1.6 repleteion  ordered  and ordered  repeat levels  Abdominal ultrasound with trace ascites cholelithiasis and echogenic liver  I consulted GI and discussed with Dr. Grossman  I ordered octreotide and ceftriaxone    Total time spent reviewing medical records lab results examining patient discussing with consultant nursing staff and case management and pharmacist 53 minutes    I have discussed this patient's plan of care and discharge plan at IDT rounds today with Case Management, Nursing, Nursing leadership, and other members of the IDT team.    Consultants/Specialty  GI    Code Status  DNAR/DNI    Disposition  The patient is not medically cleared for discharge to home or a post-acute facility.      I have placed the appropriate orders for post-discharge needs.    Review of Systems  Review of Systems   Unable to perform ROS: Medical condition        Physical Exam  Temp:  [36.7 °C (98.1 °F)-36.9 °C (98.4 °F)] 36.8 °C (98.2 °F)  Pulse:  [101-128] 101  Resp:  [16-20] 20  BP: ()/(47-85) 116/57  SpO2:  [94 %-97 %] 95 %    Physical Exam  Vitals and nursing note reviewed.   Constitutional:       Appearance: He is well-developed. He is diaphoretic.   HENT:      Head: Normocephalic.       Mouth/Throat:      Pharynx: No oropharyngeal exudate.   Eyes:      General:         Right eye: No discharge.         Left eye: No discharge.   Neck:      Vascular: No JVD.      Trachea: No tracheal deviation.   Cardiovascular:      Rate and Rhythm: Regular rhythm. Tachycardia present.      Heart sounds: No murmur heard.     No friction rub. No gallop.   Pulmonary:      Effort: No respiratory distress.      Breath sounds: Normal breath sounds. No stridor. No wheezing.   Chest:      Chest wall: No tenderness.   Abdominal:      General: There is distension.      Palpations: Abdomen is soft.      Tenderness: There is no abdominal tenderness. There is no rebound.   Musculoskeletal:         General: No tenderness.      Cervical back: Neck supple.   Skin:     General: Skin is warm.      Nails: There is no clubbing.   Neurological:      Mental Status: He is alert.      Cranial Nerves: No cranial nerve deficit.      Motor: No abnormal muscle tone.      Comments: Oriented x 2   Psychiatric:         Judgment: Judgment is impulsive.         Fluids    Intake/Output Summary (Last 24 hours) at 8/13/2024 1631  Last data filed at 8/13/2024 0001  Gross per 24 hour   Intake --   Output 675 ml   Net -675 ml       Laboratory  Recent Labs     08/13/24  0022 08/13/24  0430 08/13/24  0925 08/13/24  1216   WBC 9.0  --   --   --    RBC 2.60*  --   --   --    HEMOGLOBIN 8.5*  9.0* 8.3* 8.1* 7.9*   HEMATOCRIT 27.7*  28.3* 26.0* 24.7* 23.9*   .5*  --   --   --    MCH 32.7  --   --   --    MCHC 30.7*  --   --   --    RDW 55.8*  --   --   --    PLATELETCT 83*  --   --   --    MPV 11.2  --   --   --      Recent Labs     08/13/24  0022   SODIUM 139   POTASSIUM 3.9   CHLORIDE 102   CO2 11*   GLUCOSE 129*   BUN 11   CREATININE 0.67   CALCIUM 7.6*     Recent Labs     08/12/24  2247   INR 2.09*               Imaging  US-ABDOMEN LTD (SOFT TISSUE)   Final Result      1.  Trace ascites in the right upper quadrant/perihepatic area.   2.   Insufficient ascites for paracentesis at this time.      US-RUQ   Final Result         1.  Echogenic liver, compatible with fatty change versus fibrosis.   2.  Common bile duct dilatation, within physiologic limits for patient's stated age, consider causes of biliary obstruction with additional workup as clinically appropriate.   3.  Cholelithiasis without additional sonographic findings of acute cholecystitis.      OUTSIDE IMAGES-DX ABDOMEN   Final Result           Assessment/Plan  * Acute GI bleeding- (present on admission)  Assessment & Plan  With history of liver cirrhosis  Continue IV Protonix start octreotide and ceftriaxone  GI consult with plans for EGD once more clinically stable    Alcoholic cirrhosis (HCC)  Assessment & Plan  Reinforced alcohol cessation when mental status improved    Duodenal ulcer- (present on admission)  Assessment & Plan  PPI    Alcohol dependence with withdrawal delirium (HCC)- (present on admission)  Assessment & Plan  Continue IV thiamine and folate  Lorazepam per CIWA protocol  Tapering dose of Valium    Acute blood loss anemia- (present on admission)  Assessment & Plan  Monitor hemoglobin and transfuse if less than 7    Lactic acidosis- (present on admission)  Assessment & Plan  Likely due to ETOH and dehydration  IVF  High dose IV thiamine  trend         VTE prophylaxis:   SCDs/TEDs      I have performed a physical exam and reviewed and updated ROS and Plan today (8/13/2024). In review of yesterday's note (8/12/2024), there are no changes except as documented above.

## 2024-08-13 NOTE — ASSESSMENT & PLAN NOTE
Goal of care discussed with patient in T8.  He stated he does not wish to end up or machine/life support in a vegetative state.  He stated he does not wish to have aggressive/heroic life-sustaining measures-no CPR/defibrillation/intubation or mechanical ventilation.  He is however agreeable for treatment with IV Protonix, possible blood transfusion, gastroenterologist evaluation for possible endoscopic intervention as clinically warranted.  Diagnosis, prognosis, question and concern addressed.  DNR/DNI status confirmed.  ACP: 17 minutes

## 2024-08-13 NOTE — PROGRESS NOTES
Critical lactic of 13.5. MD Migdalia notified. Two 1 L boluses of IV LR ordered. Hourly rounding in place.

## 2024-08-13 NOTE — PROGRESS NOTES
[facility]  Consult requested by: Dr Singh  Reason for consult: Hematemesis  Is consult for transition of care: Yes  Pertinent history/hospital Course: Patient is a 74-year-old gentleman with longstanding history of alcohol abuse.  The patient was told to follow-up from his last visit of gastrointestinal bleeding, at which point in time that he was not evaluated by gastroenterologist, to follow-up with gastroenterology which he did not.  The patient this morning started to have vomiting and he apparently filled his entire sink at home with blood.  He then went into the emergency room at [facility], he was followed there, he was vomiting every 2 hours about 100 mL of fresh blood.  The patient's hemoglobin and hematocrit have been monitored and his most recent hemoglobin is 11.2 with a hematocrit of 32.9.  Platelets are only 83 INR is 1.9.  His white blood cells are down to 3.5.  Liver functions are stable except for AST is 80.  Vital signs currently are stable but he is slightly tachycardic.  Given his current condition the patient will need urgent endoscopy.  This is currently not available at [facility] and thus patient will be direct admitted to [facility] and gastroenterology will need to coordinate the intervention.  Code Status: Full code  Can the hospitalist sign off upon completion of required consult/outcomes requested: No   Assigned hospitalist: Please call [phone] for assignment of hospitalist after MAR is completed, signed and held orders are released, height and weight are charted.    For any question or concerns regarding the care of this patient, please reach out to the assigned hospitalist.

## 2024-08-13 NOTE — PROGRESS NOTES
Monitor Summary  Rhythm: -145, intermittent tachycardia up to 170's    Ectopy: Rare PVC    Intervals: .16/.11/.32

## 2024-08-13 NOTE — HOSPITAL COURSE
Chava Mercedes is a 74 y.o. male with history of alcohol abuse, alcoholic cirrhosis who presented 8/12/2024 as direct transfer for evaluation of GI bleed.  Patient reported having black tarry stool for the past 3 days, noted to have coffee-ground emesis earlier today.  Therefore he presented to [facility] where he was reported to have more witnessed coffee-ground emesis while in the emergency room.

## 2024-08-13 NOTE — CARE PLAN
The patient is Stable - Low risk of patient condition declining or worsening    Shift Goals  Clinical Goals: monitor h&h  Patient Goals: rest  Family Goals: truong    Progress made toward(s) clinical / shift goals:      Problem: Knowledge Deficit - Standard  Goal: Patient and family/care givers will demonstrate understanding of plan of care, disease process/condition, diagnostic tests and medications  Outcome: Progressing     Problem: Pain - Standard  Goal: Alleviation of pain or a reduction in pain to the patient’s comfort goal  Outcome: Progressing       Patient is not progressing towards the following goals:

## 2024-08-14 ENCOUNTER — APPOINTMENT (OUTPATIENT)
Dept: RADIOLOGY | Facility: MEDICAL CENTER | Age: 74
DRG: 377 | End: 2024-08-14
Attending: HOSPITALIST
Payer: MEDICARE

## 2024-08-14 PROBLEM — E87.8 ELECTROLYTE ABNORMALITY: Status: ACTIVE | Noted: 2024-08-14

## 2024-08-14 LAB
ALBUMIN SERPL BCP-MCNC: 2.3 G/DL (ref 3.2–4.9)
ALBUMIN/GLOB SERPL: 1 G/DL
ALP SERPL-CCNC: 75 U/L (ref 30–99)
ALT SERPL-CCNC: 14 U/L (ref 2–50)
ANION GAP SERPL CALC-SCNC: 9 MMOL/L (ref 7–16)
AST SERPL-CCNC: 39 U/L (ref 12–45)
BILIRUB SERPL-MCNC: 1.9 MG/DL (ref 0.1–1.5)
BUN SERPL-MCNC: 14 MG/DL (ref 8–22)
CALCIUM ALBUM COR SERPL-MCNC: 9.1 MG/DL (ref 8.5–10.5)
CALCIUM SERPL-MCNC: 7.7 MG/DL (ref 8.5–10.5)
CHLORIDE SERPL-SCNC: 106 MMOL/L (ref 96–112)
CO2 SERPL-SCNC: 23 MMOL/L (ref 20–33)
CREAT SERPL-MCNC: 0.59 MG/DL (ref 0.5–1.4)
ERYTHROCYTE [DISTWIDTH] IN BLOOD BY AUTOMATED COUNT: 53.3 FL (ref 35.9–50)
GFR SERPLBLD CREATININE-BSD FMLA CKD-EPI: 102 ML/MIN/1.73 M 2
GLOBULIN SER CALC-MCNC: 2.4 G/DL (ref 1.9–3.5)
GLUCOSE SERPL-MCNC: 113 MG/DL (ref 65–99)
HAPTOGLOB SERPL-MCNC: 13 MG/DL (ref 30–200)
HCT VFR BLD AUTO: 20.8 % (ref 42–52)
HCT VFR BLD AUTO: 25 % (ref 42–52)
HGB BLD-MCNC: 6.8 G/DL (ref 14–18)
HGB BLD-MCNC: 8.4 G/DL (ref 14–18)
MAGNESIUM SERPL-MCNC: 1.8 MG/DL (ref 1.5–2.5)
MCH RBC QN AUTO: 33.3 PG (ref 27–33)
MCHC RBC AUTO-ENTMCNC: 32.7 G/DL (ref 32.3–36.5)
MCV RBC AUTO: 102 FL (ref 81.4–97.8)
PHOSPHATE SERPL-MCNC: 1.5 MG/DL (ref 2.5–4.5)
PLATELET # BLD AUTO: 55 K/UL (ref 164–446)
PLATELETS.RETICULATED NFR BLD AUTO: 11.1 % (ref 0.6–13.1)
PMV BLD AUTO: 11.9 FL (ref 9–12.9)
POTASSIUM SERPL-SCNC: 3.5 MMOL/L (ref 3.6–5.5)
PROT SERPL-MCNC: 4.7 G/DL (ref 6–8.2)
RBC # BLD AUTO: 2.04 M/UL (ref 4.7–6.1)
SODIUM SERPL-SCNC: 138 MMOL/L (ref 135–145)
WBC # BLD AUTO: 3.8 K/UL (ref 4.8–10.8)

## 2024-08-14 PROCEDURE — 700105 HCHG RX REV CODE 258

## 2024-08-14 PROCEDURE — 80053 COMPREHEN METABOLIC PANEL: CPT

## 2024-08-14 PROCEDURE — 700111 HCHG RX REV CODE 636 W/ 250 OVERRIDE (IP): Performed by: STUDENT IN AN ORGANIZED HEALTH CARE EDUCATION/TRAINING PROGRAM

## 2024-08-14 PROCEDURE — 700102 HCHG RX REV CODE 250 W/ 637 OVERRIDE(OP): Performed by: HOSPITALIST

## 2024-08-14 PROCEDURE — 700102 HCHG RX REV CODE 250 W/ 637 OVERRIDE(OP): Performed by: STUDENT IN AN ORGANIZED HEALTH CARE EDUCATION/TRAINING PROGRAM

## 2024-08-14 PROCEDURE — 85027 COMPLETE CBC AUTOMATED: CPT

## 2024-08-14 PROCEDURE — 770020 HCHG ROOM/CARE - TELE (206)

## 2024-08-14 PROCEDURE — 36430 TRANSFUSION BLD/BLD COMPNT: CPT

## 2024-08-14 PROCEDURE — 700101 HCHG RX REV CODE 250: Performed by: HOSPITALIST

## 2024-08-14 PROCEDURE — 02HV33Z INSERTION OF INFUSION DEVICE INTO SUPERIOR VENA CAVA, PERCUTANEOUS APPROACH: ICD-10-PCS

## 2024-08-14 PROCEDURE — P9016 RBC LEUKOCYTES REDUCED: HCPCS

## 2024-08-14 PROCEDURE — 85014 HEMATOCRIT: CPT

## 2024-08-14 PROCEDURE — A9270 NON-COVERED ITEM OR SERVICE: HCPCS | Performed by: HOSPITALIST

## 2024-08-14 PROCEDURE — 83735 ASSAY OF MAGNESIUM: CPT

## 2024-08-14 PROCEDURE — 99233 SBSQ HOSP IP/OBS HIGH 50: CPT | Performed by: HOSPITALIST

## 2024-08-14 PROCEDURE — 99232 SBSQ HOSP IP/OBS MODERATE 35: CPT | Performed by: NURSE PRACTITIONER

## 2024-08-14 PROCEDURE — 700111 HCHG RX REV CODE 636 W/ 250 OVERRIDE (IP): Performed by: HOSPITALIST

## 2024-08-14 PROCEDURE — 36573 INSJ PICC RS&I 5 YR+: CPT

## 2024-08-14 PROCEDURE — 36415 COLL VENOUS BLD VENIPUNCTURE: CPT

## 2024-08-14 PROCEDURE — 85018 HEMOGLOBIN: CPT

## 2024-08-14 PROCEDURE — 86923 COMPATIBILITY TEST ELECTRIC: CPT

## 2024-08-14 PROCEDURE — 700105 HCHG RX REV CODE 258: Performed by: HOSPITALIST

## 2024-08-14 PROCEDURE — A9270 NON-COVERED ITEM OR SERVICE: HCPCS | Performed by: STUDENT IN AN ORGANIZED HEALTH CARE EDUCATION/TRAINING PROGRAM

## 2024-08-14 PROCEDURE — 700105 HCHG RX REV CODE 258: Performed by: STUDENT IN AN ORGANIZED HEALTH CARE EDUCATION/TRAINING PROGRAM

## 2024-08-14 PROCEDURE — 84100 ASSAY OF PHOSPHORUS: CPT

## 2024-08-14 PROCEDURE — 85055 RETICULATED PLATELET ASSAY: CPT

## 2024-08-14 RX ORDER — MAGNESIUM SULFATE HEPTAHYDRATE 40 MG/ML
2 INJECTION, SOLUTION INTRAVENOUS ONCE
Status: COMPLETED | OUTPATIENT
Start: 2024-08-14 | End: 2024-08-14

## 2024-08-14 RX ORDER — SODIUM CHLORIDE 9 MG/ML
INJECTION, SOLUTION INTRAVENOUS CONTINUOUS
Status: ACTIVE | OUTPATIENT
Start: 2024-08-14 | End: 2024-08-14

## 2024-08-14 RX ORDER — SODIUM CHLORIDE 9 MG/ML
INJECTION, SOLUTION INTRAVENOUS CONTINUOUS
Status: DISCONTINUED | OUTPATIENT
Start: 2024-08-14 | End: 2024-08-14

## 2024-08-14 RX ADMIN — LORAZEPAM 1 MG: 2 INJECTION INTRAMUSCULAR; INTRAVENOUS at 16:12

## 2024-08-14 RX ADMIN — SODIUM CHLORIDE: 9 INJECTION, SOLUTION INTRAVENOUS at 04:23

## 2024-08-14 RX ADMIN — CEFTRIAXONE SODIUM 2000 MG: 10 INJECTION, POWDER, FOR SOLUTION INTRAVENOUS at 05:04

## 2024-08-14 RX ADMIN — PHYTONADIONE 10 MG: 5 TABLET ORAL at 05:08

## 2024-08-14 RX ADMIN — LORAZEPAM 1 MG: 2 INJECTION INTRAMUSCULAR; INTRAVENOUS at 20:31

## 2024-08-14 RX ADMIN — PANTOPRAZOLE SODIUM 8 MG/HR: 40 INJECTION, POWDER, FOR SOLUTION INTRAVENOUS at 17:54

## 2024-08-14 RX ADMIN — LORAZEPAM 1 MG: 2 INJECTION INTRAMUSCULAR; INTRAVENOUS at 02:36

## 2024-08-14 RX ADMIN — DIAZEPAM 5 MG: 5 TABLET ORAL at 05:08

## 2024-08-14 RX ADMIN — FOLIC ACID 1 MG: 1 TABLET ORAL at 05:08

## 2024-08-14 RX ADMIN — CYANOCOBALAMIN TAB 500 MCG 1000 MCG: 500 TAB at 05:08

## 2024-08-14 RX ADMIN — THIAMINE HYDROCHLORIDE 500 MG: 100 INJECTION, SOLUTION INTRAMUSCULAR; INTRAVENOUS at 20:41

## 2024-08-14 RX ADMIN — MAGNESIUM SULFATE HEPTAHYDRATE 2 G: 2 INJECTION, SOLUTION INTRAVENOUS at 07:36

## 2024-08-14 RX ADMIN — LORAZEPAM 2 MG: 2 TABLET ORAL at 06:04

## 2024-08-14 RX ADMIN — THIAMINE HYDROCHLORIDE 500 MG: 100 INJECTION, SOLUTION INTRAMUSCULAR; INTRAVENOUS at 09:39

## 2024-08-14 RX ADMIN — DIAZEPAM 5 MG: 5 TABLET ORAL at 00:11

## 2024-08-14 RX ADMIN — DIAZEPAM 5 MG: 5 TABLET ORAL at 17:14

## 2024-08-14 RX ADMIN — FOLIC ACID 1 MG: 1 TABLET ORAL at 17:14

## 2024-08-14 RX ADMIN — PANTOPRAZOLE SODIUM 8 MG/HR: 40 INJECTION, POWDER, FOR SOLUTION INTRAVENOUS at 09:21

## 2024-08-14 RX ADMIN — SODIUM CHLORIDE, POTASSIUM CHLORIDE, SODIUM LACTATE AND CALCIUM CHLORIDE: 600; 310; 30; 20 INJECTION, SOLUTION INTRAVENOUS at 00:09

## 2024-08-14 RX ADMIN — LORAZEPAM 1 MG: 2 INJECTION INTRAMUSCULAR; INTRAVENOUS at 10:14

## 2024-08-14 RX ADMIN — PANTOPRAZOLE SODIUM 8 MG/HR: 40 INJECTION, POWDER, FOR SOLUTION INTRAVENOUS at 00:07

## 2024-08-14 RX ADMIN — THIAMINE HYDROCHLORIDE 500 MG: 100 INJECTION, SOLUTION INTRAMUSCULAR; INTRAVENOUS at 15:26

## 2024-08-14 RX ADMIN — THERA TABS 1 TABLET: TAB at 05:08

## 2024-08-14 RX ADMIN — LORAZEPAM 1 MG: 1 TABLET ORAL at 23:00

## 2024-08-14 RX ADMIN — LORAZEPAM 2 MG: 2 TABLET ORAL at 18:49

## 2024-08-14 RX ADMIN — OCTREOTIDE ACETATE 50 MCG/HR: 200 INJECTION, SOLUTION INTRAVENOUS; SUBCUTANEOUS at 13:34

## 2024-08-14 RX ADMIN — DIAZEPAM 5 MG: 5 TABLET ORAL at 12:09

## 2024-08-14 RX ADMIN — LORAZEPAM 2 MG: 2 TABLET ORAL at 08:06

## 2024-08-14 RX ADMIN — POTASSIUM PHOSPHATE, MONOBASIC AND POTASSIUM PHOSPHATE, DIBASIC 30 MMOL: 224; 236 INJECTION, SOLUTION, CONCENTRATE INTRAVENOUS at 10:16

## 2024-08-14 RX ADMIN — LORAZEPAM 2 MG: 2 TABLET ORAL at 14:38

## 2024-08-14 ASSESSMENT — ENCOUNTER SYMPTOMS
DEPRESSION: 0
DIZZINESS: 0
WEAKNESS: 1
CONSTIPATION: 0
ABDOMINAL PAIN: 1
BACK PAIN: 0
FEVER: 0
COUGH: 0
FOCAL WEAKNESS: 1
MEMORY LOSS: 1
DIARRHEA: 0
BLOOD IN STOOL: 1
VOMITING: 0
HALLUCINATIONS: 1
BLURRED VISION: 0
TREMORS: 1
SHORTNESS OF BREATH: 0
CHILLS: 0
HEARTBURN: 0
NAUSEA: 0

## 2024-08-14 ASSESSMENT — LIFESTYLE VARIABLES
ORIENTATION AND CLOUDING OF SENSORIUM: DATE DISORIENTATION BY NO MORE THAN TWO CALENDAR DAYS
ORIENTATION AND CLOUDING OF SENSORIUM: DATE DISORIENTATION BY MORE THAN TWO CALENDAR DAYS
VISUAL DISTURBANCES: NOT PRESENT
ORIENTATION AND CLOUDING OF SENSORIUM: DATE DISORIENTATION BY MORE THAN TWO CALENDAR DAYS
VISUAL DISTURBANCES: NOT PRESENT
ANXIETY: *
AUDITORY DISTURBANCES: NOT PRESENT
NAUSEA AND VOMITING: *
ANXIETY: MILDLY ANXIOUS
TREMOR: MODERATE TREMOR WITH ARMS EXTENDED
HEADACHE, FULLNESS IN HEAD: MILD
TREMOR: MODERATE TREMOR WITH ARMS EXTENDED
ANXIETY: MILDLY ANXIOUS
ORIENTATION AND CLOUDING OF SENSORIUM: DATE DISORIENTATION BY MORE THAN TWO CALENDAR DAYS
PAROXYSMAL SWEATS: NO SWEAT VISIBLE
HEADACHE, FULLNESS IN HEAD: MILD
NAUSEA AND VOMITING: *
ORIENTATION AND CLOUDING OF SENSORIUM: DATE DISORIENTATION BY MORE THAN TWO CALENDAR DAYS
VISUAL DISTURBANCES: VERY MILD SENSITIVITY
ANXIETY: *
PAROXYSMAL SWEATS: NO SWEAT VISIBLE
HEADACHE, FULLNESS IN HEAD: MODERATE
TREMOR: *
AUDITORY DISTURBANCES: NOT PRESENT
NAUSEA AND VOMITING: *
AGITATION: NORMAL ACTIVITY
TOTAL SCORE: 13
AGITATION: NORMAL ACTIVITY
PAROXYSMAL SWEATS: NO SWEAT VISIBLE
AGITATION: NORMAL ACTIVITY
NAUSEA AND VOMITING: NO NAUSEA AND NO VOMITING
ANXIETY: MILDLY ANXIOUS
PAROXYSMAL SWEATS: *
HEADACHE, FULLNESS IN HEAD: MODERATE
ANXIETY: MILDLY ANXIOUS
VISUAL DISTURBANCES: VERY MILD SENSITIVITY
ORIENTATION AND CLOUDING OF SENSORIUM: DATE DISORIENTATION BY MORE THAN TWO CALENDAR DAYS
AUDITORY DISTURBANCES: NOT PRESENT
AGITATION: NORMAL ACTIVITY
VISUAL DISTURBANCES: NOT PRESENT
TREMOR: MODERATE TREMOR WITH ARMS EXTENDED
AUDITORY DISTURBANCES: VERY MILD HARSHNESS OR ABILITY TO FRIGHTEN
VISUAL DISTURBANCES: NOT PRESENT
AGITATION: NORMAL ACTIVITY
TREMOR: MODERATE TREMOR WITH ARMS EXTENDED
PAROXYSMAL SWEATS: BARELY PERCEPTIBLE SWEATING. PALMS MOIST
VISUAL DISTURBANCES: NOT PRESENT
ANXIETY: *
HEADACHE, FULLNESS IN HEAD: MILD
TOTAL SCORE: 12
NAUSEA AND VOMITING: *
TOTAL SCORE: 13
AUDITORY DISTURBANCES: NOT PRESENT
PAROXYSMAL SWEATS: NO SWEAT VISIBLE
TOTAL SCORE: 12
NAUSEA AND VOMITING: *
TOTAL SCORE: 13
NAUSEA AND VOMITING: *
PAROXYSMAL SWEATS: BARELY PERCEPTIBLE SWEATING. PALMS MOIST
HEADACHE, FULLNESS IN HEAD: MODERATE
VISUAL DISTURBANCES: NOT PRESENT
HEADACHE, FULLNESS IN HEAD: MODERATE
TREMOR: MODERATE TREMOR WITH ARMS EXTENDED
AGITATION: NORMAL ACTIVITY
TOTAL SCORE: 14
AUDITORY DISTURBANCES: NOT PRESENT
NAUSEA AND VOMITING: *
VISUAL DISTURBANCES: VERY MILD SENSITIVITY
SUBSTANCE_ABUSE: 1
ANXIETY: NO ANXIETY (AT EASE)
ORIENTATION AND CLOUDING OF SENSORIUM: DATE DISORIENTATION BY NO MORE THAN TWO CALENDAR DAYS
HEADACHE, FULLNESS IN HEAD: MODERATE
AUDITORY DISTURBANCES: NOT PRESENT
AUDITORY DISTURBANCES: NOT PRESENT
ORIENTATION AND CLOUDING OF SENSORIUM: DATE DISORIENTATION BY MORE THAN TWO CALENDAR DAYS
TREMOR: MODERATE TREMOR WITH ARMS EXTENDED
AGITATION: NORMAL ACTIVITY
HEADACHE, FULLNESS IN HEAD: MODERATE
VISUAL DISTURBANCES: NOT PRESENT
ANXIETY: MILDLY ANXIOUS
TOTAL SCORE: 14
TOTAL SCORE: 13
PAROXYSMAL SWEATS: BARELY PERCEPTIBLE SWEATING. PALMS MOIST
TREMOR: *
VISUAL DISTURBANCES: VERY MILD SENSITIVITY
ANXIETY: MILDLY ANXIOUS
AUDITORY DISTURBANCES: NOT PRESENT
TOTAL SCORE: 13
TOTAL SCORE: 10
ORIENTATION AND CLOUDING OF SENSORIUM: DATE DISORIENTATION BY MORE THAN TWO CALENDAR DAYS
AGITATION: NORMAL ACTIVITY
AUDITORY DISTURBANCES: NOT PRESENT
HEADACHE, FULLNESS IN HEAD: VERY MILD
NAUSEA AND VOMITING: MILD NAUSEA WITH NO VOMITING
TOTAL SCORE: 14
AGITATION: NORMAL ACTIVITY
PAROXYSMAL SWEATS: NO SWEAT VISIBLE
AGITATION: NORMAL ACTIVITY
ANXIETY: MILDLY ANXIOUS
TREMOR: MODERATE TREMOR WITH ARMS EXTENDED
NAUSEA AND VOMITING: *
AUDITORY DISTURBANCES: NOT PRESENT
TREMOR: *
ORIENTATION AND CLOUDING OF SENSORIUM: DATE DISORIENTATION BY MORE THAN TWO CALENDAR DAYS
PAROXYSMAL SWEATS: BARELY PERCEPTIBLE SWEATING. PALMS MOIST
NAUSEA AND VOMITING: MILD NAUSEA WITH NO VOMITING
ORIENTATION AND CLOUDING OF SENSORIUM: DATE DISORIENTATION BY NO MORE THAN TWO CALENDAR DAYS
TREMOR: MODERATE TREMOR WITH ARMS EXTENDED
HEADACHE, FULLNESS IN HEAD: MILD
PAROXYSMAL SWEATS: BARELY PERCEPTIBLE SWEATING. PALMS MOIST
AGITATION: NORMAL ACTIVITY

## 2024-08-14 ASSESSMENT — PAIN DESCRIPTION - PAIN TYPE
TYPE: ACUTE PAIN

## 2024-08-14 ASSESSMENT — FIBROSIS 4 INDEX: FIB4 SCORE: 13.83

## 2024-08-14 NOTE — CARE PLAN
The patient is Stable - Low risk of patient condition declining or worsening    Shift Goals  Clinical Goals: monitor h/h, CIWA  Patient Goals: truong  Family Goals: truong    Progress made toward(s) clinical / shift goals:    Problem: Knowledge Deficit - Standard  Goal: Patient and family/care givers will demonstrate understanding of plan of care, disease process/condition, diagnostic tests and medications  Outcome: Progressing  Note: POC dicussed with patient. Patient A  n O x 2-3. CIWA protocol in place.      Problem: Optimal Care for Alcohol Withdrawal  Goal: Optimal Care for the alcohol withdrawal patient  Outcome: Progressing  Note: CIWA in place. Seizure precautions in place.      Problem: Fall Risk  Goal: Patient will remain free from falls  Outcome: Progressing  Note: Fall precautions in place. Bed alarm on.        Patient is not progressing towards the following goals:

## 2024-08-14 NOTE — CARE PLAN
The patient is Watcher - Medium risk of patient condition declining or worsening    Shift Goals  Clinical Goals: monitor h/h, CIWA, safety  Patient Goals: truong  Family Goals: truong    Progress made toward(s) clinical / shift goals:      Problem: Hemodynamics  Goal: Patient's hemodynamics, fluid balance and neurologic status will be stable or improve  Outcome: Progressing  Note: Pts VSS, neuro has remained at baseline. Managing IV fluids.     Problem: Optimal Care for Alcohol Withdrawal  Goal: Optimal Care for the alcohol withdrawal patient  Outcome: Progressing  Note: Patient has been on Q2H CIWA, pt on thiamine replacement and fluids.       Patient is not progressing towards the following goals:

## 2024-08-14 NOTE — PROGRESS NOTES
Monitor Summary:   Rhythm: st/sr  Rate:   Measurement: .19/.08/.32  Ectopy: r pvc

## 2024-08-14 NOTE — PROGRESS NOTES
[facility] Medicine Daily Progress Note    Date of Service  8/14/2024    Chief Complaint  Chava Mercedes is a 74 y.o. male admitted 8/12/2024 with GI bleed    Hospital Course  Chava Mercedes is a 74 y.o. male with history of alcohol abuse, alcoholic cirrhosis who presented 8/12/2024 as direct transfer for evaluation of GI bleed.  Patient reported having black tarry stool for the past 3 days, noted to have coffee-ground emesis earlier today.  Therefore he presented to [facility] where he was reported to have more witnessed coffee-ground emesis while in the emergency room.     Interval Problem Update    Patient is afebrile on room air  He is oriented to self and place and year disoriented to situation  Still in alcohol withdrawal receiving benzodiazepines  Hemoglobin 6.8 transfuse 1 unit packed RBC with repeat hemoglobin of 8.4  I reviewed chemistry panel potassium 3.5 phosphorus 1.5 I ordered IV K-Phos magnesium 1.8 I ordered magnesium sulfate  I discussed with GI plan is to reevaluate for EGD in a.m.  Patient remains on IV Protonix octreotide drip and ceftriaxone  Patient with poor IV access PICC line ordered    Total time spent today reviewing lab results examining patient and discussing with consultant nursing staff 54 minutes    I have discussed this patient's plan of care and discharge plan at IDT rounds today with Case Management, Nursing, Nursing leadership, and other members of the IDT team.    Consultants/Specialty  GI    Code Status  DNAR/DNI    Disposition  The patient is not medically cleared for discharge to home or a post-acute facility.      I have placed the appropriate orders for post-discharge needs.    Review of Systems  Review of Systems   Unable to perform ROS: Medical condition        Physical Exam  Temp:  [36.4 °C (97.5 °F)-36.9 °C (98.4 °F)] 36.6 °C (97.9 °F)  Pulse:  [] 87  Resp:  [14-20] 18  BP: (108-125)/(57-73) 125/72  SpO2:  [93 %-100 %] 93 %    Physical Exam  HENT:      Head:  Normocephalic.   Eyes:      General:         Right eye: No discharge.         Left eye: No discharge.   Cardiovascular:      Rate and Rhythm: Normal rate.      Heart sounds: No murmur heard.     No friction rub. No gallop.   Abdominal:      General: There is distension.      Palpations: Abdomen is soft.      Tenderness: There is no abdominal tenderness.   Musculoskeletal:         General: No swelling.      Cervical back: Neck supple.   Skin:     General: Skin is warm and dry.   Neurological:      General: No focal deficit present.   Psychiatric:         Cognition and Memory: He exhibits impaired recent memory.         Fluids    Intake/Output Summary (Last 24 hours) at 8/14/2024 1516  Last data filed at 8/14/2024 1346  Gross per 24 hour   Intake 596.67 ml   Output 800 ml   Net -203.33 ml       Laboratory  Recent Labs     08/13/24  0022 08/13/24  0430 08/13/24  1759 08/14/24  0023 08/14/24  0627   WBC 9.0  --   --  3.8*  --    RBC 2.60*  --   --  2.04*  --    HEMOGLOBIN 8.5*  9.0*   < > 7.7* 6.8* 8.4*   HEMATOCRIT 27.7*  28.3*   < > 24.0* 20.8* 25.0*   .5*  --   --  102.0*  --    MCH 32.7  --   --  33.3*  --    MCHC 30.7*  --   --  32.7  --    RDW 55.8*  --   --  53.3*  --    PLATELETCT 83*  --   --  55*  --    MPV 11.2  --   --  11.9  --     < > = values in this interval not displayed.     Recent Labs     08/13/24  0022 08/14/24  0023   SODIUM 139 138   POTASSIUM 3.9 3.5*   CHLORIDE 102 106   CO2 11* 23   GLUCOSE 129* 113*   BUN 11 14   CREATININE 0.67 0.59   CALCIUM 7.6* 7.7*     Recent Labs     08/12/24  2247   INR 2.09*               Imaging  US-ABDOMEN LTD (SOFT TISSUE)   Final Result      1.  Trace ascites in the right upper quadrant/perihepatic area.   2.  Insufficient ascites for paracentesis at this time.      US-RUQ   Final Result         1.  Echogenic liver, compatible with fatty change versus fibrosis.   2.  Common bile duct dilatation, within physiologic limits for patient's stated age, consider  causes of biliary obstruction with additional workup as clinically appropriate.   3.  Cholelithiasis without additional sonographic findings of acute cholecystitis.      OUTSIDE IMAGES-DX ABDOMEN   Final Result      IR-PICC LINE PLACEMENT W/ GUIDANCE > AGE 5    (Results Pending)        Assessment/Plan  * Acute GI bleeding- (present on admission)  Assessment & Plan  With history of liver cirrhosis  Continue IV Protonix  octreotide and ceftriaxone  Discussed with GI reevaluate for EGD in a.m.  No significant ascites on ultrasound    Electrolyte abnormality  Assessment & Plan  Hypokalemia  Hypophosphatemia  Hypomagnesemia    I ordered IV repletion  I ordered repeat chemistry panel    Alcoholic cirrhosis (HCC)  Assessment & Plan  Reinforce alcohol cessation when mental status improved    Duodenal ulcer- (present on admission)  Assessment & Plan  PPI    Alcohol dependence with withdrawal delirium (HCC)- (present on admission)  Assessment & Plan  Continue IV thiamine and folate  Lorazepam per CIWA protocol  Taper off scheduled Valium    Acute blood loss anemia- (present on admission)  Assessment & Plan  Monitor hemoglobin and transfuse if less than 7    Lactic acidosis- (present on admission)  Assessment & Plan  Likely due to ETOH and dehydration  Continue IV hydration and IV thiamine         VTE prophylaxis:   SCDs/TEDs      I have performed a physical exam and reviewed and updated ROS and Plan today (8/14/2024). In review of yesterday's note (8/13/2024), there are no changes except as documented above.

## 2024-08-14 NOTE — PROGRESS NOTES
Bedside report received from off going RN/tech: Maddy, assumed care of patient.   CIWA in place. All fall precautions in place. Patient repots no pain at this time. Patient A n O x3. Disoriented to time. VSS. Patient on  sating at 94% on room air  Fall Risk Score: HIGH RISK  Fall risk interventions in place: Place yellow fall risk ID band on patient, Provide patient/family education based on risk assessment, Educate patient/family to call staff for assistance when getting out of bed, Place fall precaution signage outside patient door, Place patient in room close to nursing station, and Utilize bed/chair fall alarm  Bed type: Regular (Mg Score less than 17 interventions in place)  Patient on cardiac monitor: Yes  IVF/IV medications: Infusion per MAR (List Med(s)) NS running at 100/hr, Protonix running at 10/hr and octreotide running at 10ml/hr  Oxygen: Room Air  Bedside sitter: Not Applicable   Isolation: Not applicable

## 2024-08-14 NOTE — PROGRESS NOTES
Bedside report received from off going RN/tech: ana m Adams care of patient.     Fall Risk Score: HIGH RISK  Fall risk interventions in place: Place yellow fall risk ID band on patient, Provide patient/family education based on risk assessment, Educate patient/family to call staff for assistance when getting out of bed, Place fall precaution signage outside patient door, Place patient in room close to nursing station, Utilize bed/chair fall alarm, and Bed alarm connected correctly  Bed type: Regular (Mg Score less than 17 interventions in place)  Patient on cardiac monitor: Yes  IVF/IV medications: Infusion per MAR (List Med(s)) NS, Mg, Octreotide, Protonix  Oxygen: Room Air  Bedside sitter: Not Applicable   Isolation: Not applicable

## 2024-08-14 NOTE — PROGRESS NOTES
..Gastroenterology Progress Note               Author:  Shayy Constantino, DNP,  APRN Date & Time Created: 8/14/2024 9:10 AM       Patient ID:  Name:             Chava Mercedes  YOB: 1950  Age:                 74 y.o.  male  MRN:               [id number]    Medical Decision Making, by Problem:  Active Hospital Problems    Diagnosis     Hematemesis with nausea [K92.0]     Alcoholic cirrhosis (HCC) [K70.30]     ACP (advance care planning) [Z71.89]     Duodenal ulcer [K26.9]     Alcohol dependence with withdrawal delirium (HCC) [F10.231]     Acute blood loss anemia [D62]     Acute GI bleeding [K92.2]     Lactic acidosis [E87.20]      Presenting Chief Complaint:  GI bleed     History of Present Illness:    This is a 74 y.o. male seen by our group in March for coffee ground emesis and EGD showed mild gastritis and mild portal hypertensive gastropathy.  NO esophageal or gastric varices noted.  Seen at OSH yesterday with hematemesis and melena x 3 days.  In [facility] ER he had coffee ground emesis and  Hgb 11.2, plt 83  and transferred to [facility].  Patient tremulous and confused and unable to provide history.  Drinks 3-4 beers per day (unknown size or if additional alcohol) and states last drink 2 days ago.     Currently Hgb 8.1, .5, plt 83, INR 2.09  BUN 11, Cr 0.67, AST 54, ALT 19, , bili 1.9, alb 2.7     RUQ US:  liver normal contour, +gallstones, CBD 6.6mm, MPV 1.22cm, no ascites     CT abd 12/2023:  umbilical hernia , cirrhosis with ascites and splenomegaly, cholelithiasis    Interval History:  8/14/2024: Patient seen. Having melena  Hgb 6.8 overnight, s/p one unit PRBC now 8.4, plt 55, K and phos being replaced        Hospital Medications:  Current Facility-Administered Medications   Medication Dose Frequency Provider Last Rate Last Admin    NS infusion   Continuous MARCIA VoraAZEUS 100 mL/hr at 08/14/24 0423 New Bag at 08/14/24 0423    potassium phosphate IVPB 30 mmol in  500 mL D5W (premix)  30 mmol Once Sesar Elizabeth M.D.        magnesium sulfate IVPB premix 2 g  2 g Once Sesar Elizabeth M.D. 25 mL/hr at 08/14/24 0736 2 g at 08/14/24 0736    cyanocobalamin (Vitamin B-12) tablet 1,000 mcg  1,000 mcg DAILY Chaparro Negron M.D.   1,000 mcg at 08/14/24 0508    folic acid (Folvite) tablet 1 mg  1 mg BID Chaparro Negron M.D.   1 mg at 08/14/24 0508    phytonadione (Mephyton) tablet 10 mg  10 mg DAILY Chaparro Negron M.D.   10 mg at 08/14/24 0508    acetaminophen (Tylenol) tablet 650 mg  650 mg Q6HRS PRN CHARLES LaceyPSantosRSantosNSantos        cefTRIAXone (Rocephin) syringe 2,000 mg  2,000 mg Q24HRS Sesar Elizabeth M.D.   2,000 mg at 08/14/24 0504    octreotide (SandoSTATIN) 1,250 mcg in  mL Infusion  50 mcg/hr Continuous Sesar Elizabeth M.D. 10 mL/hr at 08/13/24 1152 50 mcg/hr at 08/13/24 1152    labetalol (Normodyne/Trandate) injection 10 mg  10 mg Q4HRS PRN Chaparro Negron M.D.        ondansetron (Zofran) syringe/vial injection 4 mg  4 mg Q4HRS PRN Chaparro Negron M.D.   4 mg at 08/12/24 2212    ondansetron (Zofran ODT) dispertab 4 mg  4 mg Q4HRS PRN Chaparro Negron M.D.        pantoprazole (Protonix) 80 mg in  mL continuous infusion  8 mg/hr Continuous Chaparro Negron M.D. 10 mL/hr at 08/14/24 0007 8 mg/hr at 08/14/24 0007    LORazepam (Ativan) tablet 0.5 mg  0.5 mg Q4HRS PRN Chaparro Chaung, M.D.        LORazepam (Ativan) tablet 1 mg  1 mg Q4HRS PRYOU Negron M.D.   1 mg at 08/13/24 1149    Or    LORazepam (Ativan) injection 0.5 mg  0.5 mg Q4HRS PRYOU Negron M.D.   0.5 mg at 08/13/24 0321    LORazepam (Ativan) tablet 2 mg  2 mg Q2HRS PRYOU Negron M.D.   2 mg at 08/14/24 0806    Or    LORazepam (Ativan) injection 1 mg  1 mg Q2HRS PRYOU Negron M.D.   1 mg at 08/14/24 0236    LORazepam (Ativan) tablet 3 mg  3 mg Q HOUR PRYOU Negron M.D.        Or    LORazepam (Ativan) injection 1.5 mg  1.5 mg Q HOUR PRYOU Negron M.D.   1.5 mg at 08/13/24 1420     LORazepam (Ativan) tablet 4 mg  4 mg Q15 MIN PRN Chaparro Negron M.D.        Or    LORazepam (Ativan) injection 2 mg  2 mg Q15 MIN PRN Chaparro Negron M.D.        diazePAM (Valium) tablet 5 mg  5 mg Q6HR Chaparro Negron M.D.   5 mg at 08/14/24 0508    multivitamin tablet 1 Tablet  1 Tablet DAILY Chaparro Negron M.D.   1 Tablet at 08/14/24 0508    thiamine (B-1) 500 mg in dextrose 5% 100 mL IVPB  500 mg TID Chaparro Negron M.D. 200 mL/hr at 08/13/24 2152 500 mg at 08/13/24 2152   Last reviewed on 8/13/2024 12:12 PM by Carolann Rouse [facility]       Review of Systems:  Review of Systems   Constitutional:  Positive for malaise/fatigue. Negative for chills and fever.   HENT:  Negative for hearing loss.    Eyes:  Negative for blurred vision.   Respiratory:  Negative for cough and shortness of breath.    Cardiovascular:  Positive for leg swelling. Negative for chest pain.   Gastrointestinal:  Positive for abdominal pain, blood in stool and melena. Negative for constipation, diarrhea, heartburn, nausea and vomiting.   Genitourinary:  Negative for dysuria.   Musculoskeletal:  Negative for back pain.   Skin:  Negative for rash.   Neurological:  Positive for tremors, focal weakness and weakness. Negative for dizziness.   Psychiatric/Behavioral:  Positive for hallucinations, memory loss and substance abuse. Negative for depression.    All other systems reviewed and are negative.        Vital signs:  Weight/BMI: Body mass index is 26.88 kg/m².  /62   Pulse 89   Temp 36.4 °C (97.5 °F) (Temporal)   Resp 16   Ht 1.829 m (6')   Wt 89.9 kg (198 lb 3.1 oz)   SpO2 95%   Vitals:    08/14/24 0444 08/14/24 0500 08/14/24 0600 08/14/24 0805   BP: 121/71  114/67 125/62   Pulse: 86  83 89   Resp: 16 14 16   Temp: 36.7 °C (98.1 °F)  36.9 °C (98.4 °F) 36.4 °C (97.5 °F)   TempSrc: Temporal  Temporal Temporal   SpO2: 100%  96% 95%   Weight:  89.9 kg (198 lb 3.1 oz)     Height:         Oxygen Therapy:  Pulse Oximetry: 95 %, O2 (LPM): 0, O2  Delivery Device: None - Room Air    Intake/Output Summary (Last 24 hours) at 8/14/2024 0910  Last data filed at 8/14/2024 0600  Gross per 24 hour   Intake 546.67 ml   Output 400 ml   Net 146.67 ml         Physical Exam:  Physical Exam  Vitals and nursing note reviewed.   Constitutional:       General: He is not in acute distress.     Appearance: He is ill-appearing.   HENT:      Head: Normocephalic and atraumatic.      Right Ear: External ear normal.      Left Ear: External ear normal.      Nose: Nose normal.      Mouth/Throat:      Mouth: Mucous membranes are moist.      Pharynx: Oropharynx is clear.   Eyes:      General: No scleral icterus.  Cardiovascular:      Rate and Rhythm: Regular rhythm. Tachycardia present.      Pulses: Normal pulses.      Heart sounds: Normal heart sounds.   Pulmonary:      Effort: Pulmonary effort is normal.      Breath sounds: Normal breath sounds.   Abdominal:      General: Abdomen is flat. Bowel sounds are normal. There is distension.      Palpations: Abdomen is soft.      Tenderness: There is abdominal tenderness.   Musculoskeletal:         General: Normal range of motion.      Cervical back: Normal range of motion and neck supple.      Right lower leg: Edema present.      Left lower leg: Edema present.   Skin:     General: Skin is warm.      Capillary Refill: Capillary refill takes less than 2 seconds.      Coloration: Skin is pale.   Neurological:      Motor: Weakness present.   Psychiatric:      Comments: Tremulous, lethargic, disoriented  On Ativan for withdrawals         Labs:  Recent Labs     08/12/24 2247 08/13/24 0022 08/14/24  0023   SODIUM  --  139 138   POTASSIUM  --  3.9 3.5*   CHLORIDE  --  102 106   CO2  --  11* 23   BUN  --  11 14   CREATININE  --  0.67 0.59   MAGNESIUM 1.6 1.6 1.8   PHOSPHORUS 3.7 3.3 1.5*   CALCIUM  --  7.6* 7.7*     Recent Labs     08/12/24 2247 08/13/24 0022 08/14/24  0023   ALTSGPT  --  19 14   ASTSGOT  --  54* 39   ALKPHOSPHAT  --  104* 75    TBILIRUBIN  --  1.9* 1.9*   LIPASE 33  31  --   --    GLUCOSE  --  129* 113*     Recent Labs     08/13/24  0022 08/14/24  0023   WBC 9.0 3.8*   NEUTSPOLYS 76.80*  --    LYMPHOCYTES 3.80*  --    MONOCYTES 18.30*  --    EOSINOPHILS 0.00  --    BASOPHILS 0.30  --    ASTSGOT 54* 39   ALTSGPT 19 14   ALKPHOSPHAT 104* 75   TBILIRUBIN 1.9* 1.9*     Recent Labs     08/12/24  2247 08/13/24  0022 08/13/24  0430 08/13/24  1759 08/14/24  0023 08/14/24  0627   RBC  --  2.60*  --   --  2.04*  --    HEMOGLOBIN  --  8.5*  9.0*   < > 7.7* 6.8* 8.4*   HEMATOCRIT  --  27.7*  28.3*   < > 24.0* 20.8* 25.0*   PLATELETCT  --  83*  --   --  55*  --    PROTHROMBTM 23.7*  --   --   --   --   --    INR 2.09*  --   --   --   --   --    IRON 104  --   --   --   --   --    FERRITIN 71.2  --   --   --   --   --    TOTIRONBC 231*  --   --   --   --   --     < > = values in this interval not displayed.     Recent Results (from the past 24 hour(s))   HEMOGLOBIN AND HEMATOCRIT    Collection Time: 08/13/24  9:25 AM   Result Value Ref Range    Hemoglobin 8.1 (L) 14.0 - 18.0 g/dL    Hematocrit 24.7 (L) 42.0 - 52.0 %   LACTIC ACID    Collection Time: 08/13/24  9:25 AM   Result Value Ref Range    Lactic Acid 6.2 (HH) 0.5 - 2.0 mmol/L   HEMOGLOBIN AND HEMATOCRIT    Collection Time: 08/13/24 12:16 PM   Result Value Ref Range    Hemoglobin 7.9 (L) 14.0 - 18.0 g/dL    Hematocrit 23.9 (L) 42.0 - 52.0 %   HEMOGLOBIN AND HEMATOCRIT    Collection Time: 08/13/24  5:59 PM   Result Value Ref Range    Hemoglobin 7.7 (L) 14.0 - 18.0 g/dL    Hematocrit 24.0 (L) 42.0 - 52.0 %   CBC WITHOUT DIFFERENTIAL    Collection Time: 08/14/24 12:23 AM   Result Value Ref Range    WBC 3.8 (L) 4.8 - 10.8 K/uL    RBC 2.04 (L) 4.70 - 6.10 M/uL    Hemoglobin 6.8 (L) 14.0 - 18.0 g/dL    Hematocrit 20.8 (L) 42.0 - 52.0 %    .0 (H) 81.4 - 97.8 fL    MCH 33.3 (H) 27.0 - 33.0 pg    MCHC 32.7 32.3 - 36.5 g/dL    RDW 53.3 (H) 35.9 - 50.0 fL    Platelet Count 55 (L) 164 - 446 K/uL     MPV 11.9 9.0 - 12.9 fL   MAGNESIUM    Collection Time: 08/14/24 12:23 AM   Result Value Ref Range    Magnesium 1.8 1.5 - 2.5 mg/dL   PHOSPHORUS    Collection Time: 08/14/24 12:23 AM   Result Value Ref Range    Phosphorus 1.5 (L) 2.5 - 4.5 mg/dL   Comp Metabolic Panel    Collection Time: 08/14/24 12:23 AM   Result Value Ref Range    Sodium 138 135 - 145 mmol/L    Potassium 3.5 (L) 3.6 - 5.5 mmol/L    Chloride 106 96 - 112 mmol/L    Co2 23 20 - 33 mmol/L    Anion Gap 9.0 7.0 - 16.0    Glucose 113 (H) 65 - 99 mg/dL    Bun 14 8 - 22 mg/dL    Creatinine 0.59 0.50 - 1.40 mg/dL    Calcium 7.7 (L) 8.5 - 10.5 mg/dL    Correct Calcium 9.1 8.5 - 10.5 mg/dL    AST(SGOT) 39 12 - 45 U/L    ALT(SGPT) 14 2 - 50 U/L    Alkaline Phosphatase 75 30 - 99 U/L    Total Bilirubin 1.9 (H) 0.1 - 1.5 mg/dL    Albumin 2.3 (L) 3.2 - 4.9 g/dL    Total Protein 4.7 (L) 6.0 - 8.2 g/dL    Globulin 2.4 1.9 - 3.5 g/dL    A-G Ratio 1.0 g/dL   IMMATURE PLT FRACTION    Collection Time: 08/14/24 12:23 AM   Result Value Ref Range    Imm. Plt Fraction 11.1 0.6 - 13.1 %   ESTIMATED GFR    Collection Time: 08/14/24 12:23 AM   Result Value Ref Range    GFR (CKD-EPI) 102 >60 mL/min/1.73 m 2   HEMOGLOBIN AND HEMATOCRIT    Collection Time: 08/14/24  6:27 AM   Result Value Ref Range    Hemoglobin 8.4 (L) 14.0 - 18.0 g/dL    Hematocrit 25.0 (L) 42.0 - 52.0 %       Radiology Review:  US-ABDOMEN LTD (SOFT TISSUE)   Final Result      1.  Trace ascites in the right upper quadrant/perihepatic area.   2.  Insufficient ascites for paracentesis at this time.      US-RUQ   Final Result         1.  Echogenic liver, compatible with fatty change versus fibrosis.   2.  Common bile duct dilatation, within physiologic limits for patient's stated age, consider causes of biliary obstruction with additional workup as clinically appropriate.   3.  Cholelithiasis without additional sonographic findings of acute cholecystitis.      OUTSIDE IMAGES-DX ABDOMEN   Final Result             MDM (Data Review):   -Records reviewed and summarized in current documentation  -I personally reviewed and interpreted the laboratory results  -I personally reviewed the radiology images    Assessment/Recommendations:  IMPRESSION:  Melena  Coffee-ground emesis  Acute blood loss anemia  Alcohol withdrawal  Alcoholic cirrhosis.  While current ultrasound did not describe cirrhosis, previous CT abdomen did  History of ascites.  Current ultrasound did not describe ascites, previous CT did and physical examination consistent with, repeat US with trace ascites  Thrombocytopenia  Elevated INR  9.   Tachycardia     Recommendations:   On pantoprazole and will add octreotide drip despite not seeing varices on last endoscopy  Trace ascites on ultrasound  Has been started on ceftriaxone appropriately  CIWA protocol - consider librium  Continue to trend H/H and transfuse for hgb <7  Continue full liquid diet  EGD when medically stable, will tentatively plan for tomorrow  Will make NPO at midnight and GI team will round in am and determine if clinically appropriate for EGD    Discussed with patient, RN, Dr. Dacia Elizabeth, Dr. Aguilar    ...Shayy Constantino, DNP,  APRN    Core Quality Measures   Reviewed items::  Labs, Medications and Radiology reports reviewed

## 2024-08-14 NOTE — PROCEDURES
Vascular Access Team     Date of Insertion: 8/14/24  Arm Circumference: 26.5  Internal length: 47  External Length: 0  Vein Occupancy %: 35   Reason for PICC: access  Labs: WBC 3.5, PLT 55, BUN 14, Cr 0.59, , INR 2.09 on 8/12/24     Consents confirmed, vessel patency confirmed with ultrasound. Risks and benefits of procedure explained to other and education regarding central line associated bloodstream infections provided. Questions answered.      PICC placed in LUE per licensed provider order with ultrasound guidance.  5 Fr, triple lumen PICC placed in brachial vein after 1 attempt(s). 2 mL of 1% lidocaine injected intradermally at the insertion site. A 21 gauge microintroducer needle was visualized entering the vein and modified Seldinger technique was used to obtain access to the vein. 47 cm catheter inserted and brisk blood return was observed from each lumen upon aspiration. Line secured at the 0 cm marker. TCS stylet removed and observed to be fully intact. Each lumen flushed using pulsatile method without resistance with 10 mL 0.9% normal saline. PICC line secured with Biopatch and Tegaderm.     PICC tip placement location is confirmed by nurse to be in the Superior Vena Cava (SVC) utilizing 3CG technology. PICC line is appropriate for use at this time. Patient tolerated procedure well, without complications.  Patient condition relayed to primary RN or ordering physician via this post procedure note in the EMR.      Ultrasound images uploaded to PACS and viewable in the EMR - yes  Ultrasound imaged printed and placed in paper chart - no     [employer] PICC ref # [id number], Lot # [id number], Expiration Date 06/30/2025   No

## 2024-08-14 NOTE — PROGRESS NOTES
Monitor Summary  Rhythm: SR  Rate: 85-95  Ectopy: 0  .17 / .09 / .36

## 2024-08-14 NOTE — CARE PLAN
Problem: Respiratory  Goal: Patient will achieve/maintain optimum respiratory ventilation and gas exchange  Description: Target End Date:  Prior to discharge or change in level of care    Document on Assessment flowsheet    1.  Assess and monitor rate, rhythm, depth and effort of respiration  2.  Breath sounds assessed qshift and/or as needed  3.  Assess O2 saturation, administer/titrate oxygen as ordered  4.  Position patient for maximum ventilatory efficiency  5.  Turn, cough, and deep breath with splinting to improve effectiveness  6.  Collaborate with RT to administer medication/treatments per order  7.  Encourage use of incentive spirometer and encourage patient to cough after use and utilize splinting techniques if applicable  8.  Airway suctioning  9.  Monitor sputum production for changes in color, consistency and frequency  10. Perform frequent oral hygiene  11. Alternate physical activity with rest periods  Outcome: Progressing   The patient is Stable - Low risk of patient condition declining or worsening    Shift Goals  Clinical Goals: monitor h/h/ ciwa  Patient Goals: truong  Family Goals: truong    Progress made toward(s) clinical / shift goals:      Patient is not progressing towards the following goals:

## 2024-08-15 ENCOUNTER — ANESTHESIA EVENT (OUTPATIENT)
Dept: SURGERY | Facility: MEDICAL CENTER | Age: 74
DRG: 377 | End: 2024-08-15
Payer: MEDICARE

## 2024-08-15 ENCOUNTER — ANESTHESIA (OUTPATIENT)
Dept: SURGERY | Facility: MEDICAL CENTER | Age: 74
DRG: 377 | End: 2024-08-15
Payer: MEDICARE

## 2024-08-15 LAB
ALBUMIN SERPL BCP-MCNC: 2.6 G/DL (ref 3.2–4.9)
ALBUMIN/GLOB SERPL: 0.9 G/DL
ALP SERPL-CCNC: 81 U/L (ref 30–99)
ALT SERPL-CCNC: 17 U/L (ref 2–50)
ANION GAP SERPL CALC-SCNC: 8 MMOL/L (ref 7–16)
AST SERPL-CCNC: 47 U/L (ref 12–45)
BILIRUB SERPL-MCNC: 2.7 MG/DL (ref 0.1–1.5)
BUN SERPL-MCNC: 12 MG/DL (ref 8–22)
CALCIUM ALBUM COR SERPL-MCNC: 8.4 MG/DL (ref 8.5–10.5)
CALCIUM SERPL-MCNC: 7.3 MG/DL (ref 8.5–10.5)
CHLORIDE SERPL-SCNC: 105 MMOL/L (ref 96–112)
CO2 SERPL-SCNC: 20 MMOL/L (ref 20–33)
CREAT SERPL-MCNC: 0.59 MG/DL (ref 0.5–1.4)
ERYTHROCYTE [DISTWIDTH] IN BLOOD BY AUTOMATED COUNT: 57.2 FL (ref 35.9–50)
GFR SERPLBLD CREATININE-BSD FMLA CKD-EPI: 102 ML/MIN/1.73 M 2
GLOBULIN SER CALC-MCNC: 3 G/DL (ref 1.9–3.5)
GLUCOSE SERPL-MCNC: 103 MG/DL (ref 65–99)
HCT VFR BLD AUTO: 24.4 % (ref 42–52)
HGB BLD-MCNC: 8.2 G/DL (ref 14–18)
MAGNESIUM SERPL-MCNC: 1.9 MG/DL (ref 1.5–2.5)
MCH RBC QN AUTO: 33.3 PG (ref 27–33)
MCHC RBC AUTO-ENTMCNC: 33.6 G/DL (ref 32.3–36.5)
MCV RBC AUTO: 99.2 FL (ref 81.4–97.8)
PHOSPHATE SERPL-MCNC: 2.6 MG/DL (ref 2.5–4.5)
PLATELET # BLD AUTO: 61 K/UL (ref 164–446)
PLATELETS.RETICULATED NFR BLD AUTO: 8.3 % (ref 0.6–13.1)
PMV BLD AUTO: 11.7 FL (ref 9–12.9)
POTASSIUM SERPL-SCNC: 3.5 MMOL/L (ref 3.6–5.5)
PROT SERPL-MCNC: 5.6 G/DL (ref 6–8.2)
RBC # BLD AUTO: 2.46 M/UL (ref 4.7–6.1)
SODIUM SERPL-SCNC: 133 MMOL/L (ref 135–145)
WBC # BLD AUTO: 3.4 K/UL (ref 4.8–10.8)

## 2024-08-15 PROCEDURE — 0DJ08ZZ INSPECTION OF UPPER INTESTINAL TRACT, VIA NATURAL OR ARTIFICIAL OPENING ENDOSCOPIC: ICD-10-PCS | Performed by: INTERNAL MEDICINE

## 2024-08-15 PROCEDURE — 700101 HCHG RX REV CODE 250: Performed by: ANESTHESIOLOGY

## 2024-08-15 PROCEDURE — A9270 NON-COVERED ITEM OR SERVICE: HCPCS | Performed by: HOSPITALIST

## 2024-08-15 PROCEDURE — 770001 HCHG ROOM/CARE - MED/SURG/GYN PRIV*

## 2024-08-15 PROCEDURE — 700105 HCHG RX REV CODE 258: Performed by: HOSPITALIST

## 2024-08-15 PROCEDURE — 36415 COLL VENOUS BLD VENIPUNCTURE: CPT

## 2024-08-15 PROCEDURE — 700111 HCHG RX REV CODE 636 W/ 250 OVERRIDE (IP): Performed by: HOSPITALIST

## 2024-08-15 PROCEDURE — 700102 HCHG RX REV CODE 250 W/ 637 OVERRIDE(OP): Performed by: STUDENT IN AN ORGANIZED HEALTH CARE EDUCATION/TRAINING PROGRAM

## 2024-08-15 PROCEDURE — 160002 HCHG RECOVERY MINUTES (STAT): Performed by: INTERNAL MEDICINE

## 2024-08-15 PROCEDURE — 160048 HCHG OR STATISTICAL LEVEL 1-5: Performed by: INTERNAL MEDICINE

## 2024-08-15 PROCEDURE — 160202 HCHG ENDO MINUTES - 1ST 30 MINS LEVEL 3: Performed by: INTERNAL MEDICINE

## 2024-08-15 PROCEDURE — 700101 HCHG RX REV CODE 250: Performed by: HOSPITALIST

## 2024-08-15 PROCEDURE — 85055 RETICULATED PLATELET ASSAY: CPT

## 2024-08-15 PROCEDURE — 700105 HCHG RX REV CODE 258: Performed by: ANESTHESIOLOGY

## 2024-08-15 PROCEDURE — 83735 ASSAY OF MAGNESIUM: CPT

## 2024-08-15 PROCEDURE — 43235 EGD DIAGNOSTIC BRUSH WASH: CPT | Performed by: INTERNAL MEDICINE

## 2024-08-15 PROCEDURE — 700111 HCHG RX REV CODE 636 W/ 250 OVERRIDE (IP): Performed by: STUDENT IN AN ORGANIZED HEALTH CARE EDUCATION/TRAINING PROGRAM

## 2024-08-15 PROCEDURE — 99232 SBSQ HOSP IP/OBS MODERATE 35: CPT | Performed by: NURSE PRACTITIONER

## 2024-08-15 PROCEDURE — 80053 COMPREHEN METABOLIC PANEL: CPT

## 2024-08-15 PROCEDURE — 85027 COMPLETE CBC AUTOMATED: CPT

## 2024-08-15 PROCEDURE — 84100 ASSAY OF PHOSPHORUS: CPT

## 2024-08-15 PROCEDURE — 700111 HCHG RX REV CODE 636 W/ 250 OVERRIDE (IP): Performed by: ANESTHESIOLOGY

## 2024-08-15 PROCEDURE — 160035 HCHG PACU - 1ST 60 MINS PHASE I: Performed by: INTERNAL MEDICINE

## 2024-08-15 PROCEDURE — 700105 HCHG RX REV CODE 258: Performed by: STUDENT IN AN ORGANIZED HEALTH CARE EDUCATION/TRAINING PROGRAM

## 2024-08-15 PROCEDURE — A9270 NON-COVERED ITEM OR SERVICE: HCPCS | Performed by: STUDENT IN AN ORGANIZED HEALTH CARE EDUCATION/TRAINING PROGRAM

## 2024-08-15 PROCEDURE — 700102 HCHG RX REV CODE 250 W/ 637 OVERRIDE(OP): Performed by: HOSPITALIST

## 2024-08-15 PROCEDURE — 160009 HCHG ANES TIME/MIN: Performed by: INTERNAL MEDICINE

## 2024-08-15 PROCEDURE — 99232 SBSQ HOSP IP/OBS MODERATE 35: CPT | Performed by: HOSPITALIST

## 2024-08-15 RX ORDER — SODIUM CHLORIDE, SODIUM LACTATE, POTASSIUM CHLORIDE, CALCIUM CHLORIDE 600; 310; 30; 20 MG/100ML; MG/100ML; MG/100ML; MG/100ML
INJECTION, SOLUTION INTRAVENOUS
Status: DISCONTINUED | OUTPATIENT
Start: 2024-08-15 | End: 2024-08-15 | Stop reason: SURG

## 2024-08-15 RX ORDER — HYDRALAZINE HYDROCHLORIDE 20 MG/ML
5 INJECTION INTRAMUSCULAR; INTRAVENOUS
Status: DISCONTINUED | OUTPATIENT
Start: 2024-08-15 | End: 2024-08-15 | Stop reason: HOSPADM

## 2024-08-15 RX ORDER — LABETALOL HYDROCHLORIDE 5 MG/ML
5 INJECTION, SOLUTION INTRAVENOUS
Status: DISCONTINUED | OUTPATIENT
Start: 2024-08-15 | End: 2024-08-15 | Stop reason: HOSPADM

## 2024-08-15 RX ORDER — ONDANSETRON 2 MG/ML
INJECTION INTRAMUSCULAR; INTRAVENOUS PRN
Status: DISCONTINUED | OUTPATIENT
Start: 2024-08-15 | End: 2024-08-15 | Stop reason: SURG

## 2024-08-15 RX ORDER — IBUPROFEN 200 MG
950 CAPSULE ORAL DAILY
Status: DISCONTINUED | OUTPATIENT
Start: 2024-08-15 | End: 2024-08-16

## 2024-08-15 RX ORDER — IPRATROPIUM BROMIDE AND ALBUTEROL SULFATE 2.5; .5 MG/3ML; MG/3ML
3 SOLUTION RESPIRATORY (INHALATION)
Status: DISCONTINUED | OUTPATIENT
Start: 2024-08-15 | End: 2024-08-15 | Stop reason: HOSPADM

## 2024-08-15 RX ORDER — DIPHENHYDRAMINE HYDROCHLORIDE 50 MG/ML
12.5 INJECTION INTRAMUSCULAR; INTRAVENOUS
Status: DISCONTINUED | OUTPATIENT
Start: 2024-08-15 | End: 2024-08-15 | Stop reason: HOSPADM

## 2024-08-15 RX ORDER — POTASSIUM CHLORIDE 1500 MG/1
40 TABLET, EXTENDED RELEASE ORAL ONCE
Status: COMPLETED | OUTPATIENT
Start: 2024-08-15 | End: 2024-08-15

## 2024-08-15 RX ORDER — ONDANSETRON 2 MG/ML
4 INJECTION INTRAMUSCULAR; INTRAVENOUS
Status: DISCONTINUED | OUTPATIENT
Start: 2024-08-15 | End: 2024-08-15 | Stop reason: HOSPADM

## 2024-08-15 RX ORDER — HALOPERIDOL 5 MG/ML
1 INJECTION INTRAMUSCULAR
Status: DISCONTINUED | OUTPATIENT
Start: 2024-08-15 | End: 2024-08-15 | Stop reason: HOSPADM

## 2024-08-15 RX ORDER — EPHEDRINE SULFATE 50 MG/ML
5 INJECTION, SOLUTION INTRAVENOUS
Status: DISCONTINUED | OUTPATIENT
Start: 2024-08-15 | End: 2024-08-15 | Stop reason: HOSPADM

## 2024-08-15 RX ORDER — LIDOCAINE HYDROCHLORIDE 20 MG/ML
INJECTION, SOLUTION EPIDURAL; INFILTRATION; INTRACAUDAL; PERINEURAL PRN
Status: DISCONTINUED | OUTPATIENT
Start: 2024-08-15 | End: 2024-08-15 | Stop reason: SURG

## 2024-08-15 RX ORDER — SODIUM CHLORIDE, SODIUM LACTATE, POTASSIUM CHLORIDE, CALCIUM CHLORIDE 600; 310; 30; 20 MG/100ML; MG/100ML; MG/100ML; MG/100ML
INJECTION, SOLUTION INTRAVENOUS CONTINUOUS
Status: DISCONTINUED | OUTPATIENT
Start: 2024-08-15 | End: 2024-08-16

## 2024-08-15 RX ORDER — METOPROLOL TARTRATE 1 MG/ML
1 INJECTION, SOLUTION INTRAVENOUS
Status: DISCONTINUED | OUTPATIENT
Start: 2024-08-15 | End: 2024-08-15 | Stop reason: HOSPADM

## 2024-08-15 RX ADMIN — POTASSIUM CHLORIDE 40 MEQ: 1500 TABLET, EXTENDED RELEASE ORAL at 08:06

## 2024-08-15 RX ADMIN — CEFTRIAXONE SODIUM 2000 MG: 10 INJECTION, POWDER, FOR SOLUTION INTRAVENOUS at 04:49

## 2024-08-15 RX ADMIN — LORAZEPAM 1.5 MG: 2 INJECTION INTRAMUSCULAR; INTRAVENOUS at 00:27

## 2024-08-15 RX ADMIN — CYANOCOBALAMIN TAB 500 MCG 1000 MCG: 500 TAB at 04:44

## 2024-08-15 RX ADMIN — FOLIC ACID 1 MG: 1 TABLET ORAL at 04:45

## 2024-08-15 RX ADMIN — PANTOPRAZOLE SODIUM 8 MG/HR: 40 INJECTION, POWDER, FOR SOLUTION INTRAVENOUS at 03:35

## 2024-08-15 RX ADMIN — LIDOCAINE HYDROCHLORIDE 50 MG: 20 INJECTION, SOLUTION EPIDURAL; INFILTRATION; INTRACAUDAL at 11:27

## 2024-08-15 RX ADMIN — SODIUM CHLORIDE, POTASSIUM CHLORIDE, SODIUM LACTATE AND CALCIUM CHLORIDE: 600; 310; 30; 20 INJECTION, SOLUTION INTRAVENOUS at 11:24

## 2024-08-15 RX ADMIN — Medication 950 MG: at 08:06

## 2024-08-15 RX ADMIN — FENTANYL CITRATE 50 MCG: 50 INJECTION, SOLUTION INTRAMUSCULAR; INTRAVENOUS at 11:25

## 2024-08-15 RX ADMIN — LORAZEPAM 0.5 MG: 0.5 TABLET ORAL at 20:58

## 2024-08-15 RX ADMIN — LORAZEPAM 0.5 MG: 2 INJECTION INTRAMUSCULAR; INTRAVENOUS at 06:13

## 2024-08-15 RX ADMIN — LORAZEPAM 2 MG: 2 TABLET ORAL at 04:43

## 2024-08-15 RX ADMIN — THERA TABS 1 TABLET: TAB at 04:44

## 2024-08-15 RX ADMIN — THIAMINE HYDROCHLORIDE 500 MG: 100 INJECTION, SOLUTION INTRAMUSCULAR; INTRAVENOUS at 08:10

## 2024-08-15 RX ADMIN — ONDANSETRON 4 MG: 2 INJECTION INTRAMUSCULAR; INTRAVENOUS at 11:28

## 2024-08-15 RX ADMIN — PROPOFOL 60 MG: 10 INJECTION, EMULSION INTRAVENOUS at 11:27

## 2024-08-15 RX ADMIN — OCTREOTIDE ACETATE 50 MCG/HR: 200 INJECTION, SOLUTION INTRAVENOUS; SUBCUTANEOUS at 12:51

## 2024-08-15 RX ADMIN — PANTOPRAZOLE SODIUM 8 MG/HR: 40 INJECTION, POWDER, FOR SOLUTION INTRAVENOUS at 20:51

## 2024-08-15 RX ADMIN — PANTOPRAZOLE SODIUM 8 MG/HR: 40 INJECTION, POWDER, FOR SOLUTION INTRAVENOUS at 12:53

## 2024-08-15 RX ADMIN — FOLIC ACID 1 MG: 1 TABLET ORAL at 16:18

## 2024-08-15 RX ADMIN — PHYTONADIONE 10 MG: 5 TABLET ORAL at 04:45

## 2024-08-15 RX ADMIN — LORAZEPAM 1 MG: 2 INJECTION INTRAMUSCULAR; INTRAVENOUS at 01:48

## 2024-08-15 RX ADMIN — THIAMINE HYDROCHLORIDE 500 MG: 100 INJECTION, SOLUTION INTRAMUSCULAR; INTRAVENOUS at 20:57

## 2024-08-15 RX ADMIN — THIAMINE HYDROCHLORIDE 500 MG: 100 INJECTION, SOLUTION INTRAMUSCULAR; INTRAVENOUS at 16:15

## 2024-08-15 ASSESSMENT — ENCOUNTER SYMPTOMS
BACK PAIN: 0
DEPRESSION: 0
NAUSEA: 0
WEAKNESS: 1
HEARTBURN: 0
COUGH: 0
MEMORY LOSS: 1
ABDOMINAL PAIN: 1
VOMITING: 0
DIARRHEA: 0
SHORTNESS OF BREATH: 0
HALLUCINATIONS: 0
FOCAL WEAKNESS: 1
BLURRED VISION: 0
FEVER: 0
TREMORS: 1
CHILLS: 0
DIZZINESS: 0
CONSTIPATION: 0

## 2024-08-15 ASSESSMENT — LIFESTYLE VARIABLES
SUBSTANCE_ABUSE: 1
NAUSEA AND VOMITING: MILD NAUSEA WITH NO VOMITING
ORIENTATION AND CLOUDING OF SENSORIUM: ORIENTED AND CAN DO SERIAL ADDITIONS
HEADACHE, FULLNESS IN HEAD: NOT PRESENT
PAROXYSMAL SWEATS: NO SWEAT VISIBLE
VISUAL DISTURBANCES: MILD SENSITIVITY
HEADACHE, FULLNESS IN HEAD: VERY MILD
PAROXYSMAL SWEATS: NO SWEAT VISIBLE
TREMOR: *
PAROXYSMAL SWEATS: NO SWEAT VISIBLE
VISUAL DISTURBANCES: NOT PRESENT
AUDITORY DISTURBANCES: NOT PRESENT
TREMOR: *
HEADACHE, FULLNESS IN HEAD: MILD
ANXIETY: *
AUDITORY DISTURBANCES: NOT PRESENT
PAROXYSMAL SWEATS: BARELY PERCEPTIBLE SWEATING. PALMS MOIST
ANXIETY: *
ORIENTATION AND CLOUDING OF SENSORIUM: ORIENTED AND CAN DO SERIAL ADDITIONS
AGITATION: SOMEWHAT MORE THAN NORMAL ACTIVITY
NAUSEA AND VOMITING: MILD NAUSEA WITH NO VOMITING
HEADACHE, FULLNESS IN HEAD: NOT PRESENT
TREMOR: *
VISUAL DISTURBANCES: MILD SENSITIVITY
NAUSEA AND VOMITING: NO NAUSEA AND NO VOMITING
HEADACHE, FULLNESS IN HEAD: MILD
TOTAL SCORE: 4
TOTAL SCORE: 14
TREMOR: *
NAUSEA AND VOMITING: NO NAUSEA AND NO VOMITING
TREMOR: *
VISUAL DISTURBANCES: NOT PRESENT
AUDITORY DISTURBANCES: NOT PRESENT
AGITATION: SOMEWHAT MORE THAN NORMAL ACTIVITY
ANXIETY: MILDLY ANXIOUS
TOTAL SCORE: 5
ORIENTATION AND CLOUDING OF SENSORIUM: ORIENTED AND CAN DO SERIAL ADDITIONS
AGITATION: NORMAL ACTIVITY
AUDITORY DISTURBANCES: NOT PRESENT
ORIENTATION AND CLOUDING OF SENSORIUM: ORIENTED AND CAN DO SERIAL ADDITIONS
TREMOR: *
AGITATION: NORMAL ACTIVITY
ORIENTATION AND CLOUDING OF SENSORIUM: DATE DISORIENTATION BY MORE THAN TWO CALENDAR DAYS
TOTAL SCORE: 10
AUDITORY DISTURBANCES: NOT PRESENT
ANXIETY: *
ORIENTATION AND CLOUDING OF SENSORIUM: ORIENTED AND CAN DO SERIAL ADDITIONS
NAUSEA AND VOMITING: *
AGITATION: NORMAL ACTIVITY
PAROXYSMAL SWEATS: *
TOTAL SCORE: 15
NAUSEA AND VOMITING: *
ORIENTATION AND CLOUDING OF SENSORIUM: DATE DISORIENTATION BY MORE THAN TWO CALENDAR DAYS
HEADACHE, FULLNESS IN HEAD: NOT PRESENT
AGITATION: NORMAL ACTIVITY
TREMOR: *
AGITATION: NORMAL ACTIVITY
NAUSEA AND VOMITING: NO NAUSEA AND NO VOMITING
TOTAL SCORE: 3
AGITATION: SOMEWHAT MORE THAN NORMAL ACTIVITY
ANXIETY: MILDLY ANXIOUS
ANXIETY: MILDLY ANXIOUS
TOTAL SCORE: 13
HEADACHE, FULLNESS IN HEAD: MILD
PAROXYSMAL SWEATS: NO SWEAT VISIBLE
AUDITORY DISTURBANCES: NOT PRESENT
AUDITORY DISTURBANCES: NOT PRESENT
HEADACHE, FULLNESS IN HEAD: MILD
TREMOR: *
ANXIETY: MILDLY ANXIOUS
VISUAL DISTURBANCES: NOT PRESENT
ORIENTATION AND CLOUDING OF SENSORIUM: DATE DISORIENTATION BY MORE THAN TWO CALENDAR DAYS
PAROXYSMAL SWEATS: NO SWEAT VISIBLE
VISUAL DISTURBANCES: NOT PRESENT
PAROXYSMAL SWEATS: NO SWEAT VISIBLE
TOTAL SCORE: 4
VISUAL DISTURBANCES: MILD SENSITIVITY
AUDITORY DISTURBANCES: NOT PRESENT
VISUAL DISTURBANCES: VERY MILD SENSITIVITY
NAUSEA AND VOMITING: NO NAUSEA AND NO VOMITING
ANXIETY: MILDLY ANXIOUS

## 2024-08-15 ASSESSMENT — PAIN SCALES - PAIN ASSESSMENT IN ADVANCED DEMENTIA (PAINAD)
TOTALSCORE: 0
BODYLANGUAGE: RELAXED
FACIALEXPRESSION: SMILING OR INEXPRESSIVE
BREATHING: NORMAL
CONSOLABILITY: NO NEED TO CONSOLE

## 2024-08-15 ASSESSMENT — PAIN DESCRIPTION - PAIN TYPE
TYPE: SURGICAL PAIN
TYPE: ACUTE PAIN
TYPE: ACUTE PAIN

## 2024-08-15 ASSESSMENT — PAIN SCALES - GENERAL: PAIN_LEVEL: 0

## 2024-08-15 ASSESSMENT — FIBROSIS 4 INDEX: FIB4 SCORE: 13.64

## 2024-08-15 NOTE — OR NURSING
1134 received patient from OR. Report from Anesthesiologist and OR RN. Patient on 6L at 100 % O2. VSS. Monitor connected. No advanced airway in place. S/P EGD    2833 Handoff to Nina ROSARIO

## 2024-08-15 NOTE — PROGRESS NOTES
Bedside report received from off going RN/tech: Nina, assumed care of patient.     Fall Risk Score: HIGH RISK  Fall risk interventions in place: Place yellow fall risk ID band on patient, Provide patient/family education based on risk assessment, Educate patient/family to call staff for assistance when getting out of bed, Place fall precaution signage outside patient door, Place patient in room close to nursing station, and Utilize bed/chair fall alarm  Bed type: Regular (Mg Score less than 17 interventions in place)  Patient on cardiac monitor: Yes  IVF/IV medications: Infusion per MAR (List Med(s)) Protonix, octreotide   Oxygen: Room Air  Bedside sitter: Not Applicable   Isolation: Not applicable

## 2024-08-15 NOTE — PROGRESS NOTES
Bedside report received from off going RN/tech: Angie assumed care of patient.     Fall Risk Score: HIGH RISK  Fall risk interventions in place: Place yellow fall risk ID band on patient, Provide patient/family education based on risk assessment, Educate patient/family to call staff for assistance when getting out of bed, Place fall precaution signage outside patient door, Utilize bed/chair fall alarm, and Bed alarm connected correctly  Bed type: Regular (Mg Score less than 17 interventions in place)  Patient on cardiac monitor: Yes  IVF/IV medications: Infusion per MAR (List Med(s)) Octreotide and Pantoprazole  Oxygen: Room Air  Bedside sitter: Not Applicable   Isolation: Not applicable    No

## 2024-08-15 NOTE — CARE PLAN
Problem: Fall Risk  Goal: Patient will remain free from falls  Outcome: Progressing     Problem: Skin Integrity  Goal: Skin integrity is maintained or improved  Outcome: Progressing   The patient is Watcher - Medium risk of patient condition declining or worsening    Shift Goals  Clinical Goals: CIWA,safety, procedure  Patient Goals: rest  Family Goals: truong    Progress made toward(s) clinical / shift goals:  procedure complete, patient on Q4 hour CIWA, fall safety precautions in place.     Patient is not progressing towards the following goals: n/a

## 2024-08-15 NOTE — PROCEDURES
OPERATIVE REPORT    PATIENT:   Chava BARTLETT [facility]   1950       PREOPERATIVE DIAGNOSES/INDICATIONS: Upper GI bleeding.     POSTOPERATIVE DIAGNOSIS:   Portal hypertensive gastropathy with possible recent bleeding.     PROCEDURE:  ESOPHAGOGASTRODUODENOSCOPY    PHYSICIAN:  Vinay Aguilar MD. MPH.    ANESTHESIA:  Per anesthesiologist or ICU/ED team.     LOCATION: [facility]    CONSENT:  OBTAINED. The risks, benefits and alternatives of the procedure were discussed in details. The risks include and are not limited to bleeding, infection, perforation, missed lesions, and sedations risks (cardiopulmonary compromise and allergic reaction to medications).    DESCRIPTION: Scope team at bedside  Patient was placed on his/her left lateral decubitus position. A bite block was placed in patient's mouth. Patient was sedated by anesthesia.  Vital signs were monitored throughout the procedure.  Oxygenation support was provided throughout the procedure. Upper endoscope was inserted into patient's mouth and advanced to the second portion of the duodenum under direct visualization.      Once the site was reached and examined, the upper endoscope was withdrawn.  Retroflexion was made within the stomach.  The stomach was decompressed, scope was withdrawn and the procedure was terminated.    The patient tolerated the procedure well.  There were no immediate complications.    OPERATIVE FINDINGS:    1. Esophagus: GERD grade B. Trace EV x 1, not requiring treatment.   2. Stomach: Extensive PHG in body, likely recent bleeding but no active bleeding now.   3. Duodenum: Grossly normal.   Large amount of medication residua in stomach, removed by us.       RECOMMENDATIONS:  Keep PPI daily dose. Resume oral intake.     GI signs off.   This note has been transcribed with digital voice recognition software and although it has been reviewed may contain grammatical or word errors

## 2024-08-15 NOTE — PROGRESS NOTES
..Gastroenterology Progress Note               Author:  Shayy Constantino, DNP,  APRN Date & Time Created: 8/15/2024 9:03 AM       Patient ID:  Name:             Chava Mercedes  YOB: 1950  Age:                 74 y.o.  male  MRN:               [id number]    Medical Decision Making, by Problem:  Active Hospital Problems    Diagnosis     Electrolyte abnormality [E87.8]     Hematemesis with nausea [K92.0]     Alcoholic cirrhosis (HCC) [K70.30]     ACP (advance care planning) [Z71.89]     Duodenal ulcer [K26.9]     Alcohol dependence with withdrawal delirium (HCC) [F10.231]     Acute blood loss anemia [D62]     Acute GI bleeding [K92.2]     Lactic acidosis [E87.20]      Presenting Chief Complaint:  GI bleed     History of Present Illness:    This is a 74 y.o. male seen by our group in March for coffee ground emesis and EGD showed mild gastritis and mild portal hypertensive gastropathy.  NO esophageal or gastric varices noted.  Seen at OSH yesterday with hematemesis and melena x 3 days.  In [facility] ER he had coffee ground emesis and  Hgb 11.2, plt 83  and transferred to [facility].  Patient tremulous and confused and unable to provide history.  Drinks 3-4 beers per day (unknown size or if additional alcohol) and states last drink 2 days ago.     Currently Hgb 8.1, .5, plt 83, INR 2.09  BUN 11, Cr 0.67, AST 54, ALT 19, , bili 1.9, alb 2.7     RUQ US:  liver normal contour, +gallstones, CBD 6.6mm, MPV 1.22cm, no ascites     CT abd 12/2023:  umbilical hernia , cirrhosis with ascites and splenomegaly, cholelithiasis    Interval History:  8/14/2024: Patient seen. Having melena  Hgb 6.8 overnight, s/p one unit PRBC now 8.4, plt 55, K and phos being replaced    8/15/2024: Patient seen. Still tremulous but more alert. Oriented to self and place. Hungry and thirsty. Melena yesterday. Hgb 8.2.     Hospital Medications:  Current Facility-Administered Medications   Medication Dose Frequency  Provider Last Rate Last Admin    calcium citrate (Calcitrate) tablet 950 mg  950 mg DAILY Sesar Elizabeth M.D.   950 mg at 08/15/24 0806    cyanocobalamin (Vitamin B-12) tablet 1,000 mcg  1,000 mcg DAILY Chaparro Negron M.D.   1,000 mcg at 08/15/24 0444    folic acid (Folvite) tablet 1 mg  1 mg BID Chaparro Negron M.D.   1 mg at 08/15/24 0445    acetaminophen (Tylenol) tablet 650 mg  650 mg Q6HRS PRN CHARLES LaceyPSantosRCIARA        cefTRIAXone (Rocephin) syringe 2,000 mg  2,000 mg Q24HRS Sesar Elizabeth M.D.   2,000 mg at 08/15/24 0449    octreotide (SandoSTATIN) 1,250 mcg in  mL Infusion  50 mcg/hr Continuous Sesar Elizabeth M.D. 10 mL/hr at 08/14/24 1334 50 mcg/hr at 08/14/24 1334    labetalol (Normodyne/Trandate) injection 10 mg  10 mg Q4HRS PRN Chaparro Negron M.D.        ondansetron (Zofran) syringe/vial injection 4 mg  4 mg Q4HRS PRN Chaparro Negron M.D.   4 mg at 08/12/24 2212    ondansetron (Zofran ODT) dispertab 4 mg  4 mg Q4HRS PRN Chaparro Negron M.D.        pantoprazole (Protonix) 80 mg in  mL continuous infusion  8 mg/hr Continuous Chaparro Negron M.D. 10 mL/hr at 08/15/24 0335 8 mg/hr at 08/15/24 0335    LORazepam (Ativan) tablet 0.5 mg  0.5 mg Q4HRS PRN Chaparro Negron M.D.        LORazepam (Ativan) tablet 1 mg  1 mg Q4HRS PRN Chaparro Negron M.D.   1 mg at 08/14/24 2300    Or    LORazepam (Ativan) injection 0.5 mg  0.5 mg Q4HRS PRN Chaparro Negron M.D.   0.5 mg at 08/15/24 0613    LORazepam (Ativan) tablet 2 mg  2 mg Q2HRS PRN Chaparro Negron M.D.   2 mg at 08/15/24 0443    Or    LORazepam (Ativan) injection 1 mg  1 mg Q2HRS PRN Chaparro Negron M.D.   1 mg at 08/15/24 0148    LORazepam (Ativan) tablet 3 mg  3 mg Q HOUR PRN Chaparro Negron M.D.        Or    LORazepam (Ativan) injection 1.5 mg  1.5 mg Q HOUR PRN Chaparro Negron M.D.   1.5 mg at 08/15/24 0027    LORazepam (Ativan) tablet 4 mg  4 mg Q15 MIN PRN Chaparro Negron M.D.        Or    LORazepam (Ativan) injection 2 mg  2 mg Q15 MIN PRN  hCaparro Negron M.D.        multivitamin tablet 1 Tablet  1 Tablet DAILY Chaparro Negron M.D.   1 Tablet at 08/15/24 0444    thiamine (B-1) 500 mg in dextrose 5% 100 mL IVPB  500 mg TID Chaparro Negron M.D. 200 mL/hr at 08/15/24 0810 500 mg at 08/15/24 0810   Last reviewed on 8/13/2024 12:12 PM by Carolann Rouse [facility]       Review of Systems:  Review of Systems   Constitutional:  Positive for malaise/fatigue. Negative for chills and fever.   HENT:  Negative for hearing loss.    Eyes:  Negative for blurred vision.   Respiratory:  Negative for cough and shortness of breath.    Cardiovascular:  Positive for leg swelling. Negative for chest pain.   Gastrointestinal:  Positive for abdominal pain and melena. Negative for constipation, diarrhea, heartburn, nausea and vomiting.   Genitourinary:  Negative for dysuria.   Musculoskeletal:  Negative for back pain.   Skin:  Negative for rash.   Neurological:  Positive for tremors, focal weakness and weakness. Negative for dizziness.   Psychiatric/Behavioral:  Positive for memory loss and substance abuse. Negative for depression and hallucinations.    All other systems reviewed and are negative.        Vital signs:  Weight/BMI: Body mass index is 26.28 kg/m².  /81   Pulse 85   Temp 36.6 °C (97.9 °F) (Temporal)   Resp 16   Ht 1.829 m (6')   Wt 87.9 kg (193 lb 12.6 oz)   SpO2 93%   Vitals:    08/14/24 2000 08/15/24 0000 08/15/24 0400 08/15/24 0830   BP: 124/64 129/76 111/66 121/81   Pulse: 90 89 89 85   Resp: 18 18 18 16   Temp: 36.6 °C (97.9 °F) 36.7 °C (98.1 °F) 36.6 °C (97.9 °F) 36.6 °C (97.9 °F)   TempSrc: Temporal Temporal Temporal Temporal   SpO2: 96% 94% 95% 93%   Weight:   87.9 kg (193 lb 12.6 oz)    Height:         Oxygen Therapy:  Pulse Oximetry: 93 %, O2 (LPM): 0, O2 Delivery Device: None - Room Air    Intake/Output Summary (Last 24 hours) at 8/15/2024 0903  Last data filed at 8/15/2024 0000  Gross per 24 hour   Intake 100 ml   Output 950 ml   Net -850 ml          Physical Exam:  Physical Exam  Vitals and nursing note reviewed.   Constitutional:       General: He is not in acute distress.     Appearance: He is ill-appearing.   HENT:      Head: Normocephalic and atraumatic.      Right Ear: External ear normal.      Left Ear: External ear normal.      Nose: Nose normal.      Mouth/Throat:      Mouth: Mucous membranes are moist.      Pharynx: Oropharynx is clear.   Eyes:      General: No scleral icterus.  Cardiovascular:      Rate and Rhythm: Regular rhythm. Tachycardia present.      Pulses: Normal pulses.      Heart sounds: Normal heart sounds.   Pulmonary:      Effort: Pulmonary effort is normal.      Breath sounds: Normal breath sounds.   Abdominal:      General: Abdomen is flat. Bowel sounds are normal. There is distension.      Palpations: Abdomen is soft.      Tenderness: There is abdominal tenderness.   Musculoskeletal:         General: Normal range of motion.      Cervical back: Normal range of motion and neck supple.      Right lower leg: Edema present.      Left lower leg: Edema present.   Skin:     General: Skin is warm.      Capillary Refill: Capillary refill takes less than 2 seconds.      Coloration: Skin is pale.   Neurological:      Motor: Weakness present.   Psychiatric:      Comments: Tremulous, lethargic, disoriented  On Ativan for withdrawals         Labs:  Recent Labs     08/13/24 0022 08/14/24  0023 08/15/24  0154   SODIUM 139 138 133*   POTASSIUM 3.9 3.5* 3.5*   CHLORIDE 102 106 105   CO2 11* 23 20   BUN 11 14 12   CREATININE 0.67 0.59 0.59   MAGNESIUM 1.6 1.8 1.9   PHOSPHORUS 3.3 1.5* 2.6   CALCIUM 7.6* 7.7* 7.3*     Recent Labs     08/12/24  2247 08/13/24 0022 08/14/24  0023 08/15/24  0154   ALTSGPT  --  19 14 17   ASTSGOT  --  54* 39 47*   ALKPHOSPHAT  --  104* 75 81   TBILIRUBIN  --  1.9* 1.9* 2.7*   LIPASE 33  31  --   --   --    GLUCOSE  --  129* 113* 103*     Recent Labs     08/13/24 0022 08/14/24  0023 08/15/24  0154   WBC 9.0 3.8* 3.4*    NEUTSPOLYS 76.80*  --   --    LYMPHOCYTES 3.80*  --   --    MONOCYTES 18.30*  --   --    EOSINOPHILS 0.00  --   --    BASOPHILS 0.30  --   --    ASTSGOT 54* 39 47*   ALTSGPT 19 14 17   ALKPHOSPHAT 104* 75 81   TBILIRUBIN 1.9* 1.9* 2.7*     Recent Labs     08/12/24  2247 08/13/24  0022 08/13/24  0430 08/14/24  0023 08/14/24  0627 08/15/24  0154   RBC  --  2.60*  --  2.04*  --  2.46*   HEMOGLOBIN  --  8.5*  9.0*   < > 6.8* 8.4* 8.2*   HEMATOCRIT  --  27.7*  28.3*   < > 20.8* 25.0* 24.4*   PLATELETCT  --  83*  --  55*  --  61*   PROTHROMBTM 23.7*  --   --   --   --   --    INR 2.09*  --   --   --   --   --    IRON 104  --   --   --   --   --    FERRITIN 71.2  --   --   --   --   --    TOTIRONBC 231*  --   --   --   --   --     < > = values in this interval not displayed.     Recent Results (from the past 24 hour(s))   CBC WITHOUT DIFFERENTIAL    Collection Time: 08/15/24  1:54 AM   Result Value Ref Range    WBC 3.4 (L) 4.8 - 10.8 K/uL    RBC 2.46 (L) 4.70 - 6.10 M/uL    Hemoglobin 8.2 (L) 14.0 - 18.0 g/dL    Hematocrit 24.4 (L) 42.0 - 52.0 %    MCV 99.2 (H) 81.4 - 97.8 fL    MCH 33.3 (H) 27.0 - 33.0 pg    MCHC 33.6 32.3 - 36.5 g/dL    RDW 57.2 (H) 35.9 - 50.0 fL    Platelet Count 61 (L) 164 - 446 K/uL    MPV 11.7 9.0 - 12.9 fL   Comp Metabolic Panel    Collection Time: 08/15/24  1:54 AM   Result Value Ref Range    Sodium 133 (L) 135 - 145 mmol/L    Potassium 3.5 (L) 3.6 - 5.5 mmol/L    Chloride 105 96 - 112 mmol/L    Co2 20 20 - 33 mmol/L    Anion Gap 8.0 7.0 - 16.0    Glucose 103 (H) 65 - 99 mg/dL    Bun 12 8 - 22 mg/dL    Creatinine 0.59 0.50 - 1.40 mg/dL    Calcium 7.3 (L) 8.5 - 10.5 mg/dL    Correct Calcium 8.4 (L) 8.5 - 10.5 mg/dL    AST(SGOT) 47 (H) 12 - 45 U/L    ALT(SGPT) 17 2 - 50 U/L    Alkaline Phosphatase 81 30 - 99 U/L    Total Bilirubin 2.7 (H) 0.1 - 1.5 mg/dL    Albumin 2.6 (L) 3.2 - 4.9 g/dL    Total Protein 5.6 (L) 6.0 - 8.2 g/dL    Globulin 3.0 1.9 - 3.5 g/dL    A-G Ratio 0.9 g/dL   MAGNESIUM     Collection Time: 08/15/24  1:54 AM   Result Value Ref Range    Magnesium 1.9 1.5 - 2.5 mg/dL   PHOSPHORUS    Collection Time: 08/15/24  1:54 AM   Result Value Ref Range    Phosphorus 2.6 2.5 - 4.5 mg/dL   IMMATURE PLT FRACTION    Collection Time: 08/15/24  1:54 AM   Result Value Ref Range    Imm. Plt Fraction 8.3 0.6 - 13.1 %   ESTIMATED GFR    Collection Time: 08/15/24  1:54 AM   Result Value Ref Range    GFR (CKD-EPI) 102 >60 mL/min/1.73 m 2       Radiology Review:  IR-PICC LINE PLACEMENT W/ GUIDANCE > AGE 5   Final Result                  Ultrasound-guided PICC placement performed by qualified nursing staff as    above.          US-ABDOMEN LTD (SOFT TISSUE)   Final Result      1.  Trace ascites in the right upper quadrant/perihepatic area.   2.  Insufficient ascites for paracentesis at this time.      US-RUQ   Final Result         1.  Echogenic liver, compatible with fatty change versus fibrosis.   2.  Common bile duct dilatation, within physiologic limits for patient's stated age, consider causes of biliary obstruction with additional workup as clinically appropriate.   3.  Cholelithiasis without additional sonographic findings of acute cholecystitis.      OUTSIDE IMAGES-DX ABDOMEN   Final Result            MDM (Data Review):   -Records reviewed and summarized in current documentation  -I personally reviewed and interpreted the laboratory results  -I personally reviewed the radiology images    Assessment/Recommendations:  IMPRESSION:  Melena  Coffee-ground emesis  Acute blood loss anemia  Alcohol withdrawal  Alcoholic cirrhosis.  While current ultrasound did not describe cirrhosis, previous CT abdomen did  History of ascites.  Current ultrasound did not describe ascites, previous CT did and physical examination consistent with, repeat US with trace ascites  Thrombocytopenia  Elevated INR  9.   Tachycardia     Recommendations:   On pantoprazole and will add octreotide drip despite not seeing varices on last  endoscopy  Trace ascites on ultrasound  Has been started on ceftriaxone appropriately  CIWA protocol - consider librium  Continue to trend H/H and transfuse for hgb <7  Continue full liquid diet  Added for EGD today  Keep NPO  Further plan of care to be determined post procedure.    Discussed with patient, RN, Dr. Dacia Elizabeth, Dr. Aguilar    ...Shayy Constantino, DNP,  APRN    Core Quality Measures   Reviewed items::  Labs, Medications and Radiology reports reviewed

## 2024-08-15 NOTE — PROGRESS NOTES
Saw the patient at Pre OP bedside, borderline mentation level, not able to complete a consent. No answer from all the number in epic, no family at bedside.     After discussion with Dr. Rodriguez, and Pre Op team, we agree to proceed to have upper GI scope done as urgent/emergent and necessary procedure.

## 2024-08-15 NOTE — PROGRESS NOTES
[facility] Medicine Daily Progress Note    Date of Service  8/15/2024    Chief Complaint  Chava Mercedes is a 74 y.o. male admitted 8/12/2024 with GI bleed    Hospital Course  Chava Mercedes is a 74 y.o. male with history of alcohol abuse, alcoholic cirrhosis who presented 8/12/2024 as direct transfer for evaluation of GI bleed.  Patient reported having black tarry stool for the past 3 days, noted to have coffee-ground emesis earlier today.  Therefore he presented to [place] ER where he was reported to have more witnessed coffee-ground emesis while in the emergency room.     Interval Problem Update    Patient is afebrile on room air  I discussed with GI plan is for EGD  I reviewed chemistry panel potassium 3.5 I ordered repletion  I reviewed CBC hemoglobin 8.2 platelets 61    I have discussed this patient's plan of care and discharge plan at IDT rounds today with Case Management, Nursing, Nursing leadership, and other members of the IDT team.    Consultants/Specialty  GI    Code Status  DNAR/DNI    Disposition  The patient is not medically cleared for discharge to home or a post-acute facility.      I have placed the appropriate orders for post-discharge needs.    Review of Systems  Review of Systems   Unable to perform ROS: Medical condition        Physical Exam  Temp:  [36.4 °C (97.5 °F)-37.4 °C (99.4 °F)] 36.4 °C (97.5 °F)  Pulse:  [67-91] 91  Resp:  [10-21] 21  BP: ()/(57-81) 117/65  SpO2:  [92 %-100 %] 96 %    Physical Exam  HENT:      Head: Normocephalic and atraumatic.   Eyes:      General:         Right eye: No discharge.         Left eye: No discharge.   Cardiovascular:      Rate and Rhythm: Normal rate and regular rhythm.      Heart sounds: No murmur heard.  Pulmonary:      Effort: Pulmonary effort is normal.      Breath sounds: No rales.   Chest:      Chest wall: No tenderness.   Abdominal:      General: There is distension.      Tenderness: There is no abdominal tenderness.   Musculoskeletal:       Cervical back: Neck supple.   Neurological:      General: No focal deficit present.      Mental Status: He is alert.      Comments: Oriented x 2   Psychiatric:         Cognition and Memory: He exhibits impaired recent memory.         Fluids    Intake/Output Summary (Last 24 hours) at 8/15/2024 1541  Last data filed at 8/15/2024 1132  Gross per 24 hour   Intake 350 ml   Output 552 ml   Net -202 ml       Laboratory  Recent Labs     08/13/24  0022 08/13/24  0430 08/14/24  0023 08/14/24  0627 08/15/24  0154   WBC 9.0  --  3.8*  --  3.4*   RBC 2.60*  --  2.04*  --  2.46*   HEMOGLOBIN 8.5*  9.0*   < > 6.8* 8.4* 8.2*   HEMATOCRIT 27.7*  28.3*   < > 20.8* 25.0* 24.4*   .5*  --  102.0*  --  99.2*   MCH 32.7  --  33.3*  --  33.3*   MCHC 30.7*  --  32.7  --  33.6   RDW 55.8*  --  53.3*  --  57.2*   PLATELETCT 83*  --  55*  --  61*   MPV 11.2  --  11.9  --  11.7    < > = values in this interval not displayed.     Recent Labs     08/13/24  0022 08/14/24  0023 08/15/24  0154   SODIUM 139 138 133*   POTASSIUM 3.9 3.5* 3.5*   CHLORIDE 102 106 105   CO2 11* 23 20   GLUCOSE 129* 113* 103*   BUN 11 14 12   CREATININE 0.67 0.59 0.59   CALCIUM 7.6* 7.7* 7.3*     Recent Labs     08/12/24  2247   INR 2.09*               Imaging  IR-PICC LINE PLACEMENT W/ GUIDANCE > AGE 5   Final Result                  Ultrasound-guided PICC placement performed by qualified nursing staff as    above.          US-ABDOMEN LTD (SOFT TISSUE)   Final Result      1.  Trace ascites in the right upper quadrant/perihepatic area.   2.  Insufficient ascites for paracentesis at this time.      US-RUQ   Final Result         1.  Echogenic liver, compatible with fatty change versus fibrosis.   2.  Common bile duct dilatation, within physiologic limits for patient's stated age, consider causes of biliary obstruction with additional workup as clinically appropriate.   3.  Cholelithiasis without additional sonographic findings of acute cholecystitis.      OUTSIDE  IMAGES-DX ABDOMEN   Final Result           Assessment/Plan  * Acute GI bleeding- (present on admission)  Assessment & Plan  With history of liver cirrhosis  Continue IV Protonix  octreotide   Discussed with GI plan for EGD later today.  No significant ascites on ultrasound    Addendum    EGD completed1. Esophagus: GERD grade B. Trace EV x 1, not requiring treatment.   2. Stomach: Extensive PHG in body, likely recent bleeding but no active bleeding now.   3. Duodenum: Grossly normal.   Large amount of medication residua in stomach, removed by us.     Given no further active bleeding will discontinue ceftriaxone  Transition to Prilosec    Electrolyte abnormality  Assessment & Plan  Hypokalemia      I ordered repletion and repeat chemistry panel    Alcoholic cirrhosis (HCC)  Assessment & Plan  Reinforce alcohol cessation when mental status improved    Duodenal ulcer- (present on admission)  Assessment & Plan  History of no evidence of recurrence on repeat EGD     Alcohol dependence with withdrawal delirium (HCC)- (present on admission)  Assessment & Plan  Continue IV thiamine and folate  Lorazepam per [facility] protocol   patient on cessation    Acute blood loss anemia- (present on admission)  Assessment & Plan  Monitor hemoglobin and transfuse if less than 7    Lactic acidosis- (present on admission)  Assessment & Plan  Likely due to ETOH and dehydration           VTE prophylaxis:   SCDs/TEDs      I have performed a physical exam and reviewed and updated ROS and Plan today (8/15/2024). In review of yesterday's note (8/14/2024), there are no changes except as documented above.

## 2024-08-15 NOTE — ANESTHESIA PREPROCEDURE EVALUATION
Case: [id number] Date/Time: 08/15/24 1101    Procedure: GASTROSCOPY (Esophagus)    Anesthesia type: MAC    Pre-op diagnosis: hematemesis and melena x 3 days.    Location: [facility] ROOM 26 / SURGERY SAME DAY [facility]    Surgeons: Vinay Aguilar M.D.            Relevant Problems   PULMONARY   (positive) Pneumonia      CARDIAC   (positive) Pulmonary emboli (HCC)   (positive) Pulmonary embolism (HCC)      GI   (positive) Duodenal ulcer         (positive) Alcoholic cirrhosis (HCC)   (positive) Alcoholic liver disease (HCC)   (positive) Hepatic steatosis       Physical Exam    Airway   Mallampati: III  TM distance: >3 FB  Neck ROM: full       Cardiovascular - normal exam  Rhythm: regular  Rate: normal  (-) murmur     Dental - normal exam           Pulmonary - normal exam  Breath sounds clear to auscultation     Abdominal    Neurological - normal exam and sedated/unconcious         Other findings: Patient unable to consent and no family is available. The patient is bleeding, so we are calling this case urgent.              Anesthesia Plan    ASA 3- EMERGENT   ASA physical status 3 criteria: alcohol and/or substance dependence or abuseASA physical status emergent criteria: acute hemorrhage    Plan - general       Airway plan will be mask          Induction: intravenous      Pertinent diagnostic labs and testing reviewed    Informed Consent:  Emergent - Consent given by clinician    Use of blood products discussed with: patient whom consented to blood products.

## 2024-08-15 NOTE — ANESTHESIA POSTPROCEDURE EVALUATION
Patient: Chava Mercedes    Procedure Summary       Date: 08/15/24 Room / Location: [facility] ROOM 26 / SURGERY SAME DAY [facility]    Anesthesia Start: 1124 Anesthesia Stop: 1136    Procedure: GASTROSCOPY (Esophagus) Diagnosis: (GERD GRADE B,  PORTAL HTN GASTROPATHY)    Surgeons: Vinay Aguilar M.D. Responsible Provider: Rangel Sparks M.D.    Anesthesia Type: general ASA Status: 3 - Emergent            Final Anesthesia Type: general  Last vitals  BP   Blood Pressure : 100/57    Temp   36.4 °C (97.5 °F)    Pulse   72   Resp   19    SpO2   100 %      Anesthesia Post Evaluation    Patient location during evaluation: PACU  Patient participation: complete - patient participated  Level of consciousness: awake and alert and confused  Pain score: 0    Airway patency: patent  Anesthetic complications: no  Cardiovascular status: hemodynamically stable  Respiratory status: acceptable  Hydration status: euvolemic    PONV: none  patient was unable to participate        There were no known notable events for this encounter.     Nurse Pain Score: 0 (NPRS)

## 2024-08-15 NOTE — ANESTHESIA TIME REPORT
Anesthesia Start and Stop Event Times       Date Time Event    8/15/2024 1124 Ready for Procedure     1124 Anesthesia Start     1136 Anesthesia Stop          Responsible Staff  08/15/24      Name Role Begin End    Rangel Sparks M.D. Anesth 1124 1136          Overtime Reason:  no overtime (within assigned shift)    Comments:

## 2024-08-15 NOTE — OR NURSING
Telephone report received from bedside RN, Es. All questions answered for patient procedure in RV [facility] today. Patient placed on transport at this time.

## 2024-08-15 NOTE — CARE PLAN
The patient is Stable - Low risk of patient condition declining or worsening    Shift Goals  Clinical Goals: CIWA, npo at midnight, saftey  Patient Goals: rest  Family Goals: truong    Progress made toward(s) clinical / shift goals:    Problem: Knowledge Deficit - Standard  Goal: Patient and family/care givers will demonstrate understanding of plan of care, disease process/condition, diagnostic tests and medications  Outcome: Progressing  Note: POC discussed with patient. CIWA protocol in place. NPO at midnight for possible GI scope in the morning.      Problem: Pain - Standard  Goal: Alleviation of pain or a reduction in pain to the patient’s comfort goal  Outcome: Progressing  Note: Patient reports mild headache from withdrawal. Rates 2/10. Patient reports improvement after ativan. Comfort measures in place.      Problem: Optimal Care for Alcohol Withdrawal  Goal: Optimal Care for the alcohol withdrawal patient  Outcome: Progressing  Note: Patient on Q2 hours CIWAs. Seizure precautions in place. Monitoring I/O's. Patient has condom cath in place.      Problem: Seizure Precautions  Goal: Implementation of seizure precautions  Outcome: Progressing  Note: All precautions in place.      Problem: Fall Risk  Goal: Patient will remain free from falls  Outcome: Progressing  Note: Fall precautions in place. Bed alarm on.        Patient is not progressing towards the following goals:

## 2024-08-15 NOTE — OR NURSING
1153 Report from ABRAHAM Hannon. Pt resting, appears comfortable. Respirations even and unlabored. No needs at this time.     1225 Called report to ABRAHAM Suggs. RN is sending Protonix/Octreotide bags to PACU. Weaned to 2L NC O2. Will transport once pt is more awake and can assess pain.     1253 Changed IV bags. Pt resting, appears comfortable, eyes clsoed. Respirations even and unlabored. Transport orders placed.    1315 Meets transfer criteria at this time. No e/o pain, pt unable to provide pain scale d/t confusion. No nausea. On 1L NC oxygen. Pt transferred to T809 via gurney with pt transport. O2 tank > half full. All belongings previously left in inpt room. Pt more awake on transfer, beginning to show mild tremor, no anxiety, called Es ROSARIO with updates.

## 2024-08-16 PROBLEM — K92.0 HEMATEMESIS WITH NAUSEA: Status: RESOLVED | Noted: 2024-08-12 | Resolved: 2024-08-16

## 2024-08-16 LAB
ANION GAP SERPL CALC-SCNC: 11 MMOL/L (ref 7–16)
BUN SERPL-MCNC: 10 MG/DL (ref 8–22)
CALCIUM SERPL-MCNC: 7.4 MG/DL (ref 8.5–10.5)
CHLORIDE SERPL-SCNC: 105 MMOL/L (ref 96–112)
CO2 SERPL-SCNC: 19 MMOL/L (ref 20–33)
CREAT SERPL-MCNC: 0.64 MG/DL (ref 0.5–1.4)
ERYTHROCYTE [DISTWIDTH] IN BLOOD BY AUTOMATED COUNT: 55.8 FL (ref 35.9–50)
GFR SERPLBLD CREATININE-BSD FMLA CKD-EPI: 100 ML/MIN/1.73 M 2
GLUCOSE SERPL-MCNC: 87 MG/DL (ref 65–99)
HCT VFR BLD AUTO: 25.3 % (ref 42–52)
HGB BLD-MCNC: 8.4 G/DL (ref 14–18)
MAGNESIUM SERPL-MCNC: 1.8 MG/DL (ref 1.5–2.5)
MCH RBC QN AUTO: 32.9 PG (ref 27–33)
MCHC RBC AUTO-ENTMCNC: 33.2 G/DL (ref 32.3–36.5)
MCV RBC AUTO: 99.2 FL (ref 81.4–97.8)
PHOSPHATE SERPL-MCNC: 2.9 MG/DL (ref 2.5–4.5)
PLATELET # BLD AUTO: 71 K/UL (ref 164–446)
PLATELETS.RETICULATED NFR BLD AUTO: 6.7 % (ref 0.6–13.1)
PMV BLD AUTO: 11.5 FL (ref 9–12.9)
POTASSIUM SERPL-SCNC: 3.5 MMOL/L (ref 3.6–5.5)
RBC # BLD AUTO: 2.55 M/UL (ref 4.7–6.1)
SODIUM SERPL-SCNC: 135 MMOL/L (ref 135–145)
WBC # BLD AUTO: 3.9 K/UL (ref 4.8–10.8)

## 2024-08-16 PROCEDURE — 84100 ASSAY OF PHOSPHORUS: CPT

## 2024-08-16 PROCEDURE — 83735 ASSAY OF MAGNESIUM: CPT

## 2024-08-16 PROCEDURE — 99232 SBSQ HOSP IP/OBS MODERATE 35: CPT | Performed by: HOSPITALIST

## 2024-08-16 PROCEDURE — 85027 COMPLETE CBC AUTOMATED: CPT

## 2024-08-16 PROCEDURE — A9270 NON-COVERED ITEM OR SERVICE: HCPCS | Performed by: HOSPITALIST

## 2024-08-16 PROCEDURE — 85055 RETICULATED PLATELET ASSAY: CPT

## 2024-08-16 PROCEDURE — 700111 HCHG RX REV CODE 636 W/ 250 OVERRIDE (IP): Performed by: STUDENT IN AN ORGANIZED HEALTH CARE EDUCATION/TRAINING PROGRAM

## 2024-08-16 PROCEDURE — 770001 HCHG ROOM/CARE - MED/SURG/GYN PRIV*

## 2024-08-16 PROCEDURE — 700105 HCHG RX REV CODE 258: Performed by: STUDENT IN AN ORGANIZED HEALTH CARE EDUCATION/TRAINING PROGRAM

## 2024-08-16 PROCEDURE — 700111 HCHG RX REV CODE 636 W/ 250 OVERRIDE (IP): Performed by: HOSPITALIST

## 2024-08-16 PROCEDURE — 36415 COLL VENOUS BLD VENIPUNCTURE: CPT

## 2024-08-16 PROCEDURE — 700102 HCHG RX REV CODE 250 W/ 637 OVERRIDE(OP): Performed by: HOSPITALIST

## 2024-08-16 PROCEDURE — 700102 HCHG RX REV CODE 250 W/ 637 OVERRIDE(OP): Performed by: STUDENT IN AN ORGANIZED HEALTH CARE EDUCATION/TRAINING PROGRAM

## 2024-08-16 PROCEDURE — 80048 BASIC METABOLIC PNL TOTAL CA: CPT

## 2024-08-16 PROCEDURE — A9270 NON-COVERED ITEM OR SERVICE: HCPCS | Performed by: STUDENT IN AN ORGANIZED HEALTH CARE EDUCATION/TRAINING PROGRAM

## 2024-08-16 RX ORDER — MAGNESIUM SULFATE HEPTAHYDRATE 40 MG/ML
2 INJECTION, SOLUTION INTRAVENOUS ONCE
Status: COMPLETED | OUTPATIENT
Start: 2024-08-16 | End: 2024-08-16

## 2024-08-16 RX ORDER — QUETIAPINE FUMARATE 25 MG/1
25 TABLET, FILM COATED ORAL
Status: DISCONTINUED | OUTPATIENT
Start: 2024-08-16 | End: 2024-08-19 | Stop reason: HOSPADM

## 2024-08-16 RX ORDER — THIAMINE HYDROCHLORIDE 100 MG/ML
100 INJECTION, SOLUTION INTRAMUSCULAR; INTRAVENOUS DAILY
Status: COMPLETED | OUTPATIENT
Start: 2024-08-16 | End: 2024-08-18

## 2024-08-16 RX ORDER — IBUPROFEN 200 MG
950 CAPSULE ORAL 2 TIMES DAILY
Status: DISCONTINUED | OUTPATIENT
Start: 2024-08-16 | End: 2024-08-19 | Stop reason: HOSPADM

## 2024-08-16 RX ORDER — POTASSIUM CHLORIDE 1500 MG/1
40 TABLET, EXTENDED RELEASE ORAL ONCE
Status: COMPLETED | OUTPATIENT
Start: 2024-08-16 | End: 2024-08-16

## 2024-08-16 RX ADMIN — OMEPRAZOLE 20 MG: 20 CAPSULE, DELAYED RELEASE ORAL at 11:49

## 2024-08-16 RX ADMIN — FOLIC ACID 1 MG: 1 TABLET ORAL at 05:19

## 2024-08-16 RX ADMIN — POTASSIUM CHLORIDE 40 MEQ: 1500 TABLET, EXTENDED RELEASE ORAL at 08:07

## 2024-08-16 RX ADMIN — FOLIC ACID 1 MG: 1 TABLET ORAL at 17:30

## 2024-08-16 RX ADMIN — Medication 950 MG: at 17:30

## 2024-08-16 RX ADMIN — PANTOPRAZOLE SODIUM 8 MG/HR: 40 INJECTION, POWDER, FOR SOLUTION INTRAVENOUS at 07:36

## 2024-08-16 RX ADMIN — LORAZEPAM 0.5 MG: 0.5 TABLET ORAL at 01:12

## 2024-08-16 RX ADMIN — CYANOCOBALAMIN TAB 500 MCG 1000 MCG: 500 TAB at 05:19

## 2024-08-16 RX ADMIN — Medication 950 MG: at 05:19

## 2024-08-16 RX ADMIN — THERA TABS 1 TABLET: TAB at 05:19

## 2024-08-16 RX ADMIN — MAGNESIUM SULFATE HEPTAHYDRATE 2 G: 2 INJECTION, SOLUTION INTRAVENOUS at 08:08

## 2024-08-16 RX ADMIN — THIAMINE HYDROCHLORIDE 100 MG: 100 INJECTION, SOLUTION INTRAMUSCULAR; INTRAVENOUS at 17:30

## 2024-08-16 ASSESSMENT — LIFESTYLE VARIABLES
ORIENTATION AND CLOUDING OF SENSORIUM: ORIENTED AND CAN DO SERIAL ADDITIONS
ORIENTATION AND CLOUDING OF SENSORIUM: ORIENTED AND CAN DO SERIAL ADDITIONS
VISUAL DISTURBANCES: NOT PRESENT
ANXIETY: MILDLY ANXIOUS
HEADACHE, FULLNESS IN HEAD: NOT PRESENT
NAUSEA AND VOMITING: NO NAUSEA AND NO VOMITING
AUDITORY DISTURBANCES: VERY MILD HARSHNESS OR ABILITY TO FRIGHTEN
TREMOR: TREMOR NOT VISIBLE BUT CAN BE FELT, FINGERTIP TO FINGERTIP
ANXIETY: NO ANXIETY (AT EASE)
ORIENTATION AND CLOUDING OF SENSORIUM: ORIENTED AND CAN DO SERIAL ADDITIONS
ANXIETY: NO ANXIETY (AT EASE)
ORIENTATION AND CLOUDING OF SENSORIUM: ORIENTED AND CAN DO SERIAL ADDITIONS
TOTAL SCORE: 1
VISUAL DISTURBANCES: VERY MILD SENSITIVITY
TREMOR: TREMOR NOT VISIBLE BUT CAN BE FELT, FINGERTIP TO FINGERTIP
VISUAL DISTURBANCES: NOT PRESENT
TOTAL SCORE: 4
AGITATION: NORMAL ACTIVITY
PAROXYSMAL SWEATS: NO SWEAT VISIBLE
NAUSEA AND VOMITING: NO NAUSEA AND NO VOMITING
TREMOR: *
NAUSEA AND VOMITING: NO NAUSEA AND NO VOMITING
AGITATION: NORMAL ACTIVITY
TOTAL SCORE: 6
AUDITORY DISTURBANCES: NOT PRESENT
NAUSEA AND VOMITING: NO NAUSEA AND NO VOMITING
AUDITORY DISTURBANCES: NOT PRESENT
VISUAL DISTURBANCES: NOT PRESENT
HEADACHE, FULLNESS IN HEAD: VERY MILD
PAROXYSMAL SWEATS: NO SWEAT VISIBLE
HEADACHE, FULLNESS IN HEAD: NOT PRESENT
HEADACHE, FULLNESS IN HEAD: NOT PRESENT
AGITATION: NORMAL ACTIVITY
TOTAL SCORE: 2
TREMOR: *
PAROXYSMAL SWEATS: BARELY PERCEPTIBLE SWEATING. PALMS MOIST
AUDITORY DISTURBANCES: NOT PRESENT
AGITATION: NORMAL ACTIVITY
PAROXYSMAL SWEATS: BARELY PERCEPTIBLE SWEATING. PALMS MOIST
ANXIETY: MILDLY ANXIOUS

## 2024-08-16 ASSESSMENT — COGNITIVE AND FUNCTIONAL STATUS - GENERAL
STANDING UP FROM CHAIR USING ARMS: A LOT
SUGGESTED CMS G CODE MODIFIER MOBILITY: CK
TOILETING: A LITTLE
EATING MEALS: A LITTLE
WALKING IN HOSPITAL ROOM: A LITTLE
HELP NEEDED FOR BATHING: A LITTLE
DRESSING REGULAR UPPER BODY CLOTHING: A LITTLE
DAILY ACTIVITIY SCORE: 19
MOBILITY SCORE: 18
PERSONAL GROOMING: A LITTLE
SUGGESTED CMS G CODE MODIFIER DAILY ACTIVITY: CK
CLIMB 3 TO 5 STEPS WITH RAILING: A LITTLE
MOVING TO AND FROM BED TO CHAIR: A LOT

## 2024-08-16 ASSESSMENT — PAIN DESCRIPTION - PAIN TYPE: TYPE: ACUTE PAIN

## 2024-08-16 ASSESSMENT — FIBROSIS 4 INDEX: FIB4 SCORE: 11.72

## 2024-08-16 NOTE — DIETARY
"Nutrition Update: Follow-up for PO intake and interview/NFPE  Day 4 of admit.  Chava Mercedes is a 74 y.o. male with admitting DX of Hematemesis with nausea [K92.0].    Current Diet: Low fiber diet  Per ADLs, PO <25% x 2 meals and 25-50% x 1 meal.    RD visited pt at bedside. Pt said that he had no decrease in intake PTA. Pt said two times that he \"ate a little bit a lot.\" He reported that his UBW is 180lbs but that he has lost 20lbs over 1 year. Pt said that he likes cottage cheese and fruit, gravy, and pudding.    Per chart review, pt has not lost significant wt.   Wt Readings from Last Encounters:   08/16/24 87.7 kg (193 lb 5.5 oz)   03/06/24 79.7 kg (175 lb 11.3 oz)   12/27/23 87.1 kg (192 lb 0.3 oz)     Pt consented to nutrition focused physical exam (NFPE). RD palpated temporalis muscle,   buccal fat pads, acromion process/clavicle area, and upper arm area. Pt w/ mild buccal fat loss and mild fat loss in upper arm area (some but not ample fat to pinch in between fingers).    Malnutrition risk: Does not meet ASPEN criteria at this time    Problem: Nutritional:  Goal: Achieve adequate nutritional intake  Description: Patient will consume >50% of meals  Outcome: Not met.     RD to adjust meals per pt preference.     RD following.    "

## 2024-08-16 NOTE — PROGRESS NOTES
Monitor Summary  Rhythm: sinus rhythm  Rate: 73-87  Ectopy: PVC(r), PAC(r)  .20 / .07 / .39

## 2024-08-16 NOTE — PROGRESS NOTES
[facility] Medicine Daily Progress Note    Date of Service  8/16/2024    Chief Complaint  Chava Mercedes is a 74 y.o. male admitted 8/12/2024 with GI bleed    Hospital Course  Chava Mercedes is a 74 y.o. male with history of alcohol abuse, alcoholic cirrhosis who presented 8/12/2024 as direct transfer for evaluation of GI bleed.  Patient reported having black tarry stool for the past 3 days, noted to have coffee-ground emesis earlier today.  Therefore he presented to [place] [facility] where he was reported to have more witnessed coffee-ground emesis while in the emergency room.     Interval Problem Update    Patient is afebrile on room air  Received lorazepam overnight  He is oriented x 3 but disoriented to situation  I reviewed chemistry panel potassium 3.5 magnesium 1.8 I ordered repletion  Reviewed CBC hemoglobin 8.4 platelets 71        I have discussed this patient's plan of care and discharge plan at IDT rounds today with Case Management, Nursing, Nursing leadership, and other members of the IDT team.    Consultants/Specialty  GI    Code Status  DNAR/DNI    Disposition  The patient is not medically cleared for discharge to home or a post-acute facility.      I have placed the appropriate orders for post-discharge needs.    Review of Systems  Review of Systems   Unable to perform ROS: Medical condition        Physical Exam  Temp:  [36.4 °C (97.6 °F)-36.6 °C (97.9 °F)] 36.4 °C (97.6 °F)  Pulse:  [81-99] 89  Resp:  [17-20] 17  BP: (103-130)/(62-71) 128/69  SpO2:  [90 %-94 %] 94 %    Physical Exam  HENT:      Head: Atraumatic.   Eyes:      General:         Right eye: No discharge.         Left eye: No discharge.   Cardiovascular:      Rate and Rhythm: Normal rate and regular rhythm.      Heart sounds: No murmur heard.  Pulmonary:      Breath sounds: Normal breath sounds. No rales.   Chest:      Chest wall: No tenderness.   Abdominal:      General: There is distension.   Musculoskeletal:         General: No swelling or  tenderness.      Cervical back: Neck supple.   Neurological:      General: No focal deficit present.      Mental Status: He is alert.         Fluids    Intake/Output Summary (Last 24 hours) at 8/16/2024 1531  Last data filed at 8/16/2024 0500  Gross per 24 hour   Intake --   Output 1000 ml   Net -1000 ml       Laboratory  Recent Labs     08/14/24  0023 08/14/24  0627 08/15/24  0154 08/16/24  0017   WBC 3.8*  --  3.4* 3.9*   RBC 2.04*  --  2.46* 2.55*   HEMOGLOBIN 6.8* 8.4* 8.2* 8.4*   HEMATOCRIT 20.8* 25.0* 24.4* 25.3*   .0*  --  99.2* 99.2*   MCH 33.3*  --  33.3* 32.9   MCHC 32.7  --  33.6 33.2   RDW 53.3*  --  57.2* 55.8*   PLATELETCT 55*  --  61* 71*   MPV 11.9  --  11.7 11.5     Recent Labs     08/14/24  0023 08/15/24  0154 08/16/24  0017   SODIUM 138 133* 135   POTASSIUM 3.5* 3.5* 3.5*   CHLORIDE 106 105 105   CO2 23 20 19*   GLUCOSE 113* 103* 87   BUN 14 12 10   CREATININE 0.59 0.59 0.64   CALCIUM 7.7* 7.3* 7.4*                     Imaging  IR-PICC LINE PLACEMENT W/ GUIDANCE > AGE 5   Final Result                  Ultrasound-guided PICC placement performed by qualified nursing staff as    above.          US-ABDOMEN LTD (SOFT TISSUE)   Final Result      1.  Trace ascites in the right upper quadrant/perihepatic area.   2.  Insufficient ascites for paracentesis at this time.      US-RUQ   Final Result         1.  Echogenic liver, compatible with fatty change versus fibrosis.   2.  Common bile duct dilatation, within physiologic limits for patient's stated age, consider causes of biliary obstruction with additional workup as clinically appropriate.   3.  Cholelithiasis without additional sonographic findings of acute cholecystitis.      OUTSIDE IMAGES-DX ABDOMEN   Final Result           Assessment/Plan  * Acute GI bleeding- (present on admission)  Assessment & Plan  With history of liver cirrhosis      No significant ascites on ultrasound        EGD completed 8/15/24  1. Esophagus: GERD grade B. Trace EV x  1, not requiring treatment.   2. Stomach: Extensive PHG in body, likely recent bleeding but no active bleeding now.   3. Duodenum: Grossly normal.   Large amount of medication residua in stomach, removed by us.     Continue Prilosec and close clinical monitoring    Electrolyte abnormality  Assessment & Plan  Hypokalemia  Hypocalcemia  Hypomagnesemia      I ordered repletion and repeat chemistry panel    Alcoholic cirrhosis (HCC)  Assessment & Plan  Reinforced alcohol cessation      Duodenal ulcer- (present on admission)  Assessment & Plan  History of no evidence of recurrence on repeat EGD     Alcohol dependence with withdrawal delirium (HCC)- (present on admission)  Assessment & Plan  Continue IV thiamine and folate    Counseled patient on cessation    I suspect that the patient is over his alcohol withdrawal suspect some component of delirium will DC lorazepam frequent orientation use low-dose Seroquel for agitation    Acute blood loss anemia- (present on admission)  Assessment & Plan  Monitor hemoglobin and transfuse if less than 7    Lactic acidosis- (present on admission)  Assessment & Plan  Likely due to ETOH and dehydration           VTE prophylaxis:   SCDs/TEDs      I have performed a physical exam and reviewed and updated ROS and Plan today (8/16/2024). In review of yesterday's note (8/15/2024), there are no changes except as documented above.

## 2024-08-16 NOTE — PROGRESS NOTES
Bedside report received from off going RN/tech: Angie assumed care of patient.     Fall Risk Score: HIGH RISK  Fall risk interventions in place: Place yellow fall risk ID band on patient, Provide patient/family education based on risk assessment, Educate patient/family to call staff for assistance when getting out of bed, Place fall precaution signage outside patient door, Utilize bed/chair fall alarm, and Bed alarm connected correctly  Bed type: Regular (Mg Score less than 17 interventions in place)  Patient on cardiac monitor: No   IVF/IV medications: Infusion per MAR (List Med(s)) Octreotide, pantoprazole  Oxygen: Room Air  Bedside sitter: Not Applicable   Isolation: Not applicable

## 2024-08-16 NOTE — PROGRESS NOTES
Bedside report received from off going RN/tech: Es, assumed care of patient.     Fall Risk Score: MODERATE RISK  Fall risk interventions in place: Place yellow fall risk ID band on patient, Provide patient/family education based on risk assessment, Educate patient/family to call staff for assistance when getting out of bed, Place fall precaution signage outside patient door, Place patient in room close to nursing station, and Utilize bed/chair fall alarm  Bed type: Regular (Mg Score less than 17 interventions in place)  Patient on cardiac monitor: Yes  IVF/IV medications: Infusion per MAR (List Med(s)) Protonix, octreotide   Oxygen: Room Air  Bedside sitter: Not Applicable   Isolation: Not applicable

## 2024-08-16 NOTE — CARE PLAN
The patient is Stable - Low risk of patient condition declining or worsening    Shift Goals  Clinical Goals: CIWA and safety  Patient Goals: Rest  Family Goals: truong    Progress made toward(s) clinical / shift goals:    Problem: Knowledge Deficit - Standard  Goal: Patient and family/care givers will demonstrate understanding of plan of care, disease process/condition, diagnostic tests and medications  Outcome: Progressing  Note: POC discussed with patient. Patient verbalized understanding.      Problem: Optimal Care for Alcohol Withdrawal  Goal: Optimal Care for the alcohol withdrawal patient  Outcome: Progressing  Note: CIWA in place. Patient on Q4 hour CIWA. Reorienting as needed.      Problem: Seizure Precautions  Goal: Implementation of seizure precautions  Outcome: Progressing  Note: Seizure precautions in place.      Problem: Fall Risk  Goal: Patient will remain free from falls  Outcome: Progressing  Note: Bed alarm on. Call light in reach.        Patient is not progressing towards the following goals:

## 2024-08-16 NOTE — CARE PLAN
Problem: Knowledge Deficit - Standard  Goal: Patient and family/care givers will demonstrate understanding of plan of care, disease process/condition, diagnostic tests and medications  Outcome: Progressing   The patient is Stable - Low risk of patient condition declining or worsening    Shift Goals  Clinical Goals: CIWA and safety  Patient Goals: Rest  Family Goals: truong    Progress made toward(s) clinical / shift goals:  Reinforce safety, include patient in plan of care    Patient is not progressing towards the following goals: n/a

## 2024-08-17 LAB
ALBUMIN SERPL BCP-MCNC: 2.3 G/DL (ref 3.2–4.9)
ALBUMIN SERPL BCP-MCNC: 2.4 G/DL (ref 3.2–4.9)
ALBUMIN/GLOB SERPL: 0.7 G/DL
ALBUMIN/GLOB SERPL: 0.8 G/DL
ALP SERPL-CCNC: 83 U/L (ref 30–99)
ALP SERPL-CCNC: 83 U/L (ref 30–99)
ALT SERPL-CCNC: 21 U/L (ref 2–50)
ALT SERPL-CCNC: 22 U/L (ref 2–50)
ANION GAP SERPL CALC-SCNC: 5 MMOL/L (ref 7–16)
ANION GAP SERPL CALC-SCNC: 6 MMOL/L (ref 7–16)
AST SERPL-CCNC: 46 U/L (ref 12–45)
AST SERPL-CCNC: 49 U/L (ref 12–45)
BILIRUB SERPL-MCNC: 2.8 MG/DL (ref 0.1–1.5)
BILIRUB SERPL-MCNC: 2.9 MG/DL (ref 0.1–1.5)
BUN SERPL-MCNC: 8 MG/DL (ref 8–22)
BUN SERPL-MCNC: 9 MG/DL (ref 8–22)
CALCIUM ALBUM COR SERPL-MCNC: 8.8 MG/DL (ref 8.5–10.5)
CALCIUM ALBUM COR SERPL-MCNC: 8.8 MG/DL (ref 8.5–10.5)
CALCIUM SERPL-MCNC: 7.4 MG/DL (ref 8.5–10.5)
CALCIUM SERPL-MCNC: 7.5 MG/DL (ref 8.5–10.5)
CHLORIDE SERPL-SCNC: 104 MMOL/L (ref 96–112)
CHLORIDE SERPL-SCNC: 104 MMOL/L (ref 96–112)
CO2 SERPL-SCNC: 20 MMOL/L (ref 20–33)
CO2 SERPL-SCNC: 22 MMOL/L (ref 20–33)
CREAT SERPL-MCNC: 0.65 MG/DL (ref 0.5–1.4)
CREAT SERPL-MCNC: 0.68 MG/DL (ref 0.5–1.4)
ERYTHROCYTE [DISTWIDTH] IN BLOOD BY AUTOMATED COUNT: 55.3 FL (ref 35.9–50)
ERYTHROCYTE [DISTWIDTH] IN BLOOD BY AUTOMATED COUNT: 56.1 FL (ref 35.9–50)
GFR SERPLBLD CREATININE-BSD FMLA CKD-EPI: 98 ML/MIN/1.73 M 2
GFR SERPLBLD CREATININE-BSD FMLA CKD-EPI: 99 ML/MIN/1.73 M 2
GLOBULIN SER CALC-MCNC: 3.2 G/DL (ref 1.9–3.5)
GLOBULIN SER CALC-MCNC: 3.2 G/DL (ref 1.9–3.5)
GLUCOSE SERPL-MCNC: 125 MG/DL (ref 65–99)
GLUCOSE SERPL-MCNC: 126 MG/DL (ref 65–99)
HCT VFR BLD AUTO: 24.6 % (ref 42–52)
HCT VFR BLD AUTO: 25.1 % (ref 42–52)
HGB BLD-MCNC: 8.1 G/DL (ref 14–18)
HGB BLD-MCNC: 8.2 G/DL (ref 14–18)
MAGNESIUM SERPL-MCNC: 1.9 MG/DL (ref 1.5–2.5)
MAGNESIUM SERPL-MCNC: 1.9 MG/DL (ref 1.5–2.5)
MCH RBC QN AUTO: 33.1 PG (ref 27–33)
MCH RBC QN AUTO: 33.3 PG (ref 27–33)
MCHC RBC AUTO-ENTMCNC: 32.7 G/DL (ref 32.3–36.5)
MCHC RBC AUTO-ENTMCNC: 32.9 G/DL (ref 32.3–36.5)
MCV RBC AUTO: 101.2 FL (ref 81.4–97.8)
MCV RBC AUTO: 101.2 FL (ref 81.4–97.8)
PHOSPHATE SERPL-MCNC: 2.6 MG/DL (ref 2.5–4.5)
PHOSPHATE SERPL-MCNC: 2.8 MG/DL (ref 2.5–4.5)
PLATELET # BLD AUTO: 80 K/UL (ref 164–446)
PLATELET # BLD AUTO: 84 K/UL (ref 164–446)
PLATELETS.RETICULATED NFR BLD AUTO: 6.7 % (ref 0.6–13.1)
PLATELETS.RETICULATED NFR BLD AUTO: 7.2 % (ref 0.6–13.1)
PMV BLD AUTO: 10.5 FL (ref 9–12.9)
PMV BLD AUTO: 11 FL (ref 9–12.9)
POTASSIUM SERPL-SCNC: 3.8 MMOL/L (ref 3.6–5.5)
POTASSIUM SERPL-SCNC: 3.9 MMOL/L (ref 3.6–5.5)
PROT SERPL-MCNC: 5.5 G/DL (ref 6–8.2)
PROT SERPL-MCNC: 5.6 G/DL (ref 6–8.2)
RBC # BLD AUTO: 2.43 M/UL (ref 4.7–6.1)
RBC # BLD AUTO: 2.48 M/UL (ref 4.7–6.1)
SODIUM SERPL-SCNC: 130 MMOL/L (ref 135–145)
SODIUM SERPL-SCNC: 131 MMOL/L (ref 135–145)
WBC # BLD AUTO: 4.1 K/UL (ref 4.8–10.8)
WBC # BLD AUTO: 4.1 K/UL (ref 4.8–10.8)

## 2024-08-17 PROCEDURE — 36415 COLL VENOUS BLD VENIPUNCTURE: CPT

## 2024-08-17 PROCEDURE — 700102 HCHG RX REV CODE 250 W/ 637 OVERRIDE(OP)

## 2024-08-17 PROCEDURE — 84100 ASSAY OF PHOSPHORUS: CPT

## 2024-08-17 PROCEDURE — A9270 NON-COVERED ITEM OR SERVICE: HCPCS

## 2024-08-17 PROCEDURE — 770001 HCHG ROOM/CARE - MED/SURG/GYN PRIV*

## 2024-08-17 PROCEDURE — 97163 PT EVAL HIGH COMPLEX 45 MIN: CPT

## 2024-08-17 PROCEDURE — 700102 HCHG RX REV CODE 250 W/ 637 OVERRIDE(OP): Performed by: STUDENT IN AN ORGANIZED HEALTH CARE EDUCATION/TRAINING PROGRAM

## 2024-08-17 PROCEDURE — A9270 NON-COVERED ITEM OR SERVICE: HCPCS | Performed by: HOSPITALIST

## 2024-08-17 PROCEDURE — A9270 NON-COVERED ITEM OR SERVICE: HCPCS | Performed by: STUDENT IN AN ORGANIZED HEALTH CARE EDUCATION/TRAINING PROGRAM

## 2024-08-17 PROCEDURE — 80053 COMPREHEN METABOLIC PANEL: CPT | Mod: 91

## 2024-08-17 PROCEDURE — 85055 RETICULATED PLATELET ASSAY: CPT | Mod: 91

## 2024-08-17 PROCEDURE — 83735 ASSAY OF MAGNESIUM: CPT | Mod: 91

## 2024-08-17 PROCEDURE — 97530 THERAPEUTIC ACTIVITIES: CPT

## 2024-08-17 PROCEDURE — 99232 SBSQ HOSP IP/OBS MODERATE 35: CPT | Performed by: HOSPITALIST

## 2024-08-17 PROCEDURE — 85027 COMPLETE CBC AUTOMATED: CPT | Mod: 91

## 2024-08-17 PROCEDURE — 700111 HCHG RX REV CODE 636 W/ 250 OVERRIDE (IP): Performed by: HOSPITALIST

## 2024-08-17 PROCEDURE — 700102 HCHG RX REV CODE 250 W/ 637 OVERRIDE(OP): Performed by: HOSPITALIST

## 2024-08-17 RX ADMIN — THIAMINE HYDROCHLORIDE 100 MG: 100 INJECTION, SOLUTION INTRAMUSCULAR; INTRAVENOUS at 16:29

## 2024-08-17 RX ADMIN — Medication 950 MG: at 04:30

## 2024-08-17 RX ADMIN — OMEPRAZOLE 20 MG: 20 CAPSULE, DELAYED RELEASE ORAL at 04:30

## 2024-08-17 RX ADMIN — Medication 950 MG: at 16:30

## 2024-08-17 RX ADMIN — FOLIC ACID 1 MG: 1 TABLET ORAL at 16:30

## 2024-08-17 RX ADMIN — CYANOCOBALAMIN TAB 500 MCG 1000 MCG: 500 TAB at 04:30

## 2024-08-17 RX ADMIN — FOLIC ACID 1 MG: 1 TABLET ORAL at 04:30

## 2024-08-17 RX ADMIN — ACETAMINOPHEN 650 MG: 325 TABLET ORAL at 09:25

## 2024-08-17 ASSESSMENT — COGNITIVE AND FUNCTIONAL STATUS - GENERAL
SUGGESTED CMS G CODE MODIFIER MOBILITY: CL
TURNING FROM BACK TO SIDE WHILE IN FLAT BAD: A LITTLE
DRESSING REGULAR UPPER BODY CLOTHING: A LOT
MOVING FROM LYING ON BACK TO SITTING ON SIDE OF FLAT BED: A LITTLE
EATING MEALS: A LITTLE
TURNING FROM BACK TO SIDE WHILE IN FLAT BAD: A LOT
MOBILITY SCORE: 17
DAILY ACTIVITIY SCORE: 13
STANDING UP FROM CHAIR USING ARMS: A LITTLE
MOBILITY SCORE: 10
WALKING IN HOSPITAL ROOM: A LITTLE
DRESSING REGULAR LOWER BODY CLOTHING: A LOT
HELP NEEDED FOR BATHING: A LOT
STANDING UP FROM CHAIR USING ARMS: A LOT
MOVING TO AND FROM BED TO CHAIR: A LITTLE
MOVING TO AND FROM BED TO CHAIR: A LOT
CLIMB 3 TO 5 STEPS WITH RAILING: TOTAL
WALKING IN HOSPITAL ROOM: TOTAL
TOILETING: A LOT
SUGGESTED CMS G CODE MODIFIER DAILY ACTIVITY: CL
CLIMB 3 TO 5 STEPS WITH RAILING: A LOT
PERSONAL GROOMING: A LOT
MOVING FROM LYING ON BACK TO SITTING ON SIDE OF FLAT BED: A LOT
SUGGESTED CMS G CODE MODIFIER MOBILITY: CK

## 2024-08-17 ASSESSMENT — PAIN DESCRIPTION - PAIN TYPE
TYPE: ACUTE PAIN
TYPE: ACUTE PAIN

## 2024-08-17 ASSESSMENT — GAIT ASSESSMENTS
ASSISTIVE DEVICE: FRONT WHEEL WALKER
DISTANCE (FEET): 15
GAIT LEVEL OF ASSIST: MINIMAL ASSIST

## 2024-08-17 ASSESSMENT — ENCOUNTER SYMPTOMS
FEVER: 0
VOMITING: 0
ABDOMINAL PAIN: 1

## 2024-08-17 ASSESSMENT — FIBROSIS 4 INDEX: FIB4 SCORE: 9.16

## 2024-08-17 NOTE — CARE PLAN
The patient is Watcher - Medium risk of patient condition declining or worsening    Shift Goals  Clinical Goals: safety, monitor labs, monitor v/s  Patient Goals: rest, go home  Family Goals: truong    Progress made toward(s) clinical / shift goals:    Problem: Knowledge Deficit - Standard  Goal: Patient and family/care givers will demonstrate understanding of plan of care, disease process/condition, diagnostic tests and medications  Outcome: Progressing  Note: Discuss and review POC with patient/family. Re-educate as needed.        Patient is not progressing towards the following goals:      Problem: Respiratory  Goal: Patient will achieve/maintain optimum respiratory ventilation and gas exchange  Outcome: Not Progressing  Note: Pt requiring 2lpm overnight while sleeping

## 2024-08-17 NOTE — PROGRESS NOTES
[facility] Medicine Daily Progress Note    Date of Service  8/17/2024    Chief Complaint  Chava Mercedes is a 74 y.o. male admitted 8/12/2024 with GI bleed    Hospital Course  Chava Mercedes is a 74 y.o. male with history of alcohol abuse, alcoholic cirrhosis who presented 8/12/2024 as direct transfer for evaluation of GI bleed.  Patient reported having black tarry stool for the past 3 days, noted to have coffee-ground emesis earlier today.  Therefore he presented to [place] ER where he was reported to have more witnessed coffee-ground emesis while in the emergency room.     Interval Problem Update    Patient is afebrile on room air  SBP   No further melena  I reviewed CMP sodium 131 potassium 3.8 magnesium 1.9  Reviewed CBC WBC 4.1 hemoglobin 8.1 platelets 80  Patient is more alert and oriented today          I have discussed this patient's plan of care and discharge plan at IDT rounds today with Case Management, Nursing, Nursing leadership, and other members of the IDT team.    Consultants/Specialty  GI    Code Status  DNAR/DNI    Disposition  Medically Cleared  I have placed the appropriate orders for post-discharge needs.    Review of Systems  Review of Systems   Constitutional:  Positive for malaise/fatigue. Negative for fever.   Gastrointestinal:  Positive for abdominal pain. Negative for vomiting.        Physical Exam  Temp:  [36.6 °C (97.9 °F)-36.9 °C (98.4 °F)] 36.6 °C (97.9 °F)  Pulse:  [76-87] 76  Resp:  [18] 18  BP: ()/(59-73) 103/60  SpO2:  [92 %-95 %] 93 %    Physical Exam  Cardiovascular:      Rate and Rhythm: Normal rate and regular rhythm.      Heart sounds: No murmur heard.  Pulmonary:      Effort: Pulmonary effort is normal.      Breath sounds: Normal breath sounds.   Abdominal:      General: There is distension.      Tenderness: There is no abdominal tenderness.   Musculoskeletal:         General: Swelling present.   Neurological:      General: No focal deficit present.      Mental  Status: He is alert.      Motor: Weakness present.         Fluids    Intake/Output Summary (Last 24 hours) at 8/17/2024 1646  Last data filed at 8/17/2024 0500  Gross per 24 hour   Intake 240 ml   Output 150 ml   Net 90 ml       Laboratory  Recent Labs     08/16/24  0017 08/17/24  0123 08/17/24  0309   WBC 3.9* 4.1* 4.1*   RBC 2.55* 2.48* 2.43*   HEMOGLOBIN 8.4* 8.2* 8.1*   HEMATOCRIT 25.3* 25.1* 24.6*   MCV 99.2* 101.2* 101.2*   MCH 32.9 33.1* 33.3*   MCHC 33.2 32.7 32.9   RDW 55.8* 56.1* 55.3*   PLATELETCT 71* 84* 80*   MPV 11.5 11.0 10.5     Recent Labs     08/16/24  0017 08/17/24  0123 08/17/24  0309   SODIUM 135 130* 131*   POTASSIUM 3.5* 3.9 3.8   CHLORIDE 105 104 104   CO2 19* 20 22   GLUCOSE 87 125* 126*   BUN 10 9 8   CREATININE 0.64 0.65 0.68   CALCIUM 7.4* 7.4* 7.5*                     Imaging  IR-PICC LINE PLACEMENT W/ GUIDANCE > AGE 5   Final Result                  Ultrasound-guided PICC placement performed by qualified nursing staff as    above.          US-ABDOMEN LTD (SOFT TISSUE)   Final Result      1.  Trace ascites in the right upper quadrant/perihepatic area.   2.  Insufficient ascites for paracentesis at this time.      US-RUQ   Final Result         1.  Echogenic liver, compatible with fatty change versus fibrosis.   2.  Common bile duct dilatation, within physiologic limits for patient's stated age, consider causes of biliary obstruction with additional workup as clinically appropriate.   3.  Cholelithiasis without additional sonographic findings of acute cholecystitis.      OUTSIDE IMAGES-DX ABDOMEN   Final Result           Assessment/Plan  * Acute GI bleeding- (present on admission)  Assessment & Plan  With history of liver cirrhosis      No significant ascites on ultrasound        EGD completed 8/15/24  1. Esophagus: GERD grade B. Trace EV x 1, not requiring treatment.   2. Stomach: Extensive PHG in body, likely recent bleeding but no active bleeding now.   3. Duodenum: Grossly normal.    Large amount of medication residua in stomach, removed by us.     Bleeding clinically resolved continue Prilosec and close clinical monitoring    Electrolyte abnormality  Assessment & Plan  Repleted  Monitor electrolytes I ordered repeat chemistry panel    Alcoholic cirrhosis (HCC)  Assessment & Plan  Reinforced alcohol cessation      Duodenal ulcer- (present on admission)  Assessment & Plan  History of no evidence of recurrence on repeat EGD     Alcohol dependence with withdrawal delirium (HCC)- (present on admission)  Assessment & Plan  Continue IV thiamine and folate    Counseled patient on cessation    Encourage mobilization PT eval    Acute blood loss anemia- (present on admission)  Assessment & Plan  Monitor hemoglobin and transfuse if less than 7  Hemoglobin 8.1 I ordered repeat CBC    Lactic acidosis- (present on admission)  Assessment & Plan  Likely due to ETOH and dehydration           VTE prophylaxis:   SCDs/TEDs      I have performed a physical exam and reviewed and updated ROS and Plan today (8/17/2024). In review of yesterday's note (8/16/2024), there are no changes except as documented above.

## 2024-08-17 NOTE — CARE PLAN
The patient is Stable - Low risk of patient condition declining or worsening    Shift Goals  Clinical Goals: safety, monitor VS  Patient Goals: go home, rest  Family Goals: truong    Progress made toward(s) clinical / shift goals:    Problem: Knowledge Deficit - Standard  Goal: Patient and family/care givers will demonstrate understanding of plan of care, disease process/condition, diagnostic tests and medications  Outcome: Progressing     Problem: Hemodynamics  Goal: Patient's hemodynamics, fluid balance and neurologic status will be stable or improve  Outcome: Progressing     Problem: Fluid Volume  Goal: Fluid volume balance will be maintained  Outcome: Progressing     Problem: Pain - Standard  Goal: Alleviation of pain or a reduction in pain to the patient’s comfort goal  Outcome: Progressing     Problem: Fall Risk  Goal: Patient will remain free from falls  Outcome: Progressing     Problem: Skin Integrity  Goal: Skin integrity is maintained or improved  Outcome: Progressing

## 2024-08-17 NOTE — PROGRESS NOTES
Pt's family refusing NG tube at this time. Pt's family gave permission to insert NG tube to assist with contrast intake only. MD velasquez.

## 2024-08-17 NOTE — PROGRESS NOTES
Bedside report received from off going RN/tech: Es, assumed care of patient.     Fall Risk Score: HIGH RISK  Fall risk interventions in place: Place yellow fall risk ID band on patient, Provide patient/family education based on risk assessment, Educate patient/family to call staff for assistance when getting out of bed, Place fall precaution signage outside patient door, Utilize bed/chair fall alarm, Notify charge of high risk for huddle, and Bed alarm connected correctly  Bed type: Regular (Mg Score less than 17 interventions in place)  Patient on cardiac monitor: No   IVF/IV medications: Not Applicable   Oxygen: Room Air  Bedside sitter: Not Applicable   Isolation: Not applicable

## 2024-08-17 NOTE — PROGRESS NOTES
With recurrent episode of emesis, MD and RN spoke with family about insertion of NG tube to help with stomach decompression. Family declines NG tube insertion at this time. MD aware.

## 2024-08-17 NOTE — PROGRESS NOTES
Bedside report received from off going RN: Raz, assumed care of patient.     Fall Risk Score: HIGH RISK  Fall risk interventions in place: Place yellow fall risk ID band on patient, Provide patient/family education based on risk assessment, Educate patient/family to call staff for assistance when getting out of bed, Place fall precaution signage outside patient door, Place patient in room close to nursing station, Utilize bed/chair fall alarm, Notify charge of high risk for huddle, and Bed alarm connected correctly  Bed type: Regular (Mg Score less than 17 interventions in place)  Patient on cardiac monitor: Yes  IVF/IV medications: Not Applicable   Oxygen: Room Air  Bedside sitter: Not Applicable   Isolation: Not applicable

## 2024-08-18 LAB
ALBUMIN SERPL BCP-MCNC: 2.9 G/DL (ref 3.2–4.9)
ALBUMIN/GLOB SERPL: 0.8 G/DL
ALP SERPL-CCNC: 104 U/L (ref 30–99)
ALT SERPL-CCNC: 26 U/L (ref 2–50)
ANION GAP SERPL CALC-SCNC: 10 MMOL/L (ref 7–16)
AST SERPL-CCNC: 52 U/L (ref 12–45)
BILIRUB SERPL-MCNC: 3.4 MG/DL (ref 0.1–1.5)
BUN SERPL-MCNC: 6 MG/DL (ref 8–22)
CALCIUM ALBUM COR SERPL-MCNC: 8.9 MG/DL (ref 8.5–10.5)
CALCIUM SERPL-MCNC: 8 MG/DL (ref 8.5–10.5)
CHLORIDE SERPL-SCNC: 103 MMOL/L (ref 96–112)
CO2 SERPL-SCNC: 20 MMOL/L (ref 20–33)
CREAT SERPL-MCNC: 0.69 MG/DL (ref 0.5–1.4)
ERYTHROCYTE [DISTWIDTH] IN BLOOD BY AUTOMATED COUNT: 56.1 FL (ref 35.9–50)
GFR SERPLBLD CREATININE-BSD FMLA CKD-EPI: 97 ML/MIN/1.73 M 2
GLOBULIN SER CALC-MCNC: 3.5 G/DL (ref 1.9–3.5)
GLUCOSE SERPL-MCNC: 116 MG/DL (ref 65–99)
HCT VFR BLD AUTO: 27 % (ref 42–52)
HGB BLD-MCNC: 9.3 G/DL (ref 14–18)
MAGNESIUM SERPL-MCNC: 1.7 MG/DL (ref 1.5–2.5)
MCH RBC QN AUTO: 34.7 PG (ref 27–33)
MCHC RBC AUTO-ENTMCNC: 34.4 G/DL (ref 32.3–36.5)
MCV RBC AUTO: 100.7 FL (ref 81.4–97.8)
PHOSPHATE SERPL-MCNC: 2.6 MG/DL (ref 2.5–4.5)
PLATELET # BLD AUTO: 96 K/UL (ref 164–446)
PLATELETS.RETICULATED NFR BLD AUTO: 5.9 % (ref 0.6–13.1)
PMV BLD AUTO: 10.5 FL (ref 9–12.9)
POTASSIUM SERPL-SCNC: 3.9 MMOL/L (ref 3.6–5.5)
PROT SERPL-MCNC: 6.4 G/DL (ref 6–8.2)
RBC # BLD AUTO: 2.68 M/UL (ref 4.7–6.1)
SODIUM SERPL-SCNC: 133 MMOL/L (ref 135–145)
WBC # BLD AUTO: 4.5 K/UL (ref 4.8–10.8)

## 2024-08-18 PROCEDURE — 83735 ASSAY OF MAGNESIUM: CPT

## 2024-08-18 PROCEDURE — 700102 HCHG RX REV CODE 250 W/ 637 OVERRIDE(OP): Performed by: HOSPITALIST

## 2024-08-18 PROCEDURE — 80053 COMPREHEN METABOLIC PANEL: CPT

## 2024-08-18 PROCEDURE — A9270 NON-COVERED ITEM OR SERVICE: HCPCS | Performed by: HOSPITALIST

## 2024-08-18 PROCEDURE — 99232 SBSQ HOSP IP/OBS MODERATE 35: CPT | Performed by: HOSPITALIST

## 2024-08-18 PROCEDURE — 85027 COMPLETE CBC AUTOMATED: CPT

## 2024-08-18 PROCEDURE — A9270 NON-COVERED ITEM OR SERVICE: HCPCS | Performed by: STUDENT IN AN ORGANIZED HEALTH CARE EDUCATION/TRAINING PROGRAM

## 2024-08-18 PROCEDURE — 85055 RETICULATED PLATELET ASSAY: CPT

## 2024-08-18 PROCEDURE — 84100 ASSAY OF PHOSPHORUS: CPT

## 2024-08-18 PROCEDURE — 36415 COLL VENOUS BLD VENIPUNCTURE: CPT

## 2024-08-18 PROCEDURE — 700111 HCHG RX REV CODE 636 W/ 250 OVERRIDE (IP): Performed by: HOSPITALIST

## 2024-08-18 PROCEDURE — 770001 HCHG ROOM/CARE - MED/SURG/GYN PRIV*

## 2024-08-18 PROCEDURE — 700102 HCHG RX REV CODE 250 W/ 637 OVERRIDE(OP): Performed by: STUDENT IN AN ORGANIZED HEALTH CARE EDUCATION/TRAINING PROGRAM

## 2024-08-18 RX ADMIN — Medication 950 MG: at 04:49

## 2024-08-18 RX ADMIN — FOLIC ACID 1 MG: 1 TABLET ORAL at 04:49

## 2024-08-18 RX ADMIN — MAGNESIUM 64 MG (MAGNESIUM CHLORIDE) TABLET,DELAYED RELEASE 64 MG: at 08:07

## 2024-08-18 RX ADMIN — OMEPRAZOLE 20 MG: 20 CAPSULE, DELAYED RELEASE ORAL at 04:49

## 2024-08-18 RX ADMIN — MAGNESIUM 64 MG (MAGNESIUM CHLORIDE) TABLET,DELAYED RELEASE 64 MG: at 18:04

## 2024-08-18 RX ADMIN — Medication 950 MG: at 18:04

## 2024-08-18 RX ADMIN — THIAMINE HYDROCHLORIDE 100 MG: 100 INJECTION, SOLUTION INTRAMUSCULAR; INTRAVENOUS at 15:35

## 2024-08-18 RX ADMIN — CYANOCOBALAMIN TAB 500 MCG 1000 MCG: 500 TAB at 04:49

## 2024-08-18 RX ADMIN — FOLIC ACID 1 MG: 1 TABLET ORAL at 18:04

## 2024-08-18 ASSESSMENT — ENCOUNTER SYMPTOMS
NAUSEA: 0
VOMITING: 0
SHORTNESS OF BREATH: 0
ABDOMINAL PAIN: 1

## 2024-08-18 ASSESSMENT — FIBROSIS 4 INDEX: FIB4 SCORE: 7.75

## 2024-08-18 ASSESSMENT — PAIN DESCRIPTION - PAIN TYPE: TYPE: ACUTE PAIN

## 2024-08-18 NOTE — PROGRESS NOTES
Bedside report received from off going RN/tech: Raz, assumed care of patient.     Fall Risk Score: HIGH RISK  Fall risk interventions in place: Place yellow fall risk ID band on patient, Provide patient/family education based on risk assessment, Educate patient/family to call staff for assistance when getting out of bed, Place fall precaution signage outside patient door, Place patient in room close to nursing station, Utilize bed/chair fall alarm, and Bed alarm connected correctly  Bed type: Regular (Mg Score less than 17 interventions in place)  Patient on cardiac monitor: No   IVF/IV medications: Not Applicable   Oxygen: Room Air  Bedside sitter: Not Applicable   Isolation: Not applicable

## 2024-08-18 NOTE — PROGRESS NOTES
[facility] Medicine Daily Progress Note    Date of Service  8/18/2024    Chief Complaint  Chava Mercedes is a 74 y.o. male admitted 8/12/2024 with GI bleed    Hospital Course  Chava Mercedes is a 74 y.o. male with history of alcohol abuse, alcoholic cirrhosis who presented 8/12/2024 as direct transfer for evaluation of GI bleed.  Patient reported having black tarry stool for the past 3 days, noted to have coffee-ground emesis earlier today.  Therefore he presented to [facility] where he was reported to have more witnessed coffee-ground emesis while in the emergency room.     Interval Problem Update    Patient is afebrile on room air  He is oriented x 3  I reviewed chemistry panel sodium 133 BUN 6 creatinine 0.69 magnesium 1.7 I ordered oral repletion  I reviewed CBC WBC 4.5 hemoglobin 9.3 platelets 96        I have discussed this patient's plan of care and discharge plan at IDT rounds today with Case Management, Nursing, Nursing leadership, and other members of the IDT team.    Consultants/Specialty  GI    Code Status  DNAR/DNI    Disposition  Medically Cleared  I have placed the appropriate orders for post-discharge needs.    Review of Systems  Review of Systems   Constitutional:  Positive for malaise/fatigue.   Respiratory:  Negative for shortness of breath.    Cardiovascular:  Negative for chest pain.   Gastrointestinal:  Positive for abdominal pain. Negative for nausea and vomiting.        Physical Exam  Temp:  [36.2 °C (97.2 °F)-36.9 °C (98.4 °F)] 36.2 °C (97.2 °F)  Pulse:  [84-94] 84  Resp:  [18] 18  BP: (103-128)/(60-80) 128/65  SpO2:  [93 %-96 %] 94 %    Physical Exam  HENT:      Head: Normocephalic.   Cardiovascular:      Rate and Rhythm: Normal rate and regular rhythm.      Heart sounds: No murmur heard.  Pulmonary:      Effort: Pulmonary effort is normal.      Breath sounds: No rales.   Chest:      Chest wall: No tenderness.   Abdominal:      General: There is distension.      Palpations: Abdomen is  soft.   Musculoskeletal:         General: Swelling present.   Neurological:      General: No focal deficit present.      Mental Status: He is alert.      Cranial Nerves: No cranial nerve deficit.      Motor: Weakness present.   Psychiatric:         Mood and Affect: Mood normal.         Fluids    Intake/Output Summary (Last 24 hours) at 8/18/2024 1545  Last data filed at 8/17/2024 2000  Gross per 24 hour   Intake 360 ml   Output 125 ml   Net 235 ml       Laboratory  Recent Labs     08/17/24  0123 08/17/24  0309 08/18/24  0051   WBC 4.1* 4.1* 4.5*   RBC 2.48* 2.43* 2.68*   HEMOGLOBIN 8.2* 8.1* 9.3*   HEMATOCRIT 25.1* 24.6* 27.0*   .2* 101.2* 100.7*   MCH 33.1* 33.3* 34.7*   MCHC 32.7 32.9 34.4   RDW 56.1* 55.3* 56.1*   PLATELETCT 84* 80* 96*   MPV 11.0 10.5 10.5     Recent Labs     08/17/24  0123 08/17/24  0309 08/18/24  0051   SODIUM 130* 131* 133*   POTASSIUM 3.9 3.8 3.9   CHLORIDE 104 104 103   CO2 20 22 20   GLUCOSE 125* 126* 116*   BUN 9 8 6*   CREATININE 0.65 0.68 0.69   CALCIUM 7.4* 7.5* 8.0*                     Imaging  IR-PICC LINE PLACEMENT W/ GUIDANCE > AGE 5   Final Result                  Ultrasound-guided PICC placement performed by qualified nursing staff as    above.          US-ABDOMEN LTD (SOFT TISSUE)   Final Result      1.  Trace ascites in the right upper quadrant/perihepatic area.   2.  Insufficient ascites for paracentesis at this time.      US-RUQ   Final Result         1.  Echogenic liver, compatible with fatty change versus fibrosis.   2.  Common bile duct dilatation, within physiologic limits for patient's stated age, consider causes of biliary obstruction with additional workup as clinically appropriate.   3.  Cholelithiasis without additional sonographic findings of acute cholecystitis.      OUTSIDE IMAGES-DX ABDOMEN   Final Result           Assessment/Plan  * Acute GI bleeding- (present on admission)  Assessment & Plan  With history of liver cirrhosis      No significant ascites on  ultrasound        EGD completed 8/15/24  1. Esophagus: GERD grade B. Trace EV x 1, not requiring treatment.   2. Stomach: Extensive PHG in body, likely recent bleeding but no active bleeding now.   3. Duodenum: Grossly normal.   Large amount of medication residua in stomach, removed by us.     Bleeding clinically resolved continue Prilosec and close clinical monitoring    Electrolyte abnormality  Assessment & Plan  Hypomagnesemia  I ordered oral supplements  Monitor electrolytes I ordered repeat chemistry panel    Alcoholic cirrhosis (HCC)  Assessment & Plan  Reinforced alcohol cessation      Duodenal ulcer- (present on admission)  Assessment & Plan  History of no evidence of recurrence on repeat EGD     Alcohol dependence with withdrawal delirium (HCC)- (present on admission)  Assessment & Plan  Continue IV thiamine and folate    Counseled patient on cessation    Encourage mobilization PT eval    Acute blood loss anemia- (present on admission)  Assessment & Plan  Monitor hemoglobin and transfuse if less than 7  Hemoglobin 9.3 I ordered repeat CBC         VTE prophylaxis:   SCDs/TEDs      I have performed a physical exam and reviewed and updated ROS and Plan today (8/18/2024). In review of yesterday's note (8/17/2024), there are no changes except as documented above.

## 2024-08-18 NOTE — THERAPY
Physical Therapy   Initial Evaluation     Patient Name: Chava Mercedes  Age:  74 y.o., Sex:  male  Medical Record #: [id number]  Today's Date: 8/17/2024     Precautions  Precautions: Fall Risk    Assessment  Patient is 74 y.o. male with history of alcohol abuse, alcoholic cirrhosis, ascites who presented 8/12/2024 as direct transfer for evaluation of GI bleed.  Patient reported having black tarry stool for the past 3 days, noted to have coffee-ground emesis earlier today, tremulous, confused. Drinks 3-4 beers a day. S/P EGD with Portal hypertensive gastropathy with possible recent bleeding.  Pt lives alone, spouse is currently in SNF after having a CVA in April. Pt was independent, no AD, prior to admission. Limited resources and support.     Pt currently with generalized weakness, cognitive impairment with delayed verbal and physical reactions. Lack of insight into deficits. Gait is significantly impaired, externally rotated Lower extremities, WBOS, very slow, high fall risk with mobility. Decreased activity tolerance. At this time, pt not appropriate to DC home alone. He would not be able to perform daily tasks at his current functional level. Anticipate pt will benefit from post acute placement upon DC from hospital. Presents with PT clinical presentation of unstable due to pt with high fall risk and cognitive impairments.        Plan    Physical Therapy Initial Treatment Plan   Treatment Plan : Bed Mobility, Gait Training, Neuro Re-Education / Balance, Therapeutic Activities, Therapeutic Exercise, Equipment, Self Care / Home Evaluation  Treatment Frequency: 4 Times per Week  Duration: Until Therapy Goals Met    DC Equipment Recommendations: Unable to determine at this time  Discharge Recommendations: Recommend post-acute placement for additional physical therapy services prior to discharge home (pt min hesistant to post acute initially but may agree with continued education on benefits of post acute rehab. Pt's  "spouse in currently in SNF in [place].)       Subjective    Pt agreed to PT. \"I'm going to go home tomorrow.\" \"I want an A+ report.\"     Objective       08/17/24 1435   Time In/Time Out   Therapy Start Time 1435   Therapy End Time 1515   Total Therapy Time 40   Initial Contact Note    Initial Contact Note Order Received and Verified, Physical Therapy Evaluation in Progress with Full Report to Follow.   Precautions   Precautions Fall Risk   Vitals   Room Air Oximetry 94   Vitals Comments slight SOB, no other s/s distress   Pain 0 - 10 Group   Location Abdomen   Location Orientation Mid;Upper   Therapist Pain Assessment During Activity;Post Activity Pain Same as Prior to Activity;Nurse Notified   Prior Living Situation   Prior Services Home-Independent   Housing / Facility 1 Story Apartment / Condo   Steps Into Home 0   Steps In Home 0   Bathroom Set up Walk In Shower;Grab Bars   Equipment Owned Wheelchair;4-Wheel Walker;Grab Bar(s) By Toilet;Grab Bar(s) In Tub / Shower   Lives with - Patient's Self Care Capacity Alone and Able to Care For Self   Comments currently his wife is at a SNF due to a stroke   Prior Level of Functional Mobility   Comments independent, retired, uses taxi or neighbor for transportation   History of Falls   History of Falls No   Cognition    Cognition / Consciousness X   Speech/ Communication Delayed Responses   Level of Consciousness Alert   Safety Awareness Impaired   Initiation Impaired   Comments pleasant, cooperative, delayed physical and verbal responses,  lack of insight into deficits- pt with min improved insight by end of session and enducation   Active ROM Lower Body    Active ROM Lower Body  WDL   Strength Lower Body   Lower Body Strength  X   Gross Strength Generalized Weakness, Equal Bilaterally   Coordination Lower Body    Coordination Lower Body  WDL   Vision   Vision Comments reading glasses   Balance Assessment   Sitting Balance (Static) Fair +   Sitting Balance (Dynamic) " Fair +   Standing Balance (Static) Fair -  (with FWW)   Standing Balance (Dynamic) Poor +  (with FWW)   Weight Shift Sitting Fair   Weight Shift Standing Fair   Comments pt required COG adjustments at EOB to don socks for balance, standing balance with externally rotated feet and WBOS, no overt LOB   Bed Mobility    Supine to Sit Standby Assist   Sit to Supine Standby Assist   Scooting Standby Assist   Rolling Standby Assist   Comments pt sat up to long sitting for OOB, HOB flat, no bed rail   Gait Analysis   Gait Level Of Assist Minimal Assist   Assistive Device Front Wheel Walker   Distance (Feet) 15   # of Times Distance was Traveled 2   Deviation Bradykinetic;Shuffled Gait;Increased Base Of Support;Other (Comment)  (externally rotation at feet/hips, very slow, forward lean over walker, difficulty dual tasking with amb)   # of Stairs Climbed 0   Functional Mobility   Sit to Stand Contact Guard Assist   Bed, Chair, Wheelchair Transfer Contact Guard Assist   Transfer Method Stand Step   6 Clicks Assessment - How much HELP from from another person do you currently need... (If the patient hasn't done an activity recently, how much help from another person do you think he/she would need if he/she tried?)   Turning from your back to your side while in a flat bed without using bedrails? 3   Moving from lying on your back to sitting on the side of a flat bed without using bedrails? 3   Moving to and from a bed to a chair (including a wheelchair)? 3   Standing up from a chair using your arms (e.g., wheelchair, or bedside chair)? 3   Walking in hospital room? 3   Climbing 3-5 steps with a railing? 2   6 clicks Mobility Score 17   Activity Tolerance   Sitting Edge of Bed 5 minutes   Standing 18 minutes total with standing and walking   Comments limited by weakness, and slow amb, delayed responses   Patient / Family Goals    Patient / Family Goal #1 To go home   Short Term Goals    Short Term Goal # 1 Pt will perform supine  to/from sit with flat bed and no features supervised in 6 visits to progress bed mobility   Short Term Goal # 2 Pt will perform sit to/from stand with FWW supervised in 6 visits to progress transfers   Short Term Goal # 3 Pt will amb with FWW 200ft supervised in 6 visits to progress with functional household mobility.   Education Group   Education Provided Role of Physical Therapist;Gait Training;Use of Assistive Device  (fall prevention, benefits of post acute placement)   Role of Physical Therapist Patient Response Patient;Acceptance;Explanation;Verbal Demonstration   Gait Training Patient Response Patient;Acceptance;Explanation;Action Demonstration;Reinforcement Needed   Use of Assistive Device Patient Response Patient;Acceptance;Explanation;Action Demonstration   Physical Therapy Initial Treatment Plan    Treatment Plan  Bed Mobility;Gait Training;Neuro Re-Education / Balance;Therapeutic Activities;Therapeutic Exercise;Equipment;Self Care / Home Evaluation   Treatment Frequency 4 Times per Week   Duration Until Therapy Goals Met   Problem List    Problems Pain;Impaired Bed Mobility;Impaired Transfers;Impaired Ambulation;Decreased Activity Tolerance;Functional Strength Deficit;Impaired Balance;Safety Awareness Deficits / Cognition;Motor Planning / Sequencing   Anticipated Discharge Equipment and Recommendations   DC Equipment Recommendations Unable to determine at this time   Discharge Recommendations Recommend post-acute placement for additional physical therapy services prior to discharge home  (pt min hesistant to post acute initially but may agree with continued education on benefits of post acute rehab. Pt's spouse in currently in SNF in [place].)   Interdisciplinary Plan of Care Collaboration   IDT Collaboration with  Nursing   Patient Position at End of Therapy In Bed;Bed Alarm On;Call Light within Reach;Tray Table within Reach;Phone within Reach   Collaboration Comments RN updated

## 2024-08-18 NOTE — CARE PLAN
The patient is Stable - Low risk of patient condition declining or worsening    Shift Goals  Clinical Goals: safety, mobilization, monitor labs  Patient Goals: go home, rest  Family Goals: truong    Progress made toward(s) clinical / shift goals:    Problem: Fall Risk  Goal: Patient will remain free from falls  Outcome: Progressing  Note: Treaded socks and bed/strip alarm on, side rails up x 2. Call light within reach. Pt educated to call for assistance. Reinforce as needed. Continue to monitor.         Problem: Skin Integrity  Goal: Skin integrity is maintained or improved  Outcome: Progressing  Note: Assess skin and monitor for skin breakdown. Alleviate pressure to bony prominences and provide assistance with turning, repositioning, ROM and mobility as appropriate. Use of taps system, condom catheter, and pillows as needed. Continue to monitor.

## 2024-08-18 NOTE — DISCHARGE PLANNING
Pt JESSICAK is wife, Amalia, [phone] She is currently in [facility] in [place], [place].    Met with Pt at bedside, he is A&Ox4. Pt lives alone in a duplex in [place]. He has a FWW and Cane at [facility]. No other DME. He is independent with ADL/IADLs except driving, when needed he calls a taxi or a friend. He is medically disabled and receives SSI income of $1350/monthly. No other benefits. Pt endorses adaily alcohol intake of 'one six pack of beer a day'. Denies illicit drug use. Denies MH dx. Pt denies any barriers to meeting his basic needs.  His wife, Amalia, has been at [facility] for 'about 7 months'. Pt says [facility] is the only SNF he will consider. He will leave 'on his own' if not accepted there. Referral has been sent to [facility].    Medicare A&B coverage    Care Transition Team Assessment    Information Source  Orientation Level: Oriented to place, Oriented to situation, Oriented to person, Disoriented to time  Information Given By: Patient  Who is responsible for making decisions for patient? : Patient    Readmission Evaluation  Is this a readmission?: No    Elopement Risk  Legal Hold: No  Ambulatory or Self Mobile in Wheelchair: Yes  Disoriented: No  Psychiatric Symptoms: None  History of Wandering: No  Elopement this Admit: No  Vocalizing Wanting to Leave: No  Displays Behaviors, Body Language Wanting to Leave: No-Not at Risk for Elopement  Elopement Risk: Not at Risk for Elopement    Interdisciplinary Discharge Planning  Lives with - Patient's Self Care Capacity: Alone and Able to Care For Self  Patient or legal guardian wants to designate a caregiver: No  Support Systems: None  Housing / Facility: 1 Story Apartment / Condo  Prior Services: Home-Independent    Discharge Preparedness  What is your plan after discharge?: Uncertain - pending medical team collaboration  What are your discharge supports?: Spouse  Prior Functional Level: Ambulatory, Independent with Activities of Daily Living,  Independent with Medication Management, Uses Cane, Uses Walker  Difficulity with ADLs: None  Difficulity with IADLs: Driving    Functional Assesment  Prior Functional Level: Ambulatory, Independent with Activities of Daily Living, Independent with Medication Management, Uses Cane, Uses Walker    Finances  Financial Barriers to Discharge: No  Prescription Coverage: Yes    Vision / Hearing Impairment  Vision Impairment : No  Right Eye Vision: Impaired, Wears Glasses  Left Eye Vision: Impaired, Wears Glasses  Hearing Impairment : No     Advance Directive  Advance Directive?: None  Advance Directive offered?: AD Booklet refused    Domestic Abuse  Possible Abuse/Neglect Reported to:: Not Applicable    Psychological Assessment  History of Substance Abuse: Alcohol  Date Last Used - Alcohol: 8/12  History of Psychiatric Problems: No  Non-compliant with Treatment: No  Newly Diagnosed Illness: Yes    Discharge Risks or Barriers  Discharge risks or barriers?: Substance abuse  Patient risk factors: Cognitive / sensory / physical deficit    Anticipated Discharge Information  Discharge Disposition: D/T to SNF with Medicare cert in anticipation of skilled care (03)  Discharge Contact Phone Number: Amalia Grant ()

## 2024-08-18 NOTE — CARE PLAN
The patient is Stable - Low risk of patient condition declining or worsening    Shift Goals  Clinical Goals: hemodynamic stability  Patient Goals: rest  Family Goals: truong    Progress made toward(s) clinical / shift goals:    Problem: Knowledge Deficit - Standard  Goal: Patient and family/care givers will demonstrate understanding of plan of care, disease process/condition, diagnostic tests and medications  Outcome: Progressing     Problem: Hemodynamics  Goal: Patient's hemodynamics, fluid balance and neurologic status will be stable or improve  Outcome: Progressing     Problem: Fluid Volume  Goal: Fluid volume balance will be maintained  Outcome: Progressing     Problem: Urinary - Renal Perfusion  Goal: Ability to achieve and maintain adequate renal perfusion and functioning will improve  Outcome: Progressing     Problem: Respiratory  Goal: Patient will achieve/maintain optimum respiratory ventilation and gas exchange  Outcome: Progressing     Problem: Physical Regulation  Goal: Diagnostic test results will improve  Outcome: Progressing  Goal: Signs and symptoms of infection will decrease  Outcome: Progressing     Problem: Pain - Standard  Goal: Alleviation of pain or a reduction in pain to the patient’s comfort goal  Outcome: Progressing     Problem: Optimal Care for Alcohol Withdrawal  Goal: Optimal Care for the alcohol withdrawal patient  Outcome: Progressing     Problem: Seizure Precautions  Goal: Implementation of seizure precautions  Outcome: Progressing     Problem: Lifestyle Changes  Goal: Patient's ability to identify lifestyle changes and available resources to help reduce recurrence of condition will improve  Outcome: Progressing     Problem: Psychosocial  Goal: Patient's level of anxiety will decrease  Outcome: Progressing  Goal: Spiritual and cultural needs incorporated into hospitalization  Outcome: Progressing     Problem: Risk for Aspiration  Goal: Patient's risk for aspiration will be absent or  decrease  Outcome: Progressing     Problem: Fall Risk  Goal: Patient will remain free from falls  Outcome: Progressing     Problem: Skin Integrity  Goal: Skin integrity is maintained or improved  Outcome: Progressing       Patient is not progressing towards the following goals:

## 2024-08-18 NOTE — PROGRESS NOTES
Bedside report received from off going RN/tech: Yaz, assumed care of patient.     Fall Risk Score: HIGH RISK  Fall risk interventions in place: Place yellow fall risk ID band on patient, Provide patient/family education based on risk assessment, Educate patient/family to call staff for assistance when getting out of bed, Place fall precaution signage outside patient door, Place patient in room close to nursing station, Utilize bed/chair fall alarm, Notify charge of high risk for huddle, and Bed alarm connected correctly  Bed type: Regular (Mg Score less than 17 interventions in place)  Patient on cardiac monitor: No   IVF/IV medications: Not Applicable   Oxygen: Room Air  Bedside sitter: Not Applicable   Isolation: Not applicable

## 2024-08-19 ENCOUNTER — PHARMACY VISIT (OUTPATIENT)
Dept: PHARMACY | Facility: MEDICAL CENTER | Age: 74
End: 2024-08-19
Payer: COMMERCIAL

## 2024-08-19 VITALS
HEART RATE: 86 BPM | TEMPERATURE: 97.2 F | RESPIRATION RATE: 18 BRPM | DIASTOLIC BLOOD PRESSURE: 73 MMHG | SYSTOLIC BLOOD PRESSURE: 119 MMHG | OXYGEN SATURATION: 94 % | BODY MASS INDEX: 24.55 KG/M2 | WEIGHT: 181.22 LBS | HEIGHT: 72 IN

## 2024-08-19 LAB
ALBUMIN SERPL BCP-MCNC: 2.7 G/DL (ref 3.2–4.9)
ALBUMIN/GLOB SERPL: 0.8 G/DL
ALP SERPL-CCNC: 110 U/L (ref 30–99)
ALT SERPL-CCNC: 25 U/L (ref 2–50)
ANION GAP SERPL CALC-SCNC: 8 MMOL/L (ref 7–16)
AST SERPL-CCNC: 50 U/L (ref 12–45)
BILIRUB SERPL-MCNC: 2.6 MG/DL (ref 0.1–1.5)
BUN SERPL-MCNC: 5 MG/DL (ref 8–22)
CALCIUM ALBUM COR SERPL-MCNC: 8.9 MG/DL (ref 8.5–10.5)
CALCIUM SERPL-MCNC: 7.9 MG/DL (ref 8.5–10.5)
CHLORIDE SERPL-SCNC: 105 MMOL/L (ref 96–112)
CO2 SERPL-SCNC: 23 MMOL/L (ref 20–33)
CREAT SERPL-MCNC: 0.66 MG/DL (ref 0.5–1.4)
ERYTHROCYTE [DISTWIDTH] IN BLOOD BY AUTOMATED COUNT: 55 FL (ref 35.9–50)
GFR SERPLBLD CREATININE-BSD FMLA CKD-EPI: 99 ML/MIN/1.73 M 2
GLOBULIN SER CALC-MCNC: 3.5 G/DL (ref 1.9–3.5)
GLUCOSE SERPL-MCNC: 104 MG/DL (ref 65–99)
HCT VFR BLD AUTO: 26.5 % (ref 42–52)
HGB BLD-MCNC: 8.8 G/DL (ref 14–18)
MAGNESIUM SERPL-MCNC: 1.7 MG/DL (ref 1.5–2.5)
MCH RBC QN AUTO: 33.2 PG (ref 27–33)
MCHC RBC AUTO-ENTMCNC: 33.2 G/DL (ref 32.3–36.5)
MCV RBC AUTO: 100 FL (ref 81.4–97.8)
PLATELET # BLD AUTO: 96 K/UL (ref 164–446)
PLATELETS.RETICULATED NFR BLD AUTO: 5.7 % (ref 0.6–13.1)
PMV BLD AUTO: 10.6 FL (ref 9–12.9)
POTASSIUM SERPL-SCNC: 3.7 MMOL/L (ref 3.6–5.5)
PROT SERPL-MCNC: 6.2 G/DL (ref 6–8.2)
RBC # BLD AUTO: 2.65 M/UL (ref 4.7–6.1)
SODIUM SERPL-SCNC: 136 MMOL/L (ref 135–145)
WBC # BLD AUTO: 3.9 K/UL (ref 4.8–10.8)

## 2024-08-19 PROCEDURE — 700102 HCHG RX REV CODE 250 W/ 637 OVERRIDE(OP): Performed by: STUDENT IN AN ORGANIZED HEALTH CARE EDUCATION/TRAINING PROGRAM

## 2024-08-19 PROCEDURE — 700102 HCHG RX REV CODE 250 W/ 637 OVERRIDE(OP): Performed by: HOSPITALIST

## 2024-08-19 PROCEDURE — 85055 RETICULATED PLATELET ASSAY: CPT

## 2024-08-19 PROCEDURE — 85027 COMPLETE CBC AUTOMATED: CPT

## 2024-08-19 PROCEDURE — 700102 HCHG RX REV CODE 250 W/ 637 OVERRIDE(OP)

## 2024-08-19 PROCEDURE — A9270 NON-COVERED ITEM OR SERVICE: HCPCS

## 2024-08-19 PROCEDURE — A9270 NON-COVERED ITEM OR SERVICE: HCPCS | Performed by: STUDENT IN AN ORGANIZED HEALTH CARE EDUCATION/TRAINING PROGRAM

## 2024-08-19 PROCEDURE — RXMED WILLOW AMBULATORY MEDICATION CHARGE: Performed by: HOSPITALIST

## 2024-08-19 PROCEDURE — A9270 NON-COVERED ITEM OR SERVICE: HCPCS | Performed by: HOSPITALIST

## 2024-08-19 PROCEDURE — 83735 ASSAY OF MAGNESIUM: CPT

## 2024-08-19 PROCEDURE — 99239 HOSP IP/OBS DSCHRG MGMT >30: CPT | Performed by: HOSPITALIST

## 2024-08-19 PROCEDURE — 80053 COMPREHEN METABOLIC PANEL: CPT

## 2024-08-19 RX ORDER — POTASSIUM CHLORIDE 1500 MG/1
20 TABLET, EXTENDED RELEASE ORAL ONCE
Status: COMPLETED | OUTPATIENT
Start: 2024-08-19 | End: 2024-08-19

## 2024-08-19 RX ORDER — ACETAMINOPHEN 500 MG
500 TABLET ORAL EVERY 6 HOURS PRN
COMMUNITY
Start: 2024-08-19

## 2024-08-19 RX ADMIN — ACETAMINOPHEN 650 MG: 325 TABLET ORAL at 02:44

## 2024-08-19 RX ADMIN — Medication 950 MG: at 05:35

## 2024-08-19 RX ADMIN — ACETAMINOPHEN 650 MG: 325 TABLET ORAL at 08:57

## 2024-08-19 RX ADMIN — FOLIC ACID 1 MG: 1 TABLET ORAL at 05:38

## 2024-08-19 RX ADMIN — MAGNESIUM 64 MG (MAGNESIUM CHLORIDE) TABLET,DELAYED RELEASE 64 MG: at 05:34

## 2024-08-19 RX ADMIN — POTASSIUM CHLORIDE 20 MEQ: 1500 TABLET, EXTENDED RELEASE ORAL at 08:45

## 2024-08-19 RX ADMIN — OMEPRAZOLE 20 MG: 20 CAPSULE, DELAYED RELEASE ORAL at 05:35

## 2024-08-19 RX ADMIN — CYANOCOBALAMIN TAB 500 MCG 1000 MCG: 500 TAB at 05:35

## 2024-08-19 ASSESSMENT — SOCIAL DETERMINANTS OF HEALTH (SDOH)

## 2024-08-19 ASSESSMENT — PAIN DESCRIPTION - PAIN TYPE: TYPE: ACUTE PAIN

## 2024-08-19 ASSESSMENT — FIBROSIS 4 INDEX: FIB4 SCORE: 7.6

## 2024-08-19 NOTE — PROGRESS NOTES
Ambulated to [facility]. Dr Dacia Ramos witnessed - stated he will order a walker for home. Patient requesting assistance for a ride home - case management notified

## 2024-08-19 NOTE — CARE PLAN
The patient is Stable - Low risk of patient condition declining or worsening    Shift Goals  Clinical Goals: patient will remain free from falls and report no s/s bleeding through shift  Patient Goals: go home  Family Goals: updated on poc    Progress made toward(s) clinical / shift goals:    Problem: Hemodynamics  Goal: Patient's hemodynamics, fluid balance and neurologic status will be stable or improve  Outcome: Progressing  Note: VSS on ra.      Problem: Pain - Standard  Goal: Alleviation of pain or a reduction in pain to the patient’s comfort goal  Outcome: Progressing  Note: Back pain controlled with prn tylenol.      Problem: Seizure Precautions  Goal: Implementation of seizure precautions  Outcome: Progressing       Patient is not progressing towards the following goals:

## 2024-08-19 NOTE — PROGRESS NOTES
All dc paperwork discussed at bedside. PICC removed. All belongings gathered. FWW delivered to bedside. Medical transport to arrive between [id number].

## 2024-08-19 NOTE — PROGRESS NOTES
Bedside report received from ABRAHAM Crandall. Patient resting in bed. Call bell within reach, bed alarm on and in place. All belongings within reach for patient. No further needs at this time.

## 2024-08-19 NOTE — DISCHARGE PLANNING
Case Management Discharge Planning    Admission Date: 8/12/2024  GMLOS: 3  ALOS: 7    6-Clicks ADL Score: 13  6-Clicks Mobility Score: 17        Anticipated Discharge Dispo: Discharge Disposition: Discharged to home/self care (01)  Discharge Contact Phone Number: Amalia Grant ()    DME Needed: No    Action(s) Taken: Pt discussed in IDT rounds pt does not want to go to SNF. Pt needs transportation home. LMSW requested ride line request. Pending confirmation.     0937 LMSW called hospital schedulers to help assist pt with a PCP appointment.

## 2024-08-19 NOTE — DISCHARGE PLANNING
DC Transport Scheduled    Transport Company Scheduled:  [employer] [phone]  Spoke with Antonia at [facility] to schedule [facility] Trip #: [id number]    Scheduled Date: 8/19/2024  Scheduled Time: 8443-5131    Destination: Home   Destination address: [address] [facility] [address]    Notified care team of scheduled transport via Voalte.     If there are any changes needed to the DC transportation scheduled, please contact [facility] at ext. [phone] between the hours of 3487-2468 Mon-Fri. If outside those hours, contact the ED Case Manager at ext. [phone].

## 2024-08-19 NOTE — DISCHARGE SUMMARY
Discharge Summary    CHIEF COMPLAINT ON ADMISSION  No chief complaint on file.      Reason for Admission  GI bleed     Admission Date  8/12/2024    CODE STATUS  DNAR/DNI    HPI & HOSPITAL COURSE    Chava Mercedes is a 74 y.o. male with history of alcohol abuse, alcoholic cirrhosis who presented 8/12/2024 as direct transfer for evaluation of GI bleed.  Patient reported having black tarry stool for the past 3 days, noted to have coffee-ground emesis earlier today.  Therefore he presented to [place] [facility] where he was reported to have more witnessed coffee-ground emesis while in the emergency room.     The patient was admitted started on treatment for alcohol drawl with benzodiazepines and vitamins and electrolyte repletion.  He was evaluated by GI and underwent upper endoscopy that revealed esophagitis trace esophageal varices x 1 and extensive portal hypertensive gastropathy.  He was initially on octreotide and IV Protonix subsequently transition to Prilosec.    Patient symptoms improved his alcohol drawl symptoms have resolved.  His diet was advanced and his hemoglobin remained stable with no clinical signs of recurrent bleed.  He was extensively counseled on the importance of alcohol cessation.    He was evaluated by PT OT with initial recommendation for SNF referral which was initiated however the patient functional status is improved on my evaluation today he declines going to SNF and would like to go home.  He tolerated mobilization with walker without assistance with steady gait.  I asked case management to assist him with transportation home to [place] and assist him with establishing with PCP    Therefore, he is discharged in good and stable condition to home with close outpatient follow-up.    The patient met 2-midnight criteria for an inpatient stay at the time of discharge.    Discharge Date  8/19/2024    FOLLOW UP ITEMS POST DISCHARGE  Establish with PCP  Repeat CBC and CMP at follow-up  Continue to  abstain from alcohol    DISCHARGE DIAGNOSES  Principal Problem:    Acute GI bleeding (POA: Yes)  Active Problems:    Acute blood loss anemia (POA: Yes)    Alcohol dependence with withdrawal delirium (HCC) (POA: Yes)    Duodenal ulcer (POA: Yes)    Alcoholic cirrhosis (HCC) (POA: Unknown)    ACP (advance care planning) (POA: Unknown)    Electrolyte abnormality (POA: Unknown)  Resolved Problems:    Lactic acidosis (POA: Yes)    Hematemesis with nausea (POA: Yes)      FOLLOW UP  No future appointments.  [id number] [place] [id number] [phone]  Follow up  Appts and Walk-Ins M-F 8am-5pm  Sliding Scale      MEDICATIONS ON DISCHARGE     Medication List        START taking these medications        Instructions   acetaminophen 500 MG Tabs  Commonly known as: Tylenol   Take 1 Tablet by mouth every 6 hours as needed for Mild Pain or Moderate Pain.  Dose: 500 mg            CONTINUE taking these medications        Instructions   omeprazole 20 MG delayed-release capsule  Commonly known as: PriLOSEC   Take 1 Capsule by mouth every day.  Dose: 20 mg              Allergies  No Known Allergies    DIET  Orders Placed This Encounter   Procedures    Diet Order Diet: Low Fiber(GI Soft)     Standing Status:   Standing     Number of Occurrences:   1     Order Specific Question:   Diet:     Answer:   Low Fiber(GI Soft) [2]       ACTIVITY  As tolerated.  Weight bearing as tolerated    CONSULTATIONS  GI    PROCEDURES    OPERATIVE REPORT     PATIENT:   Chava Mercedes   1950         PREOPERATIVE DIAGNOSES/INDICATIONS: Upper GI bleeding.      POSTOPERATIVE DIAGNOSIS:   Portal hypertensive gastropathy with possible recent bleeding.      PROCEDURE:  ESOPHAGOGASTRODUODENOSCOPY     PHYSICIAN:  Vinay Aguilar MD. MPH.    LABORATORY  Lab Results   Component Value Date    SODIUM 136 08/19/2024    POTASSIUM 3.7 08/19/2024    CHLORIDE 105 08/19/2024    CO2 23 08/19/2024    GLUCOSE 104 (H)  08/19/2024    BUN 5 (L) 08/19/2024    CREATININE 0.66 08/19/2024        Lab Results   Component Value Date    WBC 3.9 (L) 08/19/2024    HEMOGLOBIN 8.8 (L) 08/19/2024    HEMATOCRIT 26.5 (L) 08/19/2024    PLATELETCT 96 (L) 08/19/2024        Total time of the discharge process exceeds 35 minutes.

## 2024-08-19 NOTE — PROGRESS NOTES
Bedside report received from off going RN/tech: Nate, assumed care of patient.     Fall Risk Score: HIGH RISK  Fall risk interventions in place: Place yellow fall risk ID band on patient, Provide patient/family education based on risk assessment, Educate patient/family to call staff for assistance when getting out of bed, Place fall precaution signage outside patient door, Utilize bed/chair fall alarm, Notify charge of high risk for huddle, and Bed alarm connected correctly  Bed type: Regular (Mg Score less than 17 interventions in place)  Patient on cardiac monitor: No   IVF/IV medications: Not Applicable   Oxygen: Room Air  Bedside sitter: Not Applicable   Isolation: Not applicable

## 2024-08-19 NOTE — CARE PLAN
The patient is Stable - Low risk of patient condition declining or worsening    Shift Goals  Clinical Goals: safety  Patient Goals: rest  Family Goals: truong    Progress made toward(s) clinical / shift goals:    Problem: Fall Risk  Goal: Patient will remain free from falls  Outcome: Progressing  Note: Treaded socks and bed/strip alarm on, side rails up x 3. Call light within reach. Pt educated to call for assistance. Reinforce as needed. Continue to monitor.         Problem: Skin Integrity  Goal: Skin integrity is maintained or improved  Outcome: Progressing  Note: Assess skin and monitor for skin breakdown. Alleviate pressure to bony prominences and provide assistance with turning, repositioning, ROM and mobility as appropriate. Use of condom catheter, and pillows as needed. Continue to monitor.

## 2024-10-18 ENCOUNTER — HOSPITAL ENCOUNTER (OUTPATIENT)
Dept: RADIOLOGY | Facility: MEDICAL CENTER | Age: 74
End: 2024-10-18
Payer: MEDICARE

## 2024-10-19 ENCOUNTER — ANESTHESIA (OUTPATIENT)
Dept: SURGERY | Facility: MEDICAL CENTER | Age: 74
DRG: 378 | End: 2024-10-19
Payer: MEDICARE

## 2024-10-19 ENCOUNTER — ANESTHESIA EVENT (OUTPATIENT)
Dept: SURGERY | Facility: MEDICAL CENTER | Age: 74
DRG: 378 | End: 2024-10-19
Payer: MEDICARE

## 2024-10-19 ENCOUNTER — HOSPITAL ENCOUNTER (INPATIENT)
Facility: MEDICAL CENTER | Age: 74
LOS: 4 days | DRG: 378 | End: 2024-10-23
Attending: HOSPITALIST | Admitting: HOSPITALIST
Payer: MEDICARE

## 2024-10-19 DIAGNOSIS — K42.0 UMBILICAL HERNIA, INCARCERATED: ICD-10-CM

## 2024-10-19 DIAGNOSIS — Z71.89 ACP (ADVANCE CARE PLANNING): ICD-10-CM

## 2024-10-19 DIAGNOSIS — D68.9 COAGULOPATHY (HCC): ICD-10-CM

## 2024-10-19 DIAGNOSIS — N50.89 SCROTAL SWELLING: ICD-10-CM

## 2024-10-19 DIAGNOSIS — D53.9 MACROCYTIC ANEMIA: ICD-10-CM

## 2024-10-19 DIAGNOSIS — K26.9 DUODENAL ULCER: ICD-10-CM

## 2024-10-19 DIAGNOSIS — R57.9 SHOCK (HCC): ICD-10-CM

## 2024-10-19 DIAGNOSIS — E43 SEVERE PROTEIN-CALORIE MALNUTRITION (HCC): ICD-10-CM

## 2024-10-19 DIAGNOSIS — D62 ACUTE BLOOD LOSS ANEMIA: ICD-10-CM

## 2024-10-19 DIAGNOSIS — F10.231 ALCOHOL DEPENDENCE WITH WITHDRAWAL DELIRIUM (HCC): ICD-10-CM

## 2024-10-19 DIAGNOSIS — R91.1 PULMONARY NODULE: ICD-10-CM

## 2024-10-19 DIAGNOSIS — E87.8 ELECTROLYTE ABNORMALITY: ICD-10-CM

## 2024-10-19 DIAGNOSIS — I82.90 THROMBUS: ICD-10-CM

## 2024-10-19 DIAGNOSIS — K92.2 ACUTE UPPER GI BLEED: ICD-10-CM

## 2024-10-19 DIAGNOSIS — G93.40 ENCEPHALOPATHY ACUTE: ICD-10-CM

## 2024-10-19 DIAGNOSIS — K92.2 ACUTE GI BLEEDING: ICD-10-CM

## 2024-10-19 DIAGNOSIS — N43.3 HYDROCELE IN ADULT: ICD-10-CM

## 2024-10-19 DIAGNOSIS — K40.91 UNILATERAL RECURRENT INGUINAL HERNIA WITHOUT OBSTRUCTION OR GANGRENE: ICD-10-CM

## 2024-10-19 DIAGNOSIS — K76.0 HEPATIC STEATOSIS: ICD-10-CM

## 2024-10-19 DIAGNOSIS — K70.31 ALCOHOLIC CIRRHOSIS OF LIVER WITH ASCITES (HCC): ICD-10-CM

## 2024-10-19 DIAGNOSIS — K22.719 BARRETT'S ESOPHAGUS WITH DYSPLASIA: ICD-10-CM

## 2024-10-19 DIAGNOSIS — D61.818 PANCYTOPENIA (HCC): ICD-10-CM

## 2024-10-19 DIAGNOSIS — R31.0 GROSS HEMATURIA: ICD-10-CM

## 2024-10-19 DIAGNOSIS — F10.239 ALCOHOL DEPENDENCE WITH WITHDRAWAL WITH COMPLICATION (HCC): ICD-10-CM

## 2024-10-19 DIAGNOSIS — J90 PLEURAL EFFUSION ON LEFT: ICD-10-CM

## 2024-10-19 DIAGNOSIS — F10.931 ALCOHOL WITHDRAWAL DELIRIUM (HCC): ICD-10-CM

## 2024-10-19 DIAGNOSIS — F10.931 ALCOHOL WITHDRAWAL SYNDROME, WITH DELIRIUM (HCC): ICD-10-CM

## 2024-10-19 DIAGNOSIS — K70.9 ALCOHOLIC LIVER DISEASE (HCC): ICD-10-CM

## 2024-10-19 DIAGNOSIS — D69.6 THROMBOCYTOPENIA (HCC): ICD-10-CM

## 2024-10-19 LAB
ANION GAP SERPL CALC-SCNC: 15 MMOL/L (ref 7–16)
BUN SERPL-MCNC: 9 MG/DL (ref 8–22)
CALCIUM SERPL-MCNC: 8.2 MG/DL (ref 8.4–10.2)
CHLORIDE SERPL-SCNC: 101 MMOL/L (ref 96–112)
CO2 SERPL-SCNC: 19 MMOL/L (ref 20–33)
CREAT SERPL-MCNC: 0.66 MG/DL (ref 0.5–1.4)
ERYTHROCYTE [DISTWIDTH] IN BLOOD BY AUTOMATED COUNT: 64.5 FL (ref 35.9–50)
FERRITIN SERPL-MCNC: 52.2 NG/ML (ref 22–322)
GFR SERPLBLD CREATININE-BSD FMLA CKD-EPI: 99 ML/MIN/1.73 M 2
GLUCOSE SERPL-MCNC: 86 MG/DL (ref 65–99)
HCT VFR BLD AUTO: 23.7 % (ref 42–52)
HCT VFR BLD AUTO: 24.3 % (ref 42–52)
HCT VFR BLD AUTO: 25 % (ref 42–52)
HCT VFR BLD AUTO: 27.1 % (ref 42–52)
HGB BLD-MCNC: 7.3 G/DL (ref 14–18)
HGB BLD-MCNC: 7.5 G/DL (ref 14–18)
HGB BLD-MCNC: 7.7 G/DL (ref 14–18)
HGB BLD-MCNC: 8.3 G/DL (ref 14–18)
IRON SATN MFR SERPL: 33 % (ref 15–55)
IRON SERPL-MCNC: 83 UG/DL (ref 50–180)
MCH RBC QN AUTO: 27.6 PG (ref 27–33)
MCHC RBC AUTO-ENTMCNC: 30.8 G/DL (ref 32.3–36.5)
MCV RBC AUTO: 89.6 FL (ref 81.4–97.8)
PLATELET # BLD AUTO: 88 K/UL (ref 164–446)
PLATELETS.RETICULATED NFR BLD AUTO: 7.2 % (ref 0.6–13.1)
PMV BLD AUTO: 10.8 FL (ref 9–12.9)
POTASSIUM SERPL-SCNC: 4.7 MMOL/L (ref 3.6–5.5)
RBC # BLD AUTO: 2.79 M/UL (ref 4.7–6.1)
SODIUM SERPL-SCNC: 135 MMOL/L (ref 135–145)
TIBC SERPL-MCNC: 249 UG/DL (ref 250–450)
UIBC SERPL-MCNC: 166 UG/DL (ref 110–370)
WBC # BLD AUTO: 5.2 K/UL (ref 4.8–10.8)

## 2024-10-19 PROCEDURE — 700101 HCHG RX REV CODE 250: Performed by: STUDENT IN AN ORGANIZED HEALTH CARE EDUCATION/TRAINING PROGRAM

## 2024-10-19 PROCEDURE — 83550 IRON BINDING TEST: CPT

## 2024-10-19 PROCEDURE — 700111 HCHG RX REV CODE 636 W/ 250 OVERRIDE (IP): Mod: JZ | Performed by: STUDENT IN AN ORGANIZED HEALTH CARE EDUCATION/TRAINING PROGRAM

## 2024-10-19 PROCEDURE — 160002 HCHG RECOVERY MINUTES (STAT): Performed by: INTERNAL MEDICINE

## 2024-10-19 PROCEDURE — 700105 HCHG RX REV CODE 258: Performed by: STUDENT IN AN ORGANIZED HEALTH CARE EDUCATION/TRAINING PROGRAM

## 2024-10-19 PROCEDURE — 700102 HCHG RX REV CODE 250 W/ 637 OVERRIDE(OP): Performed by: INTERNAL MEDICINE

## 2024-10-19 PROCEDURE — 99223 1ST HOSP IP/OBS HIGH 75: CPT | Performed by: HOSPITALIST

## 2024-10-19 PROCEDURE — 160048 HCHG OR STATISTICAL LEVEL 1-5: Performed by: INTERNAL MEDICINE

## 2024-10-19 PROCEDURE — 160035 HCHG PACU - 1ST 60 MINS PHASE I: Performed by: INTERNAL MEDICINE

## 2024-10-19 PROCEDURE — 700111 HCHG RX REV CODE 636 W/ 250 OVERRIDE (IP): Mod: JZ | Performed by: HOSPITALIST

## 2024-10-19 PROCEDURE — 700111 HCHG RX REV CODE 636 W/ 250 OVERRIDE (IP): Performed by: STUDENT IN AN ORGANIZED HEALTH CARE EDUCATION/TRAINING PROGRAM

## 2024-10-19 PROCEDURE — 160207 HCHG ENDO MINUTES - EA ADDL 1 MIN LEVEL 3: Performed by: INTERNAL MEDICINE

## 2024-10-19 PROCEDURE — 160202 HCHG ENDO MINUTES - 1ST 30 MINS LEVEL 3: Performed by: INTERNAL MEDICINE

## 2024-10-19 PROCEDURE — 85027 COMPLETE CBC AUTOMATED: CPT

## 2024-10-19 PROCEDURE — 700111 HCHG RX REV CODE 636 W/ 250 OVERRIDE (IP): Mod: JA | Performed by: HOSPITALIST

## 2024-10-19 PROCEDURE — 160036 HCHG PACU - EA ADDL 30 MINS PHASE I: Performed by: INTERNAL MEDICINE

## 2024-10-19 PROCEDURE — 0DJ08ZZ INSPECTION OF UPPER INTESTINAL TRACT, VIA NATURAL OR ARTIFICIAL OPENING ENDOSCOPIC: ICD-10-PCS | Performed by: INTERNAL MEDICINE

## 2024-10-19 PROCEDURE — 85014 HEMATOCRIT: CPT

## 2024-10-19 PROCEDURE — A9270 NON-COVERED ITEM OR SERVICE: HCPCS | Performed by: HOSPITALIST

## 2024-10-19 PROCEDURE — 700102 HCHG RX REV CODE 250 W/ 637 OVERRIDE(OP): Performed by: HOSPITALIST

## 2024-10-19 PROCEDURE — 160009 HCHG ANES TIME/MIN: Performed by: INTERNAL MEDICINE

## 2024-10-19 PROCEDURE — 36415 COLL VENOUS BLD VENIPUNCTURE: CPT

## 2024-10-19 PROCEDURE — 700105 HCHG RX REV CODE 258: Performed by: HOSPITALIST

## 2024-10-19 PROCEDURE — 85055 RETICULATED PLATELET ASSAY: CPT

## 2024-10-19 PROCEDURE — 83540 ASSAY OF IRON: CPT

## 2024-10-19 PROCEDURE — A9270 NON-COVERED ITEM OR SERVICE: HCPCS | Performed by: INTERNAL MEDICINE

## 2024-10-19 PROCEDURE — HZ2ZZZZ DETOXIFICATION SERVICES FOR SUBSTANCE ABUSE TREATMENT: ICD-10-PCS | Performed by: HOSPITALIST

## 2024-10-19 PROCEDURE — 700111 HCHG RX REV CODE 636 W/ 250 OVERRIDE (IP)

## 2024-10-19 PROCEDURE — 80048 BASIC METABOLIC PNL TOTAL CA: CPT

## 2024-10-19 PROCEDURE — 82728 ASSAY OF FERRITIN: CPT

## 2024-10-19 PROCEDURE — 85018 HEMOGLOBIN: CPT

## 2024-10-19 PROCEDURE — 770020 HCHG ROOM/CARE - TELE (206)

## 2024-10-19 RX ORDER — LORAZEPAM 1 MG/1
1 TABLET ORAL EVERY 4 HOURS PRN
Status: DISCONTINUED | OUTPATIENT
Start: 2024-10-19 | End: 2024-10-24 | Stop reason: HOSPADM

## 2024-10-19 RX ORDER — DIPHENHYDRAMINE HYDROCHLORIDE 50 MG/ML
12.5 INJECTION INTRAMUSCULAR; INTRAVENOUS
Status: DISCONTINUED | OUTPATIENT
Start: 2024-10-19 | End: 2024-10-19 | Stop reason: HOSPADM

## 2024-10-19 RX ORDER — ALBUTEROL SULFATE 5 MG/ML
2.5 SOLUTION RESPIRATORY (INHALATION)
Status: DISCONTINUED | OUTPATIENT
Start: 2024-10-19 | End: 2024-10-19 | Stop reason: HOSPADM

## 2024-10-19 RX ORDER — FERROUS SULFATE 325(65) MG
325 TABLET ORAL DAILY
COMMUNITY
End: 2024-10-31

## 2024-10-19 RX ORDER — HYDROMORPHONE HYDROCHLORIDE 1 MG/ML
0.1 INJECTION, SOLUTION INTRAMUSCULAR; INTRAVENOUS; SUBCUTANEOUS
Status: DISCONTINUED | OUTPATIENT
Start: 2024-10-19 | End: 2024-10-19 | Stop reason: HOSPADM

## 2024-10-19 RX ORDER — ONDANSETRON 4 MG/1
4 TABLET, ORALLY DISINTEGRATING ORAL EVERY 4 HOURS PRN
Status: DISCONTINUED | OUTPATIENT
Start: 2024-10-19 | End: 2024-10-24 | Stop reason: HOSPADM

## 2024-10-19 RX ORDER — PANTOPRAZOLE SODIUM 40 MG/10ML
40 INJECTION, POWDER, LYOPHILIZED, FOR SOLUTION INTRAVENOUS 2 TIMES DAILY
Status: DISCONTINUED | OUTPATIENT
Start: 2024-10-19 | End: 2024-10-19

## 2024-10-19 RX ORDER — ONDANSETRON 2 MG/ML
4 INJECTION INTRAMUSCULAR; INTRAVENOUS EVERY 4 HOURS PRN
Status: DISCONTINUED | OUTPATIENT
Start: 2024-10-19 | End: 2024-10-24 | Stop reason: HOSPADM

## 2024-10-19 RX ORDER — EPHEDRINE SULFATE 50 MG/ML
5 INJECTION, SOLUTION INTRAVENOUS
Status: DISCONTINUED | OUTPATIENT
Start: 2024-10-19 | End: 2024-10-19 | Stop reason: HOSPADM

## 2024-10-19 RX ORDER — ONDANSETRON 2 MG/ML
4 INJECTION INTRAMUSCULAR; INTRAVENOUS
Status: COMPLETED | OUTPATIENT
Start: 2024-10-19 | End: 2024-10-19

## 2024-10-19 RX ORDER — OCTREOTIDE ACETATE 200 UG/ML
INJECTION INTRAVENOUS
Status: COMPLETED
Start: 2024-10-19 | End: 2024-10-19

## 2024-10-19 RX ORDER — OXYCODONE HCL 5 MG/5 ML
5 SOLUTION, ORAL ORAL
Status: DISCONTINUED | OUTPATIENT
Start: 2024-10-19 | End: 2024-10-19 | Stop reason: HOSPADM

## 2024-10-19 RX ORDER — ONDANSETRON 2 MG/ML
INJECTION INTRAMUSCULAR; INTRAVENOUS PRN
Status: DISCONTINUED | OUTPATIENT
Start: 2024-10-19 | End: 2024-10-19 | Stop reason: SURG

## 2024-10-19 RX ORDER — HALOPERIDOL 5 MG/ML
1 INJECTION INTRAMUSCULAR
Status: DISCONTINUED | OUTPATIENT
Start: 2024-10-19 | End: 2024-10-19 | Stop reason: HOSPADM

## 2024-10-19 RX ORDER — HYDROMORPHONE HYDROCHLORIDE 1 MG/ML
0.4 INJECTION, SOLUTION INTRAMUSCULAR; INTRAVENOUS; SUBCUTANEOUS
Status: DISCONTINUED | OUTPATIENT
Start: 2024-10-19 | End: 2024-10-19 | Stop reason: HOSPADM

## 2024-10-19 RX ORDER — HYDROMORPHONE HYDROCHLORIDE 1 MG/ML
0.2 INJECTION, SOLUTION INTRAMUSCULAR; INTRAVENOUS; SUBCUTANEOUS
Status: DISCONTINUED | OUTPATIENT
Start: 2024-10-19 | End: 2024-10-19 | Stop reason: HOSPADM

## 2024-10-19 RX ORDER — LORAZEPAM 1 MG/1
3 TABLET ORAL
Status: DISCONTINUED | OUTPATIENT
Start: 2024-10-19 | End: 2024-10-24 | Stop reason: HOSPADM

## 2024-10-19 RX ORDER — HYDRALAZINE HYDROCHLORIDE 20 MG/ML
5 INJECTION INTRAMUSCULAR; INTRAVENOUS
Status: DISCONTINUED | OUTPATIENT
Start: 2024-10-19 | End: 2024-10-19 | Stop reason: HOSPADM

## 2024-10-19 RX ORDER — SODIUM CHLORIDE, SODIUM LACTATE, POTASSIUM CHLORIDE, CALCIUM CHLORIDE 600; 310; 30; 20 MG/100ML; MG/100ML; MG/100ML; MG/100ML
INJECTION, SOLUTION INTRAVENOUS
Status: DISCONTINUED | OUTPATIENT
Start: 2024-10-19 | End: 2024-10-19 | Stop reason: SURG

## 2024-10-19 RX ORDER — LORAZEPAM 1 MG/1
4 TABLET ORAL
Status: DISCONTINUED | OUTPATIENT
Start: 2024-10-19 | End: 2024-10-24 | Stop reason: HOSPADM

## 2024-10-19 RX ORDER — OXYCODONE HCL 5 MG/5 ML
10 SOLUTION, ORAL ORAL
Status: DISCONTINUED | OUTPATIENT
Start: 2024-10-19 | End: 2024-10-19 | Stop reason: HOSPADM

## 2024-10-19 RX ORDER — PANTOPRAZOLE SODIUM 40 MG/1
40 TABLET, DELAYED RELEASE ORAL 2 TIMES DAILY
Status: ON HOLD | COMMUNITY
End: 2024-10-28

## 2024-10-19 RX ORDER — UBIDECARENONE 75 MG
100 CAPSULE ORAL DAILY
Status: ON HOLD | COMMUNITY

## 2024-10-19 RX ORDER — SUCRALFATE 1 G/1
1 TABLET ORAL 4 TIMES DAILY
Status: ON HOLD | COMMUNITY

## 2024-10-19 RX ORDER — DEXAMETHASONE SODIUM PHOSPHATE 4 MG/ML
INJECTION, SOLUTION INTRA-ARTICULAR; INTRALESIONAL; INTRAMUSCULAR; INTRAVENOUS; SOFT TISSUE PRN
Status: DISCONTINUED | OUTPATIENT
Start: 2024-10-19 | End: 2024-10-19 | Stop reason: SURG

## 2024-10-19 RX ORDER — LIDOCAINE HYDROCHLORIDE 20 MG/ML
INJECTION, SOLUTION EPIDURAL; INFILTRATION; INTRACAUDAL; PERINEURAL PRN
Status: DISCONTINUED | OUTPATIENT
Start: 2024-10-19 | End: 2024-10-19 | Stop reason: SURG

## 2024-10-19 RX ORDER — ROCURONIUM BROMIDE 10 MG/ML
INJECTION, SOLUTION INTRAVENOUS PRN
Status: DISCONTINUED | OUTPATIENT
Start: 2024-10-19 | End: 2024-10-19 | Stop reason: SURG

## 2024-10-19 RX ORDER — ACETAMINOPHEN 325 MG/1
650 TABLET ORAL EVERY 6 HOURS PRN
Status: DISCONTINUED | OUTPATIENT
Start: 2024-10-19 | End: 2024-10-24 | Stop reason: HOSPADM

## 2024-10-19 RX ORDER — LORAZEPAM 2 MG/ML
0.5 INJECTION INTRAMUSCULAR EVERY 4 HOURS PRN
Status: DISCONTINUED | OUTPATIENT
Start: 2024-10-19 | End: 2024-10-24 | Stop reason: HOSPADM

## 2024-10-19 RX ORDER — LORAZEPAM 2 MG/ML
2 INJECTION INTRAMUSCULAR
Status: DISCONTINUED | OUTPATIENT
Start: 2024-10-19 | End: 2024-10-24 | Stop reason: HOSPADM

## 2024-10-19 RX ORDER — LORAZEPAM 2 MG/ML
1.5 INJECTION INTRAMUSCULAR
Status: DISCONTINUED | OUTPATIENT
Start: 2024-10-19 | End: 2024-10-24 | Stop reason: HOSPADM

## 2024-10-19 RX ORDER — LORAZEPAM 1 MG/1
2 TABLET ORAL
Status: DISCONTINUED | OUTPATIENT
Start: 2024-10-19 | End: 2024-10-24 | Stop reason: HOSPADM

## 2024-10-19 RX ORDER — HYDROMORPHONE HYDROCHLORIDE 1 MG/ML
0.25 INJECTION, SOLUTION INTRAMUSCULAR; INTRAVENOUS; SUBCUTANEOUS ONCE
Status: COMPLETED | OUTPATIENT
Start: 2024-10-19 | End: 2024-10-19

## 2024-10-19 RX ORDER — LABETALOL HYDROCHLORIDE 5 MG/ML
5 INJECTION, SOLUTION INTRAVENOUS
Status: DISCONTINUED | OUTPATIENT
Start: 2024-10-19 | End: 2024-10-19 | Stop reason: HOSPADM

## 2024-10-19 RX ORDER — LORAZEPAM 2 MG/ML
1 INJECTION INTRAMUSCULAR
Status: DISCONTINUED | OUTPATIENT
Start: 2024-10-19 | End: 2024-10-24 | Stop reason: HOSPADM

## 2024-10-19 RX ORDER — LORAZEPAM 0.5 MG/1
0.5 TABLET ORAL EVERY 4 HOURS PRN
Status: DISCONTINUED | OUTPATIENT
Start: 2024-10-19 | End: 2024-10-24 | Stop reason: HOSPADM

## 2024-10-19 RX ADMIN — ONDANSETRON 4 MG: 2 INJECTION INTRAMUSCULAR; INTRAVENOUS at 14:10

## 2024-10-19 RX ADMIN — LIDOCAINE HYDROCHLORIDE 40 MG: 20 INJECTION, SOLUTION EPIDURAL; INFILTRATION; INTRACAUDAL at 13:06

## 2024-10-19 RX ADMIN — OCTREOTIDE ACETATE 1000 MCG: 200 INJECTION, SOLUTION INTRAVENOUS; SUBCUTANEOUS at 03:46

## 2024-10-19 RX ADMIN — DEXAMETHASONE SODIUM PHOSPHATE 8 MG: 4 INJECTION INTRA-ARTICULAR; INTRALESIONAL; INTRAMUSCULAR; INTRAVENOUS; SOFT TISSUE at 13:09

## 2024-10-19 RX ADMIN — ONDANSETRON 4 MG: 2 INJECTION INTRAMUSCULAR; INTRAVENOUS at 09:05

## 2024-10-19 RX ADMIN — HYDROMORPHONE HYDROCHLORIDE 0.25 MG: 1 INJECTION, SOLUTION INTRAMUSCULAR; INTRAVENOUS; SUBCUTANEOUS at 02:33

## 2024-10-19 RX ADMIN — OMEPRAZOLE 20 MG: 20 CAPSULE, DELAYED RELEASE ORAL at 16:03

## 2024-10-19 RX ADMIN — LORAZEPAM 2 MG: 1 TABLET ORAL at 09:04

## 2024-10-19 RX ADMIN — OCTREOTIDE ACETATE 50 MCG/HR: 200 INJECTION, SOLUTION INTRAVENOUS; SUBCUTANEOUS at 03:15

## 2024-10-19 RX ADMIN — SODIUM CHLORIDE, POTASSIUM CHLORIDE, SODIUM LACTATE AND CALCIUM CHLORIDE: 600; 310; 30; 20 INJECTION, SOLUTION INTRAVENOUS at 12:33

## 2024-10-19 RX ADMIN — PANTOPRAZOLE SODIUM 40 MG: 40 INJECTION, POWDER, FOR SOLUTION INTRAVENOUS at 03:23

## 2024-10-19 RX ADMIN — LIDOCAINE HYDROCHLORIDE 10 MG: 20 INJECTION, SOLUTION EPIDURAL; INFILTRATION; INTRACAUDAL at 12:41

## 2024-10-19 RX ADMIN — ROCURONIUM BROMIDE 50 MG: 50 INJECTION, SOLUTION INTRAVENOUS at 13:06

## 2024-10-19 RX ADMIN — CEFTRIAXONE SODIUM 2000 MG: 2 INJECTION, POWDER, FOR SOLUTION INTRAMUSCULAR; INTRAVENOUS at 16:38

## 2024-10-19 RX ADMIN — ONDANSETRON 4 MG: 2 INJECTION INTRAMUSCULAR; INTRAVENOUS at 13:16

## 2024-10-19 RX ADMIN — ACETAMINOPHEN 650 MG: 325 TABLET ORAL at 09:05

## 2024-10-19 RX ADMIN — LORAZEPAM 1 MG: 1 TABLET ORAL at 16:03

## 2024-10-19 RX ADMIN — PROPOFOL 150 MG: 10 INJECTION, EMULSION INTRAVENOUS at 13:06

## 2024-10-19 RX ADMIN — SUGAMMADEX 200 MG: 100 INJECTION, SOLUTION INTRAVENOUS at 13:20

## 2024-10-19 SDOH — ECONOMIC STABILITY: TRANSPORTATION INSECURITY
IN THE PAST 12 MONTHS, HAS LACK OF RELIABLE TRANSPORTATION KEPT YOU FROM MEDICAL APPOINTMENTS, MEETINGS, WORK OR FROM GETTING THINGS NEEDED FOR DAILY LIVING?: NO

## 2024-10-19 SDOH — ECONOMIC STABILITY: TRANSPORTATION INSECURITY
IN THE PAST 12 MONTHS, HAS THE LACK OF TRANSPORTATION KEPT YOU FROM MEDICAL APPOINTMENTS OR FROM GETTING MEDICATIONS?: NO

## 2024-10-19 ASSESSMENT — PATIENT HEALTH QUESTIONNAIRE - PHQ9
9. THOUGHTS THAT YOU WOULD BE BETTER OFF DEAD, OR OF HURTING YOURSELF: NOT AT ALL
7. TROUBLE CONCENTRATING ON THINGS, SUCH AS READING THE NEWSPAPER OR WATCHING TELEVISION: NOT AT ALL
3. TROUBLE FALLING OR STAYING ASLEEP OR SLEEPING TOO MUCH: SEVERAL DAYS
1. LITTLE INTEREST OR PLEASURE IN DOING THINGS: SEVERAL DAYS
4. FEELING TIRED OR HAVING LITTLE ENERGY: SEVERAL DAYS
8. MOVING OR SPEAKING SO SLOWLY THAT OTHER PEOPLE COULD HAVE NOTICED. OR THE OPPOSITE, BEING SO FIGETY OR RESTLESS THAT YOU HAVE BEEN MOVING AROUND A LOT MORE THAN USUAL: NOT AT ALL
5. POOR APPETITE OR OVEREATING: SEVERAL DAYS
2. FEELING DOWN, DEPRESSED, IRRITABLE, OR HOPELESS: SEVERAL DAYS
6. FEELING BAD ABOUT YOURSELF - OR THAT YOU ARE A FAILURE OR HAVE LET YOURSELF OR YOUR FAMILY DOWN: NOT AL ALL
SUM OF ALL RESPONSES TO PHQ QUESTIONS 1-9: 5
SUM OF ALL RESPONSES TO PHQ9 QUESTIONS 1 AND 2: 2

## 2024-10-19 ASSESSMENT — LIFESTYLE VARIABLES
TREMOR: TREMOR NOT VISIBLE BUT CAN BE FELT, FINGERTIP TO FINGERTIP
HEADACHE, FULLNESS IN HEAD: MODERATE
HEADACHE, FULLNESS IN HEAD: NOT PRESENT
TOTAL SCORE: 7
HEADACHE, FULLNESS IN HEAD: MILD
ANXIETY: MILDLY ANXIOUS
AVERAGE NUMBER OF DAYS PER WEEK YOU HAVE A DRINK CONTAINING ALCOHOL: 4
PAROXYSMAL SWEATS: NO SWEAT VISIBLE
VISUAL DISTURBANCES: MILD SENSITIVITY
AUDITORY DISTURBANCES: NOT PRESENT
NAUSEA AND VOMITING: *
NAUSEA AND VOMITING: *
AGITATION: *
VISUAL DISTURBANCES: NOT PRESENT
TREMOR: TREMOR NOT VISIBLE BUT CAN BE FELT, FINGERTIP TO FINGERTIP
ORIENTATION AND CLOUDING OF SENSORIUM: CANNOT DO SERIAL ADDITIONS OR IS UNCERTAIN ABOUT DATE
TOTAL SCORE: 10
DOES PATIENT WANT TO STOP DRINKING: NO
AUDITORY DISTURBANCES: NOT PRESENT
ANXIETY: MILDLY ANXIOUS
NAUSEA AND VOMITING: NO NAUSEA AND NO VOMITING
ORIENTATION AND CLOUDING OF SENSORIUM: ORIENTED AND CAN DO SERIAL ADDITIONS
ANXIETY: NO ANXIETY (AT EASE)
TOTAL SCORE: 1
TOTAL SCORE: 3
VISUAL DISTURBANCES: NOT PRESENT
HAVE YOU EVER FELT YOU SHOULD CUT DOWN ON YOUR DRINKING: NO
VISUAL DISTURBANCES: VERY MILD SENSITIVITY
HOW MANY TIMES IN THE PAST YEAR HAVE YOU HAD 5 OR MORE DRINKS IN A DAY: 40
TOTAL SCORE: 1
VISUAL DISTURBANCES: VERY MILD SENSITIVITY
HEADACHE, FULLNESS IN HEAD: MILD
AGITATION: NORMAL ACTIVITY
TREMOR: NO TREMOR
ALCOHOL_USE: YES
HEADACHE, FULLNESS IN HEAD: NOT PRESENT
CONSUMPTION TOTAL: POSITIVE
NAUSEA AND VOMITING: MILD NAUSEA WITH NO VOMITING
ANXIETY: NO ANXIETY (AT EASE)
ORIENTATION AND CLOUDING OF SENSORIUM: CANNOT DO SERIAL ADDITIONS OR IS UNCERTAIN ABOUT DATE
ANXIETY: NO ANXIETY (AT EASE)
TREMOR: TREMOR NOT VISIBLE BUT CAN BE FELT, FINGERTIP TO FINGERTIP
ON A TYPICAL DAY WHEN YOU DRINK ALCOHOL HOW MANY DRINKS DO YOU HAVE: 6
TOTAL SCORE: VERY MILD ITCHING, PINS AND NEEDLES SENSATION, BURNING OR NUMBNESS
TOTAL SCORE: 3
TOTAL SCORE: VERY MILD ITCHING, PINS AND NEEDLES SENSATION, BURNING OR NUMBNESS
AGITATION: SOMEWHAT MORE THAN NORMAL ACTIVITY
PAROXYSMAL SWEATS: NO SWEAT VISIBLE
TOTAL SCORE: 1
PAROXYSMAL SWEATS: NO SWEAT VISIBLE
AGITATION: NORMAL ACTIVITY
EVER HAD A DRINK FIRST THING IN THE MORNING TO STEADY YOUR NERVES TO GET RID OF A HANGOVER: NO
PAROXYSMAL SWEATS: NO SWEAT VISIBLE
TOTAL SCORE: 14
AUDITORY DISTURBANCES: VERY MILD HARSHNESS OR ABILITY TO FRIGHTEN
HAVE PEOPLE ANNOYED YOU BY CRITICIZING YOUR DRINKING: YES
EVER FELT BAD OR GUILTY ABOUT YOUR DRINKING: NO
PAROXYSMAL SWEATS: BARELY PERCEPTIBLE SWEATING. PALMS MOIST
AUDITORY DISTURBANCES: NOT PRESENT
NAUSEA AND VOMITING: MILD NAUSEA WITH NO VOMITING
AGITATION: SOMEWHAT MORE THAN NORMAL ACTIVITY
AUDITORY DISTURBANCES: NOT PRESENT
ORIENTATION AND CLOUDING OF SENSORIUM: CANNOT DO SERIAL ADDITIONS OR IS UNCERTAIN ABOUT DATE
TREMOR: TREMOR NOT VISIBLE BUT CAN BE FELT, FINGERTIP TO FINGERTIP
ORIENTATION AND CLOUDING OF SENSORIUM: DATE DISORIENTATION BY MORE THAN TWO CALENDAR DAYS

## 2024-10-19 ASSESSMENT — ENCOUNTER SYMPTOMS
NAUSEA: 1
VOMITING: 1
INSOMNIA: 0
PALPITATIONS: 0
BRUISES/BLEEDS EASILY: 0
FEVER: 0
DOUBLE VISION: 0
SHORTNESS OF BREATH: 0
DIZZINESS: 0
HEADACHES: 0
WEAKNESS: 0
COUGH: 0
MYALGIAS: 0
NECK PAIN: 0
DEPRESSION: 0
BLURRED VISION: 0
SORE THROAT: 0

## 2024-10-19 ASSESSMENT — SOCIAL DETERMINANTS OF HEALTH (SDOH)
WITHIN THE PAST 12 MONTHS, YOU WORRIED THAT YOUR FOOD WOULD RUN OUT BEFORE YOU GOT THE MONEY TO BUY MORE: NEVER TRUE
IN THE PAST 12 MONTHS, HAS THE ELECTRIC, GAS, OIL, OR WATER COMPANY THREATENED TO SHUT OFF SERVICE IN YOUR HOME?: NO
WITHIN THE LAST YEAR, HAVE YOU BEEN AFRAID OF YOUR PARTNER OR EX-PARTNER?: NO
WITHIN THE LAST YEAR, HAVE YOU BEEN KICKED, HIT, SLAPPED, OR OTHERWISE PHYSICALLY HURT BY YOUR PARTNER OR EX-PARTNER?: NO
WITHIN THE LAST YEAR, HAVE YOU BEEN HUMILIATED OR EMOTIONALLY ABUSED IN OTHER WAYS BY YOUR PARTNER OR EX-PARTNER?: NO
WITHIN THE LAST YEAR, HAVE TO BEEN RAPED OR FORCED TO HAVE ANY KIND OF SEXUAL ACTIVITY BY YOUR PARTNER OR EX-PARTNER?: NO
WITHIN THE PAST 12 MONTHS, THE FOOD YOU BOUGHT JUST DIDN'T LAST AND YOU DIDN'T HAVE MONEY TO GET MORE: NEVER TRUE

## 2024-10-19 ASSESSMENT — FIBROSIS 4 INDEX: FIB4 SCORE: 7.6

## 2024-10-19 ASSESSMENT — COGNITIVE AND FUNCTIONAL STATUS - GENERAL
SUGGESTED CMS G CODE MODIFIER DAILY ACTIVITY: CJ
MOVING TO AND FROM BED TO CHAIR: A LOT
MOBILITY SCORE: 12
DRESSING REGULAR LOWER BODY CLOTHING: A LITTLE
TOILETING: A LITTLE
MOVING FROM LYING ON BACK TO SITTING ON SIDE OF FLAT BED: A LITTLE
STANDING UP FROM CHAIR USING ARMS: A LOT
SUGGESTED CMS G CODE MODIFIER MOBILITY: CL
DRESSING REGULAR UPPER BODY CLOTHING: A LITTLE
TURNING FROM BACK TO SIDE WHILE IN FLAT BAD: A LOT
DAILY ACTIVITIY SCORE: 20
CLIMB 3 TO 5 STEPS WITH RAILING: TOTAL
HELP NEEDED FOR BATHING: A LITTLE
WALKING IN HOSPITAL ROOM: A LOT

## 2024-10-19 ASSESSMENT — PAIN DESCRIPTION - PAIN TYPE
TYPE: ACUTE PAIN

## 2024-10-19 ASSESSMENT — PAIN SCALES - GENERAL: PAIN_LEVEL: 3

## 2024-10-20 ENCOUNTER — APPOINTMENT (OUTPATIENT)
Dept: RADIOLOGY | Facility: MEDICAL CENTER | Age: 74
DRG: 378 | End: 2024-10-20
Attending: HOSPITALIST
Payer: MEDICARE

## 2024-10-20 PROBLEM — N50.89 SCROTAL SWELLING: Status: ACTIVE | Noted: 2024-10-20

## 2024-10-20 PROBLEM — R31.9 HEMATURIA: Status: ACTIVE | Noted: 2024-10-20

## 2024-10-20 LAB
ALBUMIN SERPL BCP-MCNC: 2.7 G/DL (ref 3.2–4.9)
ALBUMIN/GLOB SERPL: 0.8 G/DL
ALP SERPL-CCNC: 89 U/L (ref 30–99)
ALT SERPL-CCNC: 13 U/L (ref 2–50)
ANION GAP SERPL CALC-SCNC: 15 MMOL/L (ref 7–16)
AST SERPL-CCNC: 28 U/L (ref 12–45)
BILIRUB SERPL-MCNC: 1.5 MG/DL (ref 0.1–1.5)
BUN SERPL-MCNC: 16 MG/DL (ref 8–22)
CALCIUM ALBUM COR SERPL-MCNC: 9.1 MG/DL (ref 8.5–10.5)
CALCIUM SERPL-MCNC: 8.1 MG/DL (ref 8.4–10.2)
CHLORIDE SERPL-SCNC: 101 MMOL/L (ref 96–112)
CO2 SERPL-SCNC: 20 MMOL/L (ref 20–33)
CREAT SERPL-MCNC: 0.76 MG/DL (ref 0.5–1.4)
ERYTHROCYTE [DISTWIDTH] IN BLOOD BY AUTOMATED COUNT: 63.6 FL (ref 35.9–50)
GFR SERPLBLD CREATININE-BSD FMLA CKD-EPI: 95 ML/MIN/1.73 M 2
GLOBULIN SER CALC-MCNC: 3.4 G/DL (ref 1.9–3.5)
GLUCOSE SERPL-MCNC: 150 MG/DL (ref 65–99)
HCT VFR BLD AUTO: 22.6 % (ref 42–52)
HCT VFR BLD AUTO: 26.2 % (ref 42–52)
HCT VFR BLD AUTO: 29.9 % (ref 42–52)
HGB BLD-MCNC: 7.2 G/DL (ref 14–18)
HGB BLD-MCNC: 8.1 G/DL (ref 14–18)
HGB BLD-MCNC: 8.7 G/DL (ref 14–18)
INR PPP: 2.16 (ref 0.87–1.13)
MCH RBC QN AUTO: 27.6 PG (ref 27–33)
MCHC RBC AUTO-ENTMCNC: 31.9 G/DL (ref 32.3–36.5)
MCV RBC AUTO: 86.6 FL (ref 81.4–97.8)
PLATELET # BLD AUTO: 97 K/UL (ref 164–446)
PLATELETS.RETICULATED NFR BLD AUTO: 6.6 % (ref 0.6–13.1)
PMV BLD AUTO: 11 FL (ref 9–12.9)
POTASSIUM SERPL-SCNC: 4.2 MMOL/L (ref 3.6–5.5)
PROT SERPL-MCNC: 6.1 G/DL (ref 6–8.2)
PROTHROMBIN TIME: 24.8 SEC (ref 12–14.6)
RBC # BLD AUTO: 2.61 M/UL (ref 4.7–6.1)
SODIUM SERPL-SCNC: 136 MMOL/L (ref 135–145)
WBC # BLD AUTO: 2.6 K/UL (ref 4.8–10.8)

## 2024-10-20 PROCEDURE — 700102 HCHG RX REV CODE 250 W/ 637 OVERRIDE(OP): Performed by: HOSPITALIST

## 2024-10-20 PROCEDURE — A9270 NON-COVERED ITEM OR SERVICE: HCPCS | Performed by: HOSPITALIST

## 2024-10-20 PROCEDURE — A9270 NON-COVERED ITEM OR SERVICE: HCPCS | Performed by: INTERNAL MEDICINE

## 2024-10-20 PROCEDURE — 76870 US EXAM SCROTUM: CPT

## 2024-10-20 PROCEDURE — 85018 HEMOGLOBIN: CPT | Mod: 91

## 2024-10-20 PROCEDURE — 85027 COMPLETE CBC AUTOMATED: CPT

## 2024-10-20 PROCEDURE — 85055 RETICULATED PLATELET ASSAY: CPT

## 2024-10-20 PROCEDURE — 700111 HCHG RX REV CODE 636 W/ 250 OVERRIDE (IP): Mod: JZ | Performed by: HOSPITALIST

## 2024-10-20 PROCEDURE — 36415 COLL VENOUS BLD VENIPUNCTURE: CPT

## 2024-10-20 PROCEDURE — 700111 HCHG RX REV CODE 636 W/ 250 OVERRIDE (IP): Performed by: HOSPITALIST

## 2024-10-20 PROCEDURE — 99233 SBSQ HOSP IP/OBS HIGH 50: CPT | Performed by: HOSPITALIST

## 2024-10-20 PROCEDURE — 94760 N-INVAS EAR/PLS OXIMETRY 1: CPT

## 2024-10-20 PROCEDURE — 99497 ADVNCD CARE PLAN 30 MIN: CPT | Performed by: HOSPITALIST

## 2024-10-20 PROCEDURE — 770020 HCHG ROOM/CARE - TELE (206)

## 2024-10-20 PROCEDURE — 700102 HCHG RX REV CODE 250 W/ 637 OVERRIDE(OP): Performed by: INTERNAL MEDICINE

## 2024-10-20 PROCEDURE — 700105 HCHG RX REV CODE 258: Performed by: HOSPITALIST

## 2024-10-20 PROCEDURE — 85014 HEMATOCRIT: CPT

## 2024-10-20 PROCEDURE — 80053 COMPREHEN METABOLIC PANEL: CPT

## 2024-10-20 PROCEDURE — 85610 PROTHROMBIN TIME: CPT

## 2024-10-20 RX ORDER — THIAMINE HYDROCHLORIDE 100 MG/ML
100 INJECTION, SOLUTION INTRAMUSCULAR; INTRAVENOUS DAILY
Status: COMPLETED | OUTPATIENT
Start: 2024-10-20 | End: 2024-10-22

## 2024-10-20 RX ORDER — M-VIT,TX,IRON,MINS/CALC/FOLIC 27MG-0.4MG
1 TABLET ORAL DAILY
Status: DISCONTINUED | OUTPATIENT
Start: 2024-10-20 | End: 2024-10-24 | Stop reason: HOSPADM

## 2024-10-20 RX ORDER — FOLIC ACID 1 MG/1
1 TABLET ORAL DAILY
Status: DISCONTINUED | OUTPATIENT
Start: 2024-10-20 | End: 2024-10-24 | Stop reason: HOSPADM

## 2024-10-20 RX ORDER — SULFAMETHOXAZOLE AND TRIMETHOPRIM 800; 160 MG/1; MG/1
1 TABLET ORAL EVERY 12 HOURS
Status: DISCONTINUED | OUTPATIENT
Start: 2024-10-21 | End: 2024-10-24 | Stop reason: HOSPADM

## 2024-10-20 RX ADMIN — LORAZEPAM 2 MG: 1 TABLET ORAL at 17:50

## 2024-10-20 RX ADMIN — LORAZEPAM 0.5 MG: 0.5 TABLET ORAL at 10:34

## 2024-10-20 RX ADMIN — LORAZEPAM 0.5 MG: 0.5 TABLET ORAL at 02:07

## 2024-10-20 RX ADMIN — LORAZEPAM 0.5 MG: 0.5 TABLET ORAL at 07:18

## 2024-10-20 RX ADMIN — LORAZEPAM 1.5 MG: 2 INJECTION INTRAMUSCULAR; INTRAVENOUS at 20:53

## 2024-10-20 RX ADMIN — LORAZEPAM 3 MG: 1 TABLET ORAL at 20:08

## 2024-10-20 RX ADMIN — LORAZEPAM 1 MG: 2 INJECTION INTRAMUSCULAR; INTRAVENOUS at 22:37

## 2024-10-20 RX ADMIN — OMEPRAZOLE 20 MG: 20 CAPSULE, DELAYED RELEASE ORAL at 04:58

## 2024-10-20 RX ADMIN — CEFTRIAXONE SODIUM 2000 MG: 2 INJECTION, POWDER, FOR SOLUTION INTRAMUSCULAR; INTRAVENOUS at 05:02

## 2024-10-20 RX ADMIN — THIAMINE HYDROCHLORIDE 100 MG: 100 INJECTION, SOLUTION INTRAMUSCULAR; INTRAVENOUS at 13:26

## 2024-10-20 RX ADMIN — LORAZEPAM 2 MG: 1 TABLET ORAL at 13:26

## 2024-10-20 RX ADMIN — LORAZEPAM 1.5 MG: 2 INJECTION INTRAMUSCULAR; INTRAVENOUS at 21:44

## 2024-10-20 RX ADMIN — FOLIC ACID 1 MG: 1 TABLET ORAL at 13:26

## 2024-10-20 RX ADMIN — Medication 1 TABLET: at 13:26

## 2024-10-20 ASSESSMENT — LIFESTYLE VARIABLES
ANXIETY: *
TOTAL SCORE: 13
AGITATION: NORMAL ACTIVITY
AUDITORY DISTURBANCES: VERY MILD HARSHNESS OR ABILITY TO FRIGHTEN
TREMOR: *
AGITATION: NORMAL ACTIVITY
ORIENTATION AND CLOUDING OF SENSORIUM: ORIENTED AND CAN DO SERIAL ADDITIONS
ORIENTATION AND CLOUDING OF SENSORIUM: ORIENTED AND CAN DO SERIAL ADDITIONS
AUDITORY DISTURBANCES: NOT PRESENT
NAUSEA AND VOMITING: MILD NAUSEA WITH NO VOMITING
NAUSEA AND VOMITING: MILD NAUSEA WITH NO VOMITING
TREMOR: *
TREMOR: TREMOR NOT VISIBLE BUT CAN BE FELT, FINGERTIP TO FINGERTIP
PAROXYSMAL SWEATS: NO SWEAT VISIBLE
AGITATION: *
TREMOR: *
AGITATION: SOMEWHAT MORE THAN NORMAL ACTIVITY
TREMOR: *
AUDITORY DISTURBANCES: NOT PRESENT
AGITATION: SOMEWHAT MORE THAN NORMAL ACTIVITY
NAUSEA AND VOMITING: MILD NAUSEA WITH NO VOMITING
TREMOR: *
AGITATION: *
VISUAL DISTURBANCES: NOT PRESENT
AUDITORY DISTURBANCES: NOT PRESENT
ANXIETY: MILDLY ANXIOUS
PAROXYSMAL SWEATS: BARELY PERCEPTIBLE SWEATING. PALMS MOIST
NAUSEA AND VOMITING: *
NAUSEA AND VOMITING: MILD NAUSEA WITH NO VOMITING
TOTAL SCORE: VERY MILD ITCHING, PINS AND NEEDLES SENSATION, BURNING OR NUMBNESS
HEADACHE, FULLNESS IN HEAD: VERY MILD
VISUAL DISTURBANCES: NOT PRESENT
HEADACHE, FULLNESS IN HEAD: VERY MILD
HEADACHE, FULLNESS IN HEAD: VERY MILD
TOTAL SCORE: 14
AGITATION: *
HEADACHE, FULLNESS IN HEAD: VERY MILD
VISUAL DISTURBANCES: NOT PRESENT
AGITATION: NORMAL ACTIVITY
ANXIETY: NO ANXIETY (AT EASE)
ORIENTATION AND CLOUDING OF SENSORIUM: DATE DISORIENTATION BY MORE THAN TWO CALENDAR DAYS
NAUSEA AND VOMITING: MILD NAUSEA WITH NO VOMITING
HEADACHE, FULLNESS IN HEAD: MILD
HEADACHE, FULLNESS IN HEAD: MILD
TOTAL SCORE: 6
TREMOR: *
HEADACHE, FULLNESS IN HEAD: NOT PRESENT
VISUAL DISTURBANCES: NOT PRESENT
TREMOR: *
VISUAL DISTURBANCES: MILD SENSITIVITY
TOTAL SCORE: 11
HEADACHE, FULLNESS IN HEAD: NOT PRESENT
ANXIETY: MILDLY ANXIOUS
PAROXYSMAL SWEATS: NO SWEAT VISIBLE
ORIENTATION AND CLOUDING OF SENSORIUM: CANNOT DO SERIAL ADDITIONS OR IS UNCERTAIN ABOUT DATE
PAROXYSMAL SWEATS: NO SWEAT VISIBLE
TOTAL SCORE: 7
NAUSEA AND VOMITING: MILD NAUSEA WITH NO VOMITING
ANXIETY: MILDLY ANXIOUS
ANXIETY: *
AUDITORY DISTURBANCES: NOT PRESENT
ANXIETY: *
VISUAL DISTURBANCES: VERY MILD SENSITIVITY
AUDITORY DISTURBANCES: NOT PRESENT
TOTAL SCORE: 3
NAUSEA AND VOMITING: MILD NAUSEA WITH NO VOMITING
ORIENTATION AND CLOUDING OF SENSORIUM: ORIENTED AND CAN DO SERIAL ADDITIONS
AGITATION: *
AGITATION: SOMEWHAT MORE THAN NORMAL ACTIVITY
ORIENTATION AND CLOUDING OF SENSORIUM: DATE DISORIENTATION BY MORE THAN TWO CALENDAR DAYS
ORIENTATION AND CLOUDING OF SENSORIUM: ORIENTED AND CAN DO SERIAL ADDITIONS
TREMOR: *
PAROXYSMAL SWEATS: NO SWEAT VISIBLE
AGITATION: NORMAL ACTIVITY
PAROXYSMAL SWEATS: BARELY PERCEPTIBLE SWEATING. PALMS MOIST
HEADACHE, FULLNESS IN HEAD: MILD
TOTAL SCORE: 7
ANXIETY: MILDLY ANXIOUS
AUDITORY DISTURBANCES: MILD HARSHNESS OR ABILITY TO FRIGHTEN
ORIENTATION AND CLOUDING OF SENSORIUM: DATE DISORIENTATION BY MORE THAN TWO CALENDAR DAYS
NAUSEA AND VOMITING: *
ORIENTATION AND CLOUDING OF SENSORIUM: DATE DISORIENTATION BY MORE THAN TWO CALENDAR DAYS
ANXIETY: MILDLY ANXIOUS
TREMOR: *
VISUAL DISTURBANCES: MILD SENSITIVITY
TREMOR: TREMOR NOT VISIBLE BUT CAN BE FELT, FINGERTIP TO FINGERTIP
AUDITORY DISTURBANCES: NOT PRESENT
TOTAL SCORE: VERY MILD ITCHING, PINS AND NEEDLES SENSATION, BURNING OR NUMBNESS
PAROXYSMAL SWEATS: NO SWEAT VISIBLE
VISUAL DISTURBANCES: VERY MILD SENSITIVITY
AUDITORY DISTURBANCES: MILD HARSHNESS OR ABILITY TO FRIGHTEN
PAROXYSMAL SWEATS: NO SWEAT VISIBLE
NAUSEA AND VOMITING: MILD NAUSEA WITH NO VOMITING
PAROXYSMAL SWEATS: NO SWEAT VISIBLE
TOTAL SCORE: 18
HEADACHE, FULLNESS IN HEAD: VERY MILD
ORIENTATION AND CLOUDING OF SENSORIUM: DATE DISORIENTATION BY MORE THAN TWO CALENDAR DAYS
PAROXYSMAL SWEATS: BARELY PERCEPTIBLE SWEATING. PALMS MOIST
TOTAL SCORE: 7
VISUAL DISTURBANCES: MODERATE SENSITIVITY
ORIENTATION AND CLOUDING OF SENSORIUM: DATE DISORIENTATION BY MORE THAN TWO CALENDAR DAYS
VISUAL DISTURBANCES: VERY MILD SENSITIVITY
AUDITORY DISTURBANCES: VERY MILD HARSHNESS OR ABILITY TO FRIGHTEN
PAROXYSMAL SWEATS: NO SWEAT VISIBLE
TOTAL SCORE: 15
NAUSEA AND VOMITING: MILD NAUSEA WITH NO VOMITING
HEADACHE, FULLNESS IN HEAD: VERY MILD
VISUAL DISTURBANCES: NOT PRESENT
AUDITORY DISTURBANCES: MILD HARSHNESS OR ABILITY TO FRIGHTEN
ANXIETY: MILDLY ANXIOUS
ANXIETY: NO ANXIETY (AT EASE)
TOTAL SCORE: 17

## 2024-10-20 ASSESSMENT — PAIN DESCRIPTION - PAIN TYPE
TYPE: ACUTE PAIN
TYPE: ACUTE PAIN

## 2024-10-20 ASSESSMENT — COGNITIVE AND FUNCTIONAL STATUS - GENERAL
HELP NEEDED FOR BATHING: A LITTLE
DRESSING REGULAR LOWER BODY CLOTHING: A LITTLE
SUGGESTED CMS G CODE MODIFIER DAILY ACTIVITY: CJ
SUGGESTED CMS G CODE MODIFIER MOBILITY: CL
MOBILITY SCORE: 13
DAILY ACTIVITIY SCORE: 20
DRESSING REGULAR UPPER BODY CLOTHING: A LITTLE
MOVING TO AND FROM BED TO CHAIR: A LITTLE
TOILETING: A LITTLE
MOVING FROM LYING ON BACK TO SITTING ON SIDE OF FLAT BED: A LITTLE
TURNING FROM BACK TO SIDE WHILE IN FLAT BAD: A LOT
CLIMB 3 TO 5 STEPS WITH RAILING: TOTAL
STANDING UP FROM CHAIR USING ARMS: A LOT
WALKING IN HOSPITAL ROOM: A LOT

## 2024-10-20 ASSESSMENT — ENCOUNTER SYMPTOMS
ABDOMINAL PAIN: 0
FOCAL WEAKNESS: 0
COUGH: 0
MYALGIAS: 0
EYE REDNESS: 0
SHORTNESS OF BREATH: 0
STRIDOR: 0
FLANK PAIN: 0
FEVER: 0
VOMITING: 0
CHILLS: 0
EYE DISCHARGE: 0
BRUISES/BLEEDS EASILY: 0
NERVOUS/ANXIOUS: 0

## 2024-10-21 ENCOUNTER — APPOINTMENT (OUTPATIENT)
Dept: RADIOLOGY | Facility: MEDICAL CENTER | Age: 74
DRG: 378 | End: 2024-10-21
Attending: HOSPITALIST
Payer: MEDICARE

## 2024-10-21 PROBLEM — N43.3 HYDROCELE IN ADULT: Status: ACTIVE | Noted: 2024-10-21

## 2024-10-21 PROBLEM — K22.70 BARRETT'S ESOPHAGUS: Status: ACTIVE | Noted: 2024-10-21

## 2024-10-21 LAB
ALBUMIN SERPL BCP-MCNC: 2.9 G/DL (ref 3.2–4.9)
ALBUMIN/GLOB SERPL: 0.8 G/DL
ALP SERPL-CCNC: 94 U/L (ref 30–99)
ALT SERPL-CCNC: 16 U/L (ref 2–50)
AMMONIA PLAS-SCNC: 25 UMOL/L (ref 11–45)
ANION GAP SERPL CALC-SCNC: 12 MMOL/L (ref 7–16)
AST SERPL-CCNC: 36 U/L (ref 12–45)
BILIRUB SERPL-MCNC: 1.3 MG/DL (ref 0.1–1.5)
BUN SERPL-MCNC: 19 MG/DL (ref 8–22)
CALCIUM ALBUM COR SERPL-MCNC: 9.1 MG/DL (ref 8.5–10.5)
CALCIUM SERPL-MCNC: 8.2 MG/DL (ref 8.4–10.2)
CHLORIDE SERPL-SCNC: 101 MMOL/L (ref 96–112)
CO2 SERPL-SCNC: 21 MMOL/L (ref 20–33)
CREAT SERPL-MCNC: 0.76 MG/DL (ref 0.5–1.4)
ERYTHROCYTE [DISTWIDTH] IN BLOOD BY AUTOMATED COUNT: 66.5 FL (ref 35.9–50)
GFR SERPLBLD CREATININE-BSD FMLA CKD-EPI: 95 ML/MIN/1.73 M 2
GLOBULIN SER CALC-MCNC: 3.7 G/DL (ref 1.9–3.5)
GLUCOSE SERPL-MCNC: 99 MG/DL (ref 65–99)
HCT VFR BLD AUTO: 22.8 % (ref 42–52)
HCT VFR BLD AUTO: 23.3 % (ref 42–52)
HGB BLD-MCNC: 7.2 G/DL (ref 14–18)
HGB BLD-MCNC: 7.5 G/DL (ref 14–18)
INR PPP: 2 (ref 0.87–1.13)
MCH RBC QN AUTO: 27.6 PG (ref 27–33)
MCHC RBC AUTO-ENTMCNC: 31.6 G/DL (ref 32.3–36.5)
MCV RBC AUTO: 87.4 FL (ref 81.4–97.8)
PLATELET # BLD AUTO: 120 K/UL (ref 164–446)
PMV BLD AUTO: 11.2 FL (ref 9–12.9)
POTASSIUM SERPL-SCNC: 3.8 MMOL/L (ref 3.6–5.5)
PROT SERPL-MCNC: 6.6 G/DL (ref 6–8.2)
PROTHROMBIN TIME: 23.3 SEC (ref 12–14.6)
RBC # BLD AUTO: 2.61 M/UL (ref 4.7–6.1)
SODIUM SERPL-SCNC: 134 MMOL/L (ref 135–145)
WBC # BLD AUTO: 6.8 K/UL (ref 4.8–10.8)

## 2024-10-21 PROCEDURE — A9270 NON-COVERED ITEM OR SERVICE: HCPCS | Performed by: HOSPITALIST

## 2024-10-21 PROCEDURE — 85027 COMPLETE CBC AUTOMATED: CPT

## 2024-10-21 PROCEDURE — A9270 NON-COVERED ITEM OR SERVICE: HCPCS | Performed by: INTERNAL MEDICINE

## 2024-10-21 PROCEDURE — 94760 N-INVAS EAR/PLS OXIMETRY 1: CPT

## 2024-10-21 PROCEDURE — 80053 COMPREHEN METABOLIC PANEL: CPT

## 2024-10-21 PROCEDURE — 700102 HCHG RX REV CODE 250 W/ 637 OVERRIDE(OP): Performed by: INTERNAL MEDICINE

## 2024-10-21 PROCEDURE — 85610 PROTHROMBIN TIME: CPT

## 2024-10-21 PROCEDURE — 700102 HCHG RX REV CODE 250 W/ 637 OVERRIDE(OP): Performed by: HOSPITALIST

## 2024-10-21 PROCEDURE — 36415 COLL VENOUS BLD VENIPUNCTURE: CPT

## 2024-10-21 PROCEDURE — 770020 HCHG ROOM/CARE - TELE (206)

## 2024-10-21 PROCEDURE — 99233 SBSQ HOSP IP/OBS HIGH 50: CPT | Performed by: HOSPITALIST

## 2024-10-21 PROCEDURE — 70450 CT HEAD/BRAIN W/O DYE: CPT

## 2024-10-21 PROCEDURE — 700111 HCHG RX REV CODE 636 W/ 250 OVERRIDE (IP): Performed by: HOSPITALIST

## 2024-10-21 PROCEDURE — 85018 HEMOGLOBIN: CPT

## 2024-10-21 PROCEDURE — 85014 HEMATOCRIT: CPT

## 2024-10-21 PROCEDURE — 82140 ASSAY OF AMMONIA: CPT

## 2024-10-21 RX ADMIN — LORAZEPAM 1 MG: 2 INJECTION INTRAMUSCULAR; INTRAVENOUS at 20:57

## 2024-10-21 RX ADMIN — LORAZEPAM 1 MG: 2 INJECTION INTRAMUSCULAR; INTRAVENOUS at 00:38

## 2024-10-21 RX ADMIN — SULFAMETHOXAZOLE AND TRIMETHOPRIM 1 TABLET: 800; 160 TABLET ORAL at 17:18

## 2024-10-21 RX ADMIN — LORAZEPAM 1.5 MG: 2 INJECTION INTRAMUSCULAR; INTRAVENOUS at 11:12

## 2024-10-21 RX ADMIN — SULFAMETHOXAZOLE AND TRIMETHOPRIM 1 TABLET: 800; 160 TABLET ORAL at 05:03

## 2024-10-21 RX ADMIN — LORAZEPAM 1 MG: 2 INJECTION INTRAMUSCULAR; INTRAVENOUS at 04:24

## 2024-10-21 RX ADMIN — THIAMINE HYDROCHLORIDE 100 MG: 100 INJECTION, SOLUTION INTRAMUSCULAR; INTRAVENOUS at 05:03

## 2024-10-21 RX ADMIN — LORAZEPAM 1.5 MG: 2 INJECTION INTRAMUSCULAR; INTRAVENOUS at 12:14

## 2024-10-21 RX ADMIN — OMEPRAZOLE 20 MG: 20 CAPSULE, DELAYED RELEASE ORAL at 05:03

## 2024-10-21 RX ADMIN — LORAZEPAM 3 MG: 1 TABLET ORAL at 13:42

## 2024-10-21 RX ADMIN — FOLIC ACID 1 MG: 1 TABLET ORAL at 05:03

## 2024-10-21 RX ADMIN — LORAZEPAM 1 MG: 2 INJECTION INTRAMUSCULAR; INTRAVENOUS at 06:43

## 2024-10-21 RX ADMIN — LORAZEPAM 1.5 MG: 2 INJECTION INTRAMUSCULAR; INTRAVENOUS at 19:39

## 2024-10-21 RX ADMIN — Medication 1 TABLET: at 05:03

## 2024-10-21 RX ADMIN — LORAZEPAM 4 MG: 1 TABLET ORAL at 12:48

## 2024-10-21 RX ADMIN — LORAZEPAM 3 MG: 1 TABLET ORAL at 17:19

## 2024-10-21 RX ADMIN — LORAZEPAM 1.5 MG: 2 INJECTION INTRAMUSCULAR; INTRAVENOUS at 18:08

## 2024-10-21 RX ADMIN — LORAZEPAM 0.5 MG: 2 INJECTION INTRAMUSCULAR; INTRAVENOUS at 07:45

## 2024-10-21 ASSESSMENT — LIFESTYLE VARIABLES
TOTAL SCORE: VERY MILD ITCHING, PINS AND NEEDLES SENSATION, BURNING OR NUMBNESS
ORIENTATION AND CLOUDING OF SENSORIUM: DATE DISORIENTATION BY MORE THAN TWO CALENDAR DAYS
ANXIETY: MODERATELY ANXIOUS OR GUARDED, SO ANXIETY IS INFERRED
AUDITORY DISTURBANCES: NOT PRESENT
VISUAL DISTURBANCES: MODERATE SENSITIVITY
ORIENTATION AND CLOUDING OF SENSORIUM: DISORIENTED FOR PLACE AND / OR PERSON
PAROXYSMAL SWEATS: BARELY PERCEPTIBLE SWEATING. PALMS MOIST
PAROXYSMAL SWEATS: NO SWEAT VISIBLE
NAUSEA AND VOMITING: NO NAUSEA AND NO VOMITING
HEADACHE, FULLNESS IN HEAD: NOT PRESENT
VISUAL DISTURBANCES: NOT PRESENT
AUDITORY DISTURBANCES: NOT PRESENT
ANXIETY: *
TOTAL SCORE: 20
NAUSEA AND VOMITING: NO NAUSEA AND NO VOMITING
ANXIETY: *
AUDITORY DISTURBANCES: NOT PRESENT
TOTAL SCORE: 20
ANXIETY: MODERATELY ANXIOUS OR GUARDED, SO ANXIETY IS INFERRED
PAROXYSMAL SWEATS: NO SWEAT VISIBLE
AGITATION: *
TOTAL SCORE: 21
TOTAL SCORE: 10
PAROXYSMAL SWEATS: BARELY PERCEPTIBLE SWEATING. PALMS MOIST
TREMOR: TREMOR NOT VISIBLE BUT CAN BE FELT, FINGERTIP TO FINGERTIP
TOTAL SCORE: SEVERE HALLUCINATIONS
VISUAL DISTURBANCES: MODERATELY SEVERE HALLUCINATIONS
TREMOR: *
AUDITORY DISTURBANCES: NOT PRESENT
NAUSEA AND VOMITING: NO NAUSEA AND NO VOMITING
TOTAL SCORE: 11
NAUSEA AND VOMITING: NO NAUSEA AND NO VOMITING
NAUSEA AND VOMITING: NO NAUSEA AND NO VOMITING
VISUAL DISTURBANCES: NOT PRESENT
AUDITORY DISTURBANCES: VERY MILD HARSHNESS OR ABILITY TO FRIGHTEN
AUDITORY DISTURBANCES: MODERATE HARSHNESS OR ABILITY TO FRIGHTEN
AGITATION: NORMAL ACTIVITY
ORIENTATION AND CLOUDING OF SENSORIUM: ORIENTED AND CAN DO SERIAL ADDITIONS
AGITATION: *
AUDITORY DISTURBANCES: NOT PRESENT
TREMOR: TREMOR NOT VISIBLE BUT CAN BE FELT, FINGERTIP TO FINGERTIP
HEADACHE, FULLNESS IN HEAD: NOT PRESENT
VISUAL DISTURBANCES: NOT PRESENT
TREMOR: *
HEADACHE, FULLNESS IN HEAD: NOT PRESENT
TREMOR: *
ANXIETY: NO ANXIETY (AT EASE)
VISUAL DISTURBANCES: NOT PRESENT
AGITATION: NORMAL ACTIVITY
NAUSEA AND VOMITING: NO NAUSEA AND NO VOMITING
TREMOR: MODERATE TREMOR WITH ARMS EXTENDED
ORIENTATION AND CLOUDING OF SENSORIUM: DISORIENTED FOR PLACE AND / OR PERSON
TREMOR: *
NAUSEA AND VOMITING: NO NAUSEA AND NO VOMITING
ORIENTATION AND CLOUDING OF SENSORIUM: DATE DISORIENTATION BY MORE THAN TWO CALENDAR DAYS
PAROXYSMAL SWEATS: BARELY PERCEPTIBLE SWEATING. PALMS MOIST
AUDITORY DISTURBANCES: MODERATELY SEVERE HALLUCINATIONS
TREMOR: *
PAROXYSMAL SWEATS: NO SWEAT VISIBLE
ANXIETY: MILDLY ANXIOUS
NAUSEA AND VOMITING: NO NAUSEA AND NO VOMITING
HEADACHE, FULLNESS IN HEAD: VERY MILD
AUDITORY DISTURBANCES: NOT PRESENT
TOTAL SCORE: 25
TOTAL SCORE: 6
AGITATION: NORMAL ACTIVITY
TREMOR: MODERATE TREMOR WITH ARMS EXTENDED
ORIENTATION AND CLOUDING OF SENSORIUM: DISORIENTED FOR PLACE AND / OR PERSON
HEADACHE, FULLNESS IN HEAD: NOT PRESENT
ORIENTATION AND CLOUDING OF SENSORIUM: CANNOT DO SERIAL ADDITIONS OR IS UNCERTAIN ABOUT DATE
AGITATION: NORMAL ACTIVITY
AGITATION: *
PAROXYSMAL SWEATS: BARELY PERCEPTIBLE SWEATING. PALMS MOIST
ORIENTATION AND CLOUDING OF SENSORIUM: DATE DISORIENTATION BY NO MORE THAN TWO CALENDAR DAYS
NAUSEA AND VOMITING: NO NAUSEA AND NO VOMITING
ORIENTATION AND CLOUDING OF SENSORIUM: DATE DISORIENTATION BY MORE THAN TWO CALENDAR DAYS
PAROXYSMAL SWEATS: BARELY PERCEPTIBLE SWEATING. PALMS MOIST
ORIENTATION AND CLOUDING OF SENSORIUM: DISORIENTED FOR PLACE AND / OR PERSON
ANXIETY: MODERATELY ANXIOUS OR GUARDED, SO ANXIETY IS INFERRED
HEADACHE, FULLNESS IN HEAD: NOT PRESENT
ANXIETY: *
AUDITORY DISTURBANCES: MILD HARSHNESS OR ABILITY TO FRIGHTEN
ORIENTATION AND CLOUDING OF SENSORIUM: DATE DISORIENTATION BY MORE THAN TWO CALENDAR DAYS
HEADACHE, FULLNESS IN HEAD: NOT PRESENT
VISUAL DISTURBANCES: MODERATELY SEVERE HALLUCINATIONS
TREMOR: MODERATE TREMOR WITH ARMS EXTENDED
PAROXYSMAL SWEATS: NO SWEAT VISIBLE
TOTAL SCORE: 31
TOTAL SCORE: 11
AGITATION: NORMAL ACTIVITY
VISUAL DISTURBANCES: NOT PRESENT
AUDITORY DISTURBANCES: MODERATELY SEVERE HALLUCINATIONS
ANXIETY: *
AGITATION: NORMAL ACTIVITY
TOTAL SCORE: 3
TOTAL SCORE: 16
NAUSEA AND VOMITING: NO NAUSEA AND NO VOMITING
TOTAL SCORE: VERY MILD ITCHING, PINS AND NEEDLES SENSATION, BURNING OR NUMBNESS
TREMOR: *
NAUSEA AND VOMITING: NO NAUSEA AND NO VOMITING
AGITATION: SOMEWHAT MORE THAN NORMAL ACTIVITY
ANXIETY: MODERATELY ANXIOUS OR GUARDED, SO ANXIETY IS INFERRED
HEADACHE, FULLNESS IN HEAD: NOT PRESENT
HEADACHE, FULLNESS IN HEAD: NOT PRESENT
AGITATION: MODERATELY FIDGETY AND RESTLESS
TOTAL SCORE: 4
AUDITORY DISTURBANCES: NOT PRESENT
NAUSEA AND VOMITING: NO NAUSEA AND NO VOMITING
NAUSEA AND VOMITING: NO NAUSEA AND NO VOMITING
TREMOR: *
ANXIETY: NO ANXIETY (AT EASE)
VISUAL DISTURBANCES: NOT PRESENT
AUDITORY DISTURBANCES: NOT PRESENT
AGITATION: NORMAL ACTIVITY
AGITATION: MODERATELY FIDGETY AND RESTLESS
AUDITORY DISTURBANCES: NOT PRESENT
HEADACHE, FULLNESS IN HEAD: VERY MILD
VISUAL DISTURBANCES: MODERATE SENSITIVITY
AUDITORY DISTURBANCES: NOT PRESENT
NAUSEA AND VOMITING: NO NAUSEA AND NO VOMITING
ORIENTATION AND CLOUDING OF SENSORIUM: DATE DISORIENTATION BY MORE THAN TWO CALENDAR DAYS
ANXIETY: MILDLY ANXIOUS
AUDITORY DISTURBANCES: MODERATE HARSHNESS OR ABILITY TO FRIGHTEN
TOTAL SCORE: 3
PAROXYSMAL SWEATS: BARELY PERCEPTIBLE SWEATING. PALMS MOIST
NAUSEA AND VOMITING: NO NAUSEA AND NO VOMITING
ANXIETY: *
HEADACHE, FULLNESS IN HEAD: NOT PRESENT
AGITATION: *
TOTAL SCORE: 16
AGITATION: *
HEADACHE, FULLNESS IN HEAD: MILD
VISUAL DISTURBANCES: MODERATE SENSITIVITY
PAROXYSMAL SWEATS: BARELY PERCEPTIBLE SWEATING. PALMS MOIST
VISUAL DISTURBANCES: VERY MILD SENSITIVITY
HEADACHE, FULLNESS IN HEAD: VERY MILD
ANXIETY: *
TREMOR: MODERATE TREMOR WITH ARMS EXTENDED
AGITATION: *
PAROXYSMAL SWEATS: NO SWEAT VISIBLE
VISUAL DISTURBANCES: NOT PRESENT
HEADACHE, FULLNESS IN HEAD: NOT PRESENT
TREMOR: MODERATE TREMOR WITH ARMS EXTENDED
ORIENTATION AND CLOUDING OF SENSORIUM: DATE DISORIENTATION BY NO MORE THAN TWO CALENDAR DAYS
ORIENTATION AND CLOUDING OF SENSORIUM: DATE DISORIENTATION BY MORE THAN TWO CALENDAR DAYS
TOTAL SCORE: 12
TOTAL SCORE: 14
ORIENTATION AND CLOUDING OF SENSORIUM: DISORIENTED FOR PLACE AND / OR PERSON
HEADACHE, FULLNESS IN HEAD: NOT PRESENT
HEADACHE, FULLNESS IN HEAD: NOT PRESENT
NAUSEA AND VOMITING: NO NAUSEA AND NO VOMITING
ORIENTATION AND CLOUDING OF SENSORIUM: ORIENTED AND CAN DO SERIAL ADDITIONS
TREMOR: TREMOR NOT VISIBLE BUT CAN BE FELT, FINGERTIP TO FINGERTIP
ANXIETY: MODERATELY ANXIOUS OR GUARDED, SO ANXIETY IS INFERRED
VISUAL DISTURBANCES: MODERATELY SEVERE HALLUCINATIONS
PAROXYSMAL SWEATS: BARELY PERCEPTIBLE SWEATING. PALMS MOIST
VISUAL DISTURBANCES: MODERATELY SEVERE HALLUCINATIONS
PAROXYSMAL SWEATS: NO SWEAT VISIBLE
ANXIETY: MILDLY ANXIOUS
PAROXYSMAL SWEATS: BARELY PERCEPTIBLE SWEATING. PALMS MOIST
TREMOR: TREMOR NOT VISIBLE BUT CAN BE FELT, FINGERTIP TO FINGERTIP
PAROXYSMAL SWEATS: NO SWEAT VISIBLE
VISUAL DISTURBANCES: NOT PRESENT

## 2024-10-21 ASSESSMENT — ENCOUNTER SYMPTOMS
FEVER: 0
STRIDOR: 0
MYALGIAS: 0
EYE REDNESS: 0
NERVOUS/ANXIOUS: 0
BRUISES/BLEEDS EASILY: 0
ABDOMINAL PAIN: 0
FOCAL WEAKNESS: 0
FLANK PAIN: 1
SHORTNESS OF BREATH: 0
CHILLS: 0
COUGH: 0
EYE DISCHARGE: 0
VOMITING: 0

## 2024-10-21 ASSESSMENT — PAIN DESCRIPTION - PAIN TYPE
TYPE: ACUTE PAIN
TYPE: ACUTE PAIN

## 2024-10-22 PROBLEM — R33.8 ACUTE URINARY RETENTION: Status: ACTIVE | Noted: 2024-10-22

## 2024-10-22 LAB
ALBUMIN SERPL BCP-MCNC: 2.5 G/DL (ref 3.2–4.9)
ALBUMIN/GLOB SERPL: 0.7 G/DL
ALP SERPL-CCNC: 87 U/L (ref 30–99)
ALT SERPL-CCNC: 20 U/L (ref 2–50)
AMMONIA PLAS-SCNC: 30 UMOL/L (ref 11–45)
ANION GAP SERPL CALC-SCNC: 12 MMOL/L (ref 7–16)
AST SERPL-CCNC: 54 U/L (ref 12–45)
BILIRUB SERPL-MCNC: 1.2 MG/DL (ref 0.1–1.5)
BUN SERPL-MCNC: 18 MG/DL (ref 8–22)
CALCIUM ALBUM COR SERPL-MCNC: 8.9 MG/DL (ref 8.5–10.5)
CALCIUM SERPL-MCNC: 7.7 MG/DL (ref 8.4–10.2)
CHLORIDE SERPL-SCNC: 105 MMOL/L (ref 96–112)
CO2 SERPL-SCNC: 20 MMOL/L (ref 20–33)
CREAT SERPL-MCNC: 0.76 MG/DL (ref 0.5–1.4)
ERYTHROCYTE [DISTWIDTH] IN BLOOD BY AUTOMATED COUNT: 68.5 FL (ref 35.9–50)
GFR SERPLBLD CREATININE-BSD FMLA CKD-EPI: 95 ML/MIN/1.73 M 2
GLOBULIN SER CALC-MCNC: 3.6 G/DL (ref 1.9–3.5)
GLUCOSE SERPL-MCNC: 85 MG/DL (ref 65–99)
HCT VFR BLD AUTO: 22.6 % (ref 42–52)
HCT VFR BLD AUTO: 23.3 % (ref 42–52)
HGB BLD-MCNC: 7.1 G/DL (ref 14–18)
HGB BLD-MCNC: 7.3 G/DL (ref 14–18)
INR PPP: 1.88 (ref 0.87–1.13)
MCH RBC QN AUTO: 27.8 PG (ref 27–33)
MCHC RBC AUTO-ENTMCNC: 31.4 G/DL (ref 32.3–36.5)
MCV RBC AUTO: 88.6 FL (ref 81.4–97.8)
PLATELET # BLD AUTO: 101 K/UL (ref 164–446)
PMV BLD AUTO: 10.7 FL (ref 9–12.9)
POTASSIUM SERPL-SCNC: 3.8 MMOL/L (ref 3.6–5.5)
PROT SERPL-MCNC: 6.1 G/DL (ref 6–8.2)
PROTHROMBIN TIME: 22.2 SEC (ref 12–14.6)
RBC # BLD AUTO: 2.55 M/UL (ref 4.7–6.1)
SODIUM SERPL-SCNC: 137 MMOL/L (ref 135–145)
WBC # BLD AUTO: 4 K/UL (ref 4.8–10.8)

## 2024-10-22 PROCEDURE — 85018 HEMOGLOBIN: CPT

## 2024-10-22 PROCEDURE — 86850 RBC ANTIBODY SCREEN: CPT

## 2024-10-22 PROCEDURE — A9270 NON-COVERED ITEM OR SERVICE: HCPCS | Performed by: HOSPITALIST

## 2024-10-22 PROCEDURE — 36415 COLL VENOUS BLD VENIPUNCTURE: CPT

## 2024-10-22 PROCEDURE — 700102 HCHG RX REV CODE 250 W/ 637 OVERRIDE(OP): Performed by: HOSPITALIST

## 2024-10-22 PROCEDURE — 86900 BLOOD TYPING SEROLOGIC ABO: CPT

## 2024-10-22 PROCEDURE — 85027 COMPLETE CBC AUTOMATED: CPT

## 2024-10-22 PROCEDURE — 80053 COMPREHEN METABOLIC PANEL: CPT

## 2024-10-22 PROCEDURE — 700102 HCHG RX REV CODE 250 W/ 637 OVERRIDE(OP): Performed by: INTERNAL MEDICINE

## 2024-10-22 PROCEDURE — 82140 ASSAY OF AMMONIA: CPT

## 2024-10-22 PROCEDURE — 99233 SBSQ HOSP IP/OBS HIGH 50: CPT | Performed by: INTERNAL MEDICINE

## 2024-10-22 PROCEDURE — 700111 HCHG RX REV CODE 636 W/ 250 OVERRIDE (IP): Performed by: HOSPITALIST

## 2024-10-22 PROCEDURE — 86901 BLOOD TYPING SEROLOGIC RH(D): CPT

## 2024-10-22 PROCEDURE — 94760 N-INVAS EAR/PLS OXIMETRY 1: CPT

## 2024-10-22 PROCEDURE — A9270 NON-COVERED ITEM OR SERVICE: HCPCS | Performed by: INTERNAL MEDICINE

## 2024-10-22 PROCEDURE — 770020 HCHG ROOM/CARE - TELE (206)

## 2024-10-22 PROCEDURE — 85610 PROTHROMBIN TIME: CPT

## 2024-10-22 PROCEDURE — 700111 HCHG RX REV CODE 636 W/ 250 OVERRIDE (IP): Mod: JZ | Performed by: HOSPITALIST

## 2024-10-22 PROCEDURE — 85014 HEMATOCRIT: CPT

## 2024-10-22 PROCEDURE — 51798 US URINE CAPACITY MEASURE: CPT

## 2024-10-22 RX ORDER — GAUZE BANDAGE 2" X 2"
100 BANDAGE TOPICAL DAILY
Status: DISCONTINUED | OUTPATIENT
Start: 2024-10-23 | End: 2024-10-24 | Stop reason: HOSPADM

## 2024-10-22 RX ORDER — TAMSULOSIN HYDROCHLORIDE 0.4 MG/1
0.4 CAPSULE ORAL
Status: DISCONTINUED | OUTPATIENT
Start: 2024-10-22 | End: 2024-10-24 | Stop reason: HOSPADM

## 2024-10-22 RX ADMIN — LORAZEPAM 2 MG: 1 TABLET ORAL at 13:55

## 2024-10-22 RX ADMIN — ONDANSETRON 4 MG: 2 INJECTION INTRAMUSCULAR; INTRAVENOUS at 13:55

## 2024-10-22 RX ADMIN — LORAZEPAM 0.5 MG: 2 INJECTION INTRAMUSCULAR; INTRAVENOUS at 03:48

## 2024-10-22 RX ADMIN — THIAMINE HYDROCHLORIDE 100 MG: 100 INJECTION, SOLUTION INTRAMUSCULAR; INTRAVENOUS at 07:26

## 2024-10-22 RX ADMIN — LORAZEPAM 1 MG: 1 TABLET ORAL at 16:59

## 2024-10-22 RX ADMIN — SULFAMETHOXAZOLE AND TRIMETHOPRIM 1 TABLET: 800; 160 TABLET ORAL at 16:58

## 2024-10-22 RX ADMIN — LORAZEPAM 2 MG: 1 TABLET ORAL at 11:36

## 2024-10-22 RX ADMIN — LORAZEPAM 1 MG: 1 TABLET ORAL at 21:02

## 2024-10-22 RX ADMIN — TAMSULOSIN HYDROCHLORIDE 0.4 MG: 0.4 CAPSULE ORAL at 15:13

## 2024-10-22 ASSESSMENT — LIFESTYLE VARIABLES
VISUAL DISTURBANCES: MILD SENSITIVITY
PAROXYSMAL SWEATS: NO SWEAT VISIBLE
AUDITORY DISTURBANCES: NOT PRESENT
ORIENTATION AND CLOUDING OF SENSORIUM: DISORIENTED FOR PLACE AND / OR PERSON
PAROXYSMAL SWEATS: NO SWEAT VISIBLE
TOTAL SCORE: 8
AUDITORY DISTURBANCES: NOT PRESENT
AGITATION: SOMEWHAT MORE THAN NORMAL ACTIVITY
ORIENTATION AND CLOUDING OF SENSORIUM: DISORIENTED FOR PLACE AND / OR PERSON
PAROXYSMAL SWEATS: NO SWEAT VISIBLE
VISUAL DISTURBANCES: VERY MILD SENSITIVITY
TREMOR: *
NAUSEA AND VOMITING: NO NAUSEA AND NO VOMITING
TREMOR: *
TREMOR: TREMOR NOT VISIBLE BUT CAN BE FELT, FINGERTIP TO FINGERTIP
AGITATION: NORMAL ACTIVITY
ORIENTATION AND CLOUDING OF SENSORIUM: ORIENTED AND CAN DO SERIAL ADDITIONS
TREMOR: TREMOR NOT VISIBLE BUT CAN BE FELT, FINGERTIP TO FINGERTIP
ANXIETY: NO ANXIETY (AT EASE)
NAUSEA AND VOMITING: NO NAUSEA AND NO VOMITING
AGITATION: NORMAL ACTIVITY
VISUAL DISTURBANCES: MILD SENSITIVITY
HEADACHE, FULLNESS IN HEAD: MODERATE
ANXIETY: MILDLY ANXIOUS
TOTAL SCORE: 4
AUDITORY DISTURBANCES: NOT PRESENT
TOTAL SCORE: 12
NAUSEA AND VOMITING: NO NAUSEA AND NO VOMITING
TOTAL SCORE: 12
PAROXYSMAL SWEATS: BARELY PERCEPTIBLE SWEATING. PALMS MOIST
ANXIETY: MILDLY ANXIOUS
ORIENTATION AND CLOUDING OF SENSORIUM: ORIENTED AND CAN DO SERIAL ADDITIONS
AGITATION: NORMAL ACTIVITY
ANXIETY: NO ANXIETY (AT EASE)
HEADACHE, FULLNESS IN HEAD: NOT PRESENT
AGITATION: NORMAL ACTIVITY
ORIENTATION AND CLOUDING OF SENSORIUM: DISORIENTED FOR PLACE AND / OR PERSON
TREMOR: MODERATE TREMOR WITH ARMS EXTENDED
AUDITORY DISTURBANCES: NOT PRESENT
ANXIETY: MILDLY ANXIOUS
ORIENTATION AND CLOUDING OF SENSORIUM: ORIENTED AND CAN DO SERIAL ADDITIONS
VISUAL DISTURBANCES: MODERATE SENSITIVITY
TREMOR: *
NAUSEA AND VOMITING: MILD NAUSEA WITH NO VOMITING
TREMOR: MODERATE TREMOR WITH ARMS EXTENDED
PAROXYSMAL SWEATS: NO SWEAT VISIBLE
HEADACHE, FULLNESS IN HEAD: NOT PRESENT
AGITATION: SOMEWHAT MORE THAN NORMAL ACTIVITY
VISUAL DISTURBANCES: MILD SENSITIVITY
HEADACHE, FULLNESS IN HEAD: NOT PRESENT
AUDITORY DISTURBANCES: NOT PRESENT
HEADACHE, FULLNESS IN HEAD: NOT PRESENT
AUDITORY DISTURBANCES: NOT PRESENT
VISUAL DISTURBANCES: MILD SENSITIVITY
HEADACHE, FULLNESS IN HEAD: NOT PRESENT
VISUAL DISTURBANCES: MODERATE SENSITIVITY
VISUAL DISTURBANCES: MILD SENSITIVITY
PAROXYSMAL SWEATS: NO SWEAT VISIBLE
HEADACHE, FULLNESS IN HEAD: VERY MILD
TOTAL SCORE: 8
ORIENTATION AND CLOUDING OF SENSORIUM: DISORIENTED FOR PLACE AND / OR PERSON
NAUSEA AND VOMITING: *
AGITATION: SOMEWHAT MORE THAN NORMAL ACTIVITY
AUDITORY DISTURBANCES: NOT PRESENT
ANXIETY: MILDLY ANXIOUS
ANXIETY: MILDLY ANXIOUS
TOTAL SCORE: 12
TOTAL SCORE: 8
NAUSEA AND VOMITING: NO NAUSEA AND NO VOMITING
PAROXYSMAL SWEATS: NO SWEAT VISIBLE
NAUSEA AND VOMITING: MILD NAUSEA WITH NO VOMITING

## 2024-10-22 ASSESSMENT — PAIN DESCRIPTION - PAIN TYPE
TYPE: ACUTE PAIN

## 2024-10-22 ASSESSMENT — FIBROSIS 4 INDEX: FIB4 SCORE: 7.35

## 2024-10-23 ENCOUNTER — APPOINTMENT (OUTPATIENT)
Dept: RADIOLOGY | Facility: MEDICAL CENTER | Age: 74
DRG: 378 | End: 2024-10-23
Attending: INTERNAL MEDICINE
Payer: MEDICARE

## 2024-10-23 VITALS
BODY MASS INDEX: 25.02 KG/M2 | RESPIRATION RATE: 16 BRPM | SYSTOLIC BLOOD PRESSURE: 107 MMHG | HEIGHT: 72 IN | DIASTOLIC BLOOD PRESSURE: 85 MMHG | WEIGHT: 184.75 LBS | HEART RATE: 104 BPM | TEMPERATURE: 97.8 F | OXYGEN SATURATION: 90 %

## 2024-10-23 PROBLEM — D64.9 ANEMIA: Status: ACTIVE | Noted: 2024-10-23

## 2024-10-23 PROBLEM — Z75.8 DISCHARGE PLANNING ISSUES: Status: ACTIVE | Noted: 2024-10-23

## 2024-10-23 PROBLEM — K92.2 LOWER GI BLEEDING: Status: ACTIVE | Noted: 2024-10-23

## 2024-10-23 LAB
ABO GROUP BLD: NORMAL
ALBUMIN SERPL BCP-MCNC: 2.6 G/DL (ref 3.2–4.9)
ALBUMIN/GLOB SERPL: 0.7 G/DL
ALP SERPL-CCNC: 90 U/L (ref 30–99)
ALT SERPL-CCNC: 22 U/L (ref 2–50)
ANION GAP SERPL CALC-SCNC: 10 MMOL/L (ref 7–16)
APPEARANCE UR: CLEAR
AST SERPL-CCNC: 44 U/L (ref 12–45)
BACTERIA #/AREA URNS HPF: ABNORMAL /HPF
BARCODED ABORH UBTYP: 600
BARCODED PRD CODE UBPRD: NORMAL
BARCODED UNIT NUM UBUNT: NORMAL
BASOPHILS # BLD AUTO: 1.3 % (ref 0–1.8)
BASOPHILS # BLD: 0.03 K/UL (ref 0–0.12)
BILIRUB SERPL-MCNC: 1.5 MG/DL (ref 0.1–1.5)
BILIRUB UR QL STRIP.AUTO: ABNORMAL
BLD GP AB SCN SERPL QL: NORMAL
BUN SERPL-MCNC: 13 MG/DL (ref 8–22)
CALCIUM ALBUM COR SERPL-MCNC: 8.8 MG/DL (ref 8.5–10.5)
CALCIUM SERPL-MCNC: 7.7 MG/DL (ref 8.4–10.2)
CHLORIDE SERPL-SCNC: 102 MMOL/L (ref 96–112)
CO2 SERPL-SCNC: 21 MMOL/L (ref 20–33)
COLOR UR: YELLOW
COMPONENT R 8504R: NORMAL
CREAT SERPL-MCNC: 0.73 MG/DL (ref 0.5–1.4)
EOSINOPHIL # BLD AUTO: 0.18 K/UL (ref 0–0.51)
EOSINOPHIL NFR BLD: 7.5 % (ref 0–6.9)
EPI CELLS #/AREA URNS HPF: ABNORMAL /HPF
ERYTHROCYTE [DISTWIDTH] IN BLOOD BY AUTOMATED COUNT: 69.8 FL (ref 35.9–50)
ERYTHROCYTE [DISTWIDTH] IN BLOOD BY AUTOMATED COUNT: 71.7 FL (ref 35.9–50)
ERYTHROCYTE [DISTWIDTH] IN BLOOD BY AUTOMATED COUNT: 74 FL (ref 35.9–50)
GFR SERPLBLD CREATININE-BSD FMLA CKD-EPI: 96 ML/MIN/1.73 M 2
GLOBULIN SER CALC-MCNC: 3.5 G/DL (ref 1.9–3.5)
GLUCOSE SERPL-MCNC: 90 MG/DL (ref 65–99)
GLUCOSE UR STRIP.AUTO-MCNC: NEGATIVE MG/DL
HCT VFR BLD AUTO: 22.3 % (ref 42–52)
HCT VFR BLD AUTO: 22.9 % (ref 42–52)
HCT VFR BLD AUTO: 31.8 % (ref 42–52)
HGB BLD-MCNC: 6.9 G/DL (ref 14–18)
HGB BLD-MCNC: 7.1 G/DL (ref 14–18)
HGB BLD-MCNC: 9.3 G/DL (ref 14–18)
IMM GRANULOCYTES # BLD AUTO: 0.01 K/UL (ref 0–0.11)
IMM GRANULOCYTES NFR BLD AUTO: 0.4 % (ref 0–0.9)
INR PPP: 1.82 (ref 0.87–1.13)
KETONES UR STRIP.AUTO-MCNC: ABNORMAL MG/DL
LACTATE SERPL-SCNC: 1 MMOL/L (ref 0.5–2)
LEUKOCYTE ESTERASE UR QL STRIP.AUTO: NEGATIVE
LG PLATELETS BLD QL SMEAR: NORMAL
LYMPHOCYTES # BLD AUTO: 0.6 K/UL (ref 1–4.8)
LYMPHOCYTES NFR BLD: 25 % (ref 22–41)
MAGNESIUM SERPL-MCNC: 1.9 MG/DL (ref 1.5–2.5)
MCH RBC QN AUTO: 27.7 PG (ref 27–33)
MCH RBC QN AUTO: 27.9 PG (ref 27–33)
MCH RBC QN AUTO: 28.3 PG (ref 27–33)
MCHC RBC AUTO-ENTMCNC: 29.2 G/DL (ref 32.3–36.5)
MCHC RBC AUTO-ENTMCNC: 30.9 G/DL (ref 32.3–36.5)
MCHC RBC AUTO-ENTMCNC: 31 G/DL (ref 32.3–36.5)
MCV RBC AUTO: 89.5 FL (ref 81.4–97.8)
MCV RBC AUTO: 90.3 FL (ref 81.4–97.8)
MCV RBC AUTO: 96.7 FL (ref 81.4–97.8)
MICRO URNS: ABNORMAL
MONOCYTES # BLD AUTO: 0.55 K/UL (ref 0–0.85)
MONOCYTES NFR BLD AUTO: 22.9 % (ref 0–13.4)
MUCOUS THREADS #/AREA URNS HPF: ABNORMAL /HPF
NEUTROPHILS # BLD AUTO: 1.03 K/UL (ref 1.82–7.42)
NEUTROPHILS NFR BLD: 42.9 % (ref 44–72)
NITRITE UR QL STRIP.AUTO: NEGATIVE
NRBC # BLD AUTO: 0 K/UL
NRBC BLD-RTO: 0 /100 WBC (ref 0–0.2)
PH UR STRIP.AUTO: 8 [PH] (ref 5–8)
PHOSPHATE SERPL-MCNC: 3.7 MG/DL (ref 2.5–4.5)
PLATELET # BLD AUTO: 85 K/UL (ref 164–446)
PLATELET # BLD AUTO: 91 K/UL (ref 164–446)
PLATELET # BLD AUTO: 92 K/UL (ref 164–446)
PLATELET BLD QL SMEAR: NORMAL
PLATELETS.RETICULATED NFR BLD AUTO: 5.1 % (ref 0.6–13.1)
PLATELETS.RETICULATED NFR BLD AUTO: 5.2 % (ref 0.6–13.1)
PLATELETS.RETICULATED NFR BLD AUTO: 6.3 % (ref 0.6–13.1)
PMV BLD AUTO: 11.3 FL (ref 9–12.9)
PMV BLD AUTO: 11.5 FL (ref 9–12.9)
PMV BLD AUTO: 11.8 FL (ref 9–12.9)
POTASSIUM SERPL-SCNC: 3.6 MMOL/L (ref 3.6–5.5)
PRODUCT TYPE UPROD: NORMAL
PROT SERPL-MCNC: 6.1 G/DL (ref 6–8.2)
PROT UR QL STRIP: NEGATIVE MG/DL
PROTHROMBIN TIME: 21.7 SEC (ref 12–14.6)
RBC # BLD AUTO: 2.47 M/UL (ref 4.7–6.1)
RBC # BLD AUTO: 2.56 M/UL (ref 4.7–6.1)
RBC # BLD AUTO: 3.29 M/UL (ref 4.7–6.1)
RBC # URNS HPF: ABNORMAL /HPF
RBC BLD AUTO: NORMAL
RBC UR QL AUTO: ABNORMAL
RH BLD: NORMAL
SODIUM SERPL-SCNC: 133 MMOL/L (ref 135–145)
SP GR UR STRIP.AUTO: 1.02
UNIT STATUS USTAT: NORMAL
WBC # BLD AUTO: 2.4 K/UL (ref 4.8–10.8)
WBC # BLD AUTO: 2.6 K/UL (ref 4.8–10.8)
WBC # BLD AUTO: 2.8 K/UL (ref 4.8–10.8)
WBC #/AREA URNS HPF: ABNORMAL /HPF

## 2024-10-23 PROCEDURE — 81001 URINALYSIS AUTO W/SCOPE: CPT

## 2024-10-23 PROCEDURE — P9016 RBC LEUKOCYTES REDUCED: HCPCS

## 2024-10-23 PROCEDURE — 700105 HCHG RX REV CODE 258: Performed by: INTERNAL MEDICINE

## 2024-10-23 PROCEDURE — 36430 TRANSFUSION BLD/BLD COMPNT: CPT

## 2024-10-23 PROCEDURE — 80053 COMPREHEN METABOLIC PANEL: CPT

## 2024-10-23 PROCEDURE — 700102 HCHG RX REV CODE 250 W/ 637 OVERRIDE(OP): Performed by: INTERNAL MEDICINE

## 2024-10-23 PROCEDURE — A9270 NON-COVERED ITEM OR SERVICE: HCPCS | Performed by: HOSPITALIST

## 2024-10-23 PROCEDURE — 99292 CRITICAL CARE ADDL 30 MIN: CPT | Performed by: HOSPITALIST

## 2024-10-23 PROCEDURE — 700117 HCHG RX CONTRAST REV CODE 255: Performed by: INTERNAL MEDICINE

## 2024-10-23 PROCEDURE — 99291 CRITICAL CARE FIRST HOUR: CPT | Performed by: INTERNAL MEDICINE

## 2024-10-23 PROCEDURE — 85055 RETICULATED PLATELET ASSAY: CPT

## 2024-10-23 PROCEDURE — 85610 PROTHROMBIN TIME: CPT

## 2024-10-23 PROCEDURE — 85027 COMPLETE CBC AUTOMATED: CPT

## 2024-10-23 PROCEDURE — 30233N1 TRANSFUSION OF NONAUTOLOGOUS RED BLOOD CELLS INTO PERIPHERAL VEIN, PERCUTANEOUS APPROACH: ICD-10-PCS | Performed by: INTERNAL MEDICINE

## 2024-10-23 PROCEDURE — 700102 HCHG RX REV CODE 250 W/ 637 OVERRIDE(OP): Performed by: HOSPITALIST

## 2024-10-23 PROCEDURE — 83735 ASSAY OF MAGNESIUM: CPT

## 2024-10-23 PROCEDURE — 84100 ASSAY OF PHOSPHORUS: CPT

## 2024-10-23 PROCEDURE — 86923 COMPATIBILITY TEST ELECTRIC: CPT

## 2024-10-23 PROCEDURE — 83605 ASSAY OF LACTIC ACID: CPT

## 2024-10-23 PROCEDURE — A9270 NON-COVERED ITEM OR SERVICE: HCPCS | Performed by: INTERNAL MEDICINE

## 2024-10-23 PROCEDURE — 770020 HCHG ROOM/CARE - TELE (206)

## 2024-10-23 PROCEDURE — 87040 BLOOD CULTURE FOR BACTERIA: CPT

## 2024-10-23 PROCEDURE — 85025 COMPLETE CBC W/AUTO DIFF WBC: CPT

## 2024-10-23 PROCEDURE — 99291 CRITICAL CARE FIRST HOUR: CPT | Performed by: HOSPITALIST

## 2024-10-23 PROCEDURE — 700111 HCHG RX REV CODE 636 W/ 250 OVERRIDE (IP): Performed by: HOSPITALIST

## 2024-10-23 PROCEDURE — 99292 CRITICAL CARE ADDL 30 MIN: CPT | Performed by: INTERNAL MEDICINE

## 2024-10-23 PROCEDURE — 36415 COLL VENOUS BLD VENIPUNCTURE: CPT

## 2024-10-23 PROCEDURE — 74174 CTA ABD&PLVS W/CONTRAST: CPT

## 2024-10-23 RX ORDER — M-VIT,TX,IRON,MINS/CALC/FOLIC 27MG-0.4MG
1 TABLET ORAL DAILY
Status: CANCELLED | OUTPATIENT
Start: 2024-10-24

## 2024-10-23 RX ORDER — SULFAMETHOXAZOLE AND TRIMETHOPRIM 800; 160 MG/1; MG/1
1 TABLET ORAL EVERY 12 HOURS
Status: CANCELLED | OUTPATIENT
Start: 2024-10-24 | End: 2024-10-26

## 2024-10-23 RX ORDER — SODIUM CHLORIDE 9 MG/ML
INJECTION, SOLUTION INTRAVENOUS CONTINUOUS
Status: CANCELLED | OUTPATIENT
Start: 2024-10-23 | End: 2024-10-23

## 2024-10-23 RX ORDER — LACTULOSE 10 G/15ML
30 SOLUTION ORAL 3 TIMES DAILY
Status: DISCONTINUED | OUTPATIENT
Start: 2024-10-23 | End: 2024-10-24 | Stop reason: HOSPADM

## 2024-10-23 RX ORDER — IODIXANOL 270 MG/ML
100 INJECTION, SOLUTION INTRAVASCULAR ONCE
Status: DISCONTINUED | OUTPATIENT
Start: 2024-10-23 | End: 2024-10-24 | Stop reason: HOSPADM

## 2024-10-23 RX ORDER — LORAZEPAM 2 MG/ML
1 INJECTION INTRAMUSCULAR
Status: CANCELLED | OUTPATIENT
Start: 2024-10-23

## 2024-10-23 RX ORDER — ONDANSETRON 2 MG/ML
4 INJECTION INTRAMUSCULAR; INTRAVENOUS EVERY 4 HOURS PRN
Status: CANCELLED | OUTPATIENT
Start: 2024-10-23

## 2024-10-23 RX ORDER — LORAZEPAM 0.5 MG/1
0.5 TABLET ORAL EVERY 4 HOURS PRN
Status: CANCELLED | OUTPATIENT
Start: 2024-10-23

## 2024-10-23 RX ORDER — LORAZEPAM 1 MG/1
2 TABLET ORAL
Status: CANCELLED | OUTPATIENT
Start: 2024-10-23

## 2024-10-23 RX ORDER — SODIUM CHLORIDE 9 MG/ML
1000 INJECTION, SOLUTION INTRAVENOUS
Status: CANCELLED | OUTPATIENT
Start: 2024-10-23

## 2024-10-23 RX ORDER — LORAZEPAM 2 MG/ML
0.5 INJECTION INTRAMUSCULAR EVERY 4 HOURS PRN
Status: CANCELLED | OUTPATIENT
Start: 2024-10-23

## 2024-10-23 RX ORDER — ONDANSETRON 4 MG/1
4 TABLET, ORALLY DISINTEGRATING ORAL EVERY 4 HOURS PRN
Status: CANCELLED | OUTPATIENT
Start: 2024-10-23

## 2024-10-23 RX ORDER — LACTULOSE 10 G/15ML
30 SOLUTION ORAL 3 TIMES DAILY
Status: CANCELLED | OUTPATIENT
Start: 2024-10-24

## 2024-10-23 RX ORDER — FOLIC ACID 1 MG/1
1 TABLET ORAL DAILY
Status: CANCELLED | OUTPATIENT
Start: 2024-10-24

## 2024-10-23 RX ORDER — TAMSULOSIN HYDROCHLORIDE 0.4 MG/1
0.4 CAPSULE ORAL
Status: CANCELLED | OUTPATIENT
Start: 2024-10-24

## 2024-10-23 RX ORDER — LORAZEPAM 1 MG/1
1 TABLET ORAL EVERY 4 HOURS PRN
Status: CANCELLED | OUTPATIENT
Start: 2024-10-23

## 2024-10-23 RX ORDER — LORAZEPAM 1 MG/1
4 TABLET ORAL
Status: CANCELLED | OUTPATIENT
Start: 2024-10-23

## 2024-10-23 RX ORDER — SODIUM CHLORIDE 9 MG/ML
INJECTION, SOLUTION INTRAVENOUS CONTINUOUS
Status: ACTIVE | OUTPATIENT
Start: 2024-10-23 | End: 2024-10-23

## 2024-10-23 RX ORDER — ACETAMINOPHEN 325 MG/1
650 TABLET ORAL EVERY 6 HOURS PRN
Status: CANCELLED | OUTPATIENT
Start: 2024-10-23

## 2024-10-23 RX ORDER — LORAZEPAM 2 MG/ML
1.5 INJECTION INTRAMUSCULAR
Status: CANCELLED | OUTPATIENT
Start: 2024-10-23

## 2024-10-23 RX ORDER — LORAZEPAM 1 MG/1
3 TABLET ORAL
Status: CANCELLED | OUTPATIENT
Start: 2024-10-23

## 2024-10-23 RX ORDER — GAUZE BANDAGE 2" X 2"
100 BANDAGE TOPICAL DAILY
Status: CANCELLED | OUTPATIENT
Start: 2024-10-24

## 2024-10-23 RX ORDER — SODIUM CHLORIDE 9 MG/ML
1000 INJECTION, SOLUTION INTRAVENOUS
Status: DISCONTINUED | OUTPATIENT
Start: 2024-10-23 | End: 2024-10-24 | Stop reason: HOSPADM

## 2024-10-23 RX ORDER — LORAZEPAM 2 MG/ML
2 INJECTION INTRAMUSCULAR
Status: CANCELLED | OUTPATIENT
Start: 2024-10-23

## 2024-10-23 RX ADMIN — SODIUM CHLORIDE: 9 INJECTION, SOLUTION INTRAVENOUS at 13:48

## 2024-10-23 RX ADMIN — IOHEXOL 100 ML: 350 INJECTION, SOLUTION INTRAVENOUS at 17:46

## 2024-10-23 RX ADMIN — LORAZEPAM 2 MG: 1 TABLET ORAL at 05:46

## 2024-10-23 RX ADMIN — LACTULOSE 30 ML: 20 SOLUTION ORAL at 17:10

## 2024-10-23 RX ADMIN — LORAZEPAM 1 MG: 1 TABLET ORAL at 19:31

## 2024-10-23 RX ADMIN — SULFAMETHOXAZOLE AND TRIMETHOPRIM 1 TABLET: 800; 160 TABLET ORAL at 17:10

## 2024-10-23 RX ADMIN — FOLIC ACID 1 MG: 1 TABLET ORAL at 05:39

## 2024-10-23 RX ADMIN — SULFAMETHOXAZOLE AND TRIMETHOPRIM 1 TABLET: 800; 160 TABLET ORAL at 05:39

## 2024-10-23 RX ADMIN — LORAZEPAM 1 MG: 2 INJECTION INTRAMUSCULAR; INTRAVENOUS at 23:01

## 2024-10-23 RX ADMIN — OMEPRAZOLE 20 MG: 20 CAPSULE, DELAYED RELEASE ORAL at 05:39

## 2024-10-23 RX ADMIN — TAMSULOSIN HYDROCHLORIDE 0.4 MG: 0.4 CAPSULE ORAL at 08:41

## 2024-10-23 RX ADMIN — LORAZEPAM 2 MG: 1 TABLET ORAL at 02:59

## 2024-10-23 RX ADMIN — Medication 1 TABLET: at 05:39

## 2024-10-23 RX ADMIN — LACTULOSE 30 ML: 20 SOLUTION ORAL at 13:40

## 2024-10-23 RX ADMIN — Medication 100 MG: at 05:39

## 2024-10-23 ASSESSMENT — LIFESTYLE VARIABLES
AGITATION: NORMAL ACTIVITY
TOTAL SCORE: 12
ORIENTATION AND CLOUDING OF SENSORIUM: DATE DISORIENTATION BY MORE THAN TWO CALENDAR DAYS
AUDITORY DISTURBANCES: NOT PRESENT
PAROXYSMAL SWEATS: NO SWEAT VISIBLE
TREMOR: TREMOR NOT VISIBLE BUT CAN BE FELT, FINGERTIP TO FINGERTIP
ANXIETY: MILDLY ANXIOUS
HEADACHE, FULLNESS IN HEAD: MILD
VISUAL DISTURBANCES: MILD SENSITIVITY
AUDITORY DISTURBANCES: NOT PRESENT
PAROXYSMAL SWEATS: NO SWEAT VISIBLE
HEADACHE, FULLNESS IN HEAD: MILD
TREMOR: TREMOR NOT VISIBLE BUT CAN BE FELT, FINGERTIP TO FINGERTIP
NAUSEA AND VOMITING: NO NAUSEA AND NO VOMITING
HEADACHE, FULLNESS IN HEAD: MILD
ANXIETY: NO ANXIETY (AT EASE)
TOTAL SCORE: 4
NAUSEA AND VOMITING: NO NAUSEA AND NO VOMITING
ANXIETY: NO ANXIETY (AT EASE)
TOTAL SCORE: 13
AGITATION: NORMAL ACTIVITY
HEADACHE, FULLNESS IN HEAD: MILD
TREMOR: *
TOTAL SCORE: 4
AGITATION: NORMAL ACTIVITY
VISUAL DISTURBANCES: MODERATE SENSITIVITY
NAUSEA AND VOMITING: NO NAUSEA AND NO VOMITING
TREMOR: TREMOR NOT VISIBLE BUT CAN BE FELT, FINGERTIP TO FINGERTIP
TREMOR: *
NAUSEA AND VOMITING: NO NAUSEA AND NO VOMITING
AUDITORY DISTURBANCES: NOT PRESENT
ORIENTATION AND CLOUDING OF SENSORIUM: DATE DISORIENTATION BY MORE THAN TWO CALENDAR DAYS
TOTAL SCORE: 10
TREMOR: TREMOR NOT VISIBLE BUT CAN BE FELT, FINGERTIP TO FINGERTIP
ORIENTATION AND CLOUDING OF SENSORIUM: ORIENTED AND CAN DO SERIAL ADDITIONS
NAUSEA AND VOMITING: NO NAUSEA AND NO VOMITING
HEADACHE, FULLNESS IN HEAD: VERY MILD
TOTAL SCORE: 4
PAROXYSMAL SWEATS: NO SWEAT VISIBLE
HEADACHE, FULLNESS IN HEAD: MODERATE
ORIENTATION AND CLOUDING OF SENSORIUM: DISORIENTED FOR PLACE AND / OR PERSON
ANXIETY: MILDLY ANXIOUS
ORIENTATION AND CLOUDING OF SENSORIUM: ORIENTED AND CAN DO SERIAL ADDITIONS
PAROXYSMAL SWEATS: NO SWEAT VISIBLE
VISUAL DISTURBANCES: VERY MILD SENSITIVITY
PAROXYSMAL SWEATS: NO SWEAT VISIBLE
ORIENTATION AND CLOUDING OF SENSORIUM: ORIENTED AND CAN DO SERIAL ADDITIONS
TOTAL SCORE: 11
AGITATION: NORMAL ACTIVITY
VISUAL DISTURBANCES: MODERATELY SEVERE HALLUCINATIONS
ANXIETY: *
AUDITORY DISTURBANCES: NOT PRESENT
NAUSEA AND VOMITING: NO NAUSEA AND NO VOMITING
HEADACHE, FULLNESS IN HEAD: MILD
NAUSEA AND VOMITING: NO NAUSEA AND NO VOMITING
AGITATION: NORMAL ACTIVITY
ANXIETY: NO ANXIETY (AT EASE)
PAROXYSMAL SWEATS: NO SWEAT VISIBLE
TREMOR: *
AGITATION: NORMAL ACTIVITY
ANXIETY: MILDLY ANXIOUS
ORIENTATION AND CLOUDING OF SENSORIUM: DATE DISORIENTATION BY MORE THAN TWO CALENDAR DAYS
AGITATION: SOMEWHAT MORE THAN NORMAL ACTIVITY
AGITATION: NORMAL ACTIVITY
TREMOR: MODERATE TREMOR WITH ARMS EXTENDED
ANXIETY: NO ANXIETY (AT EASE)
AUDITORY DISTURBANCES: NOT PRESENT
HEADACHE, FULLNESS IN HEAD: MILD
VISUAL DISTURBANCES: VERY MILD SENSITIVITY
AUDITORY DISTURBANCES: NOT PRESENT
NAUSEA AND VOMITING: NO NAUSEA AND NO VOMITING
AUDITORY DISTURBANCES: NOT PRESENT
ORIENTATION AND CLOUDING OF SENSORIUM: CANNOT DO SERIAL ADDITIONS OR IS UNCERTAIN ABOUT DATE
PAROXYSMAL SWEATS: NO SWEAT VISIBLE
AUDITORY DISTURBANCES: NOT PRESENT
VISUAL DISTURBANCES: VERY MILD SENSITIVITY
TOTAL SCORE: 7
PAROXYSMAL SWEATS: NO SWEAT VISIBLE
VISUAL DISTURBANCES: MILD SENSITIVITY
VISUAL DISTURBANCES: NOT PRESENT

## 2024-10-23 ASSESSMENT — ENCOUNTER SYMPTOMS
CHILLS: 0
FOCAL WEAKNESS: 0
FEVER: 0
VOMITING: 0
STRIDOR: 0
COUGH: 0
ABDOMINAL PAIN: 0
NERVOUS/ANXIOUS: 0
BRUISES/BLEEDS EASILY: 0
FLANK PAIN: 1
BLOOD IN STOOL: 1
MYALGIAS: 0
SHORTNESS OF BREATH: 0

## 2024-10-23 ASSESSMENT — PAIN DESCRIPTION - PAIN TYPE
TYPE: ACUTE PAIN
TYPE: ACUTE PAIN

## 2024-10-23 ASSESSMENT — FIBROSIS 4 INDEX: FIB4 SCORE: 8.06

## 2024-10-24 ENCOUNTER — HOSPITAL ENCOUNTER (INPATIENT)
Facility: MEDICAL CENTER | Age: 74
LOS: 4 days | End: 2024-10-28
Attending: HOSPITALIST | Admitting: HOSPITALIST
Payer: MEDICARE

## 2024-10-24 DIAGNOSIS — E43 SEVERE PROTEIN-CALORIE MALNUTRITION (HCC): ICD-10-CM

## 2024-10-24 DIAGNOSIS — F10.231 ALCOHOL DEPENDENCE WITH WITHDRAWAL DELIRIUM (HCC): ICD-10-CM

## 2024-10-24 DIAGNOSIS — F10.239 ALCOHOL DEPENDENCE WITH WITHDRAWAL WITH COMPLICATION (HCC): ICD-10-CM

## 2024-10-24 DIAGNOSIS — K70.31 ALCOHOLIC CIRRHOSIS OF LIVER WITH ASCITES (HCC): ICD-10-CM

## 2024-10-24 DIAGNOSIS — R31.0 GROSS HEMATURIA: ICD-10-CM

## 2024-10-24 DIAGNOSIS — F10.931 ALCOHOL WITHDRAWAL SYNDROME, WITH DELIRIUM (HCC): ICD-10-CM

## 2024-10-24 DIAGNOSIS — K22.719 BARRETT'S ESOPHAGUS WITH DYSPLASIA: ICD-10-CM

## 2024-10-24 DIAGNOSIS — J90 PLEURAL EFFUSION ON LEFT: ICD-10-CM

## 2024-10-24 DIAGNOSIS — D61.818 PANCYTOPENIA (HCC): ICD-10-CM

## 2024-10-24 DIAGNOSIS — K92.2 GASTROINTESTINAL HEMORRHAGE, UNSPECIFIED GASTROINTESTINAL HEMORRHAGE TYPE: ICD-10-CM

## 2024-10-24 DIAGNOSIS — R33.8 ACUTE URINARY RETENTION: ICD-10-CM

## 2024-10-24 DIAGNOSIS — K40.91 UNILATERAL RECURRENT INGUINAL HERNIA WITHOUT OBSTRUCTION OR GANGRENE: ICD-10-CM

## 2024-10-24 DIAGNOSIS — J96.01 ACUTE RESPIRATORY FAILURE WITH HYPOXIA (HCC): ICD-10-CM

## 2024-10-24 LAB
ALBUMIN SERPL BCP-MCNC: 2.8 G/DL (ref 3.2–4.9)
ALP SERPL-CCNC: 109 U/L (ref 30–99)
ALT SERPL-CCNC: 26 U/L (ref 2–50)
AST SERPL-CCNC: 48 U/L (ref 12–45)
BASOPHILS # BLD AUTO: 1.2 % (ref 0–1.8)
BASOPHILS # BLD: 0.04 K/UL (ref 0–0.12)
BILIRUB CONJ SERPL-MCNC: 1.2 MG/DL (ref 0.1–0.5)
BILIRUB INDIRECT SERPL-MCNC: 1.5 MG/DL (ref 0–1)
BILIRUB SERPL-MCNC: 2.7 MG/DL (ref 0.1–1.5)
BUN SERPL-MCNC: 10 MG/DL (ref 8–22)
CALCIUM ALBUM COR SERPL-MCNC: 9.2 MG/DL (ref 8.5–10.5)
CALCIUM SERPL-MCNC: 8.2 MG/DL (ref 8.5–10.5)
CHLORIDE SERPL-SCNC: 104 MMOL/L (ref 96–112)
CK SERPL-CCNC: 167 U/L (ref 0–154)
CO2 SERPL-SCNC: 19 MMOL/L (ref 20–33)
CREAT SERPL-MCNC: 0.63 MG/DL (ref 0.5–1.4)
EOSINOPHIL # BLD AUTO: 0.16 K/UL (ref 0–0.51)
EOSINOPHIL NFR BLD: 4.8 % (ref 0–6.9)
ERYTHROCYTE [DISTWIDTH] IN BLOOD BY AUTOMATED COUNT: 62.6 FL (ref 35.9–50)
ERYTHROCYTE [DISTWIDTH] IN BLOOD BY AUTOMATED COUNT: 63 FL (ref 35.9–50)
ERYTHROCYTE [DISTWIDTH] IN BLOOD BY AUTOMATED COUNT: 64.4 FL (ref 35.9–50)
GFR SERPLBLD CREATININE-BSD FMLA CKD-EPI: 100 ML/MIN/1.73 M 2
GLUCOSE SERPL-MCNC: 96 MG/DL (ref 65–99)
HCT VFR BLD AUTO: 27.2 % (ref 42–52)
HCT VFR BLD AUTO: 28 % (ref 42–52)
HCT VFR BLD AUTO: 28.4 % (ref 42–52)
HGB BLD-MCNC: 8.8 G/DL (ref 14–18)
HGB BLD-MCNC: 8.9 G/DL (ref 14–18)
HGB BLD-MCNC: 9.2 G/DL (ref 14–18)
IMM GRANULOCYTES # BLD AUTO: 0.01 K/UL (ref 0–0.11)
IMM GRANULOCYTES NFR BLD AUTO: 0.3 % (ref 0–0.9)
LYMPHOCYTES # BLD AUTO: 0.69 K/UL (ref 1–4.8)
LYMPHOCYTES NFR BLD: 20.6 % (ref 22–41)
MAGNESIUM SERPL-MCNC: 1.9 MG/DL (ref 1.5–2.5)
MCH RBC QN AUTO: 27.5 PG (ref 27–33)
MCH RBC QN AUTO: 27.6 PG (ref 27–33)
MCH RBC QN AUTO: 27.6 PG (ref 27–33)
MCHC RBC AUTO-ENTMCNC: 31.8 G/DL (ref 32.3–36.5)
MCHC RBC AUTO-ENTMCNC: 32.4 G/DL (ref 32.3–36.5)
MCHC RBC AUTO-ENTMCNC: 32.4 G/DL (ref 32.3–36.5)
MCV RBC AUTO: 85.3 FL (ref 81.4–97.8)
MCV RBC AUTO: 85.3 FL (ref 81.4–97.8)
MCV RBC AUTO: 86.4 FL (ref 81.4–97.8)
MONOCYTES # BLD AUTO: 0.98 K/UL (ref 0–0.85)
MONOCYTES NFR BLD AUTO: 29.3 % (ref 0–13.4)
NEUTROPHILS # BLD AUTO: 1.47 K/UL (ref 1.82–7.42)
NEUTROPHILS NFR BLD: 43.8 % (ref 44–72)
NRBC # BLD AUTO: 0 K/UL
NRBC BLD-RTO: 0 /100 WBC (ref 0–0.2)
PHOSPHATE SERPL-MCNC: 3.5 MG/DL (ref 2.5–4.5)
PLATELET # BLD AUTO: 103 K/UL (ref 164–446)
PLATELET # BLD AUTO: 106 K/UL (ref 164–446)
PLATELET # BLD AUTO: 98 K/UL (ref 164–446)
PLATELETS.RETICULATED NFR BLD AUTO: 3.7 % (ref 0.6–13.1)
PMV BLD AUTO: 10.2 FL (ref 9–12.9)
PMV BLD AUTO: 10.2 FL (ref 9–12.9)
PMV BLD AUTO: 10.5 FL (ref 9–12.9)
POTASSIUM SERPL-SCNC: 3.9 MMOL/L (ref 3.6–5.5)
PROCALCITONIN SERPL-MCNC: 0.11 NG/ML
PROT SERPL-MCNC: 6.5 G/DL (ref 6–8.2)
RBC # BLD AUTO: 3.19 M/UL (ref 4.7–6.1)
RBC # BLD AUTO: 3.24 M/UL (ref 4.7–6.1)
RBC # BLD AUTO: 3.33 M/UL (ref 4.7–6.1)
SODIUM SERPL-SCNC: 134 MMOL/L (ref 135–145)
WBC # BLD AUTO: 3.4 K/UL (ref 4.8–10.8)
WBC # BLD AUTO: 3.4 K/UL (ref 4.8–10.8)
WBC # BLD AUTO: 3.8 K/UL (ref 4.8–10.8)

## 2024-10-24 PROCEDURE — 82248 BILIRUBIN DIRECT: CPT

## 2024-10-24 PROCEDURE — 83735 ASSAY OF MAGNESIUM: CPT

## 2024-10-24 PROCEDURE — 97162 PT EVAL MOD COMPLEX 30 MIN: CPT

## 2024-10-24 PROCEDURE — 80069 RENAL FUNCTION PANEL: CPT

## 2024-10-24 PROCEDURE — 82550 ASSAY OF CK (CPK): CPT

## 2024-10-24 PROCEDURE — A9270 NON-COVERED ITEM OR SERVICE: HCPCS | Performed by: HOSPITALIST

## 2024-10-24 PROCEDURE — 85025 COMPLETE CBC W/AUTO DIFF WBC: CPT

## 2024-10-24 PROCEDURE — 700102 HCHG RX REV CODE 250 W/ 637 OVERRIDE(OP): Performed by: HOSPITALIST

## 2024-10-24 PROCEDURE — 99233 SBSQ HOSP IP/OBS HIGH 50: CPT | Performed by: STUDENT IN AN ORGANIZED HEALTH CARE EDUCATION/TRAINING PROGRAM

## 2024-10-24 PROCEDURE — 97535 SELF CARE MNGMENT TRAINING: CPT

## 2024-10-24 PROCEDURE — 84450 TRANSFERASE (AST) (SGOT): CPT

## 2024-10-24 PROCEDURE — 85027 COMPLETE CBC AUTOMATED: CPT

## 2024-10-24 PROCEDURE — 84155 ASSAY OF PROTEIN SERUM: CPT

## 2024-10-24 PROCEDURE — 84075 ASSAY ALKALINE PHOSPHATASE: CPT

## 2024-10-24 PROCEDURE — 36415 COLL VENOUS BLD VENIPUNCTURE: CPT

## 2024-10-24 PROCEDURE — 770020 HCHG ROOM/CARE - TELE (206)

## 2024-10-24 PROCEDURE — 84460 ALANINE AMINO (ALT) (SGPT): CPT

## 2024-10-24 PROCEDURE — HZ2ZZZZ DETOXIFICATION SERVICES FOR SUBSTANCE ABUSE TREATMENT: ICD-10-PCS | Performed by: HOSPITALIST

## 2024-10-24 PROCEDURE — 82247 BILIRUBIN TOTAL: CPT

## 2024-10-24 PROCEDURE — 84145 PROCALCITONIN (PCT): CPT

## 2024-10-24 PROCEDURE — 85055 RETICULATED PLATELET ASSAY: CPT

## 2024-10-24 RX ORDER — LORAZEPAM 2 MG/ML
0.5 INJECTION INTRAMUSCULAR EVERY 4 HOURS PRN
Status: DISCONTINUED | OUTPATIENT
Start: 2024-10-24 | End: 2024-10-28 | Stop reason: HOSPADM

## 2024-10-24 RX ORDER — LORAZEPAM 2 MG/1
2 TABLET ORAL
Status: DISCONTINUED | OUTPATIENT
Start: 2024-10-24 | End: 2024-10-28 | Stop reason: HOSPADM

## 2024-10-24 RX ORDER — LORAZEPAM 0.5 MG/1
0.5 TABLET ORAL EVERY 4 HOURS PRN
Status: DISCONTINUED | OUTPATIENT
Start: 2024-10-24 | End: 2024-10-28 | Stop reason: HOSPADM

## 2024-10-24 RX ORDER — ONDANSETRON 4 MG/1
4 TABLET, ORALLY DISINTEGRATING ORAL EVERY 4 HOURS PRN
Status: DISCONTINUED | OUTPATIENT
Start: 2024-10-24 | End: 2024-10-28 | Stop reason: HOSPADM

## 2024-10-24 RX ORDER — LORAZEPAM 2 MG/1
4 TABLET ORAL
Status: DISCONTINUED | OUTPATIENT
Start: 2024-10-24 | End: 2024-10-28 | Stop reason: HOSPADM

## 2024-10-24 RX ORDER — LORAZEPAM 2 MG/ML
1.5 INJECTION INTRAMUSCULAR
Status: DISCONTINUED | OUTPATIENT
Start: 2024-10-24 | End: 2024-10-28 | Stop reason: HOSPADM

## 2024-10-24 RX ORDER — LORAZEPAM 2 MG/ML
1 INJECTION INTRAMUSCULAR
Status: DISCONTINUED | OUTPATIENT
Start: 2024-10-24 | End: 2024-10-28 | Stop reason: HOSPADM

## 2024-10-24 RX ORDER — ACETAMINOPHEN 325 MG/1
650 TABLET ORAL EVERY 6 HOURS PRN
Status: DISCONTINUED | OUTPATIENT
Start: 2024-10-24 | End: 2024-10-28 | Stop reason: HOSPADM

## 2024-10-24 RX ORDER — LORAZEPAM 1 MG/1
1 TABLET ORAL EVERY 4 HOURS PRN
Status: DISCONTINUED | OUTPATIENT
Start: 2024-10-24 | End: 2024-10-28 | Stop reason: HOSPADM

## 2024-10-24 RX ORDER — SODIUM CHLORIDE 9 MG/ML
1000 INJECTION, SOLUTION INTRAVENOUS
Status: DISCONTINUED | OUTPATIENT
Start: 2024-10-24 | End: 2024-10-28 | Stop reason: HOSPADM

## 2024-10-24 RX ORDER — TAMSULOSIN HYDROCHLORIDE 0.4 MG/1
0.4 CAPSULE ORAL
Status: DISCONTINUED | OUTPATIENT
Start: 2024-10-24 | End: 2024-10-28 | Stop reason: HOSPADM

## 2024-10-24 RX ORDER — FOLIC ACID 1 MG/1
1 TABLET ORAL DAILY
Status: DISCONTINUED | OUTPATIENT
Start: 2024-10-24 | End: 2024-10-28 | Stop reason: HOSPADM

## 2024-10-24 RX ORDER — GAUZE BANDAGE 2" X 2"
100 BANDAGE TOPICAL DAILY
Status: DISCONTINUED | OUTPATIENT
Start: 2024-10-24 | End: 2024-10-28 | Stop reason: HOSPADM

## 2024-10-24 RX ORDER — LORAZEPAM 2 MG/ML
2 INJECTION INTRAMUSCULAR
Status: DISCONTINUED | OUTPATIENT
Start: 2024-10-24 | End: 2024-10-28 | Stop reason: HOSPADM

## 2024-10-24 RX ORDER — LACTULOSE 10 G/15ML
30 SOLUTION ORAL 3 TIMES DAILY
Status: DISCONTINUED | OUTPATIENT
Start: 2024-10-24 | End: 2024-10-28 | Stop reason: HOSPADM

## 2024-10-24 RX ORDER — SULFAMETHOXAZOLE AND TRIMETHOPRIM 800; 160 MG/1; MG/1
1 TABLET ORAL EVERY 12 HOURS
Status: DISCONTINUED | OUTPATIENT
Start: 2024-10-24 | End: 2024-10-28 | Stop reason: HOSPADM

## 2024-10-24 RX ORDER — ONDANSETRON 2 MG/ML
4 INJECTION INTRAMUSCULAR; INTRAVENOUS EVERY 4 HOURS PRN
Status: DISCONTINUED | OUTPATIENT
Start: 2024-10-24 | End: 2024-10-28 | Stop reason: HOSPADM

## 2024-10-24 RX ORDER — M-VIT,TX,IRON,MINS/CALC/FOLIC 27MG-0.4MG
1 TABLET ORAL DAILY
Status: DISCONTINUED | OUTPATIENT
Start: 2024-10-24 | End: 2024-10-28 | Stop reason: HOSPADM

## 2024-10-24 RX ADMIN — FOLIC ACID 1 MG: 1 TABLET ORAL at 05:35

## 2024-10-24 RX ADMIN — LORAZEPAM 0.5 MG: 0.5 TABLET ORAL at 18:31

## 2024-10-24 RX ADMIN — SULFAMETHOXAZOLE AND TRIMETHOPRIM 1 TABLET: 800; 160 TABLET ORAL at 05:35

## 2024-10-24 RX ADMIN — LORAZEPAM 2 MG: 2 TABLET ORAL at 21:41

## 2024-10-24 RX ADMIN — Medication 100 MG: at 05:35

## 2024-10-24 RX ADMIN — SULFAMETHOXAZOLE AND TRIMETHOPRIM 1 TABLET: 800; 160 TABLET ORAL at 18:31

## 2024-10-24 RX ADMIN — LORAZEPAM 1 MG: 1 TABLET ORAL at 15:18

## 2024-10-24 RX ADMIN — OMEPRAZOLE 20 MG: 20 CAPSULE, DELAYED RELEASE ORAL at 05:35

## 2024-10-24 RX ADMIN — LACTULOSE 30 ML: 10 SOLUTION ORAL at 05:35

## 2024-10-24 RX ADMIN — Medication 1 TABLET: at 05:39

## 2024-10-24 RX ADMIN — LORAZEPAM 0.5 MG: 0.5 TABLET ORAL at 07:11

## 2024-10-24 ASSESSMENT — COGNITIVE AND FUNCTIONAL STATUS - GENERAL
STANDING UP FROM CHAIR USING ARMS: A LOT
TURNING FROM BACK TO SIDE WHILE IN FLAT BAD: A LOT
DAILY ACTIVITIY SCORE: 16
PERSONAL GROOMING: A LITTLE
DRESSING REGULAR UPPER BODY CLOTHING: A LITTLE
HELP NEEDED FOR BATHING: A LOT
CLIMB 3 TO 5 STEPS WITH RAILING: TOTAL
MOBILITY SCORE: 11
MOVING TO AND FROM BED TO CHAIR: A LOT
DRESSING REGULAR LOWER BODY CLOTHING: A LOT
SUGGESTED CMS G CODE MODIFIER DAILY ACTIVITY: CK
SUGGESTED CMS G CODE MODIFIER MOBILITY: CL
MOVING FROM LYING ON BACK TO SITTING ON SIDE OF FLAT BED: A LOT
TOILETING: A LOT
WALKING IN HOSPITAL ROOM: A LOT

## 2024-10-24 ASSESSMENT — LIFESTYLE VARIABLES
PAROXYSMAL SWEATS: NO SWEAT VISIBLE
HEADACHE, FULLNESS IN HEAD: MILD
ALCOHOL_USE: YES
VISUAL DISTURBANCES: NOT PRESENT
TOTAL SCORE: 4
HEADACHE, FULLNESS IN HEAD: NOT PRESENT
VISUAL DISTURBANCES: NOT PRESENT
CONSUMPTION TOTAL: POSITIVE
PAROXYSMAL SWEATS: BARELY PERCEPTIBLE SWEATING. PALMS MOIST
AGITATION: *
HOW MANY TIMES IN THE PAST YEAR HAVE YOU HAD 5 OR MORE DRINKS IN A DAY: 45
ANXIETY: *
ORIENTATION AND CLOUDING OF SENSORIUM: ORIENTED AND CAN DO SERIAL ADDITIONS
TREMOR: *
TREMOR: NO TREMOR
PAROXYSMAL SWEATS: NO SWEAT VISIBLE
NAUSEA AND VOMITING: NO NAUSEA AND NO VOMITING
ANXIETY: *
AUDITORY DISTURBANCES: NOT PRESENT
DOES PATIENT WANT TO STOP DRINKING: NO
TOTAL SCORE: 1
ANXIETY: MILDLY ANXIOUS
NAUSEA AND VOMITING: NO NAUSEA AND NO VOMITING
PAROXYSMAL SWEATS: NO SWEAT VISIBLE
TOTAL SCORE: 1
AUDITORY DISTURBANCES: NOT PRESENT
TREMOR: NO TREMOR
TREMOR: *
PAROXYSMAL SWEATS: BARELY PERCEPTIBLE SWEATING. PALMS MOIST
AUDITORY DISTURBANCES: NOT PRESENT
TOTAL SCORE: 7
ANXIETY: MILDLY ANXIOUS
AGITATION: NORMAL ACTIVITY
HEADACHE, FULLNESS IN HEAD: NOT PRESENT
AVERAGE NUMBER OF DAYS PER WEEK YOU HAVE A DRINK CONTAINING ALCOHOL: 5
AUDITORY DISTURBANCES: NOT PRESENT
AUDITORY DISTURBANCES: NOT PRESENT
TOTAL SCORE: 1
TREMOR: NO TREMOR
AGITATION: SOMEWHAT MORE THAN NORMAL ACTIVITY
ORIENTATION AND CLOUDING OF SENSORIUM: ORIENTED AND CAN DO SERIAL ADDITIONS
NAUSEA AND VOMITING: NO NAUSEA AND NO VOMITING
VISUAL DISTURBANCES: NOT PRESENT
ORIENTATION AND CLOUDING OF SENSORIUM: ORIENTED AND CAN DO SERIAL ADDITIONS
ORIENTATION AND CLOUDING OF SENSORIUM: DATE DISORIENTATION BY MORE THAN TWO CALENDAR DAYS
AUDITORY DISTURBANCES: NOT PRESENT
VISUAL DISTURBANCES: NOT PRESENT
TREMOR: *
PAROXYSMAL SWEATS: NO SWEAT VISIBLE
EVER HAD A DRINK FIRST THING IN THE MORNING TO STEADY YOUR NERVES TO GET RID OF A HANGOVER: NO
ORIENTATION AND CLOUDING OF SENSORIUM: CANNOT DO SERIAL ADDITIONS OR IS UNCERTAIN ABOUT DATE
VISUAL DISTURBANCES: NOT PRESENT
NAUSEA AND VOMITING: NO NAUSEA AND NO VOMITING
TOTAL SCORE: 10
ANXIETY: *
TOTAL SCORE: 11
AGITATION: SOMEWHAT MORE THAN NORMAL ACTIVITY
TOTAL SCORE: 2
PAROXYSMAL SWEATS: NO SWEAT VISIBLE
HEADACHE, FULLNESS IN HEAD: NOT PRESENT
ORIENTATION AND CLOUDING OF SENSORIUM: CANNOT DO SERIAL ADDITIONS OR IS UNCERTAIN ABOUT DATE
EVER FELT BAD OR GUILTY ABOUT YOUR DRINKING: NO
ORIENTATION AND CLOUDING OF SENSORIUM: ORIENTED AND CAN DO SERIAL ADDITIONS
ANXIETY: MODERATELY ANXIOUS OR GUARDED, SO ANXIETY IS INFERRED
TOTAL SCORE: 2
NAUSEA AND VOMITING: NO NAUSEA AND NO VOMITING
NAUSEA AND VOMITING: NO NAUSEA AND NO VOMITING
AGITATION: *
VISUAL DISTURBANCES: VERY MILD SENSITIVITY
VISUAL DISTURBANCES: NOT PRESENT
ON A TYPICAL DAY WHEN YOU DRINK ALCOHOL HOW MANY DRINKS DO YOU HAVE: 6
NAUSEA AND VOMITING: MILD NAUSEA WITH NO VOMITING
AUDITORY DISTURBANCES: NOT PRESENT
TREMOR: *
HEADACHE, FULLNESS IN HEAD: NOT PRESENT
ANXIETY: MILDLY ANXIOUS
HEADACHE, FULLNESS IN HEAD: NOT PRESENT
AGITATION: NORMAL ACTIVITY
HEADACHE, FULLNESS IN HEAD: NOT PRESENT
TOTAL SCORE: 6
HAVE YOU EVER FELT YOU SHOULD CUT DOWN ON YOUR DRINKING: NO
AGITATION: SOMEWHAT MORE THAN NORMAL ACTIVITY
HAVE PEOPLE ANNOYED YOU BY CRITICIZING YOUR DRINKING: YES

## 2024-10-24 ASSESSMENT — PATIENT HEALTH QUESTIONNAIRE - PHQ9
SUM OF ALL RESPONSES TO PHQ QUESTIONS 1-9: 5
7. TROUBLE CONCENTRATING ON THINGS, SUCH AS READING THE NEWSPAPER OR WATCHING TELEVISION: NOT AT ALL
8. MOVING OR SPEAKING SO SLOWLY THAT OTHER PEOPLE COULD HAVE NOTICED. OR THE OPPOSITE, BEING SO FIGETY OR RESTLESS THAT YOU HAVE BEEN MOVING AROUND A LOT MORE THAN USUAL: NOT AT ALL
SUM OF ALL RESPONSES TO PHQ9 QUESTIONS 1 AND 2: 2
1. LITTLE INTEREST OR PLEASURE IN DOING THINGS: SEVERAL DAYS
5. POOR APPETITE OR OVEREATING: SEVERAL DAYS
SUM OF ALL RESPONSES TO PHQ QUESTIONS 1-9: 5
2. FEELING DOWN, DEPRESSED, IRRITABLE, OR HOPELESS: SEVERAL DAYS
2. FEELING DOWN, DEPRESSED, IRRITABLE, OR HOPELESS: SEVERAL DAYS
5. POOR APPETITE OR OVEREATING: SEVERAL DAYS
4. FEELING TIRED OR HAVING LITTLE ENERGY: SEVERAL DAYS
8. MOVING OR SPEAKING SO SLOWLY THAT OTHER PEOPLE COULD HAVE NOTICED. OR THE OPPOSITE, BEING SO FIGETY OR RESTLESS THAT YOU HAVE BEEN MOVING AROUND A LOT MORE THAN USUAL: NOT AT ALL
SUM OF ALL RESPONSES TO PHQ QUESTIONS 1-9: 5
2. FEELING DOWN, DEPRESSED, IRRITABLE, OR HOPELESS: SEVERAL DAYS
3. TROUBLE FALLING OR STAYING ASLEEP OR SLEEPING TOO MUCH: SEVERAL DAYS
3. TROUBLE FALLING OR STAYING ASLEEP OR SLEEPING TOO MUCH: SEVERAL DAYS
5. POOR APPETITE OR OVEREATING: SEVERAL DAYS
9. THOUGHTS THAT YOU WOULD BE BETTER OFF DEAD, OR OF HURTING YOURSELF: NOT AT ALL
6. FEELING BAD ABOUT YOURSELF - OR THAT YOU ARE A FAILURE OR HAVE LET YOURSELF OR YOUR FAMILY DOWN: NOT AL ALL
9. THOUGHTS THAT YOU WOULD BE BETTER OFF DEAD, OR OF HURTING YOURSELF: NOT AT ALL
1. LITTLE INTEREST OR PLEASURE IN DOING THINGS: SEVERAL DAYS
9. THOUGHTS THAT YOU WOULD BE BETTER OFF DEAD, OR OF HURTING YOURSELF: NOT AT ALL
4. FEELING TIRED OR HAVING LITTLE ENERGY: SEVERAL DAYS
3. TROUBLE FALLING OR STAYING ASLEEP OR SLEEPING TOO MUCH: SEVERAL DAYS
SUM OF ALL RESPONSES TO PHQ9 QUESTIONS 1 AND 2: 2
6. FEELING BAD ABOUT YOURSELF - OR THAT YOU ARE A FAILURE OR HAVE LET YOURSELF OR YOUR FAMILY DOWN: NOT AL ALL
7. TROUBLE CONCENTRATING ON THINGS, SUCH AS READING THE NEWSPAPER OR WATCHING TELEVISION: NOT AT ALL
7. TROUBLE CONCENTRATING ON THINGS, SUCH AS READING THE NEWSPAPER OR WATCHING TELEVISION: NOT AT ALL
8. MOVING OR SPEAKING SO SLOWLY THAT OTHER PEOPLE COULD HAVE NOTICED. OR THE OPPOSITE, BEING SO FIGETY OR RESTLESS THAT YOU HAVE BEEN MOVING AROUND A LOT MORE THAN USUAL: NOT AT ALL
1. LITTLE INTEREST OR PLEASURE IN DOING THINGS: SEVERAL DAYS
6. FEELING BAD ABOUT YOURSELF - OR THAT YOU ARE A FAILURE OR HAVE LET YOURSELF OR YOUR FAMILY DOWN: NOT AL ALL
4. FEELING TIRED OR HAVING LITTLE ENERGY: SEVERAL DAYS
SUM OF ALL RESPONSES TO PHQ9 QUESTIONS 1 AND 2: 2

## 2024-10-24 ASSESSMENT — FIBROSIS 4 INDEX: FIB4 SCORE: 7.01

## 2024-10-24 ASSESSMENT — SOCIAL DETERMINANTS OF HEALTH (SDOH)
WITHIN THE PAST 12 MONTHS, THE FOOD YOU BOUGHT JUST DIDN'T LAST AND YOU DIDN'T HAVE MONEY TO GET MORE: NEVER TRUE
IN THE PAST 12 MONTHS, HAS THE ELECTRIC, GAS, OIL, OR WATER COMPANY THREATENED TO SHUT OFF SERVICE IN YOUR HOME?: NO
WITHIN THE LAST YEAR, HAVE YOU BEEN AFRAID OF YOUR PARTNER OR EX-PARTNER?: NO
WITHIN THE PAST 12 MONTHS, YOU WORRIED THAT YOUR FOOD WOULD RUN OUT BEFORE YOU GOT THE MONEY TO BUY MORE: NEVER TRUE
WITHIN THE LAST YEAR, HAVE YOU BEEN HUMILIATED OR EMOTIONALLY ABUSED IN OTHER WAYS BY YOUR PARTNER OR EX-PARTNER?: NO
WITHIN THE LAST YEAR, HAVE TO BEEN RAPED OR FORCED TO HAVE ANY KIND OF SEXUAL ACTIVITY BY YOUR PARTNER OR EX-PARTNER?: NO
WITHIN THE LAST YEAR, HAVE YOU BEEN KICKED, HIT, SLAPPED, OR OTHERWISE PHYSICALLY HURT BY YOUR PARTNER OR EX-PARTNER?: NO

## 2024-10-24 ASSESSMENT — PAIN DESCRIPTION - PAIN TYPE
TYPE: ACUTE PAIN
TYPE: ACUTE PAIN;CHRONIC PAIN
TYPE: ACUTE PAIN;CHRONIC PAIN
TYPE: ACUTE PAIN

## 2024-10-24 NOTE — THERAPY
Physical Therapy Contact Note    Patient Name: Chava Mercedes  Age:  74 y.o., Sex:  male  Medical Record #: [id number]  Today's Date: 10/24/2024       10/24/24 0900   Initial Contact Note    Initial Contact Note Order Received and Verified, Physical Therapy Evaluation in Progress with Full Report to Follow.   Precautions   Comments CIWA   Interdisciplinary Plan of Care Collaboration   IDT Collaboration with  Nursing   Collaboration Comments PT order received.  Nursing requesting hold as pt is confused and agitated, fequently attempting to climb out of bed.  PT will follow up as appropriate.   Session Information   Date / Session Number  10/24- hold

## 2024-10-24 NOTE — H&P
Hospital Medicine History & Physical Note    Date of Service  10/23/2024    Primary Care Physician  Pcp Pt States None     Consultants  Gastroenterology, Dr. Palencia   Interventional radiology, Dr. Wilson     Code Status  Full Code    Chief Complaint  Persistent melena and hematochezia requiring IR embolization      History of Presenting Illness  Chava Mercedes is a 74 y.o. male with a past medical history of alcoholic cirrhosis admitted 10/19/2024 with GI bleeding.  The patient was started on octreotide infusion and intravenous Protonix.  Gastroenterology were consulted, Dr. Palencia from [facility] performed an upper endoscopy that revealed Ramirez's esophagus without esophageal varices.  Patient was also found to have moderate portal hypertensive gastropathy, in addition to duodenitis.  Unfortunately, the patient continued to have melanotic and bloody stools.  He required multiple blood transfusions.  Today, October 23, his hemoglobin dropped to 6.9 this morning.  He required a blood transfusion. CT angiography is revealing a focus of active extravasation in the small bowel.  I discussed patient condition with Dr. Allen.  I discussed the finding with the patient.  He is agreeable to IR embolization.  He understand the risk and benefits of this procedure. I discussed patient condition with interventional radiology on-call, Dr. Lees.  Bleeding location appears to be in the small intestine which can be very technically difficult.  Dr. Lees will attempt angioembolization.  The patient will be transferred to [facility] for IR consultation/attempt embolization I with a bleeding focus.        I discussed the plan of care with the patient, transfer center and radiology Dr. Allen and Dr. Lees    Review of Systems  Constitutional:  Positive for malaise/fatigue. Negative for chills and fever.   Respiratory:  Negative for cough, shortness of breath and stridor.    Cardiovascular:  Negative for chest pain and  leg swelling.   Gastrointestinal:  Positive for blood in stool and melena. Negative for abdominal pain and vomiting.        Multiple large bloody and melanotic bowel movements today   Genitourinary:  Positive for flank pain.        Scrotal swelling   Musculoskeletal:  Negative for myalgias.   Skin: Negative.    Neurological:  Negative for focal weakness.   Endo/Heme/Allergies:  Does not bruise/bleed easily.   Psychiatric/Behavioral:  The patient is not nervous/anxious.      Past Medical History  Alcohol dependence.  Liver cirrhosis.  Prior history of angioembolization for GI bleeding    Surgical History   has a past surgical history that includes gastroscopy (N/A, 12/11/2016); gastroscopy-endo (12/11/2016); gastroscopy-endo (12/18/2016); gastroscopy (N/A, 12/2/2017); inguinal hernia repair (Right, 12/27/2023); umbilical hernia repair (N/A, 12/27/2023); pr upper gi endoscopy,diagnosis (N/A, 3/5/2024); pr upper gi endoscopy,diagnosis (N/A, 8/15/2024); and pr upper gi endoscopy,diagnosis (N/A, 10/19/2024).     Family History  family history is not on file.      Social History   reports that he has never smoked. He has never used smokeless tobacco.    Allergies  No Known Allergies    Medications  Cannot display prior to admission medications because the patient has not been admitted in this contact.     Physical Exam  Temp:  [36.3 °C (97.3 °F)-37.3 °C (99.1 °F)] 37.1 °C (98.7 °F)  Pulse:  [] 94  Resp:  [16-20] 16  BP: ()/(56-70) 124/70  SpO2:  [90 %-95 %] 93 %    Physical Exam  Constitutional:       General: He is not in acute distress.     Appearance: He is not ill-appearing or diaphoretic.   HENT:      Head: Atraumatic.      Right Ear: External ear normal.      Left Ear: External ear normal.      Nose: No congestion or rhinorrhea.      Mouth/Throat:      Mouth: Mucous membranes are dry.   Eyes:      General: No scleral icterus.        Right eye: No discharge.         Left eye: No discharge.      Pupils:  "Pupils are equal, round, and reactive to light.   Cardiovascular:      Rate and Rhythm: Regular rhythm. Tachycardia present.   Pulmonary:      Effort: Pulmonary effort is normal.      Comments: Saturating well on room air.  Is able to speak in full sentences  Abdominal:      General: There is no distension.      Tenderness: There is no abdominal tenderness. There is no guarding or rebound.   Genitourinary:     Comments: Scrotal edema  Musculoskeletal:      Cervical back: Neck supple. No rigidity. No muscular tenderness.      Right lower leg: No edema.      Left lower leg: No edema.   Skin:     Capillary Refill: Capillary refill takes 2 to 3 seconds.      Coloration: Skin is not jaundiced or pale.   Neurological:      Mental Status: He is alert. He is disoriented.      Coordination: Coordination abnormal.      Comments: Intermittently invariably oriented, times 1-2   Tremors     Psychiatric:         Mood and Affect: Mood normal.         Behavior: Behavior normal.     Laboratory:  Recent Labs     10/23/24  0213 10/23/24  0833 10/23/24  1753   WBC 2.6* 2.4* 2.8*   RBC 2.56* 2.47* 3.29*   HEMOGLOBIN 7.1* 6.9* 9.3*   HEMATOCRIT 22.9* 22.3* 31.8*   MCV 89.5 90.3 96.7   MCH 27.7 27.9 28.3   MCHC 31.0* 30.9* 29.2*   RDW 71.7* 69.8* 74.0*   PLATELETCT 85* 92* 91*   MPV 11.8 11.3 11.5     Recent Labs     10/21/24  0034 10/22/24  0051 10/23/24  0213   SODIUM 134* 137 133*   POTASSIUM 3.8 3.8 3.6   CHLORIDE 101 105 102   CO2 21 20 21   GLUCOSE 99 85 90   BUN 19 18 13   CREATININE 0.76 0.76 0.73   CALCIUM 8.2* 7.7* 7.7*     Recent Labs     10/21/24  0034 10/22/24  0051 10/23/24  0213   ALTSGPT 16 20 22   ASTSGOT 36 54* 44   ALKPHOSPHAT 94 87 90   TBILIRUBIN 1.3 1.2 1.5   GLUCOSE 99 85 90     Recent Labs     10/21/24  0034 10/22/24  0051 10/23/24  0833   INR 2.00* 1.88* 1.82*     No results for input(s): \"NTPROBNP\" in the last 72 hours.      No results for input(s): \"TROPONINT\" in the last 72 hours.    Imaging:  No orders to " display     Assessment/Plan  Acute lower GI bleed- (present on admission)  The patient has been having multiple large bloody and melanotic bowel movements today  His hemoglobin dropped to 6.9 this morning.  He required a blood transfusion.  The patient is also hypercoagulable with an INR of 1.82.  He has thrombocytopenia with platelet count of 91  CT angiography was ordered and is revealing a focus of active extravasation in the small bowel.  This was communicated to me has an urgent page from IR film room.  I discussed patient condition with Dr. Allen.  I discussed the finding with the patient.  He is agreeable to IR embolization.  He understand the risk and benefits of this procedure.  I called interventional radiology on-call, Dr. Lees.  Bleeding location appears to be in the small intestine which can be very technically difficult.  Dr. Lees will attempt angioembolization..  I will order a follow-up H&H every 6 hours.  Transfuse for goal hemoglobin above 7.  I will make patient n.p.o.   Will continue intravenous fluids  I called transfer center to arrange patient transferred to [facility].    Anemia due to GI bleeding requiring blood transfusions- (present on admission)  Assessment & Plan  Due to GI bleeding requiring blood transfusions  Monitor hemoglobin. I will order a follow-up CBC.  Transfuse for a hemoglobin of less than or equal to 7     Ramirez's esophagus- (present on admission)  Assessment & Plan  Continue omeprazole twice daily     Hydrocele in adult- (present on admission)  Assessment & Plan  Has enlarged scrotum   Scrotal US shows a large right hydrocele, and an incidental 12 mm right epididymal cyst.    I discussed the findings with the patient. Will need urology referral.        Scrotal swelling- (present on admission)  Assessment & Plan  Scrotal US shows a large right hydrocele, and an incidental 12 mm right epididymal cyst.   Needs outpatient follow-up  Patient is having acute  urinary retention     Pleural effusion on left- (present on admission)  Assessment & Plan  Small left pleural effusion was an incidental finding seen on x-ray done at outside facility.  This has been a finding stable since July.    Remains on room air, monitoring     Thrombocytopenia (HCC)- (present on admission)  Assessment & Plan  Platelets 91    Transfusing 1 unit PRBC today 10/23/2024      Alcoholic liver disease (HCC)- (present on admission)  Assessment & Plan  Known liver cirrhosis from alcohol use disorder  Complicated with esophageal varices, portal hypertensive gastropathy  Currently with alcohol withdrawal, pancytopenia, upper GI bleed     Alcohol dependence with withdrawal (HCC)- (present on admission)  Assessment & Plan  Patient having ongoing tremors, cognition is slowly improving but still affected by alcohol withdrawal  Continue CIWA protocol  Continue p.o. thiamine, folic acid and multivitamin  Continue fall precautions     VTE prophylaxis: SCDs, GI Bleeding      Patient is critically ill.   The patient continues to have: GI bleeding, melena, dropping hemoglobin and bleeding per rectum.  The vital organ system that is affected is the: Hematologic, cardiovascular  If untreated there is a high chance of deterioration into: Shock, multiorgan failure, and eventually death  The medical decision making / critical care that I am providing today is medically complex. I discussed patient condition with Dr. Allen.  I discussed the finding with the patient.  He is agreeable to IR embolization.  He understand the risk and benefits of this procedure. I called interventional radiology on-call, Dr. Lees.  Bleeding location appears to be in the small intestine which can be very technically difficult.  Dr. Lees will attempt angioembolization..  I will order a follow-up H&H every 6 hours.  Transfuse for goal hemoglobin above 7. I will make patient n.p.o.  Will continue intravenous fluids  I called transfer center to  arrange patient transferred to [facility]. The critical that has been undertaken is medically complex.  There has been no overlap in critical care time.  Critical Care Time not including procedures: 106 minutes.

## 2024-10-24 NOTE — CARE PLAN
Problem: Knowledge Deficit - Standard  Goal: Patient and family/care givers will demonstrate understanding of plan of care, disease process/condition, diagnostic tests and medications  Outcome: Progressing     Problem: Optimal Care for Alcohol Withdrawal  Goal: Optimal Care for the alcohol withdrawal patient  Outcome: Progressing     Problem: Seizure Precautions  Goal: Implementation of seizure precautions  Outcome: Progressing     Problem: Lifestyle Changes  Goal: Patient's ability to identify lifestyle changes and available resources to help reduce recurrence of condition will improve  Outcome: Progressing     Problem: Psychosocial  Goal: Patient's level of anxiety will decrease  Outcome: Progressing  Goal: Spiritual and cultural needs incorporated into hospitalization  Outcome: Progressing     Problem: Risk for Aspiration  Goal: Patient's risk for aspiration will be absent or decrease  Outcome: Progressing     Problem: Hemodynamics  Goal: Patient's hemodynamics, fluid balance and neurologic status will be stable or improve  Outcome: Progressing     Problem: Fluid Volume  Goal: Fluid volume balance will be maintained  Outcome: Progressing     Problem: Urinary - Renal Perfusion  Goal: Ability to achieve and maintain adequate renal perfusion and functioning will improve  Outcome: Progressing     Problem: Respiratory  Goal: Patient will achieve/maintain optimum respiratory ventilation and gas exchange  Outcome: Progressing     Problem: Physical Regulation  Goal: Diagnostic test results will improve  Outcome: Progressing  Goal: Signs and symptoms of infection will decrease  Outcome: Progressing     Problem: Skin Integrity  Goal: Skin integrity is maintained or improved  Outcome: Progressing     Problem: Fall Risk  Goal: Patient will remain free from falls  Outcome: Progressing     Problem: Safety - Medical Restraint  Goal: Remains free of injury from restraints (Restraint for Interference with Medical  Device)  Outcome: Progressing  Goal: Free from restraint(s) (Restraint for Interference with Medical Device)  Outcome: Progressing   The patient is Watcher - Medium risk of patient condition declining or worsening    Shift Goals  Clinical Goals: VSS, safety, rest  Patient Goals: Rest  Family Goals: Updates    Progress made toward(s) clinical / shift goals:  VSS, safety, rest    Patient is not progressing towards the following goals:

## 2024-10-24 NOTE — CARE PLAN
The patient is Watcher - Medium risk of patient condition declining or worsening    Shift Goals  Clinical Goals: CIWA, monitor labs, NPO  Patient Goals: rest  Family Goals: truong    Progress made toward(s) clinical / shift goals:  CIWA per flowsheets. Lab results reviewed. Pt kept NPO per order. Pt able to rest in bed. Plan of care reviewed with patient, all questions addressed. Fall precautions in place, bed alarm on and plugged in, bed in lowest position and locked.       Problem: Knowledge Deficit - Standard  Goal: Patient and family/care givers will demonstrate understanding of plan of care, disease process/condition, diagnostic tests and medications  Outcome: Progressing     Problem: Fall Risk  Goal: Patient will remain free from falls  Outcome: Progressing       Patient is not progressing towards the following goals:

## 2024-10-24 NOTE — PROGRESS NOTES
Bedside report received from Jhonny RN. Pt assessment complete. Pt AO x 4. Reviewed plan of care with pt. Pt is tele monitored. Chart and labs reviewed. Bed in lowest position, and 3 side rails up. Pt educated on call lights, call light within reach. Hourly rounding in place

## 2024-10-24 NOTE — PROGRESS NOTES
4 Eyes Skin Assessment Completed by ABRAHAM Campo and Francisco RN.    Head WDL  Ears WDL  Nose WDL  Mouth WDL  Neck WDL  Breast/Chest WDL  Shoulder Blades WDL  Spine WDL  (R) Arm/Elbow/Hand Bruising and Scab generalized  (L) Arm/Elbow/Hand Bruising and Scab generalized  Abdomen WDL  Groin scrotal edema  Scrotum/Coccyx/Buttocks redness/blanching sacrum  (R) Leg Scab and Bruising  (L) Leg Scab and Bruising  (R) Heel/Foot/Toe WDL, dry calloused  (L) Heel/Foot/Toe WDL, dry calloused          Devices In Places Tele Box and Pulse Ox      Interventions In Place TAP System and Pillows    Possible Skin Injury No    Pictures Uploaded Into Epic N/A  Wound Consult Placed N/A  RN Wound Prevention Protocol Ordered No

## 2024-10-24 NOTE — DIETARY
Nutrition Services: Initial Assessment     Day 0 of admit. Chava Mercedes is a 74 y.o. male with admitting DX of GI bleeding.     Consult received for MST score >/= 2 and MD diagnosis of malnutrition.    Hospital problem list:  Principal Problem:    GI bleeding (POA: Yes)  Active Problems:    Acute GI bleeding (POA: Yes)    Alcohol dependence with withdrawal delirium (HCC) (POA: Yes)    Severe protein-calorie malnutrition (HCC) (POA: Yes)    Pancytopenia (HCC) (POA: Yes)    Alcoholic liver disease (HCC) (POA: Yes)    Thrombocytopenia (HCC) (POA: Yes)    Coagulopathy (HCC) (POA: Yes)    Alcoholic cirrhosis (HCC) (POA: Yes)    ACP (advance care planning) (POA: Yes)    Scrotal swelling (POA: Yes)    Ramirez's esophagus (POA: Yes)    Lower GI bleeding (POA: Yes)    Anemia (POA: Yes)     Nutrition Assessment:      Weight: 79.1 kg (174 lb 6.1 oz)  Weight taken via bed scale  Body mass index is 23.65 kg/m². BMI classification: Normal.  Wt Readings from Last 6 Encounters:   10/24/24 79.1 kg (174 lb 6.1 oz)   10/23/24 83.8 kg (184 lb 11.9 oz)   08/19/24 82.2 kg (181 lb 3.5 oz)   03/06/24 79.7 kg (175 lb 11.3 oz)   12/27/23 87.1 kg (192 lb 0.3 oz)   12/10/17 76.3 kg (168 lb 3.4 oz)      Objective:  Pertinent labs 10/24: Na 134 (L), Alb 2.8 (L)  Pertinent meds: Folic acid, Lactulose, Bactrim, MVI, Thiamine  Skin/wounds: No skin injury per RN skin assessment   Food Allergies: No known food allergies.  Last BM: 10/24/24 per flowsheets  Pending IR embolization     Current diet order:   NPO  Ensure Plus High Protein (provides 350 calories, 20 g protein per 8 fl oz) TID    Subjective:   Patient was in restraints at time of visit. He was unable to accurately answer all questions. He was unable to state a usual weight.   Variable PO intake recorded at [facility] per chart review.    Nutrition Focused Physical Exam (NFPE)   Weight loss: No weight loss noted per chart review. Large difference in weight from yesterday to today  likely due to different scale used as pt transferred facilities.   Muscle mass: Well-nourished  Subcutaneous fat: Well-nourished  Fluid Accumulation: 1+ edema BLE  Reduced  Strength: N/A in acute care setting.     Nutrition Diagnosis:      Inadequate oral intake related to current NPO status, recent alcohol withdrawal as evidenced by variable PO intake recorded recently and pt currently NPO.    Based on RD assessment at this time, pt does not meet criteria in congruence with ASPEN/Academy guidelines for malnutrition. Pt also seen by RD on 10/20/24 and did not meet ASPEN criteria at that time. Voalte message sent to MD.     Nutrition Interventions:      Diet advancement per MD.  Recommend continue Ensure Plus High Protein (provides 350 calories, 20 g protein per 8 fl oz) TID.  Recommend continue current vitamin supplementation due to history of alcohol abuse.  Patient aware of active plan of care as appropriate.     Nutrition Monitoring and Evaluation:     Monitor nutrition POC  Additional fluids per MD/DO  Monitor vital signs pertinent to nutrition       RD following and will provide updated recommendations as indicated.

## 2024-10-24 NOTE — DISCHARGE PLANNING
1026, RN CM visited Pt to obtain assessment information and discuss discharge planning. Pt is confused and on restraints. RN ALEKSANDR will revisit.    1353, RN ALESKANDR attempted to call Pt's wife Amalia  but the number is not working.

## 2024-10-25 LAB
ALBUMIN SERPL BCP-MCNC: 2.8 G/DL (ref 3.2–4.9)
ALBUMIN/GLOB SERPL: 0.7 G/DL
ALP SERPL-CCNC: 98 U/L (ref 30–99)
ALT SERPL-CCNC: 24 U/L (ref 2–50)
ANION GAP SERPL CALC-SCNC: 10 MMOL/L (ref 7–16)
AST SERPL-CCNC: 38 U/L (ref 12–45)
BILIRUB SERPL-MCNC: 1.7 MG/DL (ref 0.1–1.5)
BUN SERPL-MCNC: 7 MG/DL (ref 8–22)
CALCIUM ALBUM COR SERPL-MCNC: 8.9 MG/DL (ref 8.5–10.5)
CALCIUM SERPL-MCNC: 7.9 MG/DL (ref 8.5–10.5)
CHLORIDE SERPL-SCNC: 105 MMOL/L (ref 96–112)
CO2 SERPL-SCNC: 19 MMOL/L (ref 20–33)
CREAT SERPL-MCNC: 0.77 MG/DL (ref 0.5–1.4)
ERYTHROCYTE [DISTWIDTH] IN BLOOD BY AUTOMATED COUNT: 63.5 FL (ref 35.9–50)
GFR SERPLBLD CREATININE-BSD FMLA CKD-EPI: 94 ML/MIN/1.73 M 2
GLOBULIN SER CALC-MCNC: 3.8 G/DL (ref 1.9–3.5)
GLUCOSE SERPL-MCNC: 94 MG/DL (ref 65–99)
HCT VFR BLD AUTO: 25.6 % (ref 42–52)
HGB BLD-MCNC: 8.2 G/DL (ref 14–18)
MAGNESIUM SERPL-MCNC: 2 MG/DL (ref 1.5–2.5)
MCH RBC QN AUTO: 27.4 PG (ref 27–33)
MCHC RBC AUTO-ENTMCNC: 32 G/DL (ref 32.3–36.5)
MCV RBC AUTO: 85.6 FL (ref 81.4–97.8)
PHOSPHATE SERPL-MCNC: 2.9 MG/DL (ref 2.5–4.5)
PLATELET # BLD AUTO: 111 K/UL (ref 164–446)
PMV BLD AUTO: 10.2 FL (ref 9–12.9)
POTASSIUM SERPL-SCNC: 3.7 MMOL/L (ref 3.6–5.5)
PROT SERPL-MCNC: 6.6 G/DL (ref 6–8.2)
RBC # BLD AUTO: 2.99 M/UL (ref 4.7–6.1)
SODIUM SERPL-SCNC: 134 MMOL/L (ref 135–145)
WBC # BLD AUTO: 3.5 K/UL (ref 4.8–10.8)

## 2024-10-25 PROCEDURE — 97162 PT EVAL MOD COMPLEX 30 MIN: CPT

## 2024-10-25 PROCEDURE — A9270 NON-COVERED ITEM OR SERVICE: HCPCS | Performed by: HOSPITALIST

## 2024-10-25 PROCEDURE — 84100 ASSAY OF PHOSPHORUS: CPT

## 2024-10-25 PROCEDURE — 36415 COLL VENOUS BLD VENIPUNCTURE: CPT

## 2024-10-25 PROCEDURE — 85027 COMPLETE CBC AUTOMATED: CPT

## 2024-10-25 PROCEDURE — 97535 SELF CARE MNGMENT TRAINING: CPT

## 2024-10-25 PROCEDURE — 770020 HCHG ROOM/CARE - TELE (206)

## 2024-10-25 PROCEDURE — 99233 SBSQ HOSP IP/OBS HIGH 50: CPT | Performed by: STUDENT IN AN ORGANIZED HEALTH CARE EDUCATION/TRAINING PROGRAM

## 2024-10-25 PROCEDURE — 700102 HCHG RX REV CODE 250 W/ 637 OVERRIDE(OP): Performed by: HOSPITALIST

## 2024-10-25 PROCEDURE — 83735 ASSAY OF MAGNESIUM: CPT

## 2024-10-25 PROCEDURE — A9270 NON-COVERED ITEM OR SERVICE: HCPCS | Performed by: STUDENT IN AN ORGANIZED HEALTH CARE EDUCATION/TRAINING PROGRAM

## 2024-10-25 PROCEDURE — 80053 COMPREHEN METABOLIC PANEL: CPT

## 2024-10-25 PROCEDURE — 700102 HCHG RX REV CODE 250 W/ 637 OVERRIDE(OP): Performed by: STUDENT IN AN ORGANIZED HEALTH CARE EDUCATION/TRAINING PROGRAM

## 2024-10-25 RX ADMIN — LORAZEPAM 0.5 MG: 0.5 TABLET ORAL at 08:13

## 2024-10-25 RX ADMIN — LORAZEPAM 2 MG: 2 TABLET ORAL at 11:57

## 2024-10-25 RX ADMIN — FOLIC ACID 1 MG: 1 TABLET ORAL at 05:31

## 2024-10-25 RX ADMIN — TAMSULOSIN HYDROCHLORIDE 0.4 MG: 0.4 CAPSULE ORAL at 08:13

## 2024-10-25 RX ADMIN — OMEPRAZOLE 20 MG: 20 CAPSULE, DELAYED RELEASE ORAL at 05:31

## 2024-10-25 RX ADMIN — SULFAMETHOXAZOLE AND TRIMETHOPRIM 1 TABLET: 800; 160 TABLET ORAL at 05:31

## 2024-10-25 RX ADMIN — LACTULOSE 30 ML: 10 SOLUTION ORAL at 17:17

## 2024-10-25 RX ADMIN — LORAZEPAM 2 MG: 2 TABLET ORAL at 17:16

## 2024-10-25 RX ADMIN — LACTULOSE 30 ML: 10 SOLUTION ORAL at 11:57

## 2024-10-25 RX ADMIN — Medication 100 MG: at 05:30

## 2024-10-25 RX ADMIN — LORAZEPAM 2 MG: 2 TABLET ORAL at 19:37

## 2024-10-25 RX ADMIN — LORAZEPAM 3 MG: 1 TABLET ORAL at 14:02

## 2024-10-25 RX ADMIN — LACTULOSE 30 ML: 10 SOLUTION ORAL at 05:31

## 2024-10-25 RX ADMIN — Medication 1 TABLET: at 05:33

## 2024-10-25 RX ADMIN — LORAZEPAM 1 MG: 1 TABLET ORAL at 00:08

## 2024-10-25 RX ADMIN — LORAZEPAM 2 MG: 2 TABLET ORAL at 22:03

## 2024-10-25 RX ADMIN — SULFAMETHOXAZOLE AND TRIMETHOPRIM 1 TABLET: 800; 160 TABLET ORAL at 17:16

## 2024-10-25 ASSESSMENT — LIFESTYLE VARIABLES
TOTAL SCORE: 14
ORIENTATION AND CLOUDING OF SENSORIUM: DISORIENTED FOR PLACE AND / OR PERSON
HEADACHE, FULLNESS IN HEAD: NOT PRESENT
AGITATION: *
NAUSEA AND VOMITING: NO NAUSEA AND NO VOMITING
AGITATION: *
TOTAL SCORE: 8
PAROXYSMAL SWEATS: NO SWEAT VISIBLE
VISUAL DISTURBANCES: MODERATELY SEVERE HALLUCINATIONS
HEADACHE, FULLNESS IN HEAD: NOT PRESENT
TREMOR: TREMOR NOT VISIBLE BUT CAN BE FELT, FINGERTIP TO FINGERTIP
AUDITORY DISTURBANCES: NOT PRESENT
ORIENTATION AND CLOUDING OF SENSORIUM: ORIENTED AND CAN DO SERIAL ADDITIONS
TREMOR: TREMOR NOT VISIBLE BUT CAN BE FELT, FINGERTIP TO FINGERTIP
ANXIETY: NO ANXIETY (AT EASE)
VISUAL DISTURBANCES: NOT PRESENT
HEADACHE, FULLNESS IN HEAD: SEVERE
ORIENTATION AND CLOUDING OF SENSORIUM: ORIENTED AND CAN DO SERIAL ADDITIONS
PAROXYSMAL SWEATS: BARELY PERCEPTIBLE SWEATING. PALMS MOIST
TREMOR: TREMOR NOT VISIBLE BUT CAN BE FELT, FINGERTIP TO FINGERTIP
TREMOR: TREMOR NOT VISIBLE BUT CAN BE FELT, FINGERTIP TO FINGERTIP
AGITATION: *
ORIENTATION AND CLOUDING OF SENSORIUM: DATE DISORIENTATION BY MORE THAN TWO CALENDAR DAYS
VISUAL DISTURBANCES: MODERATELY SEVERE HALLUCINATIONS
TREMOR: TREMOR NOT VISIBLE BUT CAN BE FELT, FINGERTIP TO FINGERTIP
TREMOR: *
AGITATION: *
ANXIETY: NO ANXIETY (AT EASE)
HEADACHE, FULLNESS IN HEAD: NOT PRESENT
HEADACHE, FULLNESS IN HEAD: SEVERE
AUDITORY DISTURBANCES: NOT PRESENT
TOTAL SCORE: MILD ITCHING, PINS AND NEEDLES SENSATION, BURNING OR NUMBNESS
ORIENTATION AND CLOUDING OF SENSORIUM: CANNOT DO SERIAL ADDITIONS OR IS UNCERTAIN ABOUT DATE
NAUSEA AND VOMITING: NO NAUSEA AND NO VOMITING
ORIENTATION AND CLOUDING OF SENSORIUM: CANNOT DO SERIAL ADDITIONS OR IS UNCERTAIN ABOUT DATE
NAUSEA AND VOMITING: NO NAUSEA AND NO VOMITING
NAUSEA AND VOMITING: NO NAUSEA AND NO VOMITING
AUDITORY DISTURBANCES: NOT PRESENT
AUDITORY DISTURBANCES: NOT PRESENT
TOTAL SCORE: 14
VISUAL DISTURBANCES: NOT PRESENT
AUDITORY DISTURBANCES: NOT PRESENT
AUDITORY DISTURBANCES: MILD HARSHNESS OR ABILITY TO FRIGHTEN
ANXIETY: MILDLY ANXIOUS
NAUSEA AND VOMITING: NO NAUSEA AND NO VOMITING
AGITATION: MODERATELY FIDGETY AND RESTLESS
ORIENTATION AND CLOUDING OF SENSORIUM: ORIENTED AND CAN DO SERIAL ADDITIONS
AGITATION: *
ANXIETY: NO ANXIETY (AT EASE)
PAROXYSMAL SWEATS: *
AGITATION: SOMEWHAT MORE THAN NORMAL ACTIVITY
AUDITORY DISTURBANCES: NOT PRESENT
HEADACHE, FULLNESS IN HEAD: MODERATE
NAUSEA AND VOMITING: NO NAUSEA AND NO VOMITING
AGITATION: *
PAROXYSMAL SWEATS: NO SWEAT VISIBLE
VISUAL DISTURBANCES: MODERATELY SEVERE HALLUCINATIONS
TOTAL SCORE: 14
HEADACHE, FULLNESS IN HEAD: MODERATELY SEVERE
ORIENTATION AND CLOUDING OF SENSORIUM: ORIENTED AND CAN DO SERIAL ADDITIONS
AUDITORY DISTURBANCES: NOT PRESENT
ANXIETY: NO ANXIETY (AT EASE)
TREMOR: TREMOR NOT VISIBLE BUT CAN BE FELT, FINGERTIP TO FINGERTIP
TOTAL SCORE: 14
TOTAL SCORE: 5
TOTAL SCORE: 8
PAROXYSMAL SWEATS: BARELY PERCEPTIBLE SWEATING. PALMS MOIST
ANXIETY: NO ANXIETY (AT EASE)
ANXIETY: MILDLY ANXIOUS
TOTAL SCORE: 19
NAUSEA AND VOMITING: NO NAUSEA AND NO VOMITING
VISUAL DISTURBANCES: NOT PRESENT
PAROXYSMAL SWEATS: *
TREMOR: TREMOR NOT VISIBLE BUT CAN BE FELT, FINGERTIP TO FINGERTIP
ANXIETY: *
NAUSEA AND VOMITING: NO NAUSEA AND NO VOMITING
PAROXYSMAL SWEATS: BARELY PERCEPTIBLE SWEATING. PALMS MOIST
VISUAL DISTURBANCES: MILD SENSITIVITY
VISUAL DISTURBANCES: MODERATELY SEVERE HALLUCINATIONS
HEADACHE, FULLNESS IN HEAD: SEVERE
PAROXYSMAL SWEATS: BARELY PERCEPTIBLE SWEATING. PALMS MOIST

## 2024-10-25 ASSESSMENT — PAIN DESCRIPTION - PAIN TYPE
TYPE: ACUTE PAIN;CHRONIC PAIN
TYPE: ACUTE PAIN;CHRONIC PAIN
TYPE: ACUTE PAIN

## 2024-10-25 ASSESSMENT — GAIT ASSESSMENTS
DEVIATION: BRADYKINETIC;SHUFFLED GAIT
GAIT LEVEL OF ASSIST: CONTACT GUARD ASSIST
ASSISTIVE DEVICE: FRONT WHEEL WALKER
DISTANCE (FEET): 10

## 2024-10-25 ASSESSMENT — COGNITIVE AND FUNCTIONAL STATUS - GENERAL
TURNING FROM BACK TO SIDE WHILE IN FLAT BAD: A LOT
SUGGESTED CMS G CODE MODIFIER MOBILITY: CL
WALKING IN HOSPITAL ROOM: A LITTLE
MOBILITY SCORE: 14
MOVING TO AND FROM BED TO CHAIR: A LITTLE
CLIMB 3 TO 5 STEPS WITH RAILING: A LOT
MOVING FROM LYING ON BACK TO SITTING ON SIDE OF FLAT BED: A LOT
STANDING UP FROM CHAIR USING ARMS: A LOT

## 2024-10-25 ASSESSMENT — FIBROSIS 4 INDEX: FIB4 SCORE: 5.1

## 2024-10-25 NOTE — PROGRESS NOTES
"Hospital Medicine Daily Progress Note    Date of Service  10/24/2024    Chief Complaint  Chava Mercedes is a 74 y.o. male admitted 10/24/2024 with GI bleeding    Hospital Course  Per H&P: \"Chava Mercedes is a 74 y.o. male with a past medical history of alcoholic cirrhosis admitted 10/19/2024 with GI bleeding.  The patient was started on octreotide infusion and intravenous Protonix.  Gastroenterology were consulted, Dr. Palencia from [facility] performed an upper endoscopy that revealed Ramirez's esophagus without esophageal varices.  Patient was also found to have moderate portal hypertensive gastropathy, in addition to duodenitis.  Unfortunately, the patient continued to have melanotic and bloody stools.  He required multiple blood transfusions.  Today, October 23, his hemoglobin dropped to 6.9 this morning.  He required a blood transfusion. CT angiography is revealing a focus of active extravasation in the small bowel.  I discussed patient condition with Dr. Allen.  I discussed the finding with the patient.  He is agreeable to IR embolization.  He understand the risk and benefits of this procedure. I discussed patient condition with interventional radiology on-call, Dr. Lees.  Bleeding location appears to be in the small intestine which can be very technically difficult.  Dr. Lees will attempt angioembolization.  The patient will be transferred to [facility] for IR consultation/attempt embolization I with a bleeding focus.\"    Interval Problem Update  10/24  Afebrile pulse from 90s to 110 respiratory rate 1618 systolic blood pressure 170 132 pulse ox 94 to 96% on room air.  WBCs 3.8 hemoglobin 8.9 hovering around 9, platelets 106 improving bicarb 19 AST 48 T. bili 2.7 procalcitonin 0.11.  Patient is very impulsive, withdrawing, encephalopathic, restraints placed for patient safety as he was impulsively tried to get up all throughout the day.  No overt signs of bleeding, stable hemoglobin, " stable vitals, discussed with Dr. Oden from IR, will hold off on embolization at this time if he has any recurrence if it is felt to be large-volume obtain CTA.  Continue CIWA protocol.     I have discussed this patient's plan of care and discharge plan at IDT rounds today with Case Management, Nursing, Nursing leadership, and other members of the IDT team.    Consultants/Specialty  NA    Code Status  Full Code    Disposition  The patient is not medically cleared for discharge to home or a post-acute facility.      I have placed the appropriate orders for post-discharge needs.    Review of Systems  ROS   Constitutional:  Positive for malaise/fatigue. Negative for chills and fever.   Respiratory:  Negative for cough, shortness of breath and stridor.    Cardiovascular:  Negative for chest pain and leg swelling.   Gastrointestinal:  Positive for blood in stool and melena. Negative for abdominal pain and vomiting.        Multiple large bloody and melanotic bowel movements today   Genitourinary:  Positive for flank pain.        Scrotal swelling   Musculoskeletal:  Negative for myalgias.   Skin: Negative.    Neurological:  Negative for focal weakness.   Endo/Heme/Allergies:  Does not bruise/bleed easily.   Psychiatric/Behavioral:  The patient is not nervous/anxious.      Physical Exam  Temp:  [36.5 °C (97.7 °F)-37 °C (98.6 °F)] 36.9 °C (98.4 °F)  Pulse:  [] 97  Resp:  [16-18] 16  BP: ()/(53-98) 117/73  SpO2:  [90 %-96 %] 95 %    Physical Exam  Vitals and nursing note reviewed.   Constitutional:       General: He is not in acute distress.     Appearance: He is ill-appearing.      Comments: Drowsy, encephalopathic, confused   HENT:      Head: Normocephalic and atraumatic.      Nose: Nose normal. No rhinorrhea.      Mouth/Throat:      Mouth: Mucous membranes are dry.      Pharynx: Oropharynx is clear.   Eyes:      General: No scleral icterus.     Extraocular Movements: Extraocular movements intact.       Conjunctiva/sclera: Conjunctivae normal.   Cardiovascular:      Rate and Rhythm: Regular rhythm. Tachycardia present.      Pulses: Normal pulses.   Pulmonary:      Effort: No respiratory distress.      Breath sounds: No wheezing, rhonchi or rales.   Abdominal:      General: There is no distension.      Palpations: Abdomen is soft.      Tenderness: There is no abdominal tenderness. There is no guarding or rebound.   Musculoskeletal:         General: Normal range of motion.      Cervical back: Normal range of motion and neck supple. No rigidity.      Right lower leg: No edema.      Left lower leg: No edema.   Skin:     General: Skin is warm and dry.      Capillary Refill: Capillary refill takes less than 2 seconds.   Neurological:      General: No focal deficit present.      Mental Status: He is disoriented.      Cranial Nerves: No cranial nerve deficit.      Comments: Encephalopathic with withdrawals   Psychiatric:         Mood and Affect: Mood normal.         Behavior: Behavior normal.         Thought Content: Thought content normal.         Judgment: Judgment normal.         Fluids    Intake/Output Summary (Last 24 hours) at 10/24/2024 1919  Last data filed at 10/24/2024 0533  Gross per 24 hour   Intake --   Output 350 ml   Net -350 ml        Laboratory  Recent Labs     10/24/24  0118 10/24/24  0834 10/24/24  1646   WBC 3.4* 3.4* 3.8*   RBC 3.19* 3.33* 3.24*   HEMOGLOBIN 8.8* 9.2* 8.9*   HEMATOCRIT 27.2* 28.4* 28.0*   MCV 85.3 85.3 86.4   MCH 27.6 27.6 27.5   MCHC 32.4 32.4 31.8*   RDW 62.6* 63.0* 64.4*   PLATELETCT 98* 103* 106*   MPV 10.5 10.2 10.2     Recent Labs     10/22/24  0051 10/23/24  0213 10/24/24  0118   SODIUM 137 133* 134*   POTASSIUM 3.8 3.6 3.9   CHLORIDE 105 102 104   CO2 20 21 19*   GLUCOSE 85 90 96   BUN 18 13 10   CREATININE 0.76 0.73 0.63   CALCIUM 7.7* 7.7* 8.2*     Recent Labs     10/22/24  0051 10/23/24  0833   INR 1.88* 1.82*               Imaging  No orders to display         Assessment/Plan  Lower GI bleeding- (present on admission)  Assessment & Plan  No overt signs of bleeding, stable hemoglobin, stable vitals, discussed with Dr. Oden from IR, will hold off on embolization at this time if he has any recurrence if it is felt to be large-volume obtain CTA.    Ramirez's esophagus- (present on admission)  Assessment & Plan  Continue PPI twice daily, will need follow-up with GI as outpatient    Scrotal swelling- (present on admission)  Assessment & Plan  Will need outpatient follow-up for large right hydrocele incidental 12 mm right epididymal cyst    Alcoholic cirrhosis (HCC)- (present on admission)  Assessment & Plan  Counseled on alcohol cessation, going through withdrawals on CIWA protocol    Thrombocytopenia (HCC)- (present on admission)  Assessment & Plan  Continue to monitor, transfuse if bleeding for platelets less than 50,000.    Alcoholic liver disease (HCC)- (present on admission)  Assessment & Plan  CIWA protocol, multivitamins    Severe protein-calorie malnutrition (HCC)- (present on admission)  Assessment & Plan  RD consult, supplements.         VTE prophylaxis:   SCDs/TEDs      I have performed a physical exam and reviewed and updated ROS and Plan today (10/24/2024). In review of yesterday's note (10/23/2024), there are no changes except as documented above.  Total time spent 53 minutes. I spent greater than 50% of the time for patient care, counseling, and coordination on this date, including unit/floor time, and face-to-face time with the patient as per interval events, my own review of patient's imaging and lab analysis and developing my assessment and plan above.

## 2024-10-25 NOTE — CARE PLAN
The patient is Stable - Low risk of patient condition declining or worsening    Shift Goals  Clinical Goals: Safety, pain management, rest  Patient Goals: Rest, safety  Family Goals: JON    Progress made toward(s) clinical / shift goals:    Problem: Optimal Care for Alcohol Withdrawal  Goal: Optimal Care for the alcohol withdrawal patient  Outcome: Progressing     Problem: Seizure Precautions  Goal: Implementation of seizure precautions  Outcome: Progressing     Problem: Lifestyle Changes  Goal: Patient's ability to identify lifestyle changes and available resources to help reduce recurrence of condition will improve  Outcome: Progressing     Problem: Psychosocial  Goal: Patient's level of anxiety will decrease  Outcome: Progressing     Problem: Risk for Aspiration  Goal: Patient's risk for aspiration will be absent or decrease  Outcome: Progressing     Problem: Physical Regulation  Goal: Diagnostic test results will improve  Outcome: Progressing  Goal: Signs and symptoms of infection will decrease  Outcome: Progressing     Problem: Skin Integrity  Goal: Skin integrity is maintained or improved  Outcome: Progressing     Problem: Fall Risk  Goal: Patient will remain free from falls  Outcome: Progressing     Problem: Safety - Medical Restraint  Goal: Remains free of injury from restraints (Restraint for Interference with Medical Device)  Outcome: Progressing  Goal: Free from restraint(s) (Restraint for Interference with Medical Device)  Outcome: Progressing       Patient is not progressing towards the following goals:

## 2024-10-25 NOTE — CARE PLAN
The patient is Stable - Low risk of patient condition declining or worsening    Shift Goals  Clinical Goals: Safety, pain management, rest  Patient Goals: Rest, safety  Family Goals: JON    Progress made toward(s) clinical / shift goals:    Problem: Optimal Care for Alcohol Withdrawal  Goal: Optimal Care for the alcohol withdrawal patient  Outcome: Progressing     Problem: Seizure Precautions  Goal: Implementation of seizure precautions  Outcome: Progressing     Problem: Lifestyle Changes  Goal: Patient's ability to identify lifestyle changes and available resources to help reduce recurrence of condition will improve  Outcome: Progressing     Problem: Psychosocial  Goal: Patient's level of anxiety will decrease  Outcome: Progressing     Problem: Risk for Aspiration  Goal: Patient's risk for aspiration will be absent or decrease  Outcome: Progressing       Patient is not progressing towards the following goals:

## 2024-10-25 NOTE — DISCHARGE PLANNING
Case Management Discharge Planning    Admission Date: 10/24/2024  GMLOS: 2.9  ALOS: 1    6-Clicks ADL Score: 16  6-Clicks Mobility Score: 11  PT and/or OT Eval ordered: Yes  Post-acute Referrals Ordered: Pending PT/OT recs  Post-acute Choice Obtained: Pending PT/OT recs  Has referral(s) been sent to post-acute provider:  Pending PT/OT recs      Anticipated Discharge Dispo: Discharge Disposition: D/T to SNF with Medicare cert in anticipation of skilled care (03)    DME Needed: No    Action(s) Taken: Updated Provider/Nurse on Discharge Plan and DC Assessment Complete (See below)    1335,Pt was visited at room to obtain assessment information and discuss discharge planning.     [facility] # [id number]    Escalations Completed: None    Medically Clear: No    Next Steps: CM will continue to assist Pt with discharge needs.      Barriers to Discharge: Medical clearance and Pending Placement    Is the patient up for discharge tomorrow: No      Care Transition Team Assessment  RN ALEKSANDR met with Pt at bedside to obtain assessment informations. Demographics verified. RN ALEKSANDR introduced self and purpose of visit.   Pt lives with spouse in the ground level apartment with no steps to main entrance.  Pt has friends for support.  Pt has no PCP.  Pt was independent with ADLs prior to hospitalization.  Pt has cane at home.      Information Source  Orientation Level: Oriented X4  Information Given By: Patient    Elopement Risk  Legal Hold: No  Ambulatory or Self Mobile in Wheelchair: No-Not an Elopement Risk  Disoriented: No  Psychiatric Symptoms: Hallucinations-at Risk for Elopement, Impulsivity-at Risk for Elopement  History of Wandering: No  Elopement this Admit: No  Vocalizing Wanting to Leave: Yes-At Risk for Elopement  Displays Behaviors, Body Language Wanting to Leave: No-Not at Risk for Elopement  Elopement Risk: Not at Risk for Elopement    Interdisciplinary Discharge Planning  Lives with - Patient's Self Care Capacity:  Spouse  Patient or legal guardian wants to designate a caregiver: No  Support Systems: Friends / Neighbors  Housing / Facility: 1 Story Apartment / Condo (ground level with no steps to main entrance)    Discharge Preparedness  What is your plan after discharge?: Skilled nursing facility  What are your discharge supports?: Other (comment) (Friends)  Prior Functional Level: Independent with Activities of Daily Living, Independent with Medication Management    Functional Assesment  Prior Functional Level: Independent with Activities of Daily Living, Independent with Medication Management    Finances  Financial Barriers to Discharge: No  Prescription Coverage: Yes    Vision / Hearing Impairment  Vision Impairment : Yes  Right Eye Vision: Impaired, Wears Glasses  Left Eye Vision: Impaired, Wears Glasses  Hearing Impairment : Yes  Hearing Impairment: Both Ears, Hearing Device Not Available  Does Pt Need Special Equipment for the Hearing Impaired?: No    Domestic Abuse  Possible Abuse/Neglect Reported to:: Not Applicable    Psychological Assessment  History of Substance Abuse: Alcohol  Date Last Used - Alcohol: 10/23/2024 (Pt drinks 4-5 beers per day)  History of Psychiatric Problems: No    Anticipated Discharge Information  Discharge Disposition: D/T to SNF with Medicare cert in anticipation of skilled care (03)

## 2024-10-25 NOTE — THERAPY
"Physical Therapy   Initial Evaluation     Patient Name: Chava BARTLETT Amharic  Age:  74 y.o., Sex:  male  Medical Record #: [id number]  Today's Date: 10/25/2024     Precautions  Comments: (P) CIWA    Assessment    74 y.o. male was admitted for EtOH withdrawal and GI bleed requiring a transfusion.  PMHx includes EtOH and scrotal hydrocele.  He reports he lives alone in a 6th floor apartment with flights of stairs to enter and he was mod I for mobility with a FWW and QC.  On eval, he presents with weakness, decreased activity tolerance, impaired cognition, and decreased balance impairing his mobility.  He will benefit from continued acute PT services to address the above deficits in order to return home safely.  Recommend post acute PT services.    Plan    Physical Therapy Initial Treatment Plan   Treatment Plan : (P) Bed Mobility, Equipment, Family / Caregiver Training, Gait Training, Manual Therapy, Neuro Re-Education / Balance, Self Care / Home Evaluation, Stair Training, Therapeutic Activities, Therapeutic Exercise  Treatment Frequency: (P) 4 Times per Week  Duration: (P) Until Therapy Goals Met    DC Equipment Recommendations: (P) Unable to determine at this time  Discharge Recommendations: (P) Recommend post-acute placement for additional physical therapy services prior to discharge home       Subjective    \"Not with that  standing there.\" -pt instructed to turn around to rest back on the bed, no  was in the room     Objective       10/25/24 1432   Initial Contact Note    Initial Contact Note Order Received and Verified, Physical Therapy Evaluation in Progress with Full Report to Follow.   Precautions   Comments CIWA   Vitals   O2 Delivery Device None - Room Air   Vitals Comments unable to get reading /p ambulation- timed out with pulse search.  Increased WOB noted during ambulation   Pain 0 - 10 Group   Therapist Pain Assessment During Activity  (feet during inital steps)   Prior Living Situation "   Housing / Facility 1 Story Apartment / Condo  (6th floor)   Steps Into Home   (stairs to get to apartment)   Rail Left Rail  (Steps into Home)   Equipment Owned Front-Wheel Walker;Quad Cane   Lives with - Patient's Self Care Capacity Alone and Able to Care For Self   Comments info per pt- questionable    Prior Level of Functional Mobility   Bed Mobility Independent   Transfer Status Independent   Ambulation Independent   Ambulation Distance community   Assistive Devices Used   (FWW and QC)   Stairs Independent   Cognition    Level of Consciousness Alert   Comments Pt answers questions inappropriately ~25% of the time, reports seeing a  in the room during ambulation   Active ROM Lower Body    Active ROM Lower Body  WDL   Strength Lower Body   Lower Body Strength  X   Gross Strength Generalized Weakness, Equal Bilaterally   Comments BLEs grossly 4-/5   Sensation Lower Body   Lower Extremity Sensation     (not following commands for sensation testing, when asked to move leg PT touched, he moved to other leg)   Balance Assessment   Sitting Balance (Static) Fair -   Sitting Balance (Dynamic) Fair -   Standing Balance (Static) Fair -   Standing Balance (Dynamic) Fair -   Weight Shift Sitting Poor   Weight Shift Standing Poor   Comments with FWW   Bed Mobility    Supine to Sit Maximal Assist   Sit to Supine Moderate Assist   Scooting Minimal Assist   Rolling Moderate Assist to Rt.   Comments HOB elevated, rail   Gait Analysis   Gait Level Of Assist Contact Guard Assist   Assistive Device Front Wheel Walker   Distance (Feet) 10   # of Times Distance was Traveled 1   Deviation Bradykinetic;Shuffled Gait   Weight Bearing Status FWB BLEs   Functional Mobility   Sit to Stand Moderate Assist   Bed, Chair, Wheelchair Transfer Contact Guard Assist   Transfer Method Stand Step   Mobility with FWW   Activity Tolerance   Sitting Edge of Bed 4 min   Edema / Skin Assessment   Edema / Skin  Not Assessed   Short Term Goals     Short Term Goal # 1 Pt will perform supine < > sit SPV in 6 vsiits with bed flat, no rail in order to set up for upright mobility   Short Term Goal # 2 Pt will perform sit < > stand SPV in 6 visits in order to prepare for ambulation   Short Term Goal # 3 Pt will ambulate 150' SPV with FWW in 6 visits in order to return home safely.   Short Term Goal # 4 Pt will ascend/ descend 1 FOS SPV in 6 visits in order to access his home   Education Group   Education Provided Role of Physical Therapist;Gait Training;Use of Assistive Device   Role of Physical Therapist Patient Response Patient;Acceptance;Explanation;Action Demonstration   Gait Training Patient Response Patient;Acceptance;Explanation;Action Demonstration;Reinforcement Needed   Use of Assistive Device Patient Response Patient;Acceptance;Explanation;Action Demonstration;Reinforcement Needed   Physical Therapy Initial Treatment Plan    Treatment Plan  Bed Mobility;Equipment;Family / Caregiver Training;Gait Training;Manual Therapy;Neuro Re-Education / Balance;Self Care / Home Evaluation;Stair Training;Therapeutic Activities;Therapeutic Exercise   Treatment Frequency 4 Times per Week   Duration Until Therapy Goals Met   Problem List    Problems Impaired Bed Mobility;Impaired Transfers;Impaired Ambulation;Functional Strength Deficit;Impaired Balance;Decreased Activity Tolerance   Anticipated Discharge Equipment and Recommendations   DC Equipment Recommendations Unable to determine at this time   Discharge Recommendations Recommend post-acute placement for additional physical therapy services prior to discharge home   Interdisciplinary Plan of Care Collaboration   IDT Collaboration with  Nursing   Patient Position at End of Therapy In Bed;Wrist Restraints Applied;Call Light within Reach;Tray Table within Reach   Collaboration Comments RN updated   Session Information   Date / Session Number  10/25 -1 (1/4, 10/31)

## 2024-10-25 NOTE — PROGRESS NOTES
Bedside report received and patient care assumed. Pt is resting in bed, A&O4, with no complaints of pain, and is on RA. CIWA score of 8 this morning, precautions in place. Soft wrist restraints in place for safety. Tele box on. All fall precautions are in place, belongings at bedside table.  Pt was updated on POC, no questions or concerns. Pt educated on use of call light for assistance.

## 2024-10-25 NOTE — CARE PLAN
"The patient is Stable - Low risk of patient condition declining or worsening    Shift Goals  Clinical Goals: Safety, CIWA  Patient Goals: \"leave\"  Family Goals: JON    Progress made toward(s) clinical / shift goals:    Problem: Knowledge Deficit - Standard  Goal: Patient and family/care givers will demonstrate understanding of plan of care, disease process/condition, diagnostic tests and medications  Outcome: Progressing     Problem: Optimal Care for Alcohol Withdrawal  Goal: Optimal Care for the alcohol withdrawal patient  Outcome: Progressing     Problem: Seizure Precautions  Goal: Implementation of seizure precautions  Outcome: Progressing     Problem: Lifestyle Changes  Goal: Patient's ability to identify lifestyle changes and available resources to help reduce recurrence of condition will improve  Outcome: Progressing     Problem: Psychosocial  Goal: Patient's level of anxiety will decrease  Outcome: Progressing  Goal: Spiritual and cultural needs incorporated into hospitalization  Outcome: Progressing     Problem: Fall Risk  Goal: Patient will remain free from falls  Outcome: Progressing     Problem: Safety - Medical Restraint  Goal: Remains free of injury from restraints (Restraint for Interference with Medical Device)  Outcome: Progressing  Goal: Free from restraint(s) (Restraint for Interference with Medical Device)  Outcome: Progressing    Patient on CIWA precautions and safety maintained throughout shift. CIWA scores ranging from 8-19 this shift, medicated per protocol. Pt on soft wrist restraints to maintain safety.     Patient is not progressing towards the following goals:      "

## 2024-10-25 NOTE — ASSESSMENT & PLAN NOTE
No overt signs of bleeding, stable hemoglobin, stable vitals, discussed with Dr. Oden from IR, will hold off on embolization at this time if he has any recurrence if it is felt to be large-volume obtain CTA.

## 2024-10-26 LAB
ALBUMIN SERPL BCP-MCNC: 2.8 G/DL (ref 3.2–4.9)
ALBUMIN/GLOB SERPL: 0.8 G/DL
ALP SERPL-CCNC: 93 U/L (ref 30–99)
ALT SERPL-CCNC: 22 U/L (ref 2–50)
ANION GAP SERPL CALC-SCNC: 8 MMOL/L (ref 7–16)
AST SERPL-CCNC: 27 U/L (ref 12–45)
BASOPHILS # BLD AUTO: 1.5 % (ref 0–1.8)
BASOPHILS # BLD: 0.06 K/UL (ref 0–0.12)
BILIRUB SERPL-MCNC: 1.3 MG/DL (ref 0.1–1.5)
BUN SERPL-MCNC: 5 MG/DL (ref 8–22)
CALCIUM ALBUM COR SERPL-MCNC: 8.9 MG/DL (ref 8.5–10.5)
CALCIUM SERPL-MCNC: 7.9 MG/DL (ref 8.5–10.5)
CHLORIDE SERPL-SCNC: 108 MMOL/L (ref 96–112)
CO2 SERPL-SCNC: 19 MMOL/L (ref 20–33)
CREAT SERPL-MCNC: 0.6 MG/DL (ref 0.5–1.4)
EOSINOPHIL # BLD AUTO: 0.23 K/UL (ref 0–0.51)
EOSINOPHIL NFR BLD: 5.9 % (ref 0–6.9)
ERYTHROCYTE [DISTWIDTH] IN BLOOD BY AUTOMATED COUNT: 64.2 FL (ref 35.9–50)
GFR SERPLBLD CREATININE-BSD FMLA CKD-EPI: 102 ML/MIN/1.73 M 2
GLOBULIN SER CALC-MCNC: 3.5 G/DL (ref 1.9–3.5)
GLUCOSE SERPL-MCNC: 100 MG/DL (ref 65–99)
HCT VFR BLD AUTO: 25.4 % (ref 42–52)
HGB BLD-MCNC: 8.1 G/DL (ref 14–18)
IMM GRANULOCYTES # BLD AUTO: 0.01 K/UL (ref 0–0.11)
IMM GRANULOCYTES NFR BLD AUTO: 0.3 % (ref 0–0.9)
LYMPHOCYTES # BLD AUTO: 0.62 K/UL (ref 1–4.8)
LYMPHOCYTES NFR BLD: 15.8 % (ref 22–41)
MAGNESIUM SERPL-MCNC: 1.9 MG/DL (ref 1.5–2.5)
MCH RBC QN AUTO: 27.6 PG (ref 27–33)
MCHC RBC AUTO-ENTMCNC: 31.9 G/DL (ref 32.3–36.5)
MCV RBC AUTO: 86.4 FL (ref 81.4–97.8)
MONOCYTES # BLD AUTO: 1.11 K/UL (ref 0–0.85)
MONOCYTES NFR BLD AUTO: 28.2 % (ref 0–13.4)
NEUTROPHILS # BLD AUTO: 1.9 K/UL (ref 1.82–7.42)
NEUTROPHILS NFR BLD: 48.3 % (ref 44–72)
NRBC # BLD AUTO: 0 K/UL
NRBC BLD-RTO: 0 /100 WBC (ref 0–0.2)
PHOSPHATE SERPL-MCNC: 3.4 MG/DL (ref 2.5–4.5)
PLATELET # BLD AUTO: 131 K/UL (ref 164–446)
PMV BLD AUTO: 10 FL (ref 9–12.9)
POTASSIUM SERPL-SCNC: 4.1 MMOL/L (ref 3.6–5.5)
PROT SERPL-MCNC: 6.3 G/DL (ref 6–8.2)
RBC # BLD AUTO: 2.94 M/UL (ref 4.7–6.1)
SODIUM SERPL-SCNC: 135 MMOL/L (ref 135–145)
WBC # BLD AUTO: 3.9 K/UL (ref 4.8–10.8)

## 2024-10-26 PROCEDURE — 83735 ASSAY OF MAGNESIUM: CPT

## 2024-10-26 PROCEDURE — A9270 NON-COVERED ITEM OR SERVICE: HCPCS | Performed by: HOSPITALIST

## 2024-10-26 PROCEDURE — 84100 ASSAY OF PHOSPHORUS: CPT

## 2024-10-26 PROCEDURE — 99233 SBSQ HOSP IP/OBS HIGH 50: CPT | Performed by: STUDENT IN AN ORGANIZED HEALTH CARE EDUCATION/TRAINING PROGRAM

## 2024-10-26 PROCEDURE — A9270 NON-COVERED ITEM OR SERVICE: HCPCS | Performed by: STUDENT IN AN ORGANIZED HEALTH CARE EDUCATION/TRAINING PROGRAM

## 2024-10-26 PROCEDURE — 700102 HCHG RX REV CODE 250 W/ 637 OVERRIDE(OP): Performed by: HOSPITALIST

## 2024-10-26 PROCEDURE — 770001 HCHG ROOM/CARE - MED/SURG/GYN PRIV*

## 2024-10-26 PROCEDURE — 80053 COMPREHEN METABOLIC PANEL: CPT

## 2024-10-26 PROCEDURE — 36415 COLL VENOUS BLD VENIPUNCTURE: CPT

## 2024-10-26 PROCEDURE — 700102 HCHG RX REV CODE 250 W/ 637 OVERRIDE(OP): Performed by: STUDENT IN AN ORGANIZED HEALTH CARE EDUCATION/TRAINING PROGRAM

## 2024-10-26 PROCEDURE — 85025 COMPLETE CBC W/AUTO DIFF WBC: CPT

## 2024-10-26 RX ADMIN — Medication 1 TABLET: at 05:45

## 2024-10-26 RX ADMIN — LORAZEPAM 2 MG: 2 TABLET ORAL at 00:12

## 2024-10-26 RX ADMIN — SULFAMETHOXAZOLE AND TRIMETHOPRIM 1 TABLET: 800; 160 TABLET ORAL at 16:46

## 2024-10-26 RX ADMIN — TAMSULOSIN HYDROCHLORIDE 0.4 MG: 0.4 CAPSULE ORAL at 08:32

## 2024-10-26 RX ADMIN — LACTULOSE 30 ML: 10 SOLUTION ORAL at 05:48

## 2024-10-26 RX ADMIN — OMEPRAZOLE 20 MG: 20 CAPSULE, DELAYED RELEASE ORAL at 05:48

## 2024-10-26 RX ADMIN — FOLIC ACID 1 MG: 1 TABLET ORAL at 05:47

## 2024-10-26 RX ADMIN — LORAZEPAM 0.5 MG: 0.5 TABLET ORAL at 10:27

## 2024-10-26 RX ADMIN — LACTULOSE 30 ML: 10 SOLUTION ORAL at 12:10

## 2024-10-26 RX ADMIN — LORAZEPAM 0.5 MG: 0.5 TABLET ORAL at 22:29

## 2024-10-26 RX ADMIN — Medication 100 MG: at 05:47

## 2024-10-26 RX ADMIN — SULFAMETHOXAZOLE AND TRIMETHOPRIM 1 TABLET: 800; 160 TABLET ORAL at 05:47

## 2024-10-26 RX ADMIN — LACTULOSE 30 ML: 10 SOLUTION ORAL at 16:46

## 2024-10-26 ASSESSMENT — LIFESTYLE VARIABLES
VISUAL DISTURBANCES: NOT PRESENT
TREMOR: NO TREMOR
AGITATION: NORMAL ACTIVITY
TOTAL SCORE: 4
AGITATION: NORMAL ACTIVITY
AUDITORY DISTURBANCES: NOT PRESENT
ORIENTATION AND CLOUDING OF SENSORIUM: ORIENTED AND CAN DO SERIAL ADDITIONS
PAROXYSMAL SWEATS: BARELY PERCEPTIBLE SWEATING. PALMS MOIST
VISUAL DISTURBANCES: MILD SENSITIVITY
ORIENTATION AND CLOUDING OF SENSORIUM: ORIENTED AND CAN DO SERIAL ADDITIONS
NAUSEA AND VOMITING: NO NAUSEA AND NO VOMITING
NAUSEA AND VOMITING: NO NAUSEA AND NO VOMITING
HEADACHE, FULLNESS IN HEAD: NOT PRESENT
ANXIETY: NO ANXIETY (AT EASE)
TREMOR: *
AUDITORY DISTURBANCES: NOT PRESENT
NAUSEA AND VOMITING: NO NAUSEA AND NO VOMITING
ANXIETY: *
NAUSEA AND VOMITING: NO NAUSEA AND NO VOMITING
AUDITORY DISTURBANCES: NOT PRESENT
AUDITORY DISTURBANCES: NOT PRESENT
ANXIETY: NO ANXIETY (AT EASE)
HEADACHE, FULLNESS IN HEAD: NOT PRESENT
HEADACHE, FULLNESS IN HEAD: NOT PRESENT
TREMOR: NO TREMOR
AGITATION: SOMEWHAT MORE THAN NORMAL ACTIVITY
HEADACHE, FULLNESS IN HEAD: NOT PRESENT
NAUSEA AND VOMITING: NO NAUSEA AND NO VOMITING
ORIENTATION AND CLOUDING OF SENSORIUM: CANNOT DO SERIAL ADDITIONS OR IS UNCERTAIN ABOUT DATE
VISUAL DISTURBANCES: MODERATE SENSITIVITY
ANXIETY: NO ANXIETY (AT EASE)
PAROXYSMAL SWEATS: BARELY PERCEPTIBLE SWEATING. PALMS MOIST
AUDITORY DISTURBANCES: NOT PRESENT
PAROXYSMAL SWEATS: BARELY PERCEPTIBLE SWEATING. PALMS MOIST
ORIENTATION AND CLOUDING OF SENSORIUM: DISORIENTED FOR PLACE AND / OR PERSON
VISUAL DISTURBANCES: NOT PRESENT
ANXIETY: NO ANXIETY (AT EASE)
TOTAL SCORE: 5
NAUSEA AND VOMITING: NO NAUSEA AND NO VOMITING
PAROXYSMAL SWEATS: NO SWEAT VISIBLE
VISUAL DISTURBANCES: MILD SENSITIVITY
PAROXYSMAL SWEATS: NO SWEAT VISIBLE
ANXIETY: MODERATELY ANXIOUS OR GUARDED, SO ANXIETY IS INFERRED
TOTAL SCORE: 4
NAUSEA AND VOMITING: NO NAUSEA AND NO VOMITING
TREMOR: TREMOR NOT VISIBLE BUT CAN BE FELT, FINGERTIP TO FINGERTIP
TOTAL SCORE: 14
HEADACHE, FULLNESS IN HEAD: NOT PRESENT
AGITATION: NORMAL ACTIVITY
VISUAL DISTURBANCES: NOT PRESENT
ORIENTATION AND CLOUDING OF SENSORIUM: CANNOT DO SERIAL ADDITIONS OR IS UNCERTAIN ABOUT DATE
AGITATION: NORMAL ACTIVITY
PAROXYSMAL SWEATS: NO SWEAT VISIBLE
AGITATION: *
TOTAL SCORE: 4
VISUAL DISTURBANCES: NOT PRESENT
AUDITORY DISTURBANCES: NOT PRESENT
AUDITORY DISTURBANCES: NOT PRESENT
ORIENTATION AND CLOUDING OF SENSORIUM: DATE DISORIENTATION BY NO MORE THAN TWO CALENDAR DAYS
ORIENTATION AND CLOUDING OF SENSORIUM: DISORIENTED FOR PLACE AND / OR PERSON
PAROXYSMAL SWEATS: BARELY PERCEPTIBLE SWEATING. PALMS MOIST
TOTAL SCORE: 4
HEADACHE, FULLNESS IN HEAD: VERY MILD
TREMOR: *
TREMOR: *
AGITATION: NORMAL ACTIVITY
ANXIETY: NO ANXIETY (AT EASE)
TREMOR: *
HEADACHE, FULLNESS IN HEAD: NOT PRESENT
TOTAL SCORE: 7

## 2024-10-26 ASSESSMENT — PAIN DESCRIPTION - PAIN TYPE
TYPE: ACUTE PAIN
TYPE: ACUTE PAIN;CHRONIC PAIN
TYPE: ACUTE PAIN;CHRONIC PAIN

## 2024-10-26 ASSESSMENT — FIBROSIS 4 INDEX: FIB4 SCORE: 5.1

## 2024-10-26 NOTE — PROGRESS NOTES
Monitor Summary  Rhythm: SR/ST  Rate:   Ectopy: (R) PVC's  Measurements: .19/.08/.36  ---12 hr Chart Review---

## 2024-10-26 NOTE — CARE PLAN
"The patient is Stable - Low risk of patient condition declining or worsening    Shift Goals  Clinical Goals: Safety, CIWA  Patient Goals: \"leave\"  Family Goals: JON    Progress made toward(s) clinical / shift goals:    Problem: Knowledge Deficit - Standard  Goal: Patient and family/care givers will demonstrate understanding of plan of care, disease process/condition, diagnostic tests and medications  Outcome: Progressing     Problem: Optimal Care for Alcohol Withdrawal  Goal: Optimal Care for the alcohol withdrawal patient  Outcome: Progressing     Problem: Seizure Precautions  Goal: Implementation of seizure precautions  Outcome: Progressing     Problem: Lifestyle Changes  Goal: Patient's ability to identify lifestyle changes and available resources to help reduce recurrence of condition will improve  Outcome: Progressing     Problem: Physical Regulation  Goal: Diagnostic test results will improve  Outcome: Progressing  Goal: Signs and symptoms of infection will decrease  Outcome: Progressing     Problem: Skin Integrity  Goal: Skin integrity is maintained or improved  Outcome: Progressing     Problem: Fall Risk  Goal: Patient will remain free from falls  Outcome: Progressing     Problem: Safety - Medical Restraint  Goal: Remains free of injury from restraints (Restraint for Interference with Medical Device)  Outcome: Progressing  Goal: Free from restraint(s) (Restraint for Interference with Medical Device)  Outcome: Progressing       Patient is not progressing towards the following goals:      "

## 2024-10-26 NOTE — CARE PLAN
"The patient is Stable - Low risk of patient condition declining or worsening    Shift Goals  Clinical Goals: Safety, CIWA, monitor labs  Patient Goals: comfort  Family Goals: JON    Progress made toward(s) clinical / shift goals:    Problem: Knowledge Deficit - Standard  Goal: Patient and family/care givers will demonstrate understanding of plan of care, disease process/condition, diagnostic tests and medications  Outcome: Progressing     Problem: Optimal Care for Alcohol Withdrawal  Goal: Optimal Care for the alcohol withdrawal patient  Outcome: Progressing     Problem: Seizure Precautions  Goal: Implementation of seizure precautions  Outcome: Progressing     Problem: Lifestyle Changes  Goal: Patient's ability to identify lifestyle changes and available resources to help reduce recurrence of condition will improve  Outcome: Progressing     Problem: Risk for Aspiration  Goal: Patient's risk for aspiration will be absent or decrease  Outcome: Progressing     Problem: Respiratory  Goal: Patient will achieve/maintain optimum respiratory ventilation and gas exchange  Outcome: Progressing     Problem: Safety - Medical Restraint  Goal: Remains free of injury from restraints (Restraint for Interference with Medical Device)  Outcome: Progressing  Goal: Free from restraint(s) (Restraint for Interference with Medical Device)  Outcome: Progressing       Patient is not progressing towards the following goals:    CIWA scores ranging from 4-5 this shift. Restraints removed and discontinued. Pt educated on the importance of staying in bed, using call light, and all safety precautions in place. Pt able to follow safety commands. Pt is still disoriented and hallucinating, stating he sees \"cats\". No further needs expressed by pt. Continuing to advanced diet as tolerated.     "

## 2024-10-26 NOTE — PROGRESS NOTES
"Hospital Medicine Daily Progress Note    Date of Service  10/25/2024    Chief Complaint  Chava Mercedes is a 74 y.o. male admitted 10/24/2024 with GI bleeding    Hospital Course  Per H&P: \"Chava Mercedes is a 74 y.o. male with a past medical history of alcoholic cirrhosis admitted 10/19/2024 with GI bleeding.  The patient was started on octreotide infusion and intravenous Protonix.  Gastroenterology were consulted, Dr. Palencia from [facility] performed an upper endoscopy that revealed Ramirez's esophagus without esophageal varices.  Patient was also found to have moderate portal hypertensive gastropathy, in addition to duodenitis.  Unfortunately, the patient continued to have melanotic and bloody stools.  He required multiple blood transfusions.  Today, October 23, his hemoglobin dropped to 6.9 this morning.  He required a blood transfusion. CT angiography is revealing a focus of active extravasation in the small bowel.  I discussed patient condition with Dr. Allen.  I discussed the finding with the patient.  He is agreeable to IR embolization.  He understand the risk and benefits of this procedure. I discussed patient condition with interventional radiology on-call, Dr. Lees.  Bleeding location appears to be in the small intestine which can be very technically difficult.  Dr. Lees will attempt angioembolization.  The patient will be transferred to [facility] for IR consultation/attempt embolization I with a bleeding focus.\"    Interval Problem Update  10/24  Afebrile pulse from 90s to 110 respiratory rate 1618 systolic blood pressure 170 132 pulse ox 94 to 96% on room air.  WBCs 3.8 hemoglobin 8.9 hovering around 9, platelets 106 improving bicarb 19 AST 48 T. bili 2.7 procalcitonin 0.11.  Patient is very impulsive, withdrawing, encephalopathic, restraints placed for patient safety as he was impulsively tried to get up all throughout the day.  No overt signs of bleeding, stable hemoglobin, " stable vitals, discussed with Dr. Oden from IR, will hold off on embolization at this time if he has any recurrence if it is felt to be large-volume obtain CTA.  Continue CIWA protocol.     10/25  No bleeding, -140 RR 15-18 HR 80's-100's, SpO2 92-95% on room air.  Normal Renal function. With drawing still, hallucinating, impulsive, constantly trying to crawl out of bed, delirious and very weak and unsteady. No specific complaints to me. Continue CIWA, PT/OT eval.  Monitor renal function, respiratory status, neurological status, monitor for seizures. Bactrim for SBP ppx.    I have discussed this patient's plan of care and discharge plan at IDT rounds today with Case Management, Nursing, Nursing leadership, and other members of the IDT team.    Consultants/Specialty  NA    Code Status  Full Code    Disposition  The patient is not medically cleared for discharge to home or a post-acute facility.      I have placed the appropriate orders for post-discharge needs.    Review of Systems  ROS   Constitutional:  Positive for malaise/fatigue. Negative for chills and fever.   Respiratory:  Negative for cough, shortness of breath and stridor.    Cardiovascular:  Negative for chest pain and leg swelling.   Gastrointestinal:  Positive for blood in stool and melena. Negative for abdominal pain and vomiting.        Multiple large bloody and melanotic bowel movements today   Genitourinary:  Positive for flank pain.        Scrotal swelling   Musculoskeletal:  Negative for myalgias.   Skin: Negative.    Neurological:  Negative for focal weakness.   Endo/Heme/Allergies:  Does not bruise/bleed easily.   Psychiatric/Behavioral:  The patient is not nervous/anxious.      Physical Exam  Temp:  [36.2 °C (97.2 °F)-37.1 °C (98.8 °F)] 36.2 °C (97.2 °F)  Pulse:  [] 87  Resp:  [15-18] 18  BP: (115-143)/(70-96) 115/70  SpO2:  [92 %-95 %] 94 %    Physical Exam  Vitals and nursing note reviewed.   Constitutional:       General: He is  not in acute distress.     Appearance: He is ill-appearing.      Comments: Drowsy, encephalopathic, confused   HENT:      Head: Normocephalic and atraumatic.      Nose: Nose normal. No rhinorrhea.      Mouth/Throat:      Mouth: Mucous membranes are dry.      Pharynx: Oropharynx is clear.   Eyes:      General: No scleral icterus.     Extraocular Movements: Extraocular movements intact.      Conjunctiva/sclera: Conjunctivae normal.   Cardiovascular:      Rate and Rhythm: Regular rhythm. Tachycardia present.      Pulses: Normal pulses.   Pulmonary:      Effort: No respiratory distress.      Breath sounds: No wheezing, rhonchi or rales.   Abdominal:      General: There is no distension.      Palpations: Abdomen is soft.      Tenderness: There is no abdominal tenderness. There is no guarding or rebound.   Musculoskeletal:         General: Normal range of motion.      Cervical back: Normal range of motion and neck supple. No rigidity.      Right lower leg: No edema.      Left lower leg: No edema.   Skin:     General: Skin is warm and dry.      Capillary Refill: Capillary refill takes less than 2 seconds.      Coloration: Skin is not jaundiced.   Neurological:      General: No focal deficit present.      Mental Status: He is disoriented.      Cranial Nerves: No cranial nerve deficit.      Comments: Encephalopathic with withdrawals   Psychiatric:         Mood and Affect: Mood normal.         Behavior: Behavior normal.         Thought Content: Thought content normal.         Judgment: Judgment normal.         Fluids    Intake/Output Summary (Last 24 hours) at 10/26/2024 0454  Last data filed at 10/25/2024 2130  Gross per 24 hour   Intake 450 ml   Output --   Net 450 ml        Laboratory  Recent Labs     10/24/24  0834 10/24/24  1646 10/25/24  0014   WBC 3.4* 3.8* 3.5*   RBC 3.33* 3.24* 2.99*   HEMOGLOBIN 9.2* 8.9* 8.2*   HEMATOCRIT 28.4* 28.0* 25.6*   MCV 85.3 86.4 85.6   MCH 27.6 27.5 27.4   MCHC 32.4 31.8* 32.0*   RDW  63.0* 64.4* 63.5*   PLATELETCT 103* 106* 111*   MPV 10.2 10.2 10.2     Recent Labs     10/24/24  0118 10/25/24  0014   SODIUM 134* 134*   POTASSIUM 3.9 3.7   CHLORIDE 104 105   CO2 19* 19*   GLUCOSE 96 94   BUN 10 7*   CREATININE 0.63 0.77   CALCIUM 8.2* 7.9*     Recent Labs     10/23/24  0833   INR 1.82*               Imaging  No orders to display        Assessment/Plan  Lower GI bleeding- (present on admission)  Assessment & Plan  No overt signs of bleeding, stable hemoglobin, stable vitals, discussed with Dr. Oden from IR, will hold off on embolization at this time if he has any recurrence if it is felt to be large-volume obtain CTA.    Ramirez's esophagus- (present on admission)  Assessment & Plan  Continue PPI twice daily, will need follow-up with GI as outpatient    Scrotal swelling- (present on admission)  Assessment & Plan  Will need outpatient follow-up for large right hydrocele incidental 12 mm right epididymal cyst    Alcoholic cirrhosis (HCC)- (present on admission)  Assessment & Plan  Counseled on alcohol cessation, going through withdrawals on CIWA protocol    Thrombocytopenia (HCC)- (present on admission)  Assessment & Plan  Continue to monitor, transfuse if bleeding for platelets less than 50,000.    Alcoholic liver disease (HCC)- (present on admission)  Assessment & Plan  CIWA protocol, multivitamins    Severe protein-calorie malnutrition (HCC)- (present on admission)  Assessment & Plan  RD consult, supplements.         VTE prophylaxis:   SCDs/TEDs      I have performed a physical exam and reviewed and updated ROS and Plan today (10/25/2024). In review of yesterday's note (10/24/2024), there are no changes except as documented above.  Total time spent 53 minutes. I spent greater than 50% of the time for patient care, counseling, and coordination on this date, including unit/floor time, and face-to-face time with the patient as per interval events, my own review of patient's imaging and lab analysis  and developing my assessment and plan above.

## 2024-10-26 NOTE — PROGRESS NOTES
Bedside report received and patient care assumed. Pt is resting in bed, A&O1, with no complaints of pain, and is on RA. CIWA precautions in place and soft wrist restraints for safety. Tele box on. All fall precautions are in place, belongings at bedside table.  Pt was updated on POC, no questions or concerns. Pt educated on use of call light for assistance.

## 2024-10-26 NOTE — PROGRESS NOTES
"Hospital Medicine Daily Progress Note    Date of Service  10/26/2024    Chief Complaint  Chava Mercedes is a 74 y.o. male admitted 10/24/2024 with GI bleeding    Hospital Course  Per H&P: \"Chava Mercedes is a 74 y.o. male with a past medical history of alcoholic cirrhosis admitted 10/19/2024 with GI bleeding.  The patient was started on octreotide infusion and intravenous Protonix.  Gastroenterology were consulted, Dr. Palencia from [facility] performed an upper endoscopy that revealed Ramirez's esophagus without esophageal varices.  Patient was also found to have moderate portal hypertensive gastropathy, in addition to duodenitis.  Unfortunately, the patient continued to have melanotic and bloody stools.  He required multiple blood transfusions.  Today, October 23, his hemoglobin dropped to 6.9 this morning.  He required a blood transfusion. CT angiography is revealing a focus of active extravasation in the small bowel.  I discussed patient condition with Dr. Allen.  I discussed the finding with the patient.  He is agreeable to IR embolization.  He understand the risk and benefits of this procedure. I discussed patient condition with interventional radiology on-call, Dr. Lees.  Bleeding location appears to be in the small intestine which can be very technically difficult.  Dr. Lees will attempt angioembolization.  The patient will be transferred to [facility] for IR consultation/attempt embolization I with a bleeding focus.\"    Interval Problem Update  10/24  Afebrile pulse from 90s to 110 respiratory rate 1618 systolic blood pressure 170 132 pulse ox 94 to 96% on room air.  WBCs 3.8 hemoglobin 8.9 hovering around 9, platelets 106 improving bicarb 19 AST 48 T. bili 2.7 procalcitonin 0.11.  Patient is very impulsive, withdrawing, encephalopathic, restraints placed for patient safety as he was impulsively tried to get up all throughout the day.  No overt signs of bleeding, stable hemoglobin, " stable vitals, discussed with Dr. Oden from IR, will hold off on embolization at this time if he has any recurrence if it is felt to be large-volume obtain CTA.  Continue CIWA protocol.     10/25  No bleeding, -140 RR 15-18 HR 80's-100's, SpO2 92-95% on room air.  Normal Renal function. With drawing still, hallucinating, impulsive, constantly trying to crawl out of bed, delirious and very weak and unsteady. No specific complaints to me. Continue CIWA, PT/OT eval.  Monitor renal function, respiratory status, neurological status, monitor for seizures. Bactrim for SBP ppx.    10/26  Afebrile normal pulse and respiratory rate systolic blood pressure  pulse ox 95-97% on room air.  Bicarb 19 glucose 100 albumin 2.8 CBC WBCs 3.9 hemoglobin 8.1 platelets 131.    I have discussed this patient's plan of care and discharge plan at IDT rounds today with Case Management, Nursing, Nursing leadership, and other members of the IDT team.    Consultants/Specialty  NA    Code Status  Full Code    Disposition  The patient is not medically cleared for discharge to home or a post-acute facility.      I have placed the appropriate orders for post-discharge needs.    Review of Systems  ROS   Constitutional:  Positive for malaise/fatigue. Negative for chills and fever.   Respiratory:  Negative for cough, shortness of breath and stridor.    Cardiovascular:  Negative for chest pain and leg swelling.   Gastrointestinal:  Positive for blood in stool and melena. Negative for abdominal pain and vomiting.        Multiple large bloody and melanotic bowel movements today   Genitourinary:  Positive for flank pain.        Scrotal swelling   Musculoskeletal:  Negative for myalgias.   Skin: Negative.    Neurological:  Negative for focal weakness.   Endo/Heme/Allergies:  Does not bruise/bleed easily.   Psychiatric/Behavioral:  The patient is not nervous/anxious.      Physical Exam  Temp:  [36.2 °C (97.2 °F)-37.1 °C (98.8 °F)] 36.7 °C (98.1  °F)  Pulse:  [] 99  Resp:  [17-18] 17  BP: ()/(67-89) 99/67  SpO2:  [93 %-97 %] 96 %    Physical Exam  Vitals and nursing note reviewed.   Constitutional:       General: He is not in acute distress.     Appearance: He is ill-appearing.      Comments: Drowsy, encephalopathic, confused, hallucinating   HENT:      Head: Normocephalic and atraumatic.      Nose: Nose normal. No rhinorrhea.      Mouth/Throat:      Mouth: Mucous membranes are dry.      Pharynx: Oropharynx is clear.   Eyes:      General: No scleral icterus.     Extraocular Movements: Extraocular movements intact.      Conjunctiva/sclera: Conjunctivae normal.   Cardiovascular:      Rate and Rhythm: Normal rate and regular rhythm.      Pulses: Normal pulses.   Pulmonary:      Effort: No respiratory distress.      Breath sounds: No wheezing, rhonchi or rales.   Abdominal:      General: There is no distension.      Palpations: Abdomen is soft.      Tenderness: There is no abdominal tenderness. There is no guarding or rebound.   Musculoskeletal:         General: Normal range of motion.      Cervical back: Normal range of motion and neck supple. No rigidity.      Right lower leg: No edema.      Left lower leg: No edema.   Skin:     General: Skin is warm and dry.      Capillary Refill: Capillary refill takes less than 2 seconds.      Coloration: Skin is not jaundiced.   Neurological:      General: No focal deficit present.      Mental Status: He is alert. He is disoriented.      Cranial Nerves: No cranial nerve deficit.      Comments: Encephalopathic with withdrawals, delirious, hallucinating at times   Psychiatric:         Mood and Affect: Mood normal.         Behavior: Behavior normal.         Thought Content: Thought content normal.         Judgment: Judgment normal.         Fluids    Intake/Output Summary (Last 24 hours) at 10/26/2024 1556  Last data filed at 10/26/2024 1219  Gross per 24 hour   Intake 718 ml   Output 200 ml   Net 518 ml         Laboratory  Recent Labs     10/24/24  1646 10/25/24  0014 10/26/24  0524   WBC 3.8* 3.5* 3.9*   RBC 3.24* 2.99* 2.94*   HEMOGLOBIN 8.9* 8.2* 8.1*   HEMATOCRIT 28.0* 25.6* 25.4*   MCV 86.4 85.6 86.4   MCH 27.5 27.4 27.6   MCHC 31.8* 32.0* 31.9*   RDW 64.4* 63.5* 64.2*   PLATELETCT 106* 111* 131*   MPV 10.2 10.2 10.0     Recent Labs     10/24/24  0118 10/25/24  0014 10/26/24  0524   SODIUM 134* 134* 135   POTASSIUM 3.9 3.7 4.1   CHLORIDE 104 105 108   CO2 19* 19* 19*   GLUCOSE 96 94 100*   BUN 10 7* 5*   CREATININE 0.63 0.77 0.60   CALCIUM 8.2* 7.9* 7.9*                     Imaging  No orders to display        Assessment/Plan  Lower GI bleeding- (present on admission)  Assessment & Plan  No overt signs of bleeding, stable hemoglobin, stable vitals, discussed with Dr. Oden from IR, will hold off on embolization at this time if he has any recurrence if it is felt to be large-volume obtain CTA.    Ramirez's esophagus- (present on admission)  Assessment & Plan  Continue PPI twice daily, will need follow-up with GI as outpatient    Scrotal swelling- (present on admission)  Assessment & Plan  Will need outpatient follow-up for large right hydrocele incidental 12 mm right epididymal cyst    Alcoholic cirrhosis (HCC)- (present on admission)  Assessment & Plan  Counseled on alcohol cessation, going through withdrawals on CIWA protocol    Thrombocytopenia (HCC)- (present on admission)  Assessment & Plan  Continue to monitor, transfuse if bleeding for platelets less than 50,000.    Alcoholic liver disease (HCC)- (present on admission)  Assessment & Plan  WA protocol, multivitamins    Severe protein-calorie malnutrition (HCC)- (present on admission)  Assessment & Plan  RD consult, supplements.         VTE prophylaxis:   SCDs/TEDs      I have performed a physical exam and reviewed and updated ROS and Plan today (10/26/2024). In review of yesterday's note (10/25/2024), there are no changes except as documented above.  Total  time spent 54 minutes. I spent greater than 50% of the time for patient care, counseling, and coordination on this date, including unit/floor time, and face-to-face time with the patient as per interval events, my own review of patient's imaging and lab analysis and developing my assessment and plan above.

## 2024-10-27 LAB
ALBUMIN SERPL BCP-MCNC: 2.5 G/DL (ref 3.2–4.9)
ALBUMIN/GLOB SERPL: 0.7 G/DL
ALP SERPL-CCNC: 86 U/L (ref 30–99)
ALT SERPL-CCNC: 20 U/L (ref 2–50)
ANION GAP SERPL CALC-SCNC: 8 MMOL/L (ref 7–16)
AST SERPL-CCNC: 31 U/L (ref 12–45)
BILIRUB SERPL-MCNC: 1.1 MG/DL (ref 0.1–1.5)
BUN SERPL-MCNC: 6 MG/DL (ref 8–22)
CALCIUM ALBUM COR SERPL-MCNC: 9.3 MG/DL (ref 8.5–10.5)
CALCIUM SERPL-MCNC: 8.1 MG/DL (ref 8.5–10.5)
CHLORIDE SERPL-SCNC: 109 MMOL/L (ref 96–112)
CO2 SERPL-SCNC: 19 MMOL/L (ref 20–33)
CREAT SERPL-MCNC: 0.76 MG/DL (ref 0.5–1.4)
GFR SERPLBLD CREATININE-BSD FMLA CKD-EPI: 95 ML/MIN/1.73 M 2
GLOBULIN SER CALC-MCNC: 3.6 G/DL (ref 1.9–3.5)
GLUCOSE SERPL-MCNC: 101 MG/DL (ref 65–99)
HGB BLD-MCNC: 8.4 G/DL (ref 14–18)
MAGNESIUM SERPL-MCNC: 1.9 MG/DL (ref 1.5–2.5)
PHOSPHATE SERPL-MCNC: 3.2 MG/DL (ref 2.5–4.5)
POTASSIUM SERPL-SCNC: 3.7 MMOL/L (ref 3.6–5.5)
PROT SERPL-MCNC: 6.1 G/DL (ref 6–8.2)
SODIUM SERPL-SCNC: 136 MMOL/L (ref 135–145)

## 2024-10-27 PROCEDURE — A9270 NON-COVERED ITEM OR SERVICE: HCPCS | Performed by: HOSPITALIST

## 2024-10-27 PROCEDURE — 700102 HCHG RX REV CODE 250 W/ 637 OVERRIDE(OP): Performed by: HOSPITALIST

## 2024-10-27 PROCEDURE — A9270 NON-COVERED ITEM OR SERVICE: HCPCS | Performed by: STUDENT IN AN ORGANIZED HEALTH CARE EDUCATION/TRAINING PROGRAM

## 2024-10-27 PROCEDURE — 84100 ASSAY OF PHOSPHORUS: CPT

## 2024-10-27 PROCEDURE — 700102 HCHG RX REV CODE 250 W/ 637 OVERRIDE(OP): Performed by: STUDENT IN AN ORGANIZED HEALTH CARE EDUCATION/TRAINING PROGRAM

## 2024-10-27 PROCEDURE — 770001 HCHG ROOM/CARE - MED/SURG/GYN PRIV*

## 2024-10-27 PROCEDURE — 99233 SBSQ HOSP IP/OBS HIGH 50: CPT | Performed by: STUDENT IN AN ORGANIZED HEALTH CARE EDUCATION/TRAINING PROGRAM

## 2024-10-27 PROCEDURE — 83735 ASSAY OF MAGNESIUM: CPT

## 2024-10-27 PROCEDURE — 80053 COMPREHEN METABOLIC PANEL: CPT

## 2024-10-27 PROCEDURE — 85018 HEMOGLOBIN: CPT

## 2024-10-27 PROCEDURE — 36415 COLL VENOUS BLD VENIPUNCTURE: CPT

## 2024-10-27 RX ADMIN — LORAZEPAM 0.5 MG: 0.5 TABLET ORAL at 10:51

## 2024-10-27 RX ADMIN — LORAZEPAM 0.5 MG: 0.5 TABLET ORAL at 14:35

## 2024-10-27 RX ADMIN — SULFAMETHOXAZOLE AND TRIMETHOPRIM 1 TABLET: 800; 160 TABLET ORAL at 06:00

## 2024-10-27 RX ADMIN — LORAZEPAM 1 MG: 1 TABLET ORAL at 22:00

## 2024-10-27 RX ADMIN — LACTULOSE 30 ML: 10 SOLUTION ORAL at 16:25

## 2024-10-27 RX ADMIN — LACTULOSE 30 ML: 10 SOLUTION ORAL at 06:00

## 2024-10-27 RX ADMIN — Medication 1 TABLET: at 06:00

## 2024-10-27 RX ADMIN — OMEPRAZOLE 20 MG: 20 CAPSULE, DELAYED RELEASE ORAL at 06:00

## 2024-10-27 RX ADMIN — SULFAMETHOXAZOLE AND TRIMETHOPRIM 1 TABLET: 800; 160 TABLET ORAL at 16:25

## 2024-10-27 RX ADMIN — FOLIC ACID 1 MG: 1 TABLET ORAL at 06:00

## 2024-10-27 RX ADMIN — Medication 100 MG: at 06:00

## 2024-10-27 RX ADMIN — TAMSULOSIN HYDROCHLORIDE 0.4 MG: 0.4 CAPSULE ORAL at 08:43

## 2024-10-27 ASSESSMENT — LIFESTYLE VARIABLES
ORIENTATION AND CLOUDING OF SENSORIUM: ORIENTED AND CAN DO SERIAL ADDITIONS
TOTAL SCORE: 2
AGITATION: NORMAL ACTIVITY
ORIENTATION AND CLOUDING OF SENSORIUM: DATE DISORIENTATION BY MORE THAN TWO CALENDAR DAYS
ORIENTATION AND CLOUDING OF SENSORIUM: DATE DISORIENTATION BY NO MORE THAN TWO CALENDAR DAYS
VISUAL DISTURBANCES: MILD SENSITIVITY
TOTAL SCORE: 4
PAROXYSMAL SWEATS: NO SWEAT VISIBLE
TREMOR: TREMOR NOT VISIBLE BUT CAN BE FELT, FINGERTIP TO FINGERTIP
ORIENTATION AND CLOUDING OF SENSORIUM: DATE DISORIENTATION BY MORE THAN TWO CALENDAR DAYS
ANXIETY: MILDLY ANXIOUS
NAUSEA AND VOMITING: NO NAUSEA AND NO VOMITING
AGITATION: *
PAROXYSMAL SWEATS: NO SWEAT VISIBLE
VISUAL DISTURBANCES: NOT PRESENT
ANXIETY: MILDLY ANXIOUS
VISUAL DISTURBANCES: NOT PRESENT
PAROXYSMAL SWEATS: NO SWEAT VISIBLE
PAROXYSMAL SWEATS: NO SWEAT VISIBLE
ANXIETY: MILDLY ANXIOUS
HEADACHE, FULLNESS IN HEAD: VERY MILD
VISUAL DISTURBANCES: NOT PRESENT
TREMOR: *
NAUSEA AND VOMITING: NO NAUSEA AND NO VOMITING
VISUAL DISTURBANCES: VERY MILD SENSITIVITY
NAUSEA AND VOMITING: NO NAUSEA AND NO VOMITING
AUDITORY DISTURBANCES: NOT PRESENT
TOTAL SCORE: 7
AGITATION: NORMAL ACTIVITY
ANXIETY: MILDLY ANXIOUS
AGITATION: NORMAL ACTIVITY
HEADACHE, FULLNESS IN HEAD: MILD
TOTAL SCORE: 3
HEADACHE, FULLNESS IN HEAD: NOT PRESENT
TREMOR: NO TREMOR
HEADACHE, FULLNESS IN HEAD: NOT PRESENT
HEADACHE, FULLNESS IN HEAD: MILD
ANXIETY: MILDLY ANXIOUS
TREMOR: NO TREMOR
AUDITORY DISTURBANCES: NOT PRESENT
VISUAL DISTURBANCES: MILD SENSITIVITY
TREMOR: TREMOR NOT VISIBLE BUT CAN BE FELT, FINGERTIP TO FINGERTIP
TOTAL SCORE: 7
NAUSEA AND VOMITING: NO NAUSEA AND NO VOMITING
TOTAL SCORE: 10
AUDITORY DISTURBANCES: NOT PRESENT
NAUSEA AND VOMITING: NO NAUSEA AND NO VOMITING
PAROXYSMAL SWEATS: NO SWEAT VISIBLE
PAROXYSMAL SWEATS: NO SWEAT VISIBLE
ANXIETY: MILDLY ANXIOUS
AGITATION: NORMAL ACTIVITY
NAUSEA AND VOMITING: NO NAUSEA AND NO VOMITING
AUDITORY DISTURBANCES: NOT PRESENT
ORIENTATION AND CLOUDING OF SENSORIUM: DATE DISORIENTATION BY NO MORE THAN TWO CALENDAR DAYS
TREMOR: TREMOR NOT VISIBLE BUT CAN BE FELT, FINGERTIP TO FINGERTIP
AUDITORY DISTURBANCES: NOT PRESENT
ORIENTATION AND CLOUDING OF SENSORIUM: ORIENTED AND CAN DO SERIAL ADDITIONS
AUDITORY DISTURBANCES: NOT PRESENT
AGITATION: NORMAL ACTIVITY
HEADACHE, FULLNESS IN HEAD: NOT PRESENT

## 2024-10-27 ASSESSMENT — PAIN DESCRIPTION - PAIN TYPE
TYPE: ACUTE PAIN
TYPE: ACUTE PAIN

## 2024-10-27 NOTE — CARE PLAN
The patient is Stable - Low risk of patient condition declining or worsening    Shift Goals  Clinical Goals: CIWA, safety, monitor labs, VVS  Patient Goals: Rest  Family Goals: na    Progress made toward(s) clinical / shift goals:    Problem: Optimal Care for Alcohol Withdrawal  Goal: Optimal Care for the alcohol withdrawal patient  Outcome: Progressing     Problem: Psychosocial  Goal: Patient's level of anxiety will decrease  Outcome: Progressing     Problem: Risk for Aspiration  Goal: Patient's risk for aspiration will be absent or decrease  Outcome: Progressing     Problem: Hemodynamics  Goal: Patient's hemodynamics, fluid balance and neurologic status will be stable or improve  Outcome: Progressing       Patient is not progressing towards the following goals:

## 2024-10-27 NOTE — PROGRESS NOTES
Monitor Summary  Rhythm: SR  Rate: 82-95  Ectopy: (R) PVCs, (R) coup  Measurements: .15/.06/.37  ---12 hr Chart Review---

## 2024-10-27 NOTE — DISCHARGE PLANNING
Case Management Discharge Planning    Admission Date: 10/24/2024  GMLOS: 2.9  ALOS: 3    6-Clicks ADL Score: 16  6-Clicks Mobility Score: 14  PT and/or OT Eval ordered: Yes  Post-acute Referrals Ordered: Yes  Post-acute Choice Obtained: No  Has referral(s) been sent to post-acute provider:  No      Anticipated Discharge Dispo: Discharge Disposition: D/T to SNF with Medicare cert in anticipation of skilled care (03)    DME Needed: No    Action(s) Taken: OTHER  W CM rec'd update from RN, pt reports his wife is in a SNF in [facility] apparently she had a stroke and is unable to return home.    Escalations Completed: None    Medically Clear: No    Barriers to Discharge: Medical clearance

## 2024-10-27 NOTE — CARE PLAN
The patient is Stable - Low risk of patient condition declining or worsening    Shift Goals  Clinical Goals: Safety, CIWA, monitor labs  Patient Goals: comfort  Family Goals: JON    Progress made toward(s) clinical / shift goals:    Problem: Knowledge Deficit - Standard  Goal: Patient and family/care givers will demonstrate understanding of plan of care, disease process/condition, diagnostic tests and medications  Outcome: Progressing     Problem: Optimal Care for Alcohol Withdrawal  Goal: Optimal Care for the alcohol withdrawal patient  Outcome: Progressing     Problem: Seizure Precautions  Goal: Implementation of seizure precautions  Outcome: Progressing     Problem: Psychosocial  Goal: Patient's level of anxiety will decrease  Outcome: Progressing     Problem: Risk for Aspiration  Goal: Patient's risk for aspiration will be absent or decrease  Outcome: Progressing     Problem: Hemodynamics  Goal: Patient's hemodynamics, fluid balance and neurologic status will be stable or improve  Outcome: Progressing     Problem: Urinary - Renal Perfusion  Goal: Ability to achieve and maintain adequate renal perfusion and functioning will improve  Outcome: Progressing     Problem: Fall Risk  Goal: Patient will remain free from falls  Outcome: Progressing       Patient is not progressing towards the following goals:    Pt verbalizes understanding of care plan. Ativan administered per CIWA protocol. Pt continues to have hallucinations of cats in his bed and is impulsive but redirectable. Fall precautions in place. Last hemoglobin 8.1. Skin care provided for perineum and sacrum.

## 2024-10-28 ENCOUNTER — PHARMACY VISIT (OUTPATIENT)
Dept: PHARMACY | Facility: MEDICAL CENTER | Age: 74
End: 2024-10-28
Payer: COMMERCIAL

## 2024-10-28 VITALS
BODY MASS INDEX: 23.53 KG/M2 | DIASTOLIC BLOOD PRESSURE: 72 MMHG | OXYGEN SATURATION: 96 % | HEART RATE: 119 BPM | RESPIRATION RATE: 18 BRPM | TEMPERATURE: 99 F | WEIGHT: 173.5 LBS | SYSTOLIC BLOOD PRESSURE: 111 MMHG

## 2024-10-28 LAB
BACTERIA BLD CULT: NORMAL
SIGNIFICANT IND 70042: NORMAL
SITE SITE: NORMAL
SOURCE SOURCE: NORMAL

## 2024-10-28 PROCEDURE — 90662 IIV NO PRSV INCREASED AG IM: CPT | Performed by: STUDENT IN AN ORGANIZED HEALTH CARE EDUCATION/TRAINING PROGRAM

## 2024-10-28 PROCEDURE — 700111 HCHG RX REV CODE 636 W/ 250 OVERRIDE (IP): Performed by: STUDENT IN AN ORGANIZED HEALTH CARE EDUCATION/TRAINING PROGRAM

## 2024-10-28 PROCEDURE — 90677 PCV20 VACCINE IM: CPT | Performed by: STUDENT IN AN ORGANIZED HEALTH CARE EDUCATION/TRAINING PROGRAM

## 2024-10-28 PROCEDURE — 51798 US URINE CAPACITY MEASURE: CPT

## 2024-10-28 PROCEDURE — 700102 HCHG RX REV CODE 250 W/ 637 OVERRIDE(OP): Performed by: HOSPITALIST

## 2024-10-28 PROCEDURE — RXMED WILLOW AMBULATORY MEDICATION CHARGE: Performed by: STUDENT IN AN ORGANIZED HEALTH CARE EDUCATION/TRAINING PROGRAM

## 2024-10-28 PROCEDURE — 90471 IMMUNIZATION ADMIN: CPT

## 2024-10-28 PROCEDURE — 3E0234Z INTRODUCTION OF SERUM, TOXOID AND VACCINE INTO MUSCLE, PERCUTANEOUS APPROACH: ICD-10-PCS | Performed by: STUDENT IN AN ORGANIZED HEALTH CARE EDUCATION/TRAINING PROGRAM

## 2024-10-28 PROCEDURE — 700102 HCHG RX REV CODE 250 W/ 637 OVERRIDE(OP): Performed by: STUDENT IN AN ORGANIZED HEALTH CARE EDUCATION/TRAINING PROGRAM

## 2024-10-28 PROCEDURE — 3E02340 INTRODUCTION OF INFLUENZA VACCINE INTO MUSCLE, PERCUTANEOUS APPROACH: ICD-10-PCS | Performed by: STUDENT IN AN ORGANIZED HEALTH CARE EDUCATION/TRAINING PROGRAM

## 2024-10-28 PROCEDURE — A9270 NON-COVERED ITEM OR SERVICE: HCPCS | Performed by: HOSPITALIST

## 2024-10-28 PROCEDURE — A9270 NON-COVERED ITEM OR SERVICE: HCPCS | Performed by: STUDENT IN AN ORGANIZED HEALTH CARE EDUCATION/TRAINING PROGRAM

## 2024-10-28 RX ORDER — LANOLIN ALCOHOL/MO/W.PET/CERES
100 CREAM (GRAM) TOPICAL DAILY
Qty: 30 TABLET | Refills: 3 | Status: SHIPPED | OUTPATIENT
Start: 2024-10-29 | End: 2024-10-31

## 2024-10-28 RX ORDER — SULFAMETHOXAZOLE AND TRIMETHOPRIM 800; 160 MG/1; MG/1
1 TABLET ORAL EVERY 12 HOURS
Qty: 4 TABLET | Refills: 0 | Status: ACTIVE | OUTPATIENT
Start: 2024-10-28 | End: 2024-10-30

## 2024-10-28 RX ORDER — TAMSULOSIN HYDROCHLORIDE 0.4 MG/1
0.4 CAPSULE ORAL
Qty: 30 CAPSULE | Refills: 3 | Status: SHIPPED | OUTPATIENT
Start: 2024-10-29

## 2024-10-28 RX ORDER — OMEPRAZOLE 40 MG/1
40 CAPSULE, DELAYED RELEASE ORAL 2 TIMES DAILY
Qty: 60 CAPSULE | Refills: 2 | Status: SHIPPED | OUTPATIENT
Start: 2024-10-28 | End: 2025-01-26

## 2024-10-28 RX ORDER — LACTULOSE 10 G/15ML
30 SOLUTION ORAL 3 TIMES DAILY
Qty: 473 ML | Refills: 1 | Status: SHIPPED | OUTPATIENT
Start: 2024-10-28

## 2024-10-28 RX ORDER — M-VIT,TX,IRON,MINS/CALC/FOLIC 27MG-0.4MG
1 TABLET ORAL DAILY
Qty: 30 TABLET | Refills: 11 | Status: SHIPPED | OUTPATIENT
Start: 2024-10-29 | End: 2024-10-31

## 2024-10-28 RX ADMIN — LACTULOSE 30 ML: 10 SOLUTION ORAL at 05:05

## 2024-10-28 RX ADMIN — OMEPRAZOLE 20 MG: 20 CAPSULE, DELAYED RELEASE ORAL at 05:05

## 2024-10-28 RX ADMIN — SULFAMETHOXAZOLE AND TRIMETHOPRIM 1 TABLET: 800; 160 TABLET ORAL at 16:43

## 2024-10-28 RX ADMIN — TAMSULOSIN HYDROCHLORIDE 0.4 MG: 0.4 CAPSULE ORAL at 08:49

## 2024-10-28 RX ADMIN — INFLUENZA A VIRUS A/VICTORIA/4897/2022 IVR-238 (H1N1) ANTIGEN (FORMALDEHYDE INACTIVATED), INFLUENZA A VIRUS A/CALIFORNIA/122/2022 SAN-022 (H3N2) ANTIGEN (FORMALDEHYDE INACTIVATED), AND INFLUENZA B VIRUS B/MICHIGAN/01/2021 ANTIGEN (FORMALDEHYDE INACTIVATED) 0.5 ML: 60; 60; 60 INJECTION, SUSPENSION INTRAMUSCULAR at 16:44

## 2024-10-28 RX ADMIN — Medication 1 TABLET: at 05:05

## 2024-10-28 RX ADMIN — SULFAMETHOXAZOLE AND TRIMETHOPRIM 1 TABLET: 800; 160 TABLET ORAL at 05:05

## 2024-10-28 RX ADMIN — PNEUMOCOCCAL 20-VALENT CONJUGATE VACCINE 0.5 ML
2.2; 2.2; 2.2; 2.2; 2.2; 2.2; 2.2; 2.2; 2.2; 2.2; 2.2; 2.2; 2.2; 2.2; 2.2; 2.2; 4.4; 2.2; 2.2; 2.2 INJECTION, SUSPENSION INTRAMUSCULAR at 16:49

## 2024-10-28 RX ADMIN — FOLIC ACID 1 MG: 1 TABLET ORAL at 05:05

## 2024-10-28 RX ADMIN — Medication 100 MG: at 05:06

## 2024-10-28 ASSESSMENT — LIFESTYLE VARIABLES
ORIENTATION AND CLOUDING OF SENSORIUM: CANNOT DO SERIAL ADDITIONS OR IS UNCERTAIN ABOUT DATE
AGITATION: NORMAL ACTIVITY
VISUAL DISTURBANCES: NOT PRESENT
ANXIETY: NO ANXIETY (AT EASE)
TREMOR: TREMOR NOT VISIBLE BUT CAN BE FELT, FINGERTIP TO FINGERTIP
PAROXYSMAL SWEATS: NO SWEAT VISIBLE
PAROXYSMAL SWEATS: NO SWEAT VISIBLE
AUDITORY DISTURBANCES: NOT PRESENT
HEADACHE, FULLNESS IN HEAD: NOT PRESENT
NAUSEA AND VOMITING: NO NAUSEA AND NO VOMITING
ANXIETY: NO ANXIETY (AT EASE)
AGITATION: NORMAL ACTIVITY
ANXIETY: NO ANXIETY (AT EASE)
PAROXYSMAL SWEATS: NO SWEAT VISIBLE
TREMOR: *
ORIENTATION AND CLOUDING OF SENSORIUM: DATE DISORIENTATION BY NO MORE THAN TWO CALENDAR DAYS
NAUSEA AND VOMITING: NO NAUSEA AND NO VOMITING
HEADACHE, FULLNESS IN HEAD: NOT PRESENT
TREMOR: *
TOTAL SCORE: 3
VISUAL DISTURBANCES: NOT PRESENT
ORIENTATION AND CLOUDING OF SENSORIUM: CANNOT DO SERIAL ADDITIONS OR IS UNCERTAIN ABOUT DATE
AGITATION: NORMAL ACTIVITY
TOTAL SCORE: 3
AUDITORY DISTURBANCES: NOT PRESENT
VISUAL DISTURBANCES: NOT PRESENT
TOTAL SCORE: 3
HEADACHE, FULLNESS IN HEAD: NOT PRESENT
AUDITORY DISTURBANCES: NOT PRESENT
NAUSEA AND VOMITING: NO NAUSEA AND NO VOMITING

## 2024-10-28 NOTE — DISCHARGE PLANNING
DPA scanned the home health choice form into the pt's .      Agency/Facility Name: [facility]   Spoke To:    Outcome: DPA left a voice message for [facility] admissions.  Awaiting a call back.

## 2024-10-28 NOTE — DISCHARGE PLANNING
Was called by Nataliia with [facility] and told PT does not have transport benefit unless transport is to a rehab facility. Approaching  to see if we should look at [facility] or [facility] for transport.   SLC.

## 2024-10-28 NOTE — PROGRESS NOTES
Received from tele 7.  Alert x3, bed alarm in place  Incont of bowel- on lactulose  Walker in place per report for retention    [place] to room, call light within reach

## 2024-10-28 NOTE — DIETARY
Nutrition Update:    Day 4 of admit.  Chava Mercedes is a 74 y.o. male with admitting DX of GI bleeding [K92.2].  Patient being followed to optimize nutrition.    Current Diet: Low Fiber with Supplements. According to PO record, <25% for 3 meals, 50-75% for two meals, % for one meal.     Problem: Nutritional:  Goal: Achieve adequate nutritional intake  Description: Patient will consume >50% of meals  Outcome: Met     RD will screen weekly per department policy; available PRN.

## 2024-10-28 NOTE — DISCHARGE PLANNING
Initiated transport scheduling with [facility] for PT. Spoke with Nya and gave information needed for scheduling trip. Will be receiving a call when trip is scheduled.   Confirmation #: [id number]  SLC

## 2024-10-28 NOTE — CARE PLAN
The patient is Stable - Low risk of patient condition declining or worsening    Shift Goals  Clinical Goals: free from fall, monitor BM    Progress made toward(s) clinical / shift goals:  bed alarm on. Plan to dc today. [place] wdl, call light in reach. Needs attended.    Problem: Knowledge Deficit - Standard  Goal: Patient and family/care givers will demonstrate understanding of plan of care, disease process/condition, diagnostic tests and medications  Outcome: Progressing     Problem: Risk for Aspiration  Goal: Patient's risk for aspiration will be absent or decrease  Outcome: Progressing     Problem: Pain - Standard  Goal: Alleviation of pain or a reduction in pain to the patient’s comfort goal  Outcome: Progressing       Patient is not progressing towards the following goals:

## 2024-10-28 NOTE — PROGRESS NOTES
1900: Bedside report received, assumed care of patient at change of shift. Pt resting in bed, breathing even and unlabored, denies pain and in no acute distress. A&O x3. Fall precautions including bed alarm in place and education provided. Call light within reach, bed locked and in lowest position, denies other needs at this time.      2345: Report given to ABRAHAM Felipe in S6.

## 2024-10-28 NOTE — DISCHARGE PLANNING
DC Transport Scheduled    Transport Company Scheduled:  [facility]  Spoke with Fermin at [facility] to schedule transport.  Trip #: [id number]    Scheduled Date: 10/28/2024  Scheduled Time: 1830    Transport Type: Wheelchair  Destination: Home   Destination address: [address] [address]    Notified care team of scheduled transport via Voalte.     If there are any changes needed to the DC transportation scheduled, please contact [facility] at ext. [phone] between the hours of 9561-7190 Mon-Fri. If outside those hours, contact the ED Case Manager at ext. [phone].

## 2024-10-28 NOTE — DISCHARGE PLANNING
Addendum:  10-28-24/1401  Approved services form faxed to [facility] for [facility].  Pt's friend, Chava has a cobb to pt's home per bedside RNLou.      Case Management Discharge Planning    Admission Date: 10/24/2024  GMLOS: 2.9  ALOS: 4    6-Clicks ADL Score: 16  6-Clicks Mobility Score: 14  PT and/or OT Eval ordered: Yes  Post-acute Referrals Ordered: Yes  Post-acute Choice Obtained: Yes  Has referral(s) been sent to post-acute provider:  Yes      Anticipated Discharge Dispo: Discharge Disposition: D/T to home under HHA care in anticipation of covered skilled care (06)    DME Needed: none    Action(s) Taken:   Voalte msg received from Dr. Dykes that pt will go home with home health as pt refusing SNF.  RN ANNI met with patient at bedside.  I explained home health.  Pt agreeable with home health.  He signed choice form for [facility].  Form faxed to [facility].  Pt stated he has 2 fwws at home.  Pt stated he will need a ride home.    Medically Clear: Yes    Next Steps:   Follow up with [facility].  Request transportation home.    Barriers to Discharge: Transportation

## 2024-10-28 NOTE — PROGRESS NOTES
4 Eyes Skin Assessment Completed by ABRAHAM lieberman and ABRAHAM paulino.    Head WDL  Ears WDL  Nose WDL  Mouth WDL  Neck WDL  Breast/Chest WDL  Shoulder Blades WDL  Spine Redness  (R) Arm/Elbow/Hand Bruising  (L) Arm/Elbow/Hand Bruising  Abdomen Scar  Groin Redness, penis tip red , irritated- santo in place  Scrotum/Coccyx/Buttocks Non-Blanching. Scrotal edema  (R) Leg WDL  (L) Leg WDL  (R) Heel/Foot/Toe Redness  (L) Heel/Foot/Toe Redness          Devices In Places Pulse Ox      Interventions In Place Q2 Turns    Possible Skin Injury Yes    Pictures Uploaded Into Epic Yes  Wound Consult Placed Yes  RN Wound Prevention Protocol Ordered No

## 2024-10-29 PROCEDURE — 99239 HOSP IP/OBS DSCHRG MGMT >30: CPT | Performed by: STUDENT IN AN ORGANIZED HEALTH CARE EDUCATION/TRAINING PROGRAM

## 2024-10-29 NOTE — DISCHARGE PLANNING
@0904  Agency/Facility Name: [facility]   Outcome: DPA left a voice message for the [facility].  Awaiting a call back.

## 2024-10-29 NOTE — PROGRESS NOTES
Called Chava Gomez the neighbor to update him about patient still being here and waiting for a ride. He is aware.

## 2024-10-29 NOTE — DISCHARGE PLANNING
"Received call from Cathy ([facility]) requesting update on transportation since the scheduled time of 1830 had come and gone. GOPI spoke with Fermin at [facility] who \"pinged\"  and said they should be arriving in about 5-10 minutes. GOPI updated Cathy on [facility]..  "

## 2024-10-29 NOTE — PROGRESS NOTES
AVS given to pt. Explained thoroughly including meds and follow up instructions. Piv removed tip intact. Chava Gomez who is the pt. Neighbor has the keys to home, verified and spoke to him directly. Flu shot and pneumonia shot given and provided vaccine info. Pt. Has cellphone and wallet with him and he is wearing his own clothing to go home including shoes. Pt. Stated he has walker at home. Needs attended. Awaiting transport.

## 2024-10-29 NOTE — PROGRESS NOTES
Updated Dr. Dykes that pt. Had urinated per CNA and it was charted. Per MD pt. Is cleared to go home since PVR is 137.

## 2024-10-30 ENCOUNTER — HOSPITAL ENCOUNTER (OUTPATIENT)
Dept: RADIOLOGY | Facility: MEDICAL CENTER | Age: 74
End: 2024-10-30

## 2024-10-30 NOTE — DISCHARGE PLANNING
@5687  Agency/Facility Name: [facility]  Spoke To: Kayley  Outcome: Kayley called the pt this morning.  No face to face or PCP.  Kayley will work with the pt on the face to face.  Kayley will call [facility] to see if the pt has an upcoming appt.

## 2024-10-31 ENCOUNTER — APPOINTMENT (OUTPATIENT)
Dept: RADIOLOGY | Facility: MEDICAL CENTER | Age: 74
DRG: 187 | End: 2024-10-31
Payer: MEDICARE

## 2024-10-31 ENCOUNTER — HOSPITAL ENCOUNTER (INPATIENT)
Facility: MEDICAL CENTER | Age: 74
End: 2024-10-31
Attending: STUDENT IN AN ORGANIZED HEALTH CARE EDUCATION/TRAINING PROGRAM
Payer: MEDICARE

## 2024-10-31 ENCOUNTER — APPOINTMENT (OUTPATIENT)
Dept: RADIOLOGY | Facility: MEDICAL CENTER | Age: 74
DRG: 187 | End: 2024-10-31
Attending: STUDENT IN AN ORGANIZED HEALTH CARE EDUCATION/TRAINING PROGRAM
Payer: MEDICARE

## 2024-10-31 VITALS
SYSTOLIC BLOOD PRESSURE: 116 MMHG | HEIGHT: 72 IN | OXYGEN SATURATION: 91 % | HEART RATE: 102 BPM | DIASTOLIC BLOOD PRESSURE: 76 MMHG | TEMPERATURE: 98.8 F | WEIGHT: 176.81 LBS | BODY MASS INDEX: 23.95 KG/M2 | RESPIRATION RATE: 16 BRPM

## 2024-10-31 DIAGNOSIS — K70.9 ALCOHOLIC LIVER DISEASE (HCC): ICD-10-CM
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
## 2024-10-31 DIAGNOSIS — F10.239 ALCOHOL DEPENDENCE WITH WITHDRAWAL WITH COMPLICATION (HCC): ICD-10-CM

## 2024-10-31 PROBLEM — R60.9 EDEMA: Status: ACTIVE | Noted: 2024-10-31

## 2024-10-31 LAB
ABO + RH BLD: NORMAL
ABO GROUP BLD: NORMAL
ALBUMIN SERPL BCP-MCNC: 3.5 G/DL (ref 3.2–4.9)
ALBUMIN/GLOB SERPL: 0.8 G/DL
ALP SERPL-CCNC: 112 U/L (ref 30–99)
ALT SERPL-CCNC: 38 U/L (ref 2–50)
AMYLASE FLD-CCNC: 21 U/L
ANION GAP SERPL CALC-SCNC: 15 MMOL/L (ref 7–16)
APPEARANCE FLD: NORMAL
APPEARANCE UR: CLEAR
APTT PPP: 41.2 SEC (ref 24.7–36)
AST SERPL-CCNC: 77 U/L (ref 12–45)
BACTERIA #/AREA URNS HPF: ABNORMAL /HPF
BACTERIA FLD AEROBE CULT: NORMAL
BILIRUB SERPL-MCNC: 1.1 MG/DL (ref 0.1–1.5)
BILIRUB UR QL STRIP.AUTO: NEGATIVE
BLD GP AB SCN SERPL QL: NORMAL
BODY FLD TYPE: NORMAL
BUN SERPL-MCNC: 7 MG/DL (ref 8–22)
CALCIUM ALBUM COR SERPL-MCNC: 9.1 MG/DL (ref 8.5–10.5)
CALCIUM SERPL-MCNC: 8.7 MG/DL (ref 8.5–10.5)
CASTS URNS QL MICRO: ABNORMAL /LPF (ref 0–2)
CFT BLD TEG: 5.2 MIN (ref 4.6–9.1)
CFT P HPASE BLD TEG: 5 MIN (ref 4.3–8.3)
CHLORIDE SERPL-SCNC: 99 MMOL/L (ref 96–112)
CLOT ANGLE BLD TEG: 72.2 DEGREES (ref 63–78)
CLOT LYSIS 30M P MA LENFR BLD TEG: 2 % (ref 0–2.6)
CO2 SERPL-SCNC: 17 MMOL/L (ref 20–33)
COLOR FLD: YELLOW
COLOR UR: YELLOW
CREAT SERPL-MCNC: 0.61 MG/DL (ref 0.5–1.4)
CT.EXTRINSIC BLD ROTEM: 1.5 MIN (ref 0.8–2.1)
CYTOLOGY REG CYTOL: NORMAL
EKG IMPRESSION: NORMAL
EPITHELIAL CELLS 1715: ABNORMAL /HPF (ref 0–5)
ERYTHROCYTE [DISTWIDTH] IN BLOOD BY AUTOMATED COUNT: 65.1 FL (ref 35.9–50)
ETHANOL BLD-MCNC: <10.1 MG/DL
FUNGUS SPEC CULT: NORMAL
FUNGUS SPEC FUNGUS STN: NORMAL
FUNGUS SPEC FUNGUS STN: NORMAL
GFR SERPLBLD CREATININE-BSD FMLA CKD-EPI: 101 ML/MIN/1.73 M 2
GLOBULIN SER CALC-MCNC: 4.6 G/DL (ref 1.9–3.5)
GLUCOSE FLD-MCNC: 90 MG/DL
GLUCOSE SERPL-MCNC: 86 MG/DL (ref 65–99)
GLUCOSE UR STRIP.AUTO-MCNC: NEGATIVE MG/DL
GRAM STN SPEC: NORMAL
GRAM STN SPEC: NORMAL
HCT VFR BLD AUTO: 28.6 % (ref 42–52)
HGB BLD-MCNC: 9.2 G/DL (ref 14–18)
INR PPP: 1.48 (ref 0.87–1.13)
KETONES UR STRIP.AUTO-MCNC: NEGATIVE MG/DL
LDH FLD L TO P-CCNC: 114 U/L
LEUKOCYTE ESTERASE UR QL STRIP.AUTO: ABNORMAL
LYMPHOCYTES NFR FLD: 20 %
MCF BLD TEG: 56.2 MM (ref 52–69)
MCF.PLATELET INHIB BLD ROTEM: 14.8 MM (ref 15–32)
MCH RBC QN AUTO: 28 PG (ref 27–33)
MCHC RBC AUTO-ENTMCNC: 32.2 G/DL (ref 32.3–36.5)
MCV RBC AUTO: 87.2 FL (ref 81.4–97.8)
MICRO URNS: ABNORMAL
MONOS+MACROS NFR FLD MANUAL: 59 %
MYCOBACTERIUM SPEC CULT: NORMAL
NEUTROPHILS NFR FLD: 21 %
NITRITE UR QL STRIP.AUTO: NEGATIVE
NT-PROBNP SERPL IA-MCNC: 86 PG/ML (ref 0–125)
NUC CELL # FLD: 659 CELLS/UL
PA AA BLD-ACNC: 0.8 % (ref 0–11)
PA ADP BLD-ACNC: 29.4 % (ref 0–17)
PH FLD: 8 [PH]
PH UR STRIP.AUTO: 5.5 [PH] (ref 5–8)
PLATELET # BLD AUTO: 184 K/UL (ref 164–446)
PMV BLD AUTO: 10.1 FL (ref 9–12.9)
POTASSIUM SERPL-SCNC: 4.1 MMOL/L (ref 3.6–5.5)
PROT FLD-MCNC: 3.8 G/DL
PROT SERPL-MCNC: 8.1 G/DL (ref 6–8.2)
PROT UR QL STRIP: NEGATIVE MG/DL
PROTHROMBIN TIME: 18 SEC (ref 12–14.6)
RBC # BLD AUTO: 3.28 M/UL (ref 4.7–6.1)
RBC # FLD: 3000 CELLS/UL
RBC # URNS HPF: ABNORMAL /HPF (ref 0–2)
RBC UR QL AUTO: NEGATIVE
RH BLD: NORMAL
RHODAMINE-AURAMINE STN SPEC: NORMAL
SIGNIFICANT IND 70042: NORMAL
SITE SITE: NORMAL
SODIUM SERPL-SCNC: 131 MMOL/L (ref 135–145)
SOURCE SOURCE: NORMAL
SP GR UR STRIP.AUTO: 1.01
TEG ALGORITHM TGALG: ABNORMAL
UROBILINOGEN UR STRIP.AUTO-MCNC: 0.2 EU/DL
WBC # BLD AUTO: 6.7 K/UL (ref 4.8–10.8)
WBC #/AREA URNS HPF: ABNORMAL /HPF

## 2024-10-31 PROCEDURE — 88305 TISSUE EXAM BY PATHOLOGIST: CPT

## 2024-10-31 PROCEDURE — 80053 COMPREHEN METABOLIC PANEL: CPT

## 2024-10-31 PROCEDURE — 0W9B30Z DRAINAGE OF LEFT PLEURAL CAVITY WITH DRAINAGE DEVICE, PERCUTANEOUS APPROACH: ICD-10-PCS

## 2024-10-31 PROCEDURE — 99285 EMERGENCY DEPT VISIT HI MDM: CPT

## 2024-10-31 PROCEDURE — 83880 ASSAY OF NATRIURETIC PEPTIDE: CPT

## 2024-10-31 PROCEDURE — 85384 FIBRINOGEN ACTIVITY: CPT

## 2024-10-31 PROCEDURE — 87070 CULTURE OTHR SPECIMN AEROBIC: CPT

## 2024-10-31 PROCEDURE — 89051 BODY FLUID CELL COUNT: CPT

## 2024-10-31 PROCEDURE — 85576 BLOOD PLATELET AGGREGATION: CPT | Mod: 91

## 2024-10-31 PROCEDURE — 83615 LACTATE (LD) (LDH) ENZYME: CPT

## 2024-10-31 PROCEDURE — 71045 X-RAY EXAM CHEST 1 VIEW: CPT

## 2024-10-31 PROCEDURE — C1729 CATH, DRAINAGE: HCPCS

## 2024-10-31 PROCEDURE — 36415 COLL VENOUS BLD VENIPUNCTURE: CPT

## 2024-10-31 PROCEDURE — 87015 SPECIMEN INFECT AGNT CONCNTJ: CPT | Mod: 91

## 2024-10-31 PROCEDURE — 86901 BLOOD TYPING SEROLOGIC RH(D): CPT

## 2024-10-31 PROCEDURE — 85027 COMPLETE CBC AUTOMATED: CPT

## 2024-10-31 PROCEDURE — 85730 THROMBOPLASTIN TIME PARTIAL: CPT

## 2024-10-31 PROCEDURE — 93005 ELECTROCARDIOGRAM TRACING: CPT | Performed by: STUDENT IN AN ORGANIZED HEALTH CARE EDUCATION/TRAINING PROGRAM

## 2024-10-31 PROCEDURE — 87116 MYCOBACTERIA CULTURE: CPT

## 2024-10-31 PROCEDURE — 82150 ASSAY OF AMYLASE: CPT

## 2024-10-31 PROCEDURE — 87102 FUNGUS ISOLATION CULTURE: CPT

## 2024-10-31 PROCEDURE — 700102 HCHG RX REV CODE 250 W/ 637 OVERRIDE(OP)

## 2024-10-31 PROCEDURE — 85347 COAGULATION TIME ACTIVATED: CPT

## 2024-10-31 PROCEDURE — 305948 HCHG GREEN TRAUMA ACT PRE-NOTIFY NO CC

## 2024-10-31 PROCEDURE — 99222 1ST HOSP IP/OBS MODERATE 55: CPT | Mod: AI

## 2024-10-31 PROCEDURE — 86850 RBC ANTIBODY SCREEN: CPT

## 2024-10-31 PROCEDURE — 82077 ASSAY SPEC XCP UR&BREATH IA: CPT

## 2024-10-31 PROCEDURE — 87205 SMEAR GRAM STAIN: CPT

## 2024-10-31 PROCEDURE — 81015 MICROSCOPIC EXAM OF URINE: CPT

## 2024-10-31 PROCEDURE — 83986 ASSAY PH BODY FLUID NOS: CPT

## 2024-10-31 PROCEDURE — 84157 ASSAY OF PROTEIN OTHER: CPT

## 2024-10-31 PROCEDURE — 86900 BLOOD TYPING SEROLOGIC ABO: CPT

## 2024-10-31 PROCEDURE — 770020 HCHG ROOM/CARE - TELE (206)

## 2024-10-31 PROCEDURE — 87206 SMEAR FLUORESCENT/ACID STAI: CPT

## 2024-10-31 PROCEDURE — 85610 PROTHROMBIN TIME: CPT

## 2024-10-31 PROCEDURE — 88112 CYTOPATH CELL ENHANCE TECH: CPT

## 2024-10-31 PROCEDURE — 81003 URINALYSIS AUTO W/O SCOPE: CPT

## 2024-10-31 PROCEDURE — A9270 NON-COVERED ITEM OR SERVICE: HCPCS

## 2024-10-31 PROCEDURE — 82945 GLUCOSE OTHER FLUID: CPT

## 2024-10-31 RX ORDER — GAUZE BANDAGE 2" X 2"
100 BANDAGE TOPICAL DAILY
Status: DISCONTINUED | OUTPATIENT
Start: 2024-10-31 | End: 2024-11-03 | Stop reason: HOSPADM

## 2024-10-31 RX ORDER — AMOXICILLIN 250 MG
2 CAPSULE ORAL EVERY EVENING
Status: DISCONTINUED | OUTPATIENT
Start: 2024-10-31 | End: 2024-11-01

## 2024-10-31 RX ORDER — SUCRALFATE 1 G/1
1 TABLET ORAL 4 TIMES DAILY
Status: DISCONTINUED | OUTPATIENT
Start: 2024-10-31 | End: 2024-11-03 | Stop reason: HOSPADM

## 2024-10-31 RX ORDER — POLYETHYLENE GLYCOL 3350 17 G/17G
1 POWDER, FOR SOLUTION ORAL
Status: DISCONTINUED | OUTPATIENT
Start: 2024-10-31 | End: 2024-11-01

## 2024-10-31 RX ORDER — TAMSULOSIN HYDROCHLORIDE 0.4 MG/1
0.4 CAPSULE ORAL
Status: DISCONTINUED | OUTPATIENT
Start: 2024-10-31 | End: 2024-11-03 | Stop reason: HOSPADM

## 2024-10-31 RX ORDER — OXYCODONE HYDROCHLORIDE 5 MG/1
5 TABLET ORAL EVERY 4 HOURS PRN
Status: DISCONTINUED | OUTPATIENT
Start: 2024-10-31 | End: 2024-11-03 | Stop reason: HOSPADM

## 2024-10-31 RX ORDER — ACETAMINOPHEN 325 MG/1
650 TABLET ORAL EVERY 6 HOURS PRN
Status: DISCONTINUED | OUTPATIENT
Start: 2024-10-31 | End: 2024-11-03 | Stop reason: HOSPADM

## 2024-10-31 RX ORDER — OXYCODONE HYDROCHLORIDE 10 MG/1
10 TABLET ORAL EVERY 4 HOURS PRN
Status: DISCONTINUED | OUTPATIENT
Start: 2024-10-31 | End: 2024-11-03 | Stop reason: HOSPADM

## 2024-10-31 RX ORDER — LABETALOL HYDROCHLORIDE 5 MG/ML
10 INJECTION, SOLUTION INTRAVENOUS EVERY 4 HOURS PRN
Status: DISCONTINUED | OUTPATIENT
Start: 2024-10-31 | End: 2024-11-03 | Stop reason: HOSPADM

## 2024-10-31 RX ADMIN — SUCRALFATE 1 G: 1 TABLET ORAL at 12:10

## 2024-10-31 RX ADMIN — OMEPRAZOLE 40 MG: 20 CAPSULE, DELAYED RELEASE ORAL at 01:44

## 2024-10-31 RX ADMIN — OXYCODONE 5 MG: 5 TABLET ORAL at 20:26

## 2024-10-31 RX ADMIN — Medication 100 MG: at 06:44

## 2024-10-31 RX ADMIN — SUCRALFATE 1 G: 1 TABLET ORAL at 16:04

## 2024-10-31 RX ADMIN — OMEPRAZOLE 40 MG: 20 CAPSULE, DELAYED RELEASE ORAL at 12:10

## 2024-10-31 RX ADMIN — OXYCODONE HYDROCHLORIDE 10 MG: 10 TABLET ORAL at 07:27

## 2024-10-31 RX ADMIN — OXYCODONE HYDROCHLORIDE 10 MG: 10 TABLET ORAL at 01:44

## 2024-10-31 RX ADMIN — OXYCODONE HYDROCHLORIDE 10 MG: 10 TABLET ORAL at 12:23

## 2024-10-31 RX ADMIN — SUCRALFATE 1 G: 1 TABLET ORAL at 20:26

## 2024-10-31 ASSESSMENT — PATIENT HEALTH QUESTIONNAIRE - PHQ9
2. FEELING DOWN, DEPRESSED, IRRITABLE, OR HOPELESS: SEVERAL DAYS
8. MOVING OR SPEAKING SO SLOWLY THAT OTHER PEOPLE COULD HAVE NOTICED. OR THE OPPOSITE, BEING SO FIGETY OR RESTLESS THAT YOU HAVE BEEN MOVING AROUND A LOT MORE THAN USUAL: NOT AT ALL
1. LITTLE INTEREST OR PLEASURE IN DOING THINGS: SEVERAL DAYS
7. TROUBLE CONCENTRATING ON THINGS, SUCH AS READING THE NEWSPAPER OR WATCHING TELEVISION: NOT AT ALL
6. FEELING BAD ABOUT YOURSELF - OR THAT YOU ARE A FAILURE OR HAVE LET YOURSELF OR YOUR FAMILY DOWN: NOT AL ALL
9. THOUGHTS THAT YOU WOULD BE BETTER OFF DEAD, OR OF HURTING YOURSELF: NOT AT ALL
3. TROUBLE FALLING OR STAYING ASLEEP OR SLEEPING TOO MUCH: SEVERAL DAYS
SUM OF ALL RESPONSES TO PHQ9 QUESTIONS 1 AND 2: 2
5. POOR APPETITE OR OVEREATING: SEVERAL DAYS
4. FEELING TIRED OR HAVING LITTLE ENERGY: SEVERAL DAYS
SUM OF ALL RESPONSES TO PHQ QUESTIONS 1-9: 5

## 2024-10-31 ASSESSMENT — COGNITIVE AND FUNCTIONAL STATUS - GENERAL
TURNING FROM BACK TO SIDE WHILE IN FLAT BAD: A LOT
MOVING TO AND FROM BED TO CHAIR: A LOT
EATING MEALS: A LOT
DRESSING REGULAR UPPER BODY CLOTHING: A LOT
SUGGESTED CMS G CODE MODIFIER DAILY ACTIVITY: CL
PERSONAL GROOMING: A LOT
HELP NEEDED FOR BATHING: A LOT
STANDING UP FROM CHAIR USING ARMS: A LOT
DAILY ACTIVITIY SCORE: 12
TOILETING: A LOT
MOBILITY SCORE: 12
WALKING IN HOSPITAL ROOM: A LOT
SUGGESTED CMS G CODE MODIFIER MOBILITY: CL
DRESSING REGULAR LOWER BODY CLOTHING: A LOT
MOVING FROM LYING ON BACK TO SITTING ON SIDE OF FLAT BED: A LOT
CLIMB 3 TO 5 STEPS WITH RAILING: A LOT

## 2024-10-31 ASSESSMENT — ENCOUNTER SYMPTOMS
TREMORS: 1
DIZZINESS: 0
COUGH: 0
LOSS OF CONSCIOUSNESS: 0
CHILLS: 0
HEARTBURN: 0
FALLS: 1
VOMITING: 0
MYALGIAS: 1
NAUSEA: 0
HEADACHES: 0
SPEECH CHANGE: 0
BACK PAIN: 1
FOCAL WEAKNESS: 0
TINGLING: 0
SHORTNESS OF BREATH: 0
PALPITATIONS: 0
ABDOMINAL PAIN: 0
FEVER: 0
DIARRHEA: 0

## 2024-10-31 ASSESSMENT — SOCIAL DETERMINANTS OF HEALTH (SDOH)
WITHIN THE LAST YEAR, HAVE YOU BEEN KICKED, HIT, SLAPPED, OR OTHERWISE PHYSICALLY HURT BY YOUR PARTNER OR EX-PARTNER?: NO
WITHIN THE PAST 12 MONTHS, THE FOOD YOU BOUGHT JUST DIDN'T LAST AND YOU DIDN'T HAVE MONEY TO GET MORE: NEVER TRUE
WITHIN THE PAST 12 MONTHS, YOU WORRIED THAT YOUR FOOD WOULD RUN OUT BEFORE YOU GOT THE MONEY TO BUY MORE: NEVER TRUE
WITHIN THE LAST YEAR, HAVE YOU BEEN HUMILIATED OR EMOTIONALLY ABUSED IN OTHER WAYS BY YOUR PARTNER OR EX-PARTNER?: NO
WITHIN THE LAST YEAR, HAVE YOU BEEN KICKED, HIT, SLAPPED, OR OTHERWISE PHYSICALLY HURT BY YOUR PARTNER OR EX-PARTNER?: NO
WITHIN THE LAST YEAR, HAVE YOU BEEN AFRAID OF YOUR PARTNER OR EX-PARTNER?: NO
WITHIN THE LAST YEAR, HAVE TO BEEN RAPED OR FORCED TO HAVE ANY KIND OF SEXUAL ACTIVITY BY YOUR PARTNER OR EX-PARTNER?: NO
WITHIN THE LAST YEAR, HAVE YOU BEEN AFRAID OF YOUR PARTNER OR EX-PARTNER?: NO
WITHIN THE LAST YEAR, HAVE TO BEEN RAPED OR FORCED TO HAVE ANY KIND OF SEXUAL ACTIVITY BY YOUR PARTNER OR EX-PARTNER?: NO
IN THE PAST 12 MONTHS, HAS THE ELECTRIC, GAS, OIL, OR WATER COMPANY THREATENED TO SHUT OFF SERVICE IN YOUR HOME?: NO
WITHIN THE LAST YEAR, HAVE YOU BEEN HUMILIATED OR EMOTIONALLY ABUSED IN OTHER WAYS BY YOUR PARTNER OR EX-PARTNER?: NO

## 2024-10-31 ASSESSMENT — PAIN DESCRIPTION - PAIN TYPE
TYPE: ACUTE PAIN

## 2024-10-31 ASSESSMENT — LIFESTYLE VARIABLES
ALCOHOL_USE: YES
TOTAL SCORE: 0
DOES PATIENT WANT TO STOP DRINKING: NO
TOTAL SCORE: 0
ON A TYPICAL DAY WHEN YOU DRINK ALCOHOL HOW MANY DRINKS DO YOU HAVE: 4
AVERAGE NUMBER OF DAYS PER WEEK YOU HAVE A DRINK CONTAINING ALCOHOL: 5
CONSUMPTION TOTAL: POSITIVE
EVER HAD A DRINK FIRST THING IN THE MORNING TO STEADY YOUR NERVES TO GET RID OF A HANGOVER: NO
HAVE PEOPLE ANNOYED YOU BY CRITICIZING YOUR DRINKING: NO
HAVE YOU EVER FELT YOU SHOULD CUT DOWN ON YOUR DRINKING: NO
TOTAL SCORE: 0
HOW MANY TIMES IN THE PAST YEAR HAVE YOU HAD 5 OR MORE DRINKS IN A DAY: 40
EVER FELT BAD OR GUILTY ABOUT YOUR DRINKING: NO

## 2024-10-31 ASSESSMENT — FIBROSIS 4 INDEX
FIB4 SCORE: 3.86
FIB4 SCORE: 4.96
FIB4 SCORE: 4.96

## 2024-10-31 NOTE — ASSESSMENT & PLAN NOTE
Patient was noted to have large pitting edema on his legs bilaterally R > L.  Patient does not have history of congestive heart failure listed as problems.  Patient states he does not have a history of cardiac disease.  According to patient he does not take any home medications.  He does have a history of cirrhotic liver disease that could be attributing to some of his edema.  Echocardiogram obtained in 2022 showed ejection fraction of 55 to 60%.  -UA  -Echocardiogram ordered  -proBNP ordered, pending

## 2024-10-31 NOTE — ASSESSMENT & PLAN NOTE
Patient has a history of coagulopathy secondary to chronic liver disease from alcohol use.  AST elevated, history of thrombocytopenia, INR elevated to 1.39.  -Holding off on DVT prophylaxis at this time given possibility that fluid collection in lung with hemothorax.  -Daily CMP

## 2024-10-31 NOTE — ASSESSMENT & PLAN NOTE
Patient has a history of anemia.  Last CBC showed increase from 8.3 to 9.2.  Patient does have a history of recent GI bleed most likely secondary to chronic liver disease.  Patient also does have history of macrocytic anemia most likely secondary to nutritional deficiencies related to alcohol dependence.  Also cannot rule out at this time that fluid collection in lungs is not hemothorax.  -Continue to monitor CBC  -If hemoglobin drops below 7.0 transfuse or transfuse if hemoglobin is below 8.0 and patient is hemodynamically unstable

## 2024-10-31 NOTE — ASSESSMENT & PLAN NOTE
Patient had Left sided pleural effusion seen on Chest X-ray 10/30. Review of previous imaging showed pleural effusion seen on 10/18. Unchanged in size. Given that it was seen on imaging prior to fall it is unlikely this is due to traumatic injury from this fall. Imaging in 03/2024 did not show any signs of left sided pleural effusion.  - consult IR for US guided thoracentesis  - Fluid amylase  - fluid pH   -total fluid protein  - fluid LDH  - fluid glucose  - fluid cell count  - cytology  - fluid culture with gram stain  - AFB culture  - fungal culture  - ween O2 as tolerated.

## 2024-10-31 NOTE — CARE PLAN
The patient is Watcher - Medium risk of patient condition declining or worsening    Shift Goals  Clinical Goals: thoracentesis, pain management, echo  Patient Goals: rest/pain control  Family Goals: truong    Progress made toward(s) clinical / shift goals:    Problem: Knowledge Deficit - Standard  Goal: Patient and family/care givers will demonstrate understanding of plan of care, disease process/condition, diagnostic tests and medications  Outcome: Progressing  Note: Discuss and review POC with patient/family. Re-educate as needed.     Problem: Fall Risk  Goal: Patient will remain free from falls  Outcome: Progressing  Note: Fall risk assessment complete. Fall precautions implemented; bed alarm on, patient wearing non-slip socks, call light within reach, hourly rounding in progress, and tolieting needs assessed.        Patient is not progressing towards the following goals:

## 2024-10-31 NOTE — PROGRESS NOTES
Report received from ED RN. Updated on POC.  Assumed care of patient upon arrival to unit. Patient currently A & O x 4; on 4.5 L O2; up with complaints of acute pain, offered non-pharmacological pain management techniques, and educated on PRN pain medications. Pt placed on monitor, monitor room notified. Patient oriented to unit and to call light system. Call light within reach. Pt educated to fall risk. Fall precautions in place. Pt provided with personal grooming items. Bed locked and in lowest position. All questions answered. No other needs indicated at this time.

## 2024-10-31 NOTE — DOCUMENTATION QUERY
[facility]                                                                       Query Response Note      PATIENT:               ANGELA LOCKE  ACCT #:                  [id number]  MRN:                     [id number]  :                      1950  ADMIT DATE:       10/24/2024 12:20 AM  DISCH DATE:        10/28/2024 8:07 PM  RESPONDING  PROVIDER #:        [id number]           QUERY TEXT:    Patient admitted for melena/GI bleed and alcohol withdrawal.  10/24 to 10/27 PN;s document Severe protein calorie malnutrition.  Per 10/24 Dietary note - does not meet criteria in congruence with ASPEN/Academy guidelines for malnutrition.    Please clarify the clinical/diagnostic relevance for the documented findings and based on the clinical indicators documented.    Note: If you agree with a diagnosis listed, please remember to include it in your concurrent daily documentation and onto the Discharge Summary.    The patient's clinical indicators include:  10/24 to 10/27 PN - Dx Severe protein calorie malnutrition   10/24 Dietary note - does not meet criteria in congruence with ASPEN/Academy guidelines for malnutrition...No weight loss noted per chart review, Large difference in weight today likely due to different scale used at transferred  facilities...  Muscle mass: Well-nourished...Subcutaneous fat: Well-nourished...Fluid Accumulation: 1+ edema BLE...BMI 23.65    Treatment - RD consult; supplements    Risk factors - Alcoholic cirrhosis; alcohol withdrawal    Thank you,  Marly GARC[facility]  Clinical   Connect via [employer]  Options provided:   -- Severe protein calorie malnutrition is clinically significant and ruled in   -- Severe protein calorie malnutrition is ruled out, however moderate malnutrition is clinically significant and ruled in   -- Severe protein calorie malnutrition is ruled out, however mild malnutrition  is clinically significant and ruled in, (Please specify the other explanation)   -- Malnutrition is ruled out completely   -- Other explanation -, (Please document other explanation)      Query created by: Marly Fernandez on 10/28/2024 2:24 PM    RESPONSE TEXT:    Malnutrition is ruled out completely       QUERY TEXT:    Encephalopathy is documented in the Medical Record. Please specify type based on the clinical indicators documented.    Note: If you agree with a diagnosis listed, please remember to include it in your concurrent daily documentation and onto the Discharge Summary.    The patient's Clinical Indicators include:  Admit labs: CO2 19; Ca 8.2; Total Bilirubin 2.7; Total Bilirubin 1.2    10/24 PN - Encephalopathic with withdrawals...confused  10/25 PN - Bactrim for SBP ppx  10/27 PN - Extended withdrawal episodes and slow to get back to baseline mentally    Treatment - PO multivitamin/folic acid; PO Lactulose; PO Bactrim    Risk factors - Encephalopathy; Alcohol withdrawal; Alcoholic cirrhosis; Severe malnutrition; Melena; Possible SBP    Thank you,  Marly Fernandez BSN  Clinical   Connect via [employer]  Options provided:   -- Metabolic encephalopathy   -- Other type of encephalopathy, (Please document type encephalopathy)   -- Other explanation, (please specify other explanation)      Query created by: Marly Fernandez on 10/28/2024 2:24 PM    RESPONSE TEXT:    Metabolic encephalopathy          Electronically signed by:  KOTA YOUNGER MD 10/30/2024 11:45 PM

## 2024-10-31 NOTE — ED NOTES
74 y.o M BIB care flight as transfer from [facility] for GLF. Pt reports syncopal episode at home, +HS, +LOC, -thinners. Pt denies ETOH use. Imaging from [place] showed Lt sided hemathorax

## 2024-10-31 NOTE — ED NOTES
Bedside report received from off going RN/tech: Mana ROSARIO, assumed care of patient.  POC discussed with patient. Call light within reach, all needs addressed at this time.       Fall risk interventions in place: Move the patient closer to the nurse's station, Patient's personal possessions are with in their safe reach, Place socks on patient, Place fall risk sign on patient's door, Give patient urinal if applicable, and Keep floor surfaces clean and dry (all applicable per [facility] Fall risk assessment)   Continuous monitoring: Cardiac Leads, Pulse Ox, or Blood Pressure  IVF/IV medications: Not Applicable   Oxygen: How many liters 1L  Bedside sitter: Not Applicable   Isolation: Not Applicable

## 2024-10-31 NOTE — ASSESSMENT & PLAN NOTE
Ground-level fall at home without loss of consciousness.  Patient does endorse hitting his head.  Imaging studies obtained at [facility] did not show any acute fractures or intracranial bleeding.  Patient does have skin tear along left forearm and ecchymosis.  -wound care consulted for the left arm wound  -on telemetry

## 2024-10-31 NOTE — PROGRESS NOTES
[facility] Medicine Daily Progress Note    Date of Service  10/31/2024    Chief Complaint  Chava Mercedes is a 74 y.o. male admitted 10/31/2024 with ground-level fall, chest pain.    Hospital Course  Chava Mercedes is a 74 y.o. male who presented 10/31/2024 from [facility] for a ground-level fall that occurred earlier today.    Patient was recently admitted to the hospital on 10/19 for GI bleeding.  He was discharged on 10/23.  Patient reports that today he was getting up from a beer and some food when he fell to the ground striking his left side chest.  Patient denies loss of consciousness but does endorse hitting his head during the fall.  He presented to the emergency department at [facility] where he received a CT head and cervical spine which showed no acute traumatic injuries.  Chest x-ray did show a large left-sided pleural effusion.  Patient was transferred to [facility] for further workup with his pleural effusion and management of his ground-level fall.  Is complaining of pain of his left arm which does have skin tear with some bleeding that has been  bandaged.  Also complaining of left-sided chest pain that is worse with inspiration he describes as sharp and intermittent.  Pain does not radiate anywhere.  Patient denies cough, hemoptysis, shortness of breath, abdominal pain, diarrhea, blood in stool, fever, chills.     In the emergency department patient received CBC which showed mild decrease in hemoglobin to 9.2, previously hemoglobin was 8.4 on 10/27.  CMP did show hyponatremia to 131, decreased bicarbonate to 17 elevation of his AST to 77 and elevation of his alk phos to 112.  Alcohol levels were negative.  Patient did have increase in his INR to 1.48.  Chest x-ray showed stable left-sided pleural effusion that was seen previously on 10/18.    Interval Problem Update  10/31 Afebrile, vitals are stable and he is on 2L nasal canula.  He will receive US guided thoracentesis today and culture  fluids. BLE still +3 edema.  Echo pending.    I have discussed this patient's plan of care and discharge plan at IDT rounds today with Case Management, Nursing, Nursing leadership, and other members of the IDT team.    Consultants/Specialty  none    Code Status  DNAR/DNI    Disposition  The patient is not medically cleared for discharge to home or a post-acute facility.  Anticipate discharge to: home with close outpatient follow-up    I have placed the appropriate orders for post-discharge needs.    Review of Systems  Review of Systems   Constitutional:  Negative for chills, fever and malaise/fatigue.   Respiratory:  Negative for cough and shortness of breath.    Cardiovascular:  Positive for chest pain and leg swelling. Negative for palpitations.   Gastrointestinal:  Negative for abdominal pain, diarrhea, heartburn, nausea and vomiting.   Genitourinary:  Negative for dysuria, frequency and urgency.   Neurological:  Negative for dizziness and headaches.   All other systems reviewed and are negative.       Physical Exam  Temp:  [37.1 °C (98.8 °F)] 37.1 °C (98.8 °F)  Pulse:  [] 94  Resp:  [12-20] 12  BP: (105-168)/(65-85) 105/71  SpO2:  [87 %-97 %] 91 %    Physical Exam  Vitals and nursing note reviewed.   Constitutional:       Appearance: Normal appearance. He is not ill-appearing.   HENT:      Head: Normocephalic and atraumatic.      Jaw: There is normal jaw occlusion.      Right Ear: Hearing normal.      Left Ear: Hearing normal.      Nose: Nose normal.      Mouth/Throat:      Lips: Pink.      Mouth: Mucous membranes are moist.   Eyes:      Extraocular Movements: Extraocular movements intact.      Conjunctiva/sclera: Conjunctivae normal.      Pupils: Pupils are equal, round, and reactive to light.   Neck:      Vascular: No carotid bruit.   Cardiovascular:      Rate and Rhythm: Normal rate and regular rhythm.      Pulses: Normal pulses.      Heart sounds: Normal heart sounds, S1 normal and S2 normal.    Pulmonary:      Effort: Pulmonary effort is normal.      Breath sounds: Normal breath sounds and air entry. No stridor.   Musculoskeletal:      Cervical back: Normal range of motion and neck supple.      Right lower leg: 3+ Edema present.      Left lower leg: 3+ Edema present.   Skin:     General: Skin is warm and dry.      Capillary Refill: Capillary refill takes less than 2 seconds.   Neurological:      General: No focal deficit present.      Mental Status: He is alert and oriented to person, place, and time. Mental status is at baseline.      Sensory: Sensation is intact.      Motor: Motor function is intact.   Psychiatric:         Attention and Perception: Attention and perception normal.         Mood and Affect: Mood and affect normal.         Speech: Speech normal.         Behavior: Behavior normal. Behavior is cooperative.         Fluids    Intake/Output Summary (Last 24 hours) at 10/31/2024 1110  Last data filed at 10/31/2024 0023  Gross per 24 hour   Intake 0 ml   Output 0 ml   Net 0 ml        Laboratory  Recent Labs     10/31/24  0033   WBC 6.7   RBC 3.28*   HEMOGLOBIN 9.2*   HEMATOCRIT 28.6*   MCV 87.2   MCH 28.0   MCHC 32.2*   RDW 65.1*   PLATELETCT 184   MPV 10.1     Recent Labs     10/31/24  0033   SODIUM 131*   POTASSIUM 4.1   CHLORIDE 99   CO2 17*   GLUCOSE 86   BUN 7*   CREATININE 0.61   CALCIUM 8.7     Recent Labs     10/31/24  0033   APTT 41.2*   INR 1.48*               Imaging  DX-CHEST-LIMITED (1 VIEW)   Final Result         Stable appearance of the chest.      CT-OUTSIDE IMAGES-CT HEAD   Final Result      CT-OUTSIDE IMAGES-CT CERVICAL SPINE   Final Result      CT-OUTSIDE IMAGES-CT LUMBAR SPINE   Final Result      CT-OUTSIDE IMAGES-CT CHEST   Final Result      EC-ECHOCARDIOGRAM COMPLETE W/O CONT    (Results Pending)   US-THORACENTESIS PUNCTURE LEFT    (Results Pending)        Assessment/Plan  * Ground-level fall- (present on admission)  Assessment & Plan  Patient was noted to have large  pitting edema on his legs bilaterally R > L.  Patient does not have history of congestive heart failure listed as problems.  Patient states he does not have a history of cardiac disease.  According to patient he does not take any home medications.  He does have a history of cirrhotic liver disease that could be attributing to some of his edema.  Echocardiogram obtained in 2022 showed ejection fraction of 55 to 60%.  -UA  -Echocardiogram ordered  -proBNP ordered, pending    Edema  Assessment & Plan  Patient was noted to have large pitting edema on his legs bilaterally R > L.  Patient does not have history of congestive heart failure listed as problems.  Patient states he does not have a history of cardiac disease.  According to patient he does not take any home medications.  He does have a history of cirrhotic liver disease that could be attributing to some of his edema.  Echocardiogram obtained in 2022 showed ejection fraction of 55 to 60%.  -UA  -Echocardiogram ordered  -proBNP ordered, pending    Pleural effusion on left- (present on admission)  Assessment & Plan  Patient had Left sided pleural effusion seen on Chest X-ray 10/30. Review of previous imaging showed pleural effusion seen on 10/18. Unchanged in size. Given that it was seen on imaging prior to fall it is unlikely this is due to traumatic injury from this fall. Imaging in 03/2024 did not show any signs of left sided pleural effusion.  - consult IR for US guided thoracentesis  - Fluid amylase  - fluid pH   -total fluid protein  - fluid LDH  - fluid glucose  - fluid cell count  - cytology  - fluid culture with gram stain  - AFB culture  - fungal culture  - ween O2 as tolerated.    Anemia- (present on admission)  Assessment & Plan  Patient has a history of anemia.  Last CBC showed increase from 8.3 to 9.2.  Patient does have a history of recent GI bleed most likely secondary to chronic liver disease.  Patient also does have history of macrocytic anemia  most likely secondary to nutritional deficiencies related to alcohol dependence.  Also cannot rule out at this time that fluid collection in lungs is not hemothorax.  -Continue to monitor CBC  -If hemoglobin drops below 7.0 transfuse or transfuse if hemoglobin is below 8.0 and patient is hemodynamically unstable    Coagulopathy (HCC)- (present on admission)  Assessment & Plan  Ground-level fall at home without loss of consciousness.  Patient does endorse hitting his head.  Imaging studies obtained at [facility] did not show any acute fractures or intracranial bleeding.  Patient does have skin tear along left forearm and ecchymosis.  -wound care consulted for the left arm wound  -on telemetry         VTE prophylaxis:   SCDs/TEDs      I have performed a physical exam and reviewed and updated ROS and Plan today (10/31/2024). In review of yesterday's note (10/30/2024), there are no changes except as documented above.

## 2024-10-31 NOTE — PROGRESS NOTES
4 Eyes Skin Assessment Completed by ABRAHAM Cedeno and ABRAHAM Sims.    Head Bruising  Ears WDL  Nose WDL  Mouth WDL  Neck WDL  Breast/Chest WDL  Shoulder Blades WDL  Spine WDL  (R) Arm/Elbow/Hand Redness, Blanching, and Bruising  (L) Arm/Elbow/Hand Redness, Blanching, Bruising, and Abrasion  Abdomen WDL  Groin WDL  Scrotum/Coccyx/Buttocks Redness, Blanching, and Excoriation  (R) Leg WDL  (L) Leg Bruising to hip  (R) Heel/Foot/Toe Redness and Blanching  (L) Heel/Foot/Toe Redness and Blanching          Devices In Places Tele Box, Blood Pressure Cuff, Pulse Ox, and Nasal Cannula      Interventions In Place Gray Ear Foams and Pillows    Possible Skin Injury Yes    Pictures Uploaded Into Epic Yes  Wound Consult Placed N/A  RN Wound Prevention Protocol Ordered No

## 2024-10-31 NOTE — ED TRIAGE NOTES
Chief Complaint   Patient presents with    Trauma Green     74 y.o M BIB care flight as transfer from [facility] for GLF. Pt reports syncopal episode at home, +HS, +LOC, -thinners. Pt denies ETOH use. Imaging from [facility] showed Lt sided hemathorax     Seen by ERP on arrival, GCS 15, spo2 94% RA. See trauma narrator for assessment     /78   Pulse (!) 107   Temp 37.1 °C (98.8 °F) (Temporal)   Resp 17   Ht 1.829 m (6')   Wt 81.6 kg (180 lb)   SpO2 93% Comment: RA  BMI 24.41 kg/m²       
Render Note In Bullet Format When Appropriate: No
Duration Of Freeze Thaw-Cycle (Seconds): 3
Detail Level: Simple
Post-Care Instructions: I reviewed with the patient in detail post-care instructions. Patient is to wear sunprotection, and avoid picking at any of the treated lesions. Pt may apply Vaseline to crusted or scabbing areas.
Number Of Freeze-Thaw Cycles: 5 freeze-thaw cycles
Consent: The patient's consent was obtained including but not limited to risks of crusting, scabbing, blistering, scarring, darker or lighter pigmentary change, recurrence, incomplete removal and infection.

## 2024-10-31 NOTE — HOSPITAL COURSE
Chava Mercedes is a 74 y.o. male who presented 10/31/2024 from [facility] for a ground-level fall that occurred earlier today.    Patient was recently admitted to the hospital on 10/19 for GI bleeding.  He was discharged on 10/23.  Patient reports that today he was getting up from a beer and some food when he fell to the ground striking his left side chest.  Patient denies loss of consciousness but does endorse hitting his head during the fall.  He presented to the emergency department at [facility] where he received a CT head and cervical spine which showed no acute traumatic injuries.  Chest x-ray did show a large left-sided pleural effusion.  Patient was transferred to [facility] for further workup with his pleural effusion and management of his ground-level fall.  Is complaining of pain of his left arm which does have skin tear with some bleeding that has been  bandaged.  Also complaining of left-sided chest pain that is worse with inspiration he describes as sharp and intermittent.  Pain does not radiate anywhere.  Patient denies cough, hemoptysis, shortness of breath, abdominal pain, diarrhea, blood in stool, fever, chills.     In the emergency department patient received CBC which showed mild decrease in hemoglobin to 9.2, previously hemoglobin was 8.4 on 10/27.  CMP did show hyponatremia to 131, decreased bicarbonate to 17 elevation of his AST to 77 and elevation of his alk phos to 112.  Alcohol levels were negative.  Patient did have increase in his INR to 1.48.  Chest x-ray showed stable left-sided pleural effusion that was seen previously on 10/18.

## 2024-10-31 NOTE — H&P
[facility] History & Physical Note    Date of Service  10/31/2024    [facility] Team: [facility]   Attending: Joel Quevedo D.o.  Senior Resident: Dr. Romano  Intern:  Dr. Newsome  Contact Number: [phone]    Primary Care Physician  Pcp Unknown    Consultants      Code Status  DNAR/DNI    Chief Complaint  Chief Complaint   Patient presents with    Trauma Green     74 y.o M BIB care flight as transfer from [facility] for GLF. Pt reports syncopal episode at home, +HS, +LOC, -thinners. Pt denies ETOH use. Imaging from [place] showed Lt sided hemathorax       History of Presenting Illness (HPI):  Chava Mercedes is a 74 y.o. male who presented 10/31/2024 from [facility] for a ground-level fall that occurred earlier today patient was recently admitted to the hospital on 10/19 for GI bleeding.  He was discharged on 10/23.  Patient reports that today he was getting up from a beer and some food when he fell to the ground striking his left side chest.  Patient denies loss of consciousness but does endorse hitting his head during the fall.  He presented to the emergency department at [facility] where he received a CT head and cervical spine which showed no acute traumatic injuries.  Chest x-ray did show a large left-sided pleural effusion.  Patient was transferred to [facility] for further workup with his pleural effusion and management of his ground-level fall.  Is complaining of pain of his left arm which does have skin tear with some bleeding that has been  bandaged.  Also complaining of left-sided chest pain that is worse with inspiration he describes as sharp and intermittent.  Pain does not radiate anywhere.  Patient denies cough, hemoptysis, shortness of breath, abdominal pain, diarrhea, blood in stool, fever, chills.    In the emergency department patient received CBC which showed mild decrease in hemoglobin to 9.2, previously hemoglobin was 8.4 on 10/27.  CMP did show hyponatremia to 131, decreased  bicarbonate to 17 elevation of his AST to 77 and elevation of his alk phos to 112.  Alcohol levels were negative.  Patient did have increase in his INR to 1.48.  Chest x-ray showed stable left-sided pleural effusion that was seen previously on 10/18.    I discussed the plan of care with patient.    Review of Systems  Review of Systems   Gastrointestinal:  Negative for abdominal pain and diarrhea.   Musculoskeletal:  Positive for back pain (Left-sided chest pain), falls and joint pain (Left arm pain near elbow).   Neurological:  Negative for loss of consciousness.       Past Medical History   has no past medical history on file.    Surgical History   has a past surgical history that includes gastroscopy (N/A, 12/11/2016); gastroscopy-endo (12/11/2016); gastroscopy-endo (12/18/2016); gastroscopy (N/A, 12/2/2017); inguinal hernia repair (Right, 12/27/2023); umbilical hernia repair (N/A, 12/27/2023); pr upper gi endoscopy,diagnosis (N/A, 3/5/2024); pr upper gi endoscopy,diagnosis (N/A, 8/15/2024); and pr upper gi endoscopy,diagnosis (N/A, 10/19/2024).     Family History  family history is not on file.   Family history reviewed with patient.     Social History  Tobacco: Denies  Alcohol: drinks weekly, had two beers two days ago  Recreational drugs (illegal or prescription): denies  Employment: retired  Living Situation: lives at home with his wife  Recent Travel: denies  Primary Care Provider: Reviewed unknown  Other (stressors, spirituality, exposures):     Allergies  Allergies   Allergen Reactions    Morphine      Itchiness        Medications  Prior to Admission Medications   Prescriptions Last Dose Informant Patient Reported? Taking?   cyanocobalamin (VITAMIN B-12) 100 MCG Tab  Patient's Home Pharmacy Yes No   Sig: Take 100 mcg by mouth every day.   ferrous sulfate 325 (65 Fe) MG tablet  Patient's Home Pharmacy Yes No   Sig: Take 325 mg by mouth every day.   lactulose 20 GM/30ML Solution   No No   Sig: Take 30 mL by  mouth 3 times a day.   omeprazole (PRILOSEC) 40 MG delayed-release capsule   No No   Sig: Take 1 Capsule by mouth 2 times a day for 90 days.   sucralfate (CARAFATE) 1 GM Tab  Patient's Home Pharmacy Yes No   Sig: Take 1 g by mouth 4 times a day.   tamsulosin (FLOMAX) 0.4 MG capsule   No No   Sig: Take 1 Capsule by mouth 1/2 hour after breakfast.   therapeutic multivitamin-minerals (THERAGRAN-M) Tab   No No   Sig: Take 1 Tablet by mouth every day.   thiamine (THIAMINE) 100 MG tablet   No No   Sig: Take 1 Tablet by mouth every day.      Facility-Administered Medications: None       Physical Exam  Temp:  [37.1 °C (98.8 °F)] 37.1 °C (98.8 °F)  Pulse:  [] 89  Resp:  [17-20] 17  BP: (120-168)/(69-85) 130/70  SpO2:  [87 %-97 %] 97 %  Blood Pressure : 120/69   Temperature: 37.1 °C (98.8 °F)   Pulse: 88   Respiration: 17   Pulse Oximetry: 91 %       Physical Exam  Constitutional:       General: He is in acute distress.      Appearance: Normal appearance.   HENT:      Head: Normocephalic and atraumatic.      Nose: Nose normal. No congestion.      Mouth/Throat:      Mouth: Mucous membranes are moist.      Pharynx: Oropharynx is clear.   Cardiovascular:      Rate and Rhythm: Normal rate and regular rhythm.   Pulmonary:      Effort: Pulmonary effort is normal. No respiratory distress.      Breath sounds: Normal breath sounds. No wheezing or rales.   Abdominal:      General: Abdomen is flat. There is no distension.      Palpations: Abdomen is soft.      Tenderness: There is no abdominal tenderness. There is no guarding.   Musculoskeletal:         General: Signs of injury (Large linear skin tear along left forearm with tape in place.  Some bleeding) present.      Right lower leg: Edema (+2) present.      Left lower leg: Edema (+2) present.   Skin:     General: Skin is warm and dry.      Capillary Refill: Capillary refill takes less than 2 seconds.      Findings: Bruising (Large ecchymosis on the left arm) present.       "Comments: Thin, fragile   Neurological:      General: No focal deficit present.      Mental Status: He is alert and oriented to person, place, and time.   Psychiatric:         Mood and Affect: Mood normal.         Behavior: Behavior normal.         Laboratory:  Recent Labs     10/31/24  0033   WBC 6.7   RBC 3.28*   HEMOGLOBIN 9.2*   HEMATOCRIT 28.6*   MCV 87.2   MCH 28.0   MCHC 32.2*   RDW 65.1*   PLATELETCT 184   MPV 10.1     Recent Labs     10/31/24  0033   SODIUM 131*   POTASSIUM 4.1   CHLORIDE 99   CO2 17*   GLUCOSE 86   BUN 7*   CREATININE 0.61   CALCIUM 8.7     Recent Labs     10/31/24  0033   ALTSGPT 38   ASTSGOT 77*   ALKPHOSPHAT 112*   TBILIRUBIN 1.1   GLUCOSE 86     Recent Labs     10/31/24  0033   APTT 41.2*   INR 1.48*     No results for input(s): \"NTPROBNP\" in the last 72 hours.      No results for input(s): \"TROPONINT\" in the last 72 hours.    Imaging:  DX-CHEST-LIMITED (1 VIEW)   Final Result         Stable appearance of the chest.      CT-OUTSIDE IMAGES-CT HEAD   Final Result      CT-OUTSIDE IMAGES-CT CERVICAL SPINE   Final Result      CT-OUTSIDE IMAGES-CT LUMBAR SPINE   Final Result      CT-OUTSIDE IMAGES-CT CHEST   Final Result      IR-THORACENTESIS PUNCTURE LEFT    (Results Pending)   EC-ECHOCARDIOGRAM COMPLETE W/O CONT    (Results Pending)           Assessment/Plan:  Problem Representation:   I anticipate this patient will require at least two midnights for appropriate medical management, necessitating inpatient admission because ground level fall and fluid collection seen in the Left lung.     Patient will need a Telemetry bed on MEDICAL service .  The need is secondary to ground level fall.    * Ground-level fall- (present on admission)  Assessment & Plan  Patient was noted to have large pitting edema on his legs bilaterally R > L.  Patient does not have history of congestive heart failure listed as problems.  Patient states he does not have a history of cardiac disease.  According to patient " he does not take any home medications.  He does have a history of cirrhotic liver disease that could be attributing to some of his edema.  Echocardiogram obtained in 2022 showed ejection fraction of 55 to 60%.  -UA  -Echocardiogram ordered  -proBNP ordered, pending    Edema  Assessment & Plan  Patient was noted to have large pitting edema on his legs bilaterally R > L.  Patient does not have history of congestive heart failure listed as problems.  Patient states he does not have a history of cardiac disease.  According to patient he does not take any home medications.  He does have a history of cirrhotic liver disease that could be attributing to some of his edema.  Echocardiogram obtained in 2022 showed ejection fraction of 55 to 60%.  -UA  -Echocardiogram ordered  -proBNP ordered, pending    Anemia- (present on admission)  Assessment & Plan  Patient has a history of anemia.  Last CBC showed increase from 8.3 to 9.2.  Patient does have a history of recent GI bleed most likely secondary to chronic liver disease.  Patient also does have history of macrocytic anemia most likely secondary to nutritional deficiencies related to alcohol dependence.  Also cannot rule out at this time that fluid collection in lungs is not hemothorax.  -Continue to monitor CBC  -If hemoglobin drops below 7.0 transfuse or transfuse if hemoglobin is below 8.0 and patient is hemodynamically unstable    Pleural effusion on left- (present on admission)  Assessment & Plan  Patient had Left sided pleural effusion seen on Chest X-ray 10/30. Review of previous imaging showed pleural effusion seen on 10/18. Unchanged in size. Given that it was seen on imaging prior to fall it is unlikely this is due to traumatic injury from this fall. Imaging in 03/2024 did not show any signs of left sided pleural effusion.  - consult IR for US guided thoracentesis  - Fluid amylase  - fluid pH   -total fluid protein  - fluid LDH  - fluid glucose  - fluid cell  count  - cytology  - fluid culture with gram stain  - AFB culture  - fungal culture  - ween O2 as tolerated.    Coagulopathy (HCC)- (present on admission)  Assessment & Plan  Ground-level fall at home without loss of consciousness.  Patient does endorse hitting his head.  Imaging studies obtained at [facility] did not show any acute fractures or intracranial bleeding.  Patient does have skin tear along left forearm and ecchymosis.  -consult wound care for the left arm wound  -on telemetry        VTE prophylaxis: pharmacologic prophylaxis contraindicated due to possible hemithorax

## 2024-10-31 NOTE — ASSESSMENT & PLAN NOTE
Ground-level fall at home without loss of consciousness.  Patient does endorse hitting his head.  Imaging studies obtained at [facility] did not show any acute fractures or intracranial bleeding.  Patient does have skin tear along left forearm and ecchymosis.  -consult wound care for the left arm wound  -on telemetry

## 2024-10-31 NOTE — ED PROVIDER NOTES
CHIEF COMPLAINT  Chief Complaint   Patient presents with    Trauma Green     74 y.o M BIB care flight as transfer from [facility] for GLF. Pt reports syncopal episode at home, +HS, +LOC, -thinners. Pt denies ETOH use. Imaging from [place] showed Lt sided hemathorax       LIMITATION TO HISTORY   Select: None    HPI    Chava Mercedes is a 74 y.o. male who presents to the Emergency Department patient presented for evaluation from [facility] after a possible syncopal episode.  Patient stated that he was at getting up to grab a beer and some food when he fell to the ground striking the left side of his chest.  He had increasing left-sided chest pain and presented to at [facility].  Patient had a CT head cervical spine chest abdomen pelvis as well as a lumbar spine.  Which fortunately did not show any acute traumatic injuries.  Though there was not identified large left sided pleural effusion.  There was no beds for admission at [place] thus he was transferred here for further care and evaluation.    OUTSIDE HISTORIAN(S):  Select: None    EXTERNAL RECORDS REVIEWED  Select: Other reviewed external CT scan there does appear to be a moderate to large left-sided pleural effusion EKG was nonischemic troponin was undetectable at the outside facility    PAST MEDICAL HISTORY  History reviewed. No pertinent past medical history.  .    SURGICAL HISTORY  Past Surgical History:   Procedure Laterality Date    TN UPPER GI ENDOSCOPY,DIAGNOSIS N/A 10/19/2024    Procedure: GASTROSCOPY;  Surgeon: Kavin Palencia D.O.;  Location: SURGERY [facility];  Service: Gastroenterology    TN UPPER GI ENDOSCOPY,DIAGNOSIS N/A 8/15/2024    Procedure: GASTROSCOPY;  Surgeon: Vinay Aguilar M.D.;  Location: SURGERY SAME DAY [facility];  Service: Gastroenterology    TN UPPER GI ENDOSCOPY,DIAGNOSIS N/A 3/5/2024    Procedure: GASTROSCOPY;  Surgeon: Skyler Du M.D.;  Location: SURGERY SAME DAY [facility];  Service: Gastroenterology    INGUINAL  HERNIA REPAIR Right 12/27/2023    Procedure: REPAIR, HERNIA, INGUINAL - RIGHT;  Surgeon: Fermin Pacheco M.D.;  Location: SURGERY [facility];  Service: General    UMBILICAL HERNIA REPAIR N/A 12/27/2023    Procedure: REPAIR, HERNIA, UMBILICAL;  Surgeon: Fermin Pacheco M.D.;  Location: SURGERY [facility];  Service: General    GASTROSCOPY N/A 12/2/2017    Procedure: GASTROSCOPY;  Surgeon: Tyree Dan M.D.;  Location: SURGERY [facility];  Service: Gastroenterology    GASTROSCOPY-ENDO  12/18/2016    Procedure: GASTROSCOPY-ENDO;  Surgeon: Garry Cordero M.D.;  Location: SURGERY [facility];  Service:     GASTROSCOPY N/A 12/11/2016    Procedure: GASTROSCOPY;  Surgeon: Olaf Ortiz M.D.;  Location: SURGERY [facility];  Service:     GASTROSCOPY-ENDO  12/11/2016    Procedure: GASTROSCOPY-ENDO;  Surgeon: Olaf Ortiz M.D.;  Location: SURGERY [facility];  Service:          FAMILY HISTORY  History reviewed. No pertinent family history.       SOCIAL HISTORY  Social History     Socioeconomic History    Marital status: Single     Spouse name: Not on file    Number of children: Not on file    Years of education: Not on file    Highest education level: Not on file   Occupational History    Not on file   Tobacco Use    Smoking status: Never    Smokeless tobacco: Never   Vaping Use    Vaping status: Never Used   Substance and Sexual Activity    Alcohol use: Yes     Comment: beer use occ    Drug use: Never    Sexual activity: Not on file   Other Topics Concern    Not on file   Social History Narrative    Not on file     Social Determinants of Health     Financial Resource Strain: Not on file   Food Insecurity: No Food Insecurity (10/24/2024)    Hunger Vital Sign     Worried About Running Out of Food in the Last Year: Never true     Ran Out of Food in the Last Year: Never true   Transportation Needs: No Transportation Needs (10/24/2024)    PRAPARE - Transportation     Lack of Transportation (Medical): No      Lack of Transportation (Non-Medical): No   Recent Concern: Transportation Needs - Unmet Transportation Needs (8/19/2024)    PRAPARE - Transportation     Lack of Transportation (Medical): Yes     Lack of Transportation (Non-Medical): Yes   Physical Activity: Not on file   Stress: Not on file   Social Connections: Not on file   Intimate Partner Violence: Not At Risk (10/24/2024)    Humiliation, Afraid, Rape, and Kick questionnaire     Fear of Current or Ex-Partner: No     Emotionally Abused: No     Physically Abused: No     Sexually Abused: No   Housing Stability: Low Risk  (10/24/2024)    Housing Stability Vital Sign     Unable to Pay for Housing in the Last Year: No     Number of Times Moved in the Last Year: 0     Homeless in the Last Year: No         CURRENT MEDICATIONS  No current facility-administered medications on file prior to encounter.     Current Outpatient Medications on File Prior to Encounter   Medication Sig Dispense Refill    lactulose 20 GM/30ML Solution Take 30 mL by mouth 3 times a day. 473 mL 1    omeprazole (PRILOSEC) 40 MG delayed-release capsule Take 1 Capsule by mouth 2 times a day for 90 days. 60 Capsule 2    tamsulosin (FLOMAX) 0.4 MG capsule Take 1 Capsule by mouth 1/2 hour after breakfast. 30 Capsule 3    therapeutic multivitamin-minerals (THERAGRAN-M) Tab Take 1 Tablet by mouth every day. 30 Tablet 11    thiamine (THIAMINE) 100 MG tablet Take 1 Tablet by mouth every day. 30 Tablet 3    cyanocobalamin (VITAMIN B-12) 100 MCG Tab Take 100 mcg by mouth every day.      ferrous sulfate 325 (65 Fe) MG tablet Take 325 mg by mouth every day.      sucralfate (CARAFATE) 1 GM Tab Take 1 g by mouth 4 times a day.             ALLERGIES  Allergies   Allergen Reactions    Morphine      Itchiness        PHYSICAL EXAM  VITAL SIGNS:/78   Pulse (!) 107   Temp 37.1 °C (98.8 °F) (Temporal)   Resp 17   Ht 1.829 m (6')   Wt 81.6 kg (180 lb)   SpO2 93%   BMI 24.41 kg/m²       VITALS - vital signs  documented prior to this note have been reviewed and noted,  GENERAL - awake, alert, oriented, GCS 15, no apparent distress, non-toxic  appearing  HEENT -ecchymosis to the right parietal scalp normocephalic, atraumatic, pupils equal, sclera anicteric, mucus  membranes moist  NECK - supple, no meningismus, full active range of motion, trachea midline  CARDIOVASCULAR - regular rate/rhythm, no murmurs/gallops/rubs  PULMONARY - no respiratory distress, speaking in full sentences, clear to  auscultation bilaterally with diminished breath sounds in the left mid to lower fields, no wheezing/ronchi/rales, no accessory muscle use  GASTROINTESTINAL - soft, non-tender, non-distended, no rebound, guarding,  or peritonitis  Chest: He has a left-sided chest wall tenderness and a lidocaine patch in place  GENITOURINARY - Deferred  NEUROLOGIC - Awake alert, normal mental status, speech fluid, cognition  normal, moves all extremities  MUSCULOSKELETAL - no obvious asymmetry or deformities present  EXTREMITIES - warm, well-perfused, no cyanosis or significant edema  DERMATOLOGIC - warm, dry, no rashes, no jaundice  PSYCHIATRIC - normal affect, normal insight, normal concentration    DIAGNOSTIC STUDIES / PROCEDURES  EKG reviewed from the outside facility  Shows a sinus tachycardic rhythm with a normal [place] QRS and QTc interval no acute ischemic changes interpreted sinus tachycardia    LABS  Labs Reviewed - No data to display    Reviewed external labs, hemoglobin did downtrend from 8.7-8.3  RADIOLOGY  I have independently interpreted the diagnostic imaging associated with this visit and am waiting the final reading from the radiologist.   My preliminary interpretation is as follows: Chest x-ray shows a large left-sided pleural effusion      Radiologist interpretation:   DX-CHEST-LIMITED (1 VIEW)   Final Result         Stable appearance of the chest.      CT-OUTSIDE IMAGES-CT HEAD   Final Result      CT-OUTSIDE IMAGES-CT CERVICAL SPINE    Final Result      CT-OUTSIDE IMAGES-CT LUMBAR SPINE   Final Result      CT-OUTSIDE IMAGES-CT CHEST   Final Result           COURSE & MEDICAL DECISION MAKING    ED COURSE:        INTERVENTIONS BY ME:  Medications - No data to display        INITIAL ASSESSMENT, COURSE AND PLAN  Care Narrative: Patient was transferred as a trauma green activation after a syncopal episode with associated left-sided chest pain as well as noted left pleural effusion.  On examination patient does have some left chest wall tenderness.  There was a left-sided pleural effusion noted though waspresent on his previous visit, and there was no appreciable rib fractures.  Lower concern for hemothorax.  CT scans of his head cervical spine lumbar spine chest abdomen pelvis did not review any acute traumatic injuries.  Thus the trauma team did sign off.  Given the patient's syncope with a large left pleural effusion will plan for medicine admission at this time.  Spoke with the internal medicine residents who graciously agreed to accept to their service was admitted for further care and evaluation.             ADDITIONAL PROBLEM LIST  History of alcohol abuse and alcoholic liver cirrhosis  DISPOSITION AND DISCUSSIONS  I have discussed management of the patient with the following physicians and ROBERT's: Dr. Hood discussed the case given there is no acute traumatic injuries, will plan for medicine admission spoke with internal medicine residents gracefully agreed to except the patient to their service        FINAL DIAGNOSIS  #1 syncope  2.  Anemia  3.  The left pleural effusion       Electronically signed by: Lj Ryan DO ,12:37 AM 10/31/24

## 2024-10-31 NOTE — PROGRESS NOTES
I will be off duty and signed out of voalte at 3pm.  If you have any needs for this patient after 3pm, please reach out to the on call Joanne CARRINGTON.  Thank you!

## 2024-10-31 NOTE — ED NOTES
Pharmacy Medication Reconciliation      ~Medication reconciliation updated and complete per patient at bedside & previous visit   ~Allergies have been verified and updated   ~No oral ABX within the last 30 days  ~[facility]      ~Anticoagulants (rivaroxaban, apixaban, edoxaban, dabigatran, warfarin, enoxaparin) taken in the last 14 days? No

## 2024-10-31 NOTE — PROGRESS NOTES
Bedside report received from off going RN/tech: new admit, assumed care of patient.     Fall Risk Score: MODERATE RISK  Fall risk interventions in place: Place yellow fall risk ID band on patient, Provide patient/family education based on risk assessment, Educate patient/family to call staff for assistance when getting out of bed, Place fall precaution signage outside patient door, Place patient in room close to nursing station, Utilize bed/chair fall alarm, Notify charge of high risk for huddle, and Bed alarm connected correctly  Bed type: Regular (Mg Score less than 17 interventions in place)  Patient on cardiac monitor: Yes  IVF/IV medications: Not Applicable   Oxygen: How many liters 1.5L  Bedside sitter: Not Applicable   Isolation: Not applicable

## 2024-10-31 NOTE — DISCHARGE PLANNING
@5527  Agency/Facility Name: [facility]  Spoke To: Elma  Outcome: VANESSA called stating that a face to face encounter will be faxed over, fax #[phone].  Elma will call [facility] to follow up on PCP appt.

## 2024-10-31 NOTE — ED NOTES
Medicated per mar for left chest wall pain 8/10. Pt A&O x 3. Tolerates pills with sips. Placed on 1l nc o2 87% ra when sleeping. 94% 1l NC

## 2024-11-01 ENCOUNTER — APPOINTMENT (OUTPATIENT)
Dept: CARDIOLOGY | Facility: MEDICAL CENTER | Age: 74
DRG: 187 | End: 2024-11-01
Attending: GENERAL PRACTICE
Payer: MEDICARE

## 2024-11-01 PROBLEM — I85.11 SECONDARY ESOPHAGEAL VARICES WITH BLEEDING (HCC): Status: ACTIVE | Noted: 2024-11-01

## 2024-11-01 LAB
ALBUMIN SERPL BCP-MCNC: 2.7 G/DL (ref 3.2–4.9)
ALBUMIN/GLOB SERPL: 0.7 G/DL
ALP SERPL-CCNC: 94 U/L (ref 30–99)
ALT SERPL-CCNC: 27 U/L (ref 2–50)
ANION GAP SERPL CALC-SCNC: 11 MMOL/L (ref 7–16)
AST SERPL-CCNC: 47 U/L (ref 12–45)
BILIRUB SERPL-MCNC: 1.4 MG/DL (ref 0.1–1.5)
BUN SERPL-MCNC: 7 MG/DL (ref 8–22)
CALCIUM ALBUM COR SERPL-MCNC: 9.1 MG/DL (ref 8.5–10.5)
CALCIUM SERPL-MCNC: 8.1 MG/DL (ref 8.5–10.5)
CHLORIDE SERPL-SCNC: 100 MMOL/L (ref 96–112)
CO2 SERPL-SCNC: 20 MMOL/L (ref 20–33)
CREAT SERPL-MCNC: 0.58 MG/DL (ref 0.5–1.4)
ERYTHROCYTE [DISTWIDTH] IN BLOOD BY AUTOMATED COUNT: 62.4 FL (ref 35.9–50)
GFR SERPLBLD CREATININE-BSD FMLA CKD-EPI: 103 ML/MIN/1.73 M 2
GLOBULIN SER CALC-MCNC: 4 G/DL (ref 1.9–3.5)
GLUCOSE SERPL-MCNC: 100 MG/DL (ref 65–99)
HCT VFR BLD AUTO: 26.1 % (ref 42–52)
HGB BLD-MCNC: 8.3 G/DL (ref 14–18)
LV EJECT FRACT  99904: 70
LV EJECT FRACT MOD 2C 99903: 71.19
LV EJECT FRACT MOD 4C 99902: 72.06
LV EJECT FRACT MOD BP 99901: 72.18
MAGNESIUM SERPL-MCNC: 1.6 MG/DL (ref 1.5–2.5)
MCH RBC QN AUTO: 27.4 PG (ref 27–33)
MCHC RBC AUTO-ENTMCNC: 31.8 G/DL (ref 32.3–36.5)
MCV RBC AUTO: 86.1 FL (ref 81.4–97.8)
MYCOBACTERIUM SPEC CULT: NORMAL
PHOSPHATE SERPL-MCNC: 3 MG/DL (ref 2.5–4.5)
PLATELET # BLD AUTO: 136 K/UL (ref 164–446)
PMV BLD AUTO: 10.4 FL (ref 9–12.9)
POTASSIUM SERPL-SCNC: 4 MMOL/L (ref 3.6–5.5)
PROT SERPL-MCNC: 6.7 G/DL (ref 6–8.2)
RBC # BLD AUTO: 3.03 M/UL (ref 4.7–6.1)
RHODAMINE-AURAMINE STN SPEC: NORMAL
RHODAMINE-AURAMINE STN SPEC: NORMAL
SIGNIFICANT IND 70042: NORMAL
SIGNIFICANT IND 70042: NORMAL
SITE SITE: NORMAL
SITE SITE: NORMAL
SODIUM SERPL-SCNC: 131 MMOL/L (ref 135–145)
SOURCE SOURCE: NORMAL
SOURCE SOURCE: NORMAL
WBC # BLD AUTO: 5.1 K/UL (ref 4.8–10.8)

## 2024-11-01 PROCEDURE — 97163 PT EVAL HIGH COMPLEX 45 MIN: CPT

## 2024-11-01 PROCEDURE — 83735 ASSAY OF MAGNESIUM: CPT

## 2024-11-01 PROCEDURE — 80053 COMPREHEN METABOLIC PANEL: CPT

## 2024-11-01 PROCEDURE — A9270 NON-COVERED ITEM OR SERVICE: HCPCS

## 2024-11-01 PROCEDURE — 93306 TTE W/DOPPLER COMPLETE: CPT | Mod: 26 | Performed by: INTERNAL MEDICINE

## 2024-11-01 PROCEDURE — 85027 COMPLETE CBC AUTOMATED: CPT

## 2024-11-01 PROCEDURE — 700102 HCHG RX REV CODE 250 W/ 637 OVERRIDE(OP)

## 2024-11-01 PROCEDURE — A9270 NON-COVERED ITEM OR SERVICE: HCPCS | Performed by: GENERAL PRACTICE

## 2024-11-01 PROCEDURE — 770020 HCHG ROOM/CARE - TELE (206)

## 2024-11-01 PROCEDURE — 93306 TTE W/DOPPLER COMPLETE: CPT

## 2024-11-01 PROCEDURE — 97602 WOUND(S) CARE NON-SELECTIVE: CPT

## 2024-11-01 PROCEDURE — 36415 COLL VENOUS BLD VENIPUNCTURE: CPT

## 2024-11-01 PROCEDURE — 84100 ASSAY OF PHOSPHORUS: CPT

## 2024-11-01 PROCEDURE — 99233 SBSQ HOSP IP/OBS HIGH 50: CPT | Performed by: GENERAL PRACTICE

## 2024-11-01 PROCEDURE — 700102 HCHG RX REV CODE 250 W/ 637 OVERRIDE(OP): Performed by: GENERAL PRACTICE

## 2024-11-01 RX ORDER — FOLIC ACID 1 MG/1
1 TABLET ORAL DAILY
Status: DISCONTINUED | OUTPATIENT
Start: 2024-11-01 | End: 2024-11-03 | Stop reason: HOSPADM

## 2024-11-01 RX ORDER — LACTULOSE 10 G/15ML
30 SOLUTION ORAL 3 TIMES DAILY
Status: DISCONTINUED | OUTPATIENT
Start: 2024-11-01 | End: 2024-11-03 | Stop reason: HOSPADM

## 2024-11-01 RX ORDER — SPIRONOLACTONE 25 MG/1
25 TABLET ORAL
Status: DISCONTINUED | OUTPATIENT
Start: 2024-11-01 | End: 2024-11-03

## 2024-11-01 RX ORDER — FUROSEMIDE 20 MG/1
20 TABLET ORAL
Status: DISCONTINUED | OUTPATIENT
Start: 2024-11-01 | End: 2024-11-03

## 2024-11-01 RX ADMIN — SUCRALFATE 1 G: 1 TABLET ORAL at 16:03

## 2024-11-01 RX ADMIN — SUCRALFATE 1 G: 1 TABLET ORAL at 13:40

## 2024-11-01 RX ADMIN — ACETAMINOPHEN 650 MG: 325 TABLET ORAL at 23:44

## 2024-11-01 RX ADMIN — SUCRALFATE 1 G: 1 TABLET ORAL at 08:02

## 2024-11-01 RX ADMIN — LACTULOSE 30 ML: 10 SOLUTION ORAL at 08:02

## 2024-11-01 RX ADMIN — ACETAMINOPHEN 650 MG: 325 TABLET ORAL at 16:03

## 2024-11-01 RX ADMIN — OMEPRAZOLE 40 MG: 20 CAPSULE, DELAYED RELEASE ORAL at 13:40

## 2024-11-01 RX ADMIN — SPIRONOLACTONE 25 MG: 25 TABLET ORAL at 08:02

## 2024-11-01 RX ADMIN — LACTULOSE 30 ML: 10 SOLUTION ORAL at 13:40

## 2024-11-01 RX ADMIN — Medication 100 MG: at 05:08

## 2024-11-01 RX ADMIN — FOLIC ACID 1 MG: 1 TABLET ORAL at 08:02

## 2024-11-01 RX ADMIN — OMEPRAZOLE 40 MG: 20 CAPSULE, DELAYED RELEASE ORAL at 05:09

## 2024-11-01 RX ADMIN — TAMSULOSIN HYDROCHLORIDE 0.4 MG: 0.4 CAPSULE ORAL at 08:02

## 2024-11-01 RX ADMIN — FUROSEMIDE 20 MG: 20 TABLET ORAL at 08:02

## 2024-11-01 RX ADMIN — SUCRALFATE 1 G: 1 TABLET ORAL at 20:17

## 2024-11-01 ASSESSMENT — GAIT ASSESSMENTS
GAIT LEVEL OF ASSIST: CONTACT GUARD ASSIST
DISTANCE (FEET): 20
DEVIATION: ANTALGIC;BRADYKINETIC;DECREASED HEEL STRIKE;DECREASED TOE OFF
ASSISTIVE DEVICE: FRONT WHEEL WALKER

## 2024-11-01 ASSESSMENT — COGNITIVE AND FUNCTIONAL STATUS - GENERAL
WALKING IN HOSPITAL ROOM: A LITTLE
MOBILITY SCORE: 18
STANDING UP FROM CHAIR USING ARMS: A LITTLE
MOVING TO AND FROM BED TO CHAIR: A LITTLE
MOVING FROM LYING ON BACK TO SITTING ON SIDE OF FLAT BED: A LITTLE
SUGGESTED CMS G CODE MODIFIER MOBILITY: CK
CLIMB 3 TO 5 STEPS WITH RAILING: A LITTLE
TURNING FROM BACK TO SIDE WHILE IN FLAT BAD: A LITTLE

## 2024-11-01 ASSESSMENT — PAIN DESCRIPTION - PAIN TYPE
TYPE: ACUTE PAIN

## 2024-11-01 ASSESSMENT — FIBROSIS 4 INDEX: FIB4 SCORE: 4.86

## 2024-11-01 NOTE — CARE PLAN
The patient is Stable - Low risk of patient condition declining or worsening    Shift Goals  Clinical Goals: thoracentesis, pain management, echo  Patient Goals: rest/pain control  Family Goals: truong    Progress made toward(s) clinical / shift goals:  pt remains free from falls.      Patient is not progressing towards the following goals:      Problem: Pain - Standard  Goal: Alleviation of pain or a reduction in pain to the patient’s comfort goal  Outcome: Progressing     Problem: Fall Risk  Goal: Patient will remain free from falls  Outcome: Progressing

## 2024-11-01 NOTE — DISCHARGE PLANNING
[phone]  DPA spoke with Sunny from [facility]. Facility is able to accept pt as long as he is agreeable to go to facility. (This is the third referral they have received for him in the last few months and he has declined SNF the last two times). Sunny informed DPA that his last admission can be used toward his 3 midnights so he can go to facility whenever he is cleared. If pt is cleared for SNF over the weekend then Sunny from [facility] can be reached at [phone].

## 2024-11-01 NOTE — THERAPY
Physical Therapy   Initial Evaluation     Patient Name: Chava Mercedes  Age:  74 y.o., Sex:  male  Medical Record #: [id number]  Today's Date: 11/1/2024     Precautions  Precautions: Fall Risk    Assessment  Patient is 74 y.o. male presented 10/31/2024 from [facility] for a ground-level fall. Chest x ray + for large left-sided pleural effusion. S/p Ultrasound guided left sided therapeutic and diagnostic thoracentesis, 2000 mL of fluid withdrawn.  Pmhx includes recent hospital admission for  ETOH withdrawal and GI bleed discharged on 10/23/24.   Pt resides alone in a single level [facility] in [place], [place]. Pt states his spouse is in an ECF due to CVA 5 mo ago. Pt presenting with decreased activity tolerance, pain, impaired balance and unsteady gait.  Recommend continued inpatient as well as post acute PT prior to home.   PT services to follow while in house.    Plan    Physical Therapy Initial Treatment Plan   Treatment Plan : (P) Bed Mobility, Equipment, Gait Training, Manual Therapy, Neuro Re-Education / Balance, Self Care / Home Evaluation, Stair Training, Therapeutic Activities, Therapeutic Exercise  Treatment Frequency: (P) 4 Times per Week  Duration: (P) Until Therapy Goals Met    DC Equipment Recommendations: (P) Unable to determine at this time  Discharge Recommendations: (P) Recommend post-acute placement for additional physical therapy services prior to discharge home         Initial Contact Note    Initial Contact Note Order Received and Verified, Physical Therapy Evaluation in Progress with Full Report to Follow.   Precautions   Precautions Fall Risk   Pain 0 - 10 Group   Location Abdomen   Location Orientation Left   Therapist Pain Assessment During Activity;6   Prior Living Situation   Prior Services None   Housing / Facility 1 Story House   Steps Into Home 1   Steps In Home 0   Equipment Owned Front-Wheel Walker;Single Point Cane   Lives with - Patient's Self Care Capacity Alone and Able to Care For  Self   Prior Level of Functional Mobility   Bed Mobility Independent   Transfer Status Independent   Ambulation Independent   Ambulation Distance [facility]   Assistive Devices Used None   Stairs Independent   History of Falls   History of Falls Yes   Date of Last Fall 10/30/24   Cognition    Level of Consciousness Alert   Active ROM Lower Body    Active ROM Lower Body  WDL   Strength Lower Body   Lower Body Strength  X   Gross Strength Generalized Weakness, Equal Bilaterally   Lower Body Muscle Tone   Lower Body Muscle Tone  WDL   Balance Assessment   Sitting Balance (Static) Fair +   Sitting Balance (Dynamic) Fair   Standing Balance (Static) Fair   Standing Balance (Dynamic) Fair -   Weight Shift Sitting Fair   Weight Shift Standing Fair   Comments w/FWW   Bed Mobility    Supine to Sit Supervised   Sit to Supine Supervised   Gait Analysis   Gait Level Of Assist Contact Guard Assist   Assistive Device Front Wheel Walker   Distance (Feet) 20   # of Times Distance was Traveled 2   Deviation Antalgic;Bradykinetic;Decreased Heel Strike;Decreased Toe Off  (periodic stops due to pain)   # of Stairs Climbed 0   Functional Mobility   Sit to Stand Contact Guard Assist   Bed, Chair, Wheelchair Transfer Contact Guard Assist   Toilet Transfers Contact Guard Assist   6 Clicks Assessment - How much HELP from another person do you currently need... (If the patient hasn't done an activity recently, how much help from another person do you think he/she would need if he/she tried?)   Turning from your back to your side while in a flat bed without using bedrails? 3   Moving from lying on your back to sitting on the side of a flat bed without using bedrails? 3   Moving to and from a bed to a chair (including a wheelchair)? 3   Standing up from a chair using your arms (e.g., wheelchair, or bedside chair)? 3   Walking in hospital room? 3   Climbing 3-5 steps with a railing? 3   6 clicks Mobility Score 18   Short Term Goals    Short Term  Goal # 1 Bed mobility at IND level by tx 6   Short Term Goal # 2 Transfers with LRAD w/S by tx 6   Short Term Goal # 3 Ambulate with LRAD for 150 ft w/ s by tx 6   Short Term Goal # 4 Ascend and descend step x 1 with LRAD at S level by tx 6   Education Group   Education Provided Role of Physical Therapist;Gait Training;Use of Assistive Device   Role of Physical Therapist Patient Response Patient;Acceptance;Explanation;Reinforcement Needed   Gait Training Patient Response Patient;Acceptance;Explanation;Reinforcement Needed   Use of Assistive Device Patient Response Patient;Acceptance;Explanation;Reinforcement Needed   Physical Therapy Initial Treatment Plan    Treatment Plan  Bed Mobility;Equipment;Gait Training;Manual Therapy;Neuro Re-Education / Balance;Self Care / Home Evaluation;Stair Training;Therapeutic Activities;Therapeutic Exercise   Treatment Frequency 4 Times per Week   Duration Until Therapy Goals Met   Problem List    Problems Pain;Impaired Bed Mobility;Impaired Transfers;Impaired Ambulation;Functional Strength Deficit;Decreased Activity Tolerance   Anticipated Discharge Equipment and Recommendations   DC Equipment Recommendations Unable to determine at this time   Discharge Recommendations Recommend post-acute placement for additional physical therapy services prior to discharge home   Interdisciplinary Plan of Care Collaboration   IDT Collaboration with  Nursing   Patient Position at End of Therapy In Bed;Call Light within Reach;Tray Table within Reach;Bed Alarm On   Collaboration Comments RN updated.   Session Information   Date / Session Number  11/1-1 ( 1/4, 11/7)

## 2024-11-01 NOTE — DISCHARGE PLANNING
Care Transition Team Discharge Planning    Anticipated Discharge Information  Discharge Disposition: D/T to SNF with Medicare cert in anticipation of skilled care (03)    Discharge Plan:  [facility] # [id number]

## 2024-11-01 NOTE — ASSESSMENT & PLAN NOTE
Of note patient was admitted in August 2024 with GI bleed secondary to varices and extensive portal hypertensive gastropathy, treated for alcohol withdrawal symptoms during that admission.  Patient was readmitted in October for GI bleed, patient underwent endoscopy noted moderate portal hypertensive gastropathy and duodenitis.  Continued with melena, CTA noted active extravasation in the small bowel -patient was transferred to [facility] however did not undergo embolization as there is no further episodes of bleeding,discharged home 10/28.    Added Lasix and Aldactone 11/1  Monitor renal function

## 2024-11-01 NOTE — PROGRESS NOTES
Hospital Medicine Daily Progress Note    Date of Service  11/1/2024    Chief Complaint  Chava Mercedes is a 74 y.o. male admitted 10/31/2024 with GLF    Hospital Course  This is a 74-year-old male with alcoholic liver cirrhosis with continued use and esophageal varices requiring banding and multiple endoscopies, moderate portal hypertensive gastropathy, duodenitis who was admitted on 10/31/2024  after sustaining a ground-level fall.    CT head cervical spine chest abdomen pelvis as well as a lumbar spine performed at outside facility negative for any traumatic injuries.      Chest x-ray noted large left-sided pleural effusion. BNP 86. Thoracentesis performed on 10/31 2000 mL removed.  Repeat x-ray with no evidence of pneumothorax postoperatively.  Cytology pending.  And fluid studies pending.  Echocardiogram pending.    Of note patient was admitted in August 2024 with GI bleed secondary to varices and extensive portal hypertensive gastropathy, treated for alcohol withdrawal symptoms during that admission.  Patient was readmitted in October for GI bleed, patient underwent endoscopy noted moderate portal hypertensive gastropathy and duodenitis.  Continued with melena, CTA noted active extravasation in the small bowel -patient was transferred to [facility] however did not undergo embolization as there is no further episodes of bleeding,discharged home 10/28.    Interval Problem Update  Patient with pleural effusion, 2 L removed.  Started patient on Lasix and Aldactone.  Echocardiogram pending.    SNF PENDING    I have discussed this patient's plan of care and discharge plan at IDT rounds today with Case Management, Nursing, Nursing leadership, and other members of the IDT team.    Consultants/Specialty  None    Code Status  DNAR/DNI    Disposition  Patient is not medically cleared to discharge to SNF.    I have placed the appropriate orders for post-discharge needs.    Review of Systems  Review of Systems   All other  systems reviewed and are negative.       Physical Exam  Temp:  [36.8 °C (98.2 °F)-37.4 °C (99.3 °F)] 37 °C (98.6 °F)  Pulse:  [102-120] 114  Resp:  [16-20] 17  BP: (106-144)/(68-87) 106/68  SpO2:  [90 %-94 %] 93 %    Physical Exam  Vitals and nursing note reviewed.   Constitutional:       General: He is not in acute distress.     Appearance: He is ill-appearing.   HENT:      Head: Normocephalic and atraumatic.   Eyes:      Extraocular Movements: Extraocular movements intact.      Conjunctiva/sclera: Conjunctivae normal.      Pupils: Pupils are equal, round, and reactive to light.   Cardiovascular:      Rate and Rhythm: Normal rate and regular rhythm.      Pulses: Normal pulses.      Heart sounds: No murmur heard.     No friction rub. No gallop.   Pulmonary:      Effort: Pulmonary effort is normal. No respiratory distress.      Breath sounds: Normal breath sounds. No wheezing, rhonchi or rales.   Abdominal:      General: Bowel sounds are normal. There is no distension.      Palpations: Abdomen is soft.      Tenderness: There is no abdominal tenderness.   Musculoskeletal:         General: No swelling or tenderness. Normal range of motion.      Cervical back: Normal range of motion and neck supple. No muscular tenderness.      Right lower leg: Edema present.      Left lower leg: Edema present.   Skin:     General: Skin is warm and dry.      Capillary Refill: Capillary refill takes less than 2 seconds.      Findings: No bruising, erythema or rash.   Neurological:      General: No focal deficit present.      Mental Status: He is alert and oriented to person, place, and time.      Motor: Weakness present.         Fluids    Intake/Output Summary (Last 24 hours) at 11/1/2024 1336  Last data filed at 11/1/2024 1200  Gross per 24 hour   Intake 1340 ml   Output 500 ml   Net 840 ml        Laboratory  Recent Labs     10/31/24  0033 11/01/24  0144   WBC 6.7 5.1   RBC 3.28* 3.03*   HEMOGLOBIN 9.2* 8.3*   HEMATOCRIT 28.6* 26.1*    MCV 87.2 86.1   MCH 28.0 27.4   MCHC 32.2* 31.8*   RDW 65.1* 62.4*   PLATELETCT 184 136*   MPV 10.1 10.4     Recent Labs     10/31/24  0033 11/01/24  0144   SODIUM 131* 131*   POTASSIUM 4.1 4.0   CHLORIDE 99 100   CO2 17* 20   GLUCOSE 86 100*   BUN 7* 7*   CREATININE 0.61 0.58   CALCIUM 8.7 8.1*     Recent Labs     10/31/24  0033   APTT 41.2*   INR 1.48*               Imaging  DX-CHEST-PORTABLE (1 VIEW)   Final Result      1.  Small left pleural effusion.      2.  Left basilar atelectasis.      US-THORACENTESIS PUNCTURE LEFT   Final Result      1. Ultrasound guided left sided therapeutic and diagnostic thoracentesis.      2. 2000 mL of fluid withdrawn.      DX-CHEST-LIMITED (1 VIEW)   Final Result         Stable appearance of the chest.      CT-OUTSIDE IMAGES-CT HEAD   Final Result      CT-OUTSIDE IMAGES-CT CERVICAL SPINE   Final Result      CT-OUTSIDE IMAGES-CT LUMBAR SPINE   Final Result      CT-OUTSIDE IMAGES-CT CHEST   Final Result      EC-ECHOCARDIOGRAM COMPLETE W/O CONT    (Results Pending)        Assessment/Plan  * Ground-level fall- (present on admission)  Assessment & Plan  CT head cervical spine chest abdomen pelvis as well as a lumbar spine performed at outside facility negative for any traumatic injuries.      Pleural effusion on left- (present on admission)  Assessment & Plan  Chest x-ray noted large left-sided pleural effusion. BNP 86. Thoracentesis performed on 10/31 2000 mL removed.  Repeat x-ray with no evidence of pneumothorax postoperatively.  Cytology pending.  And fluid studies pending.  Echocardiogram pending.    Secondary esophageal varices with bleeding (HCC)  Assessment & Plan  Of note patient was admitted in August 2024 with GI bleed secondary to varices and extensive portal hypertensive gastropathy, treated for alcohol withdrawal symptoms during that admission.  Patient was readmitted in October for GI bleed, patient underwent endoscopy noted moderate portal hypertensive gastropathy and  duodenitis.  Continued with melena, CTA noted active extravasation in the small bowel -patient was transferred to [facility] however did not undergo embolization as there is no further episodes of bleeding,discharged home 10/28.    Alcoholic cirrhosis (HCC)- (present on admission)  Assessment & Plan  Of note patient was admitted in August 2024 with GI bleed secondary to varices and extensive portal hypertensive gastropathy, treated for alcohol withdrawal symptoms during that admission.  Patient was readmitted in October for GI bleed, patient underwent endoscopy noted moderate portal hypertensive gastropathy and duodenitis.  Continued with melena, CTA noted active extravasation in the small bowel -patient was transferred to [facility] however did not undergo embolization as there is no further episodes of bleeding,discharged home 10/28.    Edema  Assessment & Plan  Chest x-ray noted large left-sided pleural effusion. BNP 86. Thoracentesis performed on 10/31 2000 mL removed.  Repeat x-ray with no evidence of pneumothorax postoperatively.  Cytology pending.  And fluid studies pending.  Echocardiogram pending.    Anemia- (present on admission)  Assessment & Plan   Patient does have a history of recent GI bleed most likely secondary to chronic liver disease.  Patient   -Continue to monitor CBC  -If hemoglobin drops below 7.0 transfuse or transfuse if hemoglobin is below 8.0 and patient is hemodynamically unstable    Coagulopathy (HCC)- (present on admission)  Assessment & Plan  Of note patient was admitted in August 2024 with GI bleed secondary to varices and extensive portal hypertensive gastropathy, treated for alcohol withdrawal symptoms during that admission.  Patient was readmitted in October for GI bleed, patient underwent endoscopy noted moderate portal hypertensive gastropathy and duodenitis.  Continued with melena, CTA noted active extravasation in the small bowel -patient was transferred to [facility]  however did not undergo embolization as there is no further episodes of bleeding,discharged home 10/28.         VTE prophylaxis: SCDs    I have performed a physical exam and reviewed and updated ROS and Plan today (11/1/2024). In review of yesterday's note (10/31/2024), there are no changes except as documented above.    Greater than 51 minutes spent prepping to see patient (e.g. reviewing EMR) obtaining and/or reviewing separately obtained history. Performing a medically appropriate examination and evaluation. Independently interpreting results and communicating results to patient/family/caregiver. Counseling and educating the patient/family/caregiver. Ordering medications, tests, and/or procedures. Referring and communicating with other health care professionals.  Documenting clinical information in EPIC. Care coordination.

## 2024-11-01 NOTE — PROGRESS NOTES
Trauma tertiary and SBIRT per trauma guidelines and quality measures. This note does not constitute as a formal trauma consult. Please defer all management to primary team.     Mental status adequate for full examination?: Yes        REVIEW OF SYSTEMS:  Review of Systems   Constitutional:  Negative for chills and fever.   Respiratory:  Negative for cough.    Gastrointestinal:  Negative for abdominal pain, nausea and vomiting.   Genitourinary:  Negative for dysuria.   Musculoskeletal:  Positive for myalgias.   Neurological:  Positive for tremors. Negative for tingling, speech change and focal weakness.       PHYSICAL EXAMINATION:  /80   Pulse (!) 120   Temp 36.8 °C (98.2 °F) (Temporal)   Resp 16   Ht 1.829 m (6')   Wt 80.2 kg (176 lb 12.9 oz)   SpO2 90%   BMI 23.98 kg/m²   Physical Exam  Constitutional:       General: He is awake. He is not in acute distress.     Appearance: He is not ill-appearing.   HENT:      Head: Normocephalic.      Right Ear: External ear normal.      Left Ear: External ear normal.      Nose: Nose normal.      Mouth/Throat:      Mouth: Mucous membranes are moist.   Eyes:      General: No scleral icterus.        Right eye: No discharge.         Left eye: No discharge.   Cardiovascular:      Rate and Rhythm: Normal rate and regular rhythm.      Pulses: Normal pulses.   Pulmonary:      Effort: Pulmonary effort is normal. No respiratory distress.      Breath sounds: Examination of the right-lower field reveals decreased breath sounds. Examination of the left-lower field reveals decreased breath sounds. Decreased breath sounds present.   Chest:      Chest wall: Tenderness (left chest wall) present.   Abdominal:      General: There is no distension.      Palpations: Abdomen is soft.      Tenderness: There is no abdominal tenderness.   Musculoskeletal:      Cervical back: Neck supple.      Comments: Moves all extremities without limitation  Left elbow with skin tear and Steri-Strips  applied, no evidence of infection no drainage   Skin:     General: Skin is warm and dry.      Capillary Refill: Capillary refill takes less than 2 seconds.      Findings: Bruising and signs of injury present.   Neurological:      Mental Status: He is alert and oriented to person, place, and time.      GCS: GCS eye subscore is 4. GCS verbal subscore is 5. GCS motor subscore is 6.      Sensory: Sensation is intact.      Motor: Motor function is intact.   Psychiatric:         Attention and Perception: Attention normal.         Mood and Affect: Mood normal.         Behavior: Behavior is cooperative.         LABORATORY VALUES:  Recent Labs     10/31/24  0033   WBC 6.7   RBC 3.28*   HEMOGLOBIN 9.2*   HEMATOCRIT 28.6*   MCV 87.2   MCH 28.0   MCHC 32.2*   RDW 65.1*   PLATELETCT 184   MPV 10.1     Recent Labs     10/31/24  0033   SODIUM 131*   POTASSIUM 4.1   CHLORIDE 99   CO2 17*   GLUCOSE 86   BUN 7*   CREATININE 0.61   CALCIUM 8.7     Recent Labs     10/31/24  0033   ASTSGOT 77*   ALTSGPT 38   TBILIRUBIN 1.1   ALKPHOSPHAT 112*   GLOBULIN 4.6*   INR 1.48*     Recent Labs     10/31/24  0033   APTT 41.2*   INR 1.48*       IMAGING:  DX-CHEST-PORTABLE (1 VIEW)   Final Result      1.  Small left pleural effusion.      2.  Left basilar atelectasis.      US-THORACENTESIS PUNCTURE LEFT   Final Result      1. Ultrasound guided left sided therapeutic and diagnostic thoracentesis.      2. 2000 mL of fluid withdrawn.      DX-CHEST-LIMITED (1 VIEW)   Final Result         Stable appearance of the chest.      CT-OUTSIDE IMAGES-CT HEAD   Final Result      CT-OUTSIDE IMAGES-CT CERVICAL SPINE   Final Result      CT-OUTSIDE IMAGES-CT LUMBAR SPINE   Final Result      CT-OUTSIDE IMAGES-CT CHEST   Final Result      EC-ECHOCARDIOGRAM COMPLETE W/O CONT    (Results Pending)         CAGE Results: positive Blood Alcohol>0.08: no CAGE Score: 0  Total: POSITIVE  Assessment complete date: 10/31/2024  Intervention: Complete. Patient response to  intervention: Drinks bud light, has cut back recently, not interestedin intervention.   Patient demonstrates understanding of intervention. Patient does not agree to follow-up.   has not been contacted. Total ETOH intervention time: 15 - 30 mintues      PDI Score: 15  (Score > 23 = Psychiatry consult)        Newly identified traumatic injuries.  None at this time        Trauma tertiary and SBIRT per trauma guidelines and quality measures. This note does not constitute as a formal trauma consult. Please defer all management to primary team.

## 2024-11-01 NOTE — ASSESSMENT & PLAN NOTE
Of note patient was admitted in August 2024 with GI bleed secondary to varices and extensive portal hypertensive gastropathy, treated for alcohol withdrawal symptoms during that admission.  Patient was readmitted in October for GI bleed, patient underwent endoscopy noted moderate portal hypertensive gastropathy and duodenitis.  Continued with melena, CTA noted active extravasation in the small bowel -patient was transferred to [facility] however did not undergo embolization as there is no further episodes of bleeding,discharged home 10/28.

## 2024-11-01 NOTE — CARE PLAN
The patient is Stable - Low risk of patient condition declining or worsening    Shift Goals  Clinical Goals: Pain control  Patient Goals: rest, get home  Family Goals: truong    Progress made toward(s) clinical / shift goals:    Problem: Knowledge Deficit - Standard  Goal: Patient and family/care givers will demonstrate understanding of plan of care, disease process/condition, diagnostic tests and medications  Outcome: Progressing  Note: Verbalizes understanding of POC     Problem: Pain - Standard  Goal: Alleviation of pain or a reduction in pain to the patient’s comfort goal  Note: Pt having pain on Left side from fall. Splints when coughing appropriately to help manage pain        Patient is not progressing towards the following goals:

## 2024-11-01 NOTE — WOUND TEAM
[facility]  Inpatient Services  Wound and Skin Care Brief Evaluation    Admission Date: 10/31/2024     Last order of IP CONSULT TO WOUND CARE was found on 10/31/2024 from Hospital Encounter on 10/30/2024     HPI, PMH, SH: Reviewed    Chief Complaint   Patient presents with    Trauma Green     74 y.o M BIB care flight as transfer from [facility] for GLF. Pt reports syncopal episode at home, +HS, +LOC, -thinners. Pt denies ETOH use. Imaging from [facility] showed Lt sided hemathorax     Diagnosis: Ground-level fall [W18.30XA]    Unit where seen by Wound Team: [id number]     Wound consult placed regarding L elbow. Chart and images reviewed. This discussed with bedside RN. This clinician in to assess patient. Patient pleasant and agreeable. L elbow with skin tear - mepitel one applied following removal . Non-selectively debrided with Wound cleanser and Gauze.   Sacrum and BL heels intact    No pressure injuries or advanced wound care needs identified. Wound consult completed. No further follow up unless indicated and consulted.     Wound 11/01/24 Skin Tear Elbow Left (Active)   Date First Assessed/Time First Assessed: 11/01/24 1414   Primary Wound Type: Skin Tear  Location: Elbow  Laterality: Left      Assessments 11/1/2024  2:14 PM   Wound Image      Site Assessment Red   Periwound Assessment Intact;Ecchymosis   Margins Defined edges;Attached edges   Closure Open to air   Drainage Amount Scant   Drainage Description Serous   Treatments Cleansed;Site care;Offloading   Wound Cleansing Approved Wound Cleanser   Periwound Protectant Skin Protectant Wipes to Periwound   Dressing Status Clean;Dry;Intact   Dressing Changed New   Dressing Cleansing/Solutions Not Applicable   Dressing Options Mepitel One   Dressing Change/Treatment Frequency Weekly, and As Needed   NEXT Dressing Change/Treatment Date 11/08/24   Wound Team Following Not following   Non-staged Wound Description Partial thickness       PREVENTATIVE  INTERVENTIONS:    Q shift Mg - performed per nursing policy  Q shift pressure point assessments - performed per nursing policy    Surface/Positioning  Standard/trauma mattress - Currently in Place  Reposition q 2 hours - Currently in Place    Offloading/Redistribution  Float Heels off Bed with Pillows - Currently in Place

## 2024-11-01 NOTE — DIETARY
"Nutrition Services: Initial Assessment     Day 1 of admit. Chava Mercedes is a 74 y.o. male with admitting DX of Ground-level fall [W18.30XA]     Consult received for MST score >/= 2: unplanned wt loss 14-23 lb in 6 months, poor PO intake PTA.     Nutrition Assessment:      Height: 182.9 cm (6')  Weight: 74 kg (163 lb 2.3 oz)   Body mass index is 22.13 kg/m². BMI classification: Normal.  Wt Readings from Last 7 Encounters:   11/01/24 74 kg (163 lb 2.3 oz)   10/26/24 78.7 kg (173 lb 8 oz)   10/23/24 83.8 kg (184 lb 11.9 oz)   08/19/24 82.2 kg (181 lb 3.5 oz)   03/06/24 79.7 kg (175 lb 11.3 oz)   12/27/23 87.1 kg (192 lb 0.3 oz)   12/10/17 76.3 kg (168 lb 3.4 oz)      Objective:  Presents with GLF, +headstrike  Problem list    Ground-level fall (POA: Yes)    Coagulopathy (HCC) (POA: Yes)    Alcoholic cirrhosis (HCC) (POA: Yes)    Pleural effusion on left (POA: Yes)    Anemia (POA: Yes)    Edema (POA: Unknown)    Secondary esophageal varices with bleeding (HCC) (POA: Unknown)  Pertinent medical hx: inguinal hernia repair (12/2023)  Pertinent labs: Na 131, glu 100, BUN 7, Ca 8.1, corrected Ca 9.1, alb 2.7,   Pertinent meds: folvite, lasix, lactulose, prilosec, aldactone, oxycodone, carafate, thiamine  Skin/wounds: no documented wounds   Food Allergies: NKFA  Last BM:  (pta),   per flowsheets     Current diet order:   2 Gram Sodium, 2000 mL fluid restriction ; PO intake per ADLs 0% x1, % x2    Subjective:   RD visited pt at bedside. Pt reprots wt loss in past 6 months due to poor appetite since his wife suffered a stroke. Reports intake has been 1/2 of normal. Reports 4-6 beers/day, speaks about this as in the past. States wt loss from 204 lb to \"180-something\" lb. Reports eating well here. Pt agreeable to Ensure supplements w/ meals. RD spoke w/ pt re: fluid restriction, optimizing nutritional intake in setting of decreased appetite.    Nutrition Focused Physical Exam (NFPE) - pt consented to exam  Weight change: " 15% x10 months; 9.9% in 2.5 months is severe  Muscle mass: moderate loss to temporalis, clavicle, deltoid  Subcutaneous fat: mild triceps loss; moderate loss buccal and orbital regions  Fluid Accumulation: 1+ BLE edema  Reduced  Strength: N/A in acute care setting.     Nutrition Diagnosis:      Severe malnutrition in context of chronic illness suspect related to cirrhosis hx  as evidenced by pt reports PO intake ~50% of normal x5 months and wt loss 9.9% in 2.5 months.     Nutrition Interventions:      Provide Ensure Plus High Protein (provides 350 calories, 20 g protein per 8 fl oz) BID.  Patient aware of active plan of care.     Nutrition Monitoring and Evaluation:     Monitor nutrition POC  Encourage PO intake >/=75% meals and supplements.  Document intake of PO as % consumed in ADLs.  Monitor weight trends.      RD following and will provide updated recommendations as indicated.

## 2024-11-02 PROBLEM — E87.6 HYPOKALEMIA: Status: ACTIVE | Noted: 2024-11-02

## 2024-11-02 LAB
ALBUMIN SERPL BCP-MCNC: 2.9 G/DL (ref 3.2–4.9)
ALBUMIN/GLOB SERPL: 0.8 G/DL
ALP SERPL-CCNC: 108 U/L (ref 30–99)
ALT SERPL-CCNC: 30 U/L (ref 2–50)
ANION GAP SERPL CALC-SCNC: 13 MMOL/L (ref 7–16)
AST SERPL-CCNC: 52 U/L (ref 12–45)
BILIRUB SERPL-MCNC: 1 MG/DL (ref 0.1–1.5)
BUN SERPL-MCNC: 5 MG/DL (ref 8–22)
CALCIUM ALBUM COR SERPL-MCNC: 8.8 MG/DL (ref 8.5–10.5)
CALCIUM SERPL-MCNC: 7.9 MG/DL (ref 8.5–10.5)
CHLORIDE SERPL-SCNC: 98 MMOL/L (ref 96–112)
CO2 SERPL-SCNC: 18 MMOL/L (ref 20–33)
CREAT SERPL-MCNC: 0.56 MG/DL (ref 0.5–1.4)
ERYTHROCYTE [DISTWIDTH] IN BLOOD BY AUTOMATED COUNT: 61.4 FL (ref 35.9–50)
GFR SERPLBLD CREATININE-BSD FMLA CKD-EPI: 104 ML/MIN/1.73 M 2
GLOBULIN SER CALC-MCNC: 3.5 G/DL (ref 1.9–3.5)
GLUCOSE SERPL-MCNC: 115 MG/DL (ref 65–99)
HCT VFR BLD AUTO: 25.8 % (ref 42–52)
HGB BLD-MCNC: 8.4 G/DL (ref 14–18)
MAGNESIUM SERPL-MCNC: 1.5 MG/DL (ref 1.5–2.5)
MCH RBC QN AUTO: 27.8 PG (ref 27–33)
MCHC RBC AUTO-ENTMCNC: 32.6 G/DL (ref 32.3–36.5)
MCV RBC AUTO: 85.4 FL (ref 81.4–97.8)
PHOSPHATE SERPL-MCNC: 2.6 MG/DL (ref 2.5–4.5)
PLATELET # BLD AUTO: 154 K/UL (ref 164–446)
PMV BLD AUTO: 10.4 FL (ref 9–12.9)
POTASSIUM SERPL-SCNC: 3.2 MMOL/L (ref 3.6–5.5)
PROT SERPL-MCNC: 6.4 G/DL (ref 6–8.2)
RBC # BLD AUTO: 3.02 M/UL (ref 4.7–6.1)
SODIUM SERPL-SCNC: 129 MMOL/L (ref 135–145)
WBC # BLD AUTO: 5 K/UL (ref 4.8–10.8)

## 2024-11-02 PROCEDURE — A9270 NON-COVERED ITEM OR SERVICE: HCPCS

## 2024-11-02 PROCEDURE — 700102 HCHG RX REV CODE 250 W/ 637 OVERRIDE(OP): Mod: JZ

## 2024-11-02 PROCEDURE — 85027 COMPLETE CBC AUTOMATED: CPT

## 2024-11-02 PROCEDURE — A9270 NON-COVERED ITEM OR SERVICE: HCPCS | Mod: JZ

## 2024-11-02 PROCEDURE — 36415 COLL VENOUS BLD VENIPUNCTURE: CPT

## 2024-11-02 PROCEDURE — 700102 HCHG RX REV CODE 250 W/ 637 OVERRIDE(OP)

## 2024-11-02 PROCEDURE — 770001 HCHG ROOM/CARE - MED/SURG/GYN PRIV*

## 2024-11-02 PROCEDURE — 84100 ASSAY OF PHOSPHORUS: CPT

## 2024-11-02 PROCEDURE — 99232 SBSQ HOSP IP/OBS MODERATE 35: CPT | Performed by: GENERAL PRACTICE

## 2024-11-02 PROCEDURE — 700101 HCHG RX REV CODE 250: Performed by: GENERAL PRACTICE

## 2024-11-02 PROCEDURE — 83735 ASSAY OF MAGNESIUM: CPT

## 2024-11-02 PROCEDURE — A9270 NON-COVERED ITEM OR SERVICE: HCPCS | Performed by: GENERAL PRACTICE

## 2024-11-02 PROCEDURE — 700102 HCHG RX REV CODE 250 W/ 637 OVERRIDE(OP): Performed by: GENERAL PRACTICE

## 2024-11-02 PROCEDURE — 80053 COMPREHEN METABOLIC PANEL: CPT

## 2024-11-02 RX ORDER — FOLIC ACID 1 MG/1
1 TABLET ORAL DAILY
Status: SHIPPED
Start: 2024-11-03

## 2024-11-02 RX ORDER — SPIRONOLACTONE 25 MG/1
25 TABLET ORAL DAILY
Status: SHIPPED
Start: 2024-11-03 | End: 2024-11-03

## 2024-11-02 RX ORDER — POTASSIUM CHLORIDE 1500 MG/1
40 TABLET, EXTENDED RELEASE ORAL ONCE
Status: COMPLETED | OUTPATIENT
Start: 2024-11-02 | End: 2024-11-02

## 2024-11-02 RX ORDER — METHOCARBAMOL 500 MG/1
500 TABLET, FILM COATED ORAL 4 TIMES DAILY PRN
Status: DISCONTINUED | OUTPATIENT
Start: 2024-11-02 | End: 2024-11-03 | Stop reason: HOSPADM

## 2024-11-02 RX ORDER — FUROSEMIDE 20 MG/1
20 TABLET ORAL DAILY
Status: SHIPPED
Start: 2024-11-03 | End: 2024-11-03

## 2024-11-02 RX ORDER — LANOLIN ALCOHOL/MO/W.PET/CERES
100 CREAM (GRAM) TOPICAL DAILY
Status: SHIPPED
Start: 2024-11-02

## 2024-11-02 RX ORDER — METHOCARBAMOL 500 MG/1
500 TABLET, FILM COATED ORAL 4 TIMES DAILY PRN
Status: SHIPPED
Start: 2024-11-02

## 2024-11-02 RX ADMIN — SUCRALFATE 1 G: 1 TABLET ORAL at 09:14

## 2024-11-02 RX ADMIN — DICLOFENAC SODIUM 4 G: 10 GEL TOPICAL at 11:27

## 2024-11-02 RX ADMIN — SUCRALFATE 1 G: 1 TABLET ORAL at 20:31

## 2024-11-02 RX ADMIN — DICLOFENAC SODIUM 4 G: 10 GEL TOPICAL at 17:30

## 2024-11-02 RX ADMIN — FOLIC ACID 1 MG: 1 TABLET ORAL at 06:04

## 2024-11-02 RX ADMIN — OXYCODONE HYDROCHLORIDE 10 MG: 10 TABLET ORAL at 20:37

## 2024-11-02 RX ADMIN — SPIRONOLACTONE 25 MG: 25 TABLET ORAL at 06:04

## 2024-11-02 RX ADMIN — OMEPRAZOLE 40 MG: 20 CAPSULE, DELAYED RELEASE ORAL at 06:04

## 2024-11-02 RX ADMIN — FUROSEMIDE 20 MG: 20 TABLET ORAL at 06:04

## 2024-11-02 RX ADMIN — OXYCODONE HYDROCHLORIDE 10 MG: 10 TABLET ORAL at 09:13

## 2024-11-02 RX ADMIN — LACTULOSE 30 ML: 10 SOLUTION ORAL at 06:04

## 2024-11-02 RX ADMIN — OMEPRAZOLE 40 MG: 20 CAPSULE, DELAYED RELEASE ORAL at 11:27

## 2024-11-02 RX ADMIN — Medication 100 MG: at 06:04

## 2024-11-02 RX ADMIN — SUCRALFATE 1 G: 1 TABLET ORAL at 16:16

## 2024-11-02 RX ADMIN — ACETAMINOPHEN 650 MG: 325 TABLET ORAL at 09:14

## 2024-11-02 RX ADMIN — METHOCARBAMOL 500 MG: 500 TABLET ORAL at 12:24

## 2024-11-02 RX ADMIN — TAMSULOSIN HYDROCHLORIDE 0.4 MG: 0.4 CAPSULE ORAL at 09:14

## 2024-11-02 RX ADMIN — SUCRALFATE 1 G: 1 TABLET ORAL at 11:27

## 2024-11-02 RX ADMIN — METHOCARBAMOL 500 MG: 500 TABLET ORAL at 16:16

## 2024-11-02 RX ADMIN — POTASSIUM CHLORIDE 40 MEQ: 1500 TABLET, EXTENDED RELEASE ORAL at 03:52

## 2024-11-02 ASSESSMENT — PAIN DESCRIPTION - PAIN TYPE
TYPE: ACUTE PAIN

## 2024-11-02 ASSESSMENT — FIBROSIS 4 INDEX: FIB4 SCORE: 4.5

## 2024-11-02 NOTE — PROGRESS NOTES
Bedside report received from off going RN/tech: Carina assumed care of patient.     Fall Risk Score: HIGH RISK  Fall risk interventions in place: Place yellow fall risk ID band on patient, Provide patient/family education based on risk assessment, Educate patient/family to call staff for assistance when getting out of bed, Place fall precaution signage outside patient door, Place patient in room close to nursing station, Utilize bed/chair fall alarm, Notify charge of high risk for huddle, and Bed alarm connected correctly  Bed type: Regular (Mg Score less than 17 interventions in place)  Patient on cardiac monitor: Yes  IVF/IV medications: Not Applicable   Oxygen: Room Air  Bedside sitter: Not Applicable   Isolation: Not applicable

## 2024-11-02 NOTE — CARE PLAN
The patient is Watcher - Medium risk of patient condition declining or worsening    Shift Goals  Clinical Goals: Mobility  Patient Goals: Sleep  Family Goals: JON    Progress made toward(s) clinical / shift goals:  Ambulating with SBA, FWW.    Problem: Fall Risk  Goal: Patient will remain free from falls  Outcome: Progressing  Note: Bed alarm under patient and plugged in. Bed in lowest, locked position. Call light/belongings within reach. Instructed to call before attempting to get out of bed. Ambulating with SBA and FWW.        Patient is not progressing towards the following goals: N/A       Danger to self

## 2024-11-02 NOTE — DISCHARGE PLANNING
Met with pt at bedside to complete assessment and discuss discharge planning.   Confirmed information listed on facesheet. Pt states he lives in a single story duplex. Pt lives alone as his wife is currently a resident @ [facility]. Pt reports being independent at baseline. He owns a walker but hasn't had to use it. Pt states his neighbor helps with transportation. Pt doesn't have a PCP.   Discussed SNF recommendation and pt is agreeable and wants to go to [facility]. Pt states his goal is to return home when stronger and eventually take his wife home as well.     Care Transition Team Assessment    Information Source  Orientation Level: Oriented to person, Oriented to place, Oriented to situation, Disoriented to time  Information Given By: Patient    Elopement Risk  Legal Hold: No  Ambulatory or Self Mobile in Wheelchair: Yes  Disoriented: Time-At Risk for Elopement  Psychiatric Symptoms: None  History of Wandering: No  Elopement this Admit: No  Vocalizing Wanting to Leave: No  Displays Behaviors, Body Language Wanting to Leave: No-Not at Risk for Elopement  Elopement Risk: Not at Risk for Elopement    Interdisciplinary Discharge Planning  Lives with - Patient's Self Care Capacity: Alone and Able to Care For Self  Patient or legal guardian wants to designate a caregiver: No  Support Systems: Family Member(s)  Housing / Facility: [address] [facility]  Prior Services: None    Discharge Preparedness  What is your plan after discharge?: Skilled nursing facility  Prior Functional Level: Ambulatory, Independent with Activities of Daily Living, Independent with Medication Management  Difficulity with ADLs: None  Difficulity with IADLs: None    Functional Assesment  Prior Functional Level: Ambulatory, Independent with Activities of Daily Living, Independent with Medication Management    Finances  Financial Barriers to Discharge: No  Prescription Coverage: Yes    Vision / Hearing Impairment  Vision Impairment : No  Right Eye  Vision: Impaired, Patient Declines to Wear Visual Aid  Left Eye Vision: Impaired, Patient Declines to Wear Visual Aid  Hearing Impairment : No  Hearing Impairment: Both Ears, Hearing Device Not Available    Domestic Abuse  Have you ever been the victim of abuse or violence?: No  Possible Abuse/Neglect Reported to:: Not Applicable    Psychological Assessment  History of Substance Abuse: Alcohol  History of Psychiatric Problems: No  Non-compliant with Treatment: No  Newly Diagnosed Illness: Yes    Discharge Risks or Barriers  Discharge risks or barriers?: No PCP, Lives alone, no community support  Patient risk factors: Lack of outside supports    Anticipated Discharge Information  Discharge Disposition: D/T to SNF with Medicare cert in anticipation of skilled care (03)

## 2024-11-02 NOTE — PROGRESS NOTES
Hospital Medicine Daily Progress Note    Date of Service  11/2/2024    Chief Complaint  Chava Mercedes is a 74 y.o. male admitted 10/31/2024 with GLF    Hospital Course  This is a 74-year-old male with alcoholic liver cirrhosis with continued use and esophageal varices requiring banding and multiple endoscopies, moderate portal hypertensive gastropathy, duodenitis who was admitted on 10/31/2024  after sustaining a ground-level fall.    CT head cervical spine chest abdomen pelvis as well as a lumbar spine performed at outside facility negative for any traumatic injuries.      Chest x-ray noted large left-sided pleural effusion. BNP 86. Thoracentesis performed on 10/31 2000 mL removed.  Repeat x-ray with no evidence of pneumothorax postoperatively.  Cytology pending.  And fluid studies pending.  Echocardiogram noted LVEF of 70%, no valvular abnormalities.    Of note patient was admitted in August 2024 with GI bleed secondary to varices and extensive portal hypertensive gastropathy, treated for alcohol withdrawal symptoms during that admission.  Patient was readmitted in October for GI bleed, patient underwent endoscopy noted moderate portal hypertensive gastropathy and duodenitis.  Continued with melena, CTA noted active extravasation in the small bowel -patient was transferred to [facility] however did not undergo embolization as there is no further episodes of bleeding,discharged home 10/28.    Interval Problem Update  Patient with pleural effusion, 2 L removed.  Started patient on Lasix and Aldactone.  Echocardiogram WNL.    Monitor renal function.    SNF PENDING    I have discussed this patient's plan of care and discharge plan at IDT rounds today with Case Management, Nursing, Nursing leadership, and other members of the IDT team.    Consultants/Specialty  None    Code Status  DNAR/DNI    Disposition  Patient is not medically cleared to discharge to SNF.    I have placed the appropriate orders for post-discharge  needs.    Review of Systems  Review of Systems   All other systems reviewed and are negative.       Physical Exam  Temp:  [36.7 °C (98.1 °F)-38.2 °C (100.8 °F)] 36.7 °C (98.1 °F)  Pulse:  [] 103  Resp:  [16-18] 18  BP: (100-132)/(62-74) 112/74  SpO2:  [92 %-97 %] 97 %    Physical Exam  Vitals and nursing note reviewed.   Constitutional:       General: He is not in acute distress.     Appearance: He is ill-appearing.   HENT:      Head: Normocephalic and atraumatic.   Eyes:      Extraocular Movements: Extraocular movements intact.      Conjunctiva/sclera: Conjunctivae normal.      Pupils: Pupils are equal, round, and reactive to light.   Cardiovascular:      Rate and Rhythm: Normal rate and regular rhythm.      Pulses: Normal pulses.      Heart sounds: No murmur heard.     No friction rub. No gallop.   Pulmonary:      Effort: Pulmonary effort is normal. No respiratory distress.      Breath sounds: Normal breath sounds. No wheezing, rhonchi or rales.   Abdominal:      General: Bowel sounds are normal. There is no distension.      Palpations: Abdomen is soft.      Tenderness: There is no abdominal tenderness.   Musculoskeletal:         General: No swelling or tenderness. Normal range of motion.      Cervical back: Normal range of motion and neck supple. No muscular tenderness.      Right lower leg: Edema present.      Left lower leg: Edema present.   Skin:     General: Skin is warm and dry.      Capillary Refill: Capillary refill takes less than 2 seconds.      Findings: No bruising, erythema or rash.   Neurological:      General: No focal deficit present.      Mental Status: He is alert and oriented to person, place, and time.      Motor: Weakness present.         Fluids    Intake/Output Summary (Last 24 hours) at 11/2/2024 1230  Last data filed at 11/2/2024 0800  Gross per 24 hour   Intake 900 ml   Output 550 ml   Net 350 ml        Laboratory  Recent Labs     10/31/24  0033 11/01/24  0144 11/02/24  0143   WBC  6.7 5.1 5.0   RBC 3.28* 3.03* 3.02*   HEMOGLOBIN 9.2* 8.3* 8.4*   HEMATOCRIT 28.6* 26.1* 25.8*   MCV 87.2 86.1 85.4   MCH 28.0 27.4 27.8   MCHC 32.2* 31.8* 32.6   RDW 65.1* 62.4* 61.4*   PLATELETCT 184 136* 154*   MPV 10.1 10.4 10.4     Recent Labs     10/31/24  0033 11/01/24  0144 11/02/24  0143   SODIUM 131* 131* 129*   POTASSIUM 4.1 4.0 3.2*   CHLORIDE 99 100 98   CO2 17* 20 18*   GLUCOSE 86 100* 115*   BUN 7* 7* 5*   CREATININE 0.61 0.58 0.56   CALCIUM 8.7 8.1* 7.9*     Recent Labs     10/31/24  0033   APTT 41.2*   INR 1.48*               Imaging  EC-ECHOCARDIOGRAM COMPLETE W/O CONT   Final Result      DX-CHEST-PORTABLE (1 VIEW)   Final Result      1.  Small left pleural effusion.      2.  Left basilar atelectasis.      US-THORACENTESIS PUNCTURE LEFT   Final Result      1. Ultrasound guided left sided therapeutic and diagnostic thoracentesis.      2. 2000 mL of fluid withdrawn.      DX-CHEST-LIMITED (1 VIEW)   Final Result         Stable appearance of the chest.      CT-OUTSIDE IMAGES-CT HEAD   Final Result      CT-OUTSIDE IMAGES-CT CERVICAL SPINE   Final Result      CT-OUTSIDE IMAGES-CT LUMBAR SPINE   Final Result      CT-OUTSIDE IMAGES-CT CHEST   Final Result           Assessment/Plan  * Ground-level fall- (present on admission)  Assessment & Plan  CT head cervical spine chest abdomen pelvis as well as a lumbar spine performed at outside facility negative for any traumatic injuries.      Pleural effusion on left- (present on admission)  Assessment & Plan  Chest x-ray noted large left-sided pleural effusion. BNP 86. Thoracentesis performed on 10/31 2000 mL removed.  Repeat x-ray with no evidence of pneumothorax postoperatively.  Cytology pending.  And fluid studies pending. Cultures negative/.  Echocardiogram noted LVEF of 70%, no valvular abnormalities.    Secondary esophageal varices with bleeding (HCC)  Assessment & Plan  Of note patient was admitted in August 2024 with GI bleed secondary to varices and  extensive portal hypertensive gastropathy, treated for alcohol withdrawal symptoms during that admission.  Patient was readmitted in October for GI bleed, patient underwent endoscopy noted moderate portal hypertensive gastropathy and duodenitis.  Continued with melena, CTA noted active extravasation in the small bowel -patient was transferred to [facility] however did not undergo embolization as there is no further episodes of bleeding,discharged home 10/28.    Alcoholic cirrhosis (HCC)- (present on admission)  Assessment & Plan  Of note patient was admitted in August 2024 with GI bleed secondary to varices and extensive portal hypertensive gastropathy, treated for alcohol withdrawal symptoms during that admission.  Patient was readmitted in October for GI bleed, patient underwent endoscopy noted moderate portal hypertensive gastropathy and duodenitis.  Continued with melena, CTA noted active extravasation in the small bowel -patient was transferred to [facility] however did not undergo embolization as there is no further episodes of bleeding,discharged home 10/28.    Added Lasix and Aldactone 11/1  Monitor renal function    Hypokalemia  Assessment & Plan  Oral supplementation  Serial BMP, magnesium, phosphorus levels ordered for tomorrow morning to continue to monitor electrolytes     Edema  Assessment & Plan  Chest x-ray noted large left-sided pleural effusion. BNP 86. Thoracentesis performed on 10/31 2000 mL removed.  Repeat x-ray with no evidence of pneumothorax postoperatively.  Cytology pending.  And fluid studies pending.  Echocardiogram pending.    Anemia- (present on admission)  Assessment & Plan   Patient does have a history of recent GI bleed most likely secondary to chronic liver disease.  Patient   -Continue to monitor CBC  -If hemoglobin drops below 7.0 transfuse or transfuse if hemoglobin is below 8.0 and patient is hemodynamically unstable    Coagulopathy (HCC)- (present on admission)  Assessment &  Plan  Of note patient was admitted in August 2024 with GI bleed secondary to varices and extensive portal hypertensive gastropathy, treated for alcohol withdrawal symptoms during that admission.  Patient was readmitted in October for GI bleed, patient underwent endoscopy noted moderate portal hypertensive gastropathy and duodenitis.  Continued with melena, CTA noted active extravasation in the small bowel -patient was transferred to [facility] however did not undergo embolization as there is no further episodes of bleeding,discharged home 10/28.         VTE prophylaxis: SCDs    I have performed a physical exam and reviewed and updated ROS and Plan today (11/2/2024). In review of yesterday's note (11/1/2024), there are no changes except as documented above.

## 2024-11-02 NOTE — CARE PLAN
The patient is Stable - Low risk of patient condition declining or worsening    Shift Goals  Clinical Goals: Safety, stable VS  Patient Goals: Sleep  Family Goals: JON    Progress made toward(s) clinical / shift goals:      Problem: Pain - Standard  Goal: Alleviation of pain or a reduction in pain to the patient’s comfort goal  Description: Target End Date:  Prior to discharge or change in level of care    Document on Vitals flowsheet    1.  Document pain using the appropriate pain scale per order or unit policy  2.  Educate and implement non-pharmacologic comfort measures (i.e. relaxation, distraction, massage, cold/heat therapy, etc.)  3.  Pain management medications as ordered  4.  Reassess pain after pain med administration per policy  5.  If opiods administered assess patient's response to pain medication is appropriate per POSS sedation scale  6.  Follow pain management plan developed in collaboration with patient and interdisciplinary team (including palliative care or pain specialists if applicable)  Outcome: Progressing     Problem: Knowledge Deficit - Standard  Goal: Patient and family/care givers will demonstrate understanding of plan of care, disease process/condition, diagnostic tests and medications  Description: Target End Date:  1-3 days or as soon as patient condition allows    Document in Patient Education    1.  Patient and family/caregiver oriented to unit, equipment, visitation policy and means for communicating concern  2.  Complete/review Learning Assessment  3.  Assess knowledge level of disease process/condition, treatment plan, diagnostic tests and medications  4.  Explain disease process/condition, treatment plan, diagnostic tests and medications  Outcome: Progressing     Problem: Fall Risk  Goal: Patient will remain free from falls  Description: Target End Date:  Prior to discharge or change in level of care    Document interventions on the Radha Menchaca Fall Risk Assessment    1.  Assess  for fall risk factors  2.  Implement fall precautions  Outcome: Progressing

## 2024-11-02 NOTE — DISCHARGE PLANNING
Case Management Discharge Planning    Admission Date: 10/31/2024  GMLOS: 3  ALOS: 2    Pt's case discussed during IDT rounds. Per MD, possible medical clearance tomorrow for SNF.      left for [facility] ([phone]) to see if they would be able to accept pt tomorrow.

## 2024-11-02 NOTE — DISCHARGE PLANNING
Case Management Discharge Planning    Admission Date: 10/31/2024  GMLOS: 3  ALOS: 2    6-Clicks ADL Score: 12  6-Clicks Mobility Score: 18  PT and/or OT Eval ordered: Yes  Post-acute Referrals Ordered: Yes  Post-acute Choice Obtained: Yes  Has referral(s) been sent to post-acute provider:  Yes      Anticipated Discharge Dispo: Discharge Disposition: D/T to SNF with Medicare cert in anticipation of skilled care (03)    DME Needed: No    Action(s) Taken:     Spoke with Sunny [facility] and confirmed they can accept pt tomorrow.   Transport arranged via [facility] @ 1100 reservation # [id number]   Sunny notified of transport time. She's available tomorrow [phone] if needed.   MD notified of transport time    Will need to cancel at least two hours in advance if pt isn't medically cleared    Escalations Completed: None    Medically Clear: No    Next Steps: D/C to SNF    Barriers to Discharge: Medical clearance    Is the patient up for discharge tomorrow: No

## 2024-11-03 VITALS
DIASTOLIC BLOOD PRESSURE: 74 MMHG | OXYGEN SATURATION: 98 % | SYSTOLIC BLOOD PRESSURE: 109 MMHG | WEIGHT: 164.9 LBS | BODY MASS INDEX: 22.34 KG/M2 | RESPIRATION RATE: 16 BRPM | HEART RATE: 109 BPM | HEIGHT: 72 IN | TEMPERATURE: 97.9 F

## 2024-11-03 LAB
ANION GAP SERPL CALC-SCNC: 11 MMOL/L (ref 7–16)
BACTERIA FLD AEROBE CULT: NORMAL
BUN SERPL-MCNC: 5 MG/DL (ref 8–22)
CALCIUM SERPL-MCNC: 7.9 MG/DL (ref 8.5–10.5)
CHLORIDE SERPL-SCNC: 98 MMOL/L (ref 96–112)
CO2 SERPL-SCNC: 19 MMOL/L (ref 20–33)
CREAT SERPL-MCNC: 0.64 MG/DL (ref 0.5–1.4)
GFR SERPLBLD CREATININE-BSD FMLA CKD-EPI: 100 ML/MIN/1.73 M 2
GLUCOSE SERPL-MCNC: 116 MG/DL (ref 65–99)
GRAM STN SPEC: NORMAL
MAGNESIUM SERPL-MCNC: 1.5 MG/DL (ref 1.5–2.5)
PHOSPHATE SERPL-MCNC: 2.7 MG/DL (ref 2.5–4.5)
POTASSIUM SERPL-SCNC: 3.5 MMOL/L (ref 3.6–5.5)
SIGNIFICANT IND 70042: NORMAL
SITE SITE: NORMAL
SODIUM SERPL-SCNC: 128 MMOL/L (ref 135–145)
SODIUM SERPL-SCNC: 129 MMOL/L (ref 135–145)
SOURCE SOURCE: NORMAL

## 2024-11-03 PROCEDURE — 700102 HCHG RX REV CODE 250 W/ 637 OVERRIDE(OP)

## 2024-11-03 PROCEDURE — 700102 HCHG RX REV CODE 250 W/ 637 OVERRIDE(OP): Performed by: GENERAL PRACTICE

## 2024-11-03 PROCEDURE — A9270 NON-COVERED ITEM OR SERVICE: HCPCS | Performed by: GENERAL PRACTICE

## 2024-11-03 PROCEDURE — A9270 NON-COVERED ITEM OR SERVICE: HCPCS

## 2024-11-03 PROCEDURE — 84100 ASSAY OF PHOSPHORUS: CPT

## 2024-11-03 PROCEDURE — 80048 BASIC METABOLIC PNL TOTAL CA: CPT

## 2024-11-03 PROCEDURE — 84295 ASSAY OF SERUM SODIUM: CPT

## 2024-11-03 PROCEDURE — 99239 HOSP IP/OBS DSCHRG MGMT >30: CPT | Performed by: GENERAL PRACTICE

## 2024-11-03 PROCEDURE — 83735 ASSAY OF MAGNESIUM: CPT

## 2024-11-03 PROCEDURE — 36415 COLL VENOUS BLD VENIPUNCTURE: CPT

## 2024-11-03 RX ORDER — TETRAHYDROZOLINE HCL 0.05 %
1 DROPS OPHTHALMIC (EYE) 4 TIMES DAILY PRN
Status: DISCONTINUED | OUTPATIENT
Start: 2024-11-03 | End: 2024-11-03 | Stop reason: HOSPADM

## 2024-11-03 RX ORDER — POTASSIUM CHLORIDE 1500 MG/1
40 TABLET, EXTENDED RELEASE ORAL ONCE
Status: COMPLETED | OUTPATIENT
Start: 2024-11-03 | End: 2024-11-03

## 2024-11-03 RX ADMIN — OXYCODONE HYDROCHLORIDE 10 MG: 10 TABLET ORAL at 05:11

## 2024-11-03 RX ADMIN — FOLIC ACID 1 MG: 1 TABLET ORAL at 05:09

## 2024-11-03 RX ADMIN — POTASSIUM CHLORIDE 40 MEQ: 1500 TABLET, EXTENDED RELEASE ORAL at 08:26

## 2024-11-03 RX ADMIN — SUCRALFATE 1 G: 1 TABLET ORAL at 08:25

## 2024-11-03 RX ADMIN — DICLOFENAC SODIUM 4 G: 10 GEL TOPICAL at 05:12

## 2024-11-03 RX ADMIN — METHOCARBAMOL 500 MG: 500 TABLET ORAL at 05:10

## 2024-11-03 RX ADMIN — FUROSEMIDE 20 MG: 20 TABLET ORAL at 05:10

## 2024-11-03 RX ADMIN — OMEPRAZOLE 40 MG: 20 CAPSULE, DELAYED RELEASE ORAL at 05:10

## 2024-11-03 RX ADMIN — LACTULOSE 30 ML: 10 SOLUTION ORAL at 05:12

## 2024-11-03 RX ADMIN — Medication 100 MG: at 05:10

## 2024-11-03 RX ADMIN — TAMSULOSIN HYDROCHLORIDE 0.4 MG: 0.4 CAPSULE ORAL at 08:26

## 2024-11-03 RX ADMIN — SPIRONOLACTONE 25 MG: 25 TABLET ORAL at 05:10

## 2024-11-03 RX ADMIN — ACETAMINOPHEN 650 MG: 325 TABLET ORAL at 08:24

## 2024-11-03 ASSESSMENT — FIBROSIS 4 INDEX: FIB4 SCORE: 4.5

## 2024-11-03 ASSESSMENT — PAIN DESCRIPTION - PAIN TYPE
TYPE: ACUTE PAIN

## 2024-11-03 NOTE — PROGRESS NOTES
Bedside report received from off going RN/tech: Idalia, assumed care of patient.     Fall Risk Score: HIGH RISK  Fall risk interventions in place: Place yellow fall risk ID band on patient, Provide patient/family education based on risk assessment, Educate patient/family to call staff for assistance when getting out of bed, Place fall precaution signage outside patient door, Place patient in room close to nursing station, Utilize bed/chair fall alarm, and Notify charge of high risk for huddle  Bed type: Regular (Mg Score less than 17 interventions in place)  Patient on cardiac monitor: No   IVF/IV medications: Not Applicable   Oxygen: Room Air  Bedside sitter: Not Applicable   Isolation: Not applicable

## 2024-11-03 NOTE — DISCHARGE PLANNING
Case Management Discharge Planning    RN CM notified patient is able to transfer to [facility] today. COBRA packet signed and given to bedside RN.

## 2024-11-03 NOTE — DISCHARGE SUMMARY
Discharge Summary    CHIEF COMPLAINT ON ADMISSION  Chief Complaint   Patient presents with    Trauma Green     74 y.o M BIB care flight as transfer from [facility] for [facility]. Pt reports syncopal episode at home, +HS, +LOC, -thinners. Pt denies ETOH use. Imaging from [place] showed Lt sided hemathorax       Reason for Admission  74M- fall    Admission Date  10/31/2024     CODE STATUS  DNAR/DNI    HPI & HOSPITAL COURSE  This is a 74-year-old male with alcoholic liver cirrhosis with continued use and esophageal varices requiring banding and multiple endoscopies, moderate portal hypertensive gastropathy, duodenitis who was admitted on 10/31/2024  after sustaining a ground-level fall.    CT head cervical spine chest abdomen pelvis as well as a lumbar spine performed at outside facility negative for any traumatic injuries.      Chest x-ray noted large left-sided pleural effusion. BNP 86. Thoracentesis performed on 10/31 2000 mL removed.  Repeat x-ray with no evidence of pneumothorax postoperatively.  Cytology pending.  And fluid studies pending.  Echocardiogram noted LVEF of 70%, no valvular abnormalities.    Of note patient was admitted in August 2024 with GI bleed secondary to varices and extensive portal hypertensive gastropathy, treated for alcohol withdrawal symptoms during that admission.  Patient was readmitted in October for GI bleed, patient underwent endoscopy noted moderate portal hypertensive gastropathy and duodenitis.  Continued with melena, CTA noted active extravasation in the small bowel -patient was transferred to [facility] however did not undergo embolization as there is no further episodes of bleeding,discharged home 10/28.    Therefore, he is discharged in good and stable condition to skilled nursing facility.    The patient met 2-midnight criteria for an inpatient stay at the time of discharge.      FOLLOW UP ITEMS POST DISCHARGE  PCP  GI    DISCHARGE DIAGNOSES  Principal Problem:    Ground-level  fall (POA: Yes)  Active Problems:    Pleural effusion on left (POA: Yes)    Alcoholic cirrhosis (HCC) (POA: Yes)    Secondary esophageal varices with bleeding (HCC) (POA: Unknown)    Coagulopathy (HCC) (POA: Yes)    Anemia (POA: Yes)    Edema (POA: Unknown)    Hypokalemia (POA: Unknown)  Resolved Problems:    * No resolved hospital problems. *      FOLLOW UP  [facility] - [address] [address] [phone]  Follow up      [facility]  [address] [place] [id number] [phone]        [facility] [place] [address]  [phone]          MEDICATIONS ON DISCHARGE     Medication List        START taking these medications        Instructions   folic acid 1 MG Tabs  Commonly known as: Folvite   Take 1 Tablet by mouth every day.  Dose: 1 mg     methocarbamol 500 MG Tabs  Commonly known as: Robaxin   Take 1 Tablet by mouth 4 times a day as needed (muscle spasms).  Dose: 500 mg            CONTINUE taking these medications        Instructions   cyanocobalamin 100 MCG Tabs  Commonly known as: Vitamin B-12   Take 100 mcg by mouth every day.  Dose: 100 mcg     lactulose 10 GM/15ML Soln   Take 30 mL by mouth 3 times a day.  Dose: 30 mL     omeprazole 40 MG delayed-release capsule  Commonly known as: PriLOSEC   Take 1 Capsule by mouth 2 times a day for 90 days.  Dose: 40 mg     sucralfate 1 GM Tabs  Commonly known as: Carafate   Take 1 g by mouth 4 times a day.  Dose: 1 g     tamsulosin 0.4 MG capsule  Commonly known as: Flomax   Take 1 Capsule by mouth 1/2 hour after breakfast.  Dose: 0.4 mg     thiamine 100 MG tablet  Commonly known as: Thiamine   Take 1 Tablet by mouth every day.  Dose: 100 mg              Allergies  Allergies   Allergen Reactions    Morphine Itching     Itchiness        DIET  Orders Placed This Encounter   Procedures    Diet Order Diet: 2 Gram Sodium; Fluid modifications:  (optional): 2000 ml Fluid Restriction     Standing Status:   Standing     Number of Occurrences:   1     Order Specific Question:   Diet:     Answer:   2 Gram Sodium [7]     Order Specific Question:   Fluid modifications: (optional)     Answer:   2000 ml Fluid Restriction [11]       ACTIVITY  As tolerated and directed by skilled nursing.  Weight bearing as tolerated    LINES, DRAINS, AND WOUNDS  This is an automated list. Peripheral IVs will be removed prior to discharge.  Peripheral IV 10/31/24 20 G Right Upper arm (Active)   Site Assessment Clean;Dry;Intact 11/02/24 2037   Dressing Type Transparent 11/02/24 2037   Line Status Flushed;Scrubbed the hub prior to access;Saline locked 11/02/24 2037   Dressing Status Clean;Dry;Intact 11/02/24 2037   Dressing Intervention N/A 11/02/24 0900   Infiltration Grading ([facility]) 0 11/02/24 0900   Phlebitis Scale ([facility] Only) 0 11/02/24 0900       Wound 10/25/24 Perineum REDNESS/EXCORIATION (Active)       Wound 11/01/24 Skin Tear Elbow Left (Active)   Wound Image    11/01/24 1414   Site Assessment Dry;Clean;Red;Scabbed 11/02/24 2037   Periwound Assessment Clean;Dry 11/02/24 2037   Margins Defined edges;Attached edges 11/02/24 0900   Closure Open to air 11/01/24 2000   Drainage Amount Scant 11/02/24 0900   Drainage Description Serosanguineous 11/02/24 0900   Treatments Cleansed;Site care 11/02/24 0900   Wound Cleansing Approved Wound Cleanser 11/02/24 0900   Periwound Protectant Skin Protectant Wipes to Periwound 11/02/24 0900   Dressing Status Clean;Dry;Intact 11/02/24 2037   Dressing Changed Observed 11/02/24 2037   Dressing Cleansing/Solutions Not Applicable 11/01/24 1414   Dressing Options Mepitel One 11/02/24 2037   Dressing Change/Treatment Frequency Weekly, and As Needed 11/02/24 2037   NEXT Dressing Change/Treatment Date 11/08/24 11/02/24 0900   Wound Team Following Not following 11/01/24 1414   Non-staged Wound Description Partial thickness 11/01/24 1414        Peripheral IV 10/31/24 20 G Right Upper arm (Active)   Site Assessment Clean;Dry;Intact 11/02/24 2037   Dressing Type Transparent 11/02/24 2037   Line Status Flushed;Scrubbed the hub prior to access;Saline locked 11/02/24 2037   Dressing Status Clean;Dry;Intact 11/02/24 2037   Dressing Intervention N/A 11/02/24 0900   Infiltration Grading ([facility]) 0 11/02/24 0900   Phlebitis Scale ([facility] Only) 0 11/02/24 0900               MENTAL STATUS ON TRANSFER             CONSULTATIONS  None    PROCEDURES  Thoracentesis    LABORATORY  Lab Results   Component Value Date    SODIUM 128 (L) 11/03/2024    POTASSIUM 3.5 (L) 11/03/2024    CHLORIDE 98 11/03/2024    CO2 19 (L) 11/03/2024    GLUCOSE 116 (H) 11/03/2024    BUN 5 (L) 11/03/2024    CREATININE 0.64 11/03/2024        Lab Results   Component Value Date    WBC 5.0 11/02/2024    HEMOGLOBIN 8.4 (L) 11/02/2024    HEMATOCRIT 25.8 (L) 11/02/2024    PLATELETCT 154 (L) 11/02/2024      EC-ECHOCARDIOGRAM COMPLETE W/O CONT   Final Result      DX-CHEST-PORTABLE (1 VIEW)   Final Result      1.  Small left pleural effusion.      2.  Left basilar atelectasis.      US-THORACENTESIS PUNCTURE LEFT   Final Result      1. Ultrasound guided left sided therapeutic and diagnostic thoracentesis.      2. 2000 mL of fluid withdrawn.      DX-CHEST-LIMITED (1 VIEW)   Final Result         Stable appearance of the chest.      CT-OUTSIDE IMAGES-CT HEAD   Final Result      CT-OUTSIDE IMAGES-CT CERVICAL SPINE   Final Result      CT-OUTSIDE IMAGES-CT LUMBAR SPINE   Final Result      CT-OUTSIDE IMAGES-CT CHEST   Final Result         Total time of the discharge process exceeds 45 minutes.

## 2024-11-03 NOTE — PROGRESS NOTES
Patient discharged to [facility] in stable condition. PIV removed, catheter tip intact. Toileted prior to discharge. Discharge education provided, all questions answered. Transportation provided by [facility] via wheelchair, COBRA packet delivered to [facility] staff. Spouse Amalia notified of transfer.

## 2024-11-03 NOTE — CARE PLAN
The patient is Stable - Low risk of patient condition declining or worsening    Shift Goals  Clinical Goals: pain management, mobility  Patient Goals: rest, pain control  Family Goals: JON    Progress made toward(s) clinical / shift goals:    Problem: Pain - Standard  Goal: Alleviation of pain or a reduction in pain to the patient’s comfort goal  Outcome: Progressing  Note: PRN medication given as ordered. Patient able to rest.      Problem: Knowledge Deficit - Standard  Goal: Patient and family/care givers will demonstrate understanding of plan of care, disease process/condition, diagnostic tests and medications  Outcome: Progressing  Note: Patient verbalized understanding to plan of care and medications.      Problem: Fall Risk  Goal: Patient will remain free from falls  Note: Bed locked and in lowest position. Patient scores high on polk maikel fall assessment. Bed alarm in place. Patient calling appropriatr       Problem: Skin Integrity  Goal: Skin integrity is maintained or improved  Not: Patient able to turn self. Patient has heel and elbow mepilex in place.   Outcome: Progressing

## 2024-11-04 LAB — COMPONENT CELLULAR 8504CLL: NORMAL

## 2024-11-07 NOTE — DISCHARGE PLANNING
DPA received a faxed response from [facility] declining the home health referral.  Pt does not have a PCP, is not homebound, driving, and confused about his wife.  [facility] did encourage the pt to go to urgent care.

## 2024-11-19 NOTE — DISCHARGE SUMMARY
"Discharge Summary    CHIEF COMPLAINT ON ADMISSION  No chief complaint on file.      Reason for Admission  GI bleed requiring IR embolization     Admission Date  10/24/2024    CODE STATUS  Prior    HPI & HOSPITAL COURSE  Per H&P: \"Chava Mercedes is a 74 y.o. male with a past medical history of alcoholic cirrhosis admitted 10/19/2024 with GI bleeding.  The patient was started on octreotide infusion and intravenous Protonix.  Gastroenterology were consulted, Dr. Palencia from [facility] performed an upper endoscopy that revealed Ramirez's esophagus without esophageal varices.  Patient was also found to have moderate portal hypertensive gastropathy, in addition to duodenitis.  Unfortunately, the patient continued to have melanotic and bloody stools.  He required multiple blood transfusions.  Today, October 23, his hemoglobin dropped to 6.9 this morning.  He required a blood transfusion. CT angiography is revealing a focus of active extravasation in the small bowel.  I discussed patient condition with Dr. Allen.  I discussed the finding with the patient.  He is agreeable to IR embolization.  He understand the risk and benefits of this procedure. I discussed patient condition with interventional radiology on-call, Dr. Lees.  Bleeding location appears to be in the small intestine which can be very technically difficult.  Dr. Lees will attempt angioembolization.  The patient will be transferred to [facility] for IR consultation/attempt embolization I with a bleeding focus.\"  When he got to [facility] he had no further bleeding episodes. Hgb was stable and vitals stable. Discussed with IR and decision was made to monitor for now and if any further bleeding occurs then to proceed with embolization but at least initially did appear to stop.  SBP ppx. Extended withdrawal episodes and slow to get back to baseline mentally.    10/27  still quite weak, unsteady on his feet, quite adamant that he does not " want to go to a SNF, initially saying he was planning ongoing.  I spent a while trying to convince him otherwise however I was unsuccessful.    10/28 medically cleared for DC, declines SNF, HH arranged.    Therefore, he is discharged in fair and stable condition to home with organized home healthcare and close outpatient follow-up.    The patient met 2-midnight criteria for an inpatient stay at the time of discharge.    Discharge Date  10/28/2024    FOLLOW UP ITEMS POST DISCHARGE  PCP follow up for post hospital care    DISCHARGE DIAGNOSES  Principal Problem:    GI bleeding (POA: Yes)  Active Problems:    Acute GI bleeding (POA: Yes)    Alcohol dependence with withdrawal delirium (HCC) (POA: Yes)    Severe protein-calorie malnutrition (HCC) (POA: Yes)    Alcoholic liver disease (HCC) (POA: Yes)    Thrombocytopenia (HCC) (POA: Yes)    Coagulopathy (HCC) (POA: Yes)    Alcoholic cirrhosis (HCC) (POA: Yes)    ACP (advance care planning) (POA: Yes)    Scrotal swelling (POA: Yes)    Ramirez's esophagus (POA: Yes)    Lower GI bleeding (POA: Yes)    Anemia (POA: Yes)  Resolved Problems:    * No resolved hospital problems. *      FOLLOW UP  No future appointments.  [facility]  [phone], 24 hours a day, 7 days a week  Call  Please call for assistance with cutting back or stopping alcohol.    [facility]  [facility] [address] [place] [address] [phone]    Schedule an appointment as soon as possible for a visit  Call to schedule an appointment to establish care with a primary care provider.    Pcp Unknown            MEDICATIONS ON DISCHARGE     Medication List        START taking these medications        Instructions   lactulose 10 GM/15ML Soln   Take 30 mL by mouth 3 times a day.  Dose: 30 mL     omeprazole 40 MG delayed-release capsule  Commonly known as: PriLOSEC   Take 1 Capsule by mouth 2 times a day for 90 days.  Dose: 40 mg     tamsulosin 0.4 MG capsule  Commonly known  as: Flomax   Take 1 Capsule by mouth 1/2 hour after breakfast.  Dose: 0.4 mg            CONTINUE taking these medications        Instructions   cyanocobalamin 100 MCG Tabs  Commonly known as: Vitamin B-12   Take 100 mcg by mouth every day.  Dose: 100 mcg     sucralfate 1 GM Tabs  Commonly known as: Carafate   Take 1 g by mouth 4 times a day.  Dose: 1 g            STOP taking these medications      pantoprazole 40 MG Tbec  Commonly known as: Protonix            ASK your doctor about these medications        Instructions   sulfamethoxazole-trimethoprim 800-160 MG tablet  Commonly known as: Bactrim DS  Ask about: Should I take this medication?   Take 1 Tablet by mouth every 12 hours for 2 days.  Dose: 1 Tablet              Allergies  Allergies   Allergen Reactions    Morphine Itching     Itchiness        DIET  No orders of the defined types were placed in this encounter.      ACTIVITY  As tolerated.  Weight bearing as tolerated    CONSULTATIONS  NA    PROCEDURES  NA    LABORATORY  Lab Results   Component Value Date    SODIUM 129 (L) 11/03/2024    POTASSIUM 3.5 (L) 11/03/2024    CHLORIDE 98 11/03/2024    CO2 19 (L) 11/03/2024    GLUCOSE 116 (H) 11/03/2024    BUN 5 (L) 11/03/2024    CREATININE 0.64 11/03/2024        Lab Results   Component Value Date    WBC 5.0 11/02/2024    HEMOGLOBIN 8.4 (L) 11/02/2024    HEMATOCRIT 25.8 (L) 11/02/2024    PLATELETCT 154 (L) 11/02/2024        Total time of the discharge process exceeds 35 minutes.

## 2024-11-26 LAB
FUNGUS SPEC CULT: NORMAL
FUNGUS SPEC FUNGUS STN: NORMAL
SIGNIFICANT IND 70042: NORMAL
SITE SITE: NORMAL
SOURCE SOURCE: NORMAL

## 2024-12-13 LAB
MYCOBACTERIUM SPEC CULT: NORMAL
RHODAMINE-AURAMINE STN SPEC: NORMAL
SIGNIFICANT IND 70042: NORMAL
SITE SITE: NORMAL
SOURCE SOURCE: NORMAL

## 2025-01-22 ENCOUNTER — ANESTHESIA EVENT (OUTPATIENT)
Dept: SURGERY | Facility: MEDICAL CENTER | Age: 75
End: 2025-01-22
Payer: MEDICARE

## 2025-01-22 ENCOUNTER — HOSPITAL ENCOUNTER (INPATIENT)
Facility: MEDICAL CENTER | Age: 75
LOS: 3 days | End: 2025-01-25
Attending: HOSPITALIST | Admitting: HOSPITALIST
Payer: MEDICARE

## 2025-01-22 ENCOUNTER — ANESTHESIA (OUTPATIENT)
Dept: SURGERY | Facility: MEDICAL CENTER | Age: 75
End: 2025-01-22
Payer: MEDICARE

## 2025-01-22 ENCOUNTER — HOSPITAL ENCOUNTER (OUTPATIENT)
Dept: RADIOLOGY | Facility: MEDICAL CENTER | Age: 75
End: 2025-01-22

## 2025-01-22 DIAGNOSIS — K70.31 ALCOHOLIC CIRRHOSIS OF LIVER WITH ASCITES (HCC): ICD-10-CM

## 2025-01-22 DIAGNOSIS — K92.2 ACUTE GI BLEEDING: ICD-10-CM

## 2025-01-22 PROBLEM — A41.9 SEPSIS (HCC): Status: ACTIVE | Noted: 2025-01-22

## 2025-01-22 PROBLEM — A41.9 SEPSIS (HCC): Status: RESOLVED | Noted: 2025-01-22 | Resolved: 2025-01-22

## 2025-01-22 PROBLEM — K52.9 JEJUNITIS: Status: ACTIVE | Noted: 2025-01-22

## 2025-01-22 LAB
ABO GROUP BLD: NORMAL
ALBUMIN SERPL BCP-MCNC: 2.3 G/DL (ref 3.2–4.9)
ALBUMIN/GLOB SERPL: 0.5 G/DL
ALP SERPL-CCNC: 92 U/L (ref 30–99)
ALT SERPL-CCNC: 19 U/L (ref 2–50)
ANION GAP SERPL CALC-SCNC: 9 MMOL/L (ref 7–16)
APPEARANCE FLD: NORMAL
AST SERPL-CCNC: 30 U/L (ref 12–45)
BARCODED ABORH UBTYP: 600
BARCODED PRD CODE UBPRD: NORMAL
BARCODED UNIT NUM UBUNT: NORMAL
BASOPHILS # BLD AUTO: 0.9 % (ref 0–1.8)
BASOPHILS # BLD: 0.05 K/UL (ref 0–0.12)
BILIRUB SERPL-MCNC: 0.8 MG/DL (ref 0.1–1.5)
BLD GP AB SCN SERPL QL: NORMAL
BODY FLD TYPE: NORMAL
BODY FLD TYPE: NORMAL
BUN SERPL-MCNC: 20 MG/DL (ref 8–22)
CALCIUM ALBUM COR SERPL-MCNC: 9 MG/DL (ref 8.5–10.5)
CALCIUM SERPL-MCNC: 7.6 MG/DL (ref 8.4–10.2)
CFT BLD TEG: 4.7 MIN (ref 4.6–9.1)
CFT P HPASE BLD TEG: 4.3 MIN (ref 4.3–8.3)
CHLORIDE SERPL-SCNC: 103 MMOL/L (ref 96–112)
CLOT ANGLE BLD TEG: 74 DEGREES (ref 63–78)
CLOT LYSIS 30M P MA LENFR BLD TEG: 2.1 % (ref 0–2.6)
CO2 SERPL-SCNC: 20 MMOL/L (ref 20–33)
COLOR FLD: YELLOW
COMPONENT R 8504R: NORMAL
CREAT SERPL-MCNC: 0.74 MG/DL (ref 0.5–1.4)
CT.EXTRINSIC BLD ROTEM: 1.3 MIN (ref 0.8–2.1)
EOSINOPHIL # BLD AUTO: 0.02 K/UL (ref 0–0.51)
EOSINOPHIL NFR BLD: 0.4 % (ref 0–6.9)
ERYTHROCYTE [DISTWIDTH] IN BLOOD BY AUTOMATED COUNT: 61.4 FL (ref 35.9–50)
GFR SERPLBLD CREATININE-BSD FMLA CKD-EPI: 95 ML/MIN/1.73 M 2
GLOBULIN SER CALC-MCNC: 4.3 G/DL (ref 1.9–3.5)
GLUCOSE SERPL-MCNC: 98 MG/DL (ref 65–99)
HCT VFR BLD AUTO: 28.8 % (ref 42–52)
HGB BLD-MCNC: 8.9 G/DL (ref 14–18)
HGB BLD-MCNC: 8.9 G/DL (ref 14–18)
IMM GRANULOCYTES # BLD AUTO: 0.02 K/UL (ref 0–0.11)
IMM GRANULOCYTES NFR BLD AUTO: 0.4 % (ref 0–0.9)
INR PPP: 1.48 (ref 0.87–1.13)
LYMPHOCYTES # BLD AUTO: 1.07 K/UL (ref 1–4.8)
LYMPHOCYTES NFR BLD: 18.8 % (ref 22–41)
LYMPHOCYTES NFR FLD: 31 %
MCF BLD TEG: 55.9 MM (ref 52–69)
MCF.PLATELET INHIB BLD ROTEM: 16.1 MM (ref 15–32)
MCH RBC QN AUTO: 26.4 PG (ref 27–33)
MCHC RBC AUTO-ENTMCNC: 30.9 G/DL (ref 32.3–36.5)
MCV RBC AUTO: 85.5 FL (ref 81.4–97.8)
MONOCYTES # BLD AUTO: 0.96 K/UL (ref 0–0.85)
MONOCYTES NFR BLD AUTO: 16.9 % (ref 0–13.4)
MONOS+MACROS NFR FLD MANUAL: 64 %
NEUTROPHILS # BLD AUTO: 3.56 K/UL (ref 1.82–7.42)
NEUTROPHILS NFR BLD: 62.6 % (ref 44–72)
NEUTROPHILS NFR FLD: 5 %
NRBC # BLD AUTO: 0 K/UL
NRBC BLD-RTO: 0 /100 WBC (ref 0–0.2)
NUC CELL # FLD: 273 CELLS/UL
PLATELET # BLD AUTO: 216 K/UL (ref 164–446)
PMV BLD AUTO: 10.5 FL (ref 9–12.9)
POTASSIUM SERPL-SCNC: 4.5 MMOL/L (ref 3.6–5.5)
PRODUCT TYPE UPROD: NORMAL
PROT FLD-MCNC: 1.3 G/DL
PROT SERPL-MCNC: 6.6 G/DL (ref 6–8.2)
PROTHROMBIN TIME: 18.4 SEC (ref 12–14.6)
RBC # BLD AUTO: 3.37 M/UL (ref 4.7–6.1)
RBC # FLD: 4000 CELLS/UL
RH BLD: NORMAL
SODIUM SERPL-SCNC: 132 MMOL/L (ref 135–145)
TEG ALGORITHM TGALG: NORMAL
UNIT STATUS USTAT: NORMAL
WBC # BLD AUTO: 5.7 K/UL (ref 4.8–10.8)

## 2025-01-22 PROCEDURE — 80053 COMPREHEN METABOLIC PANEL: CPT

## 2025-01-22 PROCEDURE — 700111 HCHG RX REV CODE 636 W/ 250 OVERRIDE (IP): Mod: JZ | Performed by: INTERNAL MEDICINE

## 2025-01-22 PROCEDURE — 89051 BODY FLUID CELL COUNT: CPT

## 2025-01-22 PROCEDURE — 0DJ08ZZ INSPECTION OF UPPER INTESTINAL TRACT, VIA NATURAL OR ARTIFICIAL OPENING ENDOSCOPIC: ICD-10-PCS | Performed by: INTERNAL MEDICINE

## 2025-01-22 PROCEDURE — 700101 HCHG RX REV CODE 250: Performed by: STUDENT IN AN ORGANIZED HEALTH CARE EDUCATION/TRAINING PROGRAM

## 2025-01-22 PROCEDURE — 160202 HCHG ENDO MINUTES - 1ST 30 MINS LEVEL 3: Performed by: INTERNAL MEDICINE

## 2025-01-22 PROCEDURE — 94760 N-INVAS EAR/PLS OXIMETRY 1: CPT

## 2025-01-22 PROCEDURE — 160009 HCHG ANES TIME/MIN: Performed by: INTERNAL MEDICINE

## 2025-01-22 PROCEDURE — 84157 ASSAY OF PROTEIN OTHER: CPT

## 2025-01-22 PROCEDURE — P9047 ALBUMIN (HUMAN), 25%, 50ML: HCPCS | Mod: JZ | Performed by: STUDENT IN AN ORGANIZED HEALTH CARE EDUCATION/TRAINING PROGRAM

## 2025-01-22 PROCEDURE — 700105 HCHG RX REV CODE 258: Performed by: INTERNAL MEDICINE

## 2025-01-22 PROCEDURE — 85576 BLOOD PLATELET AGGREGATION: CPT

## 2025-01-22 PROCEDURE — 0W9G3ZZ DRAINAGE OF PERITONEAL CAVITY, PERCUTANEOUS APPROACH: ICD-10-PCS | Performed by: STUDENT IN AN ORGANIZED HEALTH CARE EDUCATION/TRAINING PROGRAM

## 2025-01-22 PROCEDURE — 700105 HCHG RX REV CODE 258: Performed by: STUDENT IN AN ORGANIZED HEALTH CARE EDUCATION/TRAINING PROGRAM

## 2025-01-22 PROCEDURE — 160035 HCHG PACU - 1ST 60 MINS PHASE I: Performed by: INTERNAL MEDICINE

## 2025-01-22 PROCEDURE — 49083 ABD PARACENTESIS W/IMAGING: CPT | Performed by: STUDENT IN AN ORGANIZED HEALTH CARE EDUCATION/TRAINING PROGRAM

## 2025-01-22 PROCEDURE — 85025 COMPLETE CBC W/AUTO DIFF WBC: CPT

## 2025-01-22 PROCEDURE — 85384 FIBRINOGEN ACTIVITY: CPT

## 2025-01-22 PROCEDURE — 700102 HCHG RX REV CODE 250 W/ 637 OVERRIDE(OP): Performed by: STUDENT IN AN ORGANIZED HEALTH CARE EDUCATION/TRAINING PROGRAM

## 2025-01-22 PROCEDURE — 87070 CULTURE OTHR SPECIMN AEROBIC: CPT

## 2025-01-22 PROCEDURE — 85018 HEMOGLOBIN: CPT

## 2025-01-22 PROCEDURE — 86850 RBC ANTIBODY SCREEN: CPT

## 2025-01-22 PROCEDURE — 82042 OTHER SOURCE ALBUMIN QUAN EA: CPT

## 2025-01-22 PROCEDURE — 85610 PROTHROMBIN TIME: CPT

## 2025-01-22 PROCEDURE — 770020 HCHG ROOM/CARE - TELE (206)

## 2025-01-22 PROCEDURE — 86900 BLOOD TYPING SEROLOGIC ABO: CPT

## 2025-01-22 PROCEDURE — 86901 BLOOD TYPING SEROLOGIC RH(D): CPT

## 2025-01-22 PROCEDURE — 160002 HCHG RECOVERY MINUTES (STAT): Performed by: INTERNAL MEDICINE

## 2025-01-22 PROCEDURE — 85347 COAGULATION TIME ACTIVATED: CPT

## 2025-01-22 PROCEDURE — 700111 HCHG RX REV CODE 636 W/ 250 OVERRIDE (IP): Mod: JZ | Performed by: STUDENT IN AN ORGANIZED HEALTH CARE EDUCATION/TRAINING PROGRAM

## 2025-01-22 PROCEDURE — 160048 HCHG OR STATISTICAL LEVEL 1-5: Performed by: INTERNAL MEDICINE

## 2025-01-22 PROCEDURE — A9270 NON-COVERED ITEM OR SERVICE: HCPCS | Performed by: STUDENT IN AN ORGANIZED HEALTH CARE EDUCATION/TRAINING PROGRAM

## 2025-01-22 PROCEDURE — 49082 ABD PARACENTESIS: CPT

## 2025-01-22 PROCEDURE — 87205 SMEAR GRAM STAIN: CPT

## 2025-01-22 PROCEDURE — 700111 HCHG RX REV CODE 636 W/ 250 OVERRIDE (IP): Performed by: STUDENT IN AN ORGANIZED HEALTH CARE EDUCATION/TRAINING PROGRAM

## 2025-01-22 PROCEDURE — 99291 CRITICAL CARE FIRST HOUR: CPT | Performed by: INTERNAL MEDICINE

## 2025-01-22 RX ORDER — OXYCODONE HCL 5 MG/5 ML
10 SOLUTION, ORAL ORAL
Status: DISCONTINUED | OUTPATIENT
Start: 2025-01-22 | End: 2025-01-22 | Stop reason: HOSPADM

## 2025-01-22 RX ORDER — HALOPERIDOL 5 MG/ML
1 INJECTION INTRAMUSCULAR
Status: DISCONTINUED | OUTPATIENT
Start: 2025-01-22 | End: 2025-01-22 | Stop reason: HOSPADM

## 2025-01-22 RX ORDER — FUROSEMIDE 20 MG/1
20 TABLET ORAL
Status: DISCONTINUED | OUTPATIENT
Start: 2025-01-23 | End: 2025-01-25 | Stop reason: HOSPADM

## 2025-01-22 RX ORDER — LACTULOSE 10 G/15ML
30 SOLUTION ORAL 2 TIMES DAILY
Status: DISCONTINUED | OUTPATIENT
Start: 2025-01-22 | End: 2025-01-25 | Stop reason: HOSPADM

## 2025-01-22 RX ORDER — PANTOPRAZOLE SODIUM 40 MG/10ML
40 INJECTION, POWDER, LYOPHILIZED, FOR SOLUTION INTRAVENOUS 2 TIMES DAILY
Status: DISCONTINUED | OUTPATIENT
Start: 2025-01-22 | End: 2025-01-25 | Stop reason: HOSPADM

## 2025-01-22 RX ORDER — DIPHENHYDRAMINE HYDROCHLORIDE 50 MG/ML
12.5 INJECTION INTRAMUSCULAR; INTRAVENOUS
Status: DISCONTINUED | OUTPATIENT
Start: 2025-01-22 | End: 2025-01-22 | Stop reason: HOSPADM

## 2025-01-22 RX ORDER — SODIUM CHLORIDE, SODIUM LACTATE, POTASSIUM CHLORIDE, CALCIUM CHLORIDE 600; 310; 30; 20 MG/100ML; MG/100ML; MG/100ML; MG/100ML
INJECTION, SOLUTION INTRAVENOUS
Status: DISCONTINUED | OUTPATIENT
Start: 2025-01-22 | End: 2025-01-22 | Stop reason: SURG

## 2025-01-22 RX ORDER — DEXAMETHASONE SODIUM PHOSPHATE 4 MG/ML
INJECTION, SOLUTION INTRA-ARTICULAR; INTRALESIONAL; INTRAMUSCULAR; INTRAVENOUS; SOFT TISSUE PRN
Status: DISCONTINUED | OUTPATIENT
Start: 2025-01-22 | End: 2025-01-22 | Stop reason: SURG

## 2025-01-22 RX ORDER — SODIUM CHLORIDE, SODIUM LACTATE, POTASSIUM CHLORIDE, CALCIUM CHLORIDE 600; 310; 30; 20 MG/100ML; MG/100ML; MG/100ML; MG/100ML
INJECTION, SOLUTION INTRAVENOUS CONTINUOUS
Status: DISCONTINUED | OUTPATIENT
Start: 2025-01-22 | End: 2025-01-22 | Stop reason: HOSPADM

## 2025-01-22 RX ORDER — HYDRALAZINE HYDROCHLORIDE 20 MG/ML
5 INJECTION INTRAMUSCULAR; INTRAVENOUS
Status: DISCONTINUED | OUTPATIENT
Start: 2025-01-22 | End: 2025-01-22 | Stop reason: HOSPADM

## 2025-01-22 RX ORDER — SUCCINYLCHOLINE CHLORIDE 20 MG/ML
INJECTION INTRAMUSCULAR; INTRAVENOUS PRN
Status: DISCONTINUED | OUTPATIENT
Start: 2025-01-22 | End: 2025-01-22 | Stop reason: SURG

## 2025-01-22 RX ORDER — LIDOCAINE HYDROCHLORIDE 10 MG/ML
INJECTION, SOLUTION INFILTRATION; PERINEURAL
Status: ACTIVE
Start: 2025-01-22 | End: 2025-01-23

## 2025-01-22 RX ORDER — PHENYLEPHRINE HCL IN 0.9% NACL 1 MG/10 ML
SYRINGE (ML) INTRAVENOUS PRN
Status: DISCONTINUED | OUTPATIENT
Start: 2025-01-22 | End: 2025-01-22 | Stop reason: SURG

## 2025-01-22 RX ORDER — EPHEDRINE SULFATE 50 MG/ML
5 INJECTION, SOLUTION INTRAVENOUS
Status: DISCONTINUED | OUTPATIENT
Start: 2025-01-22 | End: 2025-01-22 | Stop reason: HOSPADM

## 2025-01-22 RX ORDER — ONDANSETRON 2 MG/ML
4 INJECTION INTRAMUSCULAR; INTRAVENOUS
Status: DISCONTINUED | OUTPATIENT
Start: 2025-01-22 | End: 2025-01-22 | Stop reason: HOSPADM

## 2025-01-22 RX ORDER — LIDOCAINE HYDROCHLORIDE 20 MG/ML
INJECTION, SOLUTION EPIDURAL; INFILTRATION; INTRACAUDAL; PERINEURAL PRN
Status: DISCONTINUED | OUTPATIENT
Start: 2025-01-22 | End: 2025-01-22 | Stop reason: SURG

## 2025-01-22 RX ORDER — MIDAZOLAM HYDROCHLORIDE 1 MG/ML
INJECTION INTRAMUSCULAR; INTRAVENOUS
Status: ACTIVE
Start: 2025-01-22 | End: 2025-01-23

## 2025-01-22 RX ORDER — ALBUMIN (HUMAN) 12.5 G/50ML
25 SOLUTION INTRAVENOUS ONCE
Status: COMPLETED | OUTPATIENT
Start: 2025-01-22 | End: 2025-01-22

## 2025-01-22 RX ORDER — SPIRONOLACTONE 25 MG/1
25 TABLET ORAL
Status: DISCONTINUED | OUTPATIENT
Start: 2025-01-23 | End: 2025-01-25 | Stop reason: HOSPADM

## 2025-01-22 RX ORDER — ALBUTEROL SULFATE 5 MG/ML
2.5 SOLUTION RESPIRATORY (INHALATION)
Status: DISCONTINUED | OUTPATIENT
Start: 2025-01-22 | End: 2025-01-22 | Stop reason: HOSPADM

## 2025-01-22 RX ORDER — ONDANSETRON 2 MG/ML
INJECTION INTRAMUSCULAR; INTRAVENOUS PRN
Status: DISCONTINUED | OUTPATIENT
Start: 2025-01-22 | End: 2025-01-22 | Stop reason: SURG

## 2025-01-22 RX ORDER — OXYCODONE HCL 5 MG/5 ML
5 SOLUTION, ORAL ORAL
Status: DISCONTINUED | OUTPATIENT
Start: 2025-01-22 | End: 2025-01-22 | Stop reason: HOSPADM

## 2025-01-22 RX ORDER — SODIUM CHLORIDE, SODIUM LACTATE, POTASSIUM CHLORIDE, CALCIUM CHLORIDE 600; 310; 30; 20 MG/100ML; MG/100ML; MG/100ML; MG/100ML
INJECTION, SOLUTION INTRAVENOUS CONTINUOUS
Status: DISCONTINUED | OUTPATIENT
Start: 2025-01-22 | End: 2025-01-22

## 2025-01-22 RX ADMIN — SUCCINYLCHOLINE CHLORIDE 80 MG: 20 INJECTION, SOLUTION INTRAMUSCULAR; INTRAVENOUS at 15:39

## 2025-01-22 RX ADMIN — OCTREOTIDE ACETATE 50 MCG/HR: 200 INJECTION, SOLUTION INTRAVENOUS; SUBCUTANEOUS at 10:14

## 2025-01-22 RX ADMIN — PANTOPRAZOLE SODIUM 40 MG: 40 INJECTION, POWDER, FOR SOLUTION INTRAVENOUS at 17:31

## 2025-01-22 RX ADMIN — ALBUMIN (HUMAN) 25 G: 0.25 INJECTION, SOLUTION INTRAVENOUS at 20:27

## 2025-01-22 RX ADMIN — Medication 100 MCG: at 15:40

## 2025-01-22 RX ADMIN — PANTOPRAZOLE SODIUM 40 MG: 40 INJECTION, POWDER, FOR SOLUTION INTRAVENOUS at 10:06

## 2025-01-22 RX ADMIN — Medication 100 MCG: at 15:41

## 2025-01-22 RX ADMIN — CEFTRIAXONE SODIUM 1000 MG: 1 INJECTION, POWDER, FOR SOLUTION INTRAMUSCULAR; INTRAVENOUS at 10:08

## 2025-01-22 RX ADMIN — DEXAMETHASONE SODIUM PHOSPHATE 4 MG: 4 INJECTION INTRA-ARTICULAR; INTRALESIONAL; INTRAMUSCULAR; INTRAVENOUS; SOFT TISSUE at 15:41

## 2025-01-22 RX ADMIN — FENTANYL CITRATE 50 MCG: 50 INJECTION, SOLUTION INTRAMUSCULAR; INTRAVENOUS at 15:39

## 2025-01-22 RX ADMIN — LACTULOSE 30 ML: 20 SOLUTION ORAL at 20:27

## 2025-01-22 RX ADMIN — SODIUM CHLORIDE, POTASSIUM CHLORIDE, SODIUM LACTATE AND CALCIUM CHLORIDE: 600; 310; 30; 20 INJECTION, SOLUTION INTRAVENOUS at 15:35

## 2025-01-22 RX ADMIN — ONDANSETRON 4 MG: 2 INJECTION INTRAMUSCULAR; INTRAVENOUS at 15:48

## 2025-01-22 RX ADMIN — LIDOCAINE HYDROCHLORIDE 50 MG: 20 INJECTION, SOLUTION EPIDURAL; INFILTRATION; INTRACAUDAL; PERINEURAL at 15:39

## 2025-01-22 RX ADMIN — PROPOFOL 100 MG: 10 INJECTION, EMULSION INTRAVENOUS at 15:39

## 2025-01-22 ASSESSMENT — LIFESTYLE VARIABLES
TOTAL SCORE: 0
TOTAL SCORE: 0
AVERAGE NUMBER OF DAYS PER WEEK YOU HAVE A DRINK CONTAINING ALCOHOL: 0
HAVE PEOPLE ANNOYED YOU BY CRITICIZING YOUR DRINKING: NO
HAVE YOU EVER FELT YOU SHOULD CUT DOWN ON YOUR DRINKING: NO
HOW MANY TIMES IN THE PAST YEAR HAVE YOU HAD 5 OR MORE DRINKS IN A DAY: 0
DOES PATIENT WANT TO STOP DRINKING: NO
EVER HAD A DRINK FIRST THING IN THE MORNING TO STEADY YOUR NERVES TO GET RID OF A HANGOVER: NO
EVER FELT BAD OR GUILTY ABOUT YOUR DRINKING: NO
ALCOHOL_USE: NO
ON A TYPICAL DAY WHEN YOU DRINK ALCOHOL HOW MANY DRINKS DO YOU HAVE: 0
TOTAL SCORE: 0
CONSUMPTION TOTAL: NEGATIVE

## 2025-01-22 ASSESSMENT — ENCOUNTER SYMPTOMS
NAUSEA: 0
WEIGHT LOSS: 0
VOMITING: 1
CHILLS: 0
FEVER: 0
BLOOD IN STOOL: 0

## 2025-01-22 ASSESSMENT — PAIN DESCRIPTION - PAIN TYPE
TYPE: ACUTE PAIN
TYPE: SURGICAL PAIN

## 2025-01-22 ASSESSMENT — SOCIAL DETERMINANTS OF HEALTH (SDOH)
WITHIN THE LAST YEAR, HAVE YOU BEEN AFRAID OF YOUR PARTNER OR EX-PARTNER?: NO
WITHIN THE LAST YEAR, HAVE YOU BEEN HUMILIATED OR EMOTIONALLY ABUSED IN OTHER WAYS BY YOUR PARTNER OR EX-PARTNER?: NO
IN THE PAST 12 MONTHS, HAS THE ELECTRIC, GAS, OIL, OR WATER COMPANY THREATENED TO SHUT OFF SERVICE IN YOUR HOME?: NO
WITHIN THE LAST YEAR, HAVE YOU BEEN KICKED, HIT, SLAPPED, OR OTHERWISE PHYSICALLY HURT BY YOUR PARTNER OR EX-PARTNER?: NO
WITHIN THE PAST 12 MONTHS, YOU WORRIED THAT YOUR FOOD WOULD RUN OUT BEFORE YOU GOT THE MONEY TO BUY MORE: NEVER TRUE
WITHIN THE PAST 12 MONTHS, THE FOOD YOU BOUGHT JUST DIDN'T LAST AND YOU DIDN'T HAVE MONEY TO GET MORE: NEVER TRUE
WITHIN THE LAST YEAR, HAVE TO BEEN RAPED OR FORCED TO HAVE ANY KIND OF SEXUAL ACTIVITY BY YOUR PARTNER OR EX-PARTNER?: NO

## 2025-01-22 ASSESSMENT — COGNITIVE AND FUNCTIONAL STATUS - GENERAL
CLIMB 3 TO 5 STEPS WITH RAILING: A LITTLE
HELP NEEDED FOR BATHING: A LITTLE
DRESSING REGULAR UPPER BODY CLOTHING: A LITTLE
DRESSING REGULAR UPPER BODY CLOTHING: A LITTLE
STANDING UP FROM CHAIR USING ARMS: A LITTLE
TURNING FROM BACK TO SIDE WHILE IN FLAT BAD: A LITTLE
SUGGESTED CMS G CODE MODIFIER MOBILITY: CK
CLIMB 3 TO 5 STEPS WITH RAILING: A LITTLE
MOBILITY SCORE: 18
MOVING FROM LYING ON BACK TO SITTING ON SIDE OF FLAT BED: A LITTLE
WALKING IN HOSPITAL ROOM: A LITTLE
MOVING TO AND FROM BED TO CHAIR: A LITTLE
MOVING FROM LYING ON BACK TO SITTING ON SIDE OF FLAT BED: A LITTLE
EATING MEALS: A LITTLE
MOVING TO AND FROM BED TO CHAIR: A LITTLE
HELP NEEDED FOR BATHING: A LITTLE
SUGGESTED CMS G CODE MODIFIER MOBILITY: CK
SUGGESTED CMS G CODE MODIFIER DAILY ACTIVITY: CK
STANDING UP FROM CHAIR USING ARMS: A LITTLE
DAILY ACTIVITIY SCORE: 18
TURNING FROM BACK TO SIDE WHILE IN FLAT BAD: A LITTLE
DAILY ACTIVITIY SCORE: 18
PERSONAL GROOMING: A LITTLE
PERSONAL GROOMING: A LITTLE
MOBILITY SCORE: 18
DRESSING REGULAR LOWER BODY CLOTHING: A LITTLE
DRESSING REGULAR LOWER BODY CLOTHING: A LITTLE
EATING MEALS: A LITTLE
SUGGESTED CMS G CODE MODIFIER DAILY ACTIVITY: CK
TOILETING: A LITTLE
WALKING IN HOSPITAL ROOM: A LITTLE
TOILETING: A LITTLE

## 2025-01-22 ASSESSMENT — PATIENT HEALTH QUESTIONNAIRE - PHQ9
1. LITTLE INTEREST OR PLEASURE IN DOING THINGS: NOT AT ALL
2. FEELING DOWN, DEPRESSED, IRRITABLE, OR HOPELESS: NOT AT ALL
SUM OF ALL RESPONSES TO PHQ9 QUESTIONS 1 AND 2: 0

## 2025-01-22 ASSESSMENT — FIBROSIS 4 INDEX: FIB4 SCORE: 3.21

## 2025-01-22 ASSESSMENT — PAIN SCALES - GENERAL: PAIN_LEVEL: 1

## 2025-01-22 NOTE — ASSESSMENT & PLAN NOTE
Sober since 4/2024  Recurrent ascites and hepatic hydrothorax  Synthetic function affected  Resume lactulose when GI bleed sorted out- may need to start NM lactulose  Therapeutic paracentesis and start lasix/spirinolactone after EGD  Plan of care as outlined above.

## 2025-01-22 NOTE — ASSESSMENT & PLAN NOTE
Acute onset of maroon colored emesis  Longstanding hx of bleeding esophageal varices, hypertensive gastropathy and duodenitis as well as hx of IR embolization of GDA in 2016  PPI IV BID  Octreotide gtt  Ceftriaxone for sbp ppx  Coag, plts wnl; TEG pending  PIV x 2 present  Transfuse Hb > 7 if HD stable, > 9 HD unstable  Telemetry  Ice chips  Plan for EGD this afternoon with Dr Reyes in endoscopy suite

## 2025-01-22 NOTE — OR NURSING
"1555 Pt arrived to PACU from OR. VSS. 6L Simple mask in place. Pt is resting comfortably. Non-labored respirations. Pt is spontaneous breathing. SCDs and in place. No symptoms or complaints regarding pain or nausea at this time. Dr Reyes BS, NPO for awhile to avoid nausea.     1608 Pt states \"If I start coughing, I'll start throwing up.\"Provided emesis bag and sat pt upright in [facility].     1613 Report to ABRAHAM Monsivais    1618 Pt meets criteria for transfer back to room.                    "

## 2025-01-22 NOTE — ANESTHESIA POSTPROCEDURE EVALUATION
Patient: Chava Mercedes    Procedure Summary       Date: 01/22/25 Room / Location:  ENDOSCOPIC ULTRASOUND ROOM / SURGERY [facility]    Anesthesia Start: 1535 Anesthesia Stop: 1557    Procedure: GASTROSCOPY Diagnosis:       Gastropathy      (Gastropathy [[id number]])    Surgeons: Eddie Reyes D.O. Responsible Provider: Fei Roque D.O.    Anesthesia Type: general ASA Status: 3            Final Anesthesia Type: general  Last vitals  BP   Blood Pressure : 112/65    Temp   36.5 °C (97.7 °F)    Pulse   81   Resp   12    SpO2   100 %      Anesthesia Post Evaluation    Patient location during evaluation: PACU  Patient participation: complete - patient participated  Level of consciousness: awake and alert  Pain score: 1    Airway patency: patent  Anesthetic complications: no  Cardiovascular status: hemodynamically stable  Respiratory status: acceptable  Hydration status: euvolemic    PONV: none          No notable events documented.     Nurse Pain Score: 0 (NPRS)

## 2025-01-22 NOTE — ANESTHESIA PREPROCEDURE EVALUATION
Case: [id number] Date/Time: 01/22/25 1255    Procedure: GASTROSCOPY    Location:  ENDOSCOPIC ULTRASOUND ROOM / SURGERY [facility] ER    Surgeons: Eddie Reyes D.O.            Relevant Problems   CARDIAC   (positive) Pulmonary emboli (HCC)   (positive) Pulmonary embolism (HCC)   (positive) Secondary esophageal varices with bleeding (HCC)      GI   (positive) Duodenal ulcer         (positive) Alcoholic cirrhosis of liver with ascites (HCC)   (positive) Alcoholic liver disease (HCC)   (positive) Hepatic steatosis       Physical Exam    Airway   Mallampati: II  TM distance: >3 FB  Neck ROM: full       Cardiovascular - normal exam  Rhythm: regular  Rate: normal  (-) murmur     Dental - normal exam           Pulmonary - normal exam  Breath sounds clear to auscultation     Abdominal    Neurological - normal exam                 Anesthesia Plan    ASA 3   ASA physical status 3 criteria: alcohol and/or substance dependence or abuse    Plan - general       Airway plan will be ETT          Induction: intravenous    Postoperative Plan: Postoperative administration of opioids is intended.    Pertinent diagnostic labs and testing reviewed    Informed Consent:    Anesthetic plan and risks discussed with patient.    Use of blood products discussed with: patient whom consented to blood products.

## 2025-01-22 NOTE — PROGRESS NOTES
[facility] DIRECT ADMISSION REPORT  Transferring facility: [facility]  Transferring physician: Olaf Doan PA-C    Chief complaint: GI Bleed  Pertinent history & patient course: 75 yo M, h/o cirrhosis, esophageal varices required banding in the past, portal hypertensive gastropathy, duodenitis, also recurrent large pleural effusion and large ascites that presented to outside facility hospital after large hematemesis.  Pertinent imaging & lab results: Vital signs are stable, most recent blood pressure 110/76 with heart rate 103 bpm.  Patient hemoglobin is stable at baseline 9.3.  His creatinine is 0.8.  Lactic acid 3.9.  CT of the abdomen was done showing distended stomach with large quantity blood, also he does have large pneumothorax and recurrent ascites.  Patient was hospitalized earlier this month from 1/8 and discharged on 1/15 at [facility] treated for his large pleural effusion, status post thoracentesis with 2 L fluid out resulting in pneumothorax requiring chest tube placement.  Additionally, he also had a 5.1 L removed on most recent paracentesis at outside facility hospital earlier this month.      Consultants called prior to transfer and pertinent input from consultants: I discussed this case with GI, Dr. Reyes as [facility] has previously followed this patient and performed endoscopy in October.  Code Status: Full code but has been also DNR-DNI in the past per transferring provider, I personally verified with the transferring provider patient's code status and the transferring provider has confirmed this with the patient.  Reason for Transfer: Higher level of care, GI.  Further work up or recommendations requested prior to transfer: Continue Protonix drip and cocktail tried.  Patient will need a stat CTA on arrival.  Back in October, after EGD was done he had a CTA showing active extravasation (questionable focus of active extravasation in the small bowel), had to be transferred to [facility] but  after all did not had embolization given bleeding stopped spontaneously.  Patient will need to have a planned intubation for EGD later today in addition to thoracentesis and paracentesis at some point.    Patient accepted for transfer: Yes  Accepting [facility] Facility: [facility] - Nursing to notify the admitting provider when patient arrives to the unit.    Consultants to be called upon arrival: GI (Dr. Patterson ) and ICU Dr. Leon  Admission status: Inpatient.   Floor requested: ICU  If ICU transfer, name of intensivist case discussed with and pertinent input from critical care: Dr. Leon    The admitting provider is the point of contact for questions or concerns regarding patient's care.

## 2025-01-22 NOTE — PROGRESS NOTES
Gastroenterology Progress Note     Author: Eddie Reyes D.O.   Date & Time Created: 2025 12:21 PM    Chief Complaint:  GI bleed    Interval History:  Patient is transferred for control of upper GI bleeding     Review of Systems:  ROS    Physical Exam:  Physical Exam    Labs:          Recent Labs     25  0900   SODIUM 132*   POTASSIUM 4.5   CHLORIDE 103   CO2 20   BUN 20   CREATININE 0.74   CALCIUM 7.6*     Recent Labs     25  0900   ALTSGPT 19   ASTSGOT 30   ALKPHOSPHAT 92   TBILIRUBIN 0.8   GLUCOSE 98     Recent Labs     25  0900   RBC 3.37*   HEMOGLOBIN 8.9*   HEMATOCRIT 28.8*   PLATELETCT 216   PROTHROMBTM 18.4*   INR 1.48*     Recent Labs     25  0900   WBC 5.7   NEUTSPOLYS 62.60   LYMPHOCYTES 18.80*   MONOCYTES 16.90*   EOSINOPHILS 0.40   BASOPHILS 0.90   ASTSGOT 30   ALTSGPT 19   ALKPHOSPHAT 92   TBILIRUBIN 0.8     Hemodynamics:  Temp (24hrs), Av.6 °C (97.9 °F), Min:36.5 °C (97.7 °F), Max:36.7 °C (98 °F)  Temperature: 36.5 °C (97.7 °F)  Pulse  Av.7  Min: 98  Max: 113   Blood Pressure : 121/69     Respiratory:    Respiration: 19, Pulse Oximetry: 98 %           Fluids:    Intake/Output Summary (Last 24 hours) at 2025 1221  Last data filed at 2025 1038  Gross per 24 hour   Intake 103.01 ml   Output --   Net 103.01 ml     Weight: 79.2 kg (174 lb 9.7 oz)  GI/Nutrition:  Orders Placed This Encounter   Procedures    Diet NPO Restrict to: Ice Chips     Standing Status:   Standing     Number of Occurrences:   1     Order Specific Question:   Diet NPO Restrict to:     Answer:   Ice Chips [2]     Medical Decision Making, by Problem:  Active Hospital Problems    Diagnosis     *Acute upper GI bleed [K92.2]     Jejunitis [K52.9]     Chronic anemia [D64.9]     Alcoholic cirrhosis of liver with ascites (HCC) [K70.31]     Protein calorie malnutrition (HCC) [E46]     Pulmonary emboli (HCC) [I26.99]     Lactic acid acidosis [E87.20]        Plan:  See full dictated consult  note  Urgent EGD   NPO   See EGD note for recommendations   IV PPI, octreotide    Quality-Core Measures

## 2025-01-22 NOTE — CARE PLAN
The patient is Watcher - Medium risk of patient condition declining or worsening         Progress made toward(s) clinical / shift goals:    Problem: Knowledge Deficit - Standard  Goal: Patient and family/care givers will demonstrate understanding of plan of care, disease process/condition, diagnostic tests and medications  Outcome: Progressing     Problem: Risk for Fluid Volume Deficit Related to Bleeding  Goal: Fluid volume balance will be maintained  Outcome: Progressing  Goal: Patient will show no signs and symptoms of excessive bleeding  Outcome: Progressing     Problem: Pain - Standard  Goal: Alleviation of pain or a reduction in pain to the patient’s comfort goal  Outcome: Progressing       Patient is not progressing towards the following goals:

## 2025-01-22 NOTE — ANESTHESIA TIME REPORT
Anesthesia Start and Stop Event Times       Date Time Event    1/22/2025 1520 Ready for Procedure     1535 Anesthesia Start     1557 Anesthesia Stop          Responsible Staff  01/22/25      Name Role Begin End    Fei Roque D.O. Anesth 1535 1557          Overtime Reason:  overtime with call-back    Comments:

## 2025-01-22 NOTE — ANESTHESIA PROCEDURE NOTES
Airway    Date/Time: 1/22/2025 3:40 PM    Performed by: Fei Roque D.O.  Authorized by: Fei Roque D.O.    Location:  OR  Urgency:  Elective  Difficult Airway: No    Indications for Airway Management:  Anesthesia      Spontaneous Ventilation: absent    Sedation Level:  Deep  Preoxygenated: Yes    Patient Position:  Sniffing  Mask Difficulty Assessment:  0 - not attempted  Final Airway Type:  Endotracheal airway  Final Endotracheal Airway:  ETT  Cuffed: Yes    Technique Used for Successful ETT Placement:  Direct laryngoscopy    Insertion Site:  Oral  Blade Type:  Rashard  Laryngoscope Blade/Videolaryngoscope Blade Size:  3  ETT Size (mm):  7.5  Measured from:  Teeth  ETT to Teeth (cm):  23  Placement Verified by: auscultation and capnometry    Cormack-Lehane Classification:  Grade I - full view of glottis  Number of Attempts at Approach:  1

## 2025-01-22 NOTE — CONSULTS
Critical Care Consultation    Date of consult: 1/22/2025    Referring Physician  Paty Dickinson, *    Reason for Consultation  GI bleed    History of Presenting Illness  74 y.o. male who presented 1/22/2025 as an interfacility transfer from [facility] for management of a complex GI bleed.    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  VS stable, BP 110s, heart rate 103.  Hemoglobin stable at baseline 9.3.  Creatinine 0.8.  Lactic acid 3.9.  CT abdomen/pelvis was done showing distended stomach but no active extravasation noted.  Also has large pleural effusion and recurrent ascites.      Hospitalized at outside hospital just last week for symptomatic pleural effusion, underwent thoracentesis 2 L resulting in pneumothorax requiring chest tube placement.  Also had 5.1 L ascitic fluid removed via paracentesis.    Last hospitalization at [facility] in 10/2024 following ground-level fall, large left-sided pleural effusion noted for which he underwent a large-volume thoracentesis.  Last EGD 10/2024 noted moderate portal hypertensive gastropathy and duodenitis.  Previous CTA has noted active extravasation in the small bowel which he was planned for IR embolization however no further episodes of bleeding and was managed conservatively.    Admitting hospitalist discussed case with Dr. Reyes with plan to continue PPI, octreotide, EGD    Code Status  Full Code    Review of Systems  Review of Systems   Constitutional:  Positive for malaise/fatigue. Negative for chills, fever and weight loss.   Gastrointestinal:  Positive for vomiting (maroon emesis). Negative for blood in stool, melena and nausea.   All other systems reviewed and  are negative.    Past Medical History   has no past medical history on file.    Surgical History   has a past surgical history that includes gastroscopy (N/A, 12/11/2016); gastroscopy-endo (12/11/2016); gastroscopy-endo (12/18/2016); gastroscopy (N/A, 12/2/2017); inguinal hernia repair (Right, 12/27/2023); umbilical hernia repair (N/A, 12/27/2023); pr upper gi endoscopy,diagnosis (N/A, 3/5/2024); pr upper gi endoscopy,diagnosis (N/A, 8/15/2024); and pr upper gi endoscopy,diagnosis (N/A, 10/19/2024).    Family History  family history is not on file.    Social History   reports that he has never smoked. He has never used smokeless tobacco. He reports current alcohol use. He reports that he does not use drugs.    Medications  Home Medications    **Home medications have not yet been reviewed for this encounter**       Current Facility-Administered Medications   Medication Dose Route Frequency Provider Last Rate Last Admin    octreotide (SandoSTATIN) 1,250 mcg in  mL Infusion  50 mcg/hr Intravenous Continuous Matt Boston M.D. 10 mL/hr at 01/22/25 1014 50 mcg/hr at 01/22/25 1014    cefTRIAXone (Rocephin) 1,000 mg in  mL IVPB  1,000 mg Intravenous Q24HRS Matt Boston M.D.   Stopped at 01/22/25 1038    Pharmacy Consult Request - IV Antibiotics to PO conversion   Other PHARMACY TO DOSE Matt Boston M.D.        pantoprazole (Protonix) injection 40 mg  40 mg Intravenous BID Matt Boston M.D.   40 mg at 01/22/25 1006       Allergies  Allergies   Allergen Reactions    Morphine Itching     Itchiness        Vital Signs last 24 hours  Temp:  [36.7 °C (98 °F)] 36.7 °C (98 °F)  Pulse:  [] 112  Resp:  [18] 18  BP: (106-118)/(62-74) 106/74  SpO2:  [97 %-98 %] 97 %    Physical Exam  Physical Exam  Vitals and nursing note reviewed.   Constitutional:       General: He is not in acute distress.     Appearance: He is well-developed. He is ill-appearing. He is not diaphoretic.      Comments: Very  pleasant   HENT:      Nose: Nose normal.      Mouth/Throat:      Pharynx: No oropharyngeal exudate.   Eyes:      General: No scleral icterus.        Right eye: No discharge.         Left eye: No discharge.      Conjunctiva/sclera: Conjunctivae normal.      Pupils: Pupils are equal, round, and reactive to light.   Neck:      Thyroid: No thyromegaly.      Vascular: No JVD.      Trachea: No tracheal deviation.   Cardiovascular:      Rate and Rhythm: Normal rate and regular rhythm.      Heart sounds: Normal heart sounds. No murmur heard.  Pulmonary:      Effort: Pulmonary effort is normal. No respiratory distress.      Breath sounds: Normal breath sounds. No stridor. No wheezing or rales.   Abdominal:      General: There is distension.      Palpations: Abdomen is soft.      Tenderness: There is no abdominal tenderness. There is no guarding.   Genitourinary:     Comments: Scrotal swelling, nontender  Musculoskeletal:         General: No tenderness. Normal range of motion.      Cervical back: Neck supple.      Right lower leg: Edema present.      Left lower leg: Edema present.   Lymphadenopathy:      Cervical: No cervical adenopathy.   Skin:     General: Skin is warm and dry.      Capillary Refill: Capillary refill takes 2 to 3 seconds.      Coloration: Skin is pale.      Findings: No erythema.   Neurological:      Mental Status: He is alert and oriented to person, place, and time.      Sensory: No sensory deficit.      Motor: No abnormal muscle tone.      Coordination: Coordination normal.      Deep Tendon Reflexes: Reflexes normal.   Psychiatric:         Behavior: Behavior normal.         Thought Content: Thought content normal.         Judgment: Judgment normal.       Fluids    Intake/Output Summary (Last 24 hours) at 1/22/2025 1152  Last data filed at 1/22/2025 1038  Gross per 24 hour   Intake 103.01 ml   Output --   Net 103.01 ml       Laboratory  Recent Results (from the past 48 hours)   CBC WITH DIFFERENTIAL     Collection Time: 01/22/25  9:00 AM   Result Value Ref Range    WBC 5.7 4.8 - 10.8 K/uL    RBC 3.37 (L) 4.70 - 6.10 M/uL    Hemoglobin 8.9 (L) 14.0 - 18.0 g/dL    Hematocrit 28.8 (L) 42.0 - 52.0 %    MCV 85.5 81.4 - 97.8 fL    MCH 26.4 (L) 27.0 - 33.0 pg    MCHC 30.9 (L) 32.3 - 36.5 g/dL    RDW 61.4 (H) 35.9 - 50.0 fL    Platelet Count 216 164 - 446 K/uL    MPV 10.5 9.0 - 12.9 fL    Neutrophils-Polys 62.60 44.00 - 72.00 %    Lymphocytes 18.80 (L) 22.00 - 41.00 %    Monocytes 16.90 (H) 0.00 - 13.40 %    Eosinophils 0.40 0.00 - 6.90 %    Basophils 0.90 0.00 - 1.80 %    Immature Granulocytes 0.40 0.00 - 0.90 %    Nucleated RBC 0.00 0.00 - 0.20 /100 WBC    Neutrophils (Absolute) 3.56 1.82 - 7.42 K/uL    Lymphs (Absolute) 1.07 1.00 - 4.80 K/uL    Monos (Absolute) 0.96 (H) 0.00 - 0.85 K/uL    Eos (Absolute) 0.02 0.00 - 0.51 K/uL    Baso (Absolute) 0.05 0.00 - 0.12 K/uL    Immature Granulocytes (abs) 0.02 0.00 - 0.11 K/uL    NRBC (Absolute) 0.00 K/uL   Comp Metabolic Panel    Collection Time: 01/22/25  9:00 AM   Result Value Ref Range    Sodium 132 (L) 135 - 145 mmol/L    Potassium 4.5 3.6 - 5.5 mmol/L    Chloride 103 96 - 112 mmol/L    Co2 20 20 - 33 mmol/L    Anion Gap 9.0 7.0 - 16.0    Glucose 98 65 - 99 mg/dL    Bun 20 8 - 22 mg/dL    Creatinine 0.74 0.50 - 1.40 mg/dL    Calcium 7.6 (L) 8.4 - 10.2 mg/dL    Correct Calcium 9.0 8.5 - 10.5 mg/dL    AST(SGOT) 30 12 - 45 U/L    ALT(SGPT) 19 2 - 50 U/L    Alkaline Phosphatase 92 30 - 99 U/L    Total Bilirubin 0.8 0.1 - 1.5 mg/dL    Albumin 2.3 (L) 3.2 - 4.9 g/dL    Total Protein 6.6 6.0 - 8.2 g/dL    Globulin 4.3 (H) 1.9 - 3.5 g/dL    A-G Ratio 0.5 g/dL   Prothrombin Time    Collection Time: 01/22/25  9:00 AM   Result Value Ref Range    PT 18.4 (H) 12.0 - 14.6 sec    INR 1.48 (H) 0.87 - 1.13   COD - Adult (Type and Screen)    Collection Time: 01/22/25  9:00 AM   Result Value Ref Range    ABO Grouping Only A     Rh Grouping Only NEG     Antibody Screen-Cod NEG    ESTIMATED GFR     Collection Time: 01/22/25  9:00 AM   Result Value Ref Range    GFR (CKD-EPI) 95 >60 mL/min/1.73 m 2     Imaging  No orders to display     Assessment/Plan    * Acute upper GI bleed- (present on admission)  Assessment & Plan  Acute onset of maroon colored emesis  Longstanding hx of bleeding esophageal varices, hypertensive gastropathy and duodenitis as well as hx of IR embolization of GDA in 2016  PPI IV BID  Octreotide gtt  Ceftriaxone for sbp ppx  Coag, plts wnl; TEG pending  PIV x 2 present  Transfuse Hb > 7 if HD stable, > 9 HD unstable  Telemetry  Ice chips  Plan for EGD this afternoon with Dr Reyes in endoscopy suite    Jejunitis  Assessment & Plan  Noted on CTA from outside hospital  Currently asymptomatic  Cont ceftriaxone, no SIRS criteria currently, closely monitor    Alcoholic cirrhosis of liver with ascites (HCC)- (present on admission)  Assessment & Plan  Sober since 4/2024  Recurrent ascites and hepatic hydrothorax  Synthetic function affected  Resume lactulose when GI bleed sorted out- may need to start TN lactulose  Therapeutic paracentesis and start lasix/spirinolactone after EGD  Plan of care as outlined above.    Chronic anemia- (present on admission)  Assessment & Plan  Stable, trend    Protein calorie malnutrition (HCC)- (present on admission)  Assessment & Plan  Due to cirrhosis  Dietician when safe to eat    Pulmonary emboli (HCC)- (present on admission)  Assessment & Plan  History of  IVC filter present  Risks >>> benefits anticoagulation    Lactic acid acidosis- (present on admission)  Assessment & Plan  Due to GI bleed  Supportive care as above      Discussed patient condition and risk of morbidity and/or mortality with RN, Pharmacy, Code status disscussed, Patient, and GI.      The patient remains critically ill.  Critical care time = 59 minutes in directly providing and coordinating critical care and extensive data review.  No time overlap and excludes procedures.    This note was  generated using voice recognition software which has a chance of producing errors of grammar and content.  I have made every reasonable attempt to find and correct any errors, but it should be expected that some may not be found prior to finalization of this note.  __________  Matt Boston MD  Pulmonary and Critical Care Medicine  [facility]

## 2025-01-22 NOTE — PROGRESS NOTES
2 RN Skin Assessment Completed by Yusra RN and Nina RN.    Head: WDL  Ears: WDL  Nose: WDL  Mouth: WDL  Neck: WDL  Breasts/Chest: WDL  Shoulder Blades: WDL  Spine: stitch from previous CT - redness  (R) Arm/Elbow/hand: bruising  (L) Arm/Elbow/hand: bruising  Abdomen:bruising  Groin: swelling  Sacrum/Coccyx/Buttocks: scab left buttocks  (R) Leg: bruising  (L) Leg: bruising  (R) Heel/Foot/Toe: WDL  (L) Heel/Foot/Toe: WDL          Devices in place: ECG, BP Cuff, Pulse Ox, and SCD's    Interventions in place: TAP system, Pillows, Q2 turns, and Low air loss mattress     Possible skin injury found: Yes    Pictures uploaded into Epic: Yes  Wound Consult Placed: Yes

## 2025-01-22 NOTE — ASSESSMENT & PLAN NOTE
Noted on CTA from outside hospital  Currently asymptomatic  Cont ceftriaxone, no SIRS criteria currently, closely monitor

## 2025-01-23 PROBLEM — E87.1 HYPONATREMIA: Status: ACTIVE | Noted: 2025-01-23

## 2025-01-23 LAB
GRAM STN SPEC: NORMAL
HGB BLD-MCNC: 7.2 G/DL (ref 14–18)
HGB BLD-MCNC: 7.6 G/DL (ref 14–18)
HGB BLD-MCNC: 7.8 G/DL (ref 14–18)
HGB BLD-MCNC: 8.3 G/DL (ref 14–18)
SIGNIFICANT IND 70042: NORMAL
SITE SITE: NORMAL
SOURCE SOURCE: NORMAL

## 2025-01-23 PROCEDURE — 36415 COLL VENOUS BLD VENIPUNCTURE: CPT

## 2025-01-23 PROCEDURE — 99223 1ST HOSP IP/OBS HIGH 75: CPT | Performed by: HOSPITALIST

## 2025-01-23 PROCEDURE — 85018 HEMOGLOBIN: CPT

## 2025-01-23 PROCEDURE — 770020 HCHG ROOM/CARE - TELE (206)

## 2025-01-23 PROCEDURE — 700111 HCHG RX REV CODE 636 W/ 250 OVERRIDE (IP): Mod: JZ | Performed by: INTERNAL MEDICINE

## 2025-01-23 PROCEDURE — 700105 HCHG RX REV CODE 258: Performed by: INTERNAL MEDICINE

## 2025-01-23 PROCEDURE — 700102 HCHG RX REV CODE 250 W/ 637 OVERRIDE(OP): Performed by: STUDENT IN AN ORGANIZED HEALTH CARE EDUCATION/TRAINING PROGRAM

## 2025-01-23 PROCEDURE — A9270 NON-COVERED ITEM OR SERVICE: HCPCS | Performed by: STUDENT IN AN ORGANIZED HEALTH CARE EDUCATION/TRAINING PROGRAM

## 2025-01-23 PROCEDURE — 94760 N-INVAS EAR/PLS OXIMETRY 1: CPT

## 2025-01-23 RX ORDER — SULFAMETHOXAZOLE AND TRIMETHOPRIM 800; 160 MG/1; MG/1
1 TABLET ORAL EVERY 12 HOURS
Status: DISCONTINUED | OUTPATIENT
Start: 2025-01-24 | End: 2025-01-24

## 2025-01-23 RX ADMIN — SPIRONOLACTONE 25 MG: 25 TABLET ORAL at 05:30

## 2025-01-23 RX ADMIN — LACTULOSE 30 ML: 20 SOLUTION ORAL at 18:12

## 2025-01-23 RX ADMIN — CEFTRIAXONE SODIUM 1000 MG: 1 INJECTION, POWDER, FOR SOLUTION INTRAMUSCULAR; INTRAVENOUS at 05:30

## 2025-01-23 RX ADMIN — LACTULOSE 30 ML: 20 SOLUTION ORAL at 05:30

## 2025-01-23 RX ADMIN — FUROSEMIDE 20 MG: 20 TABLET ORAL at 05:30

## 2025-01-23 RX ADMIN — PANTOPRAZOLE SODIUM 40 MG: 40 INJECTION, POWDER, FOR SOLUTION INTRAVENOUS at 18:11

## 2025-01-23 RX ADMIN — PANTOPRAZOLE SODIUM 40 MG: 40 INJECTION, POWDER, FOR SOLUTION INTRAVENOUS at 05:31

## 2025-01-23 ASSESSMENT — ENCOUNTER SYMPTOMS
NAUSEA: 1
PALPITATIONS: 0
VOMITING: 0
DIZZINESS: 0
CHILLS: 0
COUGH: 0
ORTHOPNEA: 0
HEMOPTYSIS: 0
SPUTUM PRODUCTION: 0
BLOOD IN STOOL: 1

## 2025-01-23 ASSESSMENT — COGNITIVE AND FUNCTIONAL STATUS - GENERAL
CLIMB 3 TO 5 STEPS WITH RAILING: A LITTLE
DAILY ACTIVITIY SCORE: 21
HELP NEEDED FOR BATHING: A LITTLE
SUGGESTED CMS G CODE MODIFIER DAILY ACTIVITY: CJ
DRESSING REGULAR LOWER BODY CLOTHING: A LITTLE
WALKING IN HOSPITAL ROOM: A LITTLE
TOILETING: A LITTLE
STANDING UP FROM CHAIR USING ARMS: A LITTLE
SUGGESTED CMS G CODE MODIFIER MOBILITY: CJ
MOVING TO AND FROM BED TO CHAIR: A LITTLE
MOBILITY SCORE: 20

## 2025-01-23 ASSESSMENT — PAIN DESCRIPTION - PAIN TYPE
TYPE: ACUTE PAIN
TYPE: ACUTE PAIN

## 2025-01-23 NOTE — HOSPITAL COURSE
Chava Mercedes has past ministry includes cirrhosis, esophageal varices, ascites and pleural effusion.  Patient presented to outside medical facility with maroon-colored emesis.  The patient was transferred to [facility] for higher level of care and specialty consultation.  He was admitted to the ICU.  Gastroenterology was consulted and the patient underwent urgent EGD.  Findings included esophagitis and portal hypertensive gastropathy.  Later that day the patient underwent large-volume paracentesis with 3000 mL of fluid removed.

## 2025-01-23 NOTE — CARE PLAN
The patient is Watcher - Medium risk of patient condition declining or worsening    Shift Goals  Clinical Goals: H/H, Paracentesis Cx  Patient Goals: Comfort    Progress made toward(s) clinical / shift goals:    Problem: Knowledge Deficit - Standard  Goal: Patient and family/care givers will demonstrate understanding of plan of care, disease process/condition, diagnostic tests and medications  Outcome: Progressing     Problem: Pain - Standard  Goal: Alleviation of pain or a reduction in pain to the patient’s comfort goal  Outcome: Progressing     Problem: Fall Risk  Goal: Patient will remain free from falls  Outcome: Progressing       Patient is not progressing towards the following goals:

## 2025-01-23 NOTE — CONSULTS
DATE OF SERVICE:  01/22/2025     REASON FOR CONSULTATION:  GI bleed.     HISTORY OF PRESENT ILLNESS:  The patient is a pleasant 74-year-old male who   was transferred for management of GI bleed.  The patient was at [facility].   The patient has a history of alcohol-induced cirrhosis with recurrent ascites   as well as hepatic hydrothorax.  The patient has had esophageal varices that   were banded multiple times in the past.  In addition, the patient has had   interventional radiology coil embolization of the gastroduodenal artery in   2016.  The patient comes in with episodes of hematemesis that started at   approximately 3 this morning.  The patient has had a melenotic stool since   then.  The patient very recently approximately 1 week ago underwent   thoracentesis as well as paracentesis.  The patient unfortunately developed   pneumothorax from the thoracentesis and required chest tube placement.  Last   EGD was in 10/2024, which showed moderate portal hypertensive gastropathy and   duodenitis.  The patient denies any current abdominal pain, but does admit to   abdominal bloating as well as ascites.  The patient denies any nausea,   vomiting since getting to the hospital.  The patient denies any current fever   or chills.  The patient denies change in bowel habits other than his one   melenotic stool.     REVIEW OF SYSTEMS:  A 14-point review of systems was obtained and found to be   negative except for those above in the HPI as well as shortness of breath,   dyspnea.     PAST MEDICAL HISTORY:  Alcoholic cirrhosis, esophageal varices, ascites,   hepatic hydrothorax.     PAST SURGICAL HISTORY:  Numerous endoscopies, inguinal hernia repair,   thoracentesis, paracentesis.     FAMILY HISTORY:  Noncontributory.     SOCIAL HISTORY:  The patient has a history of alcohol abuse.  The patient   denies illicit drug use.     MEDICATIONS:  See medication reconciliation list.     ALLERGIES:  MORPHINE.     PHYSICAL  EXAMINATION:  GENERAL:  No acute distress, alert, awake and oriented x3.  VITAL SIGNS:  Stable.  HEENT:  PERRLA.  Extraocular movements are intact.  Sclerae are anicteric.    Positive conjunctival pallor.  HEART:  Regular rate and rhythm.  LUNGS:  Clear to auscultation bilaterally.  ABDOMEN:  Mild distention.  No guarding or rebound.  MUSCULOSKELETAL:  1+ pitting edema noted on bilateral lower extremities.  NEUROLOGIC:  Grossly intact.  PSYCHIATRIC:  Affect is normal.  SKIN:  No jaundice, but pallor is noted.     LABORATORY DATA:  Hemoccult positive.  INR 1.48.  AST 30, ALT 19, alkaline   phosphatase 92, total bilirubin 0.8, albumin 2.3, white count 5.7, hemoglobin   8.9, hematocrit 28.8, platelets 216.     ASSESSMENT AND PLAN:    1.  GI bleed, suspect upper gastrointestinal source likely, history of   esophageal variceal bleeding.  Recommend IV PPI as well as IV octreotide.    Keep the patient n.p.o. in anticipation of endoscopy.  See EGD report for   further details.  Plan for urgent endoscopy with possible variceal banding.    Transfuse for any hemoglobin less than 7.  2.  Anemia secondary to GI bleed, see above for management.  3.  History of esophageal varices.  Plan for endoscopy with likely banding;   however, the patient did have endoscopy 3 months ago and was not found to have   varices at that time.  4.  History of ascites.  The patient on diuresis.  5.  History of hepatic hydrothorax, status post thoracentesis a week ago and   unfortunately developed pneumothorax and underwent chest tube.  6.  Cirrhosis secondary to alcohol abuse.  Recommend close followup as an   outpatient.  The patient currently has normal transaminases.  No signs of   alcoholic hepatitis at the moment, but rather chronic alcoholic cirrhosis with   ascites, hepatic hydrothorax and esophageal varices.        ______________________________  DO LUCITA Epstein    DD:  01/22/2025 15:34  DT:  01/22/2025 18:59    Job#:  [id number]

## 2025-01-23 NOTE — CARE PLAN
The patient is Watcher - Medium risk of patient condition declining or worsening    Shift Goals  Clinical Goals: monitor labs, monitor stools  Patient Goals: go home as soon as possible    Progress made toward(s) clinical / shift goals: H/H monitored throughout shift and patient has not required a blood transfusion this shift.       Problem: Pain - Standard  Goal: Alleviation of pain or a reduction in pain to the patient’s comfort goal  Outcome: Progressing     Problem: Fall Risk  Goal: Patient will remain free from falls  Outcome: Progressing        Problem: Knowledge Deficit - Standard  Goal: Patient and family/care givers will demonstrate understanding of plan of care, disease process/condition, diagnostic tests and medications  Outcome: Progressing       Patient is not progressing towards the following goals: Patient had two dark bloody MD Ashlee aware.

## 2025-01-23 NOTE — PROCEDURES
DATE OF PROCEDURE:  01/22/2025     PROCEDURE:  Esophagogastroduodenoscopy.     PREOPERATIVE DIAGNOSIS:  GI bleed.     POSTOPERATIVE DIAGNOSES:  LA grade D esophagitis, hiatal hernia and portal   hypertensive gastropathy.     ANESTHESIA:  General anesthesia per anesthesiologist.     DESCRIPTION OF PROCEDURE:  After informed consent and appropriate sedation,   the patient was placed in left lateral position and the gastroscope was   advanced through the oropharynx into the esophagus, through to the second   portion of the duodenum.  The mucosa was then examined carefully as the scope   was gently withdrawn.  The duodenum was unremarkable as was the gastric   antrum; however, the gastric body and on retroflexion, the gastric cardia and   fundus all showed mild portal hypertensive gastropathy.  The stomach was   decompressed.  The scope was withdrawn back into the esophagus.  There was LA   grade D esophagitis in the middle and distal third of the esophagus with   ulcerations throughout.  The proximal esophagus was unremarkable.  The bowel   was decompressed.  The scope was withdrawn.  There were no immediate postop   complications.     RECOMMENDATIONS:    1.  Recommend IV PPI.  2.  Discontinue octreotide.  3.  Oral PPI b.i.d. indefinitely.  4.  Follow up with GI as an outpatient.  5.  Antiemetics as needed to prevent any nausea or vomiting that may   contribute to worsening reflux or esophagitis.  6.  Clear liquid diet, advance as tolerated to GI soft if the patient is   having no longer any nausea or vomiting.  7.  We will sign off for now, but please call with any questions or concerns.        ______________________________  DO JOE Epstein    DD:  01/22/2025 15:48  DT:  01/22/2025 17:56    Job#:  [id number]

## 2025-01-23 NOTE — DIETARY
Nutrition Services: Initial Assessment     Day 1 of admit. Chava Mercedes is 74 y.o., male with admitting DX of Acute upper GI bleed [K92.2].    Consult Received for: FTT/Malnutrition    Current Hospital Problems List:    Acute upper GI bleed  Jejunitis  Alcoholic cirrhosis of liver with ascites  Chronic anemia  Protein calorie malnutrition due to cirrhosis  Pulmonary emboli  Lactic acid acidosis       Nutrition Assessment:      Height: 182.9 cm (6')  Weight: 79.2 kg (174 lb 9.7 oz)  Weight taken via: Bed Scale  BMI Calculated: 23.68  BMI Classification: WNL       Weight Readings from Last 5 encounters:   Wt Readings from Last 5 Encounters:   01/22/25 79.2 kg (174 lb 9.7 oz)   11/03/24 74.8 kg (164 lb 14.5 oz)   10/26/24 78.7 kg (173 lb 8 oz)   10/23/24 83.8 kg (184 lb 11.9 oz)   08/19/24 82.2 kg (181 lb 3.5 oz)   03/04/24 73.8 kg (162 lb 11.2 oz)         Objective:   Pertinent Medical Hx: alcohol induced cirrhosis complicated by recurrent ascites and hepatic hydrothorax, esophageal varices previously requiring banding multiple times, portal hypertensive gastropathy, duodenitis, and IR coil embolization GDA in 2016   EGD and paracentesis (~ 3000 mL removed) on 1/22. DX of grade D esophagitis.   Pertinent Labs: 1/22/25: sodium=132  Pertinent Meds: albumin, Lasix, lactulose, Protonix, Aldactone  Skin/Wounds:  multiple areas w/ redness and/or scabbing. WT consult pending.   Last BM:   PTA          Current Diet Order/Intake:   Regular diet      Subjective:   Pt was not available when RD tried to visit today. Pt had a dx of severe chronic malnutrition in November.   Patient reported UBW: unknown  Dietary Recall/Energy Intake: Per nutrition screen, there was not report of poor PO PTA. No documentation of PO intake yet during this hospitalization.    Nutrition Focused Physical Exam (NFPE)  Weight Loss: Pt's weight has been variable over the past year.   Muscle Mass: Unable to identify at this time  Subcutaneous Fat: Unable  to identify at this time  Fluid Accumulation: 2+ edema to BLEs.   Reduced  Strength: N/A in acute care setting.    Nutrition Diagnosis:       Based on RD assessment at this time, Unable to fully assess for malnutrition at this time    Nutrition Interventions:      Patient aware of active plan of care as appropriate.       Nutrition Monitoring and Evaluation:      Monitor nutrition POC, goal for >50%% intake from meals and supplements.  Additional fluids per MD/DO  Monitor vital signs pertinent to nutrition.    RD following and will provide updated recommendations as indicated.      Tammi Rossi R.D.                                         [facility]/AND CRITERIA FOR MALNUTRITION

## 2025-01-23 NOTE — PROGRESS NOTES
Brief Critical Care Note    EGD with Grade D esophagitis. No active bleeding Id'd. GI recommended lifelong PPI BID.    Diagnostic/therapeutic thoracentesis performed tonight. Ascites appeared simple on ultrasound. Fluid was minimally cloudy and straw colored. 3.0L taken off. 25g albumin given post paracentesis. Paracentesis studies will need to be followed up.    Will start on low dose lasix/spironolactone for volume overload 2/2 cirrhosis.    Patient has been hemodynamically stable since admission, will downgrade from ICU at this time.    Additional critical care time = 25 minutes    Simone Leon MD  Pulmonary and Critical Care Medicine  [facility]

## 2025-01-23 NOTE — PROGRESS NOTES
4 Eyes Skin Assessment Completed by ABRAHAM Caruso and ABRAHAM Andrews.    Head WDL  Ears WDL  Nose WDL  Mouth WDL  Neck WDL  Breast/Chest WDL  Shoulder Blades Redness, scattered scabbing  Spine Redness, scattered scabbing, suture in place mid left back  (R) Arm/Elbow/Hand Redness, Blanching, and Scab, blanching redness on elbow, scattered scabbing  (L) Arm/Elbow/Hand Redness, Blanching, Bruising, and Scab blanching redness on elbow, scattered scabbing, bruising down arm  Abdomen WDL, bandage in place over paracentesis site  Groin WDL  Scrotum/Coccyx/Buttocks WDL, scrotal edema  (R) Leg Scab, Swelling, and Edema  (L) Leg Scab, Swelling, and Edema  (R) Heel/Foot/Toe Swelling and Edema  (L) Heel/Foot/Toe Swelling and Edema          Devices In Places Tele Box      Interventions In Place Pillows    Possible Skin Injury No    Pictures Uploaded Into Epic N/A  Wound Consult Placed N/A  RN Wound Prevention Protocol Ordered No

## 2025-01-23 NOTE — ASSESSMENT & PLAN NOTE
Due to esophagitis    1/24:  GI bleed is slow to resolve, patient's hemoglobin dropped and required blood transfusion today  Continue checking hemoglobin every 8 hours  IV PPI twice daily

## 2025-01-23 NOTE — CONSULTS
Hospital Medicine Consultation    Date of Service  1/23/2025    Referring Physician  Simone Leon MD    Consulting Physician  Simone Mcmahon M.D.    Reason for Consultation  GI bleed    History of Presenting Illness  74 y.o. male who presented 1/22/2025 with hematemesis    I have reviewed some of the recent provider documentation available to me in the patient's medical chart.  Records are briefly summarized:Chava Mercedes has past medical history that includes cirrhosis, esophageal varices, ascites and pleural effusion.  Patient presented to outside medical facility with maroon-colored emesis.  The patient was transferred to [facility] for higher level of care and specialty consultation.  He was admitted to the ICU.  Gastroenterology was consulted and the patient underwent urgent EGD.  Findings included esophagitis and portal hypertensive gastropathy.  Later that day the patient underwent large-volume paracentesis with 3000 mL of fluid removed.    Patient was seen and examined today.  Denies dizziness or lightheadedness, late this morning patient had a large dark-colored bowel movement.  Serial hemoglobins have been trending down.  The patient does not have any dyspepsia or chest pain.  States that his abdomen feels better after paracentesis, eager to go home when he is stable    Review of Systems  Review of Systems   Constitutional:  Negative for chills and malaise/fatigue.   Respiratory:  Negative for cough, hemoptysis and sputum production.    Cardiovascular:  Negative for chest pain, palpitations and orthopnea.   Gastrointestinal:  Positive for blood in stool and nausea. Negative for vomiting.   Skin:  Negative for itching and rash.   Neurological:  Negative for dizziness.   All other systems reviewed and are negative.      Past Medical History   has no past medical history on file.    Surgical History   has a past surgical history that includes gastroscopy (N/A, 12/11/2016);  gastroscopy-endo (12/11/2016); gastroscopy-endo (12/18/2016); gastroscopy (N/A, 12/2/2017); inguinal hernia repair (Right, 12/27/2023); umbilical hernia repair (N/A, 12/27/2023); pr upper gi endoscopy,diagnosis (N/A, 3/5/2024); pr upper gi endoscopy,diagnosis (N/A, 8/15/2024); pr upper gi endoscopy,diagnosis (N/A, 10/19/2024); and pr upper gi endoscopy,diagnosis (N/A, 1/22/2025).    Family History  family history is not on file.    Social History   reports that he has never smoked. He has never used smokeless tobacco. He reports current alcohol use. He reports that he does not use drugs.    Medications  Prior to Admission Medications   Prescriptions Last Dose Informant Patient Reported? Taking?   cyanocobalamin (VITAMIN B-12) 100 MCG Tab  Patient's Home Pharmacy Yes No   Sig: Take 100 mcg by mouth every day.   folic acid (FOLVITE) 1 MG Tab   No No   Sig: Take 1 Tablet by mouth every day.   lactulose 20 GM/30ML Solution  Patient's Home Pharmacy No No   Sig: Take 30 mL by mouth 3 times a day.   methocarbamol (ROBAXIN) 500 MG Tab   No No   Sig: Take 1 Tablet by mouth 4 times a day as needed (muscle spasms).   omeprazole (PRILOSEC) 40 MG delayed-release capsule  Patient's Home Pharmacy No No   Sig: Take 1 Capsule by mouth 2 times a day for 90 days.   sucralfate (CARAFATE) 1 GM Tab  Patient's Home Pharmacy Yes No   Sig: Take 1 g by mouth 4 times a day.   tamsulosin (FLOMAX) 0.4 MG capsule  Patient's Home Pharmacy No No   Sig: Take 1 Capsule by mouth 1/2 hour after breakfast.   thiamine (THIAMINE) 100 MG tablet   No No   Sig: Take 1 Tablet by mouth every day.      Facility-Administered Medications: None       Allergies  Allergies   Allergen Reactions    Morphine Itching     Itchiness        Physical Exam  Temp:  [36.2 °C (97.2 °F)-37.3 °C (99.1 °F)] 36.8 °C (98.2 °F)  Pulse:  [] 91  Resp:  [10-31] 18  BP: ()/(53-77) 102/53  SpO2:  [93 %-100 %] 98 %    Physical Exam  Constitutional:       General: He is not in  acute distress.     Appearance: Normal appearance.      Comments: thin   HENT:      Head: Normocephalic and atraumatic.      Comments: Temporal wasting     Right Ear: External ear normal.      Left Ear: External ear normal.      Nose: Nose normal.   Eyes:      Extraocular Movements: Extraocular movements intact.   Cardiovascular:      Rate and Rhythm: Normal rate and regular rhythm.      Pulses: Normal pulses.   Pulmonary:      Effort: Pulmonary effort is normal.      Breath sounds: Normal breath sounds.   Abdominal:      General: Abdomen is flat. Bowel sounds are normal. There is distension.      Palpations: Abdomen is soft.      Tenderness: There is no abdominal tenderness.   Musculoskeletal:         General: Normal range of motion.      Cervical back: Normal range of motion and neck supple.      Comments: Generalized muscle wasting   Skin:     General: Skin is warm and dry.   Neurological:      General: No focal deficit present.      Mental Status: He is alert and oriented to person, place, and time.      Cranial Nerves: No cranial nerve deficit.   Psychiatric:         Mood and Affect: Mood normal.         Behavior: Behavior normal.         Fluids  Date 01/23/25 0700 - 01/24/25 0659   Shift 0817-6401 6538-4033 6185-4580 24 Hour Total   INTAKE   Shift Total       OUTPUT   Urine 275   275   Shift Total 275   275   Weight (kg) 79.2 79.2 79.2 79.2       Laboratory  Recent Labs     01/22/25  0900 01/22/25  1734 01/23/25  0105 01/23/25  0859   WBC 5.7  --   --   --    RBC 3.37*  --   --   --    HEMOGLOBIN 8.9* 8.9* 7.6* 7.8*   HEMATOCRIT 28.8*  --   --   --    MCV 85.5  --   --   --    MCH 26.4*  --   --   --    MCHC 30.9*  --   --   --    RDW 61.4*  --   --   --    PLATELETCT 216  --   --   --    MPV 10.5  --   --   --      Recent Labs     01/22/25  0900   SODIUM 132*   POTASSIUM 4.5   CHLORIDE 103   CO2 20   GLUCOSE 98   BUN 20   CREATININE 0.74   CALCIUM 7.6*     Recent Labs     01/22/25  0900   INR 1.48*                  Imaging  No orders to display       Assessment/Plan  * Acute upper GI bleed- (present on admission)  Assessment & Plan  Due to esophagitis  Hemoglobin on the low side and drifting down, the patient had a large bowel movement today  Continue every 8 hour hemoglobin checks, transfuse to maintain hemoglobin greater than 7  Continue twice daily PPI    Alcoholic cirrhosis of liver with ascites (HCC)- (present on admission)  Assessment & Plan  Status post paracentesis    Hyponatremia- (present on admission)  Assessment & Plan  Suspect that hyponatremia is related to liver disease and effective decreased nutrition and volume  Continue to monitor especially while on diuretics    Pulmonary emboli (HCC)- (present on admission)  Assessment & Plan  Chart history of pulmonary embolism  Anticoagulation contraindicated at this time    Protein calorie malnutrition (HCC)- (present on admission)  Assessment & Plan  Patient is malnourished as evidenced by temporal and thenar muscle wasting    Lactic acid acidosis- (present on admission)  Assessment & Plan  Resolved

## 2025-01-23 NOTE — PROCEDURES
Procedure Note    Date: 1/22/2025  Time: 7:30pm    Procedure: Paracentesis - diagnostic and therapeutic  Site: LLQ abdomen (fluid localized with US guidance)    Indication: diagnostic and therapeutic thoracentesis  Consent: Informed consent obtained from patient or designated decision maker after explaining the benefits/risks of the procedure including but not limited to bleeding, infection, nerve or other deep structure injury, pneumothorax/hemothorax, bowel perforation or injury, arrythmia, or death. Patient or surrogate expressed understanding and agreement and signed consent which can be found in the patient's chart.    Procedure: After obtaining consent, a time-out was performed. Appropriate site confirmed with ultrasound and patient positioned, prepped, and draped in sterile fashion. All those present wearing mask and those physically participating remained adhering to sterile fashion with mask and gloves. 10 mL of local anesthetic injected (1% lidocaine without epinephrine) achieving appropriate comfort level for patient. Large intraabdominal fluid pocket localized and accessed using continuous ultrasound guidance and a paracentesis catheter placed using Seldinger technique and Z-track method into the abdominal cavity without complication. Able to easily aspirate 50 mL fluid. Fluid collected for analysis and 3,000 mL of fluid drained into vacutainer bottles. Catheter removed and site dressed with bandage. Significant reduction in fluid collection seen on US post-paracentesis. Patient tolerated procedure well without any difficulties and remains in care of bedside nurse. Samples sent to lab.    EBL: minimal  Complications: none    Simone Leon MD  Pulmonary and Critical Care Medicine  [facility]

## 2025-01-23 NOTE — DISCHARGE PLANNING
Met with pt at bedside to complete discharge planning assessment. Verified information listed on facesheet. Pt lives with spouse in a single story duplex. Pt states spouse recently returned home after a SNF stay due to a stroke. Pt doesn't use any DME and is independent with self care. Pt states he uses public transportation to get to appointments. PCP is Dr. Reyez. Pt states he will need assistance with transportation home at discharge.     Care Transition Team Assessment    Information Source  Orientation Level: Oriented X4  Information Given By: Patient    Readmission Evaluation  Is this a readmission?: No    Elopement Risk  Legal Hold: No  Ambulatory or Self Mobile in Wheelchair: No-Not an Elopement Risk  Elopement Risk: Not at Risk for Elopement    Interdisciplinary Discharge Planning  Lives with - Patient's Self Care Capacity: Spouse  Patient or legal guardian wants to designate a caregiver: No  Support Systems: Spouse / Significant Other  Housing / Facility: [address]    Discharge Preparedness  What is your plan after discharge?: Home with help  What are your discharge supports?: Spouse  Prior Functional Level: Ambulatory, Independent with Activities of Daily Living, Independent with Medication Management  Difficulity with ADLs: None  Difficulity with IADLs: None    Functional Assesment  Prior Functional Level: Ambulatory, Independent with Activities of Daily Living, Independent with Medication Management    Finances  Financial Barriers to Discharge: No  Prescription Coverage: Yes    Vision / Hearing Impairment  Vision Impairment : No  Hearing Impairment : No    Domestic Abuse  Possible Abuse/Neglect Reported to:: Not Applicable    Psychological Assessment  History of Substance Abuse: Alcohol  History of Psychiatric Problems: No  Non-compliant with Treatment: No  Newly Diagnosed Illness: No    Discharge Risks or Barriers  Discharge risks or barriers?: No    Anticipated Discharge Information  Discharge  Disposition: Discharged to home/self care (01)  Discharge Address: [address] [address]  [facility] [address]  Discharge Contact Phone Number: [phone]

## 2025-01-23 NOTE — PROGRESS NOTES
Telemetry Shift Summary     Rhythm: SR  Rate: 90s  Measurements: 0.20/0.08/0.38  Ectopy (reported by Monitor Tech): rare PVC/PAC     Normal Values  Rhythm: Sinus  HR:   Measurements: 0.12-0.20/0.06-0.10/0.30-0.52

## 2025-01-24 LAB
ALBUMIN FLD-MCNC: 0.3 G/DL
ALBUMIN SERPL BCP-MCNC: 2.1 G/DL (ref 3.2–4.9)
ALBUMIN/GLOB SERPL: 0.6 G/DL
ALP SERPL-CCNC: 69 U/L (ref 30–99)
ALT SERPL-CCNC: 14 U/L (ref 2–50)
ANION GAP SERPL CALC-SCNC: 10 MMOL/L (ref 7–16)
AST SERPL-CCNC: 22 U/L (ref 12–45)
BILIRUB SERPL-MCNC: 0.4 MG/DL (ref 0.1–1.5)
BUN SERPL-MCNC: 15 MG/DL (ref 8–22)
CALCIUM ALBUM COR SERPL-MCNC: 8.9 MG/DL (ref 8.5–10.5)
CALCIUM SERPL-MCNC: 7.4 MG/DL (ref 8.4–10.2)
CHLORIDE SERPL-SCNC: 104 MMOL/L (ref 96–112)
CO2 SERPL-SCNC: 20 MMOL/L (ref 20–33)
CREAT SERPL-MCNC: 0.86 MG/DL (ref 0.5–1.4)
GFR SERPLBLD CREATININE-BSD FMLA CKD-EPI: 91 ML/MIN/1.73 M 2
GLOBULIN SER CALC-MCNC: 3.3 G/DL (ref 1.9–3.5)
GLUCOSE SERPL-MCNC: 90 MG/DL (ref 65–99)
HCT VFR BLD AUTO: 22.2 % (ref 42–52)
HCT VFR BLD AUTO: 26.1 % (ref 42–52)
HCT VFR BLD AUTO: 28.3 % (ref 42–52)
HCT VFR BLD AUTO: 31.6 % (ref 42–52)
HGB BLD-MCNC: 10.1 G/DL (ref 14–18)
HGB BLD-MCNC: 7 G/DL (ref 14–18)
HGB BLD-MCNC: 8.4 G/DL (ref 14–18)
HGB BLD-MCNC: 9.1 G/DL (ref 14–18)
POTASSIUM SERPL-SCNC: 3.6 MMOL/L (ref 3.6–5.5)
PROT SERPL-MCNC: 5.4 G/DL (ref 6–8.2)
SODIUM SERPL-SCNC: 134 MMOL/L (ref 135–145)

## 2025-01-24 PROCEDURE — 85018 HEMOGLOBIN: CPT

## 2025-01-24 PROCEDURE — 99233 SBSQ HOSP IP/OBS HIGH 50: CPT | Performed by: HOSPITALIST

## 2025-01-24 PROCEDURE — A9270 NON-COVERED ITEM OR SERVICE: HCPCS | Performed by: STUDENT IN AN ORGANIZED HEALTH CARE EDUCATION/TRAINING PROGRAM

## 2025-01-24 PROCEDURE — 700111 HCHG RX REV CODE 636 W/ 250 OVERRIDE (IP): Performed by: INTERNAL MEDICINE

## 2025-01-24 PROCEDURE — 770020 HCHG ROOM/CARE - TELE (206)

## 2025-01-24 PROCEDURE — 36415 COLL VENOUS BLD VENIPUNCTURE: CPT

## 2025-01-24 PROCEDURE — A9270 NON-COVERED ITEM OR SERVICE: HCPCS | Performed by: HOSPITALIST

## 2025-01-24 PROCEDURE — 86923 COMPATIBILITY TEST ELECTRIC: CPT

## 2025-01-24 PROCEDURE — 85014 HEMATOCRIT: CPT

## 2025-01-24 PROCEDURE — 94760 N-INVAS EAR/PLS OXIMETRY 1: CPT

## 2025-01-24 PROCEDURE — 700102 HCHG RX REV CODE 250 W/ 637 OVERRIDE(OP): Performed by: STUDENT IN AN ORGANIZED HEALTH CARE EDUCATION/TRAINING PROGRAM

## 2025-01-24 PROCEDURE — P9016 RBC LEUKOCYTES REDUCED: HCPCS

## 2025-01-24 PROCEDURE — 80053 COMPREHEN METABOLIC PANEL: CPT

## 2025-01-24 PROCEDURE — 700102 HCHG RX REV CODE 250 W/ 637 OVERRIDE(OP): Performed by: HOSPITALIST

## 2025-01-24 PROCEDURE — 36430 TRANSFUSION BLD/BLD COMPNT: CPT

## 2025-01-24 RX ADMIN — PANTOPRAZOLE SODIUM 40 MG: 40 INJECTION, POWDER, FOR SOLUTION INTRAVENOUS at 05:41

## 2025-01-24 RX ADMIN — FUROSEMIDE 20 MG: 20 TABLET ORAL at 05:41

## 2025-01-24 RX ADMIN — SULFAMETHOXAZOLE AND TRIMETHOPRIM 1 TABLET: 800; 160 TABLET ORAL at 05:41

## 2025-01-24 RX ADMIN — SPIRONOLACTONE 25 MG: 25 TABLET ORAL at 05:41

## 2025-01-24 RX ADMIN — PANTOPRAZOLE SODIUM 40 MG: 40 INJECTION, POWDER, FOR SOLUTION INTRAVENOUS at 18:35

## 2025-01-24 RX ADMIN — LACTULOSE 30 ML: 20 SOLUTION ORAL at 05:41

## 2025-01-24 RX ADMIN — LACTULOSE 30 ML: 20 SOLUTION ORAL at 18:35

## 2025-01-24 ASSESSMENT — ENCOUNTER SYMPTOMS
HEMOPTYSIS: 0
BLOOD IN STOOL: 1
VOMITING: 0
PALPITATIONS: 0
CHILLS: 0
DIZZINESS: 0
ORTHOPNEA: 0
NAUSEA: 0
COUGH: 0
SPUTUM PRODUCTION: 0

## 2025-01-24 ASSESSMENT — PAIN DESCRIPTION - PAIN TYPE
TYPE: ACUTE PAIN
TYPE: ACUTE PAIN

## 2025-01-24 NOTE — PROGRESS NOTES
OVERNIGHT HOSPITALIST:    Paged by nursing Staff for patient with blood pressure 91/52 earlier on the shift.  Repeat hemoglobin was 7.0.  Blood pressure down to 72/38 and I order 1 unit PRBC blood transfusion.  Patient not having active bleeding.  Repeat hemoglobin this morning is 8.4 and his blood pressure improved to 100/56.  Will continue to closely monitor.

## 2025-01-24 NOTE — PROGRESS NOTES
Telemetry Shift Summary     Rhythm: SR  HR Range:77-83  Ectopy: rPAC  Measurements: 0.20/0.06/0.40    Normal Values  Measurements: 0.12- 0.20 / 0.08-0.10 / 0.30-0.52

## 2025-01-24 NOTE — PROGRESS NOTES
Telemetry Shift Summary     Per monitor tech:  Rhythm SR  HR Range 78-93  Ectopy rPVC/PAC  Measurements 0.22/0.08/0.40      Normal Values  Rhythm SR  HR Range:   Measurements: 0.12-0.20/0.06-0.10/0.30-0.52

## 2025-01-24 NOTE — PROGRESS NOTES
Patient had large black type 7 stool. Updated MD. Patient asymptomatic. /53 HR 81. STAT Hgb ordered.

## 2025-01-24 NOTE — CARE PLAN
The patient is Stable - Low risk of patient condition declining or worsening    Shift Goals  Clinical Goals: monitor for GI bleed  Patient Goals: go home in AM    Progress made toward(s) clinical / shift goals:    Problem: Knowledge Deficit - Standard  Goal: Patient and family/care givers will demonstrate understanding of plan of care, disease process/condition, diagnostic tests and medications  Outcome: Progressing     Problem: Skin Integrity  Goal: Skin integrity is maintained or improved  Outcome: Progressing     Problem: Risk for Bleeding  Goal: Patient will not experience bleeding as evidenced by normal blood pressure, stable hematocrit and hemoglobin levels and desired ranges for coagulation profiles  Outcome: Progressing  Note: Patient HB trending down, received 1unit of RBCs     Problem: Fall Risk  Goal: Patient will remain free from falls  Outcome: Progressing       Patient is not progressing towards the following goals:

## 2025-01-24 NOTE — DIETARY
Nutrition Services: Follow-up for wt/PO hx and NFPE   Day 2 of admit. Chava Mercedes is a 74 y.o. male with admitting DX of Acute upper GI bleed [K92.2]     Current diet order:   Regular diet     Recorded PO intake 25-50% x 1 and % x 1. Pt would like chocolate milk with dinner.      Subjective:   Patient reported UBW: 180 lb   Dietary recall/energy intake: pt said that overall he has been eating better because he stopped drinking alcohol. He is eating plenty of protein foods including some meat (not tougher cuts of red meat) and dairy. He also likes fruit. This indicates sufficient energy intake.     Nutrition Focused Physical Exam (NFPE)   Weight loss: pt w/ 6 lb wt loss recently. He attributes this to bloody emesis w/ GI bleed. Of note, pt has ascites at times and requires paracentesis.  RD suspects this change related to a combination of poor PO PTA due to current illness and fluids removed during paracentesis.  Muscle mass: Severe loss in temple, clavicle and shoulder region  Subcutaneous fat: Severe loss in orbitals, buccal fat pads and upper arm  Fluid Accumulation: generalized edema, 2+ pitting edema to BLEs. Most likely r/t dx of alcoholic cirrhosis of liver w/ ascites  Reduced  Strength: N/A in acute care setting.     Nutrition Diagnosis:       Pt meets ASPEN criteria for severe malnutrition in the context of chronic illness r/t dx of alcoholic cirrhosis of liver AEB severe muscle wasting and severe subcutaneous fat loss.     This is already a dx on pt's problem list.     Problem: Nutritional:  Goal: Achieve adequate nutritional intake  Description: Patient will consume >50% of meals  Outcome: progressing      Plan/ Recommendations:      Encourage intake of meals, goal for >50% consumption from meals and/or supplements  Document intake of all meals as % taken in ADL's to provide interdisciplinary communication across all shifts.   Monitor weight.  Nutrition rep will continue to see patient for  ongoing meal and snack preferences.  Obtain supplement order per RD as needed.      RD following

## 2025-01-24 NOTE — PROGRESS NOTES
Suture removed on left upper back from previous chest tube site per MD request. Patient tolerated removal with no pain. Gauze and tegaderm placed over site after removal.

## 2025-01-24 NOTE — PROGRESS NOTES
Hospital Medicine Daily Progress Note    Date of Service  1/24/2025    Chief Complaint  Chava Mercedes is a 74 y.o. male admitted 1/22/2025 with hematemesis    Hospital Course  Chava Mercedes has past ministry includes cirrhosis, esophageal varices, ascites and pleural effusion.  Patient presented to outside medical facility with maroon-colored emesis.  The patient was transferred to [facility] for higher level of care and specialty consultation.  He was admitted to the ICU.  Gastroenterology was consulted and the patient underwent urgent EGD.  Findings included esophagitis and portal hypertensive gastropathy.  Later that day the patient underwent large-volume paracentesis with 3000 mL of fluid removed.    Interval Problem Update  Patient had couple episodes of dark-colored stool over the last 24 hours, last bowel movement was normal  Hemoglobin dropped this morning on labs, he was transfused 1 unit packed red blood cells  States that he feels stronger, his blood pressure has improved    I have discussed this patient's plan of care and discharge plan at IDT rounds today with Case Management, Nursing, Nursing leadership, and other members of the IDT team.    Consultants/Specialty  critical care and GI    Code Status  Full Code    Disposition  The patient is not medically cleared for discharge to home or a post-acute facility.  Anticipate discharge to: home with close outpatient follow-up    I have placed the appropriate orders for post-discharge needs.    Review of Systems  Review of Systems   Constitutional:  Negative for chills and malaise/fatigue.   Respiratory:  Negative for cough, hemoptysis and sputum production.    Cardiovascular:  Negative for chest pain, palpitations and orthopnea.   Gastrointestinal:  Positive for blood in stool and melena. Negative for nausea and vomiting.   Skin:  Negative for itching and rash.   Neurological:  Negative for dizziness.   All other systems reviewed  and are negative.       Physical Exam  Temp:  [36.5 °C (97.7 °F)-36.8 °C (98.3 °F)] 36.8 °C (98.3 °F)  Pulse:  [76-92] 84  Resp:  [16-18] 18  BP: ()/(38-62) 93/58  SpO2:  [94 %-99 %] 94 %    Physical Exam  Constitutional:       General: He is not in acute distress.     Appearance: Normal appearance.      Comments: Thin   HENT:      Head: Normocephalic and atraumatic.      Right Ear: External ear normal.      Left Ear: External ear normal.      Nose: Nose normal.   Eyes:      Extraocular Movements: Extraocular movements intact.   Cardiovascular:      Rate and Rhythm: Normal rate and regular rhythm.      Pulses: Normal pulses.   Pulmonary:      Effort: Pulmonary effort is normal.      Breath sounds: Normal breath sounds.   Abdominal:      General: Abdomen is flat. Bowel sounds are normal. There is no distension.      Palpations: Abdomen is soft.      Tenderness: There is no abdominal tenderness.   Musculoskeletal:         General: Normal range of motion.      Cervical back: Normal range of motion and neck supple.   Skin:     General: Skin is warm and dry.   Neurological:      General: No focal deficit present.      Mental Status: He is alert and oriented to person, place, and time.      Cranial Nerves: No cranial nerve deficit.   Psychiatric:         Mood and Affect: Mood normal.         Behavior: Behavior normal.         Fluids    Intake/Output Summary (Last 24 hours) at 1/24/2025 1012  Last data filed at 1/24/2025 0713  Gross per 24 hour   Intake 730 ml   Output --   Net 730 ml        Laboratory  Recent Labs     01/22/25  0900 01/22/25  1734 01/23/25  1949 01/24/25  0035 01/24/25  0548   WBC 5.7  --   --   --   --    RBC 3.37*  --   --   --   --    HEMOGLOBIN 8.9*   < > 7.2* 7.0* 8.4*   HEMATOCRIT 28.8*  --   --  22.2* 26.1*   MCV 85.5  --   --   --   --    MCH 26.4*  --   --   --   --    MCHC 30.9*  --   --   --   --    RDW 61.4*  --   --   --   --    PLATELETCT 216  --   --   --   --    MPV 10.5  --   --    --   --     < > = values in this interval not displayed.     Recent Labs     01/22/25  0900 01/24/25  0035   SODIUM 132* 134*   POTASSIUM 4.5 3.6   CHLORIDE 103 104   CO2 20 20   GLUCOSE 98 90   BUN 20 15   CREATININE 0.74 0.86   CALCIUM 7.6* 7.4*     Recent Labs     01/22/25  0900   INR 1.48*               Imaging  No orders to display        Assessment/Plan  * Acute blood loss anemia- (present on admission)  Assessment & Plan  1/24:  Due to GI bleed  Transfused 1 unit packed red blood cell this morning  Continue to check hemoglobin every 8 hours, transfuse for hemoglobin less than 7    Acute upper GI bleed- (present on admission)  Assessment & Plan  Due to esophagitis    1/24:  GI bleed is slow to resolve, patient's hemoglobin dropped and required blood transfusion today  Continue checking hemoglobin every 8 hours  IV PPI twice daily      Alcoholic cirrhosis of liver with ascites (HCC)- (present on admission)  Assessment & Plan  Status post paracentesis    Hyponatremia- (present on admission)  Assessment & Plan  Mild hyponatremia, stable on diuretics    Pulmonary emboli (HCC)- (present on admission)  Assessment & Plan  Chart history of pulmonary embolism  Anticoagulation contraindicated at this time    Protein calorie malnutrition (HCC)- (present on admission)  Assessment & Plan  Patient is malnourished as evidenced by temporal and thenar muscle wasting    Lactic acid acidosis- (present on admission)  Assessment & Plan  Resolved         VTE prophylaxis:   SCDs/TEDs      I have performed a physical exam and reviewed and updated ROS and Plan today (1/24/2025). In review of yesterday's note (1/23/2025), there are no changes except as documented above.

## 2025-01-24 NOTE — ASSESSMENT & PLAN NOTE
1/24:  Due to GI bleed  Transfused 1 unit packed red blood cell this morning  Continue to check hemoglobin every 8 hours, transfuse for hemoglobin less than 7

## 2025-01-25 ENCOUNTER — PHARMACY VISIT (OUTPATIENT)
Dept: PHARMACY | Facility: MEDICAL CENTER | Age: 75
End: 2025-01-25
Payer: COMMERCIAL

## 2025-01-25 VITALS
TEMPERATURE: 98.1 F | SYSTOLIC BLOOD PRESSURE: 107 MMHG | HEIGHT: 72 IN | BODY MASS INDEX: 23.65 KG/M2 | RESPIRATION RATE: 18 BRPM | HEART RATE: 81 BPM | DIASTOLIC BLOOD PRESSURE: 65 MMHG | OXYGEN SATURATION: 98 % | WEIGHT: 174.6 LBS

## 2025-01-25 PROBLEM — E87.1 HYPONATREMIA: Status: RESOLVED | Noted: 2025-01-23 | Resolved: 2025-01-25

## 2025-01-25 PROBLEM — K52.9 JEJUNITIS: Status: RESOLVED | Noted: 2025-01-22 | Resolved: 2025-01-25

## 2025-01-25 PROBLEM — K92.2 ACUTE UPPER GI BLEED: Status: RESOLVED | Noted: 2024-10-19 | Resolved: 2025-01-25

## 2025-01-25 LAB
HCT VFR BLD AUTO: 25.6 % (ref 42–52)
HCT VFR BLD AUTO: 26.8 % (ref 42–52)
HCT VFR BLD AUTO: 30.3 % (ref 42–52)
HGB BLD-MCNC: 8.4 G/DL (ref 14–18)
HGB BLD-MCNC: 8.6 G/DL (ref 14–18)
HGB BLD-MCNC: 9.5 G/DL (ref 14–18)

## 2025-01-25 PROCEDURE — RXMED WILLOW AMBULATORY MEDICATION CHARGE: Performed by: HOSPITALIST

## 2025-01-25 PROCEDURE — 700102 HCHG RX REV CODE 250 W/ 637 OVERRIDE(OP): Performed by: STUDENT IN AN ORGANIZED HEALTH CARE EDUCATION/TRAINING PROGRAM

## 2025-01-25 PROCEDURE — A9270 NON-COVERED ITEM OR SERVICE: HCPCS | Performed by: STUDENT IN AN ORGANIZED HEALTH CARE EDUCATION/TRAINING PROGRAM

## 2025-01-25 PROCEDURE — 85018 HEMOGLOBIN: CPT | Mod: 91

## 2025-01-25 PROCEDURE — 99239 HOSP IP/OBS DSCHRG MGMT >30: CPT | Performed by: HOSPITALIST

## 2025-01-25 PROCEDURE — 85014 HEMATOCRIT: CPT | Mod: 91

## 2025-01-25 PROCEDURE — 700111 HCHG RX REV CODE 636 W/ 250 OVERRIDE (IP): Performed by: INTERNAL MEDICINE

## 2025-01-25 PROCEDURE — 36415 COLL VENOUS BLD VENIPUNCTURE: CPT

## 2025-01-25 RX ORDER — OMEPRAZOLE 20 MG/1
40 CAPSULE, DELAYED RELEASE ORAL 2 TIMES DAILY
Qty: 120 CAPSULE | Refills: 2 | Status: SHIPPED | OUTPATIENT
Start: 2025-01-25

## 2025-01-25 RX ORDER — SPIRONOLACTONE 25 MG/1
25 TABLET ORAL DAILY
Qty: 30 TABLET | Refills: 0 | Status: ON HOLD | OUTPATIENT
Start: 2025-01-26 | End: 2025-02-13

## 2025-01-25 RX ORDER — FUROSEMIDE 20 MG/1
20 TABLET ORAL DAILY
Qty: 30 TABLET | Refills: 0 | Status: ON HOLD | OUTPATIENT
Start: 2025-01-26 | End: 2025-02-13

## 2025-01-25 RX ADMIN — LACTULOSE 30 ML: 20 SOLUTION ORAL at 05:09

## 2025-01-25 RX ADMIN — PANTOPRAZOLE SODIUM 40 MG: 40 INJECTION, POWDER, FOR SOLUTION INTRAVENOUS at 05:09

## 2025-01-25 RX ADMIN — SPIRONOLACTONE 25 MG: 25 TABLET ORAL at 05:09

## 2025-01-25 RX ADMIN — FUROSEMIDE 20 MG: 20 TABLET ORAL at 05:09

## 2025-01-25 ASSESSMENT — PAIN DESCRIPTION - PAIN TYPE: TYPE: ACUTE PAIN

## 2025-01-25 NOTE — CARE PLAN
The patient is Watcher - Medium risk of patient condition declining or worsening    Shift Goals  Clinical Goals: monitor for signs of bleeding, monitor BP, monitor labs  Patient Goals: go home soon    Progress made toward(s) clinical / shift goals:  BP has inproved throughout shift. Patient's BM have turned into a green color and hgb has increased as well. Patient will need to be 24hr without blood transfusion per MD in order to D/C.       Problem: Knowledge Deficit - Standard  Goal: Patient and family/care givers will demonstrate understanding of plan of care, disease process/condition, diagnostic tests and medications  Outcome: Progressing     Problem: Fall Risk  Goal: Patient will remain free from falls  Outcome: Progressing     Problem: Risk for Bleeding  Goal: Patient will take measures to prevent bleeding and recognizes signs of bleeding that need to be reported immediately to a health care professional  Outcome: Progressing       Patient is not progressing towards the following goals:

## 2025-01-25 NOTE — CARE PLAN
The patient is Stable - Low risk of patient condition declining or worsening    Shift Goals  Clinical Goals: mpnitor for signs of GI bleed, monitor BP  Patient Goals: Sleep, DC home in AM    Progress made toward(s) clinical / shift goals:    Problem: Risk for Bleeding  Goal: Patient will take measures to prevent bleeding and recognizes signs of bleeding that need to be reported immediately to a health care professional  Outcome: Progressing     Problem: Pain - Standard  Goal: Alleviation of pain or a reduction in pain to the patient’s comfort goal  Outcome: Progressing     Problem: Fall Risk  Goal: Patient will remain free from falls  Outcome: Progressing       Patient is not progressing towards the following goals:

## 2025-01-25 NOTE — PROGRESS NOTES
Telemetry Shift Summary     Rhythm: SR  HR Range:75-90  Ectopy: rPAC/fPVC  Measurements: 0.20/0.06/0.36    Normal Values  Measurements: 0.12- 0.20 / 0.08-0.10 / 0.30-0.52

## 2025-01-25 NOTE — DISCHARGE SUMMARY
Discharge Summary    CHIEF COMPLAINT ON ADMISSION      Reason for Admission  Upper GI Bleed     Admission Date  1/22/2025    CODE STATUS  Full Code    HPI & HOSPITAL COURSE  This is a 74 y.o. male here with hematemesis    Chava Mercedes has past medical history includes cirrhosis, esophageal varices, ascites and pleural effusion.  Patient presented to outside medical facility with maroon-colored emesis.  The patient was transferred to [facility] for higher level of care and specialty consultation.  He was admitted to the ICU.  Gastroenterology was consulted and the patient underwent urgent EGD.  Findings included esophagitis and portal hypertensive gastropathy.  Later that day the patient underwent large-volume paracentesis with 3000 mL of fluid removed.    The patient did require transfusion on 1/24/2025 for hemoglobin was dropping as well as hypotension.    The patient was seen and examined today, he is no longer having any bloody stools.  Patient's blood pressure is stable in the 90s to 100s range.  Hemoglobin has stabilized as well.  He can be discharged home    I have provided prescriptions for the patient's omeprazole as well as his diuretics for ascites.    Patient is established with primary care in [place].  He will make an appointment next week    Therefore, he is discharged in fair and stable condition to home with close outpatient follow-up.    The patient met 2-midnight criteria for an inpatient stay at the time of discharge.    Discharge Date  1/25/2025    FOLLOW UP ITEMS POST DISCHARGE  Follow-up with primary care provider next week    DISCHARGE DIAGNOSES  Principal Problem (Resolved):    Acute blood loss anemia (POA: Yes)  Active Problems:    Alcoholic cirrhosis of liver with ascites (HCC) (POA: Yes)    Pulmonary emboli (HCC) (POA: Yes)    Chronic anemia (POA: Yes)  Resolved Problems:    Acute upper GI bleed (POA: Yes)    Hyponatremia (POA: Yes)    Sepsis (HCC) (POA:  Unknown)    Lactic acid acidosis (POA: Yes)    Protein calorie malnutrition (HCC) (POA: Yes)    Jejunitis (POA: Yes)      FOLLOW UP  No future appointments.  No follow-up provider specified.    MEDICATIONS ON DISCHARGE     Medication List        START taking these medications        Instructions   furosemide 20 MG Tabs  Start taking on: January 26, 2025  Commonly known as: Lasix   Take 1 Tablet by mouth every day.  Dose: 20 mg     spironolactone 25 MG Tabs  Start taking on: January 26, 2025  Commonly known as: Aldactone   Take 1 Tablet by mouth every day.  Dose: 25 mg            CHANGE how you take these medications        Instructions   omeprazole 20 MG delayed-release capsule  What changed: medication strength  Commonly known as: PriLOSEC   Take 2 Capsules by mouth 2 times a day.  Dose: 40 mg            CONTINUE taking these medications        Instructions   cyanocobalamin 100 MCG Tabs  Commonly known as: Vitamin B-12   Take 100 mcg by mouth every day.  Dose: 100 mcg     folic acid 1 MG Tabs  Commonly known as: Folvite   Take 1 Tablet by mouth every day.  Dose: 1 mg     lactulose 10 g/15mL Soln   Take 30 mL by mouth 3 times a day.  Dose: 30 mL     methocarbamol 500 MG Tabs  Commonly known as: Robaxin   Take 1 Tablet by mouth 4 times a day as needed (muscle spasms).  Dose: 500 mg     sucralfate 1 GM Tabs  Commonly known as: Carafate   Take 1 g by mouth 4 times a day.  Dose: 1 g     tamsulosin 0.4 MG capsule  Commonly known as: Flomax   Take 1 Capsule by mouth 1/2 hour after breakfast.  Dose: 0.4 mg     thiamine 100 MG tablet  Commonly known as: Thiamine   Take 1 Tablet by mouth every day.  Dose: 100 mg              Allergies  Allergies   Allergen Reactions    Morphine Itching     Itchiness        DIET  Orders Placed This Encounter   Procedures    Diet Order Diet: Regular     Standing Status:   Standing     Number of Occurrences:   1     Order Specific Question:   Diet:     Answer:   Regular [1]       ACTIVITY  As  tolerated.  Weight bearing as tolerated    CONSULTATIONS  Gastroenterology    PROCEDURES    ATE OF PROCEDURE:  01/22/2025      PROCEDURE:  Esophagogastroduodenoscopy.     PREOPERATIVE DIAGNOSIS:  GI bleed.     POSTOPERATIVE DIAGNOSES:  LA grade D esophagitis, hiatal hernia and portal   hypertensive gastropathy.     ANESTHESIA:  General anesthesia per anesthesiologist.     DESCRIPTION OF PROCEDURE:  After informed consent and appropriate sedation,   the patient was placed in left lateral position and the gastroscope was   advanced through the oropharynx into the esophagus, through to the second   portion of the duodenum.  The mucosa was then examined carefully as the scope   was gently withdrawn.  The duodenum was unremarkable as was the gastric   antrum; however, the gastric body and on retroflexion, the gastric cardia and   fundus all showed mild portal hypertensive gastropathy.  The stomach was   decompressed.  The scope was withdrawn back into the esophagus.  There was LA   grade D esophagitis in the middle and distal third of the esophagus with   ulcerations throughout.  The proximal esophagus was unremarkable.  The bowel   was decompressed.  The scope was withdrawn.  There were no immediate postop   complications.     RECOMMENDATIONS:    1.  Recommend IV PPI.  2.  Discontinue octreotide.  3.  Oral PPI b.i.d. indefinitely.  4.  Follow up with GI as an outpatient.  5.  Antiemetics as needed to prevent any nausea or vomiting that may   contribute to worsening reflux or esophagitis.  6.  Clear liquid diet, advance as tolerated to GI soft if the patient is   having no longer any nausea or vomiting.  7.  We will sign off for now, but please call with any questions or concerns.       LABORATORY  Lab Results   Component Value Date    SODIUM 134 (L) 01/24/2025    POTASSIUM 3.6 01/24/2025    CHLORIDE 104 01/24/2025    CO2 20 01/24/2025    GLUCOSE 90 01/24/2025    BUN 15 01/24/2025    CREATININE 0.86 01/24/2025        Lab  Results   Component Value Date    WBC 5.7 01/22/2025    HEMOGLOBIN 8.6 (L) 01/25/2025    HEMATOCRIT 26.8 (L) 01/25/2025    PLATELETCT 216 01/22/2025        Total time of the discharge process exceeds 36 minutes.

## 2025-01-25 NOTE — CARE PLAN
The patient is Stable - Low risk of patient condition declining or worsening    Shift Goals  Clinical Goals: Assist with ride home for discharge  Patient Goals: Go home to wife    Progress made toward(s) clinical / shift goals:        Problem: Knowledge Deficit - Standard  Goal: Patient and family/care givers will demonstrate understanding of plan of care, disease process/condition, diagnostic tests and medications  Outcome: Met     Problem: Skin Integrity  Goal: Skin integrity is maintained or improved  Outcome: Met       Patient is not progressing towards the following goals:

## 2025-01-25 NOTE — DISCHARGE PLANNING
note:  Received notification from KEANU Dupree that UBER has been approved by Malini ROSARIO CM sup.    CM scheduled Uber going to :  [address] [facility] [address].    Cost $131.94  [facility]  [id number]   [place]

## 2025-01-25 NOTE — DISCHARGE PLANNING
Case Management Discharge Planning    Admission Date: 1/22/2025  GMLOS: 3  ALOS: 3    6-Clicks ADL Score: 21  6-Clicks Mobility Score: 20      Anticipated Discharge Dispo: Discharge Disposition: Discharged to home/self care (01)  Discharge Address: [address]   [facility] [address]  Discharge Contact Phone Number: [phone]    DME Needed: No    Action(s) Taken: Transport Arranged     Pt discussed in 0815 rounds. Pt is medically cleared. Per RN pt is needing transport home to [place].   Received approval from leadership to get Uber for pt.     1115-  Spoke with RN, pt's meds to beds anticipated to be delivered between . LSW acknowledged, stated pt will need to be ready to discharge prior to Uber being ordered.     1330-  LSW notified pt is ready for discharge. Requested assistance from ED RN CM to order Uber for pt.       information provided to CNA.     Escalations Completed: Leadership    Medically Clear: Yes    Next Steps: LSW to follow    Barriers to Discharge: None    Is the patient up for discharge tomorrow: No

## 2025-01-27 ENCOUNTER — TELEPHONE (OUTPATIENT)
Dept: HEALTH INFORMATION MANAGEMENT | Facility: OTHER | Age: 75
End: 2025-01-27
Payer: MEDICARE

## 2025-01-27 LAB
BACTERIA FLD AEROBE CULT: NORMAL
GRAM STN SPEC: NORMAL
SIGNIFICANT IND 70042: NORMAL
SITE SITE: NORMAL
SOURCE SOURCE: NORMAL

## 2025-02-09 ENCOUNTER — APPOINTMENT (OUTPATIENT)
Dept: RADIOLOGY | Facility: MEDICAL CENTER | Age: 75
End: 2025-02-09
Payer: MEDICARE

## 2025-02-10 ENCOUNTER — APPOINTMENT (OUTPATIENT)
Dept: RADIOLOGY | Facility: MEDICAL CENTER | Age: 75
DRG: 433 | End: 2025-02-10
Attending: HOSPITALIST
Payer: MEDICARE

## 2025-02-10 ENCOUNTER — HOSPITAL ENCOUNTER (INPATIENT)
Facility: MEDICAL CENTER | Age: 75
LOS: 3 days | DRG: 433 | End: 2025-02-13
Attending: HOSPITALIST | Admitting: HOSPITALIST
Payer: MEDICARE

## 2025-02-10 DIAGNOSIS — K70.31 ALCOHOLIC CIRRHOSIS OF LIVER WITH ASCITES (HCC): ICD-10-CM

## 2025-02-10 DIAGNOSIS — R60.1 ANASARCA: ICD-10-CM

## 2025-02-10 DIAGNOSIS — R60.0 LOCALIZED EDEMA: ICD-10-CM

## 2025-02-10 LAB
ALBUMIN SERPL BCP-MCNC: 2.9 G/DL (ref 3.2–4.9)
ALBUMIN/GLOB SERPL: 0.6 G/DL
ALP SERPL-CCNC: 102 U/L (ref 30–99)
ALT SERPL-CCNC: 9 U/L (ref 2–50)
ANION GAP SERPL CALC-SCNC: 14 MMOL/L (ref 7–16)
AST SERPL-CCNC: 22 U/L (ref 12–45)
BILIRUB SERPL-MCNC: 0.8 MG/DL (ref 0.1–1.5)
BUN SERPL-MCNC: 13 MG/DL (ref 8–22)
CALCIUM ALBUM COR SERPL-MCNC: 9.2 MG/DL (ref 8.5–10.5)
CALCIUM SERPL-MCNC: 8.3 MG/DL (ref 8.4–10.2)
CHLORIDE SERPL-SCNC: 103 MMOL/L (ref 96–112)
CO2 SERPL-SCNC: 17 MMOL/L (ref 20–33)
CREAT SERPL-MCNC: 0.91 MG/DL (ref 0.5–1.4)
ERYTHROCYTE [DISTWIDTH] IN BLOOD BY AUTOMATED COUNT: 65.1 FL (ref 35.9–50)
GFR SERPLBLD CREATININE-BSD FMLA CKD-EPI: 89 ML/MIN/1.73 M 2
GLOBULIN SER CALC-MCNC: 4.7 G/DL (ref 1.9–3.5)
GLUCOSE SERPL-MCNC: 99 MG/DL (ref 65–99)
HCT VFR BLD AUTO: 33.3 % (ref 42–52)
HGB BLD-MCNC: 10.3 G/DL (ref 14–18)
MCH RBC QN AUTO: 27.3 PG (ref 27–33)
MCHC RBC AUTO-ENTMCNC: 30.9 G/DL (ref 32.3–36.5)
MCV RBC AUTO: 88.3 FL (ref 81.4–97.8)
NT-PROBNP SERPL IA-MCNC: 69 PG/ML (ref 0–125)
PLATELET # BLD AUTO: 134 K/UL (ref 164–446)
PMV BLD AUTO: 11 FL (ref 9–12.9)
POTASSIUM SERPL-SCNC: 4.1 MMOL/L (ref 3.6–5.5)
PROT SERPL-MCNC: 7.6 G/DL (ref 6–8.2)
RBC # BLD AUTO: 3.77 M/UL (ref 4.7–6.1)
SODIUM SERPL-SCNC: 134 MMOL/L (ref 135–145)
TROPONIN T SERPL-MCNC: 13 NG/L (ref 6–19)
TROPONIN T SERPL-MCNC: 13 NG/L (ref 6–19)
TROPONIN T SERPL-MCNC: 17 NG/L (ref 6–19)
WBC # BLD AUTO: 3.8 K/UL (ref 4.8–10.8)

## 2025-02-10 PROCEDURE — A9270 NON-COVERED ITEM OR SERVICE: HCPCS | Performed by: INTERNAL MEDICINE

## 2025-02-10 PROCEDURE — 80053 COMPREHEN METABOLIC PANEL: CPT

## 2025-02-10 PROCEDURE — 84484 ASSAY OF TROPONIN QUANT: CPT

## 2025-02-10 PROCEDURE — 71045 X-RAY EXAM CHEST 1 VIEW: CPT

## 2025-02-10 PROCEDURE — A9270 NON-COVERED ITEM OR SERVICE: HCPCS | Performed by: HOSPITALIST

## 2025-02-10 PROCEDURE — 700111 HCHG RX REV CODE 636 W/ 250 OVERRIDE (IP): Mod: JZ | Performed by: HOSPITALIST

## 2025-02-10 PROCEDURE — 99223 1ST HOSP IP/OBS HIGH 75: CPT | Mod: AI | Performed by: HOSPITALIST

## 2025-02-10 PROCEDURE — 85027 COMPLETE CBC AUTOMATED: CPT

## 2025-02-10 PROCEDURE — 49082 ABD PARACENTESIS: CPT | Performed by: HOSPITALIST

## 2025-02-10 PROCEDURE — 770001 HCHG ROOM/CARE - MED/SURG/GYN PRIV*

## 2025-02-10 PROCEDURE — 0W9G3ZZ DRAINAGE OF PERITONEAL CAVITY, PERCUTANEOUS APPROACH: ICD-10-PCS | Performed by: HOSPITALIST

## 2025-02-10 PROCEDURE — 94760 N-INVAS EAR/PLS OXIMETRY 1: CPT

## 2025-02-10 PROCEDURE — 700102 HCHG RX REV CODE 250 W/ 637 OVERRIDE(OP): Performed by: HOSPITALIST

## 2025-02-10 PROCEDURE — 83880 ASSAY OF NATRIURETIC PEPTIDE: CPT

## 2025-02-10 PROCEDURE — 36415 COLL VENOUS BLD VENIPUNCTURE: CPT

## 2025-02-10 PROCEDURE — 700102 HCHG RX REV CODE 250 W/ 637 OVERRIDE(OP): Performed by: INTERNAL MEDICINE

## 2025-02-10 PROCEDURE — 307738 PARACENTESIS ABDOMINAL KIT: Performed by: INTERNAL MEDICINE

## 2025-02-10 RX ORDER — POLYETHYLENE GLYCOL 3350 17 G/17G
1 POWDER, FOR SOLUTION ORAL
Status: DISCONTINUED | OUTPATIENT
Start: 2025-02-10 | End: 2025-02-13 | Stop reason: HOSPADM

## 2025-02-10 RX ORDER — UBIDECARENONE 75 MG
100 CAPSULE ORAL DAILY
Status: DISCONTINUED | OUTPATIENT
Start: 2025-02-10 | End: 2025-02-13 | Stop reason: HOSPADM

## 2025-02-10 RX ORDER — AMOXICILLIN 250 MG
2 CAPSULE ORAL EVERY EVENING
Status: DISCONTINUED | OUTPATIENT
Start: 2025-02-10 | End: 2025-02-13 | Stop reason: HOSPADM

## 2025-02-10 RX ORDER — FOLIC ACID 1 MG/1
1 TABLET ORAL DAILY
Status: DISCONTINUED | OUTPATIENT
Start: 2025-02-10 | End: 2025-02-13 | Stop reason: HOSPADM

## 2025-02-10 RX ORDER — ACETAMINOPHEN 325 MG/1
650 TABLET ORAL EVERY 6 HOURS PRN
Status: DISCONTINUED | OUTPATIENT
Start: 2025-02-10 | End: 2025-02-13 | Stop reason: HOSPADM

## 2025-02-10 RX ORDER — SPIRONOLACTONE 25 MG/1
25 TABLET ORAL DAILY
Status: DISCONTINUED | OUTPATIENT
Start: 2025-02-10 | End: 2025-02-13 | Stop reason: HOSPADM

## 2025-02-10 RX ORDER — ONDANSETRON 4 MG/1
4 TABLET, ORALLY DISINTEGRATING ORAL EVERY 4 HOURS PRN
Status: DISCONTINUED | OUTPATIENT
Start: 2025-02-10 | End: 2025-02-13 | Stop reason: HOSPADM

## 2025-02-10 RX ORDER — TRAMADOL HYDROCHLORIDE 50 MG/1
50 TABLET ORAL EVERY 6 HOURS PRN
Status: DISCONTINUED | OUTPATIENT
Start: 2025-02-10 | End: 2025-02-13 | Stop reason: HOSPADM

## 2025-02-10 RX ORDER — FUROSEMIDE 10 MG/ML
40 INJECTION INTRAMUSCULAR; INTRAVENOUS EVERY 8 HOURS
Status: DISCONTINUED | OUTPATIENT
Start: 2025-02-10 | End: 2025-02-11

## 2025-02-10 RX ORDER — OMEPRAZOLE 20 MG/1
40 CAPSULE, DELAYED RELEASE ORAL 2 TIMES DAILY
Status: DISCONTINUED | OUTPATIENT
Start: 2025-02-10 | End: 2025-02-13 | Stop reason: HOSPADM

## 2025-02-10 RX ORDER — METHOCARBAMOL 500 MG/1
500 TABLET, FILM COATED ORAL 4 TIMES DAILY PRN
Status: DISCONTINUED | OUTPATIENT
Start: 2025-02-10 | End: 2025-02-13 | Stop reason: HOSPADM

## 2025-02-10 RX ORDER — GAUZE BANDAGE 2" X 2"
100 BANDAGE TOPICAL DAILY
Status: DISCONTINUED | OUTPATIENT
Start: 2025-02-10 | End: 2025-02-13 | Stop reason: HOSPADM

## 2025-02-10 RX ORDER — LACTULOSE 10 G/15ML
30 SOLUTION ORAL 3 TIMES DAILY
Status: DISCONTINUED | OUTPATIENT
Start: 2025-02-10 | End: 2025-02-13 | Stop reason: HOSPADM

## 2025-02-10 RX ORDER — ONDANSETRON 2 MG/ML
4 INJECTION INTRAMUSCULAR; INTRAVENOUS EVERY 4 HOURS PRN
Status: DISCONTINUED | OUTPATIENT
Start: 2025-02-10 | End: 2025-02-13 | Stop reason: HOSPADM

## 2025-02-10 RX ORDER — SUCRALFATE 1 G/1
1 TABLET ORAL 4 TIMES DAILY
Status: DISCONTINUED | OUTPATIENT
Start: 2025-02-10 | End: 2025-02-13 | Stop reason: HOSPADM

## 2025-02-10 RX ORDER — TAMSULOSIN HYDROCHLORIDE 0.4 MG/1
0.4 CAPSULE ORAL
Status: DISCONTINUED | OUTPATIENT
Start: 2025-02-10 | End: 2025-02-13 | Stop reason: HOSPADM

## 2025-02-10 RX ADMIN — VITAM B12 100 MCG: 100 TAB at 05:18

## 2025-02-10 RX ADMIN — Medication 100 MG: at 05:18

## 2025-02-10 RX ADMIN — ONDANSETRON 4 MG: 2 INJECTION INTRAMUSCULAR; INTRAVENOUS at 11:23

## 2025-02-10 RX ADMIN — SUCRALFATE 1 G: 1 TABLET ORAL at 13:39

## 2025-02-10 RX ADMIN — FUROSEMIDE 40 MG: 10 INJECTION, SOLUTION INTRAVENOUS at 07:44

## 2025-02-10 RX ADMIN — SUCRALFATE 1 G: 1 TABLET ORAL at 17:36

## 2025-02-10 RX ADMIN — LACTULOSE 30 ML: 20 SOLUTION ORAL at 17:35

## 2025-02-10 RX ADMIN — SUCRALFATE 1 G: 1 TABLET ORAL at 09:04

## 2025-02-10 RX ADMIN — SENNOSIDES AND DOCUSATE SODIUM 2 TABLET: 50; 8.6 TABLET ORAL at 17:35

## 2025-02-10 RX ADMIN — TRAMADOL HYDROCHLORIDE 50 MG: 50 TABLET, COATED ORAL at 17:35

## 2025-02-10 RX ADMIN — SUCRALFATE 1 G: 1 TABLET ORAL at 21:11

## 2025-02-10 RX ADMIN — TAMSULOSIN HYDROCHLORIDE 0.4 MG: 0.4 CAPSULE ORAL at 08:30

## 2025-02-10 RX ADMIN — FOLIC ACID 1 MG: 1 TABLET ORAL at 05:17

## 2025-02-10 RX ADMIN — LACTULOSE 30 ML: 20 SOLUTION ORAL at 11:22

## 2025-02-10 RX ADMIN — LACTULOSE 30 ML: 20 SOLUTION ORAL at 05:17

## 2025-02-10 RX ADMIN — OMEPRAZOLE 40 MG: 20 CAPSULE, DELAYED RELEASE ORAL at 05:17

## 2025-02-10 RX ADMIN — OMEPRAZOLE 40 MG: 20 CAPSULE, DELAYED RELEASE ORAL at 17:35

## 2025-02-10 RX ADMIN — FUROSEMIDE 40 MG: 10 INJECTION, SOLUTION INTRAVENOUS at 13:40

## 2025-02-10 RX ADMIN — ACETAMINOPHEN 650 MG: 325 TABLET ORAL at 11:23

## 2025-02-10 RX ADMIN — METHOCARBAMOL 500 MG: 500 TABLET ORAL at 11:23

## 2025-02-10 ASSESSMENT — ENCOUNTER SYMPTOMS
DOUBLE VISION: 0
WEAKNESS: 0
VOMITING: 0
PALPITATIONS: 0
COUGH: 0
NAUSEA: 0
NECK PAIN: 0
MYALGIAS: 0
DIZZINESS: 0
INSOMNIA: 0
SHORTNESS OF BREATH: 0
SORE THROAT: 0
HEADACHES: 0
BLURRED VISION: 0
DEPRESSION: 0
BRUISES/BLEEDS EASILY: 0
FEVER: 0

## 2025-02-10 ASSESSMENT — COGNITIVE AND FUNCTIONAL STATUS - GENERAL
WALKING IN HOSPITAL ROOM: A LITTLE
MOBILITY SCORE: 18
SUGGESTED CMS G CODE MODIFIER MOBILITY: CK
TOILETING: A LITTLE
MOVING TO AND FROM BED TO CHAIR: A LITTLE
SUGGESTED CMS G CODE MODIFIER DAILY ACTIVITY: CJ
WALKING IN HOSPITAL ROOM: A LITTLE
TURNING FROM BACK TO SIDE WHILE IN FLAT BAD: A LITTLE
TOILETING: A LITTLE
HELP NEEDED FOR BATHING: A LITTLE
HELP NEEDED FOR BATHING: A LITTLE
CLIMB 3 TO 5 STEPS WITH RAILING: A LITTLE
DRESSING REGULAR UPPER BODY CLOTHING: A LITTLE
SUGGESTED CMS G CODE MODIFIER DAILY ACTIVITY: CJ
DRESSING REGULAR LOWER BODY CLOTHING: A LITTLE
TURNING FROM BACK TO SIDE WHILE IN FLAT BAD: A LITTLE
DRESSING REGULAR UPPER BODY CLOTHING: A LITTLE
MOVING TO AND FROM BED TO CHAIR: A LITTLE
DRESSING REGULAR LOWER BODY CLOTHING: A LITTLE
CLIMB 3 TO 5 STEPS WITH RAILING: A LITTLE
DAILY ACTIVITIY SCORE: 20
SUGGESTED CMS G CODE MODIFIER MOBILITY: CK
MOVING FROM LYING ON BACK TO SITTING ON SIDE OF FLAT BED: A LITTLE
STANDING UP FROM CHAIR USING ARMS: A LITTLE
MOVING FROM LYING ON BACK TO SITTING ON SIDE OF FLAT BED: A LITTLE
DAILY ACTIVITIY SCORE: 20
STANDING UP FROM CHAIR USING ARMS: A LITTLE
MOBILITY SCORE: 18

## 2025-02-10 ASSESSMENT — LIFESTYLE VARIABLES
EVER FELT BAD OR GUILTY ABOUT YOUR DRINKING: YES
ON A TYPICAL DAY WHEN YOU DRINK ALCOHOL HOW MANY DRINKS DO YOU HAVE: 3
CONSUMPTION TOTAL: POSITIVE
TOTAL SCORE: 3
ALCOHOL_USE: YES
TOTAL SCORE: 3
CONSUMPTION TOTAL: POSITIVE
HOW MANY TIMES IN THE PAST YEAR HAVE YOU HAD 5 OR MORE DRINKS IN A DAY: 0
TOTAL SCORE: 3
HAVE PEOPLE ANNOYED YOU BY CRITICIZING YOUR DRINKING: NO
EVER HAD A DRINK FIRST THING IN THE MORNING TO STEADY YOUR NERVES TO GET RID OF A HANGOVER: YES
HAVE PEOPLE ANNOYED YOU BY CRITICIZING YOUR DRINKING: NO
AVERAGE NUMBER OF DAYS PER WEEK YOU HAVE A DRINK CONTAINING ALCOHOL: 7
HOW MANY TIMES IN THE PAST YEAR HAVE YOU HAD 5 OR MORE DRINKS IN A DAY: 0
ON A TYPICAL DAY WHEN YOU DRINK ALCOHOL HOW MANY DRINKS DO YOU HAVE: 3
EVER FELT BAD OR GUILTY ABOUT YOUR DRINKING: YES
EVER HAD A DRINK FIRST THING IN THE MORNING TO STEADY YOUR NERVES TO GET RID OF A HANGOVER: YES
HAVE YOU EVER FELT YOU SHOULD CUT DOWN ON YOUR DRINKING: YES
HAVE YOU EVER FELT YOU SHOULD CUT DOWN ON YOUR DRINKING: YES
TOTAL SCORE: 3
AVERAGE NUMBER OF DAYS PER WEEK YOU HAVE A DRINK CONTAINING ALCOHOL: 7
ALCOHOL_USE: YES

## 2025-02-10 ASSESSMENT — PATIENT HEALTH QUESTIONNAIRE - PHQ9
1. LITTLE INTEREST OR PLEASURE IN DOING THINGS: NOT AT ALL
SUM OF ALL RESPONSES TO PHQ9 QUESTIONS 1 AND 2: 0
1. LITTLE INTEREST OR PLEASURE IN DOING THINGS: NOT AT ALL
2. FEELING DOWN, DEPRESSED, IRRITABLE, OR HOPELESS: NOT AT ALL
2. FEELING DOWN, DEPRESSED, IRRITABLE, OR HOPELESS: NOT AT ALL
1. LITTLE INTEREST OR PLEASURE IN DOING THINGS: NOT AT ALL
2. FEELING DOWN, DEPRESSED, IRRITABLE, OR HOPELESS: NOT AT ALL
1. LITTLE INTEREST OR PLEASURE IN DOING THINGS: NOT AT ALL
2. FEELING DOWN, DEPRESSED, IRRITABLE, OR HOPELESS: NOT AT ALL
SUM OF ALL RESPONSES TO PHQ9 QUESTIONS 1 AND 2: 0

## 2025-02-10 ASSESSMENT — FIBROSIS 4 INDEX
FIB4 SCORE: 1.99
FIB4 SCORE: 1.99
FIB4 SCORE: 4
FIB4 SCORE: 1.99

## 2025-02-10 ASSESSMENT — SOCIAL DETERMINANTS OF HEALTH (SDOH)
WITHIN THE LAST YEAR, HAVE YOU BEEN KICKED, HIT, SLAPPED, OR OTHERWISE PHYSICALLY HURT BY YOUR PARTNER OR EX-PARTNER?: NO
IN THE PAST 12 MONTHS, HAS THE ELECTRIC, GAS, OIL, OR WATER COMPANY THREATENED TO SHUT OFF SERVICE IN YOUR HOME?: NO
WITHIN THE LAST YEAR, HAVE YOU BEEN HUMILIATED OR EMOTIONALLY ABUSED IN OTHER WAYS BY YOUR PARTNER OR EX-PARTNER?: NO
IN THE PAST 12 MONTHS, HAS THE ELECTRIC, GAS, OIL, OR WATER COMPANY THREATENED TO SHUT OFF SERVICE IN YOUR HOME?: NO
WITHIN THE LAST YEAR, HAVE YOU BEEN AFRAID OF YOUR PARTNER OR EX-PARTNER?: NO
WITHIN THE LAST YEAR, HAVE TO BEEN RAPED OR FORCED TO HAVE ANY KIND OF SEXUAL ACTIVITY BY YOUR PARTNER OR EX-PARTNER?: NO
WITHIN THE LAST YEAR, HAVE TO BEEN RAPED OR FORCED TO HAVE ANY KIND OF SEXUAL ACTIVITY BY YOUR PARTNER OR EX-PARTNER?: NO
WITHIN THE LAST YEAR, HAVE YOU BEEN AFRAID OF YOUR PARTNER OR EX-PARTNER?: NO
WITHIN THE LAST YEAR, HAVE YOU BEEN HUMILIATED OR EMOTIONALLY ABUSED IN OTHER WAYS BY YOUR PARTNER OR EX-PARTNER?: NO
WITHIN THE PAST 12 MONTHS, THE FOOD YOU BOUGHT JUST DIDN'T LAST AND YOU DIDN'T HAVE MONEY TO GET MORE: NEVER TRUE
WITHIN THE PAST 12 MONTHS, YOU WORRIED THAT YOUR FOOD WOULD RUN OUT BEFORE YOU GOT THE MONEY TO BUY MORE: NEVER TRUE
WITHIN THE LAST YEAR, HAVE YOU BEEN KICKED, HIT, SLAPPED, OR OTHERWISE PHYSICALLY HURT BY YOUR PARTNER OR EX-PARTNER?: NO
WITHIN THE PAST 12 MONTHS, YOU WORRIED THAT YOUR FOOD WOULD RUN OUT BEFORE YOU GOT THE MONEY TO BUY MORE: NEVER TRUE
WITHIN THE PAST 12 MONTHS, THE FOOD YOU BOUGHT JUST DIDN'T LAST AND YOU DIDN'T HAVE MONEY TO GET MORE: NEVER TRUE

## 2025-02-10 ASSESSMENT — PAIN DESCRIPTION - PAIN TYPE
TYPE: ACUTE PAIN

## 2025-02-10 NOTE — CARE PLAN
The patient is Stable - Low risk of patient condition declining or worsening    Shift Goals  Clinical Goals: Patient pain will be managed at a tolerable level of 2/10 or less throughout night, Patient will remain free from fall or injury, Wean O2  Patient Goals: Rest comfortably, pain management  Family Goals: n/a    Progress made toward(s) clinical / shift goals:  Patient's pain maintained at a tolerable level of 2/10 or less throughout shift with rest. Pt did not sustain a fall or injury during shift. Able to rest comfortably throughout shift. Pt maintaining SpO2 at or above 92% on room air.     Patient is not progressing towards the following goals:

## 2025-02-10 NOTE — PROGRESS NOTES
[facility]PITAL[facility] TRIAGE OFFICER CONSULT REQUEST REPORT  Consult requested by: Dr Leslie  Reason for consult: Fluid overload  Is consult for transition of care: Yes  Pertinent history/hospital Course: Patient is a known alcoholic with cirrhosis of the liver and ascites which now is also causing pleural effusions.  Patient is with a history of esophageal varices status post recurrent banding.  Also has portal hypertensive gastropathy and duodenitis and did require an IR embolization in 2016.  Now went into the [facility] with worsening edema and shortness of breath.  The patient at this point will need diuresis and possible thoracentesis.  Their facility does not have beds to admit the patient to so they are transferring the patient to [facility].  Patient also has a history of pulmonary embolism in the past has been on anticoagulation.  Monitor INR and PTT.  Monitor H&H.    Code Status: Full code  Can the hospitalist sign off upon completion of required consult/outcomes requested: No   Assigned hospitalist: Please complete vitals, MAR and release signed and held orders prior to calling hospitalist    For any question or concerns regarding the care of this patient, please reach out to the assigned hospitalist.

## 2025-02-10 NOTE — DISCHARGE PLANNING
Case Management Discharge Planning    Admission Date: 2/10/2025  GMLOS: 2.6  ALOS: 0    6-Clicks ADL Score: 20  6-Clicks Mobility Score: 18      Anticipated Discharge Dispo: Discharge Disposition: Discharged to home/self care (01)  Discharge Address: [address]   [facility]    DME Needed: No    Action(s) Taken: Updated Provider/Nurse on Discharge Plan and DC Assessment Complete (See below)    Pt discussed in IDT rounds. Pt is pending PT/OT, needing paracentesis. MD stated pt will potentially need HH.     Met with pt at bedside to discuss discharge planning. Pt stated he does not believe he will need HH after the procedure.     Escalations Completed: None    Medically Clear: No    Next Steps: LSW to follow    Barriers to Discharge: Medical clearance    Is the patient up for discharge tomorrow: No          Care Transition Team Assessment    LSW met with pt at bedside to complete discharge planning assessment.     Pt reported he lives in an apartment with his wife, Amalia. Pt stated his wife had a stroke 8 months ago, and stated he is her caregiver. Pt stated the only thing she can do on her own is go to the bathroom.   Pt reported being independent prior to admission, no DME at baseline.   Pt is retired and receives social security, pt stated his wife does as well.   Pt confirmed insurance on file.     Information Source  Orientation Level: Oriented X4  Information Given By: Patient  Who is responsible for making decisions for patient? : Patient    Readmission Evaluation  Is this a readmission?: Yes - unplanned readmission    Elopement Risk  Legal Hold: No  Ambulatory or Self Mobile in Wheelchair: Yes  Disoriented: No  Psychiatric Symptoms: None  History of Wandering: No  Elopement this Admit: No  Vocalizing Wanting to Leave: No  Displays Behaviors, Body Language Wanting to Leave: No-Not at Risk for Elopement  Elopement Risk: Not at Risk for Elopement    Interdisciplinary Discharge Planning  Lives with -  Patient's Self Care Capacity: Spouse  Patient or legal guardian wants to designate a caregiver: No  Support Systems: Family Member(s), Friends / Neighbors  Housing / Facility: 1 Story Apartment / Condo    Discharge Preparedness  What is your plan after discharge?: Home with help  What are your discharge supports?: Other (comment), Spouse (Neighbor)  Prior Functional Level: Ambulatory, Independent with Activities of Daily Living  Difficulity with ADLs: None  Difficulity with IADLs: None    Functional Assesment  Prior Functional Level: Ambulatory, Independent with Activities of Daily Living    Finances  Financial Barriers to Discharge: No  Prescription Coverage: Yes    Vision / Hearing Impairment  Vision Impairment : Yes  Right Eye Vision: Impaired, Patient Declines to Wear Visual Aid  Left Eye Vision: Impaired, Patient Declines to Wear Visual Aid  Hearing Impairment : Yes  Hearing Impairment: Both Ears, Hearing Device Not Available  Does Pt Need Special Equipment for the Hearing Impaired?: No    Advance Directive  Advance Directive?: None    Domestic Abuse  Possible Abuse/Neglect Reported to:: Not Applicable    Psychological Assessment  History of Substance Abuse: None  History of Psychiatric Problems: No  Non-compliant with Treatment: No  Newly Diagnosed Illness: No    Discharge Risks or Barriers  Discharge risks or barriers?: No    Anticipated Discharge Information  Discharge Disposition: Discharged to home/self care (01)  Discharge Address: [address]

## 2025-02-10 NOTE — ASSESSMENT & PLAN NOTE
2/12:  Patient has extensive anasarca and scrotal edema, he would benefit from ongoing intravenous diuresis  I ordered Lasix intravenously twice daily  Monitor for side effects of Lasix which can include hypotension and hypokalemia, vital signs and laboratory values will be monitored

## 2025-02-10 NOTE — H&P
Hospital Medicine History & Physical Note    Date of Service  2/10/2025    Primary Care Physician  Pcp Unknown    Consultants  None    Code Status  Full Code    Chief Complaint  Direct Admission from [facility]: 74-year-old male, with history of alcoholic cirrhosis coming now with anasarca    History of Presenting Illness  Chava Mercedes is a 74 y.o. male coming as a Direct Admission from [facility] with anasarca under the context of alcoholic cirrhosis on 2/10/2025.    He has h/o cirrhosis, esophageal varices required banding in the past, portal hypertensive gastropathy, duodenitis, also recurrent large pleural effusion and large ascites.  He was recently admitted to our hospital from 1/22/2025 and discharged on 1/25/2025 treated for GI bleed found to have esophagitis and portal hypertensive gastropathy.  Additionally, he underwent large-volume paracentesis with 3 mL removed (1/22).  He presented outside facility with complaints of worsening bilateral pitting edema extending to his legs and abdomen.  Also reports that he noticed significant weight gain and his testicles are also swollen.    I discussed the plan of care with patient.    Review of Systems  Review of Systems   Constitutional:  Negative for fever.   HENT:  Negative for congestion and sore throat.    Eyes:  Negative for blurred vision and double vision.   Respiratory:  Negative for cough and shortness of breath.    Cardiovascular:  Negative for chest pain and palpitations.   Gastrointestinal:  Negative for nausea and vomiting.   Genitourinary:  Negative for dysuria and urgency.   Musculoskeletal:  Negative for myalgias and neck pain.   Skin:  Negative for itching and rash.   Neurological:  Negative for dizziness, weakness and headaches.   Endo/Heme/Allergies:  Does not bruise/bleed easily.   Psychiatric/Behavioral:  Negative for depression. The patient does not have insomnia.        Past Medical History  cirrhosis, esophageal varices required  banding in the past, portal hypertensive gastropathy, duodenitis, also recurrent large pleural effusion and large ascites    Surgical History   has a past surgical history that includes gastroscopy (N/A, 12/11/2016); gastroscopy-endo (12/11/2016); gastroscopy-endo (12/18/2016); gastroscopy (N/A, 12/2/2017); inguinal hernia repair (Right, 12/27/2023); umbilical hernia repair (N/A, 12/27/2023); pr upper gi endoscopy,diagnosis (N/A, 3/5/2024); pr upper gi endoscopy,diagnosis (N/A, 8/15/2024); pr upper gi endoscopy,diagnosis (N/A, 10/19/2024); and pr upper gi endoscopy,diagnosis (N/A, 1/22/2025).     Family History  Reviewed and not pertinent  Family history reviewed with patient. There is no family history that is pertinent to the chief complaint.     Social History   reports that he has never smoked. He has never used smokeless tobacco. He reports current alcohol use. He reports that he does not use drugs.    Allergies  Allergies   Allergen Reactions    Morphine Itching     Itchiness        Medications  Prior to Admission Medications   Prescriptions Last Dose Informant Patient Reported? Taking?   cyanocobalamin (VITAMIN B-12) 100 MCG Tab 2/10/2025 Morning Patient's Home Pharmacy Yes Yes   Sig: Take 100 mcg by mouth every day.   folic acid (FOLVITE) 1 MG Tab 2/10/2025 Morning  No Yes   Sig: Take 1 Tablet by mouth every day.   furosemide (LASIX) 20 MG Tab   No No   Sig: Take 1 Tablet by mouth every day.   lactulose 20 GM/30ML Solution  Patient's Home Pharmacy No No   Sig: Take 30 mL by mouth 3 times a day.   methocarbamol (ROBAXIN) 500 MG Tab   No No   Sig: Take 1 Tablet by mouth 4 times a day as needed (muscle spasms).   omeprazole (PRILOSEC) 20 MG delayed-release capsule 2/10/2025 Morning  No Yes   Sig: Take 2 Capsules by mouth 2 times a day.   spironolactone (ALDACTONE) 25 MG Tab   No No   Sig: Take 1 Tablet by mouth every day.   sucralfate (CARAFATE) 1 GM Tab  Patient's Home Pharmacy Yes No   Sig: Take 1 g by mouth  4 times a day.   tamsulosin (FLOMAX) 0.4 MG capsule  Patient's Home Pharmacy No No   Sig: Take 1 Capsule by mouth 1/2 hour after breakfast.   thiamine (THIAMINE) 100 MG tablet   No No   Sig: Take 1 Tablet by mouth every day.      Facility-Administered Medications: None       Physical Exam  Temp:  [36.7 °C (98.1 °F)] 36.7 °C (98.1 °F)  Pulse:  [117] 117  Resp:  [16] 16  BP: (124)/(95) 124/95  SpO2:  [96 %] 96 %  Blood Pressure : (!) 124/95   Temperature: 36.7 °C (98.1 °F)   Pulse: (!) 117   Respiration: 16   Pulse Oximetry: 96 %       Physical Exam  Constitutional:       Appearance: Normal appearance.   HENT:      Head: Normocephalic and atraumatic.      Nose: Nose normal.      Mouth/Throat:      Mouth: Mucous membranes are moist.      Pharynx: Oropharynx is clear.   Eyes:      Extraocular Movements: Extraocular movements intact.      Pupils: Pupils are equal, round, and reactive to light.   Cardiovascular:      Rate and Rhythm: Normal rate and regular rhythm.      Pulses: Normal pulses.      Heart sounds: Normal heart sounds.   Pulmonary:      Effort: Pulmonary effort is normal.      Breath sounds: Normal breath sounds.   Abdominal:      General: Abdomen is flat. Bowel sounds are normal. There is distension (Moderate abdominal distension with positive fluid wave).      Palpations: Abdomen is soft.      Tenderness: There is abdominal tenderness (Mild diffuse tenderness to palpation).   Musculoskeletal:      Cervical back: Normal range of motion and neck supple.      Right lower leg: Edema present.      Left lower leg: Edema present.      Comments: 3+ pitting edema extending to his thighs and abdomen   Skin:     General: Skin is warm and dry.   Neurological:      General: No focal deficit present.      Mental Status: He is alert and oriented to person, place, and time.   Psychiatric:         Mood and Affect: Mood normal.         Behavior: Behavior normal.         Laboratory:                  INR at outside facility  was 1.3  proBNP was 89.  Troponin was negative.  COVID/influenza A/influenza B: Negative    I reviewed personally EKG done in outside facility showing sinus tachycardia at 98 bpm.  No acute ST elevation.  PVCs.    Assessment/Plan:  Justification for Admission Status  I anticipate this patient will require at least two midnights for appropriate medical management, necessitating inpatient admission because Direct Admission from [facility]: 74-year-old male, with history of alcoholic cirrhosis coming now with anasarca        * Anasarca- (present on admission)  Assessment & Plan  -Inpatient Status: Medical Floor  -Will need IV diuresis.  -Lasix: Ordering Lasix, 40 mg IV every 8 hours for now  -Daily weight  -BNP on admission: Was only 89.  I am repeating BMP now.  I also consider hepatorenal syndrome but both his liver and kidney function are fine at this time.  -Echocardiogram: Last echocardiogram on file was from November 2024 and his EF was 70%.  -Serial cardiac enzymes added    -This patient will need to be diuresed and his blood pressure is normal.  He would benefit from albumin infusion if becoming hypotensive.    Coagulopathy (HCC)- (present on admission)  Assessment & Plan  -Given underlying history of liver disease  -Plan as above      Pleural effusion on left- (present on admission)  Assessment & Plan  -He has chronic and recurrent left side pleural effusion.  He is not short of breath but I am adding chest x-ray for evaluation/monitoring and to see if he has underlying pulmonary edema given anasarca.    Alcoholic cirrhosis of liver with ascites (HCC)- (present on admission)  Assessment & Plan  -I order limited abdominal ultrasound to evaluate for ascites.  I suspect he will also need a paracentesis at some point.  -Last paracentesis was done on 1/22/2025 with 3 L removed.    Pancytopenia (HCC)- (present on admission)  Assessment & Plan  -WBC is 3.8, hemoglobin 10.3 and platelet count 134.  Monitor CBC in  the morning.  He is not having any bleeding at this time.        VTE prophylaxis: SCDs/TEDs

## 2025-02-10 NOTE — PROGRESS NOTES
Bedside report received from ABRAHAM Gregg. Assumed care of patient. Daily plan of care discussed. Pt resting comfortably in bed with no signs of distress noted. Breathing even and unlabored. Patient waiting on possible thoracentesis and paracentesis. Patient reports pain level 10/10, Will review pain medication interventions. Patient reports no further needs at this time. Call light within reach. Bed locked in lowest position. Plan of care on going.

## 2025-02-10 NOTE — ASSESSMENT & PLAN NOTE
2/12:  Likely hepatic hydrothorax  Patient is on room air  Defer thoracentesis for now  Discussed with patient who is in agreement

## 2025-02-10 NOTE — PROGRESS NOTES
0135 Patient arrived to floor. Assumed care of patient. Patient is A&Ox4, on 2L NC at 99% and mild SOB noted. Expiratory wheezes audibly noted. Pt ambulated to bed with FWW with shuffling gait. Patient resting comfortably in bed. Patient reports pain 6/10 in the back, relieved with rest. Recently medicated prior to arrival. Discussed the plan of care for the night with the patient and answered all questions. Fall precautions in place. Call light and belongings within reach.

## 2025-02-10 NOTE — PROGRESS NOTES
4 Eyes Skin Assessment Completed by ABRAHAM Gregg and Nasima RN.    Head WDL  Ears WDL  Nose WDL  Mouth WDL  Neck WDL  Breast/Chest WDL  Shoulder Blades WDL  Spine WDL  (R) Arm/Elbow/Hand WDL  (L) Arm/Elbow/Hand WDL  Abdomen WDL  Groin Scrotal Edema  Scrotum/Coccyx/Buttocks Redness and Blanching  (R) Leg Redness, Blanching, Swelling, and Edema  (L) Leg Redness, Blanching, Swelling, and Edema  (R) Heel/Foot/Toe Edema, Dry and Flaky  (L) Heel/Foot/Toe Edema, Dry and Flaky          Devices In Places Pulse Ox and Nasal Cannula      Interventions In Place NC W/Ear Foams, Pillows, and Pressure Redistribution Mattress    Possible Skin Injury No    Pictures Uploaded Into Epic N/A  Wound Consult Placed N/A  RN Wound Prevention Protocol Ordered No

## 2025-02-10 NOTE — PROGRESS NOTES
Please refer to H&P from this AM  Admitted for decompensated cirrhosis  Patient has rather large L sided pleural effusion  I spoke with Dr. Boston to attempt thoracentesis this afternoon as patient is dyspneic, likely won't happen til end of day  I spoke with Dr. Diallo for paracentesis today, labs ordered  In anticipation of thora/para, I will hold further IV lasix to avoid more fluid shifts

## 2025-02-11 LAB
ALBUMIN SERPL BCP-MCNC: 2.2 G/DL (ref 3.2–4.9)
ALBUMIN/GLOB SERPL: 0.7 G/DL
ALP SERPL-CCNC: 74 U/L (ref 30–99)
ALT SERPL-CCNC: 7 U/L (ref 2–50)
ANION GAP SERPL CALC-SCNC: 10 MMOL/L (ref 7–16)
ANION GAP SERPL CALC-SCNC: 9 MMOL/L (ref 7–16)
AST SERPL-CCNC: 17 U/L (ref 12–45)
BILIRUB SERPL-MCNC: 0.6 MG/DL (ref 0.1–1.5)
BUN SERPL-MCNC: 10 MG/DL (ref 8–22)
BUN SERPL-MCNC: 12 MG/DL (ref 8–22)
CALCIUM ALBUM COR SERPL-MCNC: 9.2 MG/DL (ref 8.5–10.5)
CALCIUM SERPL-MCNC: 7.8 MG/DL (ref 8.4–10.2)
CALCIUM SERPL-MCNC: 7.8 MG/DL (ref 8.4–10.2)
CHLORIDE SERPL-SCNC: 104 MMOL/L (ref 96–112)
CHLORIDE SERPL-SCNC: 99 MMOL/L (ref 96–112)
CO2 SERPL-SCNC: 20 MMOL/L (ref 20–33)
CO2 SERPL-SCNC: 21 MMOL/L (ref 20–33)
CREAT SERPL-MCNC: 0.81 MG/DL (ref 0.5–1.4)
CREAT SERPL-MCNC: 0.85 MG/DL (ref 0.5–1.4)
ERYTHROCYTE [DISTWIDTH] IN BLOOD BY AUTOMATED COUNT: 60.6 FL (ref 35.9–50)
GFR SERPLBLD CREATININE-BSD FMLA CKD-EPI: 91 ML/MIN/1.73 M 2
GFR SERPLBLD CREATININE-BSD FMLA CKD-EPI: 93 ML/MIN/1.73 M 2
GLOBULIN SER CALC-MCNC: 3.3 G/DL (ref 1.9–3.5)
GLUCOSE SERPL-MCNC: 123 MG/DL (ref 65–99)
GLUCOSE SERPL-MCNC: 94 MG/DL (ref 65–99)
HCT VFR BLD AUTO: 25.8 % (ref 42–52)
HGB BLD-MCNC: 8.2 G/DL (ref 14–18)
MCH RBC QN AUTO: 27.4 PG (ref 27–33)
MCHC RBC AUTO-ENTMCNC: 31.8 G/DL (ref 32.3–36.5)
MCV RBC AUTO: 86.3 FL (ref 81.4–97.8)
PLATELET # BLD AUTO: 116 K/UL (ref 164–446)
PMV BLD AUTO: 10.2 FL (ref 9–12.9)
POTASSIUM SERPL-SCNC: 3.7 MMOL/L (ref 3.6–5.5)
POTASSIUM SERPL-SCNC: 4.2 MMOL/L (ref 3.6–5.5)
PROT SERPL-MCNC: 5.5 G/DL (ref 6–8.2)
RBC # BLD AUTO: 2.99 M/UL (ref 4.7–6.1)
SODIUM SERPL-SCNC: 130 MMOL/L (ref 135–145)
SODIUM SERPL-SCNC: 133 MMOL/L (ref 135–145)
WBC # BLD AUTO: 3.2 K/UL (ref 4.8–10.8)

## 2025-02-11 PROCEDURE — 85027 COMPLETE CBC AUTOMATED: CPT

## 2025-02-11 PROCEDURE — 80048 BASIC METABOLIC PNL TOTAL CA: CPT

## 2025-02-11 PROCEDURE — 36415 COLL VENOUS BLD VENIPUNCTURE: CPT

## 2025-02-11 PROCEDURE — A9270 NON-COVERED ITEM OR SERVICE: HCPCS | Performed by: INTERNAL MEDICINE

## 2025-02-11 PROCEDURE — 99233 SBSQ HOSP IP/OBS HIGH 50: CPT | Performed by: HOSPITALIST

## 2025-02-11 PROCEDURE — 700111 HCHG RX REV CODE 636 W/ 250 OVERRIDE (IP): Mod: JZ | Performed by: HOSPITALIST

## 2025-02-11 PROCEDURE — 80053 COMPREHEN METABOLIC PANEL: CPT

## 2025-02-11 PROCEDURE — A9270 NON-COVERED ITEM OR SERVICE: HCPCS | Performed by: HOSPITALIST

## 2025-02-11 PROCEDURE — 94760 N-INVAS EAR/PLS OXIMETRY 1: CPT

## 2025-02-11 PROCEDURE — 770001 HCHG ROOM/CARE - MED/SURG/GYN PRIV*

## 2025-02-11 PROCEDURE — 700102 HCHG RX REV CODE 250 W/ 637 OVERRIDE(OP): Performed by: INTERNAL MEDICINE

## 2025-02-11 PROCEDURE — 700102 HCHG RX REV CODE 250 W/ 637 OVERRIDE(OP): Performed by: HOSPITALIST

## 2025-02-11 RX ORDER — FUROSEMIDE 10 MG/ML
20 INJECTION INTRAMUSCULAR; INTRAVENOUS 2 TIMES DAILY
Status: DISCONTINUED | OUTPATIENT
Start: 2025-02-11 | End: 2025-02-13 | Stop reason: HOSPADM

## 2025-02-11 RX ORDER — OXYCODONE HYDROCHLORIDE 5 MG/1
5 TABLET ORAL EVERY 6 HOURS PRN
Status: DISCONTINUED | OUTPATIENT
Start: 2025-02-11 | End: 2025-02-13 | Stop reason: HOSPADM

## 2025-02-11 RX ORDER — FUROSEMIDE 40 MG/1
40 TABLET ORAL
Status: DISCONTINUED | OUTPATIENT
Start: 2025-02-12 | End: 2025-02-11

## 2025-02-11 RX ORDER — FUROSEMIDE 10 MG/ML
40 INJECTION INTRAMUSCULAR; INTRAVENOUS DAILY
Status: DISCONTINUED | OUTPATIENT
Start: 2025-02-12 | End: 2025-02-11

## 2025-02-11 RX ADMIN — ACETAMINOPHEN 650 MG: 325 TABLET ORAL at 19:32

## 2025-02-11 RX ADMIN — SUCRALFATE 1 G: 1 TABLET ORAL at 12:56

## 2025-02-11 RX ADMIN — LACTULOSE 30 ML: 20 SOLUTION ORAL at 05:08

## 2025-02-11 RX ADMIN — METHOCARBAMOL 500 MG: 500 TABLET ORAL at 19:33

## 2025-02-11 RX ADMIN — TAMSULOSIN HYDROCHLORIDE 0.4 MG: 0.4 CAPSULE ORAL at 08:30

## 2025-02-11 RX ADMIN — TRAMADOL HYDROCHLORIDE 50 MG: 50 TABLET, COATED ORAL at 13:21

## 2025-02-11 RX ADMIN — FOLIC ACID 1 MG: 1 TABLET ORAL at 05:10

## 2025-02-11 RX ADMIN — FUROSEMIDE 20 MG: 10 INJECTION, SOLUTION INTRAVENOUS at 17:43

## 2025-02-11 RX ADMIN — VITAM B12 100 MCG: 100 TAB at 05:10

## 2025-02-11 RX ADMIN — METHOCARBAMOL 500 MG: 500 TABLET ORAL at 13:23

## 2025-02-11 RX ADMIN — OMEPRAZOLE 40 MG: 20 CAPSULE, DELAYED RELEASE ORAL at 17:42

## 2025-02-11 RX ADMIN — SENNOSIDES AND DOCUSATE SODIUM 2 TABLET: 50; 8.6 TABLET ORAL at 17:42

## 2025-02-11 RX ADMIN — OXYCODONE HYDROCHLORIDE 5 MG: 5 TABLET ORAL at 15:16

## 2025-02-11 RX ADMIN — Medication 100 MG: at 05:10

## 2025-02-11 RX ADMIN — OXYCODONE HYDROCHLORIDE 5 MG: 5 TABLET ORAL at 21:21

## 2025-02-11 RX ADMIN — OMEPRAZOLE 40 MG: 20 CAPSULE, DELAYED RELEASE ORAL at 05:08

## 2025-02-11 RX ADMIN — SUCRALFATE 1 G: 1 TABLET ORAL at 08:30

## 2025-02-11 RX ADMIN — SUCRALFATE 1 G: 1 TABLET ORAL at 17:42

## 2025-02-11 RX ADMIN — SUCRALFATE 1 G: 1 TABLET ORAL at 21:11

## 2025-02-11 ASSESSMENT — ENCOUNTER SYMPTOMS
NAUSEA: 0
BACK PAIN: 1
PALPITATIONS: 0
COUGH: 0
SPUTUM PRODUCTION: 0
HEMOPTYSIS: 0
DIZZINESS: 0
CHILLS: 0
ORTHOPNEA: 0
VOMITING: 0

## 2025-02-11 ASSESSMENT — PATIENT HEALTH QUESTIONNAIRE - PHQ9
1. LITTLE INTEREST OR PLEASURE IN DOING THINGS: NOT AT ALL
SUM OF ALL RESPONSES TO PHQ9 QUESTIONS 1 AND 2: 0
2. FEELING DOWN, DEPRESSED, IRRITABLE, OR HOPELESS: NOT AT ALL

## 2025-02-11 ASSESSMENT — PAIN DESCRIPTION - PAIN TYPE
TYPE: ACUTE PAIN

## 2025-02-11 NOTE — HOSPITAL COURSE
Chava Mercedes has past medical history of cirrhosis, esophageal varices, ascites, and pleural effusion.  Patient was hospitalized 1/22/2025 through 1/25/2025 for GI bleed and found to have esophagitis and portal hypertensive gastropathy.  Large-volume paracentesis was performed during that hospitalization.  Patient presented initially to an outside medical facility on 2/9/2025 with complaints of anasarca.  He was transferred to [facility] for higher level of care

## 2025-02-11 NOTE — PROCEDURES
"Paracentesis    Date/Time: 2/10/2025 6:46 PM    Performed by: Magali Diallo M.D.  Authorized by: Magali Diallo M.D.  Consent: Verbal consent obtained. Written consent obtained.  Risks and benefits: risks, benefits and alternatives were discussed  Consent given by: patient  Patient understanding: patient states understanding of the procedure being performed  Patient consent: the patient's understanding of the procedure matches consent given  Procedure consent: procedure consent matches procedure scheduled  Relevant documents: relevant documents present and verified  Test results: test results available and properly labeled  Site marked: the operative site was marked  Imaging studies: imaging studies available  Required items: required blood products, implants, devices, and special equipment available  Patient identity confirmed: verbally with patient  Time out: Immediately prior to procedure a \"time out\" was called to verify the correct patient, procedure, equipment, support staff and site/side marked as required.  Initial or subsequent exam: initial  Procedure purpose: therapeutic  Indications: abdominal discomfort secondary to ascites, new onset ascites and respiratory distress secondary to ascites  Anesthesia: local infiltration    Anesthesia:  Local Anesthetic: lidocaine 1% without epinephrine  Anesthetic total: 5 mL    Sedation:  Patient sedated: no    Preparation: Patient was prepped and draped in the usual sterile fashion.  Needle gauge: 18  Puncture site: right lower quadrant  Fluid removed: 4750(ml)  Fluid appearance: cloudy  Dressing: 4x4 sterile gauze  Patient tolerance: patient tolerated the procedure well with no immediate complications              "

## 2025-02-11 NOTE — PROGRESS NOTES
Received report from day shift RN. Assumed patient care at 1900. Patient is A&O4, on RA at 95%, no SOB noted. No signs of distress or discomfort. Patient denies nausea. Complains of tolerable 2/10 pain in abdomen and shoulder. Relieved with rest and repositioning. Plan of care for the night discussed with patient. Patient verbalized understanding. Patient resting in bed comfortably. Safety precautions in place. All patient belongings and call light within reach. All needs addressed at this time. Patient will call with any needs.

## 2025-02-11 NOTE — CARE PLAN
"The patient is Stable - Low risk of patient condition declining or worsening    Shift Goals  Clinical Goals: remain fall free, pain controlled to less than 4, Monitor for increased edema  Patient Goals: Be updated on POC, \"get fluid removed from belly\"  Family Goals: n/a    Progress made toward(s) clinical / shift goals:  Patient getting paracentesis done today. Patient medicated with PRN pain meds once    Patient is not progressing towards the following goals:      "

## 2025-02-11 NOTE — CARE PLAN
The patient is Stable - Low risk of patient condition declining or worsening    Shift Goals  Clinical Goals: Patient pain will be managed at a tolerable level of 2/10 or less throughout night, Patient will remain free from fall or injury, Monitor edema  Patient Goals: Rest comfortably, pain management  Family Goals: n/a    Progress made toward(s) clinical / shift goals:  Patient's pain maintained at a tolerable level of 2/10 or less throughout shift with rest. Pt did not sustain a fall or injury during shift. Able to rest comfortably throughout shift. Edema improving after paracentesis.     Patient is not progressing towards the following goals:

## 2025-02-11 NOTE — PROGRESS NOTES
1500: patient complaining of 10/10 back spasms. Medications given with no relief. Paged MD for updates.

## 2025-02-12 LAB
BASOPHILS # BLD AUTO: 0.8 % (ref 0–1.8)
BASOPHILS # BLD: 0.03 K/UL (ref 0–0.12)
EOSINOPHIL # BLD AUTO: 0.18 K/UL (ref 0–0.51)
EOSINOPHIL NFR BLD: 4.8 % (ref 0–6.9)
ERYTHROCYTE [DISTWIDTH] IN BLOOD BY AUTOMATED COUNT: 58.6 FL (ref 35.9–50)
HCT VFR BLD AUTO: 26 % (ref 42–52)
HGB BLD-MCNC: 8.3 G/DL (ref 14–18)
IMM GRANULOCYTES # BLD AUTO: 0.01 K/UL (ref 0–0.11)
IMM GRANULOCYTES NFR BLD AUTO: 0.3 % (ref 0–0.9)
LYMPHOCYTES # BLD AUTO: 0.94 K/UL (ref 1–4.8)
LYMPHOCYTES NFR BLD: 25.3 % (ref 22–41)
MCH RBC QN AUTO: 27.3 PG (ref 27–33)
MCHC RBC AUTO-ENTMCNC: 31.9 G/DL (ref 32.3–36.5)
MCV RBC AUTO: 85.5 FL (ref 81.4–97.8)
MONOCYTES # BLD AUTO: 0.78 K/UL (ref 0–0.85)
MONOCYTES NFR BLD AUTO: 21 % (ref 0–13.4)
NEUTROPHILS # BLD AUTO: 1.78 K/UL (ref 1.82–7.42)
NEUTROPHILS NFR BLD: 47.8 % (ref 44–72)
NRBC # BLD AUTO: 0 K/UL
NRBC BLD-RTO: 0 /100 WBC (ref 0–0.2)
PLATELET # BLD AUTO: 125 K/UL (ref 164–446)
PMV BLD AUTO: 9.8 FL (ref 9–12.9)
RBC # BLD AUTO: 3.04 M/UL (ref 4.7–6.1)
WBC # BLD AUTO: 3.7 K/UL (ref 4.8–10.8)

## 2025-02-12 PROCEDURE — 700102 HCHG RX REV CODE 250 W/ 637 OVERRIDE(OP): Performed by: HOSPITALIST

## 2025-02-12 PROCEDURE — 36415 COLL VENOUS BLD VENIPUNCTURE: CPT

## 2025-02-12 PROCEDURE — A9270 NON-COVERED ITEM OR SERVICE: HCPCS | Performed by: HOSPITALIST

## 2025-02-12 PROCEDURE — 700111 HCHG RX REV CODE 636 W/ 250 OVERRIDE (IP): Mod: JZ | Performed by: HOSPITALIST

## 2025-02-12 PROCEDURE — 94760 N-INVAS EAR/PLS OXIMETRY 1: CPT

## 2025-02-12 PROCEDURE — 99233 SBSQ HOSP IP/OBS HIGH 50: CPT | Performed by: HOSPITALIST

## 2025-02-12 PROCEDURE — 85025 COMPLETE CBC W/AUTO DIFF WBC: CPT

## 2025-02-12 PROCEDURE — 770001 HCHG ROOM/CARE - MED/SURG/GYN PRIV*

## 2025-02-12 RX ADMIN — Medication 100 MG: at 05:00

## 2025-02-12 RX ADMIN — OXYCODONE HYDROCHLORIDE 5 MG: 5 TABLET ORAL at 11:38

## 2025-02-12 RX ADMIN — SUCRALFATE 1 G: 1 TABLET ORAL at 08:02

## 2025-02-12 RX ADMIN — SUCRALFATE 1 G: 1 TABLET ORAL at 13:03

## 2025-02-12 RX ADMIN — ACETAMINOPHEN 650 MG: 325 TABLET ORAL at 02:53

## 2025-02-12 RX ADMIN — LACTULOSE 30 ML: 20 SOLUTION ORAL at 17:16

## 2025-02-12 RX ADMIN — SUCRALFATE 1 G: 1 TABLET ORAL at 17:06

## 2025-02-12 RX ADMIN — OMEPRAZOLE 40 MG: 20 CAPSULE, DELAYED RELEASE ORAL at 05:00

## 2025-02-12 RX ADMIN — OXYCODONE HYDROCHLORIDE 5 MG: 5 TABLET ORAL at 19:33

## 2025-02-12 RX ADMIN — OXYCODONE HYDROCHLORIDE 5 MG: 5 TABLET ORAL at 05:01

## 2025-02-12 RX ADMIN — SENNOSIDES AND DOCUSATE SODIUM 2 TABLET: 50; 8.6 TABLET ORAL at 17:06

## 2025-02-12 RX ADMIN — FUROSEMIDE 20 MG: 10 INJECTION, SOLUTION INTRAVENOUS at 17:07

## 2025-02-12 RX ADMIN — FOLIC ACID 1 MG: 1 TABLET ORAL at 05:00

## 2025-02-12 RX ADMIN — VITAM B12 100 MCG: 100 TAB at 05:00

## 2025-02-12 RX ADMIN — ONDANSETRON 4 MG: 2 INJECTION INTRAMUSCULAR; INTRAVENOUS at 11:37

## 2025-02-12 RX ADMIN — FUROSEMIDE 20 MG: 10 INJECTION, SOLUTION INTRAVENOUS at 05:03

## 2025-02-12 RX ADMIN — SUCRALFATE 1 G: 1 TABLET ORAL at 20:45

## 2025-02-12 RX ADMIN — OMEPRAZOLE 40 MG: 20 CAPSULE, DELAYED RELEASE ORAL at 17:06

## 2025-02-12 RX ADMIN — TAMSULOSIN HYDROCHLORIDE 0.4 MG: 0.4 CAPSULE ORAL at 08:02

## 2025-02-12 RX ADMIN — LACTULOSE 30 ML: 20 SOLUTION ORAL at 05:00

## 2025-02-12 RX ADMIN — METHOCARBAMOL 500 MG: 500 TABLET ORAL at 02:53

## 2025-02-12 ASSESSMENT — ENCOUNTER SYMPTOMS
CHILLS: 0
HEMOPTYSIS: 0
NAUSEA: 0
SPUTUM PRODUCTION: 0
VOMITING: 0
BACK PAIN: 1
DIZZINESS: 0
ORTHOPNEA: 0
COUGH: 0
PALPITATIONS: 0

## 2025-02-12 ASSESSMENT — PAIN DESCRIPTION - PAIN TYPE
TYPE: ACUTE PAIN

## 2025-02-12 NOTE — CARE PLAN
The patient is Stable - Low risk of patient condition declining or worsening    Shift Goals  Clinical Goals: Patient pain will be managed at a tolerable level of 2/10 or less throughout night, Patient will remain free from fall or injury, Monitor edema  Patient Goals: Rest comfortably, pain management  Family Goals: n/a    Progress made toward(s) clinical / shift goals:  Patient's pain maintained at a tolerable level of 2/10 or less throughout shift with medication per MAR and rest. Heat packs provided to help alleviate back pain. Pt did not sustain a fall or injury during shift. Able to rest comfortably throughout shift. Edema present on lower extremities.     Patient is not progressing towards the following goals:

## 2025-02-12 NOTE — PROGRESS NOTES
Hospital Medicine Daily Progress Note    Date of Service  2/12/2025    Chief Complaint  Chava Mercedes is a 74 y.o. male admitted 2/10/2025 with generalized swelling    Hospital Course  Chava Mercedes has past medical history of cirrhosis, esophageal varices, ascites, and pleural effusion.  Patient was hospitalized 1/22/2025 through 1/25/2025 for GI bleed and found to have esophagitis and portal hypertensive gastropathy.  Large-volume paracentesis was performed during that hospitalization.  Patient presented initially to an outside medical facility on 2/9/2025 with complaints of anasarca.  He was transferred to [facility] for higher level of care    Interval Problem Update  Some improvement in lower extremity edema, he continues to have scrotal edema which is very uncomfortable  Reports that he is urinating all the time, 1200 mL of urine charted yesterday  Breathing is comfortable, he is on room air  Reports upper back pain, this is reproducible on exam appears to be musculoskeletal pain in his upper right trap    I have discussed this patient's plan of care and discharge plan at IDT rounds today with Case Management, Nursing, Nursing leadership, and other members of the IDT team.    Consultants/Specialty  pulmonary    Code Status  Full Code    Disposition  The patient is not medically cleared for discharge to home or a post-acute facility.  Anticipate discharge to: home with close outpatient follow-up    I have placed the appropriate orders for post-discharge needs.    Review of Systems  Review of Systems   Constitutional:  Negative for chills and malaise/fatigue.   Respiratory:  Negative for cough, hemoptysis and sputum production.    Cardiovascular:  Negative for chest pain, palpitations and orthopnea.   Gastrointestinal:  Negative for nausea and vomiting.   Musculoskeletal:  Positive for back pain.   Skin:  Negative for itching and rash.   Neurological:  Negative for dizziness.   All other systems  reviewed and are negative.       Physical Exam  Temp:  [36.7 °C (98.1 °F)-37.2 °C (98.9 °F)] 36.7 °C (98.1 °F)  Pulse:  [91-94] 92  Resp:  [16] 16  BP: ()/(59-68) 114/65  SpO2:  [95 %-97 %] 95 %    Physical Exam  Constitutional:       General: He is not in acute distress.     Appearance: Normal appearance. He is normal weight.   HENT:      Head: Atraumatic.      Right Ear: External ear normal.      Left Ear: External ear normal.      Nose: Nose normal.   Eyes:      Extraocular Movements: Extraocular movements intact.   Cardiovascular:      Rate and Rhythm: Normal rate and regular rhythm.      Pulses: Normal pulses.   Pulmonary:      Effort: Pulmonary effort is normal.      Breath sounds: Normal breath sounds.      Comments: Decreased breath sounds on the right  Abdominal:      General: Abdomen is flat. Bowel sounds are normal. There is distension.      Palpations: Abdomen is soft.   Musculoskeletal:         General: Normal range of motion.      Cervical back: Normal range of motion and neck supple.      Right lower leg: Edema present.      Left lower leg: Edema present.   Skin:     General: Skin is warm and dry.   Neurological:      General: No focal deficit present.      Mental Status: He is alert and oriented to person, place, and time.      Cranial Nerves: No cranial nerve deficit.   Psychiatric:         Mood and Affect: Mood normal.         Behavior: Behavior normal.         Fluids    Intake/Output Summary (Last 24 hours) at 2/12/2025 1023  Last data filed at 2/12/2025 0900  Gross per 24 hour   Intake 960 ml   Output 1200 ml   Net -240 ml        Laboratory  Recent Labs     02/10/25  0252 02/11/25  0154 02/12/25  0147   WBC 3.8* 3.2* 3.7*   RBC 3.77* 2.99* 3.04*   HEMOGLOBIN 10.3* 8.2* 8.3*   HEMATOCRIT 33.3* 25.8* 26.0*   MCV 88.3 86.3 85.5   MCH 27.3 27.4 27.3   MCHC 30.9* 31.8* 31.9*   RDW 65.1* 60.6* 58.6*   PLATELETCT 134* 116* 125*   MPV 11.0 10.2 9.8     Recent Labs     02/10/25  0252 02/11/25  0154  02/11/25  1725   SODIUM 134* 133* 130*   POTASSIUM 4.1 3.7 4.2   CHLORIDE 103 104 99   CO2 17* 20 21   GLUCOSE 99 94 123*   BUN 13 12 10   CREATININE 0.91 0.85 0.81   CALCIUM 8.3* 7.8* 7.8*                   Imaging  DX-CHEST-PORTABLE (1 VIEW)   Final Result      Enhancing large left pleural effusion with volume loss involving the left lung base.      DX-OUTSIDE IMAGES-DX CHEST   Final Result           Assessment/Plan  * Alcoholic cirrhosis of liver with ascites (HCC)- (present on admission)  Assessment & Plan  Paracentesis with 4.7 L removed on 2/10    2/12:  Lasix and spironolactone    Anasarca- (present on admission)  Assessment & Plan  2/12:  Patient has extensive anasarca and scrotal edema, he would benefit from ongoing intravenous diuresis  I ordered Lasix intravenously twice daily  Monitor for side effects of Lasix which can include hypotension and hypokalemia, vital signs and laboratory values will be monitored      Pleural effusion on left- (present on admission)  Assessment & Plan  2/12:  Likely hepatic hydrothorax  Patient is on room air  Defer thoracentesis for now  Discussed with patient who is in agreement    Coagulopathy (HCC)- (present on admission)  Assessment & Plan  Due to liver disease    Pancytopenia (HCC)- (present on admission)  Assessment & Plan  Suspect related to liver disease, no signs of infection         VTE prophylaxis:   SCDs/TEDs      I have performed a physical exam and reviewed and updated ROS and Plan today (2/12/2025). In review of yesterday's note (2/11/2025), there are no changes except as documented above.

## 2025-02-12 NOTE — CARE PLAN
The patient is Stable - Low risk of patient condition declining or worsening    Shift Goals  Clinical Goals: Manage pain at tolerable level throughout the shift, given diuresis  Patient Goals: pain control, rest comfortably  Family Goals: n/a    Progress made toward(s) clinical / shift goals:  Given patient  pharmacologic and non pharmacologic modalities for pain management  Managed appropriately per MAR. Fall precaution in place. Adequate urine output.     Patient is not progressing towards the following goals:      Problem: Pain - Standard  Goal: Alleviation of pain or a reduction in pain to the patient’s comfort goal  Outcome: Progressing     Problem: Mobility  Goal: Patient's capacity to carry out activities will improve  Outcome: Progressing     Problem: Fall Risk  Goal: Patient will remain free from falls  Outcome: Progressing     Problem: Fluid Volume  Goal: Fluid volume balance will be maintained  Outcome: Progressing

## 2025-02-12 NOTE — PROGRESS NOTES
Received report from day shift RN. Assumed patient care at 1900. Patient is A&O4, on RA, no SOB noted. No signs of distress or discomfort. Pt complains of pain in the back and muscle spasms in back, medicated per MAR. Plan of care for the night discussed with patient. Patient verbalized understanding. Patient resting in bed comfortably. Safety precautions in place. All patient belongings and call light within reach. All needs addressed at this time. Patient will call with any needs.

## 2025-02-12 NOTE — CARE PLAN
The patient is Stable - Low risk of patient condition declining or worsening    Shift Goals  Clinical Goals: Manage pain at tolerable level throughout the shift  Patient Goals: pain control, rest comfortably  Family Goals: n/a    Progress made toward(s) clinical / shift goals:  Given patient  pharmacologic and non pharmacologic modalities for pain management  Managed appropriately per MAR. Fall precaution in place.    Patient is not progressing towards the following goals:      Problem: Pain - Standard  Goal: Alleviation of pain or a reduction in pain to the patient’s comfort goal  Outcome: Progressing     Problem: Respiratory  Goal: Patient will achieve/maintain optimum respiratory ventilation and gas exchange  Outcome: Progressing     Problem: Mobility  Goal: Patient's capacity to carry out activities will improve  Outcome: Progressing     Problem: Fall Risk  Goal: Patient will remain free from falls  Outcome: Progressing

## 2025-02-12 NOTE — PROGRESS NOTES
Hospital Medicine Daily Progress Note    Date of Service  2/11/2025    Chief Complaint  Chava Mercedes is a 74 y.o. male admitted 2/10/2025 with generalized swelling    Hospital Course  Chava Mercedes has past medical history of cirrhosis, esophageal varices, ascites, and pleural effusion.  Patient was hospitalized 1/22/2025 through 1/25/2025 for GI bleed and found to have esophagitis and portal hypertensive gastropathy.  Large-volume paracentesis was performed during that hospitalization.  Patient presented initially to an outside medical facility on 2/9/2025 with complaints of anasarca.  He was transferred to [facility] for higher level of care    Interval Problem Update  Patient states that he feels overall better since paracentesis  Complaints of mid back pain  Reproducible pain on exam  On room air, doesn't feel short of breath  Blood pressure on the low side, SBP's 90's, not dizzy or lightheaded.    I have discussed this patient's plan of care and discharge plan at IDT rounds today with Case Management, Nursing, Nursing leadership, and other members of the IDT team.    Consultants/Specialty  pulmonary    Code Status  Full Code    Disposition  The patient is not medically cleared for discharge to home or a post-acute facility.  Anticipate discharge to: home with close outpatient follow-up    I have placed the appropriate orders for post-discharge needs.    Review of Systems  Review of Systems   Constitutional:  Negative for chills and malaise/fatigue.   Respiratory:  Negative for cough, hemoptysis and sputum production.    Cardiovascular:  Negative for chest pain, palpitations and orthopnea.   Gastrointestinal:  Negative for nausea and vomiting.   Musculoskeletal:  Positive for back pain.   Skin:  Negative for itching and rash.   Neurological:  Negative for dizziness.   All other systems reviewed and are negative.       Physical Exam  Temp:  [36.6 °C (97.8 °F)-37.2 °C (98.9 °F)] 37.2 °C (98.9  °F)  Pulse:  [91-98] 91  Resp:  [16-18] 16  BP: (90-94)/(56-59) 94/59  SpO2:  [93 %-97 %] 97 %    Physical Exam  Constitutional:       General: He is not in acute distress.     Appearance: Normal appearance. He is normal weight.   HENT:      Head: Atraumatic.      Right Ear: External ear normal.      Left Ear: External ear normal.      Nose: Nose normal.   Eyes:      Extraocular Movements: Extraocular movements intact.   Cardiovascular:      Rate and Rhythm: Normal rate and regular rhythm.      Pulses: Normal pulses.   Pulmonary:      Effort: Pulmonary effort is normal.      Breath sounds: Normal breath sounds.      Comments: Decreased breath sounds on the right  Abdominal:      General: Abdomen is flat. Bowel sounds are normal. There is distension.      Palpations: Abdomen is soft.   Musculoskeletal:         General: Normal range of motion.      Cervical back: Normal range of motion and neck supple.      Right lower leg: Edema present.      Left lower leg: Edema present.   Skin:     General: Skin is warm and dry.   Neurological:      General: No focal deficit present.      Mental Status: He is alert and oriented to person, place, and time.      Cranial Nerves: No cranial nerve deficit.   Psychiatric:         Mood and Affect: Mood normal.         Behavior: Behavior normal.         Fluids    Intake/Output Summary (Last 24 hours) at 2/11/2025 1707  Last data filed at 2/11/2025 1300  Gross per 24 hour   Intake 720 ml   Output 600 ml   Net 120 ml        Laboratory  Recent Labs     02/10/25  0252 02/11/25  0154   WBC 3.8* 3.2*   RBC 3.77* 2.99*   HEMOGLOBIN 10.3* 8.2*   HEMATOCRIT 33.3* 25.8*   MCV 88.3 86.3   MCH 27.3 27.4   MCHC 30.9* 31.8*   RDW 65.1* 60.6*   PLATELETCT 134* 116*   MPV 11.0 10.2     Recent Labs     02/10/25  0252 02/11/25  0154   SODIUM 134* 133*   POTASSIUM 4.1 3.7   CHLORIDE 103 104   CO2 17* 20   GLUCOSE 99 94   BUN 13 12   CREATININE 0.91 0.85   CALCIUM 8.3* 7.8*                    Imaging  DX-CHEST-PORTABLE (1 VIEW)   Final Result      Enhancing large left pleural effusion with volume loss involving the left lung base.      DX-OUTSIDE IMAGES-DX CHEST   Final Result           Assessment/Plan  * Alcoholic cirrhosis of liver with ascites (HCC)- (present on admission)  Assessment & Plan  2/11:  Paracentesis with 4.7 L removed yesterday  Resume Lasix and spironolactone    Anasarca- (present on admission)  Assessment & Plan  2/11:  I ordered intravenous Lasix,  monitor for side effects of Lasix which can include hypotension and hypokalemia, vital signs and laboratory values will be monitored      Pleural effusion on left- (present on admission)  Assessment & Plan  2/11:  Discussed with pulmonary  Likely hepatic hydrothorax  Patient is on room air  Defer thoracentesis for now    Coagulopathy (HCC)- (present on admission)  Assessment & Plan  Due to liver disease    Pancytopenia (HCC)- (present on admission)  Assessment & Plan  Suspect related to liver disease, no signs of infection         VTE prophylaxis:   SCDs/TEDs      I have performed a physical exam and reviewed and updated ROS and Plan today (2/11/2025). In review of yesterday's note (2/10/2025), there are no changes except as documented above.

## 2025-02-13 ENCOUNTER — PHARMACY VISIT (OUTPATIENT)
Dept: PHARMACY | Facility: MEDICAL CENTER | Age: 75
End: 2025-02-13
Payer: COMMERCIAL

## 2025-02-13 VITALS
RESPIRATION RATE: 18 BRPM | OXYGEN SATURATION: 96 % | HEIGHT: 72 IN | BODY MASS INDEX: 25.23 KG/M2 | HEART RATE: 98 BPM | DIASTOLIC BLOOD PRESSURE: 58 MMHG | TEMPERATURE: 98 F | SYSTOLIC BLOOD PRESSURE: 102 MMHG | WEIGHT: 186.29 LBS

## 2025-02-13 LAB
BASOPHILS # BLD AUTO: 1.6 % (ref 0–1.8)
BASOPHILS # BLD: 0.05 K/UL (ref 0–0.12)
EOSINOPHIL # BLD AUTO: 0.18 K/UL (ref 0–0.51)
EOSINOPHIL NFR BLD: 5.6 % (ref 0–6.9)
ERYTHROCYTE [DISTWIDTH] IN BLOOD BY AUTOMATED COUNT: 58.4 FL (ref 35.9–50)
HCT VFR BLD AUTO: 27.2 % (ref 42–52)
HGB BLD-MCNC: 8.9 G/DL (ref 14–18)
IMM GRANULOCYTES # BLD AUTO: 0.01 K/UL (ref 0–0.11)
IMM GRANULOCYTES NFR BLD AUTO: 0.3 % (ref 0–0.9)
LYMPHOCYTES # BLD AUTO: 0.78 K/UL (ref 1–4.8)
LYMPHOCYTES NFR BLD: 24.3 % (ref 22–41)
MCH RBC QN AUTO: 27.5 PG (ref 27–33)
MCHC RBC AUTO-ENTMCNC: 32.7 G/DL (ref 32.3–36.5)
MCV RBC AUTO: 84 FL (ref 81.4–97.8)
MONOCYTES # BLD AUTO: 0.66 K/UL (ref 0–0.85)
MONOCYTES NFR BLD AUTO: 20.6 % (ref 0–13.4)
NEUTROPHILS # BLD AUTO: 1.53 K/UL (ref 1.82–7.42)
NEUTROPHILS NFR BLD: 47.6 % (ref 44–72)
NRBC # BLD AUTO: 0 K/UL
NRBC BLD-RTO: 0 /100 WBC (ref 0–0.2)
PLATELET # BLD AUTO: 143 K/UL (ref 164–446)
PMV BLD AUTO: 10.6 FL (ref 9–12.9)
RBC # BLD AUTO: 3.24 M/UL (ref 4.7–6.1)
WBC # BLD AUTO: 3.2 K/UL (ref 4.8–10.8)

## 2025-02-13 PROCEDURE — 700102 HCHG RX REV CODE 250 W/ 637 OVERRIDE(OP): Performed by: HOSPITALIST

## 2025-02-13 PROCEDURE — RXMED WILLOW AMBULATORY MEDICATION CHARGE: Performed by: HOSPITALIST

## 2025-02-13 PROCEDURE — 700111 HCHG RX REV CODE 636 W/ 250 OVERRIDE (IP): Mod: JZ | Performed by: HOSPITALIST

## 2025-02-13 PROCEDURE — 85025 COMPLETE CBC W/AUTO DIFF WBC: CPT

## 2025-02-13 PROCEDURE — A9270 NON-COVERED ITEM OR SERVICE: HCPCS | Performed by: HOSPITALIST

## 2025-02-13 PROCEDURE — 99239 HOSP IP/OBS DSCHRG MGMT >30: CPT | Performed by: HOSPITALIST

## 2025-02-13 PROCEDURE — 36415 COLL VENOUS BLD VENIPUNCTURE: CPT

## 2025-02-13 RX ORDER — SPIRONOLACTONE 50 MG/1
50 TABLET, FILM COATED ORAL DAILY
Qty: 30 TABLET | Refills: 3 | Status: SHIPPED | OUTPATIENT
Start: 2025-02-13

## 2025-02-13 RX ORDER — OXYCODONE HYDROCHLORIDE 5 MG/1
5 TABLET ORAL EVERY 6 HOURS PRN
Qty: 10 TABLET | Refills: 0 | Status: SHIPPED | OUTPATIENT
Start: 2025-02-13 | End: 2025-02-18

## 2025-02-13 RX ORDER — FUROSEMIDE 40 MG/1
40 TABLET ORAL DAILY
Qty: 30 TABLET | Refills: 1 | Status: SHIPPED | OUTPATIENT
Start: 2025-02-13

## 2025-02-13 RX ADMIN — FOLIC ACID 1 MG: 1 TABLET ORAL at 04:57

## 2025-02-13 RX ADMIN — OXYCODONE HYDROCHLORIDE 5 MG: 5 TABLET ORAL at 08:19

## 2025-02-13 RX ADMIN — FUROSEMIDE 20 MG: 10 INJECTION, SOLUTION INTRAVENOUS at 04:56

## 2025-02-13 RX ADMIN — Medication 100 MG: at 04:57

## 2025-02-13 RX ADMIN — TAMSULOSIN HYDROCHLORIDE 0.4 MG: 0.4 CAPSULE ORAL at 08:15

## 2025-02-13 RX ADMIN — SUCRALFATE 1 G: 1 TABLET ORAL at 08:15

## 2025-02-13 RX ADMIN — LACTULOSE 30 ML: 20 SOLUTION ORAL at 04:56

## 2025-02-13 RX ADMIN — VITAM B12 100 MCG: 100 TAB at 04:56

## 2025-02-13 RX ADMIN — SUCRALFATE 1 G: 1 TABLET ORAL at 12:22

## 2025-02-13 RX ADMIN — OMEPRAZOLE 40 MG: 20 CAPSULE, DELAYED RELEASE ORAL at 04:56

## 2025-02-13 ASSESSMENT — PAIN DESCRIPTION - PAIN TYPE
TYPE: ACUTE PAIN
TYPE: ACUTE PAIN

## 2025-02-13 NOTE — CARE PLAN
The patient is Stable - Low risk of patient condition declining or worsening    Shift Goals  Clinical Goals: Patient pain will be managed at a tolerable level of 2/10 or less throughout night, Patient will remain free from fall or injury, Monitor I&O's  Patient Goals: Rest comfortably, pain management  Family Goals: n/a    Progress made toward(s) clinical / shift goals:  Patient's pain maintained at a tolerable level of 2/10 or less throughout shift with medication per MAR and rest. Pt did not sustain a fall or injury during shift. Able to rest comfortably throughout shift. I&O's monitored and charter appropriately.     Patient is not progressing towards the following goals:

## 2025-02-13 NOTE — CARE PLAN
The patient is Stable - Low risk of patient condition declining or worsening    Shift Goals  Clinical Goals: Manage pain at tolerable level throughout the shift  Patient Goals: pain control, rest comfortably  Family Goals: n/a    Progress made toward(s) clinical / shift goals:  Administered PRN medication per MAR for pain controlled. After pain medications pain is tolerable and able to rest comfortably in bed..     Patient is not progressing towards the following goals:      Problem: Pain - Standard  Goal: Alleviation of pain or a reduction in pain to the patient’s comfort goal  Outcome: Progressing     Problem: Discharge Barriers/Planning  Goal: Patient's continuum of care needs are met  Outcome: Progressing  Flowsheets (Taken 2/13/2025 1232)  Continuum of Care Needs:   Involved patient/family/support system in discharge process   Collaborated with Case Management/   Provided and explained discharge instructions   Assessed for discharge barriers   Communicated discharge barriers to interdisciplinary meka

## 2025-02-13 NOTE — PROGRESS NOTES
Received bedside report from night shift RN.  Assumed pt care.   Assessment and chart check complete.  Pt is AA0X4, respirations even and unlabored, no signs of distress, denies nausea/vomiting.  Updated for the POC of the day.  Fall precautions in place, treaded socks on pt, bed in low position.   Call light within reach.   Educated pt to call if needing anything.   Hourly rounding.

## 2025-02-13 NOTE — PROGRESS NOTES
Received report from day shift RN. Assumed patient care at 1900. Patient is A&O4, on RA, no SOB noted. No signs of distress or discomfort. Pt complains of 9/10 pain in back, legs and scrotum, medicated per MAR. Plan of care for the night discussed with patient. Patient verbalized understanding. Patient resting in bed comfortably. Safety precautions in place. Urinal at bedside. All patient belongings and call light within reach. All needs addressed at this time. Patient will call with any needs.

## 2025-02-13 NOTE — DISCHARGE SUMMARY
Discharge Summary    CHIEF COMPLAINT ON ADMISSION  No chief complaint on file.      Reason for Admission  Ascites     Admission Date  2/10/2025    CODE STATUS  Full Code    HPI & HOSPITAL COURSE  This is a 74 y.o. male here with generalized swelling     Chava Chadian has past medical history of cirrhosis, esophageal varices, ascites, and pleural effusion.  Patient was hospitalized 1/22/2025 through 1/25/2025 for GI bleed and found to have esophagitis and portal hypertensive gastropathy.  Large-volume paracentesis was performed during that hospitalization.      Patient presented initially to an outside medical facility on 2/9/2025 with complaints of anasarca.  He was transferred to [facility] [place] for higher level of care.    The patient here underwent a paracentesis with 4.7 L of fluid removed.  He was also treated with intravenous Lasix.  Patient is significantly more comfortable with less abdominal distention.  His lower extremity edema and scrotal edema has improved.    Patient does have a chronic pleural effusion.  This was discussed with pulmonary.  Patient's respiratory status is stable and decision was made not to perform a thoracentesis.    Patient can be discharged home.  We discussed the importance of weighing himself daily and taking his diuretics.  He understands that he will need to limit his fluid intake as well    Patient should follow-up with his primary care provider near his home.  If he has difficulty arranging appointment, information has been provided for local health clinic in [place]    Therefore, he is discharged in fair and stable condition to home with close outpatient follow-up.    The patient met 2-midnight criteria for an inpatient stay at the time of discharge.    Discharge Date  2/13/2025    FOLLOW UP ITEMS POST DISCHARGE  Follow-up with primary care provider    DISCHARGE DIAGNOSES  Principal Problem:    Alcoholic cirrhosis of liver with ascites (HCC) (POA: Yes)  Active Problems:     Pleural effusion on left (POA: Yes)    Anasarca (POA: Yes)    Pancytopenia (HCC) (POA: Yes)    Coagulopathy (HCC) (POA: Yes)  Resolved Problems:    * No resolved hospital problems. *      FOLLOW UP  Follow-up with primary care provider    MEDICATIONS ON DISCHARGE     Medication List        START taking these medications        Instructions   oxyCODONE immediate-release 5 MG Tabs  Commonly known as: Roxicodone   Take 1 Tablet by mouth every 6 hours as needed for Severe Pain for up to 5 days.  Dose: 5 mg            CHANGE how you take these medications        Instructions   furosemide 40 MG Tabs  What changed:   medication strength  how much to take  Commonly known as: Lasix   Take 1 Tablet by mouth every day.  Dose: 40 mg     spironolactone 50 MG Tabs  What changed:   medication strength  how much to take  Commonly known as: Aldactone   Take 1 Tablet by mouth every day.  Dose: 50 mg            CONTINUE taking these medications        Instructions   cyanocobalamin 100 MCG Tabs  Commonly known as: Vitamin B-12   Take 100 mcg by mouth every day.  Dose: 100 mcg     folic acid 1 MG Tabs  Commonly known as: Folvite   Take 1 Tablet by mouth every day.  Dose: 1 mg     lactulose 10 g/15mL Soln   Take 30 mL by mouth 3 times a day.  Dose: 30 mL     methocarbamol 500 MG Tabs  Commonly known as: Robaxin   Take 1 Tablet by mouth 4 times a day as needed (muscle spasms).  Dose: 500 mg     omeprazole 20 MG delayed-release capsule  Commonly known as: PriLOSEC   Take 2 Capsules by mouth 2 times a day.  Dose: 40 mg     sucralfate 1 GM Tabs  Commonly known as: Carafate   Take 1 g by mouth 4 times a day.  Dose: 1 g     tamsulosin 0.4 MG capsule  Commonly known as: Flomax   Take 1 Capsule by mouth 1/2 hour after breakfast.  Dose: 0.4 mg     thiamine 100 MG tablet  Commonly known as: Thiamine   Take 1 Tablet by mouth every day.  Dose: 100 mg              Allergies  Allergies   Allergen Reactions    Morphine Itching     Itchiness         DIET  Orders Placed This Encounter   Procedures    Diet Order Diet: Cardiac     Standing Status:   Standing     Number of Occurrences:   1     Diet::   Cardiac [6]       ACTIVITY  As tolerated.  Weight bearing as tolerated    CONSULTATIONS  Pulmonary    PROCEDURES  Paracentesis    LABORATORY  Lab Results   Component Value Date    SODIUM 130 (L) 02/11/2025    POTASSIUM 4.2 02/11/2025    CHLORIDE 99 02/11/2025    CO2 21 02/11/2025    GLUCOSE 123 (H) 02/11/2025    BUN 10 02/11/2025    CREATININE 0.81 02/11/2025        Lab Results   Component Value Date    WBC 3.2 (L) 02/13/2025    HEMOGLOBIN 8.9 (L) 02/13/2025    HEMATOCRIT 27.2 (L) 02/13/2025    PLATELETCT 143 (L) 02/13/2025        Total time of the discharge process exceeds 35 minutes.

## 2025-02-13 NOTE — DISCHARGE PLANNING
Case Management Discharge Planning    Admission Date: 2/10/2025  GMLOS: 3.3  ALOS: 3    6-Clicks ADL Score: 20  6-Clicks Mobility Score: 18      Anticipated Discharge Dispo: Discharge Disposition: Discharged to home/self care (01)  Discharge Address: [address]    Pt's case discussed during IDT rounds. Pt is medically cleared to dc home today but needs help with transportation.   Met with pt at bedside and confirmed he has access to his home.   Transport request submitted for [employer] [facility]     IMM delivered     Per [employer] pt doesn't have transport benefit. [employer] requested to assist with discharge home.

## (undated) DEVICE — BITEBLOCK ENDOSCOPIC PEDI. - (25/BX)

## (undated) DEVICE — TUBE E-T HI-LO CUFF 8.5MM (10EA/PK)

## (undated) DEVICE — TUBE CONNECTING SUCTION - CLEAR PLASTIC STERILE 72 IN (50EA/CA)

## (undated) DEVICE — SET EXTENSION WITH 2 PORTS (48EA/CA) ***PART #2C8610 IS A SUBSTITUTE*****

## (undated) DEVICE — MASK WITH FACE SHIELD (25/BX 4BX/CA)

## (undated) DEVICE — SUTURE 2-0 PDSII VIOLET SH 18 IN (12EA/BX)

## (undated) DEVICE — MASK PANORAMIC OXYGEN PRO2 (30EA/CA)

## (undated) DEVICE — SUTURE 3-0 VICRYL PLUS - 12 X 18 INCH (12/BX)

## (undated) DEVICE — LACTATED RINGERS INJ 1000 ML - (14EA/CA 60CA/PF)

## (undated) DEVICE — SPONGE GAUZESTER 4 X 4 4PLY - (128PK/CA)

## (undated) DEVICE — NEPTUNE 4 PORT MANIFOLD - (20/PK)

## (undated) DEVICE — TUBE SUCTION YANKAUER 1/4 X 6FT (50EA/CA)"

## (undated) DEVICE — SYRINGE SAFETY 5 ML 18 GA X 1-1/2 BLUNT LL (100/BX 4BX/CA)

## (undated) DEVICE — SYRINGE DISP. 60 CC LL - (30/BX, 12BX/CA)**WHEN THESE ARE GONE ORDER #500206**

## (undated) DEVICE — KIT CUSTOM PROCEDURE SINGLE FOR ENDO  (15/CA)

## (undated) DEVICE — SYRINGE 1 ML LL POLYCARBONATE LUER LOCK (100EA/BX 8BX/CA)

## (undated) DEVICE — TUBING CLEARLINK DUO-VENT - C-FLO (48EA/CA)

## (undated) DEVICE — BITE BLOCK ADULT 60FR (100EA/CA)

## (undated) DEVICE — COVER LIGHT HANDLE FLEXIBLE - SOFT (2EA/PK 80PK/CA)

## (undated) DEVICE — CANISTER SUCTION 3000ML MECHANICAL FILTER AUTO SHUTOFF MEDI-VAC NONSTERILE LF DISP  (40EA/CA)

## (undated) DEVICE — ELECTRODE DUAL RETURN W/ CORD - (50/PK)

## (undated) DEVICE — SUTURE 3-0 VICRYL PLUS SH - 8X 18 INCH (12/BX)

## (undated) DEVICE — SET LEADWIRE 5 LEAD BEDSIDE DISPOSABLE ECG (1SET OF 5/EA)

## (undated) DEVICE — KIT CUSTOM PROCEDURE SINGLE FOR ENDO (15/CA)

## (undated) DEVICE — SODIUM CHL IRRIGATION 0.9% 1000ML (12EA/CA)

## (undated) DEVICE — SUTURE 4-0 MONOCRYL PLUS PS-2 - 27 INCH (36/BX)

## (undated) DEVICE — TUBE E-T HI-LO CUFF 7.0MM (10EA/PK)

## (undated) DEVICE — GLOVE BIOGEL PI INDICATOR SZ 7.5 SURGICAL PF LF -(50/BX 4BX/CA)

## (undated) DEVICE — BLOCK BITE MAXI DENTAL RETENTION RIM (100EA/BX)

## (undated) DEVICE — SENSOR OXIMETER ADULT SPO2 RD SET (20EA/BX)

## (undated) DEVICE — SLEEVE, VASO, THIGH, MED

## (undated) DEVICE — DRESSING TRANSPARENT FILM TEGADERM 4 X 4.75" (50EA/BX)"

## (undated) DEVICE — CATHETER IV SAFETY 20 GA X 1-1/4 (50/BX)

## (undated) DEVICE — CHLORAPREP 26 ML APPLICATOR - ORANGE TINT(25/CA)

## (undated) DEVICE — PORT AUXILLARY WATER (50EA/BX)

## (undated) DEVICE — SYRINGE SAFETY 10 ML 18 GA X 1 1/2 BLUNT LL (100/BX 4BX/CA)

## (undated) DEVICE — FILM CASSETTE ENDO

## (undated) DEVICE — DRAIN PENROSE STERILE 1/4 X - 18 IN  (25EA/BX)

## (undated) DEVICE — TUBE E-T HI-LO CUFF 8.0MM (10EA/PK)

## (undated) DEVICE — BASIN EMESIS DISP. - (250/CA)

## (undated) DEVICE — SOD. CHL 10CC SYRINGE PREFILL - W/10 CC (30/BX)

## (undated) DEVICE — GOWN SURGEONS LARGE - (32/CA)

## (undated) DEVICE — SUTURE 2-0 VICRYL PLUS CT-1 36 (36PK/BX)"

## (undated) DEVICE — SUTURE GENERAL

## (undated) DEVICE — CANISTER SUCTION RIGID RED 1500CC (40EA/CA)

## (undated) DEVICE — CONTAINER, SPECIMEN, STERILE

## (undated) DEVICE — GOWN WARMING STANDARD FLEX - (30/CA)

## (undated) DEVICE — TOWEL STOP TIMEOUT SAFETY FLAG (40EA/CA)

## (undated) DEVICE — BUTTON ENDOSCOPY DISPOSABLE

## (undated) DEVICE — FORCEP RADIAL JAW 4 STANDARD CAPACITY W/NEEDLE 240CM (40EA/BX)

## (undated) DEVICE — WATER IRRIGATION STERILE 1000ML (12EA/CA)

## (undated) DEVICE — TUBE E-T HI-LO CUFF 7.5MM (10EA/PK)

## (undated) DEVICE — CLOSURE WOUND 1/4 X 4 (STERI - STRIP) (50/BX 4BX/CA)

## (undated) DEVICE — ELECTRODE 850 FOAM ADHESIVE - HYDROGEL RADIOTRNSPRNT (50/PK)

## (undated) DEVICE — CATHETER IV SAFETY 22 GA X 1 (50EA/BX)

## (undated) DEVICE — GLOVE BIOGEL PI ORTHO SZ 8 PF LF (40PR/BX)

## (undated) DEVICE — SUTURE2-0 27IN VCRL ANTI VIOL (36PK/BX)

## (undated) DEVICE — KIT  I.V. START (100EA/CA)

## (undated) DEVICE — TUBE NG SALEM SUMP 14FR (50EA/BX)

## (undated) DEVICE — GLOVE SZ 7 BIOGEL PI MICRO - PF LF (50PR/BX 4BX/CA)

## (undated) DEVICE — STAPLER SKIN DISP - (6/BX 10BX/CA) VISISTAT

## (undated) DEVICE — NEEDLE NON SAFETY HYPO 22 GA X 1 1/2 IN (100/BX)

## (undated) DEVICE — PACK MINOR BASIN - (2EA/CA)

## (undated) DEVICE — SYRINGE SAFETY 3 ML 18 GA X 1 1/2 BLUNT LL (100/BX 8BX/CA)

## (undated) DEVICE — TUBE E-T HI-LO CUFF 6.5MM (10EA/BX)

## (undated) DEVICE — SPONGE GAUZE NON-STERILE 4X4 - (2000/CA 10PK/CA)

## (undated) DEVICE — COVER LIGHT HANDLE ALC PLUS DISP (18EA/BX)

## (undated) DEVICE — GOWN SURGEONS X-LARGE - DISP. (30/CA)

## (undated) DEVICE — CATHETER IV 20 GA X 1-1/4 ---SURG.& SDS ONLY--- (50EA/BX)

## (undated) DEVICE — SUTURE 3-0 VICRYL PLUS SH - 27 INCH (36/BX)

## (undated) DEVICE — SLEEVE VASO CALF MED - (10PR/CA)

## (undated) DEVICE — BLADE SURGICAL #10 - (50/BX)

## (undated) DEVICE — MANIFOLD NEPTUNE 1 PORT (20/PK)

## (undated) DEVICE — SUCTION INSTRUMENT YANKAUER BULBOUS TIP W/O VENT (50EA/CA)